# Patient Record
Sex: FEMALE | Race: BLACK OR AFRICAN AMERICAN | Employment: OTHER | URBAN - METROPOLITAN AREA
[De-identification: names, ages, dates, MRNs, and addresses within clinical notes are randomized per-mention and may not be internally consistent; named-entity substitution may affect disease eponyms.]

---

## 2017-01-17 LAB
CREATININE, EXTERNAL: 0.97
MICROALBUMIN UR TEST STR-MCNC: 971.2 MG/DL

## 2017-02-27 ENCOUNTER — OFFICE VISIT (OUTPATIENT)
Dept: ENDOCRINOLOGY | Age: 72
End: 2017-02-27

## 2017-02-27 VITALS
BODY MASS INDEX: 36.18 KG/M2 | HEIGHT: 62 IN | WEIGHT: 196.6 LBS | DIASTOLIC BLOOD PRESSURE: 71 MMHG | SYSTOLIC BLOOD PRESSURE: 157 MMHG | HEART RATE: 71 BPM

## 2017-02-27 DIAGNOSIS — E11.42 TYPE 2 DIABETES MELLITUS WITH DIABETIC POLYNEUROPATHY, WITH LONG-TERM CURRENT USE OF INSULIN (HCC): Primary | ICD-10-CM

## 2017-02-27 DIAGNOSIS — I10 ESSENTIAL HYPERTENSION: ICD-10-CM

## 2017-02-27 DIAGNOSIS — E78.2 MIXED HYPERLIPIDEMIA: ICD-10-CM

## 2017-02-27 DIAGNOSIS — Z79.4 TYPE 2 DIABETES MELLITUS WITH DIABETIC POLYNEUROPATHY, WITH LONG-TERM CURRENT USE OF INSULIN (HCC): Primary | ICD-10-CM

## 2017-02-27 LAB — HBA1C MFR BLD HPLC: 12.5 %

## 2017-02-27 RX ORDER — VALSARTAN AND HYDROCHLOROTHIAZIDE 320; 25 MG/1; MG/1
TABLET, FILM COATED ORAL DAILY
COMMUNITY
Start: 2016-12-09 | End: 2017-05-30 | Stop reason: SDUPTHER

## 2017-02-27 RX ORDER — INSULIN LISPRO 100 [IU]/ML
INJECTION, SUSPENSION SUBCUTANEOUS
Qty: 45 ML | Refills: 3 | Status: SHIPPED | OUTPATIENT
Start: 2017-02-27 | End: 2017-05-30 | Stop reason: SDUPTHER

## 2017-02-27 RX ORDER — BETAMETHASONE DIPROPIONATE 0.5 MG/G
LOTION TOPICAL 2 TIMES DAILY
Qty: 60 ML | Refills: 0 | Status: SHIPPED | OUTPATIENT
Start: 2017-02-27 | End: 2017-05-30 | Stop reason: SDUPTHER

## 2017-02-27 RX ORDER — METOPROLOL TARTRATE 25 MG/1
25 TABLET, FILM COATED ORAL 2 TIMES DAILY
COMMUNITY
Start: 2016-12-09 | End: 2017-05-30

## 2017-02-27 RX ORDER — INSULIN LISPRO 100 [IU]/ML
INJECTION, SUSPENSION SUBCUTANEOUS
Qty: 6 ML | Refills: 0 | Status: SHIPPED | COMMUNITY
Start: 2017-02-27 | End: 2017-05-30 | Stop reason: SDUPTHER

## 2017-02-27 RX ORDER — FUROSEMIDE 40 MG/1
40 TABLET ORAL DAILY
COMMUNITY
Start: 2017-01-17 | End: 2017-05-30 | Stop reason: SDUPTHER

## 2017-02-27 RX ORDER — INSULIN LISPRO 100 [IU]/ML
INJECTION, SUSPENSION SUBCUTANEOUS
Qty: 45 ML | Refills: 3 | Status: SHIPPED | OUTPATIENT
Start: 2017-02-27 | End: 2017-02-27

## 2017-02-27 NOTE — PROGRESS NOTES
Chief Complaint   Patient presents with    Diabetes     pcp and pharmacy verified. No eye exam in over a year     History of Present Illness: Aura Hernandez is a 81 y.o. female here for follow up of diabetes. She was diagnosed with diabetes about around 2010. At her last visit in July 2016 her A1C was 9.6%. Since that time she has had multiple cancellations and I have not see her since July 2016. Her A1C today was 12.1%. Pt is taking Glimeperide 4mg  BID and Lantus 35 units BID. She does not check her BGs. She has seen Dr. Chuck Claros two times since our last visit. She notes that \"everything is stable\" from the CKD and Nephropathy stand point. Pt notes her feet and legs \"are turning colors\". She notes dark spots on her ankles that do itch. No erythema or swelling. She notes her  had CABG in October 2016 and she is caring for him. Her son is also living with her, who is on HD. She is followed by Dr. Tamela Overton of cardiology for HTN and CAD. She has an appointment for a podiatrist in the near future. Her last eye appointment was 2015, no hx of retinopathy. Pt is eating 2-3 meals daily  Pt has breakfast everyday, around 930AM, yesterday she had a rasin bagel with cream cheese and water. She has lunch 3 days per week, yesterday she had a tuna sandwich. She typically has an apple or a pear in the afternoon. She has dinner before 6PM, last night she had baked chicken, mixed greens and apple sauce and water. She will occasionally have a protein bar in the evening. Last night she had an apple. Pt has hx of CAD, neuropathy, and neuropathy. She was previously on Metformin but was not able to tolerate the GI side effects. She denies any episodes of hypoglycemia. Pt also has hx of HLD on Atorvastatin 40mg daily and CT/US evidence of hepatic steatosis. She states she takes her atorvastatin daily. Pt reports adherance to her BP regimen. She is on BB, ARB, CCB, Clonidine and Lasix.    She notes she has checked her BP at home and her SBP is 140+. I have previously screened her for endocrine causes of HTN and they were negative. Current Outpatient Prescriptions   Medication Sig    furosemide (LASIX) 40 mg tablet     metoprolol tartrate (LOPRESSOR) 25 mg tablet Take 25 mg by mouth two (2) times a day.  valsartan-hydroCHLOROthiazide (DIOVAN-HCT) 320-25 mg per tablet daily.  Insulin Lisp & Lisp Prot, Hum, (HUMALOG MIX 75-25 KWIKPEN) 100 unit/mL (75-25) inpn ad    betamethasone dipropionate (DIPROLENE) 0.05 % topical lotion Apply  to affected area two (2) times a day.  Insulin Lisp & Lisp Prot, Hum, (HUMALOG MIX 75-25 KWIKPEN) 100 unit/mL (75-25) inpn 40 units with breakfast and 30 units with dinner    gabapentin (NEURONTIN) 100 mg capsule Take 3 Caps by mouth three (3) times daily.  cloNIDine HCl (CATAPRES) 0.1 mg tablet Take 0.2 mg by mouth nightly.  carvedilol (COREG) 6.25 mg tablet Take  by mouth two (2) times daily (with meals).  amLODIPine (NORVASC) 5 mg tablet Take 1 Tab by mouth daily.  aspirin 81 mg chewable tablet Take 1 Tab by mouth daily.  atorvastatin (LIPITOR) 40 mg tablet Take 1 Tab by mouth nightly.  clopidogrel (PLAVIX) 75 mg tablet Take 1 Tab by mouth daily.  nitroglycerin (NITROSTAT) 0.4 mg SL tablet 1 Tab by SubLINGual route every five (5) minutes as needed for Chest Pain.  glimepiride (AMARYL) 4 mg tablet Take 1 Tab by mouth every morning. Indications: TYPE 2 DIABETES MELLITUS (Patient taking differently: Take 4 mg by mouth two (2) times a day. Indications: TYPE 2 DIABETES MELLITUS)    pantoprazole (PROTONIX) 40 mg tablet Take 1 Tab by mouth every morning. No current facility-administered medications for this visit.       No Known Allergies  Review of Systems:  - Eyes: no blurry vision or double vision  - Cardiovascular: no chest pain  - Respiratory: no shortness of breath  - Musculoskeletal: no myalgias  - Neurological: no numbness/tingling in extremities    Physical Examination:  Blood pressure 157/71, pulse 71, height 5' 2\" (1.575 m), weight 196 lb 9.6 oz (89.2 kg). - General: pleasant, no distress, good eye contact   - Neck: no carotid bruits  - Cardiovascular: regular, normal rate, nl s1 and s2, no m/r/g, 2+ DP pulses   - Respiratory: clear bilaterally  - Integumentary: no edema, no foot ulcers, sensation to monofilament and vibration intact bilaterally  - Psychiatric: normal mood and affect    Data Reviewed:   - none new for review    Assessment/Plan:   1) DM > Pt is not checking her BGs, and her A1C has gone up considerably. I explained that I felt we needed to add a short acting insulin to her basal. She wanted to minimize the number of injections so will start Humalog 75:25 insulin 40 units with breakfast and 30 units with dinner. Pt to stop the Lantus and Glimepride. Pt to check her BGs twice daily and mail her BG logs to me in 3 weeks. 2) HTN > Pts BP is elevated again today and she is on 5 drugs for HTN with poor control. She insistes she has been adherent to her regimen. I have tested her for endocrine causes for her HTN, which were negative. 3) HLD > Pt is on Atrovastain 40mg daily, she is followed by Dr. Pereyra , will defer. 4) Rash > Will give pt prescription for betamethasone cream to apply to her ankles BID. If the rash does not improve, she will need a referral to dermatology. Pt voices understanding and agreement with the plan. Pain noted and pt was recommended to call her PCP for further evaluation and treatment, as needed    We spent 40 minutes of face to face time together and > 50% of the time was spent in counseling on diabetes management and determining what changes to make to her medical regimen. RTC 3 months    Patient Instructions   1) Stop the Lantus  2) Stop the Glimepride  3) Start the Humalog 75:25 insulin. Take 40 units with breakfast and 30 units with dinner.   4) check your blood sugars twice a day for the next 3 weeks. Follow-up Disposition:  Return in about 3 months (around 5/27/2017).     Copy sent to:  Dr. Ellen Mario

## 2017-02-27 NOTE — PATIENT INSTRUCTIONS
1) Stop the Lantus  2) Stop the Glimepride  3) Start the Humalog 75:25 insulin. Take 40 units with breakfast and 30 units with dinner. 4) check your blood sugars twice a day for the next 3 weeks.

## 2017-02-27 NOTE — MR AVS SNAPSHOT
Visit Information Date & Time Provider Department Dept. Phone Encounter #  
 2/27/2017 12:10 PM José Manuel Mora, [address] [facility] [phone] [id number] Follow-up Instructions Return in about 3 months (around 5/27/2017). Upcoming Health Maintenance Date Due  
 EYE EXAM RETINAL OR DILATED Q1 1/9/1957 DTaP/Tdap/Td series (1 - Tdap) 1/9/1968 FOBT Q 1 YEAR AGE 50-75 1/9/1997 ZOSTER VACCINE AGE 60> 1/9/2007 GLAUCOMA SCREENING Q2Y 1/9/2012 OSTEOPOROSIS SCREENING (DEXA) 1/9/2012 Pneumococcal 65+ Low/Medium Risk (1 of 2 - PCV13) 1/9/2012 MEDICARE YEARLY EXAM 1/9/2012 LIPID PANEL Q1 2/16/2016 INFLUENZA AGE 9 TO ADULT 8/1/2016 HEMOGLOBIN A1C Q6M 1/29/2017 FOOT EXAM Q1 7/29/2017 MICROALBUMIN Q1 1/18/2018 BREAST CANCER SCRN MAMMOGRAM 4/25/2018 Allergies as of 2/27/2017  Review Complete On: 2/27/2017 By: José Manuel Mora MD  
 No Known Allergies Current Immunizations  Never Reviewed No immunizations on file. Not reviewed this visit You Were Diagnosed With   
  
 Codes Comments Type 2 diabetes mellitus with diabetic polyneuropathy, with long-term current use of insulin (HCC)    -  Primary ICD-10-CM: E11.42, Z79.4 ICD-9-CM: 250.60, 357.2, V58.67 Essential hypertension     ICD-10-CM: I10 
ICD-9-CM: 401.9 Mixed hyperlipidemia     ICD-10-CM: E78.2 ICD-9-CM: 272.2 Vitals BP  
  
  
  
  
  
 157/71 (BP 1 Location: Left arm, BP Patient Position: Sitting) Vitals History BMI and BSA Data Body Mass Index Body Surface Area 35.96 kg/m 2 1.98 m 2 Preferred Pharmacy Pharmacy Name Phone [facility] [address] [phone] Your Updated Medication List  
  
   
This list is accurate as of: 2/27/17 12:58 PM.  Always use your most recent med list. amLODIPine 5 mg tablet Commonly known as:  Dez Carranza Take 1 Tab by mouth daily. aspirin 81 mg chewable tablet Take 1 Tab by mouth daily. atorvastatin 40 mg tablet Commonly known as:  LIPITOR Take 1 Tab by mouth nightly. betamethasone dipropionate 0.05 % topical lotion Commonly known as:  Lucendia Pontiff Apply  to affected area two (2) times a day. carvedilol 6.25 mg tablet Commonly known as:  Cori Mynor Take  by mouth two (2) times daily (with meals). cloNIDine HCl 0.1 mg tablet Commonly known as:  CATAPRES Take 0.2 mg by mouth nightly. clopidogrel 75 mg Tab Commonly known as:  PLAVIX Take 1 Tab by mouth daily. furosemide 40 mg tablet Commonly known as:  LASIX  
  
 gabapentin 100 mg capsule Commonly known as:  NEURONTIN Take 3 Caps by mouth three (3) times daily. glimepiride 4 mg tablet Commonly known as:  AMARYL Take 1 Tab by mouth every morning. Indications: TYPE 2 DIABETES MELLITUS * Insulin Lisp & Lisp Prot (Hum) 100 unit/mL (75-25) Inpn Commonly known as:  HumaLOG Mix 75-25 KwikPen  
ad * Insulin Lisp & Lisp Prot (Hum) 100 unit/mL (75-25) Inpn Commonly known as:  HumaLOG Mix 75-25 KwikPen 40 units with breakfast and 30 units with dinner  
  
 metoprolol tartrate 25 mg tablet Commonly known as:  LOPRESSOR Take 25 mg by mouth two (2) times a day. nitroglycerin 0.4 mg SL tablet Commonly known as:  NITROSTAT  
1 Tab by SubLINGual route every five (5) minutes as needed for Chest Pain.  
  
 pantoprazole 40 mg tablet Commonly known as:  PROTONIX Take 1 Tab by mouth every morning. valsartan-hydroCHLOROthiazide 320-25 mg per tablet Commonly known as:  DIOVAN-HCT  
daily. * Notice: This list has 2 medication(s) that are the same as other medications prescribed for you. Read the directions carefully, and ask your doctor or other care provider to review them with you. Prescriptions Sent to Pharmacy Refills betamethasone dipropionate (DIPROLENE) 0.05 % topical lotion 0 Sig: Apply  to affected area two (2) times a day. Class: Normal  
 Pharmacy: [address] Ph #: [phone] Route: Topical  
 Insulin Lisp & Lisp Prot, Hum, (HUMALOG MIX 75-25 KWIKPEN) 100 unit/mL (75-25) inpn 3 Si units with breakfast and 30 units with dinner Class: Normal  
 Pharmacy: [id number] [place] [address] Ph #: [phone] Follow-up Instructions Return in about 3 months (around 2017). Patient Instructions 1) Stop the Lantus 2) Stop the Glimepride 3) Start the Humalog 75:25 insulin. Take 40 units with breakfast and 30 units with dinner. 4) check your blood sugars twice a day for the next 3 weeks. Introducing \[facility]\"" & HEALTH SERVICES! Luanne Peterson introduces [employer] patient portal. Now you can access parts of your medical record, email your doctor's office, and request medication refills online. 1. In your internet browser, go to [email or web address] 2. Click on the First Time User? Click Here link in the Sign In box. You will see the New Member Sign Up page. 3. Enter your [employer] Access Code exactly as it appears below. You will not need to use this code after youve completed the sign-up process. If you do not sign up before the expiration date, you must request a new code. · [employer] Access Code: [email or web address] Expires: 2017 10:54 AM 
 
4. Enter the last four digits of your Social Security Number (xxxx) and Date of Birth (mm/dd/yyyy) as indicated and click Submit. You will be taken to the next sign-up page. 5. Create a [employer] ID. This will be your [employer] login ID and cannot be changed, so think of one that is secure and easy to remember. 6. Create a [employer] password. You can change your password at any time. 7. Enter your Password Reset Question and Answer. This can be used at a later time if you forget your password. 8. Enter your e-mail address. You will receive e-mail notification when new information is available in [address] 9. Click Sign Up. You can now view and download portions of your medical record. 10. Click the Download Summary menu link to download a portable copy of your medical information. If you have questions, please visit the Frequently Asked Questions section of the [employer] website. Remember, [employer] is NOT to be used for urgent needs. For medical emergencies, dial 911. Now available from your iPhone and Android! Please provide this summary of care documentation to your next provider. Your primary care clinician is listed as Becky Mendoza. If you have any questions after today's visit, please call [phone].

## 2017-05-30 ENCOUNTER — TELEPHONE (OUTPATIENT)
Dept: ENDOCRINOLOGY | Age: 72
End: 2017-05-30

## 2017-05-30 ENCOUNTER — OFFICE VISIT (OUTPATIENT)
Dept: ENDOCRINOLOGY | Age: 72
End: 2017-05-30

## 2017-05-30 VITALS
HEIGHT: 62 IN | DIASTOLIC BLOOD PRESSURE: 74 MMHG | WEIGHT: 195.4 LBS | HEART RATE: 63 BPM | SYSTOLIC BLOOD PRESSURE: 173 MMHG | BODY MASS INDEX: 35.96 KG/M2

## 2017-05-30 DIAGNOSIS — Z79.4 TYPE 2 DIABETES MELLITUS WITH DIABETIC POLYNEUROPATHY, WITH LONG-TERM CURRENT USE OF INSULIN (HCC): Primary | ICD-10-CM

## 2017-05-30 DIAGNOSIS — E78.2 MIXED HYPERLIPIDEMIA: ICD-10-CM

## 2017-05-30 DIAGNOSIS — I10 ESSENTIAL HYPERTENSION: ICD-10-CM

## 2017-05-30 DIAGNOSIS — E11.42 TYPE 2 DIABETES MELLITUS WITH DIABETIC POLYNEUROPATHY, WITH LONG-TERM CURRENT USE OF INSULIN (HCC): Primary | ICD-10-CM

## 2017-05-30 LAB — HBA1C MFR BLD HPLC: 13 %

## 2017-05-30 RX ORDER — AMLODIPINE BESYLATE 10 MG/1
10 TABLET ORAL DAILY
Qty: 90 TAB | Refills: 3 | Status: SHIPPED | OUTPATIENT
Start: 2017-05-30 | End: 2017-10-26 | Stop reason: SDUPTHER

## 2017-05-30 RX ORDER — BETAMETHASONE DIPROPIONATE 0.5 MG/G
LOTION TOPICAL 2 TIMES DAILY
Qty: 60 ML | Refills: 0 | Status: SHIPPED | OUTPATIENT
Start: 2017-05-30 | End: 2018-05-22

## 2017-05-30 RX ORDER — PANTOPRAZOLE SODIUM 40 MG/1
40 TABLET, DELAYED RELEASE ORAL
Qty: 90 TAB | Refills: 3 | Status: SHIPPED | OUTPATIENT
Start: 2017-05-30 | End: 2017-10-26 | Stop reason: SDUPTHER

## 2017-05-30 RX ORDER — VALSARTAN AND HYDROCHLOROTHIAZIDE 320; 25 MG/1; MG/1
1 TABLET, FILM COATED ORAL DAILY
Qty: 90 TAB | Refills: 3 | Status: SHIPPED | OUTPATIENT
Start: 2017-05-30 | End: 2017-10-26 | Stop reason: SDUPTHER

## 2017-05-30 RX ORDER — NITROGLYCERIN 0.4 MG/1
0.4 TABLET SUBLINGUAL
Qty: 50 TAB | Refills: 12 | Status: SHIPPED | OUTPATIENT
Start: 2017-05-30 | End: 2020-01-27

## 2017-05-30 RX ORDER — FUROSEMIDE 40 MG/1
40 TABLET ORAL DAILY
Qty: 90 TAB | Refills: 3 | Status: SHIPPED | OUTPATIENT
Start: 2017-05-30 | End: 2017-10-26 | Stop reason: SDUPTHER

## 2017-05-30 RX ORDER — CLOPIDOGREL BISULFATE 75 MG/1
75 TABLET ORAL DAILY
Qty: 90 TAB | Refills: 3 | Status: SHIPPED | OUTPATIENT
Start: 2017-05-30 | End: 2017-10-26 | Stop reason: SDUPTHER

## 2017-05-30 RX ORDER — INSULIN LISPRO 100 [IU]/ML
INJECTION, SUSPENSION SUBCUTANEOUS
Qty: 45 ML | Refills: 3
Start: 2017-05-30 | End: 2017-05-30 | Stop reason: SDUPTHER

## 2017-05-30 RX ORDER — GABAPENTIN 300 MG/1
300 CAPSULE ORAL 3 TIMES DAILY
Qty: 270 CAP | Refills: 3 | Status: SHIPPED | OUTPATIENT
Start: 2017-05-30 | End: 2017-10-26 | Stop reason: SDUPTHER

## 2017-05-30 RX ORDER — ATORVASTATIN CALCIUM 40 MG/1
40 TABLET, FILM COATED ORAL
Qty: 90 TAB | Refills: 3 | Status: SHIPPED | OUTPATIENT
Start: 2017-05-30 | End: 2017-10-26 | Stop reason: SDUPTHER

## 2017-05-30 RX ORDER — CARVEDILOL 6.25 MG/1
6.25 TABLET ORAL 2 TIMES DAILY WITH MEALS
Qty: 180 TAB | Refills: 3 | Status: SHIPPED | OUTPATIENT
Start: 2017-05-30 | End: 2017-10-26 | Stop reason: SDUPTHER

## 2017-05-30 RX ORDER — INSULIN LISPRO 100 [IU]/ML
INJECTION, SUSPENSION SUBCUTANEOUS
Qty: 45 ML | Refills: 3 | Status: SHIPPED | OUTPATIENT
Start: 2017-05-30 | End: 2017-10-26 | Stop reason: SDUPTHER

## 2017-05-30 NOTE — PATIENT INSTRUCTIONS
1) Humalog 75:25. Take 50 units with breakfast and 40 units with dinner. Keep a log of your blood sugars and bring that back with you in 6 weeks.

## 2017-05-30 NOTE — PROGRESS NOTES
Chief Complaint   Patient presents with    Diabetes     pcp and pharmacy verified. Eye exam over a year     History of Present Illness: Jose Alejandro Dean is a 81 y.o. female here for follow up of diabetes. She was diagnosed with diabetes about around 2010. At her last visit in July 2016 her A1C was 12.1% on Glimepride 4mg BID and Lantus 35 units BID. I explained that I felt we needed to add a short acting insulin to her basal. She wanted to minimize the number of injections so will start Humalog 75:25 insulin 40 units with breakfast and 30 units with dinner. Pt was instructed to stop the Lantus and Glimepride. Her A1C today was 13.0%. Pt notes she is taking care of her  and son so \"I have let myself go\". Her  had CABG October 2016 and Shay Trores has been going to the doctors a lot ever since\". Her son is DM and ESRD and is on HD on T/T/Sa and between caring for them she has not had time to care for herself. She notes that her feet hurt \"like I am walking on rubber balls\" and she notes that she gets a little dizzy at time. She notes the pain started after she ran out of her Gabapentin. She will miss about one dose of her insulin weekly. She reports that she is taking the Humalog 75:25 40 units with breakfast and 30 units in the evenings (but not with dinner). She does not check her BGs regularly. She notes she started to feel light headed today. She notes that she does drink plenty of water and she notes she has issues of polyuria. She denies issues of hypoglycemia. She follows up with Dr. Ivonne Thorne on June 5th. She notes that \"everything is stable\" from the CKD and Nephropathy stand point. Pt notes her feet and legs \"are turning colors\". She notes dark spots on her ankles that do itch. No erythema or swelling. She is followed by Dr. Terrance Cohen of cardiology for HTN and CAD. She has an appointment for a podiatrist in the near future.   Her last eye appointment was 2015, no hx of retinopathy. Pt has breakfast everyday, around 930AM, yesterday she had eggs, cheese, turkey sausage and apple sauce, sweet tea and water. She notes she will snack in the mid-morning on PB crackers  She has lunch around 2PM, she will have and open face sandwich with cheese and water. She will occasionally have a tangerine. She will snack in the mid-afternoon on fruit (grapes and strawberries) or a waffle (plain)  She has dinner around 630PM, last night she had baked chicken, string beans and potatoes and ginger ale. She will have an evening snack of chicken wings, or fruit. Pt has hx of CAD, neuropathy, and neuropathy. She was previously on Metformin but was not able to tolerate the GI side effects. She denies any episodes of hypoglycemia. Pt also has hx of HLD on Atorvastatin 40mg daily and CT/US evidence of hepatic steatosis. She states she takes her atorvastatin daily. Pt reports adherance to her BP regimen. She is on BB, ARB, CCB, Clonidine and Lasix. She notes she has checked her BP at home and her SBP is 140+. I have previously screened her for endocrine causes of HTN and they were negative. Current Outpatient Prescriptions   Medication Sig    gabapentin (NEURONTIN) 300 mg capsule Take 1 Cap by mouth three (3) times daily.  amLODIPine (NORVASC) 10 mg tablet Take 1 Tab by mouth daily.  Insulin Lisp & Lisp Prot, Hum, (HUMALOG MIX 75-25 KWIKPEN) 100 unit/mL (75-25) inpn 50 units with breakfast and 40 units with dinner    furosemide (LASIX) 40 mg tablet Take 1 Tab by mouth daily.  valsartan-hydroCHLOROthiazide (DIOVAN-HCT) 320-25 mg per tablet Take 1 Tab by mouth daily.  betamethasone dipropionate (DIPROLENE) 0.05 % topical lotion Apply  to affected area two (2) times a day.  atorvastatin (LIPITOR) 40 mg tablet Take 1 Tab by mouth nightly.  clopidogrel (PLAVIX) 75 mg tab Take 1 Tab by mouth daily.     nitroglycerin (NITROSTAT) 0.4 mg SL tablet 1 Tab by SubLINGual route every five (5) minutes as needed for Chest Pain.  pantoprazole (PROTONIX) 40 mg tablet Take 1 Tab by mouth every morning.  carvedilol (COREG) 6.25 mg tablet Take 1 Tab by mouth two (2) times daily (with meals).  aspirin 81 mg chewable tablet Take 1 Tab by mouth daily. No current facility-administered medications for this visit. No Known Allergies  Review of Systems:  - Eyes: no blurry vision or double vision  - Cardiovascular: no chest pain  - Respiratory: no shortness of breath  - Musculoskeletal: no myalgias  - Neurological: + numbness/tingling in extremities    Physical Examination:  Blood pressure 173/74, pulse 63, height 5' 2\" (1.575 m), weight 195 lb 6.4 oz (88.6 kg). - General: pleasant, no distress, good eye contact   - Neck: no carotid bruits  - Cardiovascular: regular, normal rate, nl s1 and s2, no m/r/g, 2+ DP pulses   - Respiratory: clear bilaterally  - Integumentary: no edema, no foot ulcers, sensation to monofilament and vibration intact bilaterally  - Psychiatric: normal mood and affect    Data Reviewed:   Her A1C today was 13.0%. Assessment/Plan:   1) DM > Her A1C today was 13.0%. She insists she is adherent with her insulin. Will increase the Humalog 75:25 to 50 units with breakfast and 40 units with dinner. Pt to check her BGs twice daily and keep a log and bring that back with her in 6 weeks to see me. 2) HTN > Pts BP was 150/70 manual and she is on 5 drugs for HTN with poor control. I increased her Amilodipine from 5mg daily to 10mg daily. She insistes she has been adherent to her regimen. I have tested her for endocrine causes for her HTN, which were negative. 3) HLD > Pt is on Atrovastain 40mg daily, she is followed by Dr. Napoleon Almaraz, will refill her Atorvastatin. Pt voices understanding and agreement with the plan.   Pain noted and pt was recommended to call her PCP for further evaluation and treatment, as needed    We spent 40 minutes of face to face time together and > 50% of the time was spent in counseling on diabetes management and determining what changes to make to her medical regimen. RTC 6 weeks    Patient Instructions   1) Humalog 75:25. Take 50 units with breakfast and 40 units with dinner. Keep a log of your blood sugars and bring that back with you in 6 weeks. Follow-up Disposition:  Return in about 6 weeks (around 7/11/2017).     Copy sent to:  Dr. Tigist Zepeda

## 2017-05-30 NOTE — MR AVS SNAPSHOT
Visit Information Date & Time Provider Department Dept. Phone Encounter #  
 5/30/2017 11:50 AM Leslie Santoro MD [facility] [phone] [id number] Follow-up Instructions Return in about 3 months (around 8/30/2017). Upcoming Health Maintenance Date Due  
 EYE EXAM RETINAL OR DILATED Q1 1/9/1957 DTaP/Tdap/Td series (1 - Tdap) 1/9/1968 FOBT Q 1 YEAR AGE 50-75 1/9/1997 ZOSTER VACCINE AGE 60> 1/9/2007 GLAUCOMA SCREENING Q2Y 1/9/2012 OSTEOPOROSIS SCREENING (DEXA) 1/9/2012 Pneumococcal 65+ Low/Medium Risk (1 of 2 - PCV13) 1/9/2012 MEDICARE YEARLY EXAM 1/9/2012 LIPID PANEL Q1 2/16/2016 FOOT EXAM Q1 7/29/2017 INFLUENZA AGE 9 TO ADULT 8/1/2017 HEMOGLOBIN A1C Q6M 8/27/2017 MICROALBUMIN Q1 1/18/2018 BREAST CANCER SCRN MAMMOGRAM 4/25/2018 Allergies as of 5/30/2017  Review Complete On: 5/30/2017 By: Leslie Santoro MD  
 No Known Allergies Current Immunizations  Never Reviewed No immunizations on file. Not reviewed this visit You Were Diagnosed With   
  
 Codes Comments Type 2 diabetes mellitus with diabetic polyneuropathy, with long-term current use of insulin (HCC)    -  Primary ICD-10-CM: E11.42, Z79.4 ICD-9-CM: 250.60, 357.2, V58.67 Essential hypertension     ICD-10-CM: I10 
ICD-9-CM: 401.9 Mixed hyperlipidemia     ICD-10-CM: E78.2 ICD-9-CM: 272.2 Vitals BP Pulse Height(growth percentile) Weight(growth percentile) BMI OB Status 173/74 (BP 1 Location: Right arm, BP Patient Position: Sitting) 63 5' 2\" (1.575 m) 195 lb 6.4 oz (88.6 kg) 35.74 kg/m2 Postmenopausal  
 Smoking Status Never Smoker Vitals History BMI and BSA Data Body Mass Index Body Surface Area 35.74 kg/m 2 1.97 m 2 Preferred Pharmacy Pharmacy Name [address] [facility] [phone] Your Updated Medication List  
  
   
This list is accurate as of: 5/30/17 11:57 AM.  Always use your most recent med list. amLODIPine 5 mg tablet Commonly known as:  Nay Tamez Take 1 Tab by mouth daily. aspirin 81 mg chewable tablet Take 1 Tab by mouth daily. atorvastatin 40 mg tablet Commonly known as:  LIPITOR Take 1 Tab by mouth nightly. betamethasone dipropionate 0.05 % topical lotion Commonly known as:  Lulu Parkinson Apply  to affected area two (2) times a day. carvedilol 6.25 mg tablet Commonly known as:  Llana Milo Take  by mouth two (2) times daily (with meals). clopidogrel 75 mg Tab Commonly known as:  PLAVIX Take 1 Tab by mouth daily. furosemide 40 mg tablet Commonly known as:  LASIX 40 mg daily. gabapentin 100 mg capsule Commonly known as:  NEURONTIN Take 3 Caps by mouth three (3) times daily. Insulin Lisp & Lisp Prot (Hum) 100 unit/mL (75-25) Inpn Commonly known as:  HumaLOG Mix 75-25 KwikPen 40 units with breakfast and 30 units with dinner  
  
 metoprolol tartrate 25 mg tablet Commonly known as:  LOPRESSOR Take 25 mg by mouth two (2) times a day. nitroglycerin 0.4 mg SL tablet Commonly known as:  NITROSTAT  
1 Tab by SubLINGual route every five (5) minutes as needed for Chest Pain.  
  
 pantoprazole 40 mg tablet Commonly known as:  PROTONIX Take 1 Tab by mouth every morning. valsartan-hydroCHLOROthiazide 320-25 mg per tablet Commonly known as:  DIOVAN-HCT  
daily. We Performed the Following AMB POC HEMOGLOBIN A1C [75609 CPT(R)]  DIABETES FOOT EXAM [7 Custom] NC COLLECTION VENOUS BLOOD,VENIPUNCTURE H950021 CPT(R)] Follow-up Instructions Return in about 3 months (around 8/30/2017). Patient Instructions 1) Humalog 75:25. Take 50 units with breakfast and 40 units with dinner. Keep a log of your blood sugars and bring that back with you in 6 weeks. Introducing [facility]! Lili Sandeeprolly introduces [employer] patient portal. Now you can access parts of your medical record, email your doctor's office, and request medication refills online. 1. In your internet browser, go to [email or web address] 2. Click on the First Time User? Click Here link in the Sign In box. You will see the New Member Sign Up page. 3. Enter your [employer] Access Code exactly as it appears below. You will not need to use this code after youve completed the sign-up process. If you do not sign up before the expiration date, you must request a new code. · [employer] Access Code: [facility] Expires: 8/28/2017 11:57 AM 
 
4. Enter the last four digits of your Social Security Number (xxxx) and Date of Birth (mm/dd/yyyy) as indicated and click Submit. You will be taken to the next sign-up page. 5. Create a [employer] ID. This will be your [employer] login ID and cannot be changed, so think of one that is secure and easy to remember. 6. Create a [employer] password. You can change your password at any time. 7. Enter your Password Reset Question and Answer. This can be used at a later time if you forget your password. 8. Enter your e-mail address. You will receive e-mail notification when new information is available in [address] 9. Click Sign Up. You can now view and download portions of your medical record. 10. Click the Download Summary menu link to download a portable copy of your medical information. If you have questions, please visit the Frequently Asked Questions section of the [employer] website. Remember, [employer] is NOT to be used for urgent needs. For medical emergencies, dial 911. Now available from your iPhone and Android! Please provide this summary of care documentation to your next provider. Your primary care clinician is listed as Bevely Crooked. If you have any questions after today's visit, please call [phone].

## 2017-07-12 ENCOUNTER — OFFICE VISIT (OUTPATIENT)
Dept: ENDOCRINOLOGY | Age: 72
End: 2017-07-12

## 2017-07-12 VITALS
HEIGHT: 62 IN | BODY MASS INDEX: 37.39 KG/M2 | WEIGHT: 203.2 LBS | DIASTOLIC BLOOD PRESSURE: 55 MMHG | SYSTOLIC BLOOD PRESSURE: 135 MMHG | HEART RATE: 67 BPM

## 2017-07-12 DIAGNOSIS — I10 ESSENTIAL HYPERTENSION: ICD-10-CM

## 2017-07-12 DIAGNOSIS — E11.42 TYPE 2 DIABETES MELLITUS WITH DIABETIC POLYNEUROPATHY, WITH LONG-TERM CURRENT USE OF INSULIN (HCC): Primary | ICD-10-CM

## 2017-07-12 DIAGNOSIS — Z79.4 TYPE 2 DIABETES MELLITUS WITH DIABETIC POLYNEUROPATHY, WITH LONG-TERM CURRENT USE OF INSULIN (HCC): Primary | ICD-10-CM

## 2017-07-12 DIAGNOSIS — E78.2 MIXED HYPERLIPIDEMIA: ICD-10-CM

## 2017-07-12 DIAGNOSIS — R60.0 LOCALIZED EDEMA: ICD-10-CM

## 2017-07-12 NOTE — PROGRESS NOTES
Chief Complaint   Patient presents with    Diabetes     pcp and pharmacy verified. Eye exam over a year     History of Present Illness: Sindy Monterroso is a 81 y.o. female here for follow up of diabetes. She was diagnosed with diabetes about around 2010. At her last visit in May 2017 her A1C was 13% on Humalog 75:25 40 units with breakfast and 30 units in the evenings (but not with dinner). She insisted she was adherent with her insulin regimen. I instructed her to increase her AM dose of 75:25 to 50 units and her evening dose to 40 units and to take the evening dose WITH DINNER. Pt notes her legs have been swelling and \"I seem to have a fungus around my ankles\". She notes that the betamethasone has not helped with the rash on her legs. Pt notes her  is doing much better, he had a defib placed and is doing better. Her  had CABG October 2016 and Elner  has been going to the doctors a lot ever since\". Her son is DM and ESRD and is on HD on T/T/Sa and between caring for them she has not had time to care for herself. She notes that her feet hurt \"like I am walking on rubber balls\" and she notes that she gets a little dizzy at time. She notes the pain started after she ran out of her Gabapentin. She will miss about one dose of her insulin weekly. She reports that she is taking the Humalog 75:25 50 units with breakfast and 40 units with dinner. She does not check her BGs regularly and she \"is not eating right\"    Pt has breakfast everyday, around 930AM, this AM she had a bowl of oatmeal and water. She has lunch around 1PM, yesterday she had a baked chicken sandwich, no side items and sweet tea. She will snack in the mid-afternoon on \"breakfast biscuits\"  She has dinner around 6PM, last night she had baked chicken, broccoli and red potatoes and sweet tea. She does most of her snacking during the day. She will snack on potato chips. She follows up with Dr. Cee Jesus of Nephrology.  She notes that \"everything is stable\" from the CKD and Nephropathy stand point. Pt notes her feet and legs \"are turning colors\". She notes dark spots on her ankles that do itch. No erythema or swelling. She is followed by Dr. Nathalia Myles of cardiology for HTN and CAD. She has an appointment for a podiatrist in the near future. Her last eye appointment was 2015, no hx of retinopathy. Pt has hx of CAD, neuropathy, and neuropathy (pt is on gabapentin 300mg TID). Pt also has hx of HLD on Atorvastatin 40mg daily and CT/US evidence of hepatic steatosis. She states she takes her atorvastatin daily. Pt reports adherance to her BP regimen. She is on BB, ARB, CCB, Clonidine and Lasix. She notes she has checked her BP at home and her SBP is 140+. I have previously screened her for endocrine causes of HTN and they were negative. Current Outpatient Prescriptions   Medication Sig    gabapentin (NEURONTIN) 300 mg capsule Take 1 Cap by mouth three (3) times daily.  amLODIPine (NORVASC) 10 mg tablet Take 1 Tab by mouth daily.  Insulin Lisp & Lisp Prot, Hum, (HUMALOG MIX 75-25 KWIKPEN) 100 unit/mL (75-25) inpn 50 units with breakfast and 40 units with dinner    furosemide (LASIX) 40 mg tablet Take 1 Tab by mouth daily.  valsartan-hydroCHLOROthiazide (DIOVAN-HCT) 320-25 mg per tablet Take 1 Tab by mouth daily.  betamethasone dipropionate (DIPROLENE) 0.05 % topical lotion Apply  to affected area two (2) times a day.  atorvastatin (LIPITOR) 40 mg tablet Take 1 Tab by mouth nightly.  clopidogrel (PLAVIX) 75 mg tab Take 1 Tab by mouth daily.  nitroglycerin (NITROSTAT) 0.4 mg SL tablet 1 Tab by SubLINGual route every five (5) minutes as needed for Chest Pain.  pantoprazole (PROTONIX) 40 mg tablet Take 1 Tab by mouth every morning.  carvedilol (COREG) 6.25 mg tablet Take 1 Tab by mouth two (2) times daily (with meals).  aspirin 81 mg chewable tablet Take 1 Tab by mouth daily.      No current facility-administered medications for this visit. Allergies   Allergen Reactions    Metformin Other (comments)     GI side effects     Review of Systems:  - Eyes: no blurry vision or double vision  - Cardiovascular: no chest pain  - Respiratory: no shortness of breath  - Musculoskeletal: no myalgias  - Neurological: + numbness/tingling in extremities    Physical Examination:  Blood pressure 135/55, pulse 67, height 5' 2\" (1.575 m), weight 203 lb 3.2 oz (92.2 kg). - General: pleasant, no distress, good eye contact   - Neck: no carotid bruits  - Cardiovascular: regular, normal rate, nl s1 and s2, no m/r/g, 2+ DP pulses   - Respiratory: clear bilaterally  - Integumentary: no edema, no foot ulcers, sensation to monofilament and vibration intact bilaterally  - Psychiatric: normal mood and affect    Data Reviewed:   -None     Assessment/Plan:   1) DM >  She insists she is adherent with her insulin, but is still drinking sugar sweetened tea and snacking on chips and high carb snacks. Will check a fructosamine today and pt instructed to stop the sugar sweetened beverages and cut out the snacking. Will check a fructosamine level today. 2) HTN > Manual BP today was 135/55, will continue her current regimen. I would be concerned about increasing further as it could cause diastolic hypotension. 3) HLD > Pt is on Atrovastain 40mg daily, she is followed by Dr. Merlinda Ferrara. 4) LE Edema > Will have pt increase her Lasix to 60mg daily for the next week and follow up with Dr. Latosha Chun. Will check a CMP today. Pt voices understanding and agreement with the plan. Pain noted and pt was recommended to call her PCP for further evaluation and treatment, as needed    We spent 40 minutes of face to face time together and > 50% of the time was spent in counseling on diabetes management and determining what changes to make to her medical regimen.      RTC 3 months    Patient Instructions   1) Increase your Lasix (Water pill) to one and a half pills daily and follow up with Maria Teresa Melvin in a couple of weeks. Let him know that I told you to increase the lasix for the swelling in your legs for a week or two. Follow-up Disposition:  Return in about 3 months (around 10/12/2017).     Copy sent to:  Dr. Dee Cyr

## 2017-07-12 NOTE — PATIENT INSTRUCTIONS
1) Increase your Lasix (Water pill) to one and a half pills daily and follow up with Sara Harvey in a couple of weeks. Let him know that I told you to increase the lasix for the swelling in your legs for a week or two.

## 2017-07-12 NOTE — PROGRESS NOTES
Blood pressure has been repeated in opposite arm. No symptoms, per patient. Took some of her BP med today.

## 2017-07-12 NOTE — MR AVS SNAPSHOT
Visit Information Date & Time Provider Department Dept. Phone Encounter #  
 7/12/2017  9:30 AM Cinthia Acosta, [address] [facility] [phone] [id number] Follow-up Instructions Return in about 3 months (around 10/12/2017). Upcoming Health Maintenance Date Due  
 EYE EXAM RETINAL OR DILATED Q1 1/9/1957 DTaP/Tdap/Td series (1 - Tdap) 1/9/1968 FOBT Q 1 YEAR AGE 50-75 1/9/1997 ZOSTER VACCINE AGE 60> 1/9/2007 GLAUCOMA SCREENING Q2Y 1/9/2012 OSTEOPOROSIS SCREENING (DEXA) 1/9/2012 Pneumococcal 65+ Low/Medium Risk (1 of 2 - PCV13) 1/9/2012 MEDICARE YEARLY EXAM 1/9/2012 INFLUENZA AGE 9 TO ADULT 8/1/2017 HEMOGLOBIN A1C Q6M 11/30/2017 BREAST CANCER SCRN MAMMOGRAM 4/25/2018 FOOT EXAM Q1 5/30/2018 MICROALBUMIN Q1 6/9/2018 LIPID PANEL Q1 7/12/2018 Allergies as of 7/12/2017  Review Complete On: 7/12/2017 By: Cinthia Acosta MD  
  
 Severity Noted Reaction Type Reactions Metformin  07/12/2017    Other (comments) GI side effects Current Immunizations  Never Reviewed No immunizations on file. Not reviewed this visit Vitals BP Pulse Height(growth percentile) Weight(growth percentile) BMI OB Status 146/60 (BP 1 Location: Right arm, BP Patient Position: Sitting) 67 5' 2\" (1.575 m) 203 lb 3.2 oz (92.2 kg) 37.17 kg/m2 Postmenopausal  
 Smoking Status Never Smoker Vitals History BMI and BSA Data Body Mass Index Body Surface Area  
 37.17 kg/m 2 2.01 m 2 Preferred Pharmacy Pharmacy Name Phone [address] [facility] [phone] Your Updated Medication List  
  
   
This list is accurate as of: 7/12/17 10:00 AM.  Always use your most recent med list. amLODIPine 10 mg tablet Commonly known as:  Manrique Inks Take 1 Tab by mouth daily. aspirin 81 mg chewable tablet Take 1 Tab by mouth daily. atorvastatin 40 mg tablet Commonly known as:  LIPITOR Take 1 Tab by mouth nightly. betamethasone dipropionate 0.05 % topical lotion Commonly known as:  Lendell Stall Apply  to affected area two (2) times a day. carvedilol 6.25 mg tablet Commonly known as:  Gilberto Hirschfeld Take 1 Tab by mouth two (2) times daily (with meals). clopidogrel 75 mg Tab Commonly known as:  PLAVIX Take 1 Tab by mouth daily. furosemide 40 mg tablet Commonly known as:  LASIX Take 1 Tab by mouth daily. gabapentin 300 mg capsule Commonly known as:  NEURONTIN Take 1 Cap by mouth three (3) times daily. Insulin Lisp & Lisp Prot (Hum) 100 unit/mL (75-25) Inpn Commonly known as:  HumaLOG Mix 75-25 KwikPen 50 units with breakfast and 40 units with dinner  
  
 nitroglycerin 0.4 mg SL tablet Commonly known as:  NITROSTAT  
1 Tab by SubLINGual route every five (5) minutes as needed for Chest Pain.  
  
 pantoprazole 40 mg tablet Commonly known as:  PROTONIX Take 1 Tab by mouth every morning. valsartan-hydroCHLOROthiazide 320-25 mg per tablet Commonly known as:  DIOVAN-HCT Take 1 Tab by mouth daily. Follow-up Instructions Return in about 3 months (around 10/12/2017). Patient Instructions 1) Increase your Lasix (Water pill) to one and a half pills daily and follow up with [facility] in a couple of weeks. Let him know that I told you to increase the lasix for the swelling in your legs for a week or two. Introducing [facility]! Tanya Estrada introduces [employer] patient portal. Now you can access parts of your medical record, email your doctor's office, and request medication refills online. 1. In your internet browser, go to [email or web address] 2. Click on the First Time User? Click Here link in the Sign In box. You will see the New Member Sign Up page. 3. Enter your [email or web address] Access Code exactly as it appears below. You will not need to use this code after youve completed the sign-up process. If you do not sign up before the expiration date, you must request a new code. · [employer] Access Code: [facility] Expires: 8/28/2017 11:57 AM 
 
4. Enter the last four digits of your Social Security Number (xxxx) and Date of Birth (mm/dd/yyyy) as indicated and click Submit. You will be taken to the next sign-up page. 5. Create a [email or web address] ID. This will be your [employer] login ID and cannot be changed, so think of one that is secure and easy to remember. 6. Create a [employer] password. You can change your password at any time. 7. Enter your Password Reset Question and Answer. This can be used at a later time if you forget your password. 8. Enter your e-mail address. You will receive e-mail notification when new information is available in [address] 9. Click Sign Up. You can now view and download portions of your medical record. 10. Click the Download Summary menu link to download a portable copy of your medical information. If you have questions, please visit the Frequently Asked Questions section of the [employer] website. Remember, [employer] is NOT to be used for urgent needs. For medical emergencies, dial 911. Now available from your iPhone and Android! Please provide this summary of care documentation to your next provider. Your primary care clinician is listed as Ron Mix. If you have any questions after today's visit, please call [phone].

## 2017-07-13 LAB
ALBUMIN SERPL-MCNC: 3.7 G/DL (ref 3.5–4.8)
ALBUMIN/GLOB SERPL: 1.2 {RATIO} (ref 1.2–2.2)
ALP SERPL-CCNC: 58 IU/L (ref 39–117)
ALT SERPL-CCNC: 26 IU/L (ref 0–32)
AST SERPL-CCNC: 20 IU/L (ref 0–40)
BILIRUB SERPL-MCNC: 0.3 MG/DL (ref 0–1.2)
BUN SERPL-MCNC: 20 MG/DL (ref 8–27)
BUN/CREAT SERPL: 18 (ref 12–28)
CALCIUM SERPL-MCNC: 9.1 MG/DL (ref 8.7–10.3)
CHLORIDE SERPL-SCNC: 95 MMOL/L (ref 96–106)
CO2 SERPL-SCNC: 26 MMOL/L (ref 18–29)
CREAT SERPL-MCNC: 1.12 MG/DL (ref 0.57–1)
FRUCTOSAMINE SERPL-SCNC: 397 UMOL/L (ref 0–285)
GLOBULIN SER CALC-MCNC: 3.2 G/DL (ref 1.5–4.5)
GLUCOSE SERPL-MCNC: 187 MG/DL (ref 65–99)
INTERPRETATION: NORMAL
Lab: NORMAL
POTASSIUM SERPL-SCNC: 4 MMOL/L (ref 3.5–5.2)
PROT SERPL-MCNC: 6.9 G/DL (ref 6–8.5)
SODIUM SERPL-SCNC: 138 MMOL/L (ref 134–144)

## 2017-07-13 NOTE — PROGRESS NOTES
Her lab results tell me that for the last 2 weeks her average blood sugar has been in the mid 200s range. Stop drinking the sugar sweetened tea and other sugar sweetened drinks and watch the snacking as those will drive up your blood sugars. Your kidney function tests are looking ok and your liver tests look good.

## 2017-10-26 ENCOUNTER — OFFICE VISIT (OUTPATIENT)
Dept: ENDOCRINOLOGY | Age: 72
End: 2017-10-26

## 2017-10-26 VITALS
SYSTOLIC BLOOD PRESSURE: 160 MMHG | BODY MASS INDEX: 37.04 KG/M2 | HEART RATE: 80 BPM | WEIGHT: 201.3 LBS | DIASTOLIC BLOOD PRESSURE: 65 MMHG | HEIGHT: 62 IN

## 2017-10-26 DIAGNOSIS — Z79.4 TYPE 2 DIABETES MELLITUS WITH DIABETIC POLYNEUROPATHY, WITH LONG-TERM CURRENT USE OF INSULIN (HCC): Primary | ICD-10-CM

## 2017-10-26 DIAGNOSIS — E11.42 TYPE 2 DIABETES MELLITUS WITH DIABETIC POLYNEUROPATHY, WITH LONG-TERM CURRENT USE OF INSULIN (HCC): Primary | ICD-10-CM

## 2017-10-26 DIAGNOSIS — E78.2 MIXED HYPERLIPIDEMIA: ICD-10-CM

## 2017-10-26 DIAGNOSIS — I10 ESSENTIAL HYPERTENSION: ICD-10-CM

## 2017-10-26 LAB — HBA1C MFR BLD HPLC: 12.4 %

## 2017-10-26 RX ORDER — INSULIN LISPRO 100 [IU]/ML
INJECTION, SUSPENSION SUBCUTANEOUS
Qty: 45 ML | Refills: 3 | Status: SHIPPED | OUTPATIENT
Start: 2017-10-26 | End: 2018-02-07 | Stop reason: SDUPTHER

## 2017-10-26 RX ORDER — INSULIN LISPRO 100 [IU]/ML
INJECTION, SUSPENSION SUBCUTANEOUS
Qty: 45 ML | Refills: 3 | Status: SHIPPED | OUTPATIENT
Start: 2017-10-26 | End: 2017-10-26 | Stop reason: SDUPTHER

## 2017-10-26 RX ORDER — CLOPIDOGREL BISULFATE 75 MG/1
75 TABLET ORAL DAILY
Qty: 90 TAB | Refills: 3 | Status: SHIPPED | OUTPATIENT
Start: 2017-10-26 | End: 2018-02-07 | Stop reason: SDUPTHER

## 2017-10-26 RX ORDER — FUROSEMIDE 40 MG/1
40 TABLET ORAL DAILY
Qty: 90 TAB | Refills: 3 | Status: SHIPPED | OUTPATIENT
Start: 2017-10-26 | End: 2018-02-07 | Stop reason: SDUPTHER

## 2017-10-26 RX ORDER — GUAIFENESIN 100 MG/5ML
81 LIQUID (ML) ORAL DAILY
Qty: 100 TAB | Refills: 12 | Status: SHIPPED | OUTPATIENT
Start: 2017-10-26 | End: 2020-11-20

## 2017-10-26 RX ORDER — CARVEDILOL 6.25 MG/1
6.25 TABLET ORAL 2 TIMES DAILY WITH MEALS
Qty: 180 TAB | Refills: 3 | Status: SHIPPED | OUTPATIENT
Start: 2017-10-26 | End: 2018-02-07 | Stop reason: SDUPTHER

## 2017-10-26 RX ORDER — VALSARTAN AND HYDROCHLOROTHIAZIDE 320; 25 MG/1; MG/1
1 TABLET, FILM COATED ORAL DAILY
Qty: 90 TAB | Refills: 3 | Status: SHIPPED | OUTPATIENT
Start: 2017-10-26 | End: 2018-02-07 | Stop reason: SDUPTHER

## 2017-10-26 RX ORDER — PANTOPRAZOLE SODIUM 40 MG/1
40 TABLET, DELAYED RELEASE ORAL
Qty: 90 TAB | Refills: 3 | Status: SHIPPED | OUTPATIENT
Start: 2017-10-26 | End: 2018-02-07 | Stop reason: SDUPTHER

## 2017-10-26 RX ORDER — AMLODIPINE BESYLATE 10 MG/1
10 TABLET ORAL DAILY
Qty: 90 TAB | Refills: 3 | Status: SHIPPED | OUTPATIENT
Start: 2017-10-26 | End: 2018-02-07 | Stop reason: SDUPTHER

## 2017-10-26 RX ORDER — GABAPENTIN 300 MG/1
300 CAPSULE ORAL 3 TIMES DAILY
Qty: 270 CAP | Refills: 3 | Status: SHIPPED | OUTPATIENT
Start: 2017-10-26 | End: 2018-02-07 | Stop reason: SDUPTHER

## 2017-10-26 RX ORDER — ATORVASTATIN CALCIUM 40 MG/1
40 TABLET, FILM COATED ORAL
Qty: 90 TAB | Refills: 3 | Status: SHIPPED | OUTPATIENT
Start: 2017-10-26 | End: 2018-02-07 | Stop reason: SDUPTHER

## 2017-10-26 NOTE — PROGRESS NOTES
Chief Complaint   Patient presents with    Diabetes     pcp and pharmacy verified. Last eye exam 2 years ago     History of Present Illness: Nicolás Chaney is a 81 y.o. female here for follow up of diabetes. She was diagnosed with diabetes about around 2010. At her last visit in June 2017 her A1C was 13% on Humalog 75:25 40 units with breakfast and 30 units with dinner. She was not checking her BGs and not following a low carb diet and was drinking a lot of sugar sweetened beverages. Her A1C today was 12.4%. Pt reports she is taking the Humalog 75:25 insulin 50 units in the morning and 40 units HS. She notes that she has been having issues of swelling in her legs during the day and \"it feels like I am walking on rubber balls\". She is taking the Gabapentin 300mg TID. It has not been helping very much. She insists she has been adherent with her insulin regimen. Pt notes that she has cut out the sweet tea, but is drinking regular ginger ale. Pt notes her  is doing much better, he had a defib placed and is doing better. Her  had CABG October 2016 and Isabel Branham has been going to the doctors a lot ever since\". Her son is DM and ESRD and is on HD on T/T/Sa and between caring for them she has not had time to care for herself. Pt eats two meals per day. Pt has breakfast everyday, around 9AM, this AM she had a bowl of oatmeal with raisins and walnuts and water. She has lunch 3 days per week, around 1PM, yesterday she did not have lunch. She will snack in the mid-afternoon on a protein bar. She has dinner around 6PM, last night she had salmon cakes and potatoes and eunice greens and regular soda. She does most of her snacking during the day. She will snack on potato chips. She follows up with Dr. Rene Devine of Nephrology. She notes that \"everything is stable\" from the CKD and Nephropathy stand point. She sees her again in December 2017.     She is followed by Dr. Kathy Coon of cardiology Musculoskeletal: no myalgias  - Neurological: + numbness/tingling in extremities    Physical Examination:  Blood pressure 160/65, pulse 80, height 5' 2\" (1.575 m), weight 201 lb 4.8 oz (91.3 kg). - General: pleasant, no distress, good eye contact   - Neck: no carotid bruits  - Cardiovascular: regular, normal rate, nl s1 and s2, no m/r/g, 2+ DP pulses   - Respiratory: clear bilaterally  - Integumentary: 1+ edema, no foot ulcers, sensation to monofilament and vibration intact bilaterally  - Psychiatric: normal mood and affect    Data Reviewed:    Her A1C today was 12.4%    Assessment/Plan:   1) DM >  She insists she is adherent with her insulin, but is still drinking sugar sweetened soda and snacking on chips and high carb snacks. Pt again instructed to stop the sugar sweetened beverages and cut out the snacking. Will increase her Humalog 75/25 to 60 units in the AM and 60 units with dinner. Will continue the Gabapentin at 600mg TID. 2) HTN > Manual BP today was 160/65. She notes she does check her BGs at home. She notes her BP at home tends to be in the 160/60 range. Pt given information about low salt diet, she notes she is not following a low salt diet at this time. 3) HLD > Pt is on Atrovastain 40mg daily, she is followed by Dr. Jeanne Valdes. 4) LE Edema > Pt to cut back on her sodium intake and get compression stockings. Pt voices understanding and agreement with the plan. Pain noted and pt was recommended to call her PCP for further evaluation and treatment, as needed    We spent 40 minutes of face to face time together and > 50% of the time was spent in counseling on diabetes management and determining what changes to make to her medical regimen. RTC 3 months    Patient Instructions   1) Humalog 75:25. Take 60 units with breakfast and 60 units with dinner. DASH Diet: Care Instructions  Your Care Instructions    The DASH diet is an eating plan that can help lower your blood pressure. DASH stands for Dietary Approaches to Stop Hypertension. Hypertension is high blood pressure. The DASH diet focuses on eating foods that are high in calcium, potassium, and magnesium. These nutrients can lower blood pressure. The foods that are highest in these nutrients are fruits, vegetables, low-fat dairy products, nuts, seeds, and legumes. But taking calcium, potassium, and magnesium supplements instead of eating foods that are high in those nutrients does not have the same effect. The DASH diet also includes whole grains, fish, and poultry. The DASH diet is one of several lifestyle changes your doctor may recommend to lower your high blood pressure. Your doctor may also want you to decrease the amount of sodium in your diet. Lowering sodium while following the DASH diet can lower blood pressure even further than just the DASH diet alone. Follow-up care is a key part of your treatment and safety. Be sure to make and go to all appointments, and call your doctor if you are having problems. It's also a good idea to know your test results and keep a list of the medicines you take. How can you care for yourself at home? Following the DASH diet  · Eat 4 to 5 servings of fruit each day. A serving is 1 medium-sized piece of fruit, ½ cup chopped or canned fruit, 1/4 cup dried fruit, or 4 ounces (½ cup) of fruit juice. Choose fruit more often than fruit juice. · Eat 4 to 5 servings of vegetables each day. A serving is 1 cup of lettuce or raw leafy vegetables, ½ cup of chopped or cooked vegetables, or 4 ounces (½ cup) of vegetable juice. Choose vegetables more often than vegetable juice. · Get 2 to 3 servings of low-fat and fat-free dairy each day. A serving is 8 ounces of milk, 1 cup of yogurt, or 1 ½ ounces of cheese. · Eat 6 to 8 servings of grains each day.  A serving is 1 slice of bread, 1 ounce of dry cereal, or ½ cup of cooked rice, pasta, or cooked cereal. Try to choose whole-grain products as much as possible. · Limit lean meat, poultry, and fish to 2 servings each day. A serving is 3 ounces, about the size of a deck of cards. · Eat 4 to 5 servings of nuts, seeds, and legumes (cooked dried beans, lentils, and split peas) each week. A serving is 1/3 cup of nuts, 2 tablespoons of seeds, or ½ cup of cooked beans or peas. · Limit fats and oils to 2 to 3 servings each day. A serving is 1 teaspoon of vegetable oil or 2 tablespoons of salad dressing. · Limit sweets and added sugars to 5 servings or less a week. A serving is 1 tablespoon jelly or jam, ½ cup sorbet, or 1 cup of lemonade. · Eat less than 2,300 milligrams (mg) of sodium a day. If you limit your sodium to 1,500 mg a day, you can lower your blood pressure even more. Tips for success  · Start small. Do not try to make dramatic changes to your diet all at once. You might feel that you are missing out on your favorite foods and then be more likely to not follow the plan. Make small changes, and stick with them. Once those changes become habit, add a few more changes. · Try some of the following:  ¨ Make it a goal to eat a fruit or vegetable at every meal and at snacks. This will make it easy to get the recommended amount of fruits and vegetables each day. ¨ Try yogurt topped with fruit and nuts for a snack or healthy dessert. ¨ Add lettuce, tomato, cucumber, and onion to sandwiches. ¨ Combine a ready-made pizza crust with low-fat mozzarella cheese and lots of vegetable toppings. Try using tomatoes, squash, spinach, broccoli, carrots, cauliflower, and onions. ¨ Have a variety of cut-up vegetables with a low-fat dip as an appetizer instead of chips and dip. ¨ Sprinkle sunflower seeds or chopped almonds over salads. Or try adding chopped walnuts or almonds to cooked vegetables. ¨ Try some vegetarian meals using beans and peas. Add garbanzo or kidney beans to salads. Make burritos and tacos with mashed rae beans or black beans.   Where can you learn more?  Go to http://adriano-nevaeh.info/. Enter A403 in the search box to learn more about \"DASH Diet: Care Instructions. \"  Current as of: September 21, 2016  Content Version: 11.4  © 3810-6450 [employer]. Care instructions adapted under license by [employer] (which disclaims liability or warranty for this information). If you have questions about a medical condition or this instruction, always ask your healthcare professional. [place] [address] any warranty or liability for your use of this information. Follow-up Disposition:  Return in about 3 months (around 1/26/2018).     Copy sent to:  Dr. Chelo Chery

## 2017-10-26 NOTE — MR AVS SNAPSHOT
Visit Information Date & Time Provider Department Dept. Phone Encounter #  
 10/26/2017 11:30 AM Jeremiah Ramirez, [facility] [phone] Follow-up Instructions Return in about 3 months (around 1/26/2018). Upcoming Health Maintenance Date Due  
 EYE EXAM RETINAL OR DILATED Q1 1/9/1955 DTaP/Tdap/Td series (1 - Tdap) 1/9/1968 FOBT Q 1 YEAR AGE 50-75 1/9/1997 ZOSTER VACCINE AGE 60> 11/9/2006 GLAUCOMA SCREENING Q2Y 1/9/2012 OSTEOPOROSIS SCREENING (DEXA) 1/9/2012 Pneumococcal 65+ Low/Medium Risk (1 of 2 - PCV13) 1/9/2012 MEDICARE YEARLY EXAM 1/9/2012 INFLUENZA AGE 9 TO ADULT 8/1/2017 HEMOGLOBIN A1C Q6M 11/30/2017 BREAST CANCER SCRN MAMMOGRAM 4/25/2018 FOOT EXAM Q1 5/30/2018 MICROALBUMIN Q1 6/9/2018 LIPID PANEL Q1 7/12/2018 Allergies as of 10/26/2017  Review Complete On: 10/26/2017 By: Jeremiah Ramirez MD  
  
 Severity Noted Reaction Type Reactions Metformin  07/12/2017    Other (comments) GI side effects Current Immunizations  Never Reviewed No immunizations on file. Not reviewed this visit You Were Diagnosed With   
  
 Codes Comments Type 2 diabetes mellitus with diabetic polyneuropathy, with long-term current use of insulin (HCC)    -  Primary ICD-10-CM: E11.42, Z79.4 ICD-9-CM: 250.60, 357.2, V58.67 Essential hypertension     ICD-10-CM: I10 
ICD-9-CM: 401.9 Mixed hyperlipidemia     ICD-10-CM: E78.2 ICD-9-CM: 272.2 Vitals BP Pulse Height(growth percentile) Weight(growth percentile) BMI OB Status 160/65 80 5' 2\" (1.575 m) 201 lb 4.8 oz (91.3 kg) 36.82 kg/m2 Postmenopausal  
 Smoking Status Never Smoker Vitals History BMI and BSA Data Body Mass Index Body Surface Area  
 36.82 kg/m 2 2 m 2 Preferred Pharmacy Pharmacy Name Phone  [address] [facility] [place] [phone] Your Updated Medication List  
  
   
This list is accurate as of: 10/26/17 12:13 PM.  Always use your most recent med list. amLODIPine 10 mg tablet Commonly known as:  Nory Darting Take 1 Tab by mouth daily. aspirin 81 mg chewable tablet Take 1 Tab by mouth daily. atorvastatin 40 mg tablet Commonly known as:  LIPITOR Take 1 Tab by mouth nightly. betamethasone dipropionate 0.05 % topical lotion Commonly known as:  Humaira Angeles Apply  to affected area two (2) times a day. carvedilol 6.25 mg tablet Commonly known as:  Ozie Bitter Take 1 Tab by mouth two (2) times daily (with meals). clopidogrel 75 mg Tab Commonly known as:  PLAVIX Take 1 Tab by mouth daily. furosemide 40 mg tablet Commonly known as:  LASIX Take 1 Tab by mouth daily. gabapentin 300 mg capsule Commonly known as:  NEURONTIN Take 1 Cap by mouth three (3) times daily. Insulin Lisp & Lisp Prot (Hum) 100 unit/mL (75-25) Inpn Commonly known as:  HumaLOG Mix 75-25 KwikPen 60 units with breakfast and 60 units with dinner  
  
 nitroglycerin 0.4 mg SL tablet Commonly known as:  NITROSTAT  
1 Tab by SubLINGual route every five (5) minutes as needed for Chest Pain.  
  
 pantoprazole 40 mg tablet Commonly known as:  PROTONIX Take 1 Tab by mouth every morning. valsartan-hydroCHLOROthiazide 320-25 mg per tablet Commonly known as:  DIOVAN-HCT Take 1 Tab by mouth daily. Prescriptions Sent to Pharmacy Refills Insulin Lisp & Lisp Prot, Hum, (HUMALOG MIX 75-25 KWIKPEN) 100 unit/mL (75-25) inpn 3 Si units with breakfast and 60 units with dinner Class: Normal  
 Pharmacy: [address], [address] [place] [address] #: [phone]  
 gabapentin (NEURONTIN) 300 mg capsule 3 Sig: Take 1 Cap by mouth three (3) times daily.   
 Class: Normal  
 Pharmacy: [address], [address] Ph #: [phone] Route: Oral  
 amLODIPine (NORVASC) 10 mg tablet 3 Sig: Take 1 Tab by mouth daily. Class: Normal  
 Pharmacy: [address], [address] Ph #: [phone] Route: Oral  
 furosemide (LASIX) 40 mg tablet 3 Sig: Take 1 Tab by mouth daily. Class: Normal  
 Pharmacy: [address], [address] Ph #: [phone] Route: Oral  
 valsartan-hydroCHLOROthiazide (DIOVAN-HCT) 320-25 mg per tablet 3 Sig: Take 1 Tab by mouth daily. Class: Normal  
 Pharmacy: [address], [address] Ph #: [phone] Route: Oral  
 atorvastatin (LIPITOR) 40 mg tablet 3 Sig: Take 1 Tab by mouth nightly. Class: Normal  
 Pharmacy: [address], [address] Ph #: [phone] Route: Oral  
 clopidogrel (PLAVIX) 75 mg tab 3 Sig: Take 1 Tab by mouth daily. Class: Normal  
 Pharmacy: [address], [address] Ph #: [phone] Route: Oral  
 pantoprazole (PROTONIX) 40 mg tablet 3 Sig: Take 1 Tab by mouth every morning. Class: Normal  
 Pharmacy: [address], [address] Ph #: [phone] Route: Oral  
 carvedilol (COREG) 6.25 mg tablet 3 Sig: Take 1 Tab by mouth two (2) times daily (with meals). Class: Normal  
 Pharmacy: [address], [address] Ph #: [phone] Route: Oral  
 aspirin 81 mg chewable tablet 12 Sig: Take 1 Tab by mouth daily. Class: Normal  
 Pharmacy: [address], [address] Ph #: [phone] Route: Oral  
  
We Performed the Following AMB POC HEMOGLOBIN A1C [82684 CPT(R)]  DIABETES FOOT EXAM [[facility]] Follow-up Instructions Return in about 3 months (around 1/26/2018). Patient Instructions 1) Humalog 75:25. Take 60 units with breakfast and 60 units with dinner. DASH Diet: Care Instructions Your Care Instructions The DASH diet is an eating plan that can help lower your blood pressure. DASH stands for Dietary Approaches to Stop Hypertension. Hypertension is high blood pressure. The DASH diet focuses on eating foods that are high in calcium, potassium, and magnesium. These nutrients can lower blood pressure. The foods that are highest in these nutrients are fruits, vegetables, low-fat dairy products, nuts, seeds, and legumes. But taking calcium, potassium, and magnesium supplements instead of eating foods that are high in those nutrients does not have the same effect. The DASH diet also includes whole grains, fish, and poultry. The DASH diet is one of several lifestyle changes your doctor may recommend to lower your high blood pressure. Your doctor may also want you to decrease the amount of sodium in your diet. Lowering sodium while following the DASH diet can lower blood pressure even further than just the DASH diet alone. Follow-up care is a key part of your treatment and safety. Be sure to make and go to all appointments, and call your doctor if you are having problems. It's also a good idea to know your test results and keep a list of the medicines you take. How can you care for yourself at home? Following the DASH diet · Eat 4 to 5 servings of fruit each day. A serving is 1 medium-sized piece of fruit, ½ cup chopped or canned fruit, 1/4 cup dried fruit, or 4 ounces (½ cup) of fruit juice. Choose fruit more often than fruit juice. · Eat 4 to 5 servings of vegetables each day.  A serving is 1 cup of lettuce or raw leafy vegetables, ½ cup of chopped or cooked vegetables, or 4 ounces (½ cup) of vegetable juice. Choose vegetables more often than vegetable juice. · Get 2 to 3 servings of low-fat and fat-free dairy each day. A serving is 8 ounces of milk, 1 cup of yogurt, or 1 ½ ounces of cheese. · Eat 6 to 8 servings of grains each day. A serving is 1 slice of bread, 1 ounce of dry cereal, or ½ cup of cooked rice, pasta, or cooked cereal. Try to choose whole-grain products as much as possible. · Limit lean meat, poultry, and fish to 2 servings each day. A serving is 3 ounces, about the size of a deck of cards. · Eat 4 to 5 servings of nuts, seeds, and legumes (cooked dried beans, lentils, and split peas) each week. A serving is 1/3 cup of nuts, 2 tablespoons of seeds, or ½ cup of cooked beans or peas. · Limit fats and oils to 2 to 3 servings each day. A serving is 1 teaspoon of vegetable oil or 2 tablespoons of salad dressing. · Limit sweets and added sugars to 5 servings or less a week. A serving is 1 tablespoon jelly or jam, ½ cup sorbet, or 1 cup of lemonade. · Eat less than 2,300 milligrams (mg) of sodium a day. If you limit your sodium to 1,500 mg a day, you can lower your blood pressure even more. Tips for success · Start small. Do not try to make dramatic changes to your diet all at once. You might feel that you are missing out on your favorite foods and then be more likely to not follow the plan. Make small changes, and stick with them. Once those changes become habit, add a few more changes. · Try some of the following: ¨ Make it a goal to eat a fruit or vegetable at every meal and at snacks. This will make it easy to get the recommended amount of fruits and vegetables each day. ¨ Try yogurt topped with fruit and nuts for a snack or healthy dessert. ¨ Add lettuce, tomato, cucumber, and onion to sandwiches. ¨ Combine a ready-made pizza crust with low-fat mozzarella cheese and lots of vegetable toppings.  Try using tomatoes, squash, spinach, broccoli, carrots, cauliflower, and onions. ¨ Have a variety of cut-up vegetables with a low-fat dip as an appetizer instead of chips and dip. ¨ Sprinkle sunflower seeds or chopped almonds over salads. Or try adding chopped walnuts or almonds to cooked vegetables. ¨ Try some vegetarian meals using beans and peas. Add garbanzo or kidney beans to salads. Make burritos and tacos with mashed rae beans or black beans. Where can you learn more? Go to http://adrianoAvePointnevaeh.info/. Enter P571 in the search box to learn more about \"DASH Diet: Care Instructions. \" Current as of: September 21, 2016 Content Version: 11.4 © 9662-6779 GeekStatus. Care instructions adapted under license by [employer] (which disclaims liability or warranty for this information). If you have questions about a medical condition or this instruction, always ask your healthcare professional. [place] [address] any warranty or liability for your use of this information. Introducing [facility]! Bret Lockhart introduces [employer]isteo patient portal. Now you can access parts of your medical record, email your doctor's office, and request medication refills online. 1. In your internet browser, go to [email or web address] 2. Click on the First Time User? Click Here link in the Sign In box. You will see the New Member Sign Up page. 3. Enter your Qualisteo Access Code exactly as it appears below. You will not need to use this code after youve completed the sign-up process. If you do not sign up before the expiration date, you must request a new code. · [employer]o Access Code: [email or web address] Expires: 1/24/2018 12:09 PM 
 
4. Enter the last four digits of your Social Security Number (xxxx) and Date of Birth (mm/dd/yyyy) as indicated and click Submit. You will be taken to the next sign-up page. 5. Create a [employer]isteo ID.  This will be your [employer]isteo login ID and cannot be changed, so think of one that is secure and easy to remember. 6. Create a BlueVox password. You can change your password at any time. 7. Enter your Password Reset Question and Answer. This can be used at a later time if you forget your password. 8. Enter your e-mail address. You will receive e-mail notification when new information is available in [address] 9. Click Sign Up. You can now view and download portions of your medical record. 10. Click the Download Summary menu link to download a portable copy of your medical information. If you have questions, please visit the Frequently Asked Questions section of the BlueVox website. Remember, BlueVox is NOT to be used for urgent needs. For medical emergencies, dial 911. Now available from your iPhone and Android! Please provide this summary of care documentation to your next provider. Your primary care clinician is listed as Lesly Rivera. If you have any questions after today's visit, please call [phone].

## 2017-10-26 NOTE — PATIENT INSTRUCTIONS
1) Humalog 75:25. Take 60 units with breakfast and 60 units with dinner. DASH Diet: Care Instructions  Your Care Instructions    The DASH diet is an eating plan that can help lower your blood pressure. DASH stands for Dietary Approaches to Stop Hypertension. Hypertension is high blood pressure. The DASH diet focuses on eating foods that are high in calcium, potassium, and magnesium. These nutrients can lower blood pressure. The foods that are highest in these nutrients are fruits, vegetables, low-fat dairy products, nuts, seeds, and legumes. But taking calcium, potassium, and magnesium supplements instead of eating foods that are high in those nutrients does not have the same effect. The DASH diet also includes whole grains, fish, and poultry. The DASH diet is one of several lifestyle changes your doctor may recommend to lower your high blood pressure. Your doctor may also want you to decrease the amount of sodium in your diet. Lowering sodium while following the DASH diet can lower blood pressure even further than just the DASH diet alone. Follow-up care is a key part of your treatment and safety. Be sure to make and go to all appointments, and call your doctor if you are having problems. It's also a good idea to know your test results and keep a list of the medicines you take. How can you care for yourself at home? Following the DASH diet  · Eat 4 to 5 servings of fruit each day. A serving is 1 medium-sized piece of fruit, ½ cup chopped or canned fruit, 1/4 cup dried fruit, or 4 ounces (½ cup) of fruit juice. Choose fruit more often than fruit juice. · Eat 4 to 5 servings of vegetables each day. A serving is 1 cup of lettuce or raw leafy vegetables, ½ cup of chopped or cooked vegetables, or 4 ounces (½ cup) of vegetable juice. Choose vegetables more often than vegetable juice. · Get 2 to 3 servings of low-fat and fat-free dairy each day.  A serving is 8 ounces of milk, 1 cup of yogurt, or 1 ½ ounces of cheese. · Eat 6 to 8 servings of grains each day. A serving is 1 slice of bread, 1 ounce of dry cereal, or ½ cup of cooked rice, pasta, or cooked cereal. Try to choose whole-grain products as much as possible. · Limit lean meat, poultry, and fish to 2 servings each day. A serving is 3 ounces, about the size of a deck of cards. · Eat 4 to 5 servings of nuts, seeds, and legumes (cooked dried beans, lentils, and split peas) each week. A serving is 1/3 cup of nuts, 2 tablespoons of seeds, or ½ cup of cooked beans or peas. · Limit fats and oils to 2 to 3 servings each day. A serving is 1 teaspoon of vegetable oil or 2 tablespoons of salad dressing. · Limit sweets and added sugars to 5 servings or less a week. A serving is 1 tablespoon jelly or jam, ½ cup sorbet, or 1 cup of lemonade. · Eat less than 2,300 milligrams (mg) of sodium a day. If you limit your sodium to 1,500 mg a day, you can lower your blood pressure even more. Tips for success  · Start small. Do not try to make dramatic changes to your diet all at once. You might feel that you are missing out on your favorite foods and then be more likely to not follow the plan. Make small changes, and stick with them. Once those changes become habit, add a few more changes. · Try some of the following:  ¨ Make it a goal to eat a fruit or vegetable at every meal and at snacks. This will make it easy to get the recommended amount of fruits and vegetables each day. ¨ Try yogurt topped with fruit and nuts for a snack or healthy dessert. ¨ Add lettuce, tomato, cucumber, and onion to sandwiches. ¨ Combine a ready-made pizza crust with low-fat mozzarella cheese and lots of vegetable toppings. Try using tomatoes, squash, spinach, broccoli, carrots, cauliflower, and onions. ¨ Have a variety of cut-up vegetables with a low-fat dip as an appetizer instead of chips and dip. ¨ Sprinkle sunflower seeds or chopped almonds over salads.  Or try adding chopped walnuts or almonds to cooked vegetables. ¨ Try some vegetarian meals using beans and peas. Add garbanzo or kidney beans to salads. Make burritos and tacos with mashed rae beans or black beans. Where can you learn more? Go to http://adriano-nevaeh.info/. Enter Z705 in the search box to learn more about \"DASH Diet: Care Instructions. \"  Current as of: September 21, 2016  Content Version: 11.4  © 2126-9281 [employer]. Care instructions adapted under license by [employer] (which disclaims liability or warranty for this information). If you have questions about a medical condition or this instruction, always ask your healthcare professional. [address] any warranty or liability for your use of this information.

## 2018-01-31 ENCOUNTER — HOSPITAL ENCOUNTER (EMERGENCY)
Age: 73
Discharge: HOME | End: 2018-01-31
Attending: EMERGENCY MEDICINE
Payer: MEDICARE

## 2018-01-31 VITALS
OXYGEN SATURATION: 100 % | SYSTOLIC BLOOD PRESSURE: 182 MMHG | DIASTOLIC BLOOD PRESSURE: 71 MMHG | TEMPERATURE: 98 F | RESPIRATION RATE: 18 BRPM | BODY MASS INDEX: 36.47 KG/M2 | HEIGHT: 62 IN | WEIGHT: 198.19 LBS | HEART RATE: 86 BPM

## 2018-01-31 DIAGNOSIS — R10.13 ABDOMINAL PAIN, EPIGASTRIC: ICD-10-CM

## 2018-01-31 DIAGNOSIS — H60.502 ACUTE OTITIS EXTERNA OF LEFT EAR, UNSPECIFIED TYPE: Primary | ICD-10-CM

## 2018-01-31 PROCEDURE — 77030012890

## 2018-01-31 PROCEDURE — 99282 EMERGENCY DEPT VISIT SF MDM: CPT

## 2018-01-31 PROCEDURE — 77030020782 HC GWN BAIR PAWS FLX 3M -B

## 2018-01-31 RX ORDER — CIPROFLOXACIN AND DEXAMETHASONE 3; 1 MG/ML; MG/ML
4 SUSPENSION/ DROPS AURICULAR (OTIC) 2 TIMES DAILY
Qty: 7.5 ML | Refills: 0 | Status: SHIPPED | OUTPATIENT
Start: 2018-01-31 | End: 2018-05-22

## 2018-01-31 NOTE — DISCHARGE INSTRUCTIONS
Swimmer's Ear: Care Instructions  Your Care Instructions    Swimmer's ear (otitis externa) is inflammation or infection of the ear canal. This is the passage that leads from the outer ear to the eardrum. Any water, sand, or other debris that gets into the ear canal and stays there can cause swimmer's ear. Putting cotton swabs or other items in the ear to clean it can also cause this problem. Swimmer's ear can be very painful. But you can treat the pain and infection with medicines. You should feel better in a few days. Follow-up care is a key part of your treatment and safety. Be sure to make and go to all appointments, and call your doctor if you are having problems. It's also a good idea to know your test results and keep a list of the medicines you take. How can you care for yourself at home? Cleaning and care  · Use antibiotic drops as your doctor directs. · Do not insert ear drops (other than the antibiotic ear drops) or anything else into the ear unless your doctor has told you to. · Avoid getting water in the ear until the problem clears up. Use cotton lightly coated with petroleum jelly as an earplug. Do not use plastic earplugs. · Use a hair dryer set on low to carefully dry the ear after you shower. · To ease ear pain, hold a warm washcloth against your ear. · Take pain medicines exactly as directed. ¨ If the doctor gave you a prescription medicine for pain, take it as prescribed. ¨ If you are not taking a prescription pain medicine, ask your doctor if you can take an over-the-counter medicine. Inserting ear drops  · Warm the drops to body temperature by rolling the container in your hands. Or you can place it in a cup of warm water for a few minutes. · Lie down, with your ear facing up. · Place drops inside the ear. Follow your doctor's instructions (or the directions on the label) for how many drops to use.  Gently wiggle the outer ear or pull the ear up and back to help the drops get into the ear. · It's important to keep the liquid in the ear canal for 3 to 5 minutes. When should you call for help? Call your doctor now or seek immediate medical care if:  ? · You have a new or higher fever. ? · You have new or worse pain, swelling, warmth, or redness around or behind your ear. ? · You have new or increasing pus or blood draining from your ear. ? Watch closely for changes in your health, and be sure to contact your doctor if:  ? · You are not getting better after 2 days (48 hours). Where can you learn more? Go to http://adriano-nevaeh.info/. Enter C706 in the search box to learn more about \"Swimmer's Ear: Care Instructions. \"  Current as of: May 12, 2017  Content Version: 11.4  © 1812-4855 [employer]. Care instructions adapted under license by [employer] (which disclaims liability or warranty for this information). If you have questions about a medical condition or this instruction, always ask your healthcare professional. [place] [address] any warranty or liability for your use of this information. Abdominal Pain: Care Instructions  Your Care Instructions    Abdominal pain has many possible causes. Some aren't serious and get better on their own in a few days. Others need more testing and treatment. If your pain continues or gets worse, you need to be rechecked and may need more tests to find out what is wrong. You may need surgery to correct the problem. Don't ignore new symptoms, such as fever, nausea and vomiting, urination problems, pain that gets worse, and dizziness. These may be signs of a more serious problem. Your doctor may have recommended a follow-up visit in the next 8 to 12 hours. If you are not getting better, you may need more tests or treatment. The doctor has checked you carefully, but problems can develop later.  If you notice any problems or new symptoms, get medical treatment right away.  Follow-up care is a key part of your treatment and safety. Be sure to make and go to all appointments, and call your doctor if you are having problems. It's also a good idea to know your test results and keep a list of the medicines you take. How can you care for yourself at home? · Rest until you feel better. · To prevent dehydration, drink plenty of fluids, enough so that your urine is light yellow or clear like water. Choose water and other caffeine-free clear liquids until you feel better. If you have kidney, heart, or liver disease and have to limit fluids, talk with your doctor before you increase the amount of fluids you drink. · If your stomach is upset, eat mild foods, such as rice, dry toast or crackers, bananas, and applesauce. Try eating several small meals instead of two or three large ones. · Wait until 48 hours after all symptoms have gone away before you have spicy foods, alcohol, and drinks that contain caffeine. · Do not eat foods that are high in fat. · Avoid anti-inflammatory medicines such as aspirin, ibuprofen (Advil, Motrin), and naproxen (Aleve). These can cause stomach upset. Talk to your doctor if you take daily aspirin for another health problem. When should you call for help? Call 911 anytime you think you may need emergency care. For example, call if:  ? · You passed out (lost consciousness). ? · You pass maroon or very bloody stools. ? · You vomit blood or what looks like coffee grounds. ? · You have new, severe belly pain. ?Call your doctor now or seek immediate medical care if:  ? · Your pain gets worse, especially if it becomes focused in one area of your belly. ? · You have a new or higher fever. ? · Your stools are black and look like tar, or they have streaks of blood. ? · You have unexpected vaginal bleeding. ? · You have symptoms of a urinary tract infection. These may include:  ¨ Pain when you urinate.   ¨ Urinating more often than usual.  ¨ Blood in your urine. ? · You are dizzy or lightheaded, or you feel like you may faint. ? Watch closely for changes in your health, and be sure to contact your doctor if:  ? · You are not getting better after 1 day (24 hours). Where can you learn more? Go to http://adriano-nevaeh.info/. Enter U729 in the search box to learn more about \"Abdominal Pain: Care Instructions. \"  Current as of: March 20, 2017  Content Version: 11.4  © 2538-2252 [employer]. Care instructions adapted under license by [employer] (which disclaims liability or warranty for this information). If you have questions about a medical condition or this instruction, always ask your healthcare professional. [address] any warranty or liability for your use of this information.

## 2018-01-31 NOTE — ED PROVIDER NOTES
EMERGENCY DEPARTMENT HISTORY AND PHYSICAL EXAM      Date: 1/31/2018  Patient Name: Shweta Rod    History of Presenting Illness     Chief Complaint   Patient presents with    Ear Pain     pt c/o left ear ache with cough and body aches x5 days.  Cough     History Provided By: Patient    HPI: Shweta Rod, 72 y.o. female with PMHx significant for HTN, DM, and GERD, presents ambulatory to the ED with cc of gradually worsening ear pain alongside a cough and generalized aches for the past five days. Per pt, she has noted to be presenting with worsening pain to the left lateral ear with a moderate intensity and an associated dull, aching sensation to the region. Pt additionally informs of an intermittent dry cough for the past several days. Pt lastly expresses concern of generalized body aches with prominence to the abdomen with a mild intensity and an associated aching sensation. Pt reports no OTC medications for her discomfort. Pt states she uses Q-tips often to clean her ears. Pt informs of no modifying factors. Pt expresses concern for symptomatic alleviation. Pt specifically denies any fevers, chills, congestion, sore throat, nausea, vomiting, diarrhea, urinary complications, chest pain, or SOB. PMHx: arthritis   PSHx: cholecystectomy, D&C  Social Hx: - EtOH; - Smoker; - Illicit Drugs    PCP: Malia Euceda MD    There are no other complaints, changes, or physical findings at this time. Current Outpatient Prescriptions   Medication Sig Dispense Refill    ciprofloxacin-dexamethasone (CIPRODEX) 0.3-0.1 % otic suspension Administer 4 Drops in left ear two (2) times a day. 7.5 mL 0    Insulin Lisp & Lisp Prot, Hum, (HUMALOG MIX 75-25 KWIKPEN) 100 unit/mL (75-25) inpn 60 units with breakfast and 60 units with dinner 45 mL 3    gabapentin (NEURONTIN) 300 mg capsule Take 1 Cap by mouth three (3) times daily. 270 Cap 3    amLODIPine (NORVASC) 10 mg tablet Take 1 Tab by mouth daily.  90 Tab 3    furosemide (LASIX) 40 mg tablet Take 1 Tab by mouth daily. 90 Tab 3    valsartan-hydroCHLOROthiazide (DIOVAN-HCT) 320-25 mg per tablet Take 1 Tab by mouth daily. 90 Tab 3    atorvastatin (LIPITOR) 40 mg tablet Take 1 Tab by mouth nightly. 90 Tab 3    clopidogrel (PLAVIX) 75 mg tab Take 1 Tab by mouth daily. 90 Tab 3    pantoprazole (PROTONIX) 40 mg tablet Take 1 Tab by mouth every morning. 90 Tab 3    carvedilol (COREG) 6.25 mg tablet Take 1 Tab by mouth two (2) times daily (with meals). 180 Tab 3    aspirin 81 mg chewable tablet Take 1 Tab by mouth daily. 100 Tab 12    betamethasone dipropionate (DIPROLENE) 0.05 % topical lotion Apply  to affected area two (2) times a day. 60 mL 0    nitroglycerin (NITROSTAT) 0.4 mg SL tablet 1 Tab by SubLINGual route every five (5) minutes as needed for Chest Pain. 48 Tab 12     Past History     Past Medical History:  Past Medical History:   Diagnosis Date    Arthritis     Chronic bronchitis ([address]     per pt:  as of 1/9/15 pt denies any ARENAS or SOB    Diabetes ([address] Dx approx 2009    Dr Nia Wiggins (in [facility])    GERD (gastroesophageal reflux disease)     Hypercholesteremia     Hypertension      Past Surgical History:  Past Surgical History:   Procedure Laterality Date    HX CHOLECYSTECTOMY  6/12    HX COLONOSCOPY      HX CORONARY STENT PLACEMENT  8/25/2015    HX DILATION AND CURETTAGE       Family History:  Family History   Problem Relation Age of Onset    Diabetes Mother     Heart Disease Mother     Hypertension Mother     Stroke Father     Heart Disease Brother     Heart Disease Brother     Stroke Brother     HIV/AIDS Brother      Social History:  Social History   Substance Use Topics    Smoking status: Never Smoker    Smokeless tobacco: Never Used    Alcohol use No     Allergies:   Allergies   Allergen Reactions    Metformin Other (comments)     GI side effects     Review of Systems   Review of Systems   Constitutional: Negative for chills and fever.   HENT: Positive for ear pain (L). Negative for congestion, rhinorrhea and sore throat. Eyes: Negative for visual disturbance. Respiratory: Positive for cough. Negative for shortness of breath. Cardiovascular: Negative for chest pain. Gastrointestinal: Positive for abdominal pain. Negative for constipation, diarrhea, nausea and vomiting. Genitourinary: Negative for dysuria, frequency, hematuria and urgency. Musculoskeletal: Positive for arthralgias (generalized) and myalgias (generalized). Negative for back pain. Skin: Negative for wound. Neurological: Negative for headaches. All other systems reviewed and are negative. Physical Exam   Physical Exam   Constitutional: She is oriented to person, place, and time. She appears well-developed and well-nourished. No distress. Overweight 71 yo -American female   HENT:   Head: Normocephalic and atraumatic. Right Ear: Tympanic membrane, external ear and ear canal normal. No tenderness. Tympanic membrane is not erythematous. No middle ear effusion. Left Ear: Tympanic membrane and ear canal normal. There is tenderness. Tympanic membrane is not erythematous. No middle ear effusion. Nose: Rhinorrhea present. Right sinus exhibits no maxillary sinus tenderness and no frontal sinus tenderness. Left sinus exhibits no maxillary sinus tenderness and no frontal sinus tenderness. Mouth/Throat: Uvula is midline, oropharynx is clear and moist and mucous membranes are normal. No oropharyngeal exudate, posterior oropharyngeal edema or posterior oropharyngeal erythema. Erythema to the L EAC with + tragus tug. Eyes: Conjunctivae are normal. Right eye exhibits no discharge. Left eye exhibits no discharge. Neck: Normal range of motion. Neck supple. Cardiovascular: Normal rate, regular rhythm and normal heart sounds. Pulmonary/Chest: Effort normal and breath sounds normal. No respiratory distress. She has no wheezes. Abdominal: Soft. Bowel sounds are normal. She exhibits no distension. There is no tenderness. There is no rebound and no guarding. Lymphadenopathy:     She has no cervical adenopathy. Neurological: She is alert and oriented to person, place, and time. Skin: Skin is warm and dry. She is not diaphoretic. Psychiatric: She has a normal mood and affect. Her behavior is normal.   Nursing note and vitals reviewed. Medical Decision Making   I am the first provider for this patient. I reviewed the vital signs, available nursing notes, past medical history, past surgical history, family history and social history. Vital Signs-Reviewed the patient's vital signs. Patient Vitals for the past 12 hrs:   Temp Pulse Resp BP SpO2   01/31/18 0752 98 °F (36.7 °C) 86 18 182/71 100 %     Pulse Oximetry Analysis - 100% on RA    Cardiac Monitor:   Rate: 86 bpm  Rhythm: Normal Sinus Rhythm      Records Reviewed: Nursing Notes and Old Medical Records    Provider Notes (Medical Decision Making):   DDx: otitis media, otitis externa, URI, bronchitis, PNA, GERD    ED Course:   Initial assessment performed. The patients presenting problems have been discussed, and they are in agreement with the care plan formulated and outlined with them. I have encouraged them to ask questions as they arise throughout their visit. Disposition:  DISCHARGE NOTE:    8:04 AM  The patient is ready for discharge. The patient signs, symptoms, diagnosis, and discharge instructions have been discussed and the patient has conveyed their understanding. The patient is to follow-up as reccommended or returned to the ER should their symptoms worsen. Plan has been discussed and patient is in agreement. PLAN:  1. Current Discharge Medication List      START taking these medications    Details   ciprofloxacin-dexamethasone (CIPRODEX) 0.3-0.1 % otic suspension Administer 4 Drops in left ear two (2) times a day. Qty: 7.5 mL, Refills: 0           2.    Follow-up Information     Follow up With Details Comments Contact Info    Robert Negron MD In 2 days  [address] [phone]        3. Take over the counter cough/cold medications and Tylenol/Motrin PRN  4. Stop using cotton tipped applicators to ear canals  Return to ED if worse     Diagnosis     Clinical Impression:   1. Acute otitis externa of left ear, unspecified type    2. Abdominal pain, epigastric      Attestations: This note is prepared by Ana Alfonso, acting as Scribe for JESSIE Rand: The scribe's documentation has been prepared under my direction and personally reviewed by me in its entirety. I confirm that the note above accurately reflects all work, treatment, procedures, and medical decision making performed by me.

## 2018-02-07 ENCOUNTER — OFFICE VISIT (OUTPATIENT)
Dept: ENDOCRINOLOGY | Age: 73
End: 2018-02-07

## 2018-02-07 VITALS
SYSTOLIC BLOOD PRESSURE: 138 MMHG | BODY MASS INDEX: 36.1 KG/M2 | DIASTOLIC BLOOD PRESSURE: 60 MMHG | HEART RATE: 70 BPM | HEIGHT: 62 IN | WEIGHT: 196.2 LBS

## 2018-02-07 DIAGNOSIS — Z79.4 TYPE 2 DIABETES MELLITUS WITH DIABETIC POLYNEUROPATHY, WITH LONG-TERM CURRENT USE OF INSULIN (HCC): Primary | ICD-10-CM

## 2018-02-07 DIAGNOSIS — E78.2 MIXED HYPERLIPIDEMIA: ICD-10-CM

## 2018-02-07 DIAGNOSIS — E11.42 TYPE 2 DIABETES MELLITUS WITH DIABETIC POLYNEUROPATHY, WITH LONG-TERM CURRENT USE OF INSULIN (HCC): Primary | ICD-10-CM

## 2018-02-07 DIAGNOSIS — I10 ESSENTIAL HYPERTENSION: ICD-10-CM

## 2018-02-07 LAB
CREATININE, EXTERNAL: 1.23
HBA1C MFR BLD HPLC: 14.5 %
HBA1C MFR BLD HPLC: 6.1 %

## 2018-02-07 RX ORDER — PANTOPRAZOLE SODIUM 40 MG/1
40 TABLET, DELAYED RELEASE ORAL
Qty: 90 TAB | Refills: 3 | Status: SHIPPED | OUTPATIENT
Start: 2018-02-07 | End: 2019-04-17 | Stop reason: SDUPTHER

## 2018-02-07 RX ORDER — FUROSEMIDE 40 MG/1
40 TABLET ORAL DAILY
Qty: 90 TAB | Refills: 3 | Status: SHIPPED | OUTPATIENT
Start: 2018-02-07 | End: 2020-01-27

## 2018-02-07 RX ORDER — GABAPENTIN 300 MG/1
600 CAPSULE ORAL 3 TIMES DAILY
Qty: 540 CAP | Refills: 3 | Status: SHIPPED | OUTPATIENT
Start: 2018-02-07 | End: 2018-09-24

## 2018-02-07 RX ORDER — CARVEDILOL 6.25 MG/1
6.25 TABLET ORAL 2 TIMES DAILY WITH MEALS
Qty: 180 TAB | Refills: 3 | Status: SHIPPED | OUTPATIENT
Start: 2018-02-07 | End: 2019-04-17 | Stop reason: SDUPTHER

## 2018-02-07 RX ORDER — INSULIN LISPRO 100 [IU]/ML
INJECTION, SUSPENSION SUBCUTANEOUS
Qty: 60 ML | Refills: 3 | Status: SHIPPED | OUTPATIENT
Start: 2018-02-07 | End: 2018-02-07 | Stop reason: SDUPTHER

## 2018-02-07 RX ORDER — ATORVASTATIN CALCIUM 40 MG/1
40 TABLET, FILM COATED ORAL
Qty: 90 TAB | Refills: 3 | Status: SHIPPED | OUTPATIENT
Start: 2018-02-07 | End: 2019-01-08 | Stop reason: SDUPTHER

## 2018-02-07 RX ORDER — CLOPIDOGREL BISULFATE 75 MG/1
75 TABLET ORAL DAILY
Qty: 90 TAB | Refills: 3 | Status: SHIPPED | OUTPATIENT
Start: 2018-02-07 | End: 2019-04-17 | Stop reason: SDUPTHER

## 2018-02-07 RX ORDER — INSULIN LISPRO 100 [IU]/ML
INJECTION, SUSPENSION SUBCUTANEOUS
Qty: 60 ML | Refills: 3 | Status: SHIPPED | OUTPATIENT
Start: 2018-02-07 | End: 2018-05-22

## 2018-02-07 RX ORDER — AMLODIPINE BESYLATE 10 MG/1
10 TABLET ORAL DAILY
Qty: 90 TAB | Refills: 3 | Status: SHIPPED | OUTPATIENT
Start: 2018-02-07 | End: 2019-04-17 | Stop reason: SDUPTHER

## 2018-02-07 RX ORDER — VALSARTAN AND HYDROCHLOROTHIAZIDE 320; 25 MG/1; MG/1
1 TABLET, FILM COATED ORAL DAILY
Qty: 90 TAB | Refills: 3 | Status: SHIPPED | OUTPATIENT
Start: 2018-02-07 | End: 2018-09-24

## 2018-02-07 NOTE — MR AVS SNAPSHOT
[address] [facility] [address] [address] [phone] Patient: Shweta Branhamo MRN: [id number] TAN:2/7/2070 Visit Information Date & Time Provider Department Dept. Phone Encounter #  
 2/7/2018 11:30 AM Lorena Valle, [address] [place] [address] Diabetes and Endocrinology [phone] [id number] Follow-up Instructions Return in about 3 months (around 5/7/2018). Upcoming Health Maintenance Date Due  
 EYE EXAM RETINAL OR DILATED Q1 1/9/1957 DTaP/Tdap/Td series (1 - Tdap) 1/9/1968 FOBT Q 1 YEAR AGE 50-75 1/9/1997 ZOSTER VACCINE AGE 60> 11/9/2006 GLAUCOMA SCREENING Q2Y 1/9/2012 OSTEOPOROSIS SCREENING (DEXA) 1/9/2012 Pneumococcal 65+ Low/Medium Risk (1 of 2 - PCV13) 1/9/2012 MEDICARE YEARLY EXAM 1/9/2012 Influenza Age 5 to Adult 8/1/2017 BREAST CANCER SCRN MAMMOGRAM 4/25/2018 HEMOGLOBIN A1C Q6M 4/26/2018 LIPID PANEL Q1 7/12/2018 FOOT EXAM Q1 10/26/2018 MICROALBUMIN Q1 2/6/2019 Allergies as of 2/7/2018  Review Complete On: 2/7/2018 By: Lorena Valle MD  
  
 Severity Noted Reaction Type Reactions Metformin  07/12/2017    Other (comments) GI side effects Current Immunizations  Never Reviewed No immunizations on file. Not reviewed this visit You Were Diagnosed With   
  
 Codes Comments Type 2 diabetes mellitus with diabetic polyneuropathy, with long-term current use of insulin (HCC)    -  Primary ICD-10-CM: E11.42, Z79.4 ICD-9-CM: 250.60, 357.2, V58.67 Essential hypertension     ICD-10-CM: I10 
ICD-9-CM: 401.9 Mixed hyperlipidemia     ICD-10-CM: E78.2 ICD-9-CM: 272.2 Vitals BP Pulse Height(growth percentile) Weight(growth percentile) BMI OB Status 138/60 70 5' 2\" (1.575 m) 196 lb 3.2 oz (89 kg) 35.89 kg/m2 Postmenopausal  
 Smoking Status Never Smoker Vitals History BMI and BSA Data Body Mass Index Body Surface Area  
 35.89 kg/m 2 1.97 m 2 Preferred Pharmacy Pharmacy Name Phone [address] [facility] [phone] Your Updated Medication List  
  
   
This list is accurate as of: 2/7/18 11:59 AM.  Always use your most recent med list. amLODIPine 10 mg tablet Commonly known as:  Leisa Drewd Take 1 Tab by mouth daily. aspirin 81 mg chewable tablet Take 1 Tab by mouth daily. atorvastatin 40 mg tablet Commonly known as:  LIPITOR Take 1 Tab by mouth nightly. betamethasone dipropionate 0.05 % topical lotion Commonly known as:  Asim Defiance Apply  to affected area two (2) times a day. carvedilol 6.25 mg tablet Commonly known as:  Drena Gavel Take 1 Tab by mouth two (2) times daily (with meals). ciprofloxacin-dexamethasone 0.3-0.1 % otic suspension Commonly known as:  Remona Ropes Administer 4 Drops in left ear two (2) times a day. clopidogrel 75 mg Tab Commonly known as:  PLAVIX Take 1 Tab by mouth daily. furosemide 40 mg tablet Commonly known as:  LASIX Take 1 Tab by mouth daily. gabapentin 300 mg capsule Commonly known as:  NEURONTIN Take 2 Caps by mouth three (3) times daily. Insulin Lisp & Lisp Prot (Hum) 100 unit/mL (75-25) Inpn Commonly known as:  HumaLOG Mix 75-25 KwikPen 75 units with breakfast and 75 units with dinner  
  
 nitroglycerin 0.4 mg SL tablet Commonly known as:  NITROSTAT  
1 Tab by SubLINGual route every five (5) minutes as needed for Chest Pain.  
  
 pantoprazole 40 mg tablet Commonly known as:  PROTONIX Take 1 Tab by mouth every morning. valsartan-hydroCHLOROthiazide 320-25 mg per tablet Commonly known as:  DIOVAN-HCT Take 1 Tab by mouth daily. Prescriptions Sent to Pharmacy Refills  Insulin Lisp & Lisp Prot, Hum, (HUMALOG MIX 75-25 KWIKPEN) 100 unit/mL (75-25) inpn 3  
 Si units with breakfast and 75 units with dinner Class: Normal  
 Pharmacy: [address], [address] Ph #: [phone]  
 gabapentin (NEURONTIN) 300 mg capsule 3 Sig: Take 2 Caps by mouth three (3) times daily. Class: Normal  
 Pharmacy: [address], [address] Ph #: [phone] Route: Oral  
 amLODIPine (NORVASC) 10 mg tablet 3 Sig: Take 1 Tab by mouth daily. Class: Normal  
 Pharmacy: [address], [address] Ph #: [phone] Route: Oral  
 furosemide (LASIX) 40 mg tablet 3 Sig: Take 1 Tab by mouth daily. Class: Normal  
 Pharmacy: [address], [address] Ph #: [phone] Route: Oral  
 valsartan-hydroCHLOROthiazide (DIOVAN-HCT) 320-25 mg per tablet 3 Sig: Take 1 Tab by mouth daily. Class: Normal  
 Pharmacy: [address], [address] Ph #: [phone] Route: Oral  
 atorvastatin (LIPITOR) 40 mg tablet 3 Sig: Take 1 Tab by mouth nightly. Class: Normal  
 Pharmacy: [address], [address] Ph #: [phone] Route: Oral  
 clopidogrel (PLAVIX) 75 mg tab 3 Sig: Take 1 Tab by mouth daily. Class: Normal  
 Pharmacy: [address], [address] Ph #: [phone] Route: Oral  
 pantoprazole (PROTONIX) 40 mg tablet 3 Sig: Take 1 Tab by mouth every morning. Class: Normal  
 Pharmacy: [address], [address] Ph #: [phone] Route: Oral  
 carvedilol (COREG) 6.25 mg tablet 3 Sig: Take 1 Tab by mouth two (2) times daily (with meals). Class: Normal  
 Pharmacy: [address], [address] Ph #: [phone]  Route: Oral  
  
 We Performed the Following AMB POC HEMOGLOBIN A1C [21530 CPT(R)]  DIABETES FOOT EXAM [HM7 Custom] Follow-up Instructions Return in about 3 months (around 5/7/2018). Patient Instructions 1) Increase your Humalog 75:25 to 75 units with breakfast and 75 units with dinner. 2) If you start to have low blood sugars (less than 70) call me. Introducing [facility]! Ju Sanches introduces [employer] patient portal. Now you can access parts of your medical record, email your doctor's office, and request medication refills online. 1. In your internet browser, go to [email or web address] 2. Click on the First Time User? Click Here link in the Sign In box. You will see the New Member Sign Up page. 3. Enter your [employer] Access Code exactly as it appears below. You will not need to use this code after youve completed the sign-up process. If you do not sign up before the expiration date, you must request a new code. · [employer] Access Code: [facility] Expires: 5/1/2018  8:14 AM 
 
4. Enter the last four digits of your Social Security Number (xxxx) and Date of Birth (mm/dd/yyyy) as indicated and click Submit. You will be taken to the next sign-up page. 5. Create a [employer] ID. This will be your [employer] login ID and cannot be changed, so think of one that is secure and easy to remember. 6. Create a [employer] password. You can change your password at any time. 7. Enter your Password Reset Question and Answer. This can be used at a later time if you forget your password. 8. Enter your e-mail address. You will receive e-mail notification when new information is available in [address] 9. Click Sign Up. You can now view and download portions of your medical record. 10. Click the Download Summary menu link to download a portable copy of your medical information.  
 
If you have questions, please visit the Frequently Asked Questions section of the [employer]. Remember, [employer] is NOT to be used for urgent needs. For medical emergencies, dial 911. Now available from your iPhone and Android! Please provide this summary of care documentation to your next provider. Your primary care clinician is listed as Lexi Beltre. If you have any questions after today's visit, please call [phone].

## 2018-02-07 NOTE — PROGRESS NOTES
Chief Complaint   Patient presents with    Diabetes     pcp and pharmacy verified. Eye exam over a year   Records since last visit reviewed. History of Present Illness: Veronica Levi is a 72 y.o. female here for follow up of diabetes. She was diagnosed with diabetes about around 2010. At her last visit in October 2017 her A1C was 12.4% on Humalog 75:25 50 units with breakfast and 40 units with dinner. She was not checking her BGs and not following a low carb diet and was drinking a lot of sugar sweetened beverages. I again stressed the importance of cutting out the sugar sweetened beverages and I increased her Humalog 75:25 to 60 units in the morning and 60 units with dinner. Her A1C today was 14.5%. She notes her son is in the hospital. His HD graft started bleeding and he was rushed to the ED. Pt reports she is taking the Humalog 75:25 insulin 60 units in the morning and 60 units with dinner. She notes there are days (two days a week) that she will not take her insulin at all, or she will fall asleep and forget her night dose. Pt eats two meals per day. Pt has breakfast everyday, around 10AM, this AM she had an open face tuna sandwich and water. She reports that she is note eating lunch, but will have an orange or an apple. She has dinner around 6PM, last night she has an apple and an orange. She will snack occasionally during the day, but she has stopped chips and will snack on fruit. She is taking the Gabapentin 300mg TID. It has not been helping very much. Pt notes her  is doing much better, he had a defib placed and is doing better. Her  had CABG October 2016 and Shawn Mary has been going to the doctors a lot ever since\". Her son is DM and ESRD and is on HD on T/T/Sa and between caring for them she has not had time to care for herself. She follows up with Dr. Charu Villa of Nephrology. She notes that \"I am spilling protein\". She saw her on 2/6/18.    She is followed by Dr. German Navarro of cardiology for HTN and CAD. Her last eye appointment was 2015. Pt has hx of CAD, neuropathy, and neuropathy (pt is on gabapentin 300mg TID). Pt also has hx of HLD on Atorvastatin 40mg daily and CT/US evidence of hepatic steatosis. She states she takes her atorvastatin daily. Pt reports adherance to her BP regimen. She is on BB, ARB, CCB, Clonidine and Lasix. She notes she has checked her BP at home and her SBP is 140+. I have previously screened her for endocrine causes of HTN and they were negative. Current Outpatient Prescriptions   Medication Sig    Insulin Lisp & Lisp Prot, Hum, (HUMALOG MIX 75-25 KWIKPEN) 100 unit/mL (75-25) inpn 75 units with breakfast and 75 units with dinner    gabapentin (NEURONTIN) 300 mg capsule Take 2 Caps by mouth three (3) times daily.  amLODIPine (NORVASC) 10 mg tablet Take 1 Tab by mouth daily.  furosemide (LASIX) 40 mg tablet Take 1 Tab by mouth daily.  valsartan-hydroCHLOROthiazide (DIOVAN-HCT) 320-25 mg per tablet Take 1 Tab by mouth daily.  atorvastatin (LIPITOR) 40 mg tablet Take 1 Tab by mouth nightly.  clopidogrel (PLAVIX) 75 mg tab Take 1 Tab by mouth daily.  pantoprazole (PROTONIX) 40 mg tablet Take 1 Tab by mouth every morning.  carvedilol (COREG) 6.25 mg tablet Take 1 Tab by mouth two (2) times daily (with meals).  ciprofloxacin-dexamethasone (CIPRODEX) 0.3-0.1 % otic suspension Administer 4 Drops in left ear two (2) times a day.  aspirin 81 mg chewable tablet Take 1 Tab by mouth daily.  betamethasone dipropionate (DIPROLENE) 0.05 % topical lotion Apply  to affected area two (2) times a day.  nitroglycerin (NITROSTAT) 0.4 mg SL tablet 1 Tab by SubLINGual route every five (5) minutes as needed for Chest Pain. No current facility-administered medications for this visit.       Allergies   Allergen Reactions    Metformin Other (comments)     GI side effects     Review of Systems:  - Eyes: no blurry vision or double vision  - Cardiovascular: no chest pain  - Respiratory: no shortness of breath  - Musculoskeletal: no myalgias  - Neurological: + numbness/tingling in extremities    Physical Examination:  Blood pressure 138/60, pulse 70, height 5' 2\" (1.575 m), weight 196 lb 3.2 oz (89 kg). - General: pleasant, no distress, good eye contact   - Neck: no carotid bruits  - Cardiovascular: regular, normal rate, nl s1 and s2, no m/r/g, 2+ DP pulses   - Respiratory: clear bilaterally  - Integumentary: 1+ edema, no foot ulcers, sensation to monofilament and vibration intact bilaterally  - Psychiatric: normal mood and affect    Data Reviewed:    Her A1C today was 14.5%    Assessment/Plan:   1) DM >  She is not taking her insulin regularly and we discussed at length the importance of taking her insulin twice per day, every day. She does not want to change her regimen to a basal /bolus, but wants to stick with the Humalog 75/25. Will increase her Humalog 75/25 to 75 units in the AM and 75 units with dinner. Will continue the Gabapentin at 600mg TID. 2) HTN > BP at goal today on her current regimen. 3) HLD > Pt is on Atrovastain 40mg daily, she is followed by Dr. Leatha Briones. Pt voices understanding and agreement with the plan. Pain noted and pt was recommended to call her PCP for further evaluation and treatment, as needed    We spent 40 minutes of face to face time together and > 50% of the time was spent in counseling on diabetes management and determining what changes to make to her medical regimen. RTC 3 months    Patient Instructions   1) Increase your Humalog 75:25 to 75 units with breakfast and 75 units with dinner. 2) If you start to have low blood sugars (less than 70) call me. Follow-up Disposition:  Return in about 3 months (around 5/7/2018).     Copy sent to:  Dr. [place] Rod

## 2018-02-07 NOTE — PATIENT INSTRUCTIONS
1) Increase your Humalog 75:25 to 75 units with breakfast and 75 units with dinner. 2) If you start to have low blood sugars (less than 70) call me.

## 2018-05-22 ENCOUNTER — OFFICE VISIT (OUTPATIENT)
Dept: ENDOCRINOLOGY | Age: 73
End: 2018-05-22

## 2018-05-22 VITALS
HEART RATE: 64 BPM | BODY MASS INDEX: 34.63 KG/M2 | SYSTOLIC BLOOD PRESSURE: 145 MMHG | HEIGHT: 62 IN | WEIGHT: 188.2 LBS | DIASTOLIC BLOOD PRESSURE: 61 MMHG

## 2018-05-22 DIAGNOSIS — E11.42 TYPE 2 DIABETES MELLITUS WITH DIABETIC POLYNEUROPATHY, WITH LONG-TERM CURRENT USE OF INSULIN (HCC): Primary | ICD-10-CM

## 2018-05-22 DIAGNOSIS — E78.2 MIXED HYPERLIPIDEMIA: ICD-10-CM

## 2018-05-22 DIAGNOSIS — Z79.4 TYPE 2 DIABETES MELLITUS WITH DIABETIC POLYNEUROPATHY, WITH LONG-TERM CURRENT USE OF INSULIN (HCC): Primary | ICD-10-CM

## 2018-05-22 DIAGNOSIS — I10 ESSENTIAL HYPERTENSION: ICD-10-CM

## 2018-05-22 PROBLEM — E11.21 TYPE 2 DIABETES WITH NEPHROPATHY (HCC): Status: ACTIVE | Noted: 2018-05-22

## 2018-05-22 LAB — HBA1C MFR BLD HPLC: 14 %

## 2018-05-22 RX ORDER — INSULIN DEGLUDEC 200 U/ML
INJECTION, SOLUTION SUBCUTANEOUS
Qty: 6 ML | Refills: 0 | Status: SHIPPED | COMMUNITY
Start: 2018-05-22 | End: 2018-09-24 | Stop reason: SDUPTHER

## 2018-05-22 RX ORDER — INSULIN DEGLUDEC 200 U/ML
INJECTION, SOLUTION SUBCUTANEOUS
Qty: 60 ML | Refills: 1 | Status: SHIPPED | OUTPATIENT
Start: 2018-05-22 | End: 2018-06-19 | Stop reason: SDUPTHER

## 2018-05-22 NOTE — MR AVS SNAPSHOT
[address] [facility] [address] [address] [phone] Patient: Gabriel Pfeiffer MRN: [id number] BSD:[birth year] Visit Information Date & Time Provider Department Dept. Phone Encounter #  
 5/22/2018 11:30 AM Myra Rayo, [address] [place] [address] Diabetes and Endocrinology [id number] Follow-up Instructions Return in about 3 months (around 8/22/2018). Upcoming Health Maintenance Date Due  
 EYE EXAM RETINAL OR DILATED Q1 1/9/1957 DTaP/Tdap/Td series (1 - Tdap) 1/9/1968 FOBT Q 1 YEAR AGE 50-75 1/9/1997 ZOSTER VACCINE AGE 60> 11/9/2006 GLAUCOMA SCREENING Q2Y 1/9/2012 Bone Densitometry (Dexa) Screening 1/9/2012 Pneumococcal 65+ Low/Medium Risk (1 of 2 - PCV13) 1/9/2012 MEDICARE YEARLY EXAM 3/14/2018 BREAST CANCER SCRN MAMMOGRAM 4/25/2018 LIPID PANEL Q1 7/12/2018 Influenza Age 5 to Adult 8/1/2018 HEMOGLOBIN A1C Q6M 8/7/2018 MICROALBUMIN Q1 2/6/2019 FOOT EXAM Q1 2/7/2019 Allergies as of 5/22/2018  Review Complete On: 5/22/2018 By: Myra Rayo MD  
  
 Severity Noted Reaction Type Reactions Metformin  07/12/2017    Other (comments) GI side effects Current Immunizations  Never Reviewed No immunizations on file. Not reviewed this visit You Were Diagnosed With   
  
 Codes Comments Type 2 diabetes mellitus with diabetic polyneuropathy, with long-term current use of insulin (HCC)    -  Primary ICD-10-CM: E11.42, Z79.4 ICD-9-CM: 250.60, 357.2, V58.67 Essential hypertension     ICD-10-CM: I10 
ICD-9-CM: 401.9 Mixed hyperlipidemia     ICD-10-CM: E78.2 ICD-9-CM: 272.2 Vitals BP Pulse Height(growth percentile) Weight(growth percentile) BMI OB Status 145/61 (BP 1 Location: Right arm, BP Patient Position: Sitting) 64 5' 2\" (1.575 m) 188 lb 3.2 oz (85.4 kg) 34.42 kg/m2 Postmenopausal  
 Smoking Status Never Smoker Vitals History BMI and BSA Data Body Mass Index Body Surface Area 34.42 kg/m 2 1.93 m 2 Preferred Pharmacy Pharmacy Name Phone [address], [facility] [phone] Your Updated Medication List  
  
   
This list is accurate as of 5/22/18 11:41 AM.  Always use your most recent med list. amLODIPine 10 mg tablet Commonly known as:  Robin Mateus Take 1 Tab by mouth daily. aspirin 81 mg chewable tablet Take 1 Tab by mouth daily. atorvastatin 40 mg tablet Commonly known as:  LIPITOR Take 1 Tab by mouth nightly. carvedilol 6.25 mg tablet Commonly known as:  Deedee Menghini Take 1 Tab by mouth two (2) times daily (with meals). clopidogrel 75 mg Tab Commonly known as:  PLAVIX Take 1 Tab by mouth daily. furosemide 40 mg tablet Commonly known as:  LASIX Take 1 Tab by mouth daily. gabapentin 300 mg capsule Commonly known as:  NEURONTIN Take 2 Caps by mouth three (3) times daily. insulin lispro  & lisp protamine (human) 100 unit/mL (75-25) Inpn Commonly known as:  HumaLOG Mix 75-25 KwikPen 75 units with breakfast and 75 units with dinner  
  
 nitroglycerin 0.4 mg SL tablet Commonly known as:  NITROSTAT  
1 Tab by SubLINGual route every five (5) minutes as needed for Chest Pain.  
  
 pantoprazole 40 mg tablet Commonly known as:  PROTONIX Take 1 Tab by mouth every morning. valsartan-hydroCHLOROthiazide 320-25 mg per tablet Commonly known as:  DIOVAN-HCT Take 1 Tab by mouth daily. We Performed the Following AMB POC HEMOGLOBIN A1C [78185 CPT(R)]  DIABETES FOOT EXAM [7 Custom] Follow-up Instructions Return in about 3 months (around 8/22/2018). Patient Instructions 1) Stop the Humalog 75:25 insulin 2) Start Tresiba 120 units every morning.  
3) Check your blood sugars twice per day and keep a written record of your sugars. 4) Come back to see me in 4 weeks, bring your blood sugar records and we will discuss them. Introducing [facility]! Sheila Hopkins introduces [email or web address] patient portal. Now you can access parts of your medical record, email your doctor's office, and request medication refills online. 1. In your internet browser, go to [email or web address] 2. Click on the First Time User? Click Here link in the Sign In box. You will see the New Member Sign Up page. 3. Enter your [email or web address] Access Code exactly as it appears below. You will not need to use this code after youve completed the sign-up process. If you do not sign up before the expiration date, you must request a new code. · [email or web address] Access Code: [email or web address] Expires: 8/20/2018 11:41 AM 
 
4. Enter the last four digits of your Social Security Number (xxxx) and Date of Birth (mm/dd/yyyy) as indicated and click Submit. You will be taken to the next sign-up page. 5. Create a [employer] ID. This will be your [email or web address] login ID and cannot be changed, so think of one that is secure and easy to remember. 6. Create a [email or web address] password. You can change your password at any time. 7. Enter your Password Reset Question and Answer. This can be used at a later time if you forget your password. 8. Enter your e-mail address. You will receive e-mail notification when new information is available in [address] 9. Click Sign Up. You can now view and download portions of your medical record. 10. Click the Download Summary menu link to download a portable copy of your medical information. If you have questions, please visit the Frequently Asked Questions section of the [email or web address] website. Remember, [email or web address] is NOT to be used for urgent needs. For medical emergencies, dial 911. Now available from your iPhone and Android! Please provide this summary of care documentation to your next provider. Your primary care clinician is listed as Toni Gutierres. If you have any questions after today's visit, please call [phone].

## 2018-05-22 NOTE — PATIENT INSTRUCTIONS
1) Stop the Humalog 75:25 insulin  2) Start Tresiba 120 units every morning. 3) Check your blood sugars twice per day and keep a written record of your sugars. 4) Come back to see me in 4 weeks, bring your blood sugar records and we will discuss them.

## 2018-05-22 NOTE — PROGRESS NOTES
Chief Complaint   Patient presents with    Diabetes     pcp and pharmacy verified. Eye exam over a year   Records since last visit reviewed. History of Present Illness: Frutoso Homans is a 72 y.o. female here for follow up of diabetes. She was diagnosed with diabetes about around 2010. At her last visit in February 2018 her A1C was 14.5%. She was taking Humalog 75:25 60 units BID, but she reported that there were at least two days per week she would not take her insulin at all or would fall asleep and not take her PM dose. We discussed at length the importance of taking her insulin twice per day, every day. She did not want to change her regimen to a basal /bolus, but wants to stick with the Humalog 75/25, so we increased her Humalog 75/25 to 75 units in the AM and 75 units with dinner. Pt notes that her was in rehab, but was taken to [facility] for very high BP. She notes her  has been in the hospital and she notes she it the only care-taker for her son,  and brother. Her A1C today was 14%. She notes her legs and feet are hurting her and it is preventing her from sleeping at night. She reports she is taking 75 units in the morning and with dinner. She notes that she is still forgetting to take take her insulin at least twice per week. She is not checking her BGs regularly. When she does check, it tends to be in the 300's range. Pt eats two meals per day. Pt has breakfast everyday, around 10AM, this AM she had an apple and water. She reports that she is note eating lunch, but will have an orange or an apple. She has dinner around 6PM, last night she had baked chicken and broccoli and water. She denies snacking between meals. She follows up with Dr. Sheng Madrid of Nephrology for CKD III and Nephropathy. She is followed by Dr. Hector Reyna of cardiology for HTN and CAD. Her last eye appointment was 2015.     Pt has hx of CAD, neuropathy, and neuropathy (pt is on gabapentin 600mg TID). She does not feel the Gabapentin is helping. Pt also has hx of HLD on Atorvastatin 40mg daily and CT/US evidence of hepatic steatosis. She states she takes her atorvastatin daily. Pt reports adherance to her BP regimen. She is on BB, ARB, CCB, Clonidine and Lasix. She notes she has checked her BP at home and her SBP is 140+. I have previously screened her for endocrine causes of HTN and they were negative. Current Outpatient Prescriptions   Medication Sig    insulin degludec (TRESIBA FLEXTOUCH U-200) 200 unit/mL (3 mL) inpn 120 units daily    insulin degludec (TRESIBA FLEXTOUCH U-200) 200 unit/mL (3 mL) inpn 120 units daily    gabapentin (NEURONTIN) 300 mg capsule Take 2 Caps by mouth three (3) times daily.  amLODIPine (NORVASC) 10 mg tablet Take 1 Tab by mouth daily.  furosemide (LASIX) 40 mg tablet Take 1 Tab by mouth daily.  valsartan-hydroCHLOROthiazide (DIOVAN-HCT) 320-25 mg per tablet Take 1 Tab by mouth daily.  atorvastatin (LIPITOR) 40 mg tablet Take 1 Tab by mouth nightly.  clopidogrel (PLAVIX) 75 mg tab Take 1 Tab by mouth daily.  pantoprazole (PROTONIX) 40 mg tablet Take 1 Tab by mouth every morning.  carvedilol (COREG) 6.25 mg tablet Take 1 Tab by mouth two (2) times daily (with meals).  aspirin 81 mg chewable tablet Take 1 Tab by mouth daily.  nitroglycerin (NITROSTAT) 0.4 mg SL tablet 1 Tab by SubLINGual route every five (5) minutes as needed for Chest Pain. No current facility-administered medications for this visit. Allergies   Allergen Reactions    Metformin Other (comments)     GI side effects     Review of Systems:  - Eyes: no blurry vision or double vision  - Cardiovascular: no chest pain  - Respiratory: no shortness of breath  - Musculoskeletal: no myalgias  - Neurological: + numbness/tingling in extremities    Physical Examination:  Blood pressure 145/61, pulse 64, height 5' 2\" (1.575 m), weight 188 lb 3.2 oz (85.4 kg).   - General: pleasant, no distress, good eye contact   - Neck: no carotid bruits  - Cardiovascular: regular, normal rate, nl s1 and s2, no m/r/g, 2+ DP pulses   - Respiratory: clear bilaterally  - Integumentary: 1+ edema, no foot ulcers, sensation to monofilament and vibration intact bilaterally  - Psychiatric: normal mood and affect    Data Reviewed:    Her A1C today was 14%    Assessment/Plan:   1) DM >  She is not taking her insulin regularly and pt states that she is willing to try a basal bolus regimen of insulin. Will start by having pt stop the 75:25 and start her on Tresiba 120 units daily. She reports that she is taking 150 units daily in her current regimen, most days and has not had any low blood sugar episodes. Will start (150-20%) 120 units daily and have patient check her BGs twice daily and then sit down with pt in 4 weeks to discuss starting meal time insulin, based on her BG readings. Will continue the Gabapentin at 600mg TID for now and work on improving her BGs to see if that will help with her neuropathy. 2) HTN > SBP above goal today, will continue to monitor. 3) HLD > Pt is on Atrovastain 40mg daily, she is followed by Dr. Ileana Moore. Pt voices understanding and agreement with the plan. Pain noted and pt was recommended to call her PCP for further evaluation and treatment, as needed    We spent 40 minutes of face to face time together and > 50% of the time was spent in counseling on diabetes management and determining what changes to make to her medical regimen. RTC 4 weeks    Patient Instructions   1) Stop the Humalog 75:25 insulin  2) Start Tresiba 120 units every morning. 3) Check your blood sugars twice per day and keep a written record of your sugars. 4) Come back to see me in 4 weeks, bring your blood sugar records and we will discuss them. Follow-up Disposition:  Return in 4 weeks (on 6/19/2018).     Copy sent to:  Dr. Loco Back

## 2018-06-04 ENCOUNTER — DOCUMENTATION ONLY (OUTPATIENT)
Dept: ENDOCRINOLOGY | Age: 73
End: 2018-06-04

## 2018-06-04 NOTE — PROGRESS NOTES
Per Pratik Anthony. From [employer] [facility] has been approved from 04/25/18-12/3/2099. Approval ID [id number].

## 2018-06-19 ENCOUNTER — OFFICE VISIT (OUTPATIENT)
Dept: ENDOCRINOLOGY | Age: 73
End: 2018-06-19

## 2018-06-19 VITALS
SYSTOLIC BLOOD PRESSURE: 160 MMHG | DIASTOLIC BLOOD PRESSURE: 72 MMHG | BODY MASS INDEX: 35.99 KG/M2 | WEIGHT: 195.6 LBS | HEIGHT: 62 IN | HEART RATE: 69 BPM

## 2018-06-19 DIAGNOSIS — Z79.4 TYPE 2 DIABETES MELLITUS WITH DIABETIC POLYNEUROPATHY, WITH LONG-TERM CURRENT USE OF INSULIN (HCC): Primary | ICD-10-CM

## 2018-06-19 DIAGNOSIS — I10 ESSENTIAL HYPERTENSION: ICD-10-CM

## 2018-06-19 DIAGNOSIS — E11.42 TYPE 2 DIABETES MELLITUS WITH DIABETIC POLYNEUROPATHY, WITH LONG-TERM CURRENT USE OF INSULIN (HCC): Primary | ICD-10-CM

## 2018-06-19 PROBLEM — E66.01 SEVERE OBESITY (BMI 35.0-39.9): Status: ACTIVE | Noted: 2018-06-19

## 2018-06-19 NOTE — PROGRESS NOTES
Chief Complaint   Patient presents with    Diabetes     pcp and pharmacy verified. Eye exam over a  year   Records since last visit reviewed. History of Present Illness: Connie Smith is a 72 y.o. female here for follow up of diabetes. She was diagnosed with diabetes about around 2010. At her last visit on 5/22/18 her A1C was 14%. Pt admitted to missing her insulin on occasions. Pt has been in rehab and was at [facility] for very high blood pressures. Since the Humalog 75:25 regimen was not controlling her BGs, pt agreed to transition to a basal:bolus regimen. We started by having pt stop the 75:25 and started her on Tresiba U-200, 120 units daily. Pt was to check her BGs twice daily and then we agreed to sit down in 4 weeks to discuss starting meal time inuslin based on her BG readings. Pt has been taking the Tresiba U-200, 120 units daily. She will take it in the morning. She has not been checking her BGs. She does report issues of hypoglycemia. Pt notes that during the day her legs swell. When she lays down they improve and are normal by the next day. She notes her legs and feet are hurting her and it is preventing her from sleeping at night. Pt eats 2-3 meals per day. Pt has breakfast everyday, around 10AM, yesterday he had waffles (SF syrup), eggs and turkey sausage and cranberry juice. She will have lunch 4 days per week, around 2PM, yesterday she had baked chicken, broccoli and baked potato and cranberry juice      She has dinner around 6PM, last night she had baked fish, broccoli and cranberry juice. She denies snacking between meals. She follows up with Dr. Lindsay Salmon of Nephrology for CKD III and Nephropathy. She is followed by Dr. Valentina Medrano of cardiology for HTN and CAD. Her last eye appointment was 2015. Pt has hx of CAD, neuropathy, and neuropathy (pt is on gabapentin 600mg TID). She does not feel the Gabapentin is helping.   Pt also has hx of HLD on Atorvastatin 40mg daily and CT/US evidence of hepatic steatosis. She states she takes her atorvastatin daily. Pt reports adherance to her BP regimen. She is on BB, ARB, CCB, Clonidine and Lasix. She notes she has checked her BP at home and her SBP is 140+. I have previously screened her for endocrine causes of HTN and they were negative. Current Outpatient Prescriptions   Medication Sig    insulin degludec (TRESIBA FLEXTOUCH U-200) 200 unit/mL (3 mL) inpn 120 units daily    gabapentin (NEURONTIN) 300 mg capsule Take 2 Caps by mouth three (3) times daily.  amLODIPine (NORVASC) 10 mg tablet Take 1 Tab by mouth daily.  furosemide (LASIX) 40 mg tablet Take 1 Tab by mouth daily.  valsartan-hydroCHLOROthiazide (DIOVAN-HCT) 320-25 mg per tablet Take 1 Tab by mouth daily.  atorvastatin (LIPITOR) 40 mg tablet Take 1 Tab by mouth nightly.  clopidogrel (PLAVIX) 75 mg tab Take 1 Tab by mouth daily.  pantoprazole (PROTONIX) 40 mg tablet Take 1 Tab by mouth every morning.  carvedilol (COREG) 6.25 mg tablet Take 1 Tab by mouth two (2) times daily (with meals).  aspirin 81 mg chewable tablet Take 1 Tab by mouth daily.  nitroglycerin (NITROSTAT) 0.4 mg SL tablet 1 Tab by SubLINGual route every five (5) minutes as needed for Chest Pain. No current facility-administered medications for this visit. Allergies   Allergen Reactions    Metformin Other (comments)     GI side effects     Review of Systems:  - Eyes: no blurry vision or double vision  - Cardiovascular: no chest pain  - Respiratory: no shortness of breath  - Musculoskeletal: no myalgias  - Neurological: + numbness/tingling in extremities    Physical Examination:  Blood pressure 160/72, pulse 69, height 5' 2\" (1.575 m), weight 195 lb 9.6 oz (88.7 kg).   - General: pleasant, no distress, good eye contact   - Neck: no carotid bruits  - Cardiovascular: regular, normal rate, nl s1 and s2, no m/r/g, 2+ DP pulses   - Respiratory: clear bilaterally  - Integumentary: 1+ edema, no foot ulcers, sensation to monofilament and vibration intact bilaterally  - Psychiatric: normal mood and affect    Diabetic foot exam:     Left Foot:   Visual Exam: callous - present   Pulse DP: 2+ (normal)   Filament test: normal sensation    Vibratory sensation: normal      Right Foot:   Visual Exam: callous - present   Pulse DP: 2+ (normal)   Filament test: normal sensation    Vibratory sensation: normal        Data Reviewed:   - none    Assessment/Plan:   1) DM > Pt insists she is taking her Tresiba U-200 120 units daily. Will check a fructosamine and we can discuss starting meal time Novolog. Pt instructed to check her BGs 4 times daily. For her LE edema I recommended she wear compression stockings during the day. Will continue the Gabapentin at 600mg TID for now and work on improving her BGs to see if that will help with her neuropathy. With her hx of neuropathy and calluses, she would benefit from DM shoes and inserts     2) HTN > SBP above goal today, will continue to monitor. Pt is on ARB, CCB, BB, HCTZ and Lasix, which pt insists she is taking faithfully. 3) HLD > Pt is on Atrovastain 40mg daily, she is followed by Dr. Yanelis Lewis. Pt voices understanding and agreement with the plan. Pain noted and pt was recommended to call her PCP for further evaluation and treatment, as needed      Follow-up Disposition:  Return in about 3 months (around 9/19/2018).     Copy sent to:  Dr. Ritu Apodaca

## 2018-06-20 LAB — FRUCTOSAMINE SERPL-SCNC: 379 UMOL/L (ref 0–285)

## 2018-06-21 NOTE — PROGRESS NOTES
Her blood sugars have improved down from the 350-360's range down to the 210-230's range. This is excellent improvement, but still too high. Continue the Tresiba 120 units every day. Check your blood sugars twice per day and send us those blood sugar readings in 2-3 weeks. If your sugars are not improving any more in the next 3 weeks, we will need to discuss adding another medication to help get your sugars down further.

## 2018-08-15 RX ORDER — LOSARTAN POTASSIUM AND HYDROCHLOROTHIAZIDE 25; 100 MG/1; MG/1
1 TABLET ORAL DAILY
COMMUNITY
End: 2019-07-18 | Stop reason: SDUPTHER

## 2018-09-24 ENCOUNTER — OFFICE VISIT (OUTPATIENT)
Dept: ENDOCRINOLOGY | Age: 73
End: 2018-09-24

## 2018-09-24 VITALS
BODY MASS INDEX: 37.32 KG/M2 | DIASTOLIC BLOOD PRESSURE: 70 MMHG | WEIGHT: 202.8 LBS | SYSTOLIC BLOOD PRESSURE: 150 MMHG | HEIGHT: 62 IN | HEART RATE: 71 BPM

## 2018-09-24 DIAGNOSIS — E78.2 MIXED HYPERLIPIDEMIA: ICD-10-CM

## 2018-09-24 DIAGNOSIS — I10 ESSENTIAL HYPERTENSION: ICD-10-CM

## 2018-09-24 DIAGNOSIS — Z79.4 TYPE 2 DIABETES MELLITUS WITH DIABETIC POLYNEUROPATHY, WITH LONG-TERM CURRENT USE OF INSULIN (HCC): Primary | ICD-10-CM

## 2018-09-24 DIAGNOSIS — E11.42 TYPE 2 DIABETES MELLITUS WITH DIABETIC POLYNEUROPATHY, WITH LONG-TERM CURRENT USE OF INSULIN (HCC): Primary | ICD-10-CM

## 2018-09-24 LAB — HBA1C MFR BLD HPLC: 10.4 %

## 2018-09-24 RX ORDER — GLIPIZIDE 10 MG/1
10 TABLET ORAL 2 TIMES DAILY
Qty: 60 TAB | Refills: 3 | Status: SHIPPED | OUTPATIENT
Start: 2018-09-24 | End: 2019-01-08 | Stop reason: SDUPTHER

## 2018-09-24 RX ORDER — PEN NEEDLE, DIABETIC 31 GX3/16"
NEEDLE, DISPOSABLE MISCELLANEOUS
Qty: 100 PEN NEEDLE | Refills: 3 | Status: SHIPPED | OUTPATIENT
Start: 2018-09-24 | End: 2019-01-08 | Stop reason: SDUPTHER

## 2018-09-24 RX ORDER — GLIPIZIDE 10 MG/1
10 TABLET ORAL 2 TIMES DAILY
Qty: 60 TAB | Refills: 3 | Status: SHIPPED | OUTPATIENT
Start: 2018-09-24 | End: 2018-09-24 | Stop reason: SDUPTHER

## 2018-09-24 RX ORDER — PREGABALIN 75 MG/1
75 CAPSULE ORAL 2 TIMES DAILY
Qty: 60 CAP | Refills: 3 | Status: SHIPPED | OUTPATIENT
Start: 2018-09-24 | End: 2019-07-18

## 2018-09-24 RX ORDER — INSULIN DEGLUDEC 200 U/ML
INJECTION, SOLUTION SUBCUTANEOUS
Qty: 54 ML | Refills: 5 | Status: SHIPPED | OUTPATIENT
Start: 2018-09-24 | End: 2019-01-08 | Stop reason: SDUPTHER

## 2018-09-24 NOTE — PROGRESS NOTES
Chief Complaint Patient presents with  Diabetes  
  pcp and pharmacy verified; no eye exam within one year Records since last visit reviewed. History of Present Illness: Sera Menendez is a 72 y.o. female here for follow up of diabetes. She was diagnosed with diabetes about around . At her last visit on 18 her A1C was 14%. Pt admitted to missing her insulin and she had been in rehab and was at [facility] for very high blood pressures. Since the Humalog 75:25 regimen was not controlling her BGs, pt agreed to transition to a basal:bolus regimen. We started by having pt stop the 75:25 and started her on Tresiba U-200, 120 units daily. Her BGs were starting to show evidence of improvement on the Tresiba U-200 120 units daily. She was to follow up with me in 6 weeks, but this did not happen. Her A1C today was 10.4%. Pt has been taking the Tresiba U-200, 120 units daily. She will take it in the morning. She has not been checking her BGs. She does report issues of hypoglycemia. Pt eats 3 meals per day. Pt has breakfast everyday, around 10AM, this AM she had eggs, ricks and toast.   
She will have lunch 4 days per week, around 2PM, this afternoon she had a PB sandwich on toast and water. She has dinner around 6PM, last night she had baked chicken, broccoli baked potato and water. Pt reports she has been eating a lot of ice cream and has gained 7 pounds since our last visit. She notes that she has \"had a lot on my mind and I have been eating a lot of junk\". Pt notes that she son's wife , her  will be having a pacemaker placed in the near future and her brother is in [place]. She follows up with Dr. Vincenzo Marshall of Nephrology for CKD III and Nephropathy. \"She changed my cozar to hyzaar and changed me to BID\". She is followed by Dr. Marlo Sorenson of cardiology for HTN and CAD. \"I have not seen him in a while, I have been so busy taking care of others I have not been taking care of myself\". Her last eye appointment was 2015. Pt has hx of CAD, neuropathy, and neuropathy (pt is on gabapentin 600mg TID). She does not feel the Gabapentin is helping. \"My legs, feet and thighs are killing me\". Pt also has hx of HLD on Atorvastatin 40mg daily and CT/US evidence of hepatic steatosis. She states she takes her atorvastatin daily. Pt reports adherance to her BP regimen. She is on BB, ARB, CCB, Clonidine and Lasix. She notes she has checked her BP at home and her SBP is 140+. I have previously screened her for endocrine causes of HTN and they were negative. Current Outpatient Prescriptions Medication Sig  
 insulin degludec (TRESIBA FLEXTOUCH U-200) 200 unit/mL (3 mL) inpn 120 units daily  pregabalin (LYRICA) 75 mg capsule Take 1 Cap by mouth two (2) times a day. Max Daily Amount: 150 mg.  
 Insulin Needles, Disposable, (JUAN LUIS PEN NEEDLE) 32 gauge x 5/32\" ndle One shot daily  glipiZIDE (GLUCOTROL) 10 mg tablet Take 1 Tab by mouth two (2) times a day.  losartan-hydroCHLOROthiazide (HYZAAR) 100-25 mg per tablet Take 1 Tab by mouth daily.  amLODIPine (NORVASC) 10 mg tablet Take 1 Tab by mouth daily.  furosemide (LASIX) 40 mg tablet Take 1 Tab by mouth daily.  atorvastatin (LIPITOR) 40 mg tablet Take 1 Tab by mouth nightly.  clopidogrel (PLAVIX) 75 mg tab Take 1 Tab by mouth daily.  pantoprazole (PROTONIX) 40 mg tablet Take 1 Tab by mouth every morning.  carvedilol (COREG) 6.25 mg tablet Take 1 Tab by mouth two (2) times daily (with meals).  aspirin 81 mg chewable tablet Take 1 Tab by mouth daily.  nitroglycerin (NITROSTAT) 0.4 mg SL tablet 1 Tab by SubLINGual route every five (5) minutes as needed for Chest Pain. No current facility-administered medications for this visit. Allergies Allergen Reactions  Metformin Other (comments) GI side effects Review of Systems: - Eyes: no blurry vision or double vision - Cardiovascular: no chest pain - Respiratory: no shortness of breath - Musculoskeletal: no myalgias - Neurological: + numbness/tingling in extremities Physical Examination: 
Blood pressure 150/70, pulse 71, height 5' 2\" (1.575 m), weight 202 lb 12.8 oz (92 kg). - General: pleasant, no distress, good eye contact  
- Neck: no carotid bruits - Cardiovascular: regular, normal rate, nl s1 and s2, no m/r/g, 2+ DP pulses - Respiratory: clear bilaterally - Integumentary: 1+ edema, no foot ulcers, sensation to monofilament and vibration intact bilaterally - Psychiatric: normal mood and affect Diabetic foot exam:  
 
Left Foot: 
 Visual Exam: callous - present Pulse DP: 2+ (normal) Filament test: normal sensation Vibratory sensation: normal 
   
Right Foot: 
 Visual Exam: callous - present Pulse DP: 2+ (normal) Filament test: normal sensation Vibratory sensation: normal 
 
 
 
Data Reviewed:  
Her A1C today was 10.4%. Assessment/Plan:  
1) DM > Her A1C today was 10.4%. She reports that she is taking her Tresiba 120 units daily, but is not checking her BGs. Will start pt on Glipizide 10mg BID. Pt instructed to check her BGs 4 times daily. For her LE edema I recommended she wear compression stockings during the day. Will stop the Gabapentin 600mg TID and start her on Lyrica 75mg BID. Pt to take it HS for a week to see how it effects and her if she will tolerate it well. With her hx of neuropathy and calluses, she would benefit from DM shoes and inserts 2) HTN > /70 today, will continue to monitor. Pt is on ARB, CCB, BB, HCTZ and Lasix, which pt insists she is taking faithfully. 3) HLD > Pt is on Atrovastain 40mg daily, she is followed by Dr. Angel Mistry. Pt voices understanding and agreement with the plan. Pain noted and pt was recommended to call her PCP for further evaluation and treatment, as needed Follow-up Disposition: 
Return in about 3 months (around 12/24/2018). Copy sent to: Wendy Reyes and Beth Harris

## 2018-09-24 NOTE — PATIENT INSTRUCTIONS
1) Continue the Tresiba 120 units daily 2) Start Glipizide. Take one 10mg tablet in the morning and one 10mg tablet with dinner. 3) Stop the Gabapentin, I am replacing it with Lyrica 75mg tablet. Start by taking one at bedtime for a week and let me know if you do ok with this pill. Pregabalin (By mouth) Pregabalin (pre-GA-ba-catherine) Treats nerve and muscle pain, including fibromyalgia. Also treats seizures. Brand Name(s): Lyrica There may be other brand names for this medicine. When This Medicine Should Not Be Used: This medicine is not right for everyone. Do not use it if you had an allergic reaction to pregabalin. How to Use This Medicine:  
Capsule, Liquid · Take your medicine as directed. Your dose may need to be changed several times to find what works best for you. · Measure the oral liquid medicine with a marked measuring spoon, oral syringe, or medicine cup. · This medicine should come with a Medication Guide. Ask your pharmacist for a copy if you do not have one. · Missed dose: Take a dose as soon as you remember. If it is almost time for your next dose, wait until then and take a regular dose. Do not take extra medicine to make up for a missed dose. · Store the medicine in a closed container at room temperature, away from heat, moisture, and direct light. Drugs and Foods to Avoid: Ask your doctor or pharmacist before using any other medicine, including over-the-counter medicines, vitamins, and herbal products. · Some medicines can affect how pregabalin works. Tell your doctor if you are using any of the following: ¨ Diabetes medicine that you take by mouth (pioglitazone, rosiglitazone) ¨ An ACE inhibitor (benazepril, enalapril, lisinopril, quinapril, ramipril) · Tell your doctor if you use anything else that makes you sleepy. Some examples are allergy medicine, narcotic pain medicine, and alcohol. Warnings While Using This Medicine: · Tell your doctor if you are pregnant or breastfeeding, or if you have kidney disease, heart failure, heart rhythm problems, angioedema, a bleeding disorder, or a low blood platelet count. Tell your doctor if you have a history of alcohol or drug abuse, depression, or other mood problems. · This medicine may cause the following problems:  
¨ Serious allergic reactions ¨ Suicidal thoughts · This medicine may make you dizzy or drowsy. It may also cause blurry or double vision. Do not drive or do anything that could be dangerous until you know how this medicine affects you. · Do not stop using this medicine suddenly. Your doctor will need to slowly decrease your dose before you stop it completely. · Your doctor will check your progress and the effects of this medicine at regular visits. Keep all appointments. · Keep all medicine out of the reach of children. Never share your medicine with anyone. Possible Side Effects While Using This Medicine:  
Call your doctor right away if you notice any of these side effects: · Allergic reaction: Itching or hives, swelling in your face or hands, swelling or tingling in your mouth or throat, chest tightness, trouble breathing · Blistering, peeling, red skin rash · Blurry or double vision · Fever, chills, cough, sore throat, and body aches · Muscle pain, tenderness, or weakness, fever, or general ill feeling · Rapid weight gain, swelling in your hands, ankles, or feet · Severe dizziness or drowsiness · Sudden mood changes, unusual thoughts or behavior such as extreme happiness or depression · Suicidal thoughts · Swelling in your throat, head, or neck · Uneven heartbeat · Unusual bleeding, bruising, or weakness If you notice these less serious side effects, talk with your doctor: · Confusion, trouble concentrating · Constipation · Dry mouth If you notice other side effects that you think are caused by this medicine, tell your doctor. Call your doctor for medical advice about side effects. You may report side effects to FDA at 5-107-BJS-2661 © 2017 [facility] Information is for End User's use only and may not be sold, redistributed or otherwise used for commercial purposes. The above information is an  only. It is not intended as medical advice for individual conditions or treatments. Talk to your doctor, nurse or pharmacist before following any medical regimen to see if it is safe and effective for you.

## 2018-09-24 NOTE — MR AVS SNAPSHOT
[address] [place] [address] [address] [id number] [phone] Patient: Carol Bermudez MRN: [id number]:[id number] Visit Information Date & Time Provider Department Dept. Phone Encounter #  
 9/24/2018  3:50 PM Kourtney Reynoso, [address] [place] [address] Diabetes and Endocrinology [phone] Follow-up Instructions Return in about 3 months (around 12/24/2018). Upcoming Health Maintenance Date Due  
 EYE EXAM RETINAL OR DILATED Q1 1/9/1957 DTaP/Tdap/Td series (1 - Tdap) 1/9/1968 Shingrix Vaccine Age 50> (1 of 2) 1/9/1997 FOBT Q 1 YEAR AGE 50-75 1/9/1997 GLAUCOMA SCREENING Q2Y 1/9/2012 Bone Densitometry (Dexa) Screening 1/9/2012 Pneumococcal 65+ Low/Medium Risk (1 of 2 - PCV13) 1/9/2012 MEDICARE YEARLY EXAM 3/14/2018 BREAST CANCER SCRN MAMMOGRAM 4/25/2018 LIPID PANEL Q1 7/12/2018 Influenza Age 5 to Adult 8/1/2018 HEMOGLOBIN A1C Q6M 11/22/2018 MICROALBUMIN Q1 2/6/2019 FOOT EXAM Q1 6/19/2019 Allergies as of 9/24/2018  Review Complete On: 9/24/2018 By: Kourtney Reynoso MD  
  
 Severity Noted Reaction Type Reactions Metformin  07/12/2017    Other (comments) GI side effects Current Immunizations  Never Reviewed No immunizations on file. Not reviewed this visit You Were Diagnosed With   
  
 Codes Comments Type 2 diabetes mellitus with diabetic polyneuropathy, with long-term current use of insulin (HCC)    -  Primary ICD-10-CM: E11.42, Z79.4 ICD-9-CM: 250.60, 357.2, V58.67 Essential hypertension     ICD-10-CM: I10 
ICD-9-CM: 401.9 Mixed hyperlipidemia     ICD-10-CM: E78.2 ICD-9-CM: 272.2 Vitals BP Pulse Height(growth percentile) Weight(growth percentile) BMI OB Status 150/70 (BP 1 Location: Left arm, BP Patient Position: Sitting) 71 5' 2\" (1.575 m) 202 lb 12.8 oz (92 kg) 37.09 kg/m2 Postmenopausal  
 Smoking Status Never Smoker Vitals History BMI and BSA Data Body Mass Index Body Surface Area 37.09 kg/m 2 2.01 m 2 Preferred Pharmacy Pharmacy Name Phone [facility] [address], [address] [place] [address] Avenue [phone] Your Updated Medication List  
  
   
This list is accurate as of 9/24/18  4:23 PM.  Always use your most recent med list. amLODIPine 10 mg tablet Commonly known as:  [place] Take 1 Tab by mouth daily. aspirin 81 mg chewable tablet Take 1 Tab by mouth daily. atorvastatin 40 mg tablet Commonly known as:  LIPITOR Take 1 Tab by mouth nightly. carvedilol 6.25 mg tablet Commonly known as:  Carlitos Peat Take 1 Tab by mouth two (2) times daily (with meals). clopidogrel 75 mg Tab Commonly known as:  PLAVIX Take 1 Tab by mouth daily. furosemide 40 mg tablet Commonly known as:  LASIX Take 1 Tab by mouth daily. glipiZIDE 10 mg tablet Commonly known as:  Rumalda Gabriela Take 1 Tab by mouth two (2) times a day. insulin degludec 200 unit/mL (3 mL) Inpn Commonly known as:  TRESIBA FLEXTOUCH U-200  
120 units daily Insulin Needles (Disposable) 32 gauge x 5/32\" Ndle Commonly known as:  Selena Pen Needle One shot daily  
  
 losartan-hydroCHLOROthiazide 100-25 mg per tablet Commonly known as:  HYZAAR Take 1 Tab by mouth daily. nitroglycerin 0.4 mg SL tablet Commonly known as:  NITROSTAT  
1 Tab by SubLINGual route every five (5) minutes as needed for Chest Pain.  
  
 pantoprazole 40 mg tablet Commonly known as:  PROTONIX Take 1 Tab by mouth every morning. pregabalin 75 mg capsule Commonly known as:  Seleta Baller Take 1 Cap by mouth two (2) times a day. Max Daily Amount: 150 mg.  
  
  
  
  
Prescriptions Printed Refills  
 pregabalin (LYRICA) 75 mg capsule 3 Sig: Take 1 Cap by mouth two (2) times a day. Max Daily Amount: 150 mg.  
 Class: Print  Route: Oral  
  
 Prescriptions Sent to Pharmacy Refills  
 insulin degludec (TRESIBA FLEXTOUCH U-200) 200 unit/mL (3 mL) inpn 5 Si units daily Class: Normal  
 Pharmacy: [address], [address] [facility] Ph #: [phone] Insulin Needles, Disposable, (JUAN LUIS PEN NEEDLE) 32 gauge x \" ndle 3 Sig: One shot daily Class: Normal  
 Pharmacy: [facility] [address] [place] [address], [facility] Ph #: [phone]  
 glipiZIDE (GLUCOTROL) 10 mg tablet 3 Sig: Take 1 Tab by mouth two (2) times a day. Class: Normal  
 Pharmacy: [address], [address] [place] [address] Ph #: [phone] Route: Oral  
  
We Performed the Following AMB POC HEMOGLOBIN A1C [27620 CPT(R)] HM DIABETES FOOT EXAM [[facility] Custom] MICROALBUMIN, UR, RAND W/ MICROALB/CREAT RATIO [id number] CPT(R)] Follow-up Instructions Return in about 3 months (around 2018). Patient Instructions 1) Continue the Tresiba 120 units daily 2) Start Glipizide. Take one 10mg tablet in the morning and one 10mg tablet with dinner. 3) Stop the Gabapentin, I am replacing it with Lyrica 75mg tablet. Start by taking one at bedtime for a week and let me know if you do ok with this pill. Pregabalin (By mouth) Pregabalin (pre-GA-ba-catherine) Treats nerve and muscle pain, including fibromyalgia. Also treats seizures. Brand Name(s): Lyrica There may be other brand names for this medicine. When This Medicine Should Not Be Used: This medicine is not right for everyone. Do not use it if you had an allergic reaction to pregabalin. How to Use This Medicine:  
Capsule, Liquid · Take your medicine as directed. Your dose may need to be changed several times to find what works best for you. · Measure the oral liquid medicine with a marked measuring spoon, oral syringe, or medicine cup. · This medicine should come with a Medication Guide. Ask your pharmacist for a copy if you do not have one. · Missed dose: Take a dose as soon as you remember. If it is almost time for your next dose, wait until then and take a regular dose. Do not take extra medicine to make up for a missed dose. · Store the medicine in a closed container at room temperature, away from heat, moisture, and direct light. Drugs and Foods to Avoid: Ask your doctor or pharmacist before using any other medicine, including over-the-counter medicines, vitamins, and herbal products. · Some medicines can affect how pregabalin works. Tell your doctor if you are using any of the following: ¨ Diabetes medicine that you take by mouth (pioglitazone, rosiglitazone) ¨ An ACE inhibitor (benazepril, enalapril, lisinopril, quinapril, ramipril) · Tell your doctor if you use anything else that makes you sleepy. Some examples are allergy medicine, narcotic pain medicine, and alcohol. Warnings While Using This Medicine: · Tell your doctor if you are pregnant or breastfeeding, or if you have kidney disease, heart failure, heart rhythm problems, angioedema, a bleeding disorder, or a low blood platelet count. Tell your doctor if you have a history of alcohol or drug abuse, depression, or other mood problems. · This medicine may cause the following problems:  
¨ Serious allergic reactions ¨ Suicidal thoughts · This medicine may make you dizzy or drowsy. It may also cause blurry or double vision. Do not drive or do anything that could be dangerous until you know how this medicine affects you. · Do not stop using this medicine suddenly. Your doctor will need to slowly decrease your dose before you stop it completely. · Your doctor will check your progress and the effects of this medicine at regular visits. Keep all appointments. · Keep all medicine out of the reach of children. Never share your medicine with anyone. Possible Side Effects While Using This Medicine:  
Call your doctor right away if you notice any of these side effects: · Allergic reaction: Itching or hives, swelling in your face or hands, swelling or tingling in your mouth or throat, chest tightness, trouble breathing · Blistering, peeling, red skin rash · Blurry or double vision · Fever, chills, cough, sore throat, and body aches · Muscle pain, tenderness, or weakness, fever, or general ill feeling · Rapid weight gain, swelling in your hands, ankles, or feet · Severe dizziness or drowsiness · Sudden mood changes, unusual thoughts or behavior such as extreme happiness or depression · Suicidal thoughts · Swelling in your throat, head, or neck · Uneven heartbeat · Unusual bleeding, bruising, or weakness If you notice these less serious side effects, talk with your doctor: · Confusion, trouble concentrating · Constipation · Dry mouth If you notice other side effects that you think are caused by this medicine, tell your doctor. Call your doctor for medical advice about side effects. You may report side effects to FDA at 3-486-FDA-3692 © 2017 [address] Information is for End User's use only and may not be sold, redistributed or otherwise used for commercial purposes. The above information is an  only. It is not intended as medical advice for individual conditions or treatments. Talk to your doctor, nurse or pharmacist before following any medical regimen to see if it is safe and effective for you. Introducing [facility]! [employer] introduces [employer] patient portal. Now you can access parts of your medical record, email your doctor's office, and request medication refills online. 1. In your internet browser, go to [email or web address] 2. Click on the First Time User? Click Here link in the Sign In box. You will see the New Member Sign Up page. 3. Enter your [employer] Access Code exactly as it appears below. You will not need to use this code after youve completed the sign-up process. If you do not sign up before the expiration date, you must request a new code. · [employer] Access Code: [email or web address] Expires: 12/23/2018  4:23 PM 
 
4. Enter the last four digits of your Social Security Number (xxxx) and Date of Birth (mm/dd/yyyy) as indicated and click Submit. You will be taken to the next sign-up page. 5. Create a Boedo ID. This will be your [employer] login ID and cannot be changed, so think of one that is secure and easy to remember. 6. Create a [employer] password. You can change your password at any time. 7. Enter your Password Reset Question and Answer. This can be used at a later time if you forget your password. 8. Enter your e-mail address. You will receive e-mail notification when new information is available in [address] 9. Click Sign Up. You can now view and download portions of your medical record. 10. Click the Download Summary menu link to download a portable copy of your medical information. If you have questions, please visit the Frequently Asked Questions section of the [employer] website. Remember, [employer] is NOT to be used for urgent needs. For medical emergencies, dial 911. Now available from your iPhone and Android! Please provide this summary of care documentation to your next provider. Your primary care clinician is listed as Mann Lewis. If you have any questions after today's visit, please call [phone].

## 2018-09-25 LAB
ALBUMIN/CREAT UR: 326.6 MG/G CREAT (ref 0–30)
CREAT UR-MCNC: 42.5 MG/DL
MICROALBUMIN UR-MCNC: 138.8 UG/ML

## 2018-09-25 NOTE — PROGRESS NOTES
Her urine test showed that the amount of protein in her urine has decreased, which is a good finding. It is important to keep your blood sugars in a good level to help prevent further damage to her kidneys from her DM and high blood sugars.

## 2018-09-27 ENCOUNTER — TELEPHONE (OUTPATIENT)
Dept: ENDOCRINOLOGY | Age: 73
End: 2018-09-27

## 2018-09-27 RX ORDER — DULOXETIN HYDROCHLORIDE 60 MG/1
60 CAPSULE, DELAYED RELEASE ORAL DAILY
Qty: 30 CAP | Refills: 3 | Status: SHIPPED | OUTPATIENT
Start: 2018-09-27 | End: 2019-07-18 | Stop reason: SDUPTHER

## 2018-09-27 NOTE — TELEPHONE ENCOUNTER
Insurance will not cover Lyrica. Per last OV, patient was changed from Gabapentin to Lyrica. Wants patient to try generic Cymbalta.

## 2018-09-27 NOTE — TELEPHONE ENCOUNTER
Patient has been advised. Advised to take for 30 days, if does not help the neuropathy, call back. Will then do PA for Lyrica. Patient expressed understanding.

## 2018-11-26 LAB — MICROALBUMIN UR TEST STR-MCNC: 4350 MG/DL

## 2019-01-08 ENCOUNTER — OFFICE VISIT (OUTPATIENT)
Dept: ENDOCRINOLOGY | Age: 74
End: 2019-01-08

## 2019-01-08 VITALS
DIASTOLIC BLOOD PRESSURE: 85 MMHG | WEIGHT: 210.6 LBS | BODY MASS INDEX: 38.76 KG/M2 | HEIGHT: 62 IN | HEART RATE: 82 BPM | SYSTOLIC BLOOD PRESSURE: 160 MMHG

## 2019-01-08 DIAGNOSIS — Z79.4 TYPE 2 DIABETES MELLITUS WITH DIABETIC POLYNEUROPATHY, WITH LONG-TERM CURRENT USE OF INSULIN (HCC): Primary | ICD-10-CM

## 2019-01-08 DIAGNOSIS — I10 ESSENTIAL HYPERTENSION: ICD-10-CM

## 2019-01-08 DIAGNOSIS — E11.42 TYPE 2 DIABETES MELLITUS WITH DIABETIC POLYNEUROPATHY, WITH LONG-TERM CURRENT USE OF INSULIN (HCC): Primary | ICD-10-CM

## 2019-01-08 DIAGNOSIS — E78.2 MIXED HYPERLIPIDEMIA: ICD-10-CM

## 2019-01-08 LAB — HBA1C MFR BLD HPLC: 6.8 %

## 2019-01-08 RX ORDER — GLIPIZIDE 10 MG/1
10 TABLET ORAL 2 TIMES DAILY
Qty: 60 TAB | Refills: 3 | Status: SHIPPED | OUTPATIENT
Start: 2019-01-08 | End: 2019-04-12 | Stop reason: SDUPTHER

## 2019-01-08 RX ORDER — ATORVASTATIN CALCIUM 40 MG/1
40 TABLET, FILM COATED ORAL
Qty: 90 TAB | Refills: 3 | Status: SHIPPED | OUTPATIENT
Start: 2019-01-08 | End: 2020-01-27 | Stop reason: SDUPTHER

## 2019-01-08 RX ORDER — INSULIN DEGLUDEC 200 U/ML
INJECTION, SOLUTION SUBCUTANEOUS
Qty: 54 ML | Refills: 5 | Status: SHIPPED | OUTPATIENT
Start: 2019-01-08 | End: 2019-04-12 | Stop reason: SDUPTHER

## 2019-01-08 RX ORDER — PEN NEEDLE, DIABETIC 31 GX3/16"
NEEDLE, DISPOSABLE MISCELLANEOUS
Qty: 100 PEN NEEDLE | Refills: 3 | Status: SHIPPED | OUTPATIENT
Start: 2019-01-08 | End: 2019-04-12 | Stop reason: SDUPTHER

## 2019-01-08 RX ORDER — SENNOSIDES 25 MG/1
TABLET, FILM COATED ORAL
Qty: 45 G | Refills: 11 | Status: SHIPPED | OUTPATIENT
Start: 2019-01-08 | End: 2019-04-12 | Stop reason: SDUPTHER

## 2019-01-08 NOTE — PROGRESS NOTES
Chief Complaint Patient presents with  Diabetes  
  pcp and pharmacy verified. Eye exam over a year. Records since last visit reviewed. History of Present Illness: García Nichols is a 72 y.o. female here for follow up of diabetes. She was diagnosed with diabetes about around 2010. At her last visit in September 2018 her A1C had declined from 14% down to 10.4% on Tresiba U-200 120 units daily. I started pt on Glipizide 10mg BID. Her A1C today was 6.8%. \"I have been eating stuff I am not supposed to. Pt has been taking the Tresiba U-200, 120 units daily and Glipizide 10mg BID. Pt has not had any recent blood transfusion. She has not been checking her BGs. She does report issues of hypoglycemia. Pt notes her son is still in the hospital (he has been in the hospital since February 2018). He is now in rehab. Pt eats 2 meals per day. Pt has breakfast everyday, around 10AM, this AM she had eggs, ricks, toast and water. She will have lunch 4 days per week, around 2PM, this afternoon she had a PB sandwich on toast and water. She has dinner around 6PM, last night she had baked chicken, string beans, yams and ginger ale. Pt reports she has still been eating a lot of ice cream. 
She notes that she has \"had a lot on my mind and I have been eating a lot of junk\". Her  had another stent placed in his right leg (which is his second), but is doing well after having the AICD placed. She follows up with Dr. Dyllan Wilson of Nephrology for CKD III and Nephropathy. She is followed by Dr. Thom Madrid of cardiology for HTN and CAD. \"I have not seen him in a while, I have been so busy taking care of others I have not been taking care of myself\". Her last eye appointment was 2015. Pt has hx of CAD, neuropathy, and neuropathy (In September I changed her from gabapentin 600mg TID to Lyrica 75mg BID).   
Pt also has hx of HLD on Atorvastatin 40mg daily and CT/US evidence of hepatic steatosis. She states she takes her atorvastatin daily. Pt reports adherance to her BP regimen. She is on BB, ARB, CCB, Clonidine and Lasix. She notes she has checked her BP at home and her SBP is 140+. I have previously screened her for endocrine causes of HTN and they were negative. Current Outpatient Medications Medication Sig  
 atorvastatin (LIPITOR) 40 mg tablet Take 1 Tab by mouth nightly.  glipiZIDE (GLUCOTROL) 10 mg tablet Take 1 Tab by mouth two (2) times a day.  Insulin Needles, Disposable, (JUAN LUIS PEN NEEDLE) 32 gauge x 5/32\" ndle One shot daily  insulin degludec (TRESIBA FLEXTOUCH U-200) 200 unit/mL (3 mL) inpn 120 units daily  lidocaine 5 % topical cream Apply every 8 hours as needed for pain  DULoxetine (CYMBALTA) 60 mg capsule Take 1 Cap by mouth daily.  pregabalin (LYRICA) 75 mg capsule Take 1 Cap by mouth two (2) times a day. Max Daily Amount: 150 mg.  
 losartan-hydroCHLOROthiazide (HYZAAR) 100-25 mg per tablet Take 1 Tab by mouth daily.  amLODIPine (NORVASC) 10 mg tablet Take 1 Tab by mouth daily.  furosemide (LASIX) 40 mg tablet Take 1 Tab by mouth daily.  clopidogrel (PLAVIX) 75 mg tab Take 1 Tab by mouth daily.  pantoprazole (PROTONIX) 40 mg tablet Take 1 Tab by mouth every morning.  carvedilol (COREG) 6.25 mg tablet Take 1 Tab by mouth two (2) times daily (with meals).  aspirin 81 mg chewable tablet Take 1 Tab by mouth daily.  nitroglycerin (NITROSTAT) 0.4 mg SL tablet 1 Tab by SubLINGual route every five (5) minutes as needed for Chest Pain. No current facility-administered medications for this visit. Allergies Allergen Reactions  Metformin Other (comments) GI side effects Review of Systems: - Eyes: no blurry vision or double vision - Cardiovascular: no chest pain - Respiratory: no shortness of breath - Musculoskeletal: no myalgias - Neurological: + numbness/tingling in extremities Physical Examination: Blood pressure 160/85, pulse 82, height 5' 2\" (1.575 m), weight 210 lb 9.6 oz (95.5 kg). - General: pleasant, no distress, good eye contact  
- Neck: no carotid bruits - Cardiovascular: regular, normal rate, nl s1 and s2, no m/r/g, 2+ DP pulses - Respiratory: clear bilaterally - Integumentary: 1+ edema, no foot ulcers,  
- Psychiatric: normal mood and affect Diabetic foot exam:  
 
Left Foot: 
 Visual Exam: callous - present Pulse DP: 2+ (normal) Filament test: normal sensation Vibratory sensation: normal 
   
Right Foot: 
 Visual Exam: callous - present Pulse DP: 2+ (normal) Filament test: normal sensation Vibratory sensation: normal 
 
 
 
Data Reviewed:  
Her A1C today was 6.8%. Assessment/Plan:  
1) DM > Her A1C today was 6.8%. Pt to continue the Tresiba 120 units daily and Glipizide 10mg BID. Pt instructed to check her BGs 4 times daily. For her LE edema I recommended she wear compression stockings during the day. Will continue the Lyrica 75mg BID and topical Lidocaine PRN. With her hx of neuropathy and calluses, she would benefit from DM shoes and inserts 2) HTN > BP elevated. Pt notes \"I have not taken all my BP meds today. She denies issues of CP, HA, SOB, AMS. She notes she has been eating a high salt diet. I gave her information about low salt dieting to help with BP and swelling. 3) HLD > Pt is on Atrovastain 40mg daily, she is followed by Dr. Mark Stewart. Pt voices understanding and agreement with the plan. Pain noted and pt was recommended to call her PCP for further evaluation and treatment, as needed Follow-up Disposition: 
Return in about 3 months (around 4/8/2019). Copy sent to: 
Wendy Patel and Anisa Otero

## 2019-01-08 NOTE — PROGRESS NOTES
Blood pressure has been repeated in opposite arm. No symptoms, per patient.  States has not taken all of her medication yet this am.

## 2019-01-08 NOTE — PATIENT INSTRUCTIONS
1) Your A1C was 6.8%, which is very good. Continue to take your Tresiba 120 units every morning and the Glipizide 10mg with breakfast and 10mg with dinner. DASH Diet: Care Instructions Your Care Instructions The DASH diet is an eating plan that can help lower your blood pressure. DASH stands for Dietary Approaches to Stop Hypertension. Hypertension is high blood pressure. The DASH diet focuses on eating foods that are high in calcium, potassium, and magnesium. These nutrients can lower blood pressure. The foods that are highest in these nutrients are fruits, vegetables, low-fat dairy products, nuts, seeds, and legumes. But taking calcium, potassium, and magnesium supplements instead of eating foods that are high in those nutrients does not have the same effect. The DASH diet also includes whole grains, fish, and poultry. The DASH diet is one of several lifestyle changes your doctor may recommend to lower your high blood pressure. Your doctor may also want you to decrease the amount of sodium in your diet. Lowering sodium while following the DASH diet can lower blood pressure even further than just the DASH diet alone. Follow-up care is a key part of your treatment and safety. Be sure to make and go to all appointments, and call your doctor if you are having problems. It's also a good idea to know your test results and keep a list of the medicines you take. How can you care for yourself at home? Following the DASH diet · Eat 4 to 5 servings of fruit each day. A serving is 1 medium-sized piece of fruit, ½ cup chopped or canned fruit, 1/4 cup dried fruit, or 4 ounces (½ cup) of fruit juice. Choose fruit more often than fruit juice. · Eat 4 to 5 servings of vegetables each day. A serving is 1 cup of lettuce or raw leafy vegetables, ½ cup of chopped or cooked vegetables, or 4 ounces (½ cup) of vegetable juice. Choose vegetables more often than vegetable juice. · Get 2 to 3 servings of low-fat and fat-free dairy each day. A serving is 8 ounces of milk, 1 cup of yogurt, or 1 ½ ounces of cheese. · Eat 6 to 8 servings of grains each day. A serving is 1 slice of bread, 1 ounce of dry cereal, or ½ cup of cooked rice, pasta, or cooked cereal. Try to choose whole-grain products as much as possible. · Limit lean meat, poultry, and fish to 2 servings each day. A serving is 3 ounces, about the size of a deck of cards. · Eat 4 to 5 servings of nuts, seeds, and legumes (cooked dried beans, lentils, and split peas) each week. A serving is 1/3 cup of nuts, 2 tablespoons of seeds, or ½ cup of cooked beans or peas. · Limit fats and oils to 2 to 3 servings each day. A serving is 1 teaspoon of vegetable oil or 2 tablespoons of salad dressing. · Limit sweets and added sugars to 5 servings or less a week. A serving is 1 tablespoon jelly or jam, ½ cup sorbet, or 1 cup of lemonade. · Eat less than 2,300 milligrams (mg) of sodium a day. If you limit your sodium to 1,500 mg a day, you can lower your blood pressure even more. Tips for success · Start small. Do not try to make dramatic changes to your diet all at once. You might feel that you are missing out on your favorite foods and then be more likely to not follow the plan. Make small changes, and stick with them. Once those changes become habit, add a few more changes. · Try some of the following: ? Make it a goal to eat a fruit or vegetable at every meal and at snacks. This will make it easy to get the recommended amount of fruits and vegetables each day. ? Try yogurt topped with fruit and nuts for a snack or healthy dessert. ? Add lettuce, tomato, cucumber, and onion to sandwiches. ? Combine a ready-made pizza crust with low-fat mozzarella cheese and lots of vegetable toppings. Try using tomatoes, squash, spinach, broccoli, carrots, cauliflower, and onions. ? Have a variety of cut-up vegetables with a low-fat dip as an appetizer instead of chips and dip. ? Sprinkle sunflower seeds or chopped almonds over salads. Or try adding chopped walnuts or almonds to cooked vegetables. ? Try some vegetarian meals using beans and peas. Add garbanzo or kidney beans to salads. Make burritos and tacos with mashed rae beans or black beans. Where can you learn more? Go to http://adriano-nevaeh.info/. Enter M861 in the search box to learn more about \"DASH Diet: Care Instructions. \" Current as of: December 6, 2017 Content Version: 11.8 © 9479-5475 [employer]. Care instructions adapted under license by [employer] (which disclaims liability or warranty for this information). If you have questions about a medical condition or this instruction, always ask your healthcare professional. [place] [address] any warranty or liability for your use of this information.

## 2019-04-12 ENCOUNTER — OFFICE VISIT (OUTPATIENT)
Dept: ENDOCRINOLOGY | Age: 74
End: 2019-04-12

## 2019-04-12 VITALS
DIASTOLIC BLOOD PRESSURE: 70 MMHG | BODY MASS INDEX: 38.02 KG/M2 | HEART RATE: 74 BPM | HEIGHT: 62 IN | WEIGHT: 206.6 LBS | SYSTOLIC BLOOD PRESSURE: 135 MMHG

## 2019-04-12 DIAGNOSIS — Z79.4 TYPE 2 DIABETES MELLITUS WITH DIABETIC POLYNEUROPATHY, WITH LONG-TERM CURRENT USE OF INSULIN (HCC): Primary | ICD-10-CM

## 2019-04-12 DIAGNOSIS — E78.2 MIXED HYPERLIPIDEMIA: ICD-10-CM

## 2019-04-12 DIAGNOSIS — I10 ESSENTIAL HYPERTENSION: ICD-10-CM

## 2019-04-12 DIAGNOSIS — E11.42 TYPE 2 DIABETES MELLITUS WITH DIABETIC POLYNEUROPATHY, WITH LONG-TERM CURRENT USE OF INSULIN (HCC): Primary | ICD-10-CM

## 2019-04-12 LAB — HBA1C MFR BLD HPLC: 7.8 %

## 2019-04-12 RX ORDER — PEN NEEDLE, DIABETIC 31 GX3/16"
NEEDLE, DISPOSABLE MISCELLANEOUS
Qty: 100 PEN NEEDLE | Refills: 3 | Status: SHIPPED | OUTPATIENT
Start: 2019-04-12 | End: 2019-07-18 | Stop reason: SDUPTHER

## 2019-04-12 RX ORDER — INSULIN DEGLUDEC 200 U/ML
INJECTION, SOLUTION SUBCUTANEOUS
Qty: 18 PEN | Refills: 3 | Status: SHIPPED | OUTPATIENT
Start: 2019-04-12 | End: 2019-07-18 | Stop reason: SDUPTHER

## 2019-04-12 RX ORDER — GLIPIZIDE 10 MG/1
10 TABLET ORAL 2 TIMES DAILY
Qty: 180 TAB | Refills: 3 | Status: SHIPPED | OUTPATIENT
Start: 2019-04-12 | End: 2019-07-18 | Stop reason: SDUPTHER

## 2019-04-12 RX ORDER — SENNOSIDES 25 MG/1
TABLET, FILM COATED ORAL
Qty: 45 G | Refills: 11 | Status: SHIPPED | OUTPATIENT
Start: 2019-04-12 | End: 2020-01-27

## 2019-04-12 NOTE — PROGRESS NOTES
Chief Complaint Patient presents with  Diabetes  
  pcp and pharmacy verified. Brought copy of eye exam  
Records since last visit reviewed. History of Present Illness: Enzo Manzanares is a 69 y.o. female here for follow up of diabetes. She was diagnosed with diabetes about around 2010. At her last visit in January 2019 her A1C wsa 6.8% on Tresiba U-200 120 units daily and Glipizide 10mg BID. Pt was encouraged to keep up the good work. Pt notes that she has a swelling in her neck/clavicular area \"which is going down my back\". She also notes pain/tenderness in her breast and in her axillary region. She called about getting a mammogram, but she was told to speak with her PCP. She denies any recent illnesses, injuries or hospitalizations. Her son is still in rehab. \"He is not taking baby steps, but he can not walk yet\". Her  was recently admitted for PNA \"and he is not getting any better, he is still coughing and he is losing weight\". Her A1C today was 7.8%. Pt has been taking the Tresiba U-200, 120 units daily and Glipizide 10mg BID. She has not been checking her BGs. She does report issues of hypoglycemia. Pt eats 2 meals per day. Pt has breakfast everyday, around 10AM, this AM she had sausage, egg and cheese breakfast sandwich and cranberry juice. She will have lunch 4 days per week, around 2PM, yesterday she had a bowl of oatmeal and water. She has dinner around 6PM, last night she had a pork chop, rice and broccoli and unsweetened iced tea. She has stopped eating her ice cream in the evening. \"Some times I get so stressed out I eat\". She follows up with Dr. Da Gerber of Nephrology for CKD III and Nephropathy. She is followed by Dr. Charlette Toure of cardiology for HTN and CAD. \"I have not seen him in a while, I have been so busy taking care of others I have not been taking care of myself\".  
 
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 (record reviewed) Pt has hx of CAD, neuropathy, and neuropathy (In September I changed her from gabapentin 600mg TID to Lyrica 75mg BID). Pt also has hx of HLD on Atorvastatin 40mg daily and CT/US evidence of hepatic steatosis. She states she takes her atorvastatin daily. Pt reports adherance to her BP regimen. She is on BB, ARB, CCB, Clonidine and Lasix. She notes she has checked her BP at home and her SBP is 140+. I have previously screened her for endocrine causes of HTN and they were negative. Current Outpatient Medications Medication Sig  
 atorvastatin (LIPITOR) 40 mg tablet Take 1 Tab by mouth nightly.  glipiZIDE (GLUCOTROL) 10 mg tablet Take 1 Tab by mouth two (2) times a day.  Insulin Needles, Disposable, (JUAN LUIS PEN NEEDLE) 32 gauge x 5/32\" ndle One shot daily  insulin degludec (TRESIBA FLEXTOUCH U-200) 200 unit/mL (3 mL) inpn 120 units daily  DULoxetine (CYMBALTA) 60 mg capsule Take 1 Cap by mouth daily.  pregabalin (LYRICA) 75 mg capsule Take 1 Cap by mouth two (2) times a day. Max Daily Amount: 150 mg.  
 losartan-hydroCHLOROthiazide (HYZAAR) 100-25 mg per tablet Take 1 Tab by mouth daily.  amLODIPine (NORVASC) 10 mg tablet Take 1 Tab by mouth daily.  furosemide (LASIX) 40 mg tablet Take 1 Tab by mouth daily.  clopidogrel (PLAVIX) 75 mg tab Take 1 Tab by mouth daily.  pantoprazole (PROTONIX) 40 mg tablet Take 1 Tab by mouth every morning.  carvedilol (COREG) 6.25 mg tablet Take 1 Tab by mouth two (2) times daily (with meals).  aspirin 81 mg chewable tablet Take 1 Tab by mouth daily.  nitroglycerin (NITROSTAT) 0.4 mg SL tablet 1 Tab by SubLINGual route every five (5) minutes as needed for Chest Pain.  lidocaine 5 % topical cream Apply every 8 hours as needed for pain No current facility-administered medications for this visit. Allergies Allergen Reactions  Metformin Other (comments) GI side effects Review of Systems: - Eyes: no blurry vision or double vision - Cardiovascular: no chest pain - Respiratory: no shortness of breath - Musculoskeletal: no myalgias - Neurological: + numbness/tingling in extremities Physical Examination: 
Blood pressure 135/70, pulse 74, height 5' 2\" (1.575 m), weight 206 lb 9.6 oz (93.7 kg). - General: pleasant, no distress, good eye contact  
- Neck: no carotid bruits - Cardiovascular: regular, normal rate, nl s1 and s2, no m/r/g, 2+ DP pulses - Respiratory: clear bilaterally - Integumentary: 1+ edema, no foot ulcers,  
- Psychiatric: normal mood and affect Diabetic foot exam:  
 
Left Foot: 
 Visual Exam: callous - present Pulse DP: 2+ (normal) Filament test: normal sensation Vibratory sensation: normal 
   
Right Foot: 
 Visual Exam: callous - present Pulse DP: 2+ (normal) Filament test: normal sensation Vibratory sensation: normal 
 
 
 
Data Reviewed:  
Her A1C today was 7.8%. Assessment/Plan:  
1) DM > Her A1C today was 7.8%. Pt to continue the Tresiba 120 units daily and Glipizide 10mg BID and cut out the snacking and \"stress eating\". Pt instructed to check her BGs 4 times daily. For her LE edema I recommended she wear compression stockings during the day. Will continue the Lyrica 75mg BID and topical Lidocaine PRN. With her hx of neuropathy and calluses, she would benefit from DM shoes and inserts 2) HTN > BP at goal today on her current regimen. 3) HLD > Pt is on Atrovastain 40mg daily, she is followed by Dr. Raquel Starr. Pt voices understanding and agreement with the plan. Pain noted and pt was recommended to call her PCP for further evaluation and treatment, as needed Follow-up and Dispositions · Return in about 3 months (around 7/12/2019). Copy sent to: 
Wendy Bone and Gino Bertrand

## 2019-04-18 RX ORDER — PANTOPRAZOLE SODIUM 40 MG/1
TABLET, DELAYED RELEASE ORAL
Qty: 90 TAB | Refills: 3 | Status: SHIPPED | OUTPATIENT
Start: 2019-04-18 | End: 2020-10-04

## 2019-04-18 RX ORDER — CLOPIDOGREL BISULFATE 75 MG/1
TABLET ORAL
Qty: 90 TAB | Refills: 3 | Status: SHIPPED | OUTPATIENT
Start: 2019-04-18 | End: 2020-10-13

## 2019-04-18 RX ORDER — CARVEDILOL 6.25 MG/1
TABLET ORAL
Qty: 180 TAB | Refills: 3 | Status: SHIPPED | OUTPATIENT
Start: 2019-04-18 | End: 2020-01-27

## 2019-04-18 RX ORDER — AMLODIPINE BESYLATE 10 MG/1
TABLET ORAL
Qty: 90 TAB | Refills: 3 | Status: SHIPPED | OUTPATIENT
Start: 2019-04-18 | End: 2020-01-27 | Stop reason: SDUPTHER

## 2019-07-09 ENCOUNTER — HOSPITAL ENCOUNTER (OUTPATIENT)
Dept: MAMMOGRAPHY | Age: 74
Discharge: HOME | End: 2019-07-09
Attending: FAMILY MEDICINE
Payer: MEDICARE

## 2019-07-09 ENCOUNTER — HOSPITAL ENCOUNTER (OUTPATIENT)
Dept: ULTRASOUND IMAGING | Age: 74
Discharge: HOME | End: 2019-07-09
Attending: INTERNAL MEDICINE
Payer: MEDICARE

## 2019-07-09 DIAGNOSIS — Z12.39 BREAST SCREENING: ICD-10-CM

## 2019-07-09 DIAGNOSIS — R07.81 PLEURITIC PAIN: ICD-10-CM

## 2019-07-09 DIAGNOSIS — R22.2 SUPRACLAVICULAR MASS: ICD-10-CM

## 2019-07-09 PROCEDURE — 76536 US EXAM OF HEAD AND NECK: CPT

## 2019-07-09 PROCEDURE — 77067 SCR MAMMO BI INCL CAD: CPT

## 2019-07-18 ENCOUNTER — OFFICE VISIT (OUTPATIENT)
Dept: ENDOCRINOLOGY | Age: 74
End: 2019-07-18

## 2019-07-18 VITALS
WEIGHT: 203.8 LBS | SYSTOLIC BLOOD PRESSURE: 138 MMHG | HEIGHT: 62 IN | BODY MASS INDEX: 37.5 KG/M2 | HEART RATE: 72 BPM | DIASTOLIC BLOOD PRESSURE: 58 MMHG

## 2019-07-18 DIAGNOSIS — E11.42 TYPE 2 DIABETES MELLITUS WITH DIABETIC POLYNEUROPATHY, WITH LONG-TERM CURRENT USE OF INSULIN (HCC): Primary | ICD-10-CM

## 2019-07-18 DIAGNOSIS — Z79.4 TYPE 2 DIABETES MELLITUS WITH DIABETIC POLYNEUROPATHY, WITH LONG-TERM CURRENT USE OF INSULIN (HCC): Primary | ICD-10-CM

## 2019-07-18 DIAGNOSIS — Z79.4 TYPE 2 DIABETES MELLITUS WITH DIABETIC POLYNEUROPATHY, WITH LONG-TERM CURRENT USE OF INSULIN (HCC): ICD-10-CM

## 2019-07-18 DIAGNOSIS — I10 ESSENTIAL HYPERTENSION: ICD-10-CM

## 2019-07-18 DIAGNOSIS — E11.42 TYPE 2 DIABETES MELLITUS WITH DIABETIC POLYNEUROPATHY, WITH LONG-TERM CURRENT USE OF INSULIN (HCC): ICD-10-CM

## 2019-07-18 DIAGNOSIS — E78.2 MIXED HYPERLIPIDEMIA: ICD-10-CM

## 2019-07-18 LAB — HBA1C MFR BLD HPLC: 6.1 %

## 2019-07-18 RX ORDER — INSULIN DEGLUDEC 200 U/ML
INJECTION, SOLUTION SUBCUTANEOUS
Qty: 18 PEN | Refills: 3 | Status: SHIPPED | OUTPATIENT
Start: 2019-07-18 | End: 2020-01-27 | Stop reason: SDUPTHER

## 2019-07-18 RX ORDER — DULOXETIN HYDROCHLORIDE 60 MG/1
60 CAPSULE, DELAYED RELEASE ORAL DAILY
Qty: 90 CAP | Refills: 3 | Status: SHIPPED | OUTPATIENT
Start: 2019-07-18 | End: 2020-10-04

## 2019-07-18 RX ORDER — PREGABALIN 75 MG/1
100 CAPSULE ORAL 2 TIMES DAILY
Qty: 60 CAP | Refills: 3 | Status: SHIPPED | OUTPATIENT
Start: 2019-07-18 | End: 2019-07-18 | Stop reason: SDUPTHER

## 2019-07-18 RX ORDER — PEN NEEDLE, DIABETIC 31 GX3/16"
NEEDLE, DISPOSABLE MISCELLANEOUS
Qty: 100 PEN NEEDLE | Refills: 3 | Status: SHIPPED | OUTPATIENT
Start: 2019-07-18 | End: 2020-01-27 | Stop reason: SDUPTHER

## 2019-07-18 RX ORDER — GLIPIZIDE 10 MG/1
10 TABLET ORAL 2 TIMES DAILY
Qty: 180 TAB | Refills: 3 | Status: SHIPPED | OUTPATIENT
Start: 2019-07-18 | End: 2020-01-27 | Stop reason: SDUPTHER

## 2019-07-18 RX ORDER — LOSARTAN POTASSIUM AND HYDROCHLOROTHIAZIDE 25; 100 MG/1; MG/1
TABLET ORAL
Qty: 90 TAB | Refills: 3 | Status: SHIPPED | OUTPATIENT
Start: 2019-07-18 | End: 2020-01-27

## 2019-07-18 RX ORDER — PREGABALIN 75 MG/1
100 CAPSULE ORAL 2 TIMES DAILY
Qty: 180 CAP | Refills: 1 | Status: SHIPPED | OUTPATIENT
Start: 2019-07-18 | End: 2020-10-04

## 2019-07-18 NOTE — PROGRESS NOTES
Chief Complaint   Patient presents with    Diabetes     pcp and pharmacy verified. Eye exam UTD   Records since last visit reviewed. History of Present Illness: Luz Marina Cooper is a 69 y.o. female here for follow up of diabetes. She was diagnosed with diabetes about around 2010. At her last visit in April 2019 her A1C was 7.8%. Pt was instructed to continue the Tresiba 120 units daily and Glipizide 10mg BID and cut out the snacking and \"stress eating\". \"I have been getting sweats and getting dizzy\". She started to note these about a month ago. \"If I drink OJ that helps and the sweating calms down. \"  Pt notes that she is not checking her BGs when these episodes occur. She notes they mostly occur at night, but she has had them during the day. She denies any recent illnesses or hospitalizations. Her A1C today was 6.1%. Her weight today was 203 pounds, which is down 3 pounds from our last visit. Pt is still Taking the U-200 120 units every morning and Glipizide 10mg BID. Her son is still in rehab. \"He is walking with a walker\". Her  \"is coming along\". She does not check her BGs. Pt eats 2 meals per day. Pt has breakfast everyday, around 10AM, this AM she had 1/2 a sausage and egg biscuit and water. She reports that she is not eating lunch in the afternoon, but she will snack on fruit. She has dinner around 6PM, last night she had baked chicken, string beans, rice and ginger ale. She has stopped eating her ice cream in the evening. She follows up with Dr. Madiha Clark of Nephrology for CKD III and Nephropathy. She is followed by Dr. Rosalia Parsons of cardiology for HTN and CAD. \"I have not seen him in a while, I have been so busy taking care of others I have not been taking care of myself\".     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 (record reviewed)    Pt has hx of CAD, neuropathy, and neuropathy (In September I changed her from gabapentin 600mg TID to Lyrica 75mg BID). Pt also has hx of HLD on Atorvastatin 40mg daily and CT/US evidence of hepatic steatosis. She states she takes her atorvastatin daily. Pt reports adherance to her BP regimen. She is on BB, ARB, CCB, Clonidine and Lasix. I have previously screened her for endocrine causes of HTN and they were negative. Current Outpatient Medications   Medication Sig    pantoprazole (PROTONIX) 40 mg tablet TAKE 1 TABLET EVERY MORNING    carvedilol (COREG) 6.25 mg tablet TAKE 1 TABLET TWICE A DAY WITH MEALS    amLODIPine (NORVASC) 10 mg tablet TAKE 1 TABLET DAILY    clopidogrel (PLAVIX) 75 mg tab TAKE 1 TABLET DAILY    lidocaine 5 % topical cream Apply every 8 hours as needed for pain    glipiZIDE (GLUCOTROL) 10 mg tablet Take 1 Tab by mouth two (2) times a day.  insulin degludec (TRESIBA FLEXTOUCH U-200) 200 unit/mL (3 mL) inpn 120 units daily    Insulin Needles, Disposable, (JUAN LUIS PEN NEEDLE) 32 gauge x 5/32\" ndle One shot daily    atorvastatin (LIPITOR) 40 mg tablet Take 1 Tab by mouth nightly.  DULoxetine (CYMBALTA) 60 mg capsule Take 1 Cap by mouth daily.  pregabalin (LYRICA) 75 mg capsule Take 1 Cap by mouth two (2) times a day. Max Daily Amount: 150 mg.    losartan-hydroCHLOROthiazide (HYZAAR) 100-25 mg per tablet Take 1 Tab by mouth daily.  furosemide (LASIX) 40 mg tablet Take 1 Tab by mouth daily.  aspirin 81 mg chewable tablet Take 1 Tab by mouth daily.  nitroglycerin (NITROSTAT) 0.4 mg SL tablet 1 Tab by SubLINGual route every five (5) minutes as needed for Chest Pain. No current facility-administered medications for this visit.       Allergies   Allergen Reactions    Metformin Other (comments)     GI side effects     Review of Systems:  - Eyes: no blurry vision or double vision  - Cardiovascular: no chest pain  - Respiratory: no shortness of breath  - Musculoskeletal: no myalgias  - Neurological: + numbness/tingling in extremities    Physical Examination:  Blood pressure 138/58, pulse 72, height 5' 2\" (1.575 m), weight 203 lb 12.8 oz (92.4 kg). - General: pleasant, no distress, good eye contact   - Neck: no carotid bruits  - Cardiovascular: regular, normal rate, nl s1 and s2, no m/r/g, 2+ DP pulses   - Respiratory: clear bilaterally  - Integumentary: 1+ edema, no foot ulcers,   - Psychiatric: normal mood and affect    Diabetic foot exam:     Left Foot:   Visual Exam: callous - present   Pulse DP: 2+ (normal)   Filament test: normal sensation    Vibratory sensation: normal      Right Foot:   Visual Exam: callous - present   Pulse DP: 2+ (normal)   Filament test: normal sensation    Vibratory sensation: normal        Data Reviewed:   Her A1C today was 6.1%. Assessment/Plan:   1) DM > Her A1C today was 6.1%, but she is having what sounds like hypoglycemia. Pt to decrease her Tresiba to 108 units daily and continue the Glipizide 10mg BID. With her weight loss and she is not snacking on ice cream and cookies, so her insulin requirement is lower. Pt instructed to check her BGs 4 times daily. For her LE edema I recommended she wear compression stockings during the day. Will increase her Lyrica to 100mg BID and topical Lidocaine PRN. With her hx of neuropathy and calluses, she would benefit from DM shoes and inserts     2) HTN > BP at goal today on her current regimen. 3) HLD > Pt is on Atrovastain 40mg daily, she is followed by Dr. Haylee Tejeda. Pt voices understanding and agreement with the plan. Pain noted and pt was recommended to call her PCP for further evaluation and treatment, as needed          Copy sent to:  Wendy John and Christy Greenwood

## 2019-07-18 NOTE — PATIENT INSTRUCTIONS
1) Decrease your Tresiba from 120 units to 108 units every morning (10% decrease). 2) I am going to increase your Lyrica to help with the pain in your feet from 75mg to 100mg.

## 2019-10-16 LAB — CREATININE, EXTERNAL: 1.08

## 2019-10-21 ENCOUNTER — OFFICE VISIT (OUTPATIENT)
Dept: ENDOCRINOLOGY | Age: 74
End: 2019-10-21

## 2019-10-21 VITALS
DIASTOLIC BLOOD PRESSURE: 61 MMHG | HEIGHT: 62 IN | BODY MASS INDEX: 39.97 KG/M2 | WEIGHT: 217.2 LBS | HEART RATE: 65 BPM | SYSTOLIC BLOOD PRESSURE: 134 MMHG

## 2019-10-21 DIAGNOSIS — E11.42 TYPE 2 DIABETES MELLITUS WITH DIABETIC POLYNEUROPATHY, WITH LONG-TERM CURRENT USE OF INSULIN (HCC): Primary | ICD-10-CM

## 2019-10-21 DIAGNOSIS — I10 ESSENTIAL HYPERTENSION: ICD-10-CM

## 2019-10-21 DIAGNOSIS — Z79.4 TYPE 2 DIABETES MELLITUS WITH DIABETIC POLYNEUROPATHY, WITH LONG-TERM CURRENT USE OF INSULIN (HCC): Primary | ICD-10-CM

## 2019-10-21 DIAGNOSIS — E78.2 MIXED HYPERLIPIDEMIA: ICD-10-CM

## 2019-10-21 LAB — HBA1C MFR BLD HPLC: 6.2 %

## 2019-10-21 RX ORDER — CARVEDILOL 25 MG/1
25 TABLET ORAL 2 TIMES DAILY
COMMUNITY
Start: 2019-10-15 | End: 2020-01-27 | Stop reason: SDUPTHER

## 2019-10-21 NOTE — PROGRESS NOTES
No chief complaint on file. Records since last visit reviewed. History of Present Illness: Sabino Kussmaul is a 69 y.o. female here for follow up of diabetes. She was diagnosed with diabetes about around 2010. At her last visit in July 2019 her A1C was 6.1%, but she had been experiencing some low BGs. She had some weight loss as well, I instructed her to decrease her Tresiba to 108 units daily and continue the Glipizide 10mg BID. She denies any recent illnesses or hospitalizations. Her A1C today was 6.2%. Her weight today was 217 pounds. She is still Taking the [place] U-200 108 units every morning and Glipizide 10mg BID. Her son is still in rehab. \"He is walking with a walker and he is still getting better\". Her  \"is doing ok, but he is still having trouble with his hip\". Pt notes that she has a brother that she cares for as well. Pt notes that she has been under a lot of stress and has been stress eating. She does not check her BGs. She denies issues of hypoglycemia. Pt eats 2 meals per day. Pt has breakfast everyday, around 9-10AM, this AM she had an english breakfast with egg white and turkey sausage and diet ginger ale. She reports that she is not eating lunch in the afternoon, but she will snack on fruit. She has dinner around 630PM, last night she had baked fish, broccoli, baked potato and diet ginger ale. She has stopped eating her ice cream in the evening. She follows up with Dr. Woody Schirmer of Nephrology for CKD III and Nephropathy. She is followed by Dr. Chance Wills of cardiology for HTN and CAD. 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 (record reviewed)    Pt has hx of CAD, neuropathy, and neuropathy (In September I changed her from gabapentin 600mg TID to Lyrica BID). Pt also has hx of HLD on Atorvastatin 40mg daily and CT/US evidence of hepatic steatosis.  She states she takes her atorvastatin daily. Pt reports adherance to her BP regimen. She is on BB, ARB, CCB, Clonidine and Lasix. I have previously screened her for endocrine causes of HTN and they were negative. Current Outpatient Medications   Medication Sig    glipiZIDE (GLUCOTROL) 10 mg tablet Take 1 Tab by mouth two (2) times a day.  insulin degludec (TRESIBA FLEXTOUCH U-200) 200 unit/mL (3 mL) inpn 108 units daily    DULoxetine (CYMBALTA) 60 mg capsule Take 1 Cap by mouth daily.  losartan-hydroCHLOROthiazide (HYZAAR) 100-25 mg per tablet Take once daily    Insulin Needles, Disposable, (JUAN LUIS PEN NEEDLE) 32 gauge x 5/32\" ndle One shot daily. DX E11.42    pregabalin (LYRICA) 75 mg capsule Take 1 Cap by mouth two (2) times a day. Max Daily Amount: 150 mg.  pantoprazole (PROTONIX) 40 mg tablet TAKE 1 TABLET EVERY MORNING    carvedilol (COREG) 6.25 mg tablet TAKE 1 TABLET TWICE A DAY WITH MEALS    amLODIPine (NORVASC) 10 mg tablet TAKE 1 TABLET DAILY    clopidogrel (PLAVIX) 75 mg tab TAKE 1 TABLET DAILY    lidocaine 5 % topical cream Apply every 8 hours as needed for pain    atorvastatin (LIPITOR) 40 mg tablet Take 1 Tab by mouth nightly.  furosemide (LASIX) 40 mg tablet Take 1 Tab by mouth daily.  aspirin 81 mg chewable tablet Take 1 Tab by mouth daily.  nitroglycerin (NITROSTAT) 0.4 mg SL tablet 1 Tab by SubLINGual route every five (5) minutes as needed for Chest Pain. No current facility-administered medications for this visit. Allergies   Allergen Reactions    Metformin Other (comments)     GI side effects     Review of Systems:  - Eyes: no blurry vision or double vision  - Cardiovascular: no chest pain  - Respiratory: no shortness of breath  - Musculoskeletal: no myalgias  - Neurological: + numbness/tingling in extremities    Physical Examination:  There were no vitals taken for this visit.   - General: pleasant, no distress, good eye contact   - Neck: no carotid bruits  - Cardiovascular: regular, normal rate, nl s1 and s2, no m/r/g, 2+ DP pulses   - Respiratory: clear bilaterally  - Integumentary: 1+ edema, no foot ulcers,   - Psychiatric: normal mood and affect    Diabetic foot exam:     Left Foot:   Visual Exam: callous - present   Pulse DP: 2+ (normal)   Filament test: normal sensation    Vibratory sensation: normal      Right Foot:   Visual Exam: callous - present   Pulse DP: 2+ (normal)   Filament test: normal sensation    Vibratory sensation: normal        Data Reviewed:   Her A1C today was 6.2%. Assessment/Plan:   1) DM > Her A1C today was 6.2%. She is no longer having the low BGs. Pt to continue the Tresiba 108 units daily and continue the Glipizide 10mg BID. Pt instructed to check her BGs 4 times daily. For her LE edema I recommended she wear compression stockings during the day. Will continue the Lyrica to 100mg BID and topical Lidocaine PRN. With her hx of neuropathy and calluses, she would benefit from DM shoes and inserts     2) HTN > BP at goal today on her current regimen. 3) HLD > Pt is on Atrovastain 40mg daily, she is followed by Dr. Jose Boss of cardiology, will defer. Pt voices understanding and agreement with the plan. Pain noted and pt was recommended to call her PCP for further evaluation and treatment, as needed      Follow-up and Dispositions    · Return in about 3 months (around 1/21/2020). Copy sent to:  Wendy Sosa and Bernice Sewell

## 2020-01-08 LAB — CREATININE, EXTERNAL: 1.21

## 2020-01-21 DIAGNOSIS — E78.2 MIXED HYPERLIPIDEMIA: ICD-10-CM

## 2020-01-21 DIAGNOSIS — E11.42 TYPE 2 DIABETES MELLITUS WITH DIABETIC POLYNEUROPATHY, WITH LONG-TERM CURRENT USE OF INSULIN (HCC): ICD-10-CM

## 2020-01-21 DIAGNOSIS — I10 ESSENTIAL HYPERTENSION: ICD-10-CM

## 2020-01-21 DIAGNOSIS — Z79.4 TYPE 2 DIABETES MELLITUS WITH DIABETIC POLYNEUROPATHY, WITH LONG-TERM CURRENT USE OF INSULIN (HCC): ICD-10-CM

## 2020-01-27 ENCOUNTER — OFFICE VISIT (OUTPATIENT)
Dept: ENDOCRINOLOGY | Age: 75
End: 2020-01-27

## 2020-01-27 VITALS
HEIGHT: 62 IN | RESPIRATION RATE: 16 BRPM | DIASTOLIC BLOOD PRESSURE: 79 MMHG | SYSTOLIC BLOOD PRESSURE: 181 MMHG | BODY MASS INDEX: 40.27 KG/M2 | OXYGEN SATURATION: 98 % | WEIGHT: 218.8 LBS | HEART RATE: 80 BPM

## 2020-01-27 DIAGNOSIS — E11.42 TYPE 2 DIABETES MELLITUS WITH DIABETIC POLYNEUROPATHY, WITH LONG-TERM CURRENT USE OF INSULIN (HCC): Primary | ICD-10-CM

## 2020-01-27 DIAGNOSIS — Z79.4 TYPE 2 DIABETES MELLITUS WITH DIABETIC POLYNEUROPATHY, WITH LONG-TERM CURRENT USE OF INSULIN (HCC): Primary | ICD-10-CM

## 2020-01-27 DIAGNOSIS — E78.2 MIXED HYPERLIPIDEMIA: ICD-10-CM

## 2020-01-27 DIAGNOSIS — I10 ESSENTIAL HYPERTENSION: ICD-10-CM

## 2020-01-27 LAB — HBA1C MFR BLD HPLC: 7.2 %

## 2020-01-27 RX ORDER — LOSARTAN POTASSIUM AND HYDROCHLOROTHIAZIDE 25; 100 MG/1; MG/1
TABLET ORAL
Qty: 90 TAB | Refills: 3 | Status: CANCELLED | OUTPATIENT
Start: 2020-01-27

## 2020-01-27 RX ORDER — AMLODIPINE BESYLATE 10 MG/1
TABLET ORAL
Qty: 90 TAB | Refills: 3 | Status: SHIPPED | OUTPATIENT
Start: 2020-01-27 | End: 2020-10-13

## 2020-01-27 RX ORDER — FUROSEMIDE 40 MG/1
40 TABLET ORAL DAILY
Qty: 90 TAB | Refills: 3 | Status: CANCELLED | OUTPATIENT
Start: 2020-01-27

## 2020-01-27 RX ORDER — GLIPIZIDE 10 MG/1
10 TABLET ORAL 2 TIMES DAILY
Qty: 180 TAB | Refills: 3 | Status: ON HOLD | OUTPATIENT
Start: 2020-01-27 | End: 2020-11-20 | Stop reason: SDUPTHER

## 2020-01-27 RX ORDER — PEN NEEDLE, DIABETIC 31 GX3/16"
NEEDLE, DISPOSABLE MISCELLANEOUS
Qty: 100 PEN NEEDLE | Refills: 3 | Status: SHIPPED | OUTPATIENT
Start: 2020-01-27 | End: 2020-10-04

## 2020-01-27 RX ORDER — INSULIN DEGLUDEC 200 U/ML
INJECTION, SOLUTION SUBCUTANEOUS
Qty: 18 PEN | Refills: 3 | Status: ON HOLD | OUTPATIENT
Start: 2020-01-27 | End: 2020-11-20 | Stop reason: SDUPTHER

## 2020-01-27 RX ORDER — ATORVASTATIN CALCIUM 40 MG/1
40 TABLET, FILM COATED ORAL
Qty: 90 TAB | Refills: 3 | Status: SHIPPED | OUTPATIENT
Start: 2020-01-27

## 2020-01-27 RX ORDER — CARVEDILOL 25 MG/1
25 TABLET ORAL 2 TIMES DAILY
Qty: 180 TAB | Refills: 3 | Status: SHIPPED | OUTPATIENT
Start: 2020-01-27 | End: 2020-10-13

## 2020-01-27 NOTE — PATIENT INSTRUCTIONS
1) Continue the [place] 108 every day. 2) Take the Glipizide, one pill in the morning and one pill with dinner. 3) Take the Atorvastatin (Lipitor) every evening. Your A1C today was 7.2%

## 2020-01-27 NOTE — PROGRESS NOTES
Lab Results   Component Value Date/Time    Hemoglobin A1c 9.6 (H) 07/29/2016 12:26 PM    Hemoglobin A1c (POC) 6.2 10/21/2019 12:00 PM    Hemoglobin A1c, External 6.1 02/07/2018

## 2020-01-27 NOTE — PROGRESS NOTES
Chief Complaint   Patient presents with    Diabetes     Pharmacy verified: [facility]    Medication Refill   Records since last visit reviewed. History of Present Illness: Nakita Hansen is a 70 y.o. female here for follow up of diabetes. She was diagnosed with diabetes about around 2010. At her last visit in October 2019 her A1C was 6.2% on Tresiba 108 units daily and continue the Glipizide 10mg BID. Pt encouraged to keep up the good work and continue the [place] to 108 units daily and continue the Glipizide 10mg BID. Pt notes she ran out of her medications a week ago. She notes her BP was high today, because she has been out of her medication. She notes that Dr. Stacie Willis started her on Weekly Vitamin D and started her on Losartan and Torosemide. Will discontinue the Hyzar and Lasix from her chart. She denies any recent illnesses or hospitalizations. Her A1C today was 7.2%. Her weight today was 218 pounds. She is still Taking the [place] U-200 108 units every morning and Glipizide 10mg in the morning only, she has not been taking the dinner dose. She has been of her insulin a week ago. Her son is still in rehab. \"He is walking with a walker and he is still getting better\". Her  \"is doing ok, but he is still having trouble with his hip\". Pt notes that she has a brother that she cares for as well. Pt notes that she has been under a lot of stress and has been stress eating. She does not check her BGs. She denies issues of hypoglycemia. Pt eats 2 meals per day. Pt has breakfast everyday, around 9-10AM, this AM she had a banana and water. She reports that she is not eating lunch in the afternoon, but she will snack on fruit. She has dinner around 630PM, last night she had roast beef, broccoli, potatoes and OJ. She has stopped eating her ice cream in the evening. She follows up with Dr. Stacie Willis of Nephrology for CKD III and Nephropathy.      She is followed by Dr. Fernandez Green of cardiology for HTN and CAD. 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 (record reviewed)    Pt has hx of CAD, neuropathy, and neuropathy for which she is taking Lyrica. Pt also has hx of HLD on Atorvastatin 40mg daily and CT/US evidence of hepatic steatosis. Pt notes she stopped taking her atorvastatin \"a while ago\". Pt reports adherance to her BP regimen. She is on BB, ARB, CCB, Clonidine and Lasix. I have previously screened her for endocrine causes of HTN and they were negative. Current Outpatient Medications   Medication Sig    carvedilol (COREG) 25 mg tablet 25 mg two (2) times a day.  glipiZIDE (GLUCOTROL) 10 mg tablet Take 1 Tab by mouth two (2) times a day.  insulin degludec (TRESIBA FLEXTOUCH U-200) 200 unit/mL (3 mL) inpn 108 units daily    DULoxetine (CYMBALTA) 60 mg capsule Take 1 Cap by mouth daily.  Insulin Needles, Disposable, (JUAN LUIS PEN NEEDLE) 32 gauge x 5/32\" ndle One shot daily. DX E11.42    pregabalin (LYRICA) 75 mg capsule Take 1 Cap by mouth two (2) times a day. Max Daily Amount: 150 mg.  pantoprazole (PROTONIX) 40 mg tablet TAKE 1 TABLET EVERY MORNING    amLODIPine (NORVASC) 10 mg tablet TAKE 1 TABLET DAILY    clopidogrel (PLAVIX) 75 mg tab TAKE 1 TABLET DAILY    atorvastatin (LIPITOR) 40 mg tablet Take 1 Tab by mouth nightly.  aspirin 81 mg chewable tablet Take 1 Tab by mouth daily. No current facility-administered medications for this visit. Allergies   Allergen Reactions    Metformin Other (comments)     GI side effects     Review of Systems:  - Eyes: no blurry vision or double vision  - Cardiovascular: no chest pain  - Respiratory: no shortness of breath  - Musculoskeletal: no myalgias  - Neurological: + numbness/tingling in extremities    Physical Examination:  Blood pressure 181/79, pulse 80, resp.  rate 16, height 5' 2\" (1.575 m), weight 218 lb 12.8 oz (99.2 kg), SpO2 98 %. - General: pleasant, no distress, good eye contact   - Neck: no carotid bruits  - Cardiovascular: regular, normal rate, nl s1 and s2, no m/r/g, 2+ DP pulses   - Respiratory: clear bilaterally  - Integumentary: 1+ edema, no foot ulcers,   - Psychiatric: normal mood and affect    Diabetic foot exam:     Left Foot:   Visual Exam: callous - present   Pulse DP: 2+ (normal)   Filament test: normal sensation    Vibratory sensation: normal      Right Foot:   Visual Exam: callous - present   Pulse DP: 2+ (normal)   Filament test: normal sensation    Vibratory sensation: normal        Data Reviewed:   Her A1C today was 7.2%. Assessment/Plan:   1) DM > Her A1C today was 7.2%. Will reorder the Tresiba 108 units daily and pt to take the Glipizide 10mg BID. Pt instructed to check her BGs 4 times daily. For her LE edema I recommended she wear compression stockings during the day. Will continue the Lyrica to 100mg BID and topical Lidocaine PRN. With her hx of neuropathy and calluses, she would benefit from DM shoes and inserts     2) HTN > BP at goal today on her current regimen. 3) HLD > Pt to restart the Atrovastain 40mg daily, she is followed by Dr. Corazon Gomez of cardiology. Pt voices understanding and agreement with the plan. Pain noted and pt was recommended to call her PCP for further evaluation and treatment, as needed      Follow-up and Dispositions    · Return in about 3 months (around 4/27/2020). Copy sent to:  Wendy Hoyt and Stacie Willis

## 2020-04-27 DIAGNOSIS — E11.42 TYPE 2 DIABETES MELLITUS WITH DIABETIC POLYNEUROPATHY, WITH LONG-TERM CURRENT USE OF INSULIN (HCC): ICD-10-CM

## 2020-04-27 DIAGNOSIS — E78.2 MIXED HYPERLIPIDEMIA: ICD-10-CM

## 2020-04-27 DIAGNOSIS — I10 ESSENTIAL HYPERTENSION: ICD-10-CM

## 2020-04-27 DIAGNOSIS — Z79.4 TYPE 2 DIABETES MELLITUS WITH DIABETIC POLYNEUROPATHY, WITH LONG-TERM CURRENT USE OF INSULIN (HCC): ICD-10-CM

## 2020-10-04 ENCOUNTER — HOSPITAL ENCOUNTER (INPATIENT)
Age: 75
LOS: 9 days | Discharge: HOME | DRG: 374 | End: 2020-10-13
Attending: STUDENT IN AN ORGANIZED HEALTH CARE EDUCATION/TRAINING PROGRAM | Admitting: GENERAL ACUTE CARE HOSPITAL
Payer: MEDICARE

## 2020-10-04 ENCOUNTER — APPOINTMENT (OUTPATIENT)
Dept: GENERAL RADIOLOGY | Age: 75
DRG: 374 | End: 2020-10-04
Attending: STUDENT IN AN ORGANIZED HEALTH CARE EDUCATION/TRAINING PROGRAM
Payer: MEDICARE

## 2020-10-04 ENCOUNTER — APPOINTMENT (OUTPATIENT)
Dept: CT IMAGING | Age: 75
DRG: 374 | End: 2020-10-04
Attending: INTERNAL MEDICINE
Payer: MEDICARE

## 2020-10-04 DIAGNOSIS — K31.89 MASS OF GASTROESOPHAGEAL JUNCTION: ICD-10-CM

## 2020-10-04 DIAGNOSIS — D64.9 ANEMIA, UNSPECIFIED TYPE: Primary | ICD-10-CM

## 2020-10-04 PROBLEM — K92.2 GI BLEED: Status: ACTIVE | Noted: 2020-10-04

## 2020-10-04 LAB
ALBUMIN SERPL-MCNC: 2.5 G/DL (ref 3.5–5)
ALBUMIN/GLOB SERPL: 0.7 {RATIO} (ref 1.1–2.2)
ALP SERPL-CCNC: 50 U/L (ref 45–117)
ALT SERPL-CCNC: 28 U/L (ref 12–78)
ANION GAP SERPL CALC-SCNC: 10 MMOL/L (ref 5–15)
AST SERPL-CCNC: 32 U/L (ref 15–37)
BASOPHILS # BLD: 0 K/UL (ref 0–0.1)
BASOPHILS NFR BLD: 0 % (ref 0–1)
BILIRUB SERPL-MCNC: 0.5 MG/DL (ref 0.2–1)
BNP SERPL-MCNC: ABNORMAL PG/ML
BUN SERPL-MCNC: 46 MG/DL (ref 6–20)
BUN/CREAT SERPL: 24 (ref 12–20)
CALCIUM SERPL-MCNC: 7.7 MG/DL (ref 8.5–10.1)
CHLORIDE SERPL-SCNC: 105 MMOL/L (ref 97–108)
CK MB CFR SERPL CALC: 0.6 % (ref 0–2.5)
CK MB SERPL-MCNC: 2.2 NG/ML (ref 5–25)
CK SERPL-CCNC: 359 U/L (ref 26–192)
CO2 SERPL-SCNC: 25 MMOL/L (ref 21–32)
CREAT SERPL-MCNC: 1.89 MG/DL (ref 0.55–1.02)
DIFFERENTIAL METHOD BLD: ABNORMAL
EOSINOPHIL # BLD: 0 K/UL (ref 0–0.4)
EOSINOPHIL NFR BLD: 0 % (ref 0–7)
ERYTHROCYTE [DISTWIDTH] IN BLOOD BY AUTOMATED COUNT: 17.2 % (ref 11.5–14.5)
GLOBULIN SER CALC-MCNC: 3.8 G/DL (ref 2–4)
GLUCOSE BLD STRIP.AUTO-MCNC: 105 MG/DL (ref 65–100)
GLUCOSE SERPL-MCNC: 140 MG/DL (ref 65–100)
HCT VFR BLD AUTO: 15.6 % (ref 35–47)
HCT VFR BLD AUTO: 15.7 % (ref 35–47)
HEMOCCULT STL QL: NEGATIVE
HGB BLD-MCNC: 4.5 G/DL (ref 11.5–16)
HGB BLD-MCNC: 4.5 G/DL (ref 11.5–16)
HISTORY CHECKED?,CKHIST: NORMAL
IMM GRANULOCYTES # BLD AUTO: 0 K/UL (ref 0–0.04)
IMM GRANULOCYTES NFR BLD AUTO: 0 % (ref 0–0.5)
INR PPP: 1.1 (ref 0.9–1.1)
LYMPHOCYTES # BLD: 1.6 K/UL (ref 0.8–3.5)
LYMPHOCYTES NFR BLD: 10 % (ref 12–49)
MAGNESIUM SERPL-MCNC: 2.6 MG/DL (ref 1.6–2.4)
MCH RBC QN AUTO: 24.6 PG (ref 26–34)
MCHC RBC AUTO-ENTMCNC: 28.8 G/DL (ref 30–36.5)
MCV RBC AUTO: 85.2 FL (ref 80–99)
MONOCYTES # BLD: 0.7 K/UL (ref 0–1)
MONOCYTES NFR BLD: 4 % (ref 5–13)
NEUTS SEG # BLD: 14 K/UL (ref 1.8–8)
NEUTS SEG NFR BLD: 86 % (ref 32–75)
NRBC # BLD: 0.23 K/UL (ref 0–0.01)
NRBC BLD-RTO: 1.4 PER 100 WBC
PLATELET # BLD AUTO: 343 K/UL (ref 150–400)
PMV BLD AUTO: 10 FL (ref 8.9–12.9)
POTASSIUM SERPL-SCNC: 3.5 MMOL/L (ref 3.5–5.1)
PROT SERPL-MCNC: 6.3 G/DL (ref 6.4–8.2)
PROTHROMBIN TIME: 11.6 SEC (ref 9–11.1)
RBC # BLD AUTO: 1.83 M/UL (ref 3.8–5.2)
RBC MORPH BLD: ABNORMAL
SERVICE CMNT-IMP: ABNORMAL
SODIUM SERPL-SCNC: 140 MMOL/L (ref 136–145)
TROPONIN I BLD-MCNC: 1.15 NG/ML (ref 0–0.08)
TROPONIN I SERPL-MCNC: 7.99 NG/ML
TROPONIN I SERPL-MCNC: 8.11 NG/ML
TROPONIN I SERPL-MCNC: 8.2 NG/ML
WBC # BLD AUTO: 16.3 K/UL (ref 3.6–11)

## 2020-10-04 PROCEDURE — 86900 BLOOD TYPING SEROLOGIC ABO: CPT

## 2020-10-04 PROCEDURE — 74011000250 HC RX REV CODE- 250: Performed by: INTERNAL MEDICINE

## 2020-10-04 PROCEDURE — 80053 COMPREHEN METABOLIC PANEL: CPT

## 2020-10-04 PROCEDURE — 82550 ASSAY OF CK (CPK): CPT

## 2020-10-04 PROCEDURE — 74176 CT ABD & PELVIS W/O CONTRAST: CPT

## 2020-10-04 PROCEDURE — 86921 COMPATIBILITY TEST INCUBATE: CPT

## 2020-10-04 PROCEDURE — 86920 COMPATIBILITY TEST SPIN: CPT

## 2020-10-04 PROCEDURE — 36430 TRANSFUSION BLD/BLD COMPNT: CPT

## 2020-10-04 PROCEDURE — 83880 ASSAY OF NATRIURETIC PEPTIDE: CPT

## 2020-10-04 PROCEDURE — 82272 OCCULT BLD FECES 1-3 TESTS: CPT

## 2020-10-04 PROCEDURE — 74011250636 HC RX REV CODE- 250/636: Performed by: INTERNAL MEDICINE

## 2020-10-04 PROCEDURE — 74011250636 HC RX REV CODE- 250/636: Performed by: GENERAL ACUTE CARE HOSPITAL

## 2020-10-04 PROCEDURE — 71045 X-RAY EXAM CHEST 1 VIEW: CPT

## 2020-10-04 PROCEDURE — 36415 COLL VENOUS BLD VENIPUNCTURE: CPT

## 2020-10-04 PROCEDURE — 85610 PROTHROMBIN TIME: CPT

## 2020-10-04 PROCEDURE — P9016 RBC LEUKOCYTES REDUCED: HCPCS

## 2020-10-04 PROCEDURE — 86870 RBC ANTIBODY IDENTIFICATION: CPT

## 2020-10-04 PROCEDURE — C9113 INJ PANTOPRAZOLE SODIUM, VIA: HCPCS | Performed by: INTERNAL MEDICINE

## 2020-10-04 PROCEDURE — 65660000001 HC RM ICU INTERMED STEPDOWN

## 2020-10-04 PROCEDURE — 85018 HEMOGLOBIN: CPT

## 2020-10-04 PROCEDURE — 82962 GLUCOSE BLOOD TEST: CPT

## 2020-10-04 PROCEDURE — 86922 COMPATIBILITY TEST ANTIGLOB: CPT

## 2020-10-04 PROCEDURE — 84484 ASSAY OF TROPONIN QUANT: CPT

## 2020-10-04 PROCEDURE — 99285 EMERGENCY DEPT VISIT HI MDM: CPT

## 2020-10-04 PROCEDURE — 93005 ELECTROCARDIOGRAM TRACING: CPT

## 2020-10-04 PROCEDURE — 30233N1 TRANSFUSION OF NONAUTOLOGOUS RED BLOOD CELLS INTO PERIPHERAL VEIN, PERCUTANEOUS APPROACH: ICD-10-PCS | Performed by: STUDENT IN AN ORGANIZED HEALTH CARE EDUCATION/TRAINING PROGRAM

## 2020-10-04 PROCEDURE — 85025 COMPLETE CBC W/AUTO DIFF WBC: CPT

## 2020-10-04 PROCEDURE — 82553 CREATINE MB FRACTION: CPT

## 2020-10-04 PROCEDURE — 83735 ASSAY OF MAGNESIUM: CPT

## 2020-10-04 RX ORDER — TORSEMIDE 20 MG/1
40 TABLET ORAL DAILY
COMMUNITY
End: 2022-02-16

## 2020-10-04 RX ORDER — PROMETHAZINE HYDROCHLORIDE 25 MG/1
12.5 TABLET ORAL
Status: DISCONTINUED | OUTPATIENT
Start: 2020-10-04 | End: 2020-10-13 | Stop reason: HOSPADM

## 2020-10-04 RX ORDER — SODIUM CHLORIDE 9 MG/ML
250 INJECTION, SOLUTION INTRAVENOUS AS NEEDED
Status: DISCONTINUED | OUTPATIENT
Start: 2020-10-04 | End: 2020-10-05 | Stop reason: SDUPTHER

## 2020-10-04 RX ORDER — ERGOCALCIFEROL 1.25 MG/1
50000 CAPSULE ORAL
COMMUNITY
End: 2021-01-07

## 2020-10-04 RX ORDER — MAGNESIUM SULFATE 100 %
4 CRYSTALS MISCELLANEOUS AS NEEDED
Status: DISCONTINUED | OUTPATIENT
Start: 2020-10-04 | End: 2020-10-13 | Stop reason: HOSPADM

## 2020-10-04 RX ORDER — SODIUM CHLORIDE 9 MG/ML
75 INJECTION, SOLUTION INTRAVENOUS CONTINUOUS
Status: DISCONTINUED | OUTPATIENT
Start: 2020-10-04 | End: 2020-10-07

## 2020-10-04 RX ORDER — SODIUM CHLORIDE 9 MG/ML
250 INJECTION, SOLUTION INTRAVENOUS AS NEEDED
Status: DISCONTINUED | OUTPATIENT
Start: 2020-10-04 | End: 2020-10-12

## 2020-10-04 RX ORDER — ACETAMINOPHEN 650 MG/1
650 SUPPOSITORY RECTAL
Status: DISCONTINUED | OUTPATIENT
Start: 2020-10-04 | End: 2020-10-13 | Stop reason: HOSPADM

## 2020-10-04 RX ORDER — INSULIN LISPRO 100 [IU]/ML
INJECTION, SOLUTION INTRAVENOUS; SUBCUTANEOUS
Status: DISCONTINUED | OUTPATIENT
Start: 2020-10-04 | End: 2020-10-13 | Stop reason: HOSPADM

## 2020-10-04 RX ORDER — ACETAMINOPHEN 325 MG/1
650 TABLET ORAL
Status: DISCONTINUED | OUTPATIENT
Start: 2020-10-04 | End: 2020-10-13 | Stop reason: HOSPADM

## 2020-10-04 RX ORDER — DULOXETIN HYDROCHLORIDE 30 MG/1
60 CAPSULE, DELAYED RELEASE ORAL DAILY
Status: DISCONTINUED | OUTPATIENT
Start: 2020-10-05 | End: 2020-10-13 | Stop reason: HOSPADM

## 2020-10-04 RX ORDER — POLYETHYLENE GLYCOL 3350 17 G/17G
17 POWDER, FOR SOLUTION ORAL DAILY PRN
Status: DISCONTINUED | OUTPATIENT
Start: 2020-10-04 | End: 2020-10-13 | Stop reason: HOSPADM

## 2020-10-04 RX ORDER — ONDANSETRON 2 MG/ML
4 INJECTION INTRAMUSCULAR; INTRAVENOUS
Status: DISCONTINUED | OUTPATIENT
Start: 2020-10-04 | End: 2020-10-13 | Stop reason: HOSPADM

## 2020-10-04 RX ORDER — DEXTROSE 50 % IN WATER (D50W) INTRAVENOUS SYRINGE
12.5-25 AS NEEDED
Status: DISCONTINUED | OUTPATIENT
Start: 2020-10-04 | End: 2020-10-13 | Stop reason: HOSPADM

## 2020-10-04 RX ORDER — SODIUM CHLORIDE 0.9 % (FLUSH) 0.9 %
5-40 SYRINGE (ML) INJECTION EVERY 8 HOURS
Status: DISCONTINUED | OUTPATIENT
Start: 2020-10-04 | End: 2020-10-07 | Stop reason: SDUPTHER

## 2020-10-04 RX ORDER — SODIUM CHLORIDE 0.9 % (FLUSH) 0.9 %
5-40 SYRINGE (ML) INJECTION AS NEEDED
Status: DISCONTINUED | OUTPATIENT
Start: 2020-10-04 | End: 2020-10-07 | Stop reason: SDUPTHER

## 2020-10-04 RX ADMIN — SODIUM CHLORIDE 75 ML/HR: 900 INJECTION, SOLUTION INTRAVENOUS at 16:36

## 2020-10-04 RX ADMIN — SODIUM CHLORIDE 40 MG: 9 INJECTION, SOLUTION INTRAMUSCULAR; INTRAVENOUS; SUBCUTANEOUS at 16:36

## 2020-10-04 RX ADMIN — SODIUM CHLORIDE 40 MG: 9 INJECTION, SOLUTION INTRAMUSCULAR; INTRAVENOUS; SUBCUTANEOUS at 21:50

## 2020-10-04 RX ADMIN — Medication 5 ML: at 15:56

## 2020-10-04 RX ADMIN — Medication 10 ML: at 21:51

## 2020-10-04 NOTE — ED NOTES
To CT via stretcher. Skin warm and dry. Respirations even and unlabored. In no apparent distress at this time.

## 2020-10-04 NOTE — CONSULTS
GI Consultation Note Lacey Garcia for Angelina)    NAME: Estevan Hernandez : 1945 MRN: [id number]   PRIMARY GI: Dang Mckeon MD PCP: Machelle Mccallum MD  Date/Time:  10/4/2020 2:44 PM  Subjective:   REASON FOR CONSULT:    Severe microcytic anemia    Helen Christine is a 70 y.o.  female who I was asked to see for above. Pt presented to ED with severe progressive weakness over the last couple of months. 1 episode of black stool a couple of months ago. No BRB. +Generalized AP as well as back pain over this time period. No weight loss. No NSAID use. H/o CAD s/p PCI in  no longer on Plavix. EGD in '15 by Angelina with mild gastritis and fundic gland polyps. Pt reports h/o colonoscopy in past but we don't have access to those recrods      Past Medical History:   Diagnosis Date    Arthritis     Chronic bronchitis ([facility] [address]     per pt:  as of 1/9/15 pt denies any ARENAS or SOB    Diabetes ([facility] [address] Dx approx     Dr Klaus Lucio (in [facility])    GERD (gastroesophageal reflux disease)     Hypercholesteremia     Hypertension       Past Surgical History:   Procedure Laterality Date    HX CHOLECYSTECTOMY      HX COLONOSCOPY      HX CORONARY STENT PLACEMENT  2015    HX DILATION AND CURETTAGE       Social History     Tobacco Use    Smoking status: Never Smoker    Smokeless tobacco: Never Used   Substance Use Topics    Alcohol use: No      Family History   Problem Relation Age of Onset    Diabetes Mother     Heart Disease Mother     Hypertension Mother     Stroke Father     Heart Disease Brother     Heart Disease Brother     Stroke Brother     HIV/AIDS Brother       Allergies   Allergen Reactions    Metformin Other (comments)     GI side effects      Home Medications:  Prior to Admission Medications   Prescriptions Last Dose Informant Patient Reported? Taking? DULoxetine (CYMBALTA) 60 mg capsule   No No   Sig: Take 1 Cap by mouth daily.    Insulin Needles, Disposable, (JUAN LUIS PEN NEEDLE) 32 gauge x \" ndle   No No   Sig: One shot daily. DX E11.42   amLODIPine (NORVASC) 10 mg tablet   No No   Sig: TAKE 1 TABLET DAILY   aspirin 81 mg chewable tablet   No No   Sig: Take 1 Tab by mouth daily. atorvastatin (LIPITOR) 40 mg tablet   No No   Sig: Take 1 Tab by mouth nightly. carvediloL (COREG) 25 mg tablet   No No   Sig: Take 1 Tab by mouth two (2) times a day. clopidogrel (PLAVIX) 75 mg tab   No No   Sig: TAKE 1 TABLET DAILY   glipiZIDE (GLUCOTROL) 10 mg tablet   No No   Sig: Take 1 Tab by mouth two (2) times a day. insulin degludec (TRESIBA FLEXTOUCH U-200) 200 unit/mL (3 mL) inpn   No No   Si units daily   pantoprazole (PROTONIX) 40 mg tablet   No No   Sig: TAKE 1 TABLET EVERY MORNING   pregabalin (LYRICA) 75 mg capsule   No No   Sig: Take 1 Cap by mouth two (2) times a day. Max Daily Amount: 150 mg. Facility-Administered Medications: None     Hospital medications:  Current Facility-Administered Medications   Medication Dose Route Frequency    0.9% sodium chloride infusion 250 mL  250 mL IntraVENous PRN     Current Outpatient Medications   Medication Sig    insulin degludec (TRESIBA FLEXTOUCH U-200) 200 unit/mL (3 mL) inpn 108 units daily    Insulin Needles, Disposable, (JUAN LUIS PEN NEEDLE) 32 gauge x 5/32\" ndle One shot daily. DX E11.42    carvediloL (COREG) 25 mg tablet Take 1 Tab by mouth two (2) times a day.  amLODIPine (NORVASC) 10 mg tablet TAKE 1 TABLET DAILY    glipiZIDE (GLUCOTROL) 10 mg tablet Take 1 Tab by mouth two (2) times a day.  atorvastatin (LIPITOR) 40 mg tablet Take 1 Tab by mouth nightly.  DULoxetine (CYMBALTA) 60 mg capsule Take 1 Cap by mouth daily.  pregabalin (LYRICA) 75 mg capsule Take 1 Cap by mouth two (2) times a day. Max Daily Amount: 150 mg.  pantoprazole (PROTONIX) 40 mg tablet TAKE 1 TABLET EVERY MORNING    clopidogrel (PLAVIX) 75 mg tab TAKE 1 TABLET DAILY    aspirin 81 mg chewable tablet Take 1 Tab by mouth daily.      REVIEW OF SYSTEMS:     []     Unable to obtain ROS due to  []    mental status change  []    sedated   []    intubated   []    Total of 11 systems reviewed as follows:  Const:  negative fever, negative chills, negative weight loss  Eyes:   negative diplopia or visual changes, negative eye pain  ENT:   negative coryza, negative sore throat  Resp:   negative cough, hemoptysis, dyspnea  Cards:  negative for chest pain, palpitations, lower extremity edema  :  negative for frequency, dysuria and hematuria  Skin:   negative for rash and pruritus  Heme:  negative for easy bruising and gum/nose bleeding  MS:  negative for myalgias, arthralgias, back pain and muscle weakness  Neurolo:  negative for headaches, dizziness, vertigo, memory problems   Psych:  negative for feelings of anxiety, depression     Pertinent Positives include :    Objective:   VITALS:    Visit Vitals  /72 (BP 1 Location: Right arm, BP Patient Position: At rest)   Pulse 89   Temp 99.2 °F (37.3 °C)   Resp 23   Ht 5' 2\" (1.575 m)   Wt 84.8 kg (187 lb)   SpO2 99%   BMI 34.20 kg/m²     Temp (24hrs), Av.2 °F (37.3 °C), Min:99.2 °F (37.3 °C), Max:99.2 °F (37.3 °C)    PHYSICAL EXAM:   General:    Alert, cooperative, no distress, appears stated age. Head:   Normocephalic, without obvious abnormality, atraumatic. Eyes:   Conjunctivae clear, anicteric sclerae. Pupils are equal  Nose:  Nares normal. No drainage or sinus tenderness. Throat:    Lips, mucosa, and tongue normal.  No Thrush  Neck:  Supple, symmetrical,  no adenopathy, thyroid: non tender  Back:    Symmetric,  No CVA tenderness. Lungs:   CTA bilaterally. No wheezing/rhonchi/rales. Chest wall:  No tenderness or deformity. No Accessory muscle use. Heart:   Regular rate and rhythm,  no murmur, rub or gallop. Abdomen:   Soft, +generalized AP, Not distended. Bowel sounds normal. No masses  Extremities: Atraumatic, No cyanosis. No edema.  No clubbing  Skin:     Texture, turgor normal. No rashes/lesions/jaundice  Lymph: Cervical, supraclavicular normal.  Psych:  Good insight. Not depressed. Not anxious or agitated. Neurologic: EOMs intact. No facial asymmetry. No aphasia or slurred speech. Normal  strength, A/O X 3. LAB DATA REVIEWED:    Recent Results (from the past 48 hour(s))   EKG, 12 LEAD, INITIAL    Collection Time: 10/04/20 12:51 PM   Result Value Ref Range    Ventricular Rate 89 BPM    Atrial Rate 89 BPM    P-R Interval 110 ms    QRS Duration 100 ms    Q-T Interval 402 ms    QTC Calculation (Bezet) 489 ms    Calculated P Axis 26 degrees    Calculated R Axis -11 degrees    Calculated T Axis -175 degrees    Diagnosis       Sinus rhythm with short MI  Left ventricular hypertrophy with repolarization abnormality  When compared with ECG of 28-AUG-2015 05:24,  Vent. rate has increased BY  33 BPM  ST now depressed in Inferior leads  ST now depressed in Lateral leads  Nonspecific T wave abnormality, improved in Inferior leads  T wave inversion no longer evident in Anterior leads  QT has lengthened     CBC WITH AUTOMATED DIFF    Collection Time: 10/04/20  1:08 PM   Result Value Ref Range    WBC 16.3 (H) 3.6 - 11.0 K/uL    RBC 1.83 (L) 3.80 - 5.20 M/uL    HGB 4.5 (LL) 11.5 - 16.0 g/dL    HCT 15.6 (LL) 35.0 - 47.0 %    MCV 85.2 80.0 - 99.0 FL    MCH 24.6 (L) 26.0 - 34.0 PG    MCHC 28.8 (L) 30.0 - 36.5 g/dL    RDW 17.2 (H) 11.5 - 14.5 %    PLATELET 419 108 - 475 K/uL    MPV 10.0 8.9 - 12.9 FL    NRBC 1.4 (H) 0  WBC    ABSOLUTE NRBC 0.23 (H) 0.00 - 0.01 K/uL    NEUTROPHILS 86 (H) 32 - 75 %    LYMPHOCYTES 10 (L) 12 - 49 %    MONOCYTES 4 (L) 5 - 13 %    EOSINOPHILS 0 0 - 7 %    BASOPHILS 0 0 - 1 %    IMMATURE GRANULOCYTES 0 0.0 - 0.5 %    ABS. NEUTROPHILS 14.0 (H) 1.8 - 8.0 K/UL    ABS. LYMPHOCYTES 1.6 0.8 - 3.5 K/UL    ABS. MONOCYTES 0.7 0.0 - 1.0 K/UL    ABS. EOSINOPHILS 0.0 0.0 - 0.4 K/UL    ABS. BASOPHILS 0.0 0.0 - 0.1 K/UL    ABS. IMM.  GRANS. 0.0 0.00 - 0.04 K/UL    DF MANUAL      RBC COMMENTS ANISOCYTOSIS  1+        RBC COMMENTS HYPOCHROMIA  1+        RBC COMMENTS POLYCHROMASIA  1+        RBC COMMENTS Pathology Review Requested     METABOLIC PANEL, COMPREHENSIVE    Collection Time: 10/04/20  1:08 PM   Result Value Ref Range    Sodium 140 136 - 145 mmol/L    Potassium 3.5 3.5 - 5.1 mmol/L    Chloride 105 97 - 108 mmol/L    CO2 25 21 - 32 mmol/L    Anion gap 10 5 - 15 mmol/L    Glucose 140 (H) 65 - 100 mg/dL    BUN 46 (H) 6 - 20 MG/DL    Creatinine 1.89 (H) 0.55 - 1.02 MG/DL    BUN/Creatinine ratio 24 (H) 12 - 20      GFR est AA 32 (L) >60 ml/min/1.73m2    GFR est non-AA 26 (L) >60 ml/min/1.73m2    Calcium 7.7 (L) 8.5 - 10.1 MG/DL    Bilirubin, total 0.5 0.2 - 1.0 MG/DL    ALT (SGPT) 28 12 - 78 U/L    AST (SGOT) 32 15 - 37 U/L    Alk. phosphatase 50 45 - 117 U/L    Protein, total 6.3 (L) 6.4 - 8.2 g/dL    Albumin 2.5 (L) 3.5 - 5.0 g/dL    Globulin 3.8 2.0 - 4.0 g/dL    A-G Ratio 0.7 (L) 1.1 - 2.2     CK W/ REFLX CKMB    Collection Time: 10/04/20  1:08 PM   Result Value Ref Range     (H) 26 - 192 U/L   TROPONIN I    Collection Time: 10/04/20  1:08 PM   Result Value Ref Range    Troponin-I, Qt. 8.20 (H) <0.05 ng/mL   PROTHROMBIN TIME + INR    Collection Time: 10/04/20  1:08 PM   Result Value Ref Range    INR 1.1 0.9 - 1.1      Prothrombin time 11.6 (H) 9.0 - 11.1 sec   MAGNESIUM    Collection Time: 10/04/20  1:08 PM   Result Value Ref Range    Magnesium 2.6 (H) 1.6 - 2.4 mg/dL   NT-PRO BNP    Collection Time: 10/04/20  1:08 PM   Result Value Ref Range    NT pro-BNP 14,613 (H) <125 PG/ML   TYPE & SCREEN    Collection Time: 10/04/20  1:08 PM   Result Value Ref Range    Crossmatch Expiration 10/07/2020     ABO/Rh(D) PENDING     Antibody screen PENDING    CK-MB,QUANT.     Collection Time: 10/04/20  1:08 PM   Result Value Ref Range    CK - MB 2.2 <3.6 NG/ML    CK-MB Index 0.6 0.0 - 2.5     POC TROPONIN-I    Collection Time: 10/04/20  1:10 PM   Result Value Ref Range    Troponin-I (POC) 1.15 (H) 0.00 - 0.08 ng/mL     IMAGING RESULTS:   []      I have personally reviewed the actual   []    CXR  []    CT  []     US    Assessment/Plan:      Active Problems:    * No active hospital problems. *    1. Severe microcytic anemia with Hgb of 4.5 gm/dL (last Hgb from 2015 was 12.7 gm/dL)  2. No overt GI bleeding  3. EGD in '15 with mild gastritis and fundic gland polyps  4.  Pt reports h/o colonoscopy but records not available for review  ___________________________________________________  RECOMMENDATIONS:    - CT Abd/Pelvis without IV contrast (given GFR) ordered for evaluation of AP  - BID PPI  - blood transfusion with goal Hgb at least >7, maybe higher given h/o CAD  - needs eventual EGD/Colon, NPO after midnight for possible EGD tomorrow with Dr. Albino Wells if Hgb at adequate level    Dr. Albino Wells to assume care of patient tomorrow    Discussed Code Status:    []    Full Code      []    DNR    ___________________________________________________  Care Plan discussed with:    [x]    Patient   [x]    Family   []    Nursing   []    Attending   ___________________________________________________  GI: Glen Og MD

## 2020-10-04 NOTE — ED NOTES
Pt is a difficult stick - IV catheters will not pull back, but flush without difficulty. Phlebotomy at bedside to draw second pink top.

## 2020-10-04 NOTE — H&P
Hospitalist Admission Note    NAME: Shamar Carbajal   :  1945   MRN:  [id number]     Date/Time:  10/4/2020 2:51 PM    Patient PCP: Elizabeth Johnson MD  ______________________________________________________________________  Given the patient's current clinical presentation, I have a high level of concern for decompensation if discharged from the emergency department. Complex decision making was performed, which includes reviewing the patient's available past medical records, laboratory results, and x-ray films. My assessment of this patient's clinical condition and my plan of care is as follows. Assessment / Plan:  Symptomatic GI bleed  Microcytic anemia  Admit to PCU  Hgb 4.6 - transfuse pRBC  Goal Hgb 8  Trend q6hrs  IV PPI BID  Keep NPO  IVF  Abdo CT without contrast pending  GI Consult    GUANACO  Secondary to volume depletion given GI bleed  Trend, transfuse pRBC    Elevated troponin  CAD  Trop 8.2 - ST depressions noted on EKG as well  Pt without any chest pain  ?secondary to significant anemia  Trend Trops q4hrs till downtrending  Cardiology Consult  Impression:  1. Enlarged cardiac silhouette, otherwise no acute disease    DMII  GERD  HLD  HTN  Hold BP meds  FS monitoring, SS    Code Status: Full  Surrogate Decision Maker:   DVT Prophylaxis: SCD  GI Prophylaxis: not indicated  Baseline: Independent      Subjective:   CHIEF COMPLAINT: weakness, unable to walk    HISTORY OF PRESENT ILLNESS:     Shamar Carbajal is a 70 y.o.  female who presents with CC listed above. Pt states that for the past few weeks she has been feeling progressively weak. It finally came to a head today because she was unable to ambulate safely and became worried. She denies any bleeding in her stool. She denies any history of NSAID abuse.  She endorses having a colonoscopy in the past 10 years and believes that it was \"normal.\"     We were asked to admit for work up and evaluation of the above problems. Past Medical History:   Diagnosis Date    Arthritis     Chronic bronchitis ([address]     per pt:  as of 1/9/15 pt denies any ARENAS or SOB    Diabetes ([address] Dx approx 2009    Dr Rony Ogden [facility]in [facility]    GERD (gastroesophageal reflux disease)     Hypercholesteremia     Hypertension         Past Surgical History:   Procedure Laterality Date    HX CHOLECYSTECTOMY  6/12    HX COLONOSCOPY      HX CORONARY STENT PLACEMENT  8/25/2015    HX DILATION AND CURETTAGE         Social History     Tobacco Use    Smoking status: Never Smoker    Smokeless tobacco: Never Used   Substance Use Topics    Alcohol use: No        Family History   Problem Relation Age of Onset    Diabetes Mother     Heart Disease Mother     Hypertension Mother     Stroke Father     Heart Disease Brother     Heart Disease Brother     Stroke Brother     HIV/AIDS Brother      Allergies   Allergen Reactions    Metformin Other (comments)     GI side effects        Prior to Admission medications    Medication Sig Start Date End Date Taking? Authorizing Provider   insulin degludec (TRESIBA FLEXTOUCH U-200) 200 unit/mL (3 mL) inpn 108 units daily 1/27/20   David George MD   Insulin Needles, Disposable, (JUAN LUIS PEN NEEDLE) 32 gauge x 5/32\" ndle One shot daily. DX E11.42 1/27/20   David George MD   carvediloL (COREG) 25 mg tablet Take 1 Tab by mouth two (2) times a day. 1/27/20   David George MD   amLODIPine (NORVASC) 10 mg tablet TAKE 1 TABLET DAILY 1/27/20   David George MD   glipiZIDE (GLUCOTROL) 10 mg tablet Take 1 Tab by mouth two (2) times a day. 1/27/20   David George MD   atorvastatin (LIPITOR) 40 mg tablet Take 1 Tab by mouth nightly. 1/27/20   David George MD   DULoxetine (CYMBALTA) 60 mg capsule Take 1 Cap by mouth daily. 7/18/19   David George MD   pregabalin (LYRICA) 75 mg capsule Take 1 Cap by mouth two (2) times a day.  Max Daily Amount: 150 mg. 7/18/19   David George MD pantoprazole (PROTONIX) 40 mg tablet TAKE 1 TABLET EVERY MORNING 4/18/19   Chaz Barraza MD   clopidogrel (PLAVIX) 75 mg tab TAKE 1 TABLET DAILY 4/18/19   Chaz Barraza MD   aspirin 81 mg chewable tablet Take 1 Tab by mouth daily. 10/26/17   Chaz Barraza MD       REVIEW OF SYSTEMS:     I am not able to complete the review of systems because:    The patient is intubated and sedated    The patient has altered mental status due to his acute medical problems    The patient has baseline aphasia from prior stroke(s)    The patient has baseline dementia and is not reliable historian    The patient is in acute medical distress and unable to provide information           Total of 12 systems reviewed as follows:       POSITIVE= underlined text  Negative = text not underlined  General:  fever, chills, sweats, generalized weakness, weight loss/gain,      loss of appetite   Eyes:    blurred vision, eye pain, loss of vision, double vision  ENT:    rhinorrhea, pharyngitis   Respiratory:   cough, sputum production, SOB, ARENAS, wheezing, pleuritic pain   Cardiology:   chest pain, palpitations, orthopnea, PND, edema, syncope   Gastrointestinal:  abdominal pain , N/V, diarrhea, dysphagia, constipation, bleeding   Genitourinary:  frequency, urgency, dysuria, hematuria, incontinence   Muskuloskeletal :  arthralgia, myalgia, back pain  Hematology:  easy bruising, nose or gum bleeding, lymphadenopathy   Dermatological: rash, ulceration, pruritis, color change / jaundice  Endocrine:   hot flashes or polydipsia   Neurological:  headache, dizziness, confusion, focal weakness, paresthesia,     Speech difficulties, memory loss, gait difficulty  Psychological: Feelings of anxiety, depression, agitation    Objective:   VITALS:    Visit Vitals  /72 (BP 1 Location: Right arm, BP Patient Position: At rest)   Pulse 89   Temp 99.2 °F (37.3 °C)   Resp 23   Ht 5' 2\" (1.575 m)   Wt 84.8 kg (187 lb)   SpO2 99%   BMI 34.20 kg/m² PHYSICAL EXAM:    General:    Alert, cooperative, no distress, appears stated age. HEENT: Atraumatic, anicteric sclerae, pale conjunctivae     No oral ulcers, mucosa moist, throat clear, dentition fair  Neck:  Supple, symmetrical,  thyroid: non tender  Lungs:   Clear to auscultation bilaterally. No Wheezing or Rhonchi. No rales. Chest wall:  No tenderness  No Accessory muscle use. Heart:   Regular  rhythm,  No  murmur   No edema  Abdomen:   Soft, non-tender. Not distended. Bowel sounds normal  Extremities: No cyanosis. No clubbing,      Skin turgor normal, Capillary refill normal, Radial dial pulse 2+  Skin:     Not pale. Not Jaundiced  No rashes   Psych:  Good insight. Not depressed. Not anxious or agitated. Neurologic: EOMs intact. No facial asymmetry. No aphasia or slurred speech. Symmetrical strength, Sensation grossly intact. Alert and oriented X 4.     _______________________________________________________________________  Care Plan discussed with:    Comments   Patient x    Family  x    RN x    Care Manager                    Consultant:      _______________________________________________________________________  Expected  Disposition:   Home with Family    HH/PT/OT/RN x   SNF/LTC    ROSE    ________________________________________________________________________  TOTAL TIME:  54 Minutes    Critical Care Provided     Minutes non procedure based      Comments     Reviewed previous records   >50% of visit spent in counseling and coordination of care  Discussion with patient and/or family and questions answered       ________________________________________________________________________  Signed: Roopa Ann MD    Procedures: see electronic medical records for all procedures/Xrays and details which were not copied into this note but were reviewed prior to creation of Plan.     LAB DATA REVIEWED:    Recent Results (from the past 24 hour(s))   EKG, 12 LEAD, INITIAL    Collection Time: 10/04/20 12:51 PM   Result Value Ref Range    Ventricular Rate 89 BPM    Atrial Rate 89 BPM    P-R Interval 110 ms    QRS Duration 100 ms    Q-T Interval 402 ms    QTC Calculation (Bezet) 489 ms    Calculated P Axis 26 degrees    Calculated R Axis -11 degrees    Calculated T Axis -175 degrees    Diagnosis       Sinus rhythm with short ME  Left ventricular hypertrophy with repolarization abnormality  When compared with ECG of 28-AUG-2015 05:24,  Vent. rate has increased BY  33 BPM  ST now depressed in Inferior leads  ST now depressed in Lateral leads  Nonspecific T wave abnormality, improved in Inferior leads  T wave inversion no longer evident in Anterior leads  QT has lengthened     CBC WITH AUTOMATED DIFF    Collection Time: 10/04/20  1:08 PM   Result Value Ref Range    WBC 16.3 (H) 3.6 - 11.0 K/uL    RBC 1.83 (L) 3.80 - 5.20 M/uL    HGB 4.5 (LL) 11.5 - 16.0 g/dL    HCT 15.6 (LL) 35.0 - 47.0 %    MCV 85.2 80.0 - 99.0 FL    MCH 24.6 (L) 26.0 - 34.0 PG    MCHC 28.8 (L) 30.0 - 36.5 g/dL    RDW 17.2 (H) 11.5 - 14.5 %    PLATELET 624 775 - 119 K/uL    MPV 10.0 8.9 - 12.9 FL    NRBC 1.4 (H) 0  WBC    ABSOLUTE NRBC 0.23 (H) 0.00 - 0.01 K/uL    NEUTROPHILS 86 (H) 32 - 75 %    LYMPHOCYTES 10 (L) 12 - 49 %    MONOCYTES 4 (L) 5 - 13 %    EOSINOPHILS 0 0 - 7 %    BASOPHILS 0 0 - 1 %    IMMATURE GRANULOCYTES 0 0.0 - 0.5 %    ABS. NEUTROPHILS 14.0 (H) 1.8 - 8.0 K/UL    ABS. LYMPHOCYTES 1.6 0.8 - 3.5 K/UL    ABS. MONOCYTES 0.7 0.0 - 1.0 K/UL    ABS. EOSINOPHILS 0.0 0.0 - 0.4 K/UL    ABS. BASOPHILS 0.0 0.0 - 0.1 K/UL    ABS. IMM.  GRANS. 0.0 0.00 - 0.04 K/UL    DF MANUAL      RBC COMMENTS ANISOCYTOSIS  1+        RBC COMMENTS HYPOCHROMIA  1+        RBC COMMENTS POLYCHROMASIA  1+        RBC COMMENTS Pathology Review Requested     METABOLIC PANEL, COMPREHENSIVE    Collection Time: 10/04/20  1:08 PM   Result Value Ref Range    Sodium 140 136 - 145 mmol/L    Potassium 3.5 3.5 - 5.1 mmol/L    Chloride 105 97 - 108 mmol/L    CO2 25 21 - 32 mmol/L    Anion gap 10 5 - 15 mmol/L    Glucose 140 (H) 65 - 100 mg/dL    BUN 46 (H) 6 - 20 MG/DL    Creatinine 1.89 (H) 0.55 - 1.02 MG/DL    BUN/Creatinine ratio 24 (H) 12 - 20      GFR est AA 32 (L) >60 ml/min/1.73m2    GFR est non-AA 26 (L) >60 ml/min/1.73m2    Calcium 7.7 (L) 8.5 - 10.1 MG/DL    Bilirubin, total 0.5 0.2 - 1.0 MG/DL    ALT (SGPT) 28 12 - 78 U/L    AST (SGOT) 32 15 - 37 U/L    Alk. phosphatase 50 45 - 117 U/L    Protein, total 6.3 (L) 6.4 - 8.2 g/dL    Albumin 2.5 (L) 3.5 - 5.0 g/dL    Globulin 3.8 2.0 - 4.0 g/dL    A-G Ratio 0.7 (L) 1.1 - 2.2     CK W/ REFLX CKMB    Collection Time: 10/04/20  1:08 PM   Result Value Ref Range     (H) 26 - 192 U/L   TROPONIN I    Collection Time: 10/04/20  1:08 PM   Result Value Ref Range    Troponin-I, Qt. 8.20 (H) <0.05 ng/mL   PROTHROMBIN TIME + INR    Collection Time: 10/04/20  1:08 PM   Result Value Ref Range    INR 1.1 0.9 - 1.1      Prothrombin time 11.6 (H) 9.0 - 11.1 sec   MAGNESIUM    Collection Time: 10/04/20  1:08 PM   Result Value Ref Range    Magnesium 2.6 (H) 1.6 - 2.4 mg/dL   NT-PRO BNP    Collection Time: 10/04/20  1:08 PM   Result Value Ref Range    NT pro-BNP 14,613 (H) <125 PG/ML   TYPE & SCREEN    Collection Time: 10/04/20  1:08 PM   Result Value Ref Range    Crossmatch Expiration 10/07/2020     ABO/Rh(D) PENDING     Antibody screen PENDING    CK-MB,QUANT.     Collection Time: 10/04/20  1:08 PM   Result Value Ref Range    CK - MB 2.2 <3.6 NG/ML    CK-MB Index 0.6 0.0 - 2.5     POC TROPONIN-I    Collection Time: 10/04/20  1:10 PM   Result Value Ref Range    Troponin-I (POC) 1.15 (H) 0.00 - 0.08 ng/mL

## 2020-10-04 NOTE — CONSULTS
Cardiology Consult Note    CC: AMS; profound anemia    PCP:Kenzie Morrell MD  Reason for consult:  CAD; NQWMI  Requesting MD:  Dr. Jailene Horton. Admit Date: 10/4/2020   Today's Date: 10/4/2020   Cardiologist:  Dr Juana Colmenares then Dr Gina Cullen (last OV 2017). Cardiac Assessment/Plan:   1) CAD: Distal RCA NEENA 8/2015 for NQWMI; Med Rx mid circ and diffusely dz diagonals. 2) HTN  3) DM  4) Dyslipidemia  5) CKD III: Cr 1.43/gfr 42 7/2020 (Dr Dottie Dennis). 6) Obesity: ?NANY. 7) Non-compliant with some meds per pt 10/2020. Poor compliance with f/u OV. Presenting with AMS, profound anemia; NQWMI. GUANACO on CKD. No CP/change in chronic ARENAS. Rec: Need to correct profound anemia; Agree with GI eval.   I suspect NQWMI is type II; no current role for invasive Rx. No role for anticoagulation; ASA when ok with GI. Cont bblocker and ARB as BP tolerates. The patient reports no chest pain/pressure; no change in chronic ARENAS. No PND, orthopnea, palpitations, pre-syncope, syncope, new peripheral edema, or decrease in exercise tolerance. No current complaints. Fell 3 days ago (gen weak; not lightheaded; no syncope); Decreased LOC last few days per : \"asleep\"; wakes when shaken. +Sx NANY. Dark stool several weeks ago, none since. ECG: Sinus; ; IVCD 110; LVH; Lat ST depression (more than 2015). Tele: sinus  CXR: \"Enlarged cardiac silhouette, otherwise no acute disease\"    Notable labs: WBC 16.3; Hg 4.5. Cr 1.89 (nl in 2017); BUN 46. ; neg MB; trop 8.2.  _____________________________________________________________________________  Notable prior cardiac history:  @ last OV 1/24/17:         Ms Letha Mercedes has a h/o CAD (NEENA in distal RCA 8/2015 for NSTEMI,  Normal LV EF, 75% mid-circ; Dr Juana Colmenares), HTN, DM, and dyslipidemia; She has chronic mild ARENAS.    7/2016: No change ARENAS; no CP. Worse LE edema: some salt; BP poorly controlled per pt (chronic);  Last week, PCP decreased norvasc to 5 qd and added diuretic. 1/2017: No angina; Minor ARENAS (doesnt need to stop activity); Using less salt; Wt down 10# with diet. IMPRESSION AND PLAN  01. Atherosclerotic heart disease of native coronary artery with other forms of angina pectoris:  No angina; Stable dyspnea; could consider PCI of mid circ if needed: MPI next year/sooner prn. We discussed the signs and symptoms of unstable angina, myocardial infarction and malignant arrhythmia. The patient knows to seek immediate medical attention should they occur. ECG done today  Exercise MPI to be done with next visit. 02. Old myocardial infarction: This condition is stable. 03. Hypertensive heart disease without heart failure:  Adequately controlled. 04. Mixed hyperlipidemia:  Lipid labs drawn by PCP. The patient is tolerating lipid lowering therapy well. 05. Localized edema:  Resolved. The patient was instructed on a low sodium diet. 06. Type 2 diabetes mellitus without complications: This condition is stable. 07. Long term (current) use of aspirin:  No bleeding. Continue Rx   08. Long term (current) use of antithrombotics/antiplatelets:  No bleeding. D/C plavix at next OV if unremarkable MPI.   09. Body mass index (BMI) 36.0-36.9, adult:  The patient was instructed on AHA diet and regular exercise. ORDERS:  1 ECG done today   2 Myocardial Perfusion Study / Sy protocol with next visit   3 Return office visit with Stacy Moody MD in 1 Year. 4 The patient was instructed on AHA diet and regular exercise. [facility]   6 Hypertension Educational Materials   7 Patient counseled on the following: Low Salt Diet.        1/2017 MEDICATION LIST  Medication Sig Description   Amaryl 4 mg tablet take 1 tablet by oral route 2 times every day   amlodipine 10 mg tablet take 1 tablet by oral route  every day   aspirin 81 mg tablet,delayed release take 1 tablet by oral route  every day   atorvastatin 40 mg tablet take 1 tablet by oral route  every bedtime   clonidine HCl 0.1 mg tablet take 1 tablet by oral route  every day   Coreg 25 mg tablet take 1 tablet by oral route 2 times every day with food   Diovan  mg-25 mg tablet take 1 tablet by oral route  every day   furosemide 20 mg tablet take 1 tablet by oral route  every day   gabapentin 100 mg capsule take 1 capsule by oral route 3 times every day   hydralazine 25 mg tablet take 1 tablet by oral route 2 times every day with food   Lantus 100 unit/mL subcutaneous solution inject by subcutaneous route as per insulin protocol   Nitrostat 0.4 mg sublingual tablet place 1 tablet by sublingual route at the 1st sign of attack; may repeat every 5 min until relief; if pain persists after 3 tablets in 15 min, prompt medical attention is recommended   pantoprazole 40 mg tablet,delayed release take 1 tablet by oral route  every day   Plavix 75 mg tablet take 1 tablet by oral route  every day         _____________________________________________________________________________________________  CARDIAC HISTORY  CAD:  1 NSTEMI (PCI (Successful PTCA and stenting of totally occluded distal RCA. Resolute Integrity used. ) - 8/26/2015) - 8/28/2015     RISK FACTORS:  1 Hypertension   2 Dyslipidemia   3 Type 2 Diabetes       CARDIOVASCULAR PROCEDURES  CATH LAB:  Cath (EF 0.65 (65%), 30% Proximal LAD, 30% Mid LAD, 75% Mid CX, 50% Proximal RCA, 50% Mid RCA, 100% Distal RCA, small diffuse diseased Diagonals: Resolute NEENA to distal RCA. ) performed by Mitzi Stallworth MD - 8/26/2015   ELECTROPHYSIOLOGY:  EKG (LVH  left axis  T inversion inf and lat precordial leads) - 9/11/2015   EKG (Sinus Rhythm, LVH) - 12/17/2015   EKG (Sinus Rhythm, borderline LVH, inf-lat TWI.) - 1/24/2017       ALLERGIES/INTOLERANCES:    Ingredient Reaction Medication Name Comment   NO KNOWN ALLERGIES        None    PROBLEM LIST:  Problem Description Chronic   Benign essential HTN Y   High cholesterol Y   Diabetes mellitus Y Post PTCA Y         PAST MEDICAL/SURGICAL HISTORY  (Detailed)    Disease/disorder Onset Date Management Date Comments   Hyperlipidemia       Hypertension       STEMI 2015 coronary artery stent         Family History  (Detailed)  Relationship Family Member Name  Age at Death Condition Onset Age Cause of Death   Mother    Congestive heart failure  N   Mother    Diabetes mellitus  N     SOCIAL HISTORY  (Detailed)  Tobacco use reviewed. Preferred language is [place]. EDUCATION/EMPLOYMENT/OCCUPATION  Employment History Status Retired Restrictions                                                      MARITAL STATUS/FAMILY/SOCIAL SUPPORT  Currently . Smoking status: Never smoker.           ______________________________________________________________________  Patient Active Problem List    Diagnosis Date Noted    Severe obesity (BMI 35.0-39.9) 2018    Essential hypertension 2018    Mixed hyperlipidemia 2018    Type 2 diabetes with nephropathy ([facility] 2018    Type 2 diabetes mellitus with diabetic polyneuropathy, with long-term current use of insulin ([address] 2017    UTI (lower urinary tract infection) 2015    Non-ST elevated myocardial infarction ([address] 2015    Noncompliance 2015    Fatty liver 2015        Past Medical History:   Diagnosis Date    Arthritis     Chronic bronchitis ([address]     per pt:  as of 1/9/15 pt denies any ARENAS or SOB    Diabetes ([address] Dx approx     Dr Elizabeth Espinal (in [facility])    GERD (gastroesophageal reflux disease)     Hypercholesteremia     Hypertension       Past Surgical History:   Procedure Laterality Date    HX CHOLECYSTECTOMY      HX COLONOSCOPY      HX CORONARY STENT PLACEMENT  2015    HX DILATION AND CURETTAGE       Allergies   Allergen Reactions    Metformin Other (comments)     GI side effects      Family History   Problem Relation Age of Onset    Diabetes Mother     Heart Disease Mother     Hypertension Mother     Stroke Father     Heart Disease Brother     Heart Disease Brother     Stroke Brother     HIV/AIDS Brother       Social History     Socioeconomic History    Marital status:      Spouse name: Not on file    Number of children: Not on file    Years of education: Not on file    Highest education level: Not on file   Occupational History    Not on file   Social Needs    Financial resource strain: Not on file    Food insecurity     Worry: Not on file     Inability: Not on file   [employer] needs     Medical: Not on file     Non-medical: Not on file   Tobacco Use    Smoking status: Never Smoker    Smokeless tobacco: Never Used   Substance and Sexual Activity    Alcohol use: No    Drug use: No    Sexual activity: Not on file   Lifestyle    Physical activity     Days per week: Not on file     Minutes per session: Not on file    Stress: Not on file   Relationships    Social connections     Talks on phone: Not on file     Gets together: Not on file     Attends Evangelical service: Not on file     Active member of club or organization: Not on file     Attends meetings of clubs or organizations: Not on file     Relationship status: Not on file    Intimate partner violence     Fear of current or ex partner: Not on file     Emotionally abused: Not on file     Physically abused: Not on file     Forced sexual activity: Not on file   Other Topics Concern    Not on file   Social History Narrative    Not on file     Current Facility-Administered Medications   Medication Dose Route Frequency    0.9% sodium chloride infusion 250 mL  250 mL IntraVENous PRN     Current Outpatient Medications   Medication Sig    insulin degludec (TRESIBA FLEXTOUCH U-200) 200 unit/mL (3 mL) inpn 108 units daily    Insulin Needles, Disposable, (JUAN LUIS PEN NEEDLE) 32 gauge x 5/32\" ndle One shot daily. DX E11.42    carvediloL (COREG) 25 mg tablet Take 1 Tab by mouth two (2) times a day.     amLODIPine (NORVASC) 10 mg tablet TAKE 1 TABLET DAILY    glipiZIDE (GLUCOTROL) 10 mg tablet Take 1 Tab by mouth two (2) times a day.  atorvastatin (LIPITOR) 40 mg tablet Take 1 Tab by mouth nightly.  DULoxetine (CYMBALTA) 60 mg capsule Take 1 Cap by mouth daily.  pregabalin (LYRICA) 75 mg capsule Take 1 Cap by mouth two (2) times a day. Max Daily Amount: 150 mg.  pantoprazole (PROTONIX) 40 mg tablet TAKE 1 TABLET EVERY MORNING    clopidogrel (PLAVIX) 75 mg tab TAKE 1 TABLET DAILY    aspirin 81 mg chewable tablet Take 1 Tab by mouth daily. Prior to Admission Medications:  Prior to Admission medications    Medication Sig Start Date End Date Taking? Authorizing Provider   insulin degludec (TRESIBA FLEXTOUCH U-200) 200 unit/mL (3 mL) inpn 108 units daily 1/27/20   Monica Dunbar MD   Insulin Needles, Disposable, (JUAN LUIS PEN NEEDLE) 32 gauge x 5/32\" ndle One shot daily. DX E11.42 1/27/20   Monica Dunbar MD   carvediloL (COREG) 25 mg tablet Take 1 Tab by mouth two (2) times a day. 1/27/20   Monica Dunbar MD   amLODIPine (NORVASC) 10 mg tablet TAKE 1 TABLET DAILY 1/27/20   Monica Dunbar MD   glipiZIDE (GLUCOTROL) 10 mg tablet Take 1 Tab by mouth two (2) times a day. 1/27/20   Monica Dunbar MD   atorvastatin (LIPITOR) 40 mg tablet Take 1 Tab by mouth nightly. 1/27/20   Monica Dunbar MD   DULoxetine (CYMBALTA) 60 mg capsule Take 1 Cap by mouth daily. 7/18/19   Monica Dunbar MD   pregabalin (LYRICA) 75 mg capsule Take 1 Cap by mouth two (2) times a day. Max Daily Amount: 150 mg. 7/18/19   Monica Dunbar MD   pantoprazole (PROTONIX) 40 mg tablet TAKE 1 TABLET EVERY MORNING 4/18/19   Monica Dunbar MD   clopidogrel (PLAVIX) 75 mg tab TAKE 1 TABLET DAILY 4/18/19   Monica Dunbar MD   aspirin 81 mg chewable tablet Take 1 Tab by mouth daily.  10/26/17   Monica Dunbar MD        Review of Symptoms: Except as noted in HPI, patient denies recent fever or chills, nausea, vomiting, diarrhea, hemoptysis, hematemesis, dysuria, myalgias, focal neurologic symptoms, ecchymosis, angioedema, odynophagia, dysphagia, sore throat, earache,rash, hematochezia, depression, GERD, cold intolerance, petechia, bleeding gums, or significant weight loss. 24 hr VS reviewed, overall VSSAF  Temp (24hrs), Av.2 °F (37.3 °C), Min:99.2 °F (37.3 °C), Max:99.2 °F (37.3 °C)    Patient Vitals for the past 8 hrs:   Pulse   10/04/20 1331 89   10/04/20 1300 90     Patient Vitals for the past 8 hrs:   Resp   10/04/20 1331 23   10/04/20 1300 25     Patient Vitals for the past 8 hrs:   BP   10/04/20 1331 128/72   10/04/20 1300 (!) 146/60     No intake or output data in the 24 hours ending 10/04/20 1426      Physical Exam (complete single organ system exam)    Cons: The patient is no distress. Appears stated age. HEENT: Normal conjunctivae and palate. No xanthelasma. Neck: Flat JVP without appreciable HJR. Resp: Normal respiratory effort with clear lungs bilaterally. CV: Regular rate and rhythm. PMI not palpated. Normal S1,S2  No gallop or rubs appreciated. Soft JOE murmur appreciated. Intact carotid upstroke bilaterally without appreciated bruits. Abdominal aorta not palpated; no abdominal bruit noted. Intact pedal pulses. No peripheral edema. GI: No abd mass noted, soft; no organomegaly noted. Bowel sounds present. Muscular:  No significant kyphosis. Strength WNL for age. Ext: No cyanosis, clubbing, or stigmata of peripheral embolization. Derm: No ulcers or stasis dermatitis of lower extremities. Neuro: Alert and oriented x 3;  Grossly non-focal. Normal mood and affect.      Labs:   Recent Results (from the past 24 hour(s))   EKG, 12 LEAD, INITIAL    Collection Time: 10/04/20 12:51 PM   Result Value Ref Range    Ventricular Rate 89 BPM    Atrial Rate 89 BPM    P-R Interval 110 ms    QRS Duration 100 ms    Q-T Interval 402 ms    QTC Calculation (Bezet) 489 ms    Calculated P Axis 26 degrees    Calculated R Axis -11 degrees    Calculated T Axis -175 degrees    Diagnosis       Sinus rhythm with short CA  Left ventricular hypertrophy with repolarization abnormality  When compared with ECG of 28-AUG-2015 05:24,  Vent. rate has increased BY  33 BPM  ST now depressed in Inferior leads  ST now depressed in Lateral leads  Nonspecific T wave abnormality, improved in Inferior leads  T wave inversion no longer evident in Anterior leads  QT has lengthened     CBC WITH AUTOMATED DIFF    Collection Time: 10/04/20  1:08 PM   Result Value Ref Range    WBC 16.3 (H) 3.6 - 11.0 K/uL    RBC 1.83 (L) 3.80 - 5.20 M/uL    HGB 4.5 (LL) 11.5 - 16.0 g/dL    HCT 15.6 (LL) 35.0 - 47.0 %    MCV 85.2 80.0 - 99.0 FL    MCH 24.6 (L) 26.0 - 34.0 PG    MCHC 28.8 (L) 30.0 - 36.5 g/dL    RDW 17.2 (H) 11.5 - 14.5 %    PLATELET 847 526 - 839 K/uL    MPV 10.0 8.9 - 12.9 FL    NRBC 1.4 (H) 0  WBC    ABSOLUTE NRBC 0.23 (H) 0.00 - 0.01 K/uL    NEUTROPHILS 86 (H) 32 - 75 %    LYMPHOCYTES 10 (L) 12 - 49 %    MONOCYTES 4 (L) 5 - 13 %    EOSINOPHILS 0 0 - 7 %    BASOPHILS 0 0 - 1 %    IMMATURE GRANULOCYTES 0 0.0 - 0.5 %    ABS. NEUTROPHILS 14.0 (H) 1.8 - 8.0 K/UL    ABS. LYMPHOCYTES 1.6 0.8 - 3.5 K/UL    ABS. MONOCYTES 0.7 0.0 - 1.0 K/UL    ABS. EOSINOPHILS 0.0 0.0 - 0.4 K/UL    ABS. BASOPHILS 0.0 0.0 - 0.1 K/UL    ABS. IMM.  GRANS. 0.0 0.00 - 0.04 K/UL    DF MANUAL      RBC COMMENTS ANISOCYTOSIS  1+        RBC COMMENTS HYPOCHROMIA  1+        RBC COMMENTS POLYCHROMASIA  1+        RBC COMMENTS Pathology Review Requested     METABOLIC PANEL, COMPREHENSIVE    Collection Time: 10/04/20  1:08 PM   Result Value Ref Range    Sodium 140 136 - 145 mmol/L    Potassium 3.5 3.5 - 5.1 mmol/L    Chloride 105 97 - 108 mmol/L    CO2 25 21 - 32 mmol/L    Anion gap 10 5 - 15 mmol/L    Glucose 140 (H) 65 - 100 mg/dL    BUN 46 (H) 6 - 20 MG/DL    Creatinine 1.89 (H) 0.55 - 1.02 MG/DL    BUN/Creatinine ratio 24 (H) 12 - 20      GFR est AA 32 (L) >60 ml/min/1.73m2    GFR est non-AA 26 (L) >60 ml/min/1.73m2    Calcium 7.7 (L) 8.5 - 10.1 MG/DL    Bilirubin, total 0.5 0.2 - 1.0 MG/DL    ALT (SGPT) 28 12 - 78 U/L    AST (SGOT) 32 15 - 37 U/L    Alk. phosphatase 50 45 - 117 U/L    Protein, total 6.3 (L) 6.4 - 8.2 g/dL    Albumin 2.5 (L) 3.5 - 5.0 g/dL    Globulin 3.8 2.0 - 4.0 g/dL    A-G Ratio 0.7 (L) 1.1 - 2.2     CK W/ REFLX CKMB    Collection Time: 10/04/20  1:08 PM   Result Value Ref Range     (H) 26 - 192 U/L   TROPONIN I    Collection Time: 10/04/20  1:08 PM   Result Value Ref Range    Troponin-I, Qt. 8.20 (H) <0.05 ng/mL   PROTHROMBIN TIME + INR    Collection Time: 10/04/20  1:08 PM   Result Value Ref Range    INR 1.1 0.9 - 1.1      Prothrombin time 11.6 (H) 9.0 - 11.1 sec   MAGNESIUM    Collection Time: 10/04/20  1:08 PM   Result Value Ref Range    Magnesium 2.6 (H) 1.6 - 2.4 mg/dL   NT-PRO BNP    Collection Time: 10/04/20  1:08 PM   Result Value Ref Range    NT pro-BNP 14,613 (H) <125 PG/ML   CK-MB,QUANT.     Collection Time: 10/04/20  1:08 PM   Result Value Ref Range    CK - MB 2.2 <3.6 NG/ML    CK-MB Index 0.6 0.0 - 2.5     POC TROPONIN-I    Collection Time: 10/04/20  1:10 PM   Result Value Ref Range    Troponin-I (POC) 1.15 (H) 0.00 - 0.08 ng/mL     Recent Labs     10/04/20  1308   CKMB 2.2   CKNDX 0.6   TROIQ 8.20*       Recent Labs     10/04/20  1308      K 3.5      CO2 25   BUN 46*   CREA 1.89*   GFRAA 32*   *   CA 7.7*   ALB 2.5*   WBC 16.3*   HGB 4.5*   HCT 15.6*          Trace Tubbs MD

## 2020-10-04 NOTE — ED PROVIDER NOTES
EMERGENCY DEPARTMENT HISTORY AND PHYSICAL EXAM      Date: 10/4/2020  Patient Name: Estevan Hernandez    History of Presenting Illness     Chief Complaint   Patient presents with    Altered mental status     Presents to the ED via EMS with c/o AMS and SOB, per her . EMS reports an episode where the pt became unresponsive, and came around after a sternal rub. Upon arrival to ED, she appears fatigued, but A&O x 4. Pt c/o diffuse abdominal tenderness and back pain for months, also reports dark stools several months ago but none recently. [facility] MI and cardiac stents in 2015. She has been non-compliant with all her medications, including Plavix. Mucous membranes are pale.  Abdominal Pain    Back Pain    Shortness of Breath         HPI: Estevan Hernandez, 70 y.o. female presents to the ED with cc of generalized weakness and syncope. Per her , she has been very weak for several months, however this became much worse over the past several days. She had an episode today where she became slightly less responsive. She reports some slight shortness of breath. Denies any chest pain. Denies any fevers. Denies any dysuria or hematuria. She reports an episode of black stool a month ago, however none currently. Denies any headache. Denies any vomiting or diarrhea. Reports chronic abdominal pain for several months, no change in this. She has a history of coronary artery disease, she is supposed to be taking Plavix however has not taken this in many months. She takes a daily aspirin, no other blood thinners. There are no other complaints, changes, or physical findings at this time. PCP: Machelle Mccallum MD    No current facility-administered medications on file prior to encounter. Current Outpatient Medications on File Prior to Encounter   Medication Sig Dispense Refill    ergocalciferol (ERGOCALCIFEROL) 1,250 mcg (50,000 unit) capsule Take 50,000 Units by mouth every seven (7) days.  On Friday      torsemide (DEMADEX) 20 mg tablet Take 40 mg by mouth daily.  insulin degludec (TRESIBA FLEXTOUCH U-200) 200 unit/mL (3 mL) inpn 108 units daily 18 Pen 3    carvediloL (COREG) 25 mg tablet Take 1 Tab by mouth two (2) times a day. 180 Tab 3    amLODIPine (NORVASC) 10 mg tablet TAKE 1 TABLET DAILY 90 Tab 3    glipiZIDE (GLUCOTROL) 10 mg tablet Take 1 Tab by mouth two (2) times a day. 180 Tab 3    atorvastatin (LIPITOR) 40 mg tablet Take 1 Tab by mouth nightly. 90 Tab 3    clopidogrel (PLAVIX) 75 mg tab TAKE 1 TABLET DAILY 90 Tab 3    aspirin 81 mg chewable tablet Take 1 Tab by mouth daily. 100 Tab 12    [DISCONTINUED] Insulin Needles, Disposable, (JUAN LUIS PEN NEEDLE) 32 gauge x 5/32\" ndle One shot daily. DX E11.42 100 Pen Needle 3    [DISCONTINUED] DULoxetine (CYMBALTA) 60 mg capsule Take 1 Cap by mouth daily. 90 Cap 3    [DISCONTINUED] pregabalin (LYRICA) 75 mg capsule Take 1 Cap by mouth two (2) times a day.  Max Daily Amount: 150 mg. 180 Cap 1    [DISCONTINUED] pantoprazole (PROTONIX) 40 mg tablet TAKE 1 TABLET EVERY MORNING 90 Tab 3       Past History     Past Medical History:  Past Medical History:   Diagnosis Date    Arthritis     Chronic bronchitis ([address]     per pt:  as of 1/9/15 pt denies any ARENAS or SOB    Diabetes ([address] Dx approx 2009    Dr Tashia Mendoza (in [facility])    GERD (gastroesophageal reflux disease)     Hypercholesteremia     Hypertension        Past Surgical History:  Past Surgical History:   Procedure Laterality Date    HX CHOLECYSTECTOMY  6/12    HX COLONOSCOPY      HX CORONARY STENT PLACEMENT  8/25/2015    HX DILATION AND CURETTAGE         Family History:  Family History   Problem Relation Age of Onset    Diabetes Mother     Heart Disease Mother     Hypertension Mother     Stroke Father     Heart Disease Brother     Heart Disease Brother     Stroke Brother     HIV/AIDS Brother        Social History:  Social History     Tobacco Use    Smoking status: Never Smoker    Smokeless tobacco: Never Used   Substance Use Topics    Alcohol use: No    Drug use: No       Allergies: Allergies   Allergen Reactions    Metformin Other (comments)     GI side effects         Review of Systems   no fever  no eye pain  no ear pain  Reports mild shortness of breath  no chest pain  Reports chronic abdominal pain  no dysuria  no leg pain  no rash  no lymphadenopathy  no weight loss    Physical Exam   Physical Exam  Constitutional:       Appearance: She is well-developed. HENT:      Head: Normocephalic and atraumatic. Mouth/Throat:      Mouth: Mucous membranes are moist.   Eyes:      Extraocular Movements: Extraocular movements intact. Comments: Conjunctival pallor   Neck:      Musculoskeletal: Neck supple. Cardiovascular:      Rate and Rhythm: Normal rate and regular rhythm. Pulmonary:      Effort: Pulmonary effort is normal.      Breath sounds: Normal breath sounds. Abdominal:      Palpations: Abdomen is soft. Tenderness: There is no abdominal tenderness. Musculoskeletal:         General: No swelling or tenderness. Skin:     General: Skin is warm and dry. Neurological:      Mental Status: She is alert and oriented to person, place, and time. Cranial Nerves: No cranial nerve deficit. Sensory: No sensory deficit. Motor: No weakness.    Psychiatric:         Mood and Affect: Mood normal.         Diagnostic Study Results     Labs -     Recent Results (from the past 24 hour(s))   EKG, 12 LEAD, INITIAL    Collection Time: 10/04/20 12:51 PM   Result Value Ref Range    Ventricular Rate 89 BPM    Atrial Rate 89 BPM    P-R Interval 110 ms    QRS Duration 100 ms    Q-T Interval 402 ms    QTC Calculation (Bezet) 489 ms    Calculated P Axis 26 degrees    Calculated R Axis -11 degrees    Calculated T Axis -175 degrees    Diagnosis       Sinus rhythm with short TN  Left ventricular hypertrophy with repolarization abnormality  When compared with ECG of 28-AUG-2015 05:24,  Vent. rate has increased BY  33 BPM  ST now depressed in Inferior leads  ST now depressed in Lateral leads  Nonspecific T wave abnormality, improved in Inferior leads  T wave inversion no longer evident in Anterior leads  QT has lengthened     CBC WITH AUTOMATED DIFF    Collection Time: 10/04/20  1:08 PM   Result Value Ref Range    WBC 16.3 (H) 3.6 - 11.0 K/uL    RBC 1.83 (L) 3.80 - 5.20 M/uL    HGB 4.5 (LL) 11.5 - 16.0 g/dL    HCT 15.6 (LL) 35.0 - 47.0 %    MCV 85.2 80.0 - 99.0 FL    MCH 24.6 (L) 26.0 - 34.0 PG    MCHC 28.8 (L) 30.0 - 36.5 g/dL    RDW 17.2 (H) 11.5 - 14.5 %    PLATELET 051 681 - 099 K/uL    MPV 10.0 8.9 - 12.9 FL    NRBC 1.4 (H) 0  WBC    ABSOLUTE NRBC 0.23 (H) 0.00 - 0.01 K/uL    NEUTROPHILS 86 (H) 32 - 75 %    LYMPHOCYTES 10 (L) 12 - 49 %    MONOCYTES 4 (L) 5 - 13 %    EOSINOPHILS 0 0 - 7 %    BASOPHILS 0 0 - 1 %    IMMATURE GRANULOCYTES 0 0.0 - 0.5 %    ABS. NEUTROPHILS 14.0 (H) 1.8 - 8.0 K/UL    ABS. LYMPHOCYTES 1.6 0.8 - 3.5 K/UL    ABS. MONOCYTES 0.7 0.0 - 1.0 K/UL    ABS. EOSINOPHILS 0.0 0.0 - 0.4 K/UL    ABS. BASOPHILS 0.0 0.0 - 0.1 K/UL    ABS. IMM. GRANS. 0.0 0.00 - 0.04 K/UL    DF MANUAL      RBC COMMENTS ANISOCYTOSIS  1+        RBC COMMENTS HYPOCHROMIA  1+        RBC COMMENTS POLYCHROMASIA  1+        RBC COMMENTS Pathology Review Requested     METABOLIC PANEL, COMPREHENSIVE    Collection Time: 10/04/20  1:08 PM   Result Value Ref Range    Sodium 140 136 - 145 mmol/L    Potassium 3.5 3.5 - 5.1 mmol/L    Chloride 105 97 - 108 mmol/L    CO2 25 21 - 32 mmol/L    Anion gap 10 5 - 15 mmol/L    Glucose 140 (H) 65 - 100 mg/dL    BUN 46 (H) 6 - 20 MG/DL    Creatinine 1.89 (H) 0.55 - 1.02 MG/DL    BUN/Creatinine ratio 24 (H) 12 - 20      GFR est AA 32 (L) >60 ml/min/1.73m2    GFR est non-AA 26 (L) >60 ml/min/1.73m2    Calcium 7.7 (L) 8.5 - 10.1 MG/DL    Bilirubin, total 0.5 0.2 - 1.0 MG/DL    ALT (SGPT) 28 12 - 78 U/L    AST (SGOT) 32 15 - 37 U/L    Alk.  phosphatase 50 45 - 117 U/L Protein, total 6.3 (L) 6.4 - 8.2 g/dL    Albumin 2.5 (L) 3.5 - 5.0 g/dL    Globulin 3.8 2.0 - 4.0 g/dL    A-G Ratio 0.7 (L) 1.1 - 2.2     CK W/ REFLX CKMB    Collection Time: 10/04/20  1:08 PM   Result Value Ref Range     (H) 26 - 192 U/L   TROPONIN I    Collection Time: 10/04/20  1:08 PM   Result Value Ref Range    Troponin-I, Qt. 8.20 (H) <0.05 ng/mL   PROTHROMBIN TIME + INR    Collection Time: 10/04/20  1:08 PM   Result Value Ref Range    INR 1.1 0.9 - 1.1      Prothrombin time 11.6 (H) 9.0 - 11.1 sec   MAGNESIUM    Collection Time: 10/04/20  1:08 PM   Result Value Ref Range    Magnesium 2.6 (H) 1.6 - 2.4 mg/dL   NT-PRO BNP    Collection Time: 10/04/20  1:08 PM   Result Value Ref Range    NT pro-BNP 14,613 (H) <125 PG/ML   TYPE & SCREEN    Collection Time: 10/04/20  1:08 PM   Result Value Ref Range    Crossmatch Expiration 10/07/2020     ABO/Rh(D) PENDING     Antibody screen PENDING    CK-MB,QUANT. Collection Time: 10/04/20  1:08 PM   Result Value Ref Range    CK - MB 2.2 <3.6 NG/ML    CK-MB Index 0.6 0.0 - 2.5     POC TROPONIN-I    Collection Time: 10/04/20  1:10 PM   Result Value Ref Range    Troponin-I (POC) 1.15 (H) 0.00 - 0.08 ng/mL   RBC, ALLOCATE    Collection Time: 10/04/20  2:00 PM   Result Value Ref Range    HISTORY CHECKED? Historical check performed    OCCULT BLOOD, STOOL    Collection Time: 10/04/20  3:11 PM   Result Value Ref Range    Occult blood, stool Negative NEG         Radiologic Studies -   CT ABD PELV WO CONT   Final Result   IMPRESSION:      1. Small right pleural effusion. 2. Status post cholecystectomy. 3. Nonobstructive stone in the left kidney. 4. Several small uterine fibroids. XR CHEST PORT   Final Result   Impression:   1. Enlarged cardiac silhouette, otherwise no acute disease           CT Results  (Last 48 hours)               10/04/20 1546  CT ABD PELV WO CONT Final result    Impression:  IMPRESSION:       1. Small right pleural effusion.    2. Status post cholecystectomy. 3. Nonobstructive stone in the left kidney. 4. Several small uterine fibroids. Narrative:  EXAM: CT ABD PELV WO CONT       INDICATION: Generalized AP and severe anemia. Ricka Distad Ricka Distad ? Malignancy       COMPARISON: 8/25/2015       CONTRAST:  None. TECHNIQUE:    Thin axial images were obtained through the abdomen and pelvis. Coronal and   sagittal reformats were generated. Oral contrast was not administered. CT dose   reduction was achieved through use of a standardized protocol tailored for this   examination and automatic exposure control for dose modulation. The absence of intravenous contrast material reduces the sensitivity for   evaluation of the vasculature and solid organs. FINDINGS:    LOWER THORAX: The heart is enlarged. Atherosclerotic disease is present. The   blood pool is hypodense related to the ventricular myocardium related to anemia. There is a small right pleural effusion. LIVER: No mass. Several calcifications are likely related to prior granulomatous   disease. BILIARY TREE: Status post cholecystectomy. CBD is not dilated. SPLEEN: within normal limits. PANCREAS: No focal abnormality. ADRENALS: Unremarkable. KIDNEYS/URETERS: There is a small nodular to stone in lower pole of left kidney. No hydronephrosis. STOMACH: Unremarkable. SMALL BOWEL: No dilatation or wall thickening. COLON: No dilatation or wall thickening. Several colonic diverticula are   present. APPENDIX: Unremarkable. PERITONEUM: No ascites or pneumoperitoneum. RETROPERITONEUM: No lymphadenopathy or aortic aneurysm. REPRODUCTIVE ORGANS: Calcified lesions in the uterus are likely related to   uterine fibroids. URINARY BLADDER: No mass or calculus. BONES: No destructive bone lesion. ABDOMINAL WALL: No mass or hernia.    ADDITIONAL COMMENTS: N/A               CXR Results  (Last 48 hours)               10/04/20 1334  XR CHEST PORT Final result    Impression: Impression:   1. Enlarged cardiac silhouette, otherwise no acute disease           Narrative:  INDICATION:  chest pain        EXAM: Single portable view of chest 1327 . Comparison:8/25/2015       Findings: Cardiac silhouette is enlarged. Pulmonary vasculature is not engorged. There are no focal parenchymal opacities, effusions, or pneumothorax. Medical Decision Making   I am the first provider for this patient. I reviewed the vital signs, available nursing notes, past medical history, past surgical history, family history and social history. Vital Signs-Reviewed the patient's vital signs. Patient Vitals for the past 24 hrs:   Temp Pulse Resp BP SpO2   10/04/20 1721 -- 82 24 (!) 138/57 100 %   10/04/20 1516 -- 88 24 -- 100 %   10/04/20 1515 -- 87 27 132/83 --   10/04/20 1430 -- 88 28 136/66 100 %   10/04/20 1331 -- 89 23 128/72 99 %   10/04/20 1300 99.2 °F (37.3 °C) 90 25 (!) 146/60 98 %         Provider Notes (Medical Decision Making):   68-year-old female presenting with generalized weakness. Differential is broad, concern for anemia, dehydration, UTI, cardiac dysrhythmia, atypical angina, electrolyte abnormalities. EKG is performed at 12: 51, shows sinus rhythm at a rate of 89. , , QTc of 489, upright axis. There are T wave inversions present in leads V5 and V6, these are seen on prior. There is ST segment depression as well in leads V4 through V6, seen on prior however worsening on this EKG here today. EKG is interpreted as sinus rhythm with T wave inversions and ST depressions. CBC shows leukocytosis of 16.3, she is anemic with a hemoglobin of 4.5. Hemoccult is performed, there is no melena or blood on rectal exam, brown stool. She does have a history of elevated creatinine on prior, however this is significant for GUANACO. Basic metabolic panel shows creatinine elevated at 1.89. ED Course:     Initial assessment performed.  The patients presenting problems have been discussed, and they are in agreement with the care plan formulated and outlined with them. I have encouraged them to ask questions as they arise throughout their visit. She is consented for blood transfusion. Given history of reported black stools Last month, and s significant microcytic anemia, GI is consulted. Her troponin is significantly elevated at 8.2, I have spoken with cardiology. This is likely in the setting of demand ischemia given her significant anemia. She continues to deny any chest pain. She will be admitted to medicine, her vital signs are stable. Critical Care Time:         Disposition:  Admit to medicine    PLAN:  1. Current Discharge Medication List        2.    Follow-up Information    None       Return to ED if worse     Diagnosis     Clinical Impression: Acute fatigue secondary to severe anemia, acute elevated troponin likely secondary to demand ischemia in the setting of above

## 2020-10-04 NOTE — ED NOTES
Spoke with Quintin Brennan in the blood bank - pt's specimen has to go to [facility].  will notify RN when blood is ready for transfusion.

## 2020-10-04 NOTE — ED NOTES
Spoke with Aditya Claros in the blood bank - the  just picked up the pt's blood specimen to take to [facility].  will notify RN when blood is ready for transfusion.

## 2020-10-04 NOTE — PROGRESS NOTES
Pharmacy Clarification of the Prior to Admission Medication Regimen Retrospective to the Admission Medication Reconciliation    The patient was interviewed regarding clarification of the prior to admission medication regimen.  present in room and obtained permission from patient to discuss drug regimen with visitor(s) present. Patient was questioned regarding use of any other inhalers, topical products, over the counter medications, herbal medications, vitamin products or ophthalmic/nasal/otic medication use. Information Obtained From: query, Patient, RX bottles    Recommendations/Findings: The following amendments were made to the patient's active medication list on file at [facility]:     1) Additions:   Vit D2  Torsemide      2) Removals:   Cymbalta  Protonix  Lyrica  Insulin needles      3) Changes:      4) Pertinent Pharmacy Findings:Patient has run out of some for her medications and not refilled them for over a month. 1) Norvasc                                                          2) Lipitor                                                          3) Plavix                                                          4) Tresiba insulin pen                                                        Updated patients preferred outpatient pharmacy to: [facility]  Identified High Alert Medication Information       PTA medication list was corrected to the following:     Prior to Admission Medications   Prescriptions Last Dose Informant Taking? amLODIPine (NORVASC) 10 mg tablet  Self Yes   Sig: TAKE 1 TABLET DAILY   aspirin 81 mg chewable tablet 10/3/2020 at Unknown time Self Yes   Sig: Take 1 Tab by mouth daily. atorvastatin (LIPITOR) 40 mg tablet  Self Yes   Sig: Take 1 Tab by mouth nightly. carvediloL (COREG) 25 mg tablet 10/4/2020 at Unknown time Self Yes   Sig: Take 1 Tab by mouth two (2) times a day.    clopidogrel (PLAVIX) 75 mg tab  Self Yes Sig: TAKE 1 TABLET DAILY   ergocalciferol (ERGOCALCIFEROL) 1,250 mcg (50,000 unit) capsule 2020 at Unknown time Self Yes   Sig: Take 50,000 Units by mouth every seven (7) days. On Friday   glipiZIDE (GLUCOTROL) 10 mg tablet 10/4/2020 at Unknown time Self Yes   Sig: Take 1 Tab by mouth two (2) times a day. insulin degludec (TRESIBA FLEXTOUCH U-200) 200 unit/mL (3 mL) inpn  Self Yes   Si units daily   torsemide (DEMADEX) 20 mg tablet 10/4/2020 at Unknown time Self Yes   Sig: Take 40 mg by mouth daily.       Facility-Administered Medications: None          Thank you,  Tierney Claudio CPHT  Medication History Pharmacy Technician

## 2020-10-05 LAB
ALBUMIN SERPL-MCNC: 2.4 G/DL (ref 3.5–5)
ALBUMIN/GLOB SERPL: 0.7 {RATIO} (ref 1.1–2.2)
ALP SERPL-CCNC: 46 U/L (ref 45–117)
ALT SERPL-CCNC: 32 U/L (ref 12–78)
ANION GAP SERPL CALC-SCNC: 6 MMOL/L (ref 5–15)
APPEARANCE UR: ABNORMAL
AST SERPL-CCNC: 47 U/L (ref 15–37)
ATRIAL RATE: 89 BPM
BACTERIA URNS QL MICRO: NEGATIVE /HPF
BASOPHILS # BLD: 0 K/UL (ref 0–0.1)
BASOPHILS NFR BLD: 0 % (ref 0–1)
BILIRUB SERPL-MCNC: 1.2 MG/DL (ref 0.2–1)
BILIRUB UR QL: NEGATIVE
BUN SERPL-MCNC: 46 MG/DL (ref 6–20)
BUN/CREAT SERPL: 24 (ref 12–20)
CALCIUM SERPL-MCNC: 7.3 MG/DL (ref 8.5–10.1)
CALCULATED P AXIS, ECG09: 26 DEGREES
CALCULATED R AXIS, ECG10: -11 DEGREES
CALCULATED T AXIS, ECG11: -175 DEGREES
CHLORIDE SERPL-SCNC: 110 MMOL/L (ref 97–108)
CO2 SERPL-SCNC: 27 MMOL/L (ref 21–32)
COLOR UR: ABNORMAL
CREAT SERPL-MCNC: 1.92 MG/DL (ref 0.55–1.02)
DIAGNOSIS, 93000: NORMAL
DIFFERENTIAL METHOD BLD: ABNORMAL
EOSINOPHIL # BLD: 0 K/UL (ref 0–0.4)
EOSINOPHIL NFR BLD: 0 % (ref 0–7)
EPITH CASTS URNS QL MICRO: ABNORMAL /LPF
ERYTHROCYTE [DISTWIDTH] IN BLOOD BY AUTOMATED COUNT: 16 % (ref 11.5–14.5)
GLOBULIN SER CALC-MCNC: 3.5 G/DL (ref 2–4)
GLUCOSE BLD STRIP.AUTO-MCNC: 170 MG/DL (ref 65–100)
GLUCOSE BLD STRIP.AUTO-MCNC: 190 MG/DL (ref 65–100)
GLUCOSE BLD STRIP.AUTO-MCNC: 191 MG/DL (ref 65–100)
GLUCOSE BLD STRIP.AUTO-MCNC: 82 MG/DL (ref 65–100)
GLUCOSE SERPL-MCNC: 71 MG/DL (ref 65–100)
GLUCOSE UR STRIP.AUTO-MCNC: NEGATIVE MG/DL
HAPTOGLOB SERPL-MCNC: 289 MG/DL (ref 30–200)
HCT VFR BLD AUTO: 19.1 % (ref 35–47)
HCT VFR BLD AUTO: 25.2 % (ref 35–47)
HCT VFR BLD AUTO: 25.9 % (ref 35–47)
HCT VFR BLD AUTO: 26.1 % (ref 35–47)
HGB BLD-MCNC: 5.8 G/DL (ref 11.5–16)
HGB BLD-MCNC: 8 G/DL (ref 11.5–16)
HGB BLD-MCNC: 8.1 G/DL (ref 11.5–16)
HGB BLD-MCNC: 8.3 G/DL (ref 11.5–16)
HGB UR QL STRIP: NEGATIVE
HISTORY CHECKED?,CKHIST: NORMAL
HYALINE CASTS URNS QL MICRO: ABNORMAL /LPF (ref 0–5)
IMM GRANULOCYTES # BLD AUTO: 0.2 K/UL (ref 0–0.04)
IMM GRANULOCYTES NFR BLD AUTO: 1 % (ref 0–0.5)
KETONES UR QL STRIP.AUTO: NEGATIVE MG/DL
LDH SERPL L TO P-CCNC: 365 U/L (ref 81–246)
LEUKOCYTE ESTERASE UR QL STRIP.AUTO: ABNORMAL
LYMPHOCYTES # BLD: 2.7 K/UL (ref 0.8–3.5)
LYMPHOCYTES NFR BLD: 18 % (ref 12–49)
MAGNESIUM SERPL-MCNC: 2.7 MG/DL (ref 1.6–2.4)
MCH RBC QN AUTO: 26 PG (ref 26–34)
MCHC RBC AUTO-ENTMCNC: 30.4 G/DL (ref 30–36.5)
MCV RBC AUTO: 85.7 FL (ref 80–99)
MONOCYTES # BLD: 1.2 K/UL (ref 0–1)
MONOCYTES NFR BLD: 8 % (ref 5–13)
NEUTS SEG # BLD: 11.1 K/UL (ref 1.8–8)
NEUTS SEG NFR BLD: 73 % (ref 32–75)
NITRITE UR QL STRIP.AUTO: NEGATIVE
NRBC # BLD: 0.21 K/UL (ref 0–0.01)
NRBC BLD-RTO: 1.4 PER 100 WBC
P-R INTERVAL, ECG05: 110 MS
PATH REV BLD -IMP: NORMAL
PH UR STRIP: 5 [PH] (ref 5–8)
PHOSPHATE SERPL-MCNC: 5 MG/DL (ref 2.6–4.7)
PLATELET # BLD AUTO: 301 K/UL (ref 150–400)
PMV BLD AUTO: 10 FL (ref 8.9–12.9)
POTASSIUM SERPL-SCNC: 3.7 MMOL/L (ref 3.5–5.1)
PROT SERPL-MCNC: 5.9 G/DL (ref 6.4–8.2)
PROT UR STRIP-MCNC: 300 MG/DL
Q-T INTERVAL, ECG07: 402 MS
QRS DURATION, ECG06: 100 MS
QTC CALCULATION (BEZET), ECG08: 489 MS
RBC # BLD AUTO: 2.23 M/UL (ref 3.8–5.2)
RBC #/AREA URNS HPF: ABNORMAL /HPF (ref 0–5)
RBC MORPH BLD: ABNORMAL
RETICS # AUTO: 0.1 M/UL (ref 0.02–0.08)
RETICS/RBC NFR AUTO: 3.4 % (ref 0.7–2.1)
SERVICE CMNT-IMP: ABNORMAL
SERVICE CMNT-IMP: NORMAL
SODIUM SERPL-SCNC: 143 MMOL/L (ref 136–145)
SP GR UR REFRACTOMETRY: 1.01 (ref 1–1.03)
TROPONIN I SERPL-MCNC: 9.2 NG/ML
UROBILINOGEN UR QL STRIP.AUTO: 1 EU/DL (ref 0.2–1)
VENTRICULAR RATE, ECG03: 89 BPM
WBC # BLD AUTO: 15.2 K/UL (ref 3.6–11)
WBC URNS QL MICRO: ABNORMAL /HPF (ref 0–4)

## 2020-10-05 PROCEDURE — 74011250637 HC RX REV CODE- 250/637: Performed by: GENERAL ACUTE CARE HOSPITAL

## 2020-10-05 PROCEDURE — 83010 ASSAY OF HAPTOGLOBIN QUANT: CPT

## 2020-10-05 PROCEDURE — 84100 ASSAY OF PHOSPHORUS: CPT

## 2020-10-05 PROCEDURE — 36430 TRANSFUSION BLD/BLD COMPNT: CPT

## 2020-10-05 PROCEDURE — 36415 COLL VENOUS BLD VENIPUNCTURE: CPT

## 2020-10-05 PROCEDURE — 74011250636 HC RX REV CODE- 250/636: Performed by: GENERAL ACUTE CARE HOSPITAL

## 2020-10-05 PROCEDURE — 74011250636 HC RX REV CODE- 250/636: Performed by: INTERNAL MEDICINE

## 2020-10-05 PROCEDURE — 83615 LACTATE (LD) (LDH) ENZYME: CPT

## 2020-10-05 PROCEDURE — 74011636637 HC RX REV CODE- 636/637: Performed by: GENERAL ACUTE CARE HOSPITAL

## 2020-10-05 PROCEDURE — C9113 INJ PANTOPRAZOLE SODIUM, VIA: HCPCS | Performed by: INTERNAL MEDICINE

## 2020-10-05 PROCEDURE — 85025 COMPLETE CBC W/AUTO DIFF WBC: CPT

## 2020-10-05 PROCEDURE — 81001 URINALYSIS AUTO W/SCOPE: CPT

## 2020-10-05 PROCEDURE — 80053 COMPREHEN METABOLIC PANEL: CPT

## 2020-10-05 PROCEDURE — 85018 HEMOGLOBIN: CPT

## 2020-10-05 PROCEDURE — 74011250637 HC RX REV CODE- 250/637: Performed by: INTERNAL MEDICINE

## 2020-10-05 PROCEDURE — 65660000001 HC RM ICU INTERMED STEPDOWN

## 2020-10-05 PROCEDURE — 84484 ASSAY OF TROPONIN QUANT: CPT

## 2020-10-05 PROCEDURE — 85045 AUTOMATED RETICULOCYTE COUNT: CPT

## 2020-10-05 PROCEDURE — 86922 COMPATIBILITY TEST ANTIGLOB: CPT

## 2020-10-05 PROCEDURE — 74011000250 HC RX REV CODE- 250: Performed by: INTERNAL MEDICINE

## 2020-10-05 PROCEDURE — 86921 COMPATIBILITY TEST INCUBATE: CPT

## 2020-10-05 PROCEDURE — 82962 GLUCOSE BLOOD TEST: CPT

## 2020-10-05 PROCEDURE — P9016 RBC LEUKOCYTES REDUCED: HCPCS

## 2020-10-05 PROCEDURE — 83735 ASSAY OF MAGNESIUM: CPT

## 2020-10-05 RX ORDER — CARVEDILOL 3.12 MG/1
3.12 TABLET ORAL 2 TIMES DAILY WITH MEALS
Status: DISCONTINUED | OUTPATIENT
Start: 2020-10-05 | End: 2020-10-08

## 2020-10-05 RX ADMIN — INSULIN LISPRO 2 UNITS: 100 INJECTION, SOLUTION INTRAVENOUS; SUBCUTANEOUS at 17:40

## 2020-10-05 RX ADMIN — SODIUM CHLORIDE 75 ML/HR: 900 INJECTION, SOLUTION INTRAVENOUS at 22:20

## 2020-10-05 RX ADMIN — SODIUM CHLORIDE 250 ML: 900 INJECTION, SOLUTION INTRAVENOUS at 05:17

## 2020-10-05 RX ADMIN — Medication 10 ML: at 14:28

## 2020-10-05 RX ADMIN — Medication 10 ML: at 22:11

## 2020-10-05 RX ADMIN — SODIUM CHLORIDE 75 ML/HR: 900 INJECTION, SOLUTION INTRAVENOUS at 08:53

## 2020-10-05 RX ADMIN — CARVEDILOL 3.12 MG: 3.12 TABLET, FILM COATED ORAL at 09:57

## 2020-10-05 RX ADMIN — SODIUM CHLORIDE 40 MG: 9 INJECTION, SOLUTION INTRAMUSCULAR; INTRAVENOUS; SUBCUTANEOUS at 08:44

## 2020-10-05 RX ADMIN — DULOXETINE HYDROCHLORIDE 60 MG: 30 CAPSULE, DELAYED RELEASE ORAL at 08:44

## 2020-10-05 RX ADMIN — CARVEDILOL 3.12 MG: 3.12 TABLET, FILM COATED ORAL at 17:40

## 2020-10-05 RX ADMIN — SODIUM CHLORIDE 40 MG: 9 INJECTION, SOLUTION INTRAMUSCULAR; INTRAVENOUS; SUBCUTANEOUS at 22:11

## 2020-10-05 RX ADMIN — INSULIN LISPRO 2 UNITS: 100 INJECTION, SOLUTION INTRAVENOUS; SUBCUTANEOUS at 12:30

## 2020-10-05 NOTE — PROGRESS NOTES
0700: Received bedside report from Naomi off going nurse. Assumed care of patient. 1900: Bedside shift change report given to Shu Basilio (oncoming nurse) by Cori Davison (offgoing nurse). Report included the following information SBAR, Kardex, Intake/Output, MAR, Recent Results and Cardiac Rhythm NSR. Problem: Anemia Care Plan (Adult and Pediatric)  Goal: *Labs within defined limits  Outcome: Progressing Towards Goal  Goal: *Tolerates increased activity  Outcome: Progressing Towards Goal     Problem: Falls - Risk of  Goal: *Absence of Falls  Description: Document Flor Fall Risk and appropriate interventions in the flowsheet. Outcome: Progressing Towards Goal  Note: Fall Risk Interventions:  Mobility Interventions: Bed/chair exit alarm, Communicate number of staff needed for ambulation/transfer, OT consult for ADLs, PT Consult for mobility concerns, PT Consult for assist device competence, Utilize walker, cane, or other assistive device       Medication Interventions: Bed/chair exit alarm, Patient to call before getting OOB, Teach patient to arise slowly    Elimination Interventions: Bed/chair exit alarm, Call light in reach, Patient to call for help with toileting needs, Toileting schedule/hourly rounds              Problem: Pressure Injury - Risk of  Goal: *Prevention of pressure injury  Description: Document Rufus Scale and appropriate interventions in the flowsheet.   Outcome: Progressing Towards Goal  Note: Pressure Injury Interventions:  Sensory Interventions: Float heels, Keep linens dry and wrinkle-free, Maintain/enhance activity level, Minimize linen layers    Moisture Interventions: Absorbent underpads, Apply protective barrier, creams and emollients, Limit adult briefs, Maintain skin hydration (lotion/cream), Minimize layers    Activity Interventions: Increase time out of bed, Pressure redistribution bed/mattress(bed type), PT/OT evaluation    Mobility Interventions: Float heels, Pressure redistribution bed/mattress (bed type), HOB 30 degrees or less, PT/OT evaluation, Assess need for specialty bed    Nutrition Interventions: Document food/fluid/supplement intake, Discuss nutritional consult with provider    Friction and Shear Interventions: Apply protective barrier, creams and emollients, Feet elevated on foot rest, HOB 30 degrees or less, Foam dressings/transparent film/skin sealants, Lift team/patient mobility team

## 2020-10-05 NOTE — PROGRESS NOTES
Cardiology Progress Note  10/5/2020     Admit Date: 10/4/2020  Admit Diagnosis: GI bleed [K92.2]  CC: none currently  Cardiologist:  Dr Macrina Colbert then Dr Milo Mendenhall (last OV 2017). Cardiac Assessment/Plan:    1) CAD: Distal RCA NEENA 8/2015 for NQWMI; Med Rx mid circ and diffusely dz diagonals. 2) HTN  3) DM  4) Dyslipidemia  5) CKD III: Cr 1.43/gfr 42 7/2020 (Dr Shun North). 6) Obesity: ?NANY. 7) Non-compliant with some meds per pt 10/2020. Poor compliance with f/u OV.     Presenting with AMS, profound anemia; NQWMI. GUANACO on CKD. No CP/change in chronic ARENAS.     Rec POA: Need to correct profound anemia; Agree with GI eval.   I suspect NQWMI is type II; no current role for invasive Rx. No role for anticoagulation; ASA when ok with GI. Cont bblocker and ARB as BP tolerates.         10/5: Afeb; BPs 110-140s; HR 70-80s; 98% RA. Hg 5.8; Cr 1.92 from 1.89; trop 8-9 range. Cardiac meds: none; NS @ 75. Echo pending; still needs anemia corrected. Add low dose coreg (3.125 bid) w/ hold parameters.   _________________________________________________________________    As noted POA: \"The patient reports no chest pain/pressure; no change in chronic RAENAS. No PND, orthopnea, palpitations, pre-syncope, syncope, new peripheral edema, or decrease in exercise tolerance. No current complaints.     Fell 3 days ago (gen weak; not lightheaded; no syncope); Decreased LOC last few days per : \"asleep\"; wakes when shaken. +Sx NANY.     Dark stool several weeks ago, none since.     ECG: Sinus; ; IVCD 110; LVH; Lat ST depression (more than 2015). Tele: sinus  CXR: \"Enlarged cardiac silhouette, otherwise no acute disease\"     Notable labs: WBC 16.3; Hg 4.5. Cr 1.89 (nl in 2017); BUN 46. ; neg MB; trop 8. 2. \"  _____________________________________________________________________________  Notable prior cardiac history:  @ last OV 1/24/17:          Ms Montell Bamberger has a h/o CAD (NEENA in distal RCA 8/2015 for NSTEMI,  Normal LV EF, 75% mid-circ; Dr Abby Lino), HTN, DM, and dyslipidemia; She has chronic mild ARENAS.     7/2016: No change ARENAS; no CP. Worse LE edema: some salt; BP poorly controlled per pt (chronic); Last week, PCP decreased norvasc to 5 qd and added diuretic.     1/2017: No angina; Minor ARENAS (doesnt need to stop activity); Using less salt; Wt down 10# with diet.        IMPRESSION AND PLAN  01. Atherosclerotic heart disease of native coronary artery with other forms of angina pectoris:  No angina; Stable dyspnea; could consider PCI of mid circ if needed: MPI next year/sooner prn.     We discussed the signs and symptoms of unstable angina, myocardial infarction and malignant arrhythmia. The patient knows to seek immediate medical attention should they occur. ECG done today  Exercise MPI to be done with next visit. 02. Old myocardial infarction: This condition is stable. 03. Hypertensive heart disease without heart failure:  Adequately controlled. 04. Mixed hyperlipidemia:  Lipid labs drawn by PCP. The patient is tolerating lipid lowering therapy well. 05. Localized edema:  Resolved. The patient was instructed on a low sodium diet. 06. Type 2 diabetes mellitus without complications: This condition is stable. 07. Long term (current) use of aspirin:  No bleeding. Continue Rx   08. Long term (current) use of antithrombotics/antiplatelets:  No bleeding. D/C plavix at next OV if unremarkable MPI.   09. Body mass index (BMI) 36.0-36.9, adult:  The patient was instructed on AHA diet and regular exercise.            ORDERS:  1 ECG done today   2 Myocardial Perfusion Study / Sy protocol with next visit   3 Return office visit with Alvin Ballard. Heidy WATERS in 1 Year. 4 The patient was instructed on AHA diet and regular exercise.    [address] [employer] [place]   6 Hypertension Educational Materials   7 Patient counseled on the following: Low Salt Diet.         1/2017 MEDICATION LIST  Medication Sig Description   Amaryl 4 mg tablet take 1 tablet by oral route 2 times every day   amlodipine 10 mg tablet take 1 tablet by oral route  every day   aspirin 81 mg tablet,delayed release take 1 tablet by oral route  every day   atorvastatin 40 mg tablet take 1 tablet by oral route  every bedtime   clonidine HCl 0.1 mg tablet take 1 tablet by oral route  every day   Coreg 25 mg tablet take 1 tablet by oral route 2 times every day with food   Diovan  mg-25 mg tablet take 1 tablet by oral route  every day   furosemide 20 mg tablet take 1 tablet by oral route  every day   gabapentin 100 mg capsule take 1 capsule by oral route 3 times every day   hydralazine 25 mg tablet take 1 tablet by oral route 2 times every day with food   Lantus 100 unit/mL subcutaneous solution inject by subcutaneous route as per insulin protocol   Nitrostat 0.4 mg sublingual tablet place 1 tablet by sublingual route at the 1st sign of attack; may repeat every 5 min until relief; if pain persists after 3 tablets in 15 min, prompt medical attention is recommended   pantoprazole 40 mg tablet,delayed release take 1 tablet by oral route  every day   Plavix 75 mg tablet take 1 tablet by oral route  every day            _____________________________________________________________________________________________  CARDIAC HISTORY  CAD:  1 NSTEMI (PCI (Successful PTCA and stenting of totally occluded distal RCA. Resolute Integrity used. ) - 8/26/2015) - 8/28/2015      RISK FACTORS:  1 Hypertension   2 Dyslipidemia   3 Type 2 Diabetes         CARDIOVASCULAR PROCEDURES  CATH LAB:  Cath (EF 0.65 (65%), 30% Proximal LAD, 30% Mid LAD, 75% Mid CX, 50% Proximal RCA, 50% Mid RCA, 100% Distal RCA, small diffuse diseased Diagonals: Resolute NEENA to distal RCA. ) performed by William Andrade MD - 8/26/2015   ELECTROPHYSIOLOGY:  EKG (LVH  left axis  T inversion inf and lat precordial leads) - 9/11/2015   EKG (Sinus Rhythm, LVH) - 12/17/2015   EKG (Sinus Rhythm, borderline LVH, inf-lat TWI.) - 2017       For other plans, see orders.   Hospital problem list     Active Hospital Problems    Diagnosis Date Noted    GI bleed 10/04/2020        Subjective: Lauri Gerardo reports       Chest pain X none  consistent with:  Non-cardiac CP         Atypical CP     None now  On going  Anginal CP     Dyspnea X none  at rest  with exertion         improved  unchanged  worse              PND X none  overnight       Orthopnea X none  improved  unchanged  worse   Presyncope X none  improved  unchanged  worse     Ambulated in hallway without symptoms   Yes   Ambulated in room without symptoms  Yes   Objective:     Physical Exam:  Overall VSSAF;    Visit Vitals  /64   Pulse 77   Temp 98.2 °F (36.8 °C)   Resp 18   Ht 5' 2\" (1.575 m)   Wt 84.8 kg (187 lb)   SpO2 98%   BMI 34.20 kg/m²     Temp (24hrs), Av.5 °F (36.9 °C), Min:98.1 °F (36.7 °C), Max:99.2 °F (37.3 °C)    Patient Vitals for the past 8 hrs:   Pulse   10/05/20 0815 77   10/05/20 0800 79   10/05/20 0745 79   10/05/20 0730 80   10/05/20 0715 80   10/05/20 0700 79   10/05/20 0630 78   10/05/20 0600 79   10/05/20 0553 75   10/05/20 0538 76   10/05/20 0452 79   10/05/20 0400 77   10/05/20 0300 77   10/05/20 0230 76   10/05/20 0215 78   10/05/20 0157 76   10/05/20 0117 77     Patient Vitals for the past 8 hrs:   Resp   10/05/20 0800 18   10/05/20 0700 18   10/05/20 0630 18   10/05/20 0553 18   10/05/20 0538 18   10/05/20 0452 18   10/05/20 0400 18   10/05/20 0300 18   10/05/20 0230 18   10/05/20 0215 18   10/05/20 0157 18   10/05/20 0117 18     Patient Vitals for the past 8 hrs:   BP   10/05/20 0800 139/64   10/05/20 0700 (!) 141/65   10/05/20 0630 130/61   10/05/20 0600 (!) 114/46   10/05/20 0553 (!) 130/58   10/05/20 0538 (!) 137/57   10/05/20 0452 (!) 130/57   10/05/20 0400 137/76   10/05/20 0300 (!) 146/72   10/05/20 0230 (!) 141/71   10/05/20 0215 135/65   10/05/20 0157 131/63   10/05/20 0117 (!) 141/65       Intake/Output Summary (Last 24 hours) at 10/5/2020 0830  Last data filed at 10/5/2020 0000  Gross per 24 hour   Intake --   Output 600 ml   Net -600 ml     General Appearance: Well developed, well nourished, no acute distress. Ears/Nose/Mouth/Throat:   Normal MM; anicteric. JVP: WNL   Resp:   Lungs clear to auscultation bilaterally. Nl resp effort. Cardiovascular:  RRR, S1, S2 normal, no new murmur. No gallop or rub. Abdomen:   Soft, non-tender, bowel sounds are present. Extremities: No edema bilaterally. Skin:  Neuro: Warm and dry. A/O x3, grossly nonfocal       cath site intact w/o hematoma or new bruit; distal pulse unchanged  Yes   Data Review:     Telemetry independently reviewed x sinus  chronic afib  parox afib  NSVT     ECG independently reviewed  NSR  afib  no significant changes  NSST-Tw chgs     x no new ECG provided for review   Lab results reviewed as noted below. Current medications reviewed as noted below. No results for input(s): PH, PCO2, PO2 in the last 72 hours.   Recent Labs     10/05/20  0207 10/04/20  2113 10/04/20  1745 10/04/20  1308   CKMB  --   --   --  2.2   CKNDX  --   --   --  0.6   TROIQ 9.20* 8.11* 7.99* 8.20*     Recent Labs     10/05/20  0207 10/04/20  2113 10/04/20  1308     --  140   K 3.7  --  3.5   *  --  105   CO2 27  --  25   BUN 46*  --  46*   CREA 1.92*  --  1.89*   GFRAA 31*  --  32*   GLU 71  --  140*   PHOS 5.0*  --   --    CA 7.3*  --  7.7*   ALB 2.4*  --  2.5*   WBC 15.2*  --  16.3*   HGB 5.8* 4.5* 4.5*   HCT 19.1* 15.7* 15.6*     --  343     Lab Results   Component Value Date/Time    Cholesterol, total 179 02/16/2015 09:56 AM    HDL Cholesterol 29 (L) 02/16/2015 09:56 AM    LDL, calculated 74 02/16/2015 09:56 AM    Triglyceride 378 (H) 02/16/2015 09:56 AM     Recent Labs     10/05/20  0207 10/04/20  1308   AP 46 50   TP 5.9* 6.3*   ALB 2.4* 2.5*   GLOB 3.5 3.8     Recent Labs     10/04/20  1308   INR 1.1   PTP 11.6*      No components found for: [place]    Current Facility-Administered Medications   Medication Dose Route Frequency    0.9% sodium chloride infusion 250 mL  250 mL IntraVENous PRN    DULoxetine (CYMBALTA) capsule 60 mg  60 mg Oral DAILY    insulin lispro (HUMALOG) injection   SubCUTAneous AC&HS    glucose chewable tablet 16 g  4 Tab Oral PRN    dextrose (D50W) injection syrg 12.5-25 g  12.5-25 g IntraVENous PRN    glucagon (GLUCAGEN) injection 1 mg  1 mg IntraMUSCular PRN    sodium chloride (NS) flush 5-40 mL  5-40 mL IntraVENous Q8H    sodium chloride (NS) flush 5-40 mL  5-40 mL IntraVENous PRN    acetaminophen (TYLENOL) tablet 650 mg  650 mg Oral Q6H PRN    Or    acetaminophen (TYLENOL) suppository 650 mg  650 mg Rectal Q6H PRN    polyethylene glycol (MIRALAX) packet 17 g  17 g Oral DAILY PRN    promethazine (PHENERGAN) tablet 12.5 mg  12.5 mg Oral Q6H PRN    Or    ondansetron (ZOFRAN) injection 4 mg  4 mg IntraVENous Q6H PRN    0.9% sodium chloride infusion  75 mL/hr IntraVENous CONTINUOUS    pantoprazole (PROTONIX) 40 mg in 0.9% sodium chloride 10 mL injection  40 mg IntraVENous Q12H    0.9% sodium chloride infusion 250 mL  250 mL IntraVENous PRN        Mariah Capps MD

## 2020-10-05 NOTE — PROGRESS NOTES
[address] with family assistance    Transportation will be provided by spouse or family    Preferred Rx is [facility] on [facility]    2nd IM letter will be needed prior to discharge    Follow Up appointments will be needed prior to discharge. Reason for Admission:   GI Bleed                   RUR Score:    17  %  Low                 Plan for utilizing home health:    No qualifying needs for home health identified at this time however patient has been non compliant with medications per H&P    PCP: First and Last name:  Anita Soto MD   Name of Practice:    Are you a current patient: Yes/No: yes   Approximate date of last visit:   2/2020  Prior to [place]   Can you participate in a virtual visit with your PCP:                     Current Advanced Directive/Advance Care Plan:   No ACP forms on chart;  Patient stated that some time ago she thought that she had named her brother to make medical decisions for her however CM advised her that there are no forms on her chart pertaining to her brother making medical decisions. ACP form provided to patient so that she can document her wishes regarding a MPOA. Transition of Care Plan:    Home with family assistance at this time. Care Management Interventions  PCP Verified by CM: Yes  Last Visit to PCP: 02/05/20  Mode of Transport at Discharge: Other (see comment)(spouse or family)  Transition of Care Consult (CM Consult): Other  Discharge Durable Medical Equipment: No  Physical Therapy Consult: No  Occupational Therapy Consult: No  Speech Therapy Consult: No  Current Support Network: Lives with Spouse(in one level ranch home)  Confirm Follow Up Transport: Family  Discharge Location  Discharge Placement: Home with family assistance    Care Management will continue to follow and assist with discharge needs.     Jani Sanchez RN  Care Manager  Ext [phone]

## 2020-10-05 NOTE — PROGRESS NOTES
Hospitalist Progress Note    NAME: Balta Waterman   :  1945   MRN:  [id number]       Assessment / Plan:  Symptomatic severe anemia likely due to slow GI bleeding   Hb on admission 4.5, baseline 12  S/p 3 PRBC with hb up to 8.1   Feeling better  Stool hemoccult negative in ED   Will add IV iron ( cannot check level since already was transfused)    GI help appreciated: cont PPI, EGD when cleared by cardiology     NSTEMI in settings of CAD   HTN   -BP stable   -cardiology help appreciated:   suspect NQWMI is type II; no current role for invasive Rx. No role for anticoagulation; ASA when ok with GI. Cont bblocker and ARB as BP tolerates. GUANACO vs CKD  -last cr in the system 2017 was normal, recent cr is not available  Admission 1.89, monitor closely  Avoid nephrotoxic drugs, adjust all meds to GFR. DM2   - NPO after MN, watch BS  C/w SS as needed     GERD  HLP         Code status: Full  Prophylaxis: SCD's     Baseline: independent   Recommended Disposition: Home w/Family     Subjective:     Chief Complaint / Reason for Physician Visit: FU anemia/ nstemi/ guanaco   Feeling better after PRBC   No CP/dyspnea    Discussed with RN events overnight. Review of Systems:  Symptom Y/N Comments  Symptom Y/N Comments   Fever/Chills n   Chest Pain n    Poor Appetite    Edema     Cough    Abdominal Pain     Sputum    Joint Pain     SOB/ARENAS n   Pruritis/Rash     Nausea/vomit    Tolerating PT/OT     Diarrhea    Tolerating Diet     Constipation    Other       Could NOT obtain due to:      Objective:     VITALS:   Last 24hrs VS reviewed since prior progress note.  Most recent are:  Patient Vitals for the past 24 hrs:   Temp Pulse Resp BP SpO2   10/05/20 1115 97.5 °F (36.4 °C) 90 18 (!) 151/82 97 %   10/05/20 1100 -- 85 -- -- --   10/05/20 1045 -- (!) 108 -- -- --   10/05/20 1030 -- 85 -- -- --   10/05/20 1015 -- 84 -- -- --   10/05/20 1000 -- 81 -- (!) 155/76 --   10/05/20 0957 97.7 °F (36.5 °C) 81 18 (!) 155/76 97 % 10/05/20 0945 -- 80 -- -- --   10/05/20 0815 -- 77 -- -- --   10/05/20 0800 98.2 °F (36.8 °C) 79 18 139/64 98 %   10/05/20 0745 -- 79 -- -- --   10/05/20 0730 -- 80 -- -- --   10/05/20 0715 -- 80 -- -- --   10/05/20 0700 98.6 °F (37 °C) 79 18 (!) 141/65 98 %   10/05/20 0630 98.4 °F (36.9 °C) 78 18 130/61 97 %   10/05/20 0600 -- 79 -- (!) 114/46 --   10/05/20 0553 98.3 °F (36.8 °C) 75 18 (!) 130/58 98 %   10/05/20 0538 98.5 °F (36.9 °C) 76 18 (!) 137/57 98 %   10/05/20 0452 98.4 °F (36.9 °C) 79 18 (!) 130/57 100 %   10/05/20 0400 98.3 °F (36.8 °C) 77 18 137/76 97 %   10/05/20 0300 98.4 °F (36.9 °C) 77 18 (!) 146/72 99 %   10/05/20 0230 98.5 °F (36.9 °C) 76 18 (!) 141/71 97 %   10/05/20 0215 98.3 °F (36.8 °C) 78 18 135/65 98 %   10/05/20 0157 99.2 °F (37.3 °C) 76 18 131/63 98 %   10/05/20 0117 99.2 °F (37.3 °C) 77 18 (!) 141/65 99 %   10/05/20 0000 -- 79 -- 138/67 --   10/04/20 2300 98.3 °F (36.8 °C) 76 18 128/64 95 %   10/04/20 2230 98.4 °F (36.9 °C) 76 18 128/63 --   10/04/20 2204 98.4 °F (36.9 °C) 79 20 127/64 97 %   10/04/20 2130 98.6 °F (37 °C) 81 20 134/63 --   10/04/20 2116 98.4 °F (36.9 °C) 77 18 132/63 96 %   10/04/20 2045 98.1 °F (36.7 °C) 77 20 124/60 --   10/04/20 1854 99.2 °F (37.3 °C) 82 22 (!) 141/70 95 %   10/04/20 1812 98.2 °F (36.8 °C) 81 24 (!) 142/59 100 %   10/04/20 1721 -- 82 24 (!) 138/57 100 %       Intake/Output Summary (Last 24 hours) at 10/5/2020 1557  Last data filed at 10/5/2020 1427  Gross per 24 hour   Intake 860 ml   Output 1000 ml   Net -140 ml        PHYSICAL EXAM:  General: WD, WN. Alert, cooperative, no acute distress    EENT:  EOMI. Anicteric sclerae. MMM  Resp:  CTA bilaterally, no wheezing or rales. No accessory muscle use  CV:  Regular  rhythm,  No edema  GI:  Soft, Non distended, Non tender.  +Bowel sounds  Neurologic:  Alert and oriented X 3, normal speech,   Psych:   Good insight. Not anxious nor agitated  Skin:  No rashes.   No jaundice    Reviewed most current lab test results and cultures  YES  Reviewed most current radiology test results   YES  Review and summation of old records today    NO  Reviewed patient's current orders and MAR    YES  PMH/SH reviewed - no change compared to H&P  ________________________________________________________________________  Care Plan discussed with:    Comments   Patient y    Family      RN y    Care Manager     Consultant                        Multidiciplinary team rounds were held today with , nursing, pharmacist and clinical coordinator. Patient's plan of care was discussed; medications were reviewed and discharge planning was addressed. ________________________________________________________________________  Total NON critical care TIME: 35   Minutes    Total CRITICAL CARE TIME Spent:   Minutes non procedure based      Comments   >50% of visit spent in counseling and coordination of care     ________________________________________________________________________  Rufina Covarrubias MD     Procedures: see electronic medical records for all procedures/Xrays and details which were not copied into this note but were reviewed prior to creation of Plan. LABS:  I reviewed today's most current labs and imaging studies. Pertinent labs include:  Recent Labs     10/05/20  1509 10/05/20  0800 10/05/20  0207  10/04/20  1308   WBC  --   --  15.2*  --  16.3*   HGB 8.1* 8.0* 5.8*   < > 4.5*   HCT 25.9* 25.2* 19.1*   < > 15.6*   PLT  --   --  301  --  343    < > = values in this interval not displayed.      Recent Labs     10/05/20  0207 10/04/20  1308    140   K 3.7 3.5   * 105   CO2 27 25   GLU 71 140*   BUN 46* 46*   CREA 1.92* 1.89*   CA 7.3* 7.7*   MG 2.7* 2.6*   PHOS 5.0*  --    ALB 2.4* 2.5*   TBILI 1.2* 0.5   ALT 32 28   INR  --  1.1       Signed: Rufina Covarrubias MD

## 2020-10-05 NOTE — PROGRESS NOTES
2200  Critical Hgb 4.5. Patient is currently receiving 1 Unit PRBC. MD notified. Orders received for 2 units PRBC. Bedside shift change report given to KATHERINE Nicole (oncoming nurse) by Og Mckenzie (offgoing nurse). Report included the following information SBAR and Kardex.

## 2020-10-05 NOTE — PROGRESS NOTES
Gastroenterology Progress Note    10/5/2020    Admit Date: 10/4/2020    Subjective: Follow up for:  1)  Severe anemia      Patient has not had a dark stool in a couple of months. No hematochezia or hematemesis. Stool heme negative. Presented with hgb 4.5 and generalized weakness. Transfused 2 units of PRBC's and hgb improved to only 5.8. Another unit of blood was ordered by the hospitalist.  H/H being checked Q6hrs. Is on BID PPI. Not on any blood thinners. Was taking 81mg ASA PTA but no plavix \"in a while. \"  Troponin elevated to 9.20 and cardiology following for NQWMI. Has mild generalized abd discomfort that improves when she sleeps. No CP/SOB. Current Facility-Administered Medications   Medication Dose Route Frequency    0.9% sodium chloride infusion 250 mL  250 mL IntraVENous PRN    DULoxetine (CYMBALTA) capsule 60 mg  60 mg Oral DAILY    insulin lispro (HUMALOG) injection   SubCUTAneous AC&HS    glucose chewable tablet 16 g  4 Tab Oral PRN    dextrose (D50W) injection syrg 12.5-25 g  12.5-25 g IntraVENous PRN    glucagon (GLUCAGEN) injection 1 mg  1 mg IntraMUSCular PRN    sodium chloride (NS) flush 5-40 mL  5-40 mL IntraVENous Q8H    sodium chloride (NS) flush 5-40 mL  5-40 mL IntraVENous PRN    acetaminophen (TYLENOL) tablet 650 mg  650 mg Oral Q6H PRN    Or    acetaminophen (TYLENOL) suppository 650 mg  650 mg Rectal Q6H PRN    polyethylene glycol (MIRALAX) packet 17 g  17 g Oral DAILY PRN    promethazine (PHENERGAN) tablet 12.5 mg  12.5 mg Oral Q6H PRN    Or    ondansetron (ZOFRAN) injection 4 mg  4 mg IntraVENous Q6H PRN    0.9% sodium chloride infusion  75 mL/hr IntraVENous CONTINUOUS    pantoprazole (PROTONIX) 40 mg in 0.9% sodium chloride 10 mL injection  40 mg IntraVENous Q12H    0.9% sodium chloride infusion 250 mL  250 mL IntraVENous PRN        Objective:     Blood pressure 139/64, pulse 77, temperature 98.2 °F (36.8 °C), resp.  rate 18, height 5' 2\" (1.575 m), weight 84.8 kg (187 lb), SpO2 98 %. 10/05 0701 - 10/05 1900  In: 380   Out: -     10/03 1901 - 10/05 0700  In: -   Out: 600 [Urine:600]    EXAM:   Gen: obese BF in NAD  HEENT: sclera anicteric  Lungs: CTA B  Heart: RRR No M/R/G  Abd: obese, +BS, ND, soft, NT  Ext: no edema  Neuro: A&O x3    Data Review    Recent Results (from the past 24 hour(s))   EKG, 12 LEAD, INITIAL    Collection Time: 10/04/20 12:51 PM   Result Value Ref Range    Ventricular Rate 89 BPM    Atrial Rate 89 BPM    P-R Interval 110 ms    QRS Duration 100 ms    Q-T Interval 402 ms    QTC Calculation (Bezet) 489 ms    Calculated P Axis 26 degrees    Calculated R Axis -11 degrees    Calculated T Axis -175 degrees    Diagnosis       Sinus rhythm with short IA  Left ventricular hypertrophy with repolarization abnormality  When compared with ECG of 28-AUG-2015 05:24,  Vent. rate has increased BY  33 BPM  More lateral ST depression. Confirmed by Olive Barahona ([id number]) on 10/5/2020 8:51:03 AM     CBC WITH AUTOMATED DIFF    Collection Time: 10/04/20  1:08 PM   Result Value Ref Range    WBC 16.3 (H) 3.6 - 11.0 K/uL    RBC 1.83 (L) 3.80 - 5.20 M/uL    HGB 4.5 (LL) 11.5 - 16.0 g/dL    HCT 15.6 (LL) 35.0 - 47.0 %    MCV 85.2 80.0 - 99.0 FL    MCH 24.6 (L) 26.0 - 34.0 PG    MCHC 28.8 (L) 30.0 - 36.5 g/dL    RDW 17.2 (H) 11.5 - 14.5 %    PLATELET 507 589 - 101 K/uL    MPV 10.0 8.9 - 12.9 FL    NRBC 1.4 (H) 0  WBC    ABSOLUTE NRBC 0.23 (H) 0.00 - 0.01 K/uL    NEUTROPHILS 86 (H) 32 - 75 %    LYMPHOCYTES 10 (L) 12 - 49 %    MONOCYTES 4 (L) 5 - 13 %    EOSINOPHILS 0 0 - 7 %    BASOPHILS 0 0 - 1 %    IMMATURE GRANULOCYTES 0 0.0 - 0.5 %    ABS. NEUTROPHILS 14.0 (H) 1.8 - 8.0 K/UL    ABS. LYMPHOCYTES 1.6 0.8 - 3.5 K/UL    ABS. MONOCYTES 0.7 0.0 - 1.0 K/UL    ABS. EOSINOPHILS 0.0 0.0 - 0.4 K/UL    ABS. BASOPHILS 0.0 0.0 - 0.1 K/UL    ABS. IMM.  GRANS. 0.0 0.00 - 0.04 K/UL    DF MANUAL      RBC COMMENTS ANISOCYTOSIS  1+        RBC COMMENTS HYPOCHROMIA  1+ RBC COMMENTS POLYCHROMASIA  1+        RBC COMMENTS Pathology Review Requested     METABOLIC PANEL, COMPREHENSIVE    Collection Time: 10/04/20  1:08 PM   Result Value Ref Range    Sodium 140 136 - 145 mmol/L    Potassium 3.5 3.5 - 5.1 mmol/L    Chloride 105 97 - 108 mmol/L    CO2 25 21 - 32 mmol/L    Anion gap 10 5 - 15 mmol/L    Glucose 140 (H) 65 - 100 mg/dL    BUN 46 (H) 6 - 20 MG/DL    Creatinine 1.89 (H) 0.55 - 1.02 MG/DL    BUN/Creatinine ratio 24 (H) 12 - 20      GFR est AA 32 (L) >60 ml/min/1.73m2    GFR est non-AA 26 (L) >60 ml/min/1.73m2    Calcium 7.7 (L) 8.5 - 10.1 MG/DL    Bilirubin, total 0.5 0.2 - 1.0 MG/DL    ALT (SGPT) 28 12 - 78 U/L    AST (SGOT) 32 15 - 37 U/L    Alk.  phosphatase 50 45 - 117 U/L    Protein, total 6.3 (L) 6.4 - 8.2 g/dL    Albumin 2.5 (L) 3.5 - 5.0 g/dL    Globulin 3.8 2.0 - 4.0 g/dL    A-G Ratio 0.7 (L) 1.1 - 2.2     CK W/ REFLX CKMB    Collection Time: 10/04/20  1:08 PM   Result Value Ref Range     (H) 26 - 192 U/L   TROPONIN I    Collection Time: 10/04/20  1:08 PM   Result Value Ref Range    Troponin-I, Qt. 8.20 (H) <0.05 ng/mL   PROTHROMBIN TIME + INR    Collection Time: 10/04/20  1:08 PM   Result Value Ref Range    INR 1.1 0.9 - 1.1      Prothrombin time 11.6 (H) 9.0 - 11.1 sec   MAGNESIUM    Collection Time: 10/04/20  1:08 PM   Result Value Ref Range    Magnesium 2.6 (H) 1.6 - 2.4 mg/dL   NT-PRO BNP    Collection Time: 10/04/20  1:08 PM   Result Value Ref Range    NT pro-BNP 14,613 (H) <125 PG/ML   TYPE & SCREEN    Collection Time: 10/04/20  1:08 PM   Result Value Ref Range    Crossmatch Expiration 10/07/2020     ABO/Rh(D) A POSITIVE     Antibody screen NEG     Antibody ID [id number]     A1 LECTIN Invalid reactions,repeat testing     A2 CELLS NEG     Unit number [id number]     Blood component type [facility]     Unit division 00     Status of unit ISSUED     Crossmatch result Compatible     Unit number [id number]     Blood component type [facility]     Unit division 00 Status of unit ISSUED     Crossmatch result Compatible     Unit number [id number]     Blood component type RC LR     Unit division 00     Status of unit TRANSFUSED     Crossmatch result Compatible     Unit number [id number]     Blood component type RC LR     Unit division 00     Status of unit ALLOCATED     Crossmatch result Compatible     Unit number [id number]     Blood component type RC LR     Unit division 00     Status of unit ALLOCATED     Crossmatch result Compatible    CK-MB,QUANT. Collection Time: 10/04/20  1:08 PM   Result Value Ref Range    CK - MB 2.2 <3.6 NG/ML    CK-MB Index 0.6 0.0 - 2.5     POC TROPONIN-I    Collection Time: 10/04/20  1:10 PM   Result Value Ref Range    Troponin-I (POC) 1.15 (H) 0.00 - 0.08 ng/mL   RBC, ALLOCATE    Collection Time: 10/04/20  2:00 PM   Result Value Ref Range    HISTORY CHECKED? Historical check performed    OCCULT BLOOD, STOOL    Collection Time: 10/04/20  3:11 PM   Result Value Ref Range    Occult blood, stool Negative NEG     TROPONIN I    Collection Time: 10/04/20  5:45 PM   Result Value Ref Range    Troponin-I, Qt. 7.99 (H) <0.05 ng/mL   GLUCOSE, POC    Collection Time: 10/04/20  8:49 PM   Result Value Ref Range    Glucose (POC) 105 (H) 65 - 100 mg/dL    Performed by Devonte Rene    HGB & HCT    Collection Time: 10/04/20  9:13 PM   Result Value Ref Range    HGB 4.5 (LL) 11.5 - 16.0 g/dL    HCT 15.7 (LL) 35.0 - 47.0 %   TROPONIN I    Collection Time: 10/04/20  9:13 PM   Result Value Ref Range    Troponin-I, Qt. 8.11 (H) <0.05 ng/mL   RBC, ALLOCATE    Collection Time: 10/04/20 11:30 PM   Result Value Ref Range    HISTORY CHECKED?  Historical check performed    URINALYSIS W/ RFLX MICROSCOPIC    Collection Time: 10/05/20 12:29 AM   Result Value Ref Range    Color YELLOW/STRAW      Appearance CLOUDY (A) CLEAR      Specific gravity 1.013 1.003 - 1.030      pH (UA) 5.0 5.0 - 8.0      Protein 300 (A) NEG mg/dL    Glucose Negative NEG mg/dL    Ketone Negative NEG mg/dL    Bilirubin Negative NEG      Blood Negative NEG      Urobilinogen 1.0 0.2 - 1.0 EU/dL    Nitrites Negative NEG      Leukocyte Esterase TRACE (A) NEG      WBC 10-20 0 - 4 /hpf    RBC 0-5 0 - 5 /hpf    Epithelial cells FEW FEW /lpf    Bacteria Negative NEG /hpf    Hyaline cast 2-5 0 - 5 /lpf   METABOLIC PANEL, COMPREHENSIVE    Collection Time: 10/05/20  2:07 AM   Result Value Ref Range    Sodium 143 136 - 145 mmol/L    Potassium 3.7 3.5 - 5.1 mmol/L    Chloride 110 (H) 97 - 108 mmol/L    CO2 27 21 - 32 mmol/L    Anion gap 6 5 - 15 mmol/L    Glucose 71 65 - 100 mg/dL    BUN 46 (H) 6 - 20 MG/DL    Creatinine 1.92 (H) 0.55 - 1.02 MG/DL    BUN/Creatinine ratio 24 (H) 12 - 20      GFR est AA 31 (L) >60 ml/min/1.73m2    GFR est non-AA 26 (L) >60 ml/min/1.73m2    Calcium 7.3 (L) 8.5 - 10.1 MG/DL    Bilirubin, total 1.2 (H) 0.2 - 1.0 MG/DL    ALT (SGPT) 32 12 - 78 U/L    AST (SGOT) 47 (H) 15 - 37 U/L    Alk.  phosphatase 46 45 - 117 U/L    Protein, total 5.9 (L) 6.4 - 8.2 g/dL    Albumin 2.4 (L) 3.5 - 5.0 g/dL    Globulin 3.5 2.0 - 4.0 g/dL    A-G Ratio 0.7 (L) 1.1 - 2.2     MAGNESIUM    Collection Time: 10/05/20  2:07 AM   Result Value Ref Range    Magnesium 2.7 (H) 1.6 - 2.4 mg/dL   PHOSPHORUS    Collection Time: 10/05/20  2:07 AM   Result Value Ref Range    Phosphorus 5.0 (H) 2.6 - 4.7 MG/DL   CBC WITH AUTOMATED DIFF    Collection Time: 10/05/20  2:07 AM   Result Value Ref Range    WBC 15.2 (H) 3.6 - 11.0 K/uL    RBC 2.23 (L) 3.80 - 5.20 M/uL    HGB 5.8 (LL) 11.5 - 16.0 g/dL    HCT 19.1 (L) 35.0 - 47.0 %    MCV 85.7 80.0 - 99.0 FL    MCH 26.0 26.0 - 34.0 PG    MCHC 30.4 30.0 - 36.5 g/dL    RDW 16.0 (H) 11.5 - 14.5 %    PLATELET 740 671 - 595 K/uL    MPV 10.0 8.9 - 12.9 FL    NRBC 1.4 (H) 0  WBC    ABSOLUTE NRBC 0.21 (H) 0.00 - 0.01 K/uL    NEUTROPHILS 73 32 - 75 %    LYMPHOCYTES 18 12 - 49 %    MONOCYTES 8 5 - 13 %    EOSINOPHILS 0 0 - 7 %    BASOPHILS 0 0 - 1 %    IMMATURE GRANULOCYTES 1 (H) 0.0 - 0.5 %    ABS. NEUTROPHILS 11.1 (H) 1.8 - 8.0 K/UL    ABS. LYMPHOCYTES 2.7 0.8 - 3.5 K/UL    ABS. MONOCYTES 1.2 (H) 0.0 - 1.0 K/UL    ABS. EOSINOPHILS 0.0 0.0 - 0.4 K/UL    ABS. BASOPHILS 0.0 0.0 - 0.1 K/UL    ABS. IMM. GRANS. 0.2 (H) 0.00 - 0.04 K/UL    DF SMEAR SCANNED      RBC COMMENTS ANISOCYTOSIS  1+        RBC COMMENTS HYPOCHROMIA  1+        RBC COMMENTS POLYCHROMASIA  1+       TROPONIN I    Collection Time: 10/05/20  2:07 AM   Result Value Ref Range    Troponin-I, Qt. 9.20 (H) <0.05 ng/mL   GLUCOSE, POC    Collection Time: 10/05/20  7:13 AM   Result Value Ref Range    Glucose (POC) 82 65 - 100 mg/dL    Performed by Manny Byers PCT      Recent Labs     10/05/20  0207 10/04/20  2113 10/04/20  1308   WBC 15.2*  --  16.3*   HGB 5.8* 4.5* 4.5*   HCT 19.1* 15.7* 15.6*     --  343     Recent Labs     10/05/20  0207 10/04/20  1308    140   K 3.7 3.5   * 105   CO2 27 25   BUN 46* 46*   CREA 1.92* 1.89*   GLU 71 140*   CA 7.3* 7.7*   MG 2.7* 2.6*   PHOS 5.0*  --      Recent Labs     10/05/20  0207 10/04/20  1308   ALT 32 28   AP 46 50   TBILI 1.2* 0.5   TP 5.9* 6.3*   ALB 2.4* 2.5*   GLOB 3.5 3.8     Recent Labs     10/04/20  1308   INR 1.1   PTP 11.6*      No results for input(s): FE, TIBC, PSAT, FERR in the last 72 hours. No results found for: FOL, RBCF   No results for input(s): PH, PCO2, PO2 in the last 72 hours.   Recent Labs     10/05/20  0207 10/04/20  2113 10/04/20  1745 10/04/20  1308   CKNDX  --   --   --  0.6   TROIQ 9.20* 8.11* 7.99* 8.20*     Lab Results   Component Value Date/Time    Cholesterol, total 179 02/16/2015 09:56 AM    HDL Cholesterol 29 (L) 02/16/2015 09:56 AM    LDL, calculated 74 02/16/2015 09:56 AM    Triglyceride 378 (H) 02/16/2015 09:56 AM     Lab Results   Component Value Date/Time    Glucose (POC) 82 10/05/2020 07:13 AM    Glucose (POC) 105 (H) 10/04/2020 08:49 PM    Glucose (POC) 93 08/28/2015 08:15 AM    Glucose (POC) 76 08/28/2015 08:13 AM    Glucose (POC) 78 08/28/2015 08:01 AM     Lab Results   Component Value Date/Time    Color YELLOW/STRAW 10/05/2020 12:29 AM    Appearance CLOUDY (A) 10/05/2020 12:29 AM    Specific gravity 1.013 10/05/2020 12:29 AM    Specific gravity 1.025 08/25/2015 03:47 PM    pH (UA) 5.0 10/05/2020 12:29 AM    Protein 300 (A) 10/05/2020 12:29 AM    Glucose Negative 10/05/2020 12:29 AM    Ketone Negative 10/05/2020 12:29 AM    Bilirubin Negative 10/05/2020 12:29 AM    Urobilinogen 1.0 10/05/2020 12:29 AM    Nitrites Negative 10/05/2020 12:29 AM    Leukocyte Esterase TRACE (A) 10/05/2020 12:29 AM    Epithelial cells FEW 10/05/2020 12:29 AM    Bacteria Negative 10/05/2020 12:29 AM    WBC 10-20 10/05/2020 12:29 AM    RBC 0-5 10/05/2020 12:29 AM           Assessment:     Active Problems:    GI bleed (10/4/2020)        Plan:     Hold EGD today given hgb only 5.8 and patient presenting with NQWMI. Not having any overt GIB with heme negative stool. Transfuse as necessary. Continue BID PPI. Okay to eat today but will make NPO after MN for possible EGD tomorrow. Continue BID PPI. I noted elevated bili today. Check LDH, haptoglobin and reticulocyte count to rule out hemolytic anemia.     IRVING Segal  10/05/20  9:01 AM

## 2020-10-05 NOTE — PROGRESS NOTES
Problem: Falls - Risk of  Goal: *Absence of Falls  Description: Document Balta Saab Fall Risk and appropriate interventions in the flowsheet. Outcome: Progressing Towards Goal  Note: Fall Risk Interventions:  Mobility Interventions: Assess mobility with egress test, Bed/chair exit alarm, Communicate number of staff needed for ambulation/transfer, Patient to call before getting OOB         Medication Interventions: Assess postural VS orthostatic hypotension, Bed/chair exit alarm, Evaluate medications/consider consulting pharmacy, Patient to call before getting OOB    Elimination Interventions: Bed/chair exit alarm, Call light in reach, Patient to call for help with toileting needs              Problem: Pressure Injury - Risk of  Goal: *Prevention of pressure injury  Description: Document Rufus Scale and appropriate interventions in the flowsheet.   Outcome: Progressing Towards Goal  Note: Pressure Injury Interventions:  Sensory Interventions: Assess changes in LOC, Assess need for specialty bed, Keep linens dry and wrinkle-free    Moisture Interventions: Absorbent underpads, Internal/External urinary devices    Activity Interventions: Increase time out of bed, Pressure redistribution bed/mattress(bed type), PT/OT evaluation    Mobility Interventions: Chair cushion, Float heels, Pressure redistribution bed/mattress (bed type)    Nutrition Interventions: Document food/fluid/supplement intake    Friction and Shear Interventions: Apply protective barrier, creams and emollients, Foam dressings/transparent film/skin sealants, HOB 30 degrees or less

## 2020-10-06 ENCOUNTER — APPOINTMENT (OUTPATIENT)
Dept: NON INVASIVE DIAGNOSTICS | Age: 75
DRG: 374 | End: 2020-10-06
Attending: INTERNAL MEDICINE
Payer: MEDICARE

## 2020-10-06 ENCOUNTER — ANESTHESIA (OUTPATIENT)
Dept: ENDOSCOPY | Age: 75
DRG: 374 | End: 2020-10-06
Payer: MEDICARE

## 2020-10-06 ENCOUNTER — APPOINTMENT (OUTPATIENT)
Dept: CT IMAGING | Age: 75
DRG: 374 | End: 2020-10-06
Attending: SPECIALIST
Payer: MEDICARE

## 2020-10-06 ENCOUNTER — ANESTHESIA EVENT (OUTPATIENT)
Dept: ENDOSCOPY | Age: 75
DRG: 374 | End: 2020-10-06
Payer: MEDICARE

## 2020-10-06 PROBLEM — D64.9 ANEMIA, UNSPECIFIED: Status: ACTIVE | Noted: 2020-10-06

## 2020-10-06 LAB
ANION GAP SERPL CALC-SCNC: 5 MMOL/L (ref 5–15)
BUN SERPL-MCNC: 41 MG/DL (ref 6–20)
BUN/CREAT SERPL: 25 (ref 12–20)
CALCIUM SERPL-MCNC: 7.5 MG/DL (ref 8.5–10.1)
CHLORIDE SERPL-SCNC: 110 MMOL/L (ref 97–108)
CO2 SERPL-SCNC: 25 MMOL/L (ref 21–32)
CREAT SERPL-MCNC: 1.64 MG/DL (ref 0.55–1.02)
ECHO AO ROOT DIAM: 3.18 CM
ECHO AV AREA PEAK VELOCITY: 2.6 CM2
ECHO AV AREA PEAK VELOCITY: 2.6 CM2
ECHO AV AREA VTI: 2.74 CM2
ECHO AV AREA VTI: 2.74 CM2
ECHO AV MEAN GRADIENT: 1.8 MMHG
ECHO AV MEAN VELOCITY: 61.37 CM/S
ECHO AV PEAK GRADIENT: 4.04 MMHG
ECHO AV PEAK VELOCITY: 100.51 CM/S
ECHO AV VTI: 19.15 CM
ECHO EST RA PRESSURE: 10 MMHG
ECHO LA AREA 4C: 18.44 CM2
ECHO LA MAJOR AXIS: 3.44 CM
ECHO LA MINOR AXIS: 1.85 CM
ECHO LA VOL 4C: 43.75 ML (ref 22–52)
ECHO LA VOLUME INDEX A4C: 23.55 ML/M2 (ref 16–28)
ECHO LV E' LATERAL VELOCITY: 5.15 CM/S
ECHO LV E' SEPTAL VELOCITY: 3.27 CM/S
ECHO LV EDV A4C: 132.15 ML
ECHO LV EDV INDEX A4C: 71.1 ML/M2
ECHO LV EJECTION FRACTION A4C: 14 PERCENT
ECHO LV ESV A4C: 113.42 ML
ECHO LV ESV INDEX A4C: 61.1 ML/M2
ECHO LV INTERNAL DIMENSION DIASTOLIC: 5.25 CM (ref 3.9–5.3)
ECHO LV INTERNAL DIMENSION SYSTOLIC: 4.13 CM
ECHO LV IVSD: 1.35 CM (ref 0.6–0.9)
ECHO LV MASS 2D: 295.1 G (ref 67–162)
ECHO LV MASS INDEX 2D: 158.9 G/M2 (ref 43–95)
ECHO LV POSTERIOR WALL DIASTOLIC: 1.33 CM (ref 0.6–0.9)
ECHO LVOT CARDIAC OUTPUT: 3.49 LITER/MINUTE
ECHO LVOT DIAM: 2.13 CM
ECHO LVOT PEAK GRADIENT: 2.14 MMHG
ECHO LVOT PEAK VELOCITY: 73.13 CM/S
ECHO LVOT SV: 52.5 ML
ECHO LVOT VTI: 14.67 CM
ECHO MV A VELOCITY: 86.23 CM/S
ECHO MV AREA PHT: 3.25 CM2
ECHO MV AREA VTI: 2.05 CM2
ECHO MV E DECELERATION TIME (DT): 0.2 S
ECHO MV E VELOCITY: 82.79 CM/S
ECHO MV E/A RATIO: 0.96
ECHO MV E/E' LATERAL: 16.08
ECHO MV E/E' RATIO (AVERAGED): 20.7
ECHO MV E/E' SEPTAL: 25.32
ECHO MV MAX VELOCITY: 93.47 CM/S
ECHO MV MEAN GRADIENT: 1.65 MMHG
ECHO MV PEAK GRADIENT: 3.49 MMHG
ECHO MV PRESSURE HALF TIME (PHT): 0.07 S
ECHO MV VTI: 25.58 CM
ECHO PV MAX VELOCITY: 65.18 CM/S
ECHO PV MEAN GRADIENT: 0.9 MMHG
ECHO PV PEAK INSTANTANEOUS GRADIENT SYSTOLIC: 1.7 MMHG
ECHO PV PEAK INSTANTANEOUS GRADIENT SYSTOLIC: 4.32 MMHG
ECHO PV REGURGITANT MAX VELOCITY: 103.98 CM/S
ECHO PV REGURGITANT MAX VELOCITY: 390.8 CM/S
ECHO PV VTI: 16.08 CM
ECHO RIGHT VENTRICULAR SYSTOLIC PRESSURE (RVSP): 46.16 MMHG
ECHO RIGHT VENTRICULAR SYSTOLIC PRESSURE (RVSP): 53.59 MMHG
ECHO RIGHT VENTRICULAR SYSTOLIC PRESSURE (RVSP): 56.66 MMHG
ECHO RIGHT VENTRICULAR SYSTOLIC PRESSURE (RVSP): 57.94 MMHG
ECHO TV REGURGITANT MAX VELOCITY: 300.67 CM/S
ECHO TV REGURGITANT MAX VELOCITY: 329.47 CM/S
ECHO TV REGURGITANT MAX VELOCITY: 341.55 CM/S
ECHO TV REGURGITANT MAX VELOCITY: 346.19 CM/S
ECHO TV REGURGITANT PEAK GRADIENT: 36.16 MMHG
ECHO TV REGURGITANT PEAK GRADIENT: 43.59 MMHG
ECHO TV REGURGITANT PEAK GRADIENT: 46.66 MMHG
ECHO TV REGURGITANT PEAK GRADIENT: 47.94 MMHG
ERYTHROCYTE [DISTWIDTH] IN BLOOD BY AUTOMATED COUNT: 17.2 % (ref 11.5–14.5)
GLUCOSE BLD STRIP.AUTO-MCNC: 138 MG/DL (ref 65–100)
GLUCOSE BLD STRIP.AUTO-MCNC: 149 MG/DL (ref 65–100)
GLUCOSE BLD STRIP.AUTO-MCNC: 154 MG/DL (ref 65–100)
GLUCOSE BLD STRIP.AUTO-MCNC: 165 MG/DL (ref 65–100)
GLUCOSE BLD STRIP.AUTO-MCNC: 174 MG/DL (ref 65–100)
GLUCOSE SERPL-MCNC: 145 MG/DL (ref 65–100)
HCT VFR BLD AUTO: 26.3 % (ref 35–47)
HGB BLD-MCNC: 8.3 G/DL (ref 11.5–16)
LVOT MG: 0.99 MMHG
MCH RBC QN AUTO: 26.8 PG (ref 26–34)
MCHC RBC AUTO-ENTMCNC: 31.6 G/DL (ref 30–36.5)
MCV RBC AUTO: 84.8 FL (ref 80–99)
NRBC # BLD: 0.33 K/UL (ref 0–0.01)
NRBC BLD-RTO: 1.8 PER 100 WBC
PLATELET # BLD AUTO: 290 K/UL (ref 150–400)
PMV BLD AUTO: 10.9 FL (ref 8.9–12.9)
POTASSIUM SERPL-SCNC: 3.7 MMOL/L (ref 3.5–5.1)
RBC # BLD AUTO: 3.1 M/UL (ref 3.8–5.2)
SERVICE CMNT-IMP: ABNORMAL
SODIUM SERPL-SCNC: 140 MMOL/L (ref 136–145)
WBC # BLD AUTO: 18.4 K/UL (ref 3.6–11)

## 2020-10-06 PROCEDURE — 74011250636 HC RX REV CODE- 250/636: Performed by: GENERAL ACUTE CARE HOSPITAL

## 2020-10-06 PROCEDURE — 74011250636 HC RX REV CODE- 250/636: Performed by: INTERNAL MEDICINE

## 2020-10-06 PROCEDURE — 76040000019: Performed by: SPECIALIST

## 2020-10-06 PROCEDURE — 80048 BASIC METABOLIC PNL TOTAL CA: CPT

## 2020-10-06 PROCEDURE — 88342 IMHCHEM/IMCYTCHM 1ST ANTB: CPT

## 2020-10-06 PROCEDURE — 76060000031 HC ANESTHESIA FIRST 0.5 HR: Performed by: SPECIALIST

## 2020-10-06 PROCEDURE — 74011000250 HC RX REV CODE- 250: Performed by: NURSE ANESTHETIST, CERTIFIED REGISTERED

## 2020-10-06 PROCEDURE — C9113 INJ PANTOPRAZOLE SODIUM, VIA: HCPCS | Performed by: INTERNAL MEDICINE

## 2020-10-06 PROCEDURE — 74011250636 HC RX REV CODE- 250/636: Performed by: NURSE ANESTHETIST, CERTIFIED REGISTERED

## 2020-10-06 PROCEDURE — 0DB68ZX EXCISION OF STOMACH, VIA NATURAL OR ARTIFICIAL OPENING ENDOSCOPIC, DIAGNOSTIC: ICD-10-PCS | Performed by: SPECIALIST

## 2020-10-06 PROCEDURE — 77030019988 HC FCPS ENDOSC DISP BSC -B: Performed by: SPECIALIST

## 2020-10-06 PROCEDURE — 74011250636 HC RX REV CODE- 250/636: Performed by: SPECIALIST

## 2020-10-06 PROCEDURE — 74150 CT ABDOMEN W/O CONTRAST: CPT

## 2020-10-06 PROCEDURE — 36415 COLL VENOUS BLD VENIPUNCTURE: CPT

## 2020-10-06 PROCEDURE — 0DB98ZX EXCISION OF DUODENUM, VIA NATURAL OR ARTIFICIAL OPENING ENDOSCOPIC, DIAGNOSTIC: ICD-10-PCS | Performed by: SPECIALIST

## 2020-10-06 PROCEDURE — 2709999900 HC NON-CHARGEABLE SUPPLY: Performed by: SPECIALIST

## 2020-10-06 PROCEDURE — 74011000250 HC RX REV CODE- 250: Performed by: INTERNAL MEDICINE

## 2020-10-06 PROCEDURE — 74011636637 HC RX REV CODE- 636/637: Performed by: GENERAL ACUTE CARE HOSPITAL

## 2020-10-06 PROCEDURE — 74011250637 HC RX REV CODE- 250/637: Performed by: GENERAL ACUTE CARE HOSPITAL

## 2020-10-06 PROCEDURE — 82962 GLUCOSE BLOOD TEST: CPT

## 2020-10-06 PROCEDURE — 93306 TTE W/DOPPLER COMPLETE: CPT

## 2020-10-06 PROCEDURE — 74011250637 HC RX REV CODE- 250/637: Performed by: INTERNAL MEDICINE

## 2020-10-06 PROCEDURE — 85027 COMPLETE CBC AUTOMATED: CPT

## 2020-10-06 PROCEDURE — 74011000255 HC RX REV CODE- 255: Performed by: SPECIALIST

## 2020-10-06 PROCEDURE — 88341 IMHCHEM/IMCYTCHM EA ADD ANTB: CPT

## 2020-10-06 PROCEDURE — 88305 TISSUE EXAM BY PATHOLOGIST: CPT

## 2020-10-06 PROCEDURE — 65660000001 HC RM ICU INTERMED STEPDOWN

## 2020-10-06 RX ORDER — PHENYLEPHRINE HCL IN 0.9% NACL 0.4MG/10ML
SYRINGE (ML) INTRAVENOUS AS NEEDED
Status: DISCONTINUED | OUTPATIENT
Start: 2020-10-06 | End: 2020-10-06 | Stop reason: HOSPADM

## 2020-10-06 RX ORDER — FLUMAZENIL 0.1 MG/ML
0.2 INJECTION INTRAVENOUS
Status: DISCONTINUED | OUTPATIENT
Start: 2020-10-06 | End: 2020-10-06 | Stop reason: HOSPADM

## 2020-10-06 RX ORDER — NALOXONE HYDROCHLORIDE 0.4 MG/ML
0.4 INJECTION, SOLUTION INTRAMUSCULAR; INTRAVENOUS; SUBCUTANEOUS
Status: DISCONTINUED | OUTPATIENT
Start: 2020-10-06 | End: 2020-10-06 | Stop reason: HOSPADM

## 2020-10-06 RX ORDER — DEXTROMETHORPHAN/PSEUDOEPHED 2.5-7.5/.8
1.2 DROPS ORAL
Status: DISCONTINUED | OUTPATIENT
Start: 2020-10-06 | End: 2020-10-06 | Stop reason: HOSPADM

## 2020-10-06 RX ORDER — ATROPINE SULFATE 0.1 MG/ML
0.5 INJECTION INTRAVENOUS
Status: DISCONTINUED | OUTPATIENT
Start: 2020-10-06 | End: 2020-10-06 | Stop reason: HOSPADM

## 2020-10-06 RX ORDER — SODIUM CHLORIDE 9 MG/ML
50 INJECTION, SOLUTION INTRAVENOUS CONTINUOUS
Status: DISPENSED | OUTPATIENT
Start: 2020-10-06 | End: 2020-10-06

## 2020-10-06 RX ORDER — SODIUM CHLORIDE 0.9 % (FLUSH) 0.9 %
5-40 SYRINGE (ML) INJECTION EVERY 8 HOURS
Status: DISCONTINUED | OUTPATIENT
Start: 2020-10-06 | End: 2020-10-09 | Stop reason: SDUPTHER

## 2020-10-06 RX ORDER — BARIUM SULFATE 20 MG/ML
450 SUSPENSION ORAL ONCE
Status: COMPLETED | OUTPATIENT
Start: 2020-10-06 | End: 2020-10-06

## 2020-10-06 RX ORDER — PROPOFOL 10 MG/ML
INJECTION, EMULSION INTRAVENOUS AS NEEDED
Status: DISCONTINUED | OUTPATIENT
Start: 2020-10-06 | End: 2020-10-06 | Stop reason: HOSPADM

## 2020-10-06 RX ORDER — SODIUM CHLORIDE 0.9 % (FLUSH) 0.9 %
5-40 SYRINGE (ML) INJECTION AS NEEDED
Status: DISCONTINUED | OUTPATIENT
Start: 2020-10-06 | End: 2020-10-09 | Stop reason: SDUPTHER

## 2020-10-06 RX ORDER — MIDAZOLAM HYDROCHLORIDE 1 MG/ML
.25-5 INJECTION, SOLUTION INTRAMUSCULAR; INTRAVENOUS
Status: DISCONTINUED | OUTPATIENT
Start: 2020-10-06 | End: 2020-10-06 | Stop reason: HOSPADM

## 2020-10-06 RX ORDER — LIDOCAINE HYDROCHLORIDE 20 MG/ML
INJECTION, SOLUTION EPIDURAL; INFILTRATION; INTRACAUDAL; PERINEURAL AS NEEDED
Status: DISCONTINUED | OUTPATIENT
Start: 2020-10-06 | End: 2020-10-06 | Stop reason: HOSPADM

## 2020-10-06 RX ADMIN — Medication 10 ML: at 14:00

## 2020-10-06 RX ADMIN — CARVEDILOL 3.12 MG: 3.12 TABLET, FILM COATED ORAL at 08:55

## 2020-10-06 RX ADMIN — Medication 10 ML: at 06:47

## 2020-10-06 RX ADMIN — DULOXETINE HYDROCHLORIDE 60 MG: 30 CAPSULE, DELAYED RELEASE ORAL at 08:57

## 2020-10-06 RX ADMIN — PROPOFOL 20 MG: 10 INJECTION, EMULSION INTRAVENOUS at 13:04

## 2020-10-06 RX ADMIN — SODIUM CHLORIDE 50 ML/HR: 900 INJECTION, SOLUTION INTRAVENOUS at 12:26

## 2020-10-06 RX ADMIN — BARIUM SULFATE 450 ML: 20 SUSPENSION ORAL at 14:33

## 2020-10-06 RX ADMIN — CARVEDILOL 3.12 MG: 3.12 TABLET, FILM COATED ORAL at 17:18

## 2020-10-06 RX ADMIN — Medication 80 MCG: at 13:14

## 2020-10-06 RX ADMIN — SODIUM CHLORIDE 75 ML/HR: 900 INJECTION, SOLUTION INTRAVENOUS at 17:20

## 2020-10-06 RX ADMIN — INSULIN LISPRO 2 UNITS: 100 INJECTION, SOLUTION INTRAVENOUS; SUBCUTANEOUS at 08:54

## 2020-10-06 RX ADMIN — LIDOCAINE HYDROCHLORIDE 100 MG: 20 INJECTION, SOLUTION EPIDURAL; INFILTRATION; INTRACAUDAL; PERINEURAL at 13:00

## 2020-10-06 RX ADMIN — SODIUM CHLORIDE 40 MG: 9 INJECTION, SOLUTION INTRAMUSCULAR; INTRAVENOUS; SUBCUTANEOUS at 09:03

## 2020-10-06 RX ADMIN — PROPOFOL 70 MG: 10 INJECTION, EMULSION INTRAVENOUS at 13:01

## 2020-10-06 RX ADMIN — SODIUM CHLORIDE: 900 INJECTION, SOLUTION INTRAVENOUS at 12:27

## 2020-10-06 RX ADMIN — Medication 80 MCG: at 13:11

## 2020-10-06 RX ADMIN — INSULIN LISPRO 2 UNITS: 100 INJECTION, SOLUTION INTRAVENOUS; SUBCUTANEOUS at 17:17

## 2020-10-06 RX ADMIN — Medication 10 ML: at 21:42

## 2020-10-06 RX ADMIN — SODIUM CHLORIDE 40 MG: 9 INJECTION, SOLUTION INTRAMUSCULAR; INTRAVENOUS; SUBCUTANEOUS at 21:41

## 2020-10-06 NOTE — H&P
Pre-endoscopy H and P    The patient was seen and examined in the endoscopy suite. The airway was assessed and docuemented. The problem list, past medical history, and medications were reviewed. Patient Active Problem List   Diagnosis Code    Non-ST elevated myocardial infarction (HCC) I21.4    Noncompliance Z91.19    Fatty liver K76.0    UTI (lower urinary tract infection) N39.0    Type 2 diabetes mellitus with diabetic polyneuropathy, with long-term current use of insulin (HCC) E11.42, Z79.4    Essential hypertension I10    Mixed hyperlipidemia E78.2    Type 2 diabetes with nephropathy ([facility] [address] E11.21    Severe obesity (BMI 35.0-39. 9) E66.01    GI bleed K92.2    Anemia, unspecified D64.9     Social History     Socioeconomic History    Marital status:      Spouse name: Not on file    Number of children: Not on file    Years of education: Not on file    Highest education level: Not on file   Occupational History    Not on file   Social Needs    Financial resource strain: Not on file    Food insecurity     Worry: Not on file     Inability: Not on file    Transportation needs     Medical: Not on file     Non-medical: Not on file   Tobacco Use    Smoking status: Never Smoker    Smokeless tobacco: Never Used   Substance and Sexual Activity    Alcohol use: No    Drug use: No    Sexual activity: Not on file   Lifestyle    Physical activity     Days per week: Not on file     Minutes per session: Not on file    Stress: Not on file   Relationships    Social connections     Talks on phone: Not on file     Gets together: Not on file     Attends Mandaeism service: Not on file     Active member of club or organization: Not on file     Attends meetings of clubs or organizations: Not on file     Relationship status: Not on file    Intimate partner violence     Fear of current or ex partner: Not on file     Emotionally abused: Not on file     Physically abused: Not on file     Forced sexual activity: Not on file   Other Topics Concern    Not on file   Social History Narrative    Not on file     Past Medical History:   Diagnosis Date    Anemia, unspecified 10/6/2020    Arthritis     Chronic bronchitis ([facility] [address]     per pt:  as of 1/9/15 pt denies any ARENAS or SOB    Diabetes ([facility] [address] Dx approx     Dr Evy Carrillo (in connect care)    GERD (gastroesophageal reflux disease)     Hypercholesteremia     Hypertension      The patient has a family history of na    Prior to Admission Medications   Prescriptions Last Dose Informant Patient Reported? Taking? amLODIPine (NORVASC) 10 mg tablet  Self No Yes   Sig: TAKE 1 TABLET DAILY   aspirin 81 mg chewable tablet 10/3/2020 at Unknown time Self No Yes   Sig: Take 1 Tab by mouth daily. atorvastatin (LIPITOR) 40 mg tablet  Self No Yes   Sig: Take 1 Tab by mouth nightly. carvediloL (COREG) 25 mg tablet 10/4/2020 at Unknown time Self No Yes   Sig: Take 1 Tab by mouth two (2) times a day. clopidogrel (PLAVIX) 75 mg tab  Self No Yes   Sig: TAKE 1 TABLET DAILY   ergocalciferol (ERGOCALCIFEROL) 1,250 mcg (50,000 unit) capsule 2020 at Unknown time Self Yes Yes   Sig: Take 50,000 Units by mouth every seven (7) days. On Friday   glipiZIDE (GLUCOTROL) 10 mg tablet 10/4/2020 at Unknown time Self No Yes   Sig: Take 1 Tab by mouth two (2) times a day. insulin degludec (TRESIBA FLEXTOUCH U-200) 200 unit/mL (3 mL) inpn  Self No Yes   Si units daily   torsemide (DEMADEX) 20 mg tablet 10/4/2020 at Unknown time Self Yes Yes   Sig: Take 40 mg by mouth daily. Facility-Administered Medications: None           The review of systems is:  Positive for shortness of breath and negative for chest pain      The heart, lungs, and mental status were satisfactory for the administration of anesthesia sedation and for the procedure. I discussed with the patient the objectives, risks, consequences and alternatives to the procedure.   The patient was counseled at length about the risks of dylan Covid-19 during their perioperative period and any recovery window from their procedure. The patient was made aware that dylan Covid-19  may worsen their prognosis for recovering from their procedure and lend to a higher morbidity and/or mortality risk. All material risks, benefits, and reasonable alternatives including postponing the procedure were discussed. The patient does  wish to proceed with the procedure at this time.         Anna Lees MD  10/6/2020  12:59 PM

## 2020-10-06 NOTE — PROGRESS NOTES
Endoscope was pre-cleaned at the bedside immediately following procedure by Bon Zamora. For medications administered by anesthesia, see anesthesia chart.

## 2020-10-06 NOTE — PROGRESS NOTES
Physical Therapy Screening:    An [facility] screening referral was triggered for physical therapy based on results obtained during the nursing admission assessment. The patients chart was reviewed and the patient is appropriate for a skilled therapy evaluation if there is a decline in functional mobility from baseline. Please order a consult for physical therapy if you are in agreement and would like an evaluation to be completed. Thank you.     Sonja Nunez, PT, DPT

## 2020-10-06 NOTE — PROGRESS NOTES
KOFI    EGD today: Results pending. Home with family assistance     Transportation will be provided by spouse or family     Preferred Rx is [facility] on [facility]     2nd IM letter will be needed prior to discharge     Follow Up appointments will be needed prior to discharge. CM will continue to monitor discharge plan.      Marcello Bills  Ext [phone]

## 2020-10-06 NOTE — ANESTHESIA PREPROCEDURE EVALUATION
Anesthetic History   No history of anesthetic complications            Review of Systems / Medical History  Patient summary reviewed, nursing notes reviewed and pertinent labs reviewed    Pulmonary    COPD (no recent problems): mild            Comments: Hx of chronic bronchitis   Neuro/Psych   Within defined limits           Cardiovascular    Hypertension: well controlled          Past MI, CAD, cardiac stents and hyperlipidemia    Exercise tolerance: >4 METS  Comments: Active and asymptomatic since her cardiac stent    Had her beta blocker at 08:55 today   GI/Hepatic/Renal     GERD: well controlled    Renal disease: CRI  Liver disease    Comments: GI bleed Endo/Other    Diabetes (runs in the 200's): poorly controlled, type 2, using insulin    Obesity, arthritis and anemia  Pertinent negatives: No morbid obesity   Other Findings            Physical Exam    Airway  Mallampati: III  TM Distance: 4 - 6 cm  Neck ROM: normal range of motion   Mouth opening: Normal     Cardiovascular    Rhythm: regular  Rate: normal      Pertinent negatives: No murmur   Dental    Dentition: Caps/crowns and Poor dentition  Comments: Upper front & left (2)   Pulmonary  Breath sounds clear to auscultation               Abdominal  GI exam deferred       Other Findings            Anesthetic Plan    ASA: 3  Anesthesia type: total IV anesthesia and MAC          Induction: Intravenous  Anesthetic plan and risks discussed with: Patient

## 2020-10-06 NOTE — PROGRESS NOTES
Cardiology Progress Note  10/6/2020     Admit Date: 10/4/2020  Admit Diagnosis: GI bleed [K92.2]  CC: none currently  Cardiologist:  Dr Micheal Mcgregor then Dr Germain Perea (last OV 2017). Cardiac Assessment/Plan:    1) CAD: Distal RCA NEENA 8/2015 for NQWMI; Med Rx mid circ and diffusely dz diagonals. 2) HTN  3) DM  4) Dyslipidemia  5) CKD III: Cr 1.43/gfr 42 7/2020 (Dr Kim Eric). 6) Obesity: ?NANY. 7) Non-compliant with some meds per pt 10/2020. Poor compliance with f/u OV.     Presenting with AMS, profound anemia; NQWMI. GUANACO on CKD. No CP/change in chronic ARENAS.     Rec POA: Need to correct profound anemia; Agree with GI eval.   I suspect NQWMI is type II; no current role for invasive Rx. No role for anticoagulation; ASA when ok with GI. Cont bblocker and ARB as BP tolerates.         10/5: Afeb; BPs 110-140s; HR 70-80s; 98% RA. Hg 5.8; Cr 1.92 from 1.89; trop 8-9 range. Cardiac meds: none; NS @ 75. Echo pending; still needs anemia corrected. Add low dose coreg (3.125 bid) w/ hold parameters. 10/6: -150s; HR 70-90s; 95-97% RA. Net + 1.6L. Hg 8.3 (stable); Nl K; Cr 1.64. Cardiac meds: corege 3.125 bid; NS @ 75. Rec: echo pending; anemia stable currently. Add back ARB as able.   _________________________________________________________________    As noted POA: \"The patient reports no chest pain/pressure; no change in chronic ARENAS. No PND, orthopnea, palpitations, pre-syncope, syncope, new peripheral edema, or decrease in exercise tolerance. No current complaints.     Fell 3 days ago (gen weak; not lightheaded; no syncope); Decreased LOC last few days per : \"asleep\"; wakes when shaken. +Sx NANY.     Dark stool several weeks ago, none since.     ECG: Sinus; ; IVCD 110; LVH; Lat ST depression (more than 2015). Tele: sinus  CXR: \"Enlarged cardiac silhouette, otherwise no acute disease\"     Notable labs: WBC 16.3; Hg 4.5. Cr 1.89 (nl in 2017); BUN 46.  ; neg MB; trop 8.2.\"  _____________________________________________________________________________  Notable prior cardiac history:  @ last OV 1/24/17:          Ms Eusebio Mcnulty has a h/o CAD (NEENA in distal RCA 8/2015 for NSTEMI,  Normal LV EF, 75% mid-circ; Dr Christine Richter), HTN, DM, and dyslipidemia; She has chronic mild ARENAS.     7/2016: No change ARENAS; no CP. Worse LE edema: some salt; BP poorly controlled per pt (chronic); Last week, PCP decreased norvasc to 5 qd and added diuretic.     1/2017: No angina; Minor ARENAS (doesnt need to stop activity); Using less salt; Wt down 10# with diet.        IMPRESSION AND PLAN  01. Atherosclerotic heart disease of native coronary artery with other forms of angina pectoris:  No angina; Stable dyspnea; could consider PCI of mid circ if needed: MPI next year/sooner prn.     We discussed the signs and symptoms of unstable angina, myocardial infarction and malignant arrhythmia. The patient knows to seek immediate medical attention should they occur. ECG done today  Exercise MPI to be done with next visit. 02. Old myocardial infarction: This condition is stable. 03. Hypertensive heart disease without heart failure:  Adequately controlled. 04. Mixed hyperlipidemia:  Lipid labs drawn by PCP. The patient is tolerating lipid lowering therapy well. 05. Localized edema:  Resolved. The patient was instructed on a low sodium diet. 06. Type 2 diabetes mellitus without complications: This condition is stable. 07. Long term (current) use of aspirin:  No bleeding. Continue Rx   08. Long term (current) use of antithrombotics/antiplatelets:  No bleeding. D/C plavix at next OV if unremarkable MPI.   09. Body mass index (BMI) 36.0-36.9, adult:  The patient was instructed on AHA diet and regular exercise.            ORDERS:  1 ECG done today   2 Myocardial Perfusion Study / Sy protocol with next visit   3 Return office visit with Gina Moody MD in 1 Year.    4 The patient was instructed on AHA diet and regular exercise. [facility]   6 Hypertension Educational Materials   7 Patient counseled on the following: Low Salt Diet.         1/2017 MEDICATION LIST  Medication Sig Description   Amaryl 4 mg tablet take 1 tablet by oral route 2 times every day   amlodipine 10 mg tablet take 1 tablet by oral route  every day   aspirin 81 mg tablet,delayed release take 1 tablet by oral route  every day   atorvastatin 40 mg tablet take 1 tablet by oral route  every bedtime   clonidine HCl 0.1 mg tablet take 1 tablet by oral route  every day   Coreg 25 mg tablet take 1 tablet by oral route 2 times every day with food   Diovan  mg-25 mg tablet take 1 tablet by oral route  every day   furosemide 20 mg tablet take 1 tablet by oral route  every day   gabapentin 100 mg capsule take 1 capsule by oral route 3 times every day   hydralazine 25 mg tablet take 1 tablet by oral route 2 times every day with food   Lantus 100 unit/mL subcutaneous solution inject by subcutaneous route as per insulin protocol   Nitrostat 0.4 mg sublingual tablet place 1 tablet by sublingual route at the 1st sign of attack; may repeat every 5 min until relief; if pain persists after 3 tablets in 15 min, prompt medical attention is recommended   pantoprazole 40 mg tablet,delayed release take 1 tablet by oral route  every day   Plavix 75 mg tablet take 1 tablet by oral route  every day            _____________________________________________________________________________________________  CARDIAC HISTORY  CAD:  1 NSTEMI (PCI (Successful PTCA and stenting of totally occluded distal RCA. Resolute Integrity used. ) - 8/26/2015) - 8/28/2015      RISK FACTORS:  1 Hypertension   2 Dyslipidemia   3 Type 2 Diabetes         CARDIOVASCULAR PROCEDURES  CATH LAB:  Cath (EF 0.65 (65%), 30% Proximal LAD, 30% Mid LAD, 75% Mid CX, 50% Proximal RCA, 50% Mid RCA, 100% Distal RCA, small diffuse diseased Diagonals: Resolute NEENA to distal RCA.) performed by Juan Black MD - 2015   ELECTROPHYSIOLOGY:  EKG (LVH  left axis  T inversion inf and lat precordial leads) - 2015   EKG (Sinus Rhythm, LVH) - 2015   EKG (Sinus Rhythm, borderline LVH, inf-lat TWI.) - 2017       For other plans, see orders. Hospital problem list     Active Hospital Problems    Diagnosis Date Noted    GI bleed 10/04/2020        Subjective: Neris Linares reports       Chest pain X none  consistent with:  Non-cardiac CP         Atypical CP     None now  On going  Anginal CP     Dyspnea X none  at rest  with exertion         improved  unchanged  worse              PND X none  overnight       Orthopnea X none  improved  unchanged  worse   Presyncope X none  improved  unchanged  worse     Ambulated in hallway without symptoms   Yes   Ambulated in room without symptoms  Yes   Objective:     Physical Exam:  Overall VSSAF;    Visit Vitals  BP (!) 153/76 (BP 1 Location: Left arm, BP Patient Position: At rest)   Pulse 75   Temp 98.3 °F (36.8 °C)   Resp 16   Ht 5' 2\" (1.575 m)   Wt 84.8 kg (187 lb)   SpO2 95%   BMI 34.20 kg/m²     Temp (24hrs), Av °F (36.7 °C), Min:97.5 °F (36.4 °C), Max:98.3 °F (36.8 °C)    Patient Vitals for the past 8 hrs:   Pulse   10/06/20 0648 75   10/06/20 0242 73     Patient Vitals for the past 8 hrs:   Resp   10/06/20 0648 16   10/06/20 0242 16     Patient Vitals for the past 8 hrs:   BP   10/06/20 0648 (!) 153/76   10/06/20 0242 137/76       Intake/Output Summary (Last 24 hours) at 10/6/2020 0944  Last data filed at 10/5/2020 1808  Gross per 24 hour   Intake 2635 ml   Output 800 ml   Net 1835 ml     General Appearance: Well developed, obese, no acute distress. Ears/Nose/Mouth/Throat:   Normal MM; anicteric. JVP: WNL   Resp:   Lungs clear to auscultation bilaterally. Nl resp effort. Cardiovascular:  RRR, S1, S2 normal, no new murmur. No gallop or rub. Abdomen:   Soft, non-tender, bowel sounds are present.    Extremities: No edema bilaterally. Skin:  Neuro: Warm and dry. A/O x3, grossly nonfocal       cath site intact w/o hematoma or new bruit; distal pulse unchanged  Yes   Data Review:     Telemetry independently reviewed x sinus  chronic afib  parox afib  NSVT     ECG independently reviewed  NSR  afib  no significant changes  NSST-Tw chgs     x no new ECG provided for review   Lab results reviewed as noted below. Current medications reviewed as noted below. No results for input(s): PH, PCO2, PO2 in the last 72 hours. Recent Labs     10/05/20  0207 10/04/20  2113 10/04/20  1745 10/04/20  1308   CKMB  --   --   --  2.2   CKNDX  --   --   --  0.6   TROIQ 9.20* 8.11* 7.99* 8.20*     Recent Labs     10/06/20  0403 10/06/20  0244 10/05/20  2007 10/05/20  1509  10/05/20  0207  10/04/20  1308     --   --   --   --  143  --  140   K 3.7  --   --   --   --  3.7  --  3.5   *  --   --   --   --  110*  --  105   CO2 25  --   --   --   --  27  --  25   BUN 41*  --   --   --   --  46*  --  46*   CREA 1.64*  --   --   --   --  1.92*  --  1.89*   GFRAA 37*  --   --   --   --  31*  --  32*   *  --   --   --   --  71  --  140*   PHOS  --   --   --   --   --  5.0*  --   --    CA 7.5*  --   --   --   --  7.3*  --  7.7*   ALB  --   --   --   --   --  2.4*  --  2.5*   WBC  --  18.4*  --   --   --  15.2*  --  16.3*   HGB  --  8.3* 8.3* 8.1*   < > 5.8*   < > 4.5*   HCT  --  26.3* 26.1* 25.9*   < > 19.1*   < > 15.6*   PLT  --  290  --   --   --  301  --  343    < > = values in this interval not displayed.      Lab Results   Component Value Date/Time    Cholesterol, total 179 02/16/2015 09:56 AM    HDL Cholesterol 29 (L) 02/16/2015 09:56 AM    LDL, calculated 74 02/16/2015 09:56 AM    Triglyceride 378 (H) 02/16/2015 09:56 AM     Recent Labs     10/05/20  0207 10/04/20  1308   AP 46 50   TP 5.9* 6.3*   ALB 2.4* 2.5*   GLOB 3.5 3.8     Recent Labs     10/04/20  1308   INR 1.1   PTP 11.6*      No components found for: [place]    Current Facility-Administered Medications   Medication Dose Route Frequency    carvediloL (COREG) tablet 3.125 mg  3.125 mg Oral BID WITH MEALS    DULoxetine (CYMBALTA) capsule 60 mg  60 mg Oral DAILY    insulin lispro (HUMALOG) injection   SubCUTAneous AC&HS    glucose chewable tablet 16 g  4 Tab Oral PRN    dextrose (D50W) injection syrg 12.5-25 g  12.5-25 g IntraVENous PRN    glucagon (GLUCAGEN) injection 1 mg  1 mg IntraMUSCular PRN    sodium chloride (NS) flush 5-40 mL  5-40 mL IntraVENous Q8H    sodium chloride (NS) flush 5-40 mL  5-40 mL IntraVENous PRN    acetaminophen (TYLENOL) tablet 650 mg  650 mg Oral Q6H PRN    Or    acetaminophen (TYLENOL) suppository 650 mg  650 mg Rectal Q6H PRN    polyethylene glycol (MIRALAX) packet 17 g  17 g Oral DAILY PRN    promethazine (PHENERGAN) tablet 12.5 mg  12.5 mg Oral Q6H PRN    Or    ondansetron (ZOFRAN) injection 4 mg  4 mg IntraVENous Q6H PRN    0.9% sodium chloride infusion  75 mL/hr IntraVENous CONTINUOUS    pantoprazole (PROTONIX) 40 mg in 0.9% sodium chloride 10 mL injection  40 mg IntraVENous Q12H    0.9% sodium chloride infusion 250 mL  250 mL IntraVENous PRN        Josette Calle MD

## 2020-10-06 NOTE — PROGRESS NOTES
See op note    Large gastric mass    Plan:    Clear liquids    I will let  and patient know  Vinayak [phone])    Ct oral and iv contrast  gen surgery consult    No plans for colonoscopy      Cyn Sanchez MD  1:11 PM  10/6/2020

## 2020-10-06 NOTE — PROGRESS NOTES
I spoke to  by phone (home number)      Related that this is likely a gastric cancer    Nohemy Gutierrez MD  3:08 PM  10/6/2020

## 2020-10-06 NOTE — PROGRESS NOTES
Problem: Anemia Care Plan (Adult and Pediatric)  Goal: *Labs within defined limits  Outcome: Progressing Towards Goal  Goal: *Tolerates increased activity  Outcome: Progressing Towards Goal     Problem: Patient Education: Go to Patient Education Activity  Goal: Patient/Family Education  Outcome: Progressing Towards Goal

## 2020-10-06 NOTE — PROGRESS NOTES
Gastroenterology Progress Note    10/6/2020    Admit Date: 10/4/2020    Subjective: Follow up for:  1)  Severe anemia      No overnight events. Hgb improved to 8.3 after blood transfusion. No overt GIB  Continues to have \"generalized\" abd discomfort (but on exam is epigastric/luq)  WBC 18.4. No fevers. No CP or SOB. Current Facility-Administered Medications   Medication Dose Route Frequency    carvediloL (COREG) tablet 3.125 mg  3.125 mg Oral BID WITH MEALS    DULoxetine (CYMBALTA) capsule 60 mg  60 mg Oral DAILY    insulin lispro (HUMALOG) injection   SubCUTAneous AC&HS    glucose chewable tablet 16 g  4 Tab Oral PRN    dextrose (D50W) injection syrg 12.5-25 g  12.5-25 g IntraVENous PRN    glucagon (GLUCAGEN) injection 1 mg  1 mg IntraMUSCular PRN    sodium chloride (NS) flush 5-40 mL  5-40 mL IntraVENous Q8H    sodium chloride (NS) flush 5-40 mL  5-40 mL IntraVENous PRN    acetaminophen (TYLENOL) tablet 650 mg  650 mg Oral Q6H PRN    Or    acetaminophen (TYLENOL) suppository 650 mg  650 mg Rectal Q6H PRN    polyethylene glycol (MIRALAX) packet 17 g  17 g Oral DAILY PRN    promethazine (PHENERGAN) tablet 12.5 mg  12.5 mg Oral Q6H PRN    Or    ondansetron (ZOFRAN) injection 4 mg  4 mg IntraVENous Q6H PRN    0.9% sodium chloride infusion  75 mL/hr IntraVENous CONTINUOUS    pantoprazole (PROTONIX) 40 mg in 0.9% sodium chloride 10 mL injection  40 mg IntraVENous Q12H    0.9% sodium chloride infusion 250 mL  250 mL IntraVENous PRN        Objective:     Blood pressure (!) 153/76, pulse 75, temperature 98.3 °F (36.8 °C), resp. rate 16, height 5' 2\" (1.575 m), weight 84.8 kg (187 lb), SpO2 95 %. No intake/output data recorded. 10/04 1901 - 10/06 0700  In: 3015 [P.O.:720;  I.V.:1915]  Out: 1400 [Urine:1400]    EXAM:   Gen: obese BF in NAD  HEENT: sclera anicteric  Lungs: CTA B  Heart: RRR No M/R/G  Abd: obese, +BS, ND, soft, tender in epigastrium and LUQ  Ext: no edema  Neuro: A&O x3    Data Review    Recent Results (from the past 24 hour(s))   HGB & HCT    Collection Time: 10/05/20  8:00 AM   Result Value Ref Range    HGB 8.0 (L) 11.5 - 16.0 g/dL    HCT 25.2 (L) 35.0 - 47.0 %   GLUCOSE, POC    Collection Time: 10/05/20 11:28 AM   Result Value Ref Range    Glucose (POC) 170 (H) 65 - 100 mg/dL    Performed by Clifford Mitchell    LD    Collection Time: 10/05/20 11:45 AM   Result Value Ref Range     (H) 81 - 246 U/L   HAPTOGLOBIN    Collection Time: 10/05/20 11:45 AM   Result Value Ref Range    Haptoglobin 289 (H) 30 - 200 mg/dL   RETICULOCYTE COUNT    Collection Time: 10/05/20 11:45 AM   Result Value Ref Range    Reticulocyte count 3.4 (H) 0.7 - 2.1 %    Absolute Retic Cnt. 0.1034 (H) 0.0164 - 0.0776 M/ul   HGB & HCT    Collection Time: 10/05/20  3:09 PM   Result Value Ref Range    HGB 8.1 (L) 11.5 - 16.0 g/dL    HCT 25.9 (L) 35.0 - 47.0 %   GLUCOSE, POC    Collection Time: 10/05/20  4:09 PM   Result Value Ref Range    Glucose (POC) 191 (H) 65 - 100 mg/dL    Performed by Renetta Raymond (PCT)    HGB & HCT    Collection Time: 10/05/20  8:07 PM   Result Value Ref Range    HGB 8.3 (L) 11.5 - 16.0 g/dL    HCT 26.1 (L) 35.0 - 47.0 %   GLUCOSE, POC    Collection Time: 10/05/20  8:42 PM   Result Value Ref Range    Glucose (POC) 190 (H) 65 - 100 mg/dL    Performed by Dereje TANG    CBC W/O DIFF    Collection Time: 10/06/20  2:44 AM   Result Value Ref Range    WBC 18.4 (H) 3.6 - 11.0 K/uL    RBC 3.10 (L) 3.80 - 5.20 M/uL    HGB 8.3 (L) 11.5 - 16.0 g/dL    HCT 26.3 (L) 35.0 - 47.0 %    MCV 84.8 80.0 - 99.0 FL    MCH 26.8 26.0 - 34.0 PG    MCHC 31.6 30.0 - 36.5 g/dL    RDW 17.2 (H) 11.5 - 14.5 %    PLATELET 217 768 - 342 K/uL    MPV 10.9 8.9 - 12.9 FL    NRBC 1.8 (H) 0  WBC    ABSOLUTE NRBC 0.33 (H) 0.00 - 2.18 K/uL   METABOLIC PANEL, BASIC    Collection Time: 10/06/20  4:03 AM   Result Value Ref Range    Sodium 140 136 - 145 mmol/L    Potassium 3.7 3.5 - 5.1 mmol/L    Chloride 110 (H) 97 - 108 mmol/L    CO2 25 21 - 32 mmol/L    Anion gap 5 5 - 15 mmol/L    Glucose 145 (H) 65 - 100 mg/dL    BUN 41 (H) 6 - 20 MG/DL    Creatinine 1.64 (H) 0.55 - 1.02 MG/DL    BUN/Creatinine ratio 25 (H) 12 - 20      GFR est AA 37 (L) >60 ml/min/1.73m2    GFR est non-AA 31 (L) >60 ml/min/1.73m2    Calcium 7.5 (L) 8.5 - 10.1 MG/DL   GLUCOSE, POC    Collection Time: 10/06/20  6:55 AM   Result Value Ref Range    Glucose (POC) 165 (H) 65 - 100 mg/dL    Performed by Danuta Ray [facility]      Recent Labs     10/06/20  0244 10/05/20  2007  10/05/20  0207   WBC 18.4*  --   --  15.2*   HGB 8.3* 8.3*   < > 5.8*   HCT 26.3* 26.1*   < > 19.1*     --   --  301    < > = values in this interval not displayed. Recent Labs     10/06/20  0403 10/05/20  0207 10/04/20  1308    143 140   K 3.7 3.7 3.5   * 110* 105   CO2 25 27 25   BUN 41* 46* 46*   CREA 1.64* 1.92* 1.89*   * 71 140*   CA 7.5* 7.3* 7.7*   MG  --  2.7* 2.6*   PHOS  --  5.0*  --      Recent Labs     10/05/20  0207 10/04/20  1308   ALT 32 28   AP 46 50   TBILI 1.2* 0.5   TP 5.9* 6.3*   ALB 2.4* 2.5*   GLOB 3.5 3.8     Recent Labs     10/04/20  1308   INR 1.1   PTP 11.6*      No results for input(s): FE, TIBC, PSAT, FERR in the last 72 hours. No results found for: FOL, RBCF   No results for input(s): PH, PCO2, PO2 in the last 72 hours.   Recent Labs     10/05/20  0207 10/04/20  2113 10/04/20  1745 10/04/20  1308   CKNDX  --   --   --  0.6   TROIQ 9.20* 8.11* 7.99* 8.20*     Lab Results   Component Value Date/Time    Cholesterol, total 179 02/16/2015 09:56 AM    HDL Cholesterol 29 (L) 02/16/2015 09:56 AM    LDL, calculated 74 02/16/2015 09:56 AM    Triglyceride 378 (H) 02/16/2015 09:56 AM     Lab Results   Component Value Date/Time    Glucose (POC) 165 (H) 10/06/2020 06:55 AM    Glucose (POC) 190 (H) 10/05/2020 08:42 PM    Glucose (POC) 191 (H) 10/05/2020 04:09 PM    Glucose (POC) 170 (H) 10/05/2020 11:28 AM    Glucose (POC) 82 10/05/2020 07:13 AM     Lab Results   Component Value Date/Time    Color YELLOW/STRAW 10/05/2020 12:29 AM    Appearance CLOUDY (A) 10/05/2020 12:29 AM    Specific gravity 1.013 10/05/2020 12:29 AM    Specific gravity 1.025 08/25/2015 03:47 PM    pH (UA) 5.0 10/05/2020 12:29 AM    Protein 300 (A) 10/05/2020 12:29 AM    Glucose Negative 10/05/2020 12:29 AM    Ketone Negative 10/05/2020 12:29 AM    Bilirubin Negative 10/05/2020 12:29 AM    Urobilinogen 1.0 10/05/2020 12:29 AM    Nitrites Negative 10/05/2020 12:29 AM    Leukocyte Esterase TRACE (A) 10/05/2020 12:29 AM    Epithelial cells FEW 10/05/2020 12:29 AM    Bacteria Negative 10/05/2020 12:29 AM    WBC 10-20 10/05/2020 12:29 AM    RBC 0-5 10/05/2020 12:29 AM           Assessment:     Active Problems:    GI bleed (10/4/2020)        Plan:     Proceed with EGD as planned today. I reviewed risks/benefits. Hgb up to 8.3 after transfusion and remains stable with no overt GIB. She has epigastric/LUQ pain and does not use NSAIDs. Will look for ulcers, H. Pylori, large hiatal hernia with ansia erosions, etc.    The patient was counseled at length about the risks of dylan Covid-19 during their perioperative period and any recovery window from their procedure. The patient was made aware that dylan Covid-19  may worsen their prognosis for recovering from their procedure and lend to a higher morbidity and/or mortality risk. All material risks, benefits, and reasonable alternatives including postponing the procedure were discussed. The patient does  wish to proceed with the procedure at this time.           IRVING Segal  10/06/20  7:53 AM

## 2020-10-06 NOTE — PROCEDURES
Esophagogastroduodenoscopy    Indications:  Anemia  History of melena    Medications:  See anesthesia form    Assistants:  Jackie Garcia RN      Post procedure diagnosis:  Gastric Ulcer, Gastric Polyps, Gastric Mass    Description of Procedure:    Prior to the procedure its objectives, risks, consequences and alternatives were discussed with the patient who then elected to proceed. The Olympus video endoscope was inserted under direct vision into the mouth and then into the esophagus. The esophagus looked normal.    The z-line was located at 40cm. Immediately distal to the z line there was a 4 x 6 cm mass. The mass was soft to biopsy and had 3 cm of ulcerated surface. It was not bleeding. It appears malignant. In the gastric antrum at 10:00 there was a 8mm superficial ulcer and some surrounding erythema  Gastric polyps (5-10mm) were seen in the body. There were no other diagnostic abnormalities of the body, fundus, antrum, cardia and incisura of the stomach. This included direct and retroflexion examination. The first and second portion of the duodenum appeared normal. I took duodenal biopsies for celiac disease. I took gastric ulcer and gastric biopsies. I took biopsies of the mass      Complications: There were no apparent complications and the patient tolerated the procedure well.         Implants:  none    Estimated Blood Loss:  About 10 ml  Specimens Removed:  Duodenum  Gastric ulcer (antrum) and stomach  Gastric mas  Impressions:  Large proximal gastric mass, likely malignant  Small pre pyloric ulcer  Gastric poliyps      Signed By: Windy Moreno MD                        October 6, 2020     1:15 PM

## 2020-10-06 NOTE — PROGRESS NOTES
TRANSFER - OUT REPORT:    Verbal report given to Ronald Stephens RN on Chino Martínez  being transferred to [facility] routine progression of care       Report consisted of patients Situation, Background, Assessment and   Recommendations(SBAR). Information from the following report(s) SBAR was reviewed with the receiving nurse. Lines:   Peripheral IV 10/06/20 Left Wrist (Active)   Site Assessment Clean, dry, & intact 10/06/20 1115   Phlebitis Assessment 0 10/06/20 1115   Infiltration Assessment 0 10/06/20 1115   Dressing Status Clean, dry, & intact 10/06/20 1115   Dressing Type Tape;Transparent 10/06/20 1115   Hub Color/Line Status Yellow;Capped;Flushed 10/06/20 1115   Alcohol Cap Used Yes 10/06/20 1115        Opportunity for questions and clarification was provided.       Patient transported with:  Transporter

## 2020-10-06 NOTE — PROGRESS NOTES
0700 Received bedside report from Alexandro Hernández, [address] [facility] assumed care of the pt      [id number] Spoke with endoscopy was informed that pt procedure will be at 1300. Transport will be to pick pt up at [phone] Pt off floor for EGD procedure     [phone] with Ronna Coyle in [address]. Informed me to order and administer barium sulfate to pt. Call Ronna Coyle once pt has been administered barium sulfate     1900 Bedside shift change report given to Patricia Sewell (oncoming nurse) by Dale Yost RN (offgoing nurse). Report included the following information SBAR, Kardex, Intake/Output and MAR.

## 2020-10-06 NOTE — ANESTHESIA POSTPROCEDURE EVALUATION
Procedure(s):  ESOPHAGOGASTRODUODENAL (EGD) BIOPSY  ESOPHAGOGASTRODUODENOSCOPY (EGD). total IV anesthesia, MAC    Anesthesia Post Evaluation        Patient location during evaluation: PACU  Note status: Adequate. Level of consciousness: responsive to verbal stimuli and sleepy but conscious  Pain management: satisfactory to patient  Airway patency: patent  Anesthetic complications: no  Cardiovascular status: acceptable  Respiratory status: acceptable  Hydration status: acceptable  Comments: +Post-Anesthesia Evaluation and Assessment    Patient: Dave Nath MRN: [id number]  SSN: [id number]   YOB: 1945  Age: 70 y.o. Sex: female      Cardiovascular Function/Vital Signs    BP (!) 141/54 (BP 1 Location: Right arm, BP Patient Position: At rest)   Pulse 75   Temp 36.6 °C (97.9 °F)   Resp 20   Ht 5' 2\" (1.575 m)   Wt 84.8 kg (187 lb)   SpO2 97%   BMI 34.20 kg/m²     Patient is status post Procedure(s):  ESOPHAGOGASTRODUODENAL (EGD) BIOPSY  ESOPHAGOGASTRODUODENOSCOPY (EGD). Nausea/Vomiting: Controlled. Postoperative hydration reviewed and adequate. Pain:  Pain Scale 1: Numeric (0 - 10) (10/06/20 1325)  Pain Intensity 1: 0 (10/06/20 1325)   Managed. Neurological Status: At baseline. Mental Status and Level of Consciousness: Arousable. Pulmonary Status:   O2 Device: Room air (10/06/20 1348)   Adequate oxygenation and airway patent. Complications related to anesthesia: None    Post-anesthesia assessment completed. No concerns. Signed By: Abdias Hadley MD    10/6/2020  Post anesthesia nausea and vomiting:  controlled      INITIAL Post-op Vital signs:   Vitals Value Taken Time   /54 10/6/2020  2:46 PM   Temp 36.6 °C (97.9 °F) 10/6/2020  2:45 PM   Pulse 71 10/6/2020  3:59 PM   Resp 20 10/6/2020  2:45 PM   SpO2 99 % 10/6/2020  3:03 PM   Vitals shown include unvalidated device data.

## 2020-10-06 NOTE — ROUTINE PROCESS
[address] END OF SHIFT REPORT      Bedside shift change report GIVEN TO Mesfin Zazueta RN. Report included the following information SBAR, Kardex, MAR and Recent Results. SIGNIFICANT CHANGES DURING SHIFT:  Patient has been NPO since midnight. She is schedule for an EGD this morning. Consent is on the chart. This morning's Hgb was 8.3 up from 5.8 on admission. CONCERNS TO ADDRESS WITH MD:  None        [facility] NURSING NOTE   Admission Date 10/4/2020   Admission Diagnosis GI bleed [K92.2]   Consults IP CONSULT TO CARDIOLOGY  IP CONSULT TO GASTROENTEROLOGY  IP CONSULT TO HOSPITALIST      Cardiac Monitoring [x] Yes [] No      Purposeful Hourly Rounding [x] Yes    Flor Score Total Score: 3   Flor score 3 or > [x] Bed Alarm [] Avasys [] 1:1 sitter [] Patient refused (Signed refusal form in chart)   Rufus Score Rufus Score: 16   Rufus score 14 or < [] PMT consult [] Wound Care consult    []  Specialty bed  [] Nutrition consult      Influenza Vaccine Received Flu Vaccine for Current Season (usually Sept-March): No    Patient/Guardian Refused (Notify MD): Yes      Oxygen needs? [x] Room air Oxygen @  []1L    []2L    []3L   []4L    []5L   []6L via  NC   Chronic home O2 use? [] Yes [] No  Perform O2 challenge test and document in progress note using smartphrase (.Homeoxygen)      Last bowel movement Last Bowel Movement Date: 10/05/20      Urinary Catheter             LDAs               Peripheral IV 10/06/20 Left Wrist (Active)   Site Assessment Clean, dry, & intact 10/06/20 0330   Phlebitis Assessment 0 10/06/20 0330   Infiltration Assessment 0 10/06/20 0330   Dressing Status Clean, dry, & intact 10/06/20 0330   Dressing Type Tape;Transparent 10/06/20 0330   Hub Color/Line Status Yellow; Flushed 10/06/20 0330                         Readmission Risk Assessment Tool Score High Risk            40       Total Score        2 . Living with Significant Other. Assisted Living. LTAC. SNF. or   Rehab    5 Pt.  Coverage (Medicare=5 , Medicaid, or Self-Pay=4)    33 Charlson Comorbidity Score (Age + Comorbid Conditions)        Criteria that do not apply:    Has Seen PCP in Last 6 Months (Yes=3, No=0)    Patient Length of Stay (>5 days = 3)    IP Visits Last 12 Months (1-3=4, 4=9, >4=11)       Expected Length of Stay - - -   Actual Length of Stay 2

## 2020-10-07 ENCOUNTER — ANESTHESIA EVENT (OUTPATIENT)
Dept: ENDOSCOPY | Age: 75
DRG: 374 | End: 2020-10-07
Payer: MEDICARE

## 2020-10-07 LAB
A1 AB SERPL QL: NORMAL
A2 CELLS,A2C: NORMAL
ABO + RH BLD: NORMAL
ANION GAP SERPL CALC-SCNC: 6 MMOL/L (ref 5–15)
BASOPHILS # BLD: 0 K/UL (ref 0–0.1)
BASOPHILS NFR BLD: 0 % (ref 0–1)
BLD PROD TYP BPU: NORMAL
BLOOD GROUP ANTIBODIES SERPL: NORMAL
BLOOD GROUP ANTIBODIES SERPL: NORMAL
BPU ID: NORMAL
BUN SERPL-MCNC: 38 MG/DL (ref 6–20)
BUN/CREAT SERPL: 27 (ref 12–20)
CALCIUM SERPL-MCNC: 7.8 MG/DL (ref 8.5–10.1)
CHLORIDE SERPL-SCNC: 112 MMOL/L (ref 97–108)
CO2 SERPL-SCNC: 24 MMOL/L (ref 21–32)
CREAT SERPL-MCNC: 1.43 MG/DL (ref 0.55–1.02)
CROSSMATCH RESULT,%XM: NORMAL
DIFFERENTIAL METHOD BLD: ABNORMAL
EOSINOPHIL # BLD: 0.1 K/UL (ref 0–0.4)
EOSINOPHIL NFR BLD: 1 % (ref 0–7)
ERYTHROCYTE [DISTWIDTH] IN BLOOD BY AUTOMATED COUNT: 16.6 % (ref 11.5–14.5)
GLUCOSE BLD STRIP.AUTO-MCNC: 125 MG/DL (ref 65–100)
GLUCOSE BLD STRIP.AUTO-MCNC: 147 MG/DL (ref 65–100)
GLUCOSE BLD STRIP.AUTO-MCNC: 201 MG/DL (ref 65–100)
GLUCOSE BLD STRIP.AUTO-MCNC: 235 MG/DL (ref 65–100)
GLUCOSE SERPL-MCNC: 111 MG/DL (ref 65–100)
HCT VFR BLD AUTO: 27.4 % (ref 35–47)
HGB BLD-MCNC: 8.3 G/DL (ref 11.5–16)
IMM GRANULOCYTES # BLD AUTO: 0.1 K/UL (ref 0–0.04)
IMM GRANULOCYTES NFR BLD AUTO: 1 % (ref 0–0.5)
LYMPHOCYTES # BLD: 2.1 K/UL (ref 0.8–3.5)
LYMPHOCYTES NFR BLD: 15 % (ref 12–49)
MCH RBC QN AUTO: 26.3 PG (ref 26–34)
MCHC RBC AUTO-ENTMCNC: 30.3 G/DL (ref 30–36.5)
MCV RBC AUTO: 87 FL (ref 80–99)
MONOCYTES # BLD: 0.9 K/UL (ref 0–1)
MONOCYTES NFR BLD: 7 % (ref 5–13)
NEUTS SEG # BLD: 10.7 K/UL (ref 1.8–8)
NEUTS SEG NFR BLD: 76 % (ref 32–75)
NRBC # BLD: 0.17 K/UL (ref 0–0.01)
NRBC BLD-RTO: 1.2 PER 100 WBC
PLATELET # BLD AUTO: 241 K/UL (ref 150–400)
PMV BLD AUTO: 10 FL (ref 8.9–12.9)
POTASSIUM SERPL-SCNC: 3.8 MMOL/L (ref 3.5–5.1)
RBC # BLD AUTO: 3.15 M/UL (ref 3.8–5.2)
SERVICE CMNT-IMP: ABNORMAL
SODIUM SERPL-SCNC: 142 MMOL/L (ref 136–145)
SPECIMEN EXP DATE BLD: NORMAL
STATUS OF UNIT,%ST: NORMAL
UNIT DIVISION, %UDIV: 0
WBC # BLD AUTO: 13.9 K/UL (ref 3.6–11)

## 2020-10-07 PROCEDURE — 85025 COMPLETE CBC W/AUTO DIFF WBC: CPT

## 2020-10-07 PROCEDURE — 74011250637 HC RX REV CODE- 250/637: Performed by: GENERAL ACUTE CARE HOSPITAL

## 2020-10-07 PROCEDURE — 80048 BASIC METABOLIC PNL TOTAL CA: CPT

## 2020-10-07 PROCEDURE — 74011250636 HC RX REV CODE- 250/636: Performed by: INTERNAL MEDICINE

## 2020-10-07 PROCEDURE — 36415 COLL VENOUS BLD VENIPUNCTURE: CPT

## 2020-10-07 PROCEDURE — 51798 US URINE CAPACITY MEASURE: CPT

## 2020-10-07 PROCEDURE — 65660000001 HC RM ICU INTERMED STEPDOWN

## 2020-10-07 PROCEDURE — C9113 INJ PANTOPRAZOLE SODIUM, VIA: HCPCS | Performed by: INTERNAL MEDICINE

## 2020-10-07 PROCEDURE — 74011636637 HC RX REV CODE- 636/637: Performed by: GENERAL ACUTE CARE HOSPITAL

## 2020-10-07 PROCEDURE — 74011250636 HC RX REV CODE- 250/636: Performed by: GENERAL ACUTE CARE HOSPITAL

## 2020-10-07 PROCEDURE — 82962 GLUCOSE BLOOD TEST: CPT

## 2020-10-07 PROCEDURE — 74011000250 HC RX REV CODE- 250: Performed by: INTERNAL MEDICINE

## 2020-10-07 PROCEDURE — 99222 1ST HOSP IP/OBS MODERATE 55: CPT | Performed by: SURGERY

## 2020-10-07 PROCEDURE — 74011250637 HC RX REV CODE- 250/637: Performed by: INTERNAL MEDICINE

## 2020-10-07 RX ORDER — LOSARTAN POTASSIUM 25 MG/1
25 TABLET ORAL DAILY
Status: DISCONTINUED | OUTPATIENT
Start: 2020-10-07 | End: 2020-10-08

## 2020-10-07 RX ADMIN — SODIUM CHLORIDE 40 MG: 9 INJECTION, SOLUTION INTRAMUSCULAR; INTRAVENOUS; SUBCUTANEOUS at 08:29

## 2020-10-07 RX ADMIN — CARVEDILOL 3.12 MG: 3.12 TABLET, FILM COATED ORAL at 08:28

## 2020-10-07 RX ADMIN — SODIUM CHLORIDE 75 ML/HR: 900 INJECTION, SOLUTION INTRAVENOUS at 06:27

## 2020-10-07 RX ADMIN — INSULIN LISPRO 3 UNITS: 100 INJECTION, SOLUTION INTRAVENOUS; SUBCUTANEOUS at 17:29

## 2020-10-07 RX ADMIN — DULOXETINE HYDROCHLORIDE 60 MG: 30 CAPSULE, DELAYED RELEASE ORAL at 08:28

## 2020-10-07 RX ADMIN — Medication 10 ML: at 15:08

## 2020-10-07 RX ADMIN — CARVEDILOL 3.12 MG: 3.12 TABLET, FILM COATED ORAL at 17:32

## 2020-10-07 RX ADMIN — Medication 10 ML: at 06:28

## 2020-10-07 RX ADMIN — SODIUM CHLORIDE 40 MG: 9 INJECTION, SOLUTION INTRAMUSCULAR; INTRAVENOUS; SUBCUTANEOUS at 21:36

## 2020-10-07 RX ADMIN — Medication 10 ML: at 21:37

## 2020-10-07 RX ADMIN — LOSARTAN POTASSIUM 25 MG: 25 TABLET, FILM COATED ORAL at 09:24

## 2020-10-07 RX ADMIN — INSULIN LISPRO 3 UNITS: 100 INJECTION, SOLUTION INTRAVENOUS; SUBCUTANEOUS at 11:57

## 2020-10-07 NOTE — PROGRESS NOTES
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 No role for anticoagulation; ASA when ok with GI. Cont bblocker and ARB as BP tolerates  -elevated trops likely represents demand ischemic  -EKG NS, LVH, ?lateral ST depression     CKD 3  Admission 1.89, monitor closely  Avoid nephrotoxic drugs, adjust all meds to GFR.      DM2   - NPO after MN, watch BS  C/w SS as needed      GERD  HLP        Code status: Full  Prophylaxis: SCD's      Baseline: independent   Recommended Disposition: Home w/Family     Subjective:     Chief Complaint / Reason for Physician Visit  Patient seen post-procedure following EGD. Some persistent abd pain. Denies N/V or gross blood per rectum    Discussed with RN events overnight. Review of Systems:  Symptom Y/N Comments  Symptom Y/N Comments   Fever/Chills n   Chest Pain n    Poor Appetite    Edema     Cough    Abdominal Pain y mild   Sputum    Joint Pain     SOB/ARENAS    Pruritis/Rash     Nausea/vomit n   Tolerating PT/OT     Diarrhea    Tolerating Diet y clears   Constipation    Other       Could NOT obtain due to:      Objective:     VITALS:   Last 24hrs VS reviewed since prior progress note.  Most recent are:  Patient Vitals for the past 24 hrs:   Temp Pulse Resp BP SpO2   10/06/20 1835 97.6 °F (36.4 °C) 73 18 (!) 150/76 97 %   10/06/20 1445 97.9 °F (36.6 °C) 75 20 (!) 141/54 97 %   10/06/20 1409 98 °F (36.7 °C) 73 20 (!) 147/75 97 %   10/06/20 1348 -- 73 23 (!) 152/66 98 %   10/06/20 1345 -- 72 23 (!) 153/68 98 %   10/06/20 1342 -- 72 22 (!) 154/70 97 %   10/06/20 1339 -- 71 22 (!) 146/67 96 %   10/06/20 1336 -- 74 27 (!) 147/86 98 %   10/06/20 1333 -- 70 23 (!) 144/66 98 %   10/06/20 1330 -- 71 17 (!) 148/88 94 %   10/06/20 1329 -- 71 28 124/71 99 %   10/06/20 1325 98 °F (36.7 °C) 71 25 127/81 --   10/06/20 1314 -- 66 22 (!) 98/42 97 %   10/06/20 1230 -- 70 23 (!) 151/71 97 %   10/06/20 1133 97.9 °F (36.6 °C) 70 24 (!) 152/80 98 %   10/06/20 1108 -- -- -- (!) 153/76 --   10/06/20 0648 98.3 °F (36.8 °C) 75 16 (!) 153/76 --   10/06/20 0242 98 °F (36.7 °C) 73 16 137/76 --   10/05/20 2235 97.9 °F (36.6 °C) 72 18 124/68 --       Intake/Output Summary (Last 24 hours) at 10/6/2020 2233  Last data filed at 10/6/2020 1313  Gross per 24 hour   Intake 250 ml   Output --   Net 250 ml        PHYSICAL EXAM:  General: WD, WN. Alert, cooperative, no acute distress    EENT:  EOMI. Anicteric sclerae. MMM  Resp:  CTA bilaterally, no wheezing or rales. No accessory muscle use  CV:  Regular  rhythm,  No edema  GI:  Soft, mild distension, mild epigastric tenderness. Neurologic:  Alert and oriented X 3, normal speech,   Psych:   Good insight. Not anxious nor agitated  Skin:  No rashes. No jaundice    Reviewed most current lab test results and cultures  YES  Reviewed most current radiology test results   YES  Review and summation of old records today    NO  Reviewed patient's current orders and MAR    YES  PMH/SH reviewed - no change compared to H&P  ________________________________________________________________________  Care Plan discussed with:    Comments   Patient x    Family      RN x    Care Manager     Consultant                       x Multidiciplinary team rounds were held today with , nursing, pharmacist and clinical coordinator. Patient's plan of care was discussed; medications were reviewed and discharge planning was addressed.      ________________________________________________________________________  Total NON critical care TIME: 28   Minutes    Total CRITICAL CARE TIME Spent:   Minutes non procedure based      Comments   >50% of visit spent in counseling and coordination of care x    ________________________________________________________________________  Rinku Gardner DO     Procedures: see electronic medical records for all procedures/Xrays and details which were not copied into this note but were reviewed prior to creation of Plan. LABS:  I reviewed today's most current labs and imaging studies. Pertinent labs include:  Recent Labs     10/06/20  0244 10/05/20  2007 10/05/20  1509  10/05/20  0207  10/04/20  1308   WBC 18.4*  --   --   --  15.2*  --  16.3*   HGB 8.3* 8.3* 8.1*   < > 5.8*   < > 4.5*   HCT 26.3* 26.1* 25.9*   < > 19.1*   < > 15.6*     --   --   --  301  --  343    < > = values in this interval not displayed.      Recent Labs     10/06/20  0403 10/05/20  0207 10/04/20  1308    143 140   K 3.7 3.7 3.5   * 110* 105   CO2 25 27 25   * 71 140*   BUN 41* 46* 46*   CREA 1.64* 1.92* 1.89*   CA 7.5* 7.3* 7.7*   MG  --  2.7* 2.6*   PHOS  --  5.0*  --    ALB  --  2.4* 2.5*   TBILI  --  1.2* 0.5   ALT  --  32 28   INR  --   --  1.1       Signed: Lion Torres DO

## 2020-10-07 NOTE — PROGRESS NOTES
Problem: Anemia Care Plan (Adult and Pediatric)  Goal: *Labs within defined limits  Outcome: Progressing Towards Goal  Goal: *Tolerates increased activity  Outcome: Progressing Towards Goal     Problem: Patient Education: Go to Patient Education Activity  Goal: Patient/Family Education  Outcome: Progressing Towards Goal     Problem: Falls - Risk of  Goal: *Absence of Falls  Description: Document Devon Moreland Fall Risk and appropriate interventions in the flowsheet. Outcome: Progressing Towards Goal  Note: Fall Risk Interventions:  Mobility Interventions: Bed/chair exit alarm, Communicate number of staff needed for ambulation/transfer, OT consult for ADLs, Patient to call before getting OOB, PT Consult for mobility concerns, PT Consult for assist device competence, Strengthening exercises (ROM-active/passive), Utilize walker, cane, or other assistive device         Medication Interventions: Bed/chair exit alarm, Patient to call before getting OOB, Teach patient to arise slowly, Utilize gait belt for transfers/ambulation    Elimination Interventions: Bed/chair exit alarm, Call light in reach, Patient to call for help with toileting needs, Stay With Me (per policy), Toilet paper/wipes in reach, Toileting schedule/hourly rounds    History of Falls Interventions: Bed/chair exit alarm, Door open when patient unattended, Room close to nurse's station, Investigate reason for fall         Problem: Patient Education: Go to Patient Education Activity  Goal: Patient/Family Education  Outcome: Progressing Towards Goal     Problem: Pressure Injury - Risk of  Goal: *Prevention of pressure injury  Description: Document Rufus Scale and appropriate interventions in the flowsheet.   Outcome: Progressing Towards Goal  Note: Pressure Injury Interventions:  Sensory Interventions: Assess changes in LOC, Assess need for specialty bed, Avoid rigorous massage over bony prominences, Chair cushion, Check visual cues for pain, Discuss PT/OT consult with provider, Keep linens dry and wrinkle-free, Maintain/enhance activity level, Minimize linen layers, Pressure redistribution bed/mattress (bed type), Turn and reposition approx.  every two hours (pillows and wedges if needed)    Moisture Interventions: Absorbent underpads, Limit adult briefs, Minimize layers    Activity Interventions: Assess need for specialty bed, Chair cushion, Increase time out of bed, Pressure redistribution bed/mattress(bed type), PT/OT evaluation    Mobility Interventions: Assess need for specialty bed, Chair cushion, Pressure redistribution bed/mattress (bed type), PT/OT evaluation, HOB 30 degrees or less    Nutrition Interventions: Document food/fluid/supplement intake, Discuss nutritional consult with provider, Offer support with meals,snacks and hydration    Friction and Shear Interventions: HOB 30 degrees or less, Lift sheet, Minimize layers                Problem: Patient Education: Go to Patient Education Activity  Goal: Patient/Family Education  Outcome: Progressing Towards Goal

## 2020-10-07 NOTE — PROGRESS NOTES
Problem: Falls - Risk of  Goal: *Absence of Falls  Description: Document Giancarlo Emerson Fall Risk and appropriate interventions in the flowsheet.   Outcome: Progressing Towards Goal  Note: Fall Risk Interventions:  Mobility Interventions: Bed/chair exit alarm, Communicate number of staff needed for ambulation/transfer, OT consult for ADLs, Patient to call before getting OOB, PT Consult for mobility concerns, PT Consult for assist device competence, Strengthening exercises (ROM-active/passive), Utilize walker, cane, or other assistive device         Medication Interventions: Bed/chair exit alarm, Evaluate medications/consider consulting pharmacy, Patient to call before getting OOB, Teach patient to arise slowly    Elimination Interventions: Bed/chair exit alarm, Call light in reach, Patient to call for help with toileting needs, Stay With Me (per policy), Toileting schedule/hourly rounds    History of Falls Interventions: Bed/chair exit alarm, Door open when patient unattended, Investigate reason for fall, Room close to nurse's station

## 2020-10-07 NOTE — PROGRESS NOTES
Cardiology Progress Note  10/7/2020     Admit Date: 10/4/2020  Admit Diagnosis: GI bleed [K92.2]  CC: none currently  Cardiologist:  Dr Abby Lino then Dr Sammi Roberts (last OV 2017). Cardiac Assessment/Plan:    1) CAD: Distal RCA NEENA 8/2015 for NQWMI; Med Rx mid circ and diffusely dz diagonals. *NQWMI 10/2020 (trop 9), c/w type II from profound anemia. * EF 30-35% 10/6/20. 2) HTN  3) DM  4) Dyslipidemia  5) CKD III: Cr 1.43/gfr 42 7/2020 (Dr Talisha Kaba). 6) Obesity: ?NANY. 7) Non-compliant with some meds per pt 10/2020. Poor compliance with f/u OV. 8) Anemia POA (Hg 4.5): gastric mass (?CA); path pending.     Presenting with AMS, profound anemia; NQWMI. GUANACO on CKD. No CP/change in chronic ARENAS.     Rec POA: Need to correct profound anemia; Agree with GI eval.   I suspect NQWMI is type II; no current role for invasive Rx. No role for anticoagulation; ASA when ok with GI. Cont bblocker and ARB as BP tolerates.         10/5: Afeb; BPs 110-140s; HR 70-80s; 98% RA. Hg 5.8; Cr 1.92 from 1.89; trop 8-9 range. Cardiac meds: none; NS @ 75. Echo pending; still needs anemia corrected. Add low dose coreg (3.125 bid) w/ hold parameters. 10/6: -150s; HR 70-90s; 95-97% RA. Net + 1.6L. Hg 8.3 (stable); Nl K; Cr 1.64. Cardiac meds: corege 3.125 bid; NS @ 75. Rec: echo pending; anemia stable currently. Add back ARB as able. Echo 10/6/20:   · LV: Estimated LVEF is 30 - 35%. Normal cavity size. Mild concentric hypertrophy. Moderate-to-severely reduced systolic function. · TV: Mild to moderate tricuspid valve regurgitation is present. 10/7: BPs 140-160; HR 70s; NSR. 95% RA. No UOP recorded yesterday. Hg stable @ 8.3; Cr 1.43/gfr 44. Cardiac meds: coreg 3.125 bid; NS @ 75. Rec: add losartan 25 qday; uptitrate meds as able/needed. D/C IVF. Med Rx for now; cant have cath/PCI until able to have full antiplt agents (asa/plavix for at least 1 month IF BMS used).   Awaiting on stomach mass pathology. I will not be here tomorrow nor the rest if this week. Dr Carlitos Bernal will see the patient in my absence.    _________________________________________________________________    As noted POA: \"The patient reports no chest pain/pressure; no change in chronic ARENAS. No PND, orthopnea, palpitations, pre-syncope, syncope, new peripheral edema, or decrease in exercise tolerance. No current complaints.     Fell 3 days ago (gen weak; not lightheaded; no syncope); Decreased LOC last few days per : \"asleep\"; wakes when shaken. +Sx NANY.     Dark stool several weeks ago, none since.     ECG: Sinus; ; IVCD 110; LVH; Lat ST depression (more than 2015). Tele: sinus  CXR: \"Enlarged cardiac silhouette, otherwise no acute disease\"     Notable labs: WBC 16.3; Hg 4.5. Cr 1.89 (nl in 2017); BUN 46. ; neg MB; trop 8. 2. \"  _____________________________________________________________________________  Notable prior cardiac history:  @ last OV 1/24/17:          Ms Yandy Bolton has a h/o CAD (NEENA in distal RCA 8/2015 for NSTEMI,  Normal LV EF, 75% mid-circ; Dr Erin Cisneros), HTN, DM, and dyslipidemia; She has chronic mild ARENAS.     7/2016: No change ARENAS; no CP. Worse LE edema: some salt; BP poorly controlled per pt (chronic); Last week, PCP decreased norvasc to 5 qd and added diuretic.     1/2017: No angina; Minor ARENAS (doesnt need to stop activity); Using less salt; Wt down 10# with diet.        IMPRESSION AND PLAN  01. Atherosclerotic heart disease of native coronary artery with other forms of angina pectoris:  No angina; Stable dyspnea; could consider PCI of mid circ if needed: MPI next year/sooner prn.     We discussed the signs and symptoms of unstable angina, myocardial infarction and malignant arrhythmia. The patient knows to seek immediate medical attention should they occur. ECG done today  Exercise MPI to be done with next visit. 02. Old myocardial infarction: This condition is stable. 03.  Hypertensive heart disease without heart failure:  Adequately controlled. 04. Mixed hyperlipidemia:  Lipid labs drawn by PCP. The patient is tolerating lipid lowering therapy well. 05. Localized edema:  Resolved. The patient was instructed on a low sodium diet. 06. Type 2 diabetes mellitus without complications: This condition is stable. 07. Long term (current) use of aspirin:  No bleeding. Continue Rx   08. Long term (current) use of antithrombotics/antiplatelets:  No bleeding. D/C plavix at next OV if unremarkable MPI.   09. Body mass index (BMI) 36.0-36.9, adult:  The patient was instructed on AHA diet and regular exercise.            ORDERS:  1 ECG done today   2 Myocardial Perfusion Study / Sy protocol with next visit   3 Return office visit with Lauren Moody MD in 1 Year. 4 The patient was instructed on AHA diet and regular exercise.    [facility]   6 Hypertension Educational Materials   7 Patient counseled on the following: Low Salt Diet.         1/2017 MEDICATION LIST  Medication Sig Description   Amaryl 4 mg tablet take 1 tablet by oral route 2 times every day   amlodipine 10 mg tablet take 1 tablet by oral route  every day   aspirin 81 mg tablet,delayed release take 1 tablet by oral route  every day   atorvastatin 40 mg tablet take 1 tablet by oral route  every bedtime   clonidine HCl 0.1 mg tablet take 1 tablet by oral route  every day   Coreg 25 mg tablet take 1 tablet by oral route 2 times every day with food   Diovan  mg-25 mg tablet take 1 tablet by oral route  every day   furosemide 20 mg tablet take 1 tablet by oral route  every day   gabapentin 100 mg capsule take 1 capsule by oral route 3 times every day   hydralazine 25 mg tablet take 1 tablet by oral route 2 times every day with food   Lantus 100 unit/mL subcutaneous solution inject by subcutaneous route as per insulin protocol   Nitrostat 0.4 mg sublingual tablet place 1 tablet by sublingual route at the 1st sign of attack; may repeat every 5 min until relief; if pain persists after 3 tablets in 15 min, prompt medical attention is recommended   pantoprazole 40 mg tablet,delayed release take 1 tablet by oral route  every day   Plavix 75 mg tablet take 1 tablet by oral route  every day            _____________________________________________________________________________________________  CARDIAC HISTORY  CAD:  1 NSTEMI (PCI (Successful PTCA and stenting of totally occluded distal RCA. Resolute Integrity used. ) - 8/26/2015) - 8/28/2015      RISK FACTORS:  1 Hypertension   2 Dyslipidemia   3 Type 2 Diabetes         CARDIOVASCULAR PROCEDURES  CATH LAB:  Cath (EF 0.65 (65%), 30% Proximal LAD, 30% Mid LAD, 75% Mid CX, 50% Proximal RCA, 50% Mid RCA, 100% Distal RCA, small diffuse diseased Diagonals: Resolute NEENA to distal RCA. ) performed by Joie Martines MD - 8/26/2015   ELECTROPHYSIOLOGY:  EKG (LVH  left axis  T inversion inf and lat precordial leads) - 9/11/2015   EKG (Sinus Rhythm, LVH) - 12/17/2015   EKG (Sinus Rhythm, borderline LVH, inf-lat TWI.) - 1/24/2017       For other plans, see orders.   Hospital problem list     Active Hospital Problems    Diagnosis Date Noted    Anemia, unspecified 10/06/2020    GI bleed 10/04/2020        Subjective: Bella Terrazas reports       Chest pain X none  consistent with:  Non-cardiac CP         Atypical CP     None now  On going  Anginal CP     Dyspnea X none  at rest  with exertion         improved  unchanged  worse              PND X none  overnight       Orthopnea X none  improved  unchanged  worse   Presyncope X none  improved  unchanged  worse     Ambulated in hallway without symptoms   Yes   Ambulated in room without symptoms  Yes   Objective:     Physical Exam:  Overall VSSAF;    Visit Vitals  BP (!) 169/88 (BP 1 Location: Right arm, BP Patient Position: At rest)   Pulse 77   Temp 98.3 °F (36.8 °C)   Resp 20   Ht 5' 2\" (1.575 m)   Wt 92.1 kg (203 lb 1.6 oz)   SpO2 95% BMI 37.15 kg/m²     Temp (24hrs), Av °F (36.7 °C), Min:97.6 °F (36.4 °C), Max:98.3 °F (36.8 °C)    Patient Vitals for the past 8 hrs:   Pulse   10/07/20 0630 77     Patient Vitals for the past 8 hrs:   Resp   10/07/20 0630 20   10/07/20 0311 20     Patient Vitals for the past 8 hrs:   BP   10/07/20 0630 (!) 169/88   10/07/20 0311 (!) 150/68       Intake/Output Summary (Last 24 hours) at 10/7/2020 0807  Last data filed at 10/6/2020 1313  Gross per 24 hour   Intake 250 ml   Output --   Net 250 ml     General Appearance: Well developed, obese, no acute distress. Ears/Nose/Mouth/Throat:   Normal MM; anicteric. JVP: WNL   Resp:   Lungs clear to auscultation bilaterally. Nl resp effort. Cardiovascular:  RRR, S1, S2 normal, no new murmur. No gallop or rub. Abdomen:   Soft, non-tender, bowel sounds are present. Extremities: No edema bilaterally. Skin:  Neuro: Warm and dry. A/O x3, grossly nonfocal       cath site intact w/o hematoma or new bruit; distal pulse unchanged  Yes   Data Review:     Telemetry independently reviewed x sinus  chronic afib  parox afib  NSVT     ECG independently reviewed  NSR  afib  no significant changes  NSST-Tw chgs     x no new ECG provided for review   Lab results reviewed as noted below. Current medications reviewed as noted below. No results for input(s): PH, PCO2, PO2 in the last 72 hours.   Recent Labs     10/05/20  0207 10/04/20  2113 10/04/20  1745 10/04/20  1308   CKMB  --   --   --  2.2   CKNDX  --   --   --  0.6   TROIQ 9.20* 8.11* 7.99* 8.20*     Recent Labs     10/07/20  0321 10/06/20  0403 10/06/20  0244 10/05/20  2007  10/05/20  0207  10/04/20  1308    140  --   --   --  143  --  140   K 3.8 3.7  --   --   --  3.7  --  3.5   * 110*  --   --   --  110*  --  105   CO2 24 25  --   --   --  27  --  25   BUN 38* 41*  --   --   --  46*  --  46*   CREA 1.43* 1.64*  --   --   --  1.92*  --  1.89*   GFRAA 44* 37*  --   --   --  31*  --  32*   * 145* --   --   --  71  --  140*   PHOS  --   --   --   --   --  5.0*  --   --    CA 7.8* 7.5*  --   --   --  7.3*  --  7.7*   ALB  --   --   --   --   --  2.4*  --  2.5*   WBC 13.9*  --  18.4*  --   --  15.2*  --  16.3*   HGB 8.3*  --  8.3* 8.3*   < > 5.8*   < > 4.5*   HCT 27.4*  --  26.3* 26.1*   < > 19.1*   < > 15.6*     --  290  --   --  301  --  343    < > = values in this interval not displayed.      Lab Results   Component Value Date/Time    Cholesterol, total 179 02/16/2015 09:56 AM    HDL Cholesterol 29 (L) 02/16/2015 09:56 AM    LDL, calculated 74 02/16/2015 09:56 AM    Triglyceride 378 (H) 02/16/2015 09:56 AM     Recent Labs     10/05/20  0207 10/04/20  1308   AP 46 50   TP 5.9* 6.3*   ALB 2.4* 2.5*   GLOB 3.5 3.8     Recent Labs     10/04/20  1308   INR 1.1   PTP 11.6*      No components found for: GLPOC    Current Facility-Administered Medications   Medication Dose Route Frequency    sodium chloride (NS) flush 5-40 mL  5-40 mL IntraVENous Q8H    sodium chloride (NS) flush 5-40 mL  5-40 mL IntraVENous PRN    carvediloL (COREG) tablet 3.125 mg  3.125 mg Oral BID WITH MEALS    DULoxetine (CYMBALTA) capsule 60 mg  60 mg Oral DAILY    insulin lispro (HUMALOG) injection   SubCUTAneous AC&HS    glucose chewable tablet 16 g  4 Tab Oral PRN    dextrose (D50W) injection syrg 12.5-25 g  12.5-25 g IntraVENous PRN    glucagon (GLUCAGEN) injection 1 mg  1 mg IntraMUSCular PRN    sodium chloride (NS) flush 5-40 mL  5-40 mL IntraVENous Q8H    sodium chloride (NS) flush 5-40 mL  5-40 mL IntraVENous PRN    acetaminophen (TYLENOL) tablet 650 mg  650 mg Oral Q6H PRN    Or    acetaminophen (TYLENOL) suppository 650 mg  650 mg Rectal Q6H PRN    polyethylene glycol (MIRALAX) packet 17 g  17 g Oral DAILY PRN    promethazine (PHENERGAN) tablet 12.5 mg  12.5 mg Oral Q6H PRN    Or    ondansetron (ZOFRAN) injection 4 mg  4 mg IntraVENous Q6H PRN    0.9% sodium chloride infusion  75 mL/hr IntraVENous CONTINUOUS    pantoprazole (PROTONIX) 40 mg in 0.9% sodium chloride 10 mL injection  40 mg IntraVENous Q12H    0.9% sodium chloride infusion 250 mL  250 mL IntraVENous PRN        Josette Calle MD

## 2020-10-07 NOTE — PROGRESS NOTES
[address] with family     Surgical consult pending following EGD      Transportation will be provided by spouse or family     Preferred Rx is [facility] on [address]     2nd  letter will be needed prior to discharge     Follow Up appointments will be needed prior to discharge.     CM will continue to follow to assist with discharge plans.     Yasmani Hardin RN   Care Manager  Ext [phone]

## 2020-10-07 NOTE — CONSULTS
Surgery Consult  Consulted by: Dr. Andrea Desai  PCP: Kerry Perez MD    Thank you for allowing me to participate in your patient's care. Please call me with any questions. 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 Path pending. Profound anemia, subacute. Body mass index is 37.15 kg/m². Plan:   UGI to see if we can categorize this tumor anatomically to determine multimodal treatment sequence. EUS will assist with this as well. I have asked Dr. Cruzito Sweeney to see her tomorrow in case we move toward neoadjuvant chemo. Discussed this with Dr. Andrea Desai. Subjective:      Lauri Gerardo is a 70 y.o. female who is seen in consultation at the request of Dr. Andrea Desai for newly diagnosed GEj tumor. EGD yesterday. Biopsies pending. She is admitted with a month of progressive fatigue and weakness. Some abd discomfort and back pain. No known blood in stool. Has been eating well with no dysphagia. No NSAID use. No steroids. No anticoagulation. Objective:   Blood pressure (!) 143/70, pulse 71, temperature 97.7 °F (36.5 °C), resp. rate 16, height 5' 2\" (1.575 m), weight 92.1 kg (203 lb 1.6 oz), SpO2 100 %.   Temp (24hrs), Av °F (36.7 °C), Min:97.6 °F (36.4 °C), Max:98.3 °F (36.8 °C)      Physical Exam:  PHYSICAL EXAM:  Gen:  [x]     A&O     [x]      No acute distress    [x]     non-toxic     []     ill apearing     []     Critical        HEENT:   [x]     anicteric    []      scleral icterus    [x]     moist mucosa     []     dry mucosa    RESP:   [x]     CTA bilaterally, no wheezing/rhonchi/rales/crackles    []     wheezing     []     rhonchi     []     crackles     []     use of accessory muscles    CARD:  [x]     regular rate and rhythm     [x]     No murmurs/rubs/gallops    []     irregular rhythm     []     Murmur     []     Rubs     []     Gallops    ABD:     [x]  soft  [x]     non distended  [x]     non tender  [x]      NABS    SKIN:   [x]     normal      []Rashes      []Ulcers EXT:  [x]      No CCE     []2+ pulses throughout    []      Clubbing     []Cyanosis     []Edema     []diminished pulses    NEUR:  [x]     Strength normal     []weakness   []LUE     []RUE     []LLE     []RLE    [x]     follows commands          PSYCH:   insight []Poor   [x]good                      []     depressed     []     anxious     []     agitated    Past Medical History:   Diagnosis Date    Anemia, unspecified 10/6/2020    Arthritis     Chronic bronchitis ([facility] [address]     per pt:  as of 1/9/15 pt denies any ARENAS or SOB    Diabetes ([facility] [address] Dx approx 2009    Dr Brandee Fernandez (in [facility])    GERD (gastroesophageal reflux disease)     Hypercholesteremia     Hypertension      Past Surgical History:   Procedure Laterality Date    HX CHOLECYSTECTOMY  6/12    HX COLONOSCOPY      HX CORONARY STENT PLACEMENT  8/25/2015    HX DILATION AND CURETTAGE      UPPER GI ENDOSCOPY,BIOPSY  10/6/2020           Family History   Problem Relation Age of Onset    Diabetes Mother     Heart Disease Mother     Hypertension Mother     Stroke Father     Heart Disease Brother     Heart Disease Brother     Stroke Brother     HIV/AIDS Brother      Social History     Socioeconomic History    Marital status:      Spouse name: Not on file    Number of children: Not on file    Years of education: Not on file    Highest education level: Not on file   Tobacco Use    Smoking status: Never Smoker    Smokeless tobacco: Never Used   Substance and Sexual Activity    Alcohol use: No    Drug use: No      Prior to Admission medications    Medication Sig Start Date End Date Taking? Authorizing Provider   ergocalciferol (ERGOCALCIFEROL) 1,250 mcg (50,000 unit) capsule Take 50,000 Units by mouth every seven (7) days. On Friday   Yes Provider, Historical   torsemide (DEMADEX) 20 mg tablet Take 40 mg by mouth daily.    Yes Provider, Historical   insulin degludec (TRESIBA FLEXTOUCH U-200) 200 unit/mL (3 mL) inpn 108 units daily 1/27/20 Yes Yasmeen Thomas MD   carvediloL (COREG) 25 mg tablet Take 1 Tab by mouth two (2) times a day. 1/27/20  Yes Yasmeen Thomas MD   amLODIPine (NORVASC) 10 mg tablet TAKE 1 TABLET DAILY 1/27/20  Yes Yasmeen Thomas MD   glipiZIDE (GLUCOTROL) 10 mg tablet Take 1 Tab by mouth two (2) times a day. 1/27/20  Yes Yasmeen Thomas MD   atorvastatin (LIPITOR) 40 mg tablet Take 1 Tab by mouth nightly. 1/27/20  Yes Yasmeen Thomas MD   clopidogrel (PLAVIX) 75 mg tab TAKE 1 TABLET DAILY 4/18/19  Yes Yasmeen Thomas MD   aspirin 81 mg chewable tablet Take 1 Tab by mouth daily. 10/26/17  Yes Yasmeen Thomas MD     ALLERGIES:    Allergies   Allergen Reactions    Metformin Other (comments)     GI side effects.  Not a true allergy       Review of Systems:  (unchecked were asked but negative)  Constitutional: [] Fever/chills   [] Sweats   [] Loss of appetite   [x] Fatigue/weakness   [] Weight loss  Head & Neck:   [] Headaches   [] Visual loss   [] Hearing loss   [] Thyroid problems  Cardiovascular:   [] Stroke   [] Calf pain when walking   [] Chest pain   [] Rapid heart beat       [] Heart attack   [x] Stents   [] Congestive heart failure   [] Leg swelling   [] Murmur  Respiratory:   [] Sleep apnea   [] Cough/congestion   [] Shortness of breath   [] Wheezing/asthma      [] COPD/emphysema  Gastrointestinal:   [] Frequent indigestion   [] Trouble swallowing   [] Nausea   [] Vomiting   [] Bloating   [x] Abd pain       [] Diarrhea   [] Constipation   [] Blood in stool   [] Ulcers   [] Intestinal disease   [] Hepatitis    Genitourinary:   [] Painful urination   [] Difficulty urinating   [] Frequent urination       [] Enlarged prostate   [] Vasectomy   [] Blood in urine   [] Dialysis  Musculoskeletal:  [] Muscle aches   [] Back pain   [] Joint pain  Neurologic:    [] Seizures   [] Dizziness   [] Numbness  Hematologic:   [] Nosebleeds   [] Easy bruising   [] Anemia   [] Easy bleeding  Psychiatric:    [] Depression   [] Anxiety   [] Bipolar disorder   [] Schizophrenia                                  []     Unable to obtain  ROS due to  []     mental status change  []     sedated   []     intubated    LABS:  Recent Labs     10/07/20  0321 10/06/20  0244   WBC 13.9* 18.4*   HGB 8.3* 8.3*   HCT 27.4* 26.3*    290     Recent Labs     10/07/20  0321 10/06/20  0403 10/05/20  0207    140 143   K 3.8 3.7 3.7   * 110* 110*   CO2 24 25 27   BUN 38* 41* 46*   CREA 1.43* 1.64* 1.92*   * 145* 71   CA 7.8* 7.5* 7.3*   MG  --   --  2.7*   PHOS  --   --  5.0*     Recent Labs     10/05/20  0207   AP 46   TP 5.9*   ALB 2.4*   GLOB 3.5     No results for input(s): INR, PTP, APTT, INREXT in the last 72 hours.       Signed By: Sri Saldivar MD     October 7, 2020

## 2020-10-07 NOTE — PROGRESS NOTES
Gastroenterology Progress Note    10/7/2020    Admit Date: 10/4/2020    Subjective: Follow up for:  1)  Severe anemia      EGD yesterday revealed 4x6cm ulcerated mass just distal to z-line concerning for malignancy s/p bx. There was also an 8mm superficial ulcer in the antrum and 5-10mm gastric polyps in the body. Path is still pending. CT abd without contrast shows: IMPRESSION:   1. Gastric mass. 2. Left nephrolithiasis. 3. Bilateral pleural effusions. 4. The lack of intravenous contrast limits this study. Hgb remains stable at 8.3 with no overt GIB. Cardiology is holding blood thinners until cleared by GI. General surgery has been consulted. Patient's only c/o is that she is tired as she didn't sleep well last night.      Current Facility-Administered Medications   Medication Dose Route Frequency    losartan (COZAAR) tablet 25 mg  25 mg Oral DAILY    sodium chloride (NS) flush 5-40 mL  5-40 mL IntraVENous Q8H    sodium chloride (NS) flush 5-40 mL  5-40 mL IntraVENous PRN    carvediloL (COREG) tablet 3.125 mg  3.125 mg Oral BID WITH MEALS    DULoxetine (CYMBALTA) capsule 60 mg  60 mg Oral DAILY    insulin lispro (HUMALOG) injection   SubCUTAneous AC&HS    glucose chewable tablet 16 g  4 Tab Oral PRN    dextrose (D50W) injection syrg 12.5-25 g  12.5-25 g IntraVENous PRN    glucagon (GLUCAGEN) injection 1 mg  1 mg IntraMUSCular PRN    acetaminophen (TYLENOL) tablet 650 mg  650 mg Oral Q6H PRN    Or    acetaminophen (TYLENOL) suppository 650 mg  650 mg Rectal Q6H PRN    polyethylene glycol (MIRALAX) packet 17 g  17 g Oral DAILY PRN    promethazine (PHENERGAN) tablet 12.5 mg  12.5 mg Oral Q6H PRN    Or    ondansetron (ZOFRAN) injection 4 mg  4 mg IntraVENous Q6H PRN    pantoprazole (PROTONIX) 40 mg in 0.9% sodium chloride 10 mL injection  40 mg IntraVENous Q12H    0.9% sodium chloride infusion 250 mL  250 mL IntraVENous PRN        Objective:     Blood pressure 137/84, pulse 70, temperature 98.3 °F (36.8 °C), resp. rate 18, height 5' 2\" (1.575 m), weight 92.1 kg (203 lb 1.6 oz), SpO2 96 %. 10/07 0701 - 10/07 1900  In: 26 [P.O.:460]  Out: 600 [Urine:600]    10/05 1901 - 10/07 0700  In: 250 [I.V.:250]  Out: -     EXAM:   Gen: obese BF in NAD  HEENT: sclera anicteric  Lungs: CTA B  Heart: RRR No M/R/G  Abd: obese, +BS, ND, soft, non tender  Ext: no edema  Neuro: A&O x3    Data Review    Recent Results (from the past 24 hour(s))   GLUCOSE, POC    Collection Time: 10/06/20  4:47 PM   Result Value Ref Range    Glucose (POC) 174 (H) 65 - 100 mg/dL    Performed by Vasyl Swift (PCT)    GLUCOSE, POC    Collection Time: 10/06/20  9:25 PM   Result Value Ref Range    Glucose (POC) 138 (H) 65 - 100 mg/dL    Performed by Shereen Otero RN    CBC WITH AUTOMATED DIFF    Collection Time: 10/07/20  3:21 AM   Result Value Ref Range    WBC 13.9 (H) 3.6 - 11.0 K/uL    RBC 3.15 (L) 3.80 - 5.20 M/uL    HGB 8.3 (L) 11.5 - 16.0 g/dL    HCT 27.4 (L) 35.0 - 47.0 %    MCV 87.0 80.0 - 99.0 FL    MCH 26.3 26.0 - 34.0 PG    MCHC 30.3 30.0 - 36.5 g/dL    RDW 16.6 (H) 11.5 - 14.5 %    PLATELET 561 054 - 349 K/uL    MPV 10.0 8.9 - 12.9 FL    NRBC 1.2 (H) 0  WBC    ABSOLUTE NRBC 0.17 (H) 0.00 - 0.01 K/uL    NEUTROPHILS 76 (H) 32 - 75 %    LYMPHOCYTES 15 12 - 49 %    MONOCYTES 7 5 - 13 %    EOSINOPHILS 1 0 - 7 %    BASOPHILS 0 0 - 1 %    IMMATURE GRANULOCYTES 1 (H) 0.0 - 0.5 %    ABS. NEUTROPHILS 10.7 (H) 1.8 - 8.0 K/UL    ABS. LYMPHOCYTES 2.1 0.8 - 3.5 K/UL    ABS. MONOCYTES 0.9 0.0 - 1.0 K/UL    ABS. EOSINOPHILS 0.1 0.0 - 0.4 K/UL    ABS. BASOPHILS 0.0 0.0 - 0.1 K/UL    ABS. IMM.  GRANS. 0.1 (H) 0.00 - 0.04 K/UL    DF AUTOMATED     METABOLIC PANEL, BASIC    Collection Time: 10/07/20  3:21 AM   Result Value Ref Range    Sodium 142 136 - 145 mmol/L    Potassium 3.8 3.5 - 5.1 mmol/L    Chloride 112 (H) 97 - 108 mmol/L    CO2 24 21 - 32 mmol/L    Anion gap 6 5 - 15 mmol/L    Glucose 111 (H) 65 - 100 mg/dL    BUN 38 (H) 6 - 20 MG/DL    Creatinine 1.43 (H) 0.55 - 1.02 MG/DL    BUN/Creatinine ratio 27 (H) 12 - 20      GFR est AA 44 (L) >60 ml/min/1.73m2    GFR est non-AA 36 (L) >60 ml/min/1.73m2    Calcium 7.8 (L) 8.5 - 10.1 MG/DL   GLUCOSE, POC    Collection Time: 10/07/20  7:26 AM   Result Value Ref Range    Glucose (POC) 125 (H) 65 - 100 mg/dL    Performed by Ashley Nap PCT    GLUCOSE, POC    Collection Time: 10/07/20 11:48 AM   Result Value Ref Range    Glucose (POC) 235 (H) 65 - 100 mg/dL    Performed by Ashley Nap PCT      Recent Labs     10/07/20  0321 10/06/20  0244   WBC 13.9* 18.4*   HGB 8.3* 8.3*   HCT 27.4* 26.3*    290     Recent Labs     10/07/20  0321 10/06/20  0403 10/05/20  0207 10/04/20  1308    140 143 140   K 3.8 3.7 3.7 3.5   * 110* 110* 105   CO2 24 25 27 25   BUN 38* 41* 46* 46*   CREA 1.43* 1.64* 1.92* 1.89*   * 145* 71 140*   CA 7.8* 7.5* 7.3* 7.7*   MG  --   --  2.7* 2.6*   PHOS  --   --  5.0*  --      Recent Labs     10/05/20  0207 10/04/20  1308   ALT 32 28   AP 46 50   TBILI 1.2* 0.5   TP 5.9* 6.3*   ALB 2.4* 2.5*   GLOB 3.5 3.8     Recent Labs     10/04/20  1308   INR 1.1   PTP 11.6*      No results for input(s): FE, TIBC, PSAT, FERR in the last 72 hours. No results found for: FOL, RBCF   No results for input(s): PH, PCO2, PO2 in the last 72 hours.   Recent Labs     10/05/20  0207 10/04/20  2113 10/04/20  1745 10/04/20  1308   CKNDX  --   --   --  0.6   TROIQ 9.20* 8.11* 7.99* 8.20*     Lab Results   Component Value Date/Time    Cholesterol, total 179 02/16/2015 09:56 AM    HDL Cholesterol 29 (L) 02/16/2015 09:56 AM    LDL, calculated 74 02/16/2015 09:56 AM    Triglyceride 378 (H) 02/16/2015 09:56 AM     Lab Results   Component Value Date/Time    Glucose (POC) 235 (H) 10/07/2020 11:48 AM    Glucose (POC) 125 (H) 10/07/2020 07:26 AM    Glucose (POC) 138 (H) 10/06/2020 09:25 PM    Glucose (POC) 174 (H) 10/06/2020 04:47 PM    Glucose (POC) 149 (H) 10/06/2020 11:48 AM     Lab Results   Component Value Date/Time    Color YELLOW/STRAW 10/05/2020 12:29 AM    Appearance CLOUDY (A) 10/05/2020 12:29 AM    Specific gravity 1.013 10/05/2020 12:29 AM    Specific gravity 1.025 08/25/2015 03:47 PM    pH (UA) 5.0 10/05/2020 12:29 AM    Protein 300 (A) 10/05/2020 12:29 AM    Glucose Negative 10/05/2020 12:29 AM    Ketone Negative 10/05/2020 12:29 AM    Bilirubin Negative 10/05/2020 12:29 AM    Urobilinogen 1.0 10/05/2020 12:29 AM    Nitrites Negative 10/05/2020 12:29 AM    Leukocyte Esterase TRACE (A) 10/05/2020 12:29 AM    Epithelial cells FEW 10/05/2020 12:29 AM    Bacteria Negative 10/05/2020 12:29 AM    WBC 10-20 10/05/2020 12:29 AM    RBC 0-5 10/05/2020 12:29 AM           Assessment:     Active Problems:    GI bleed (10/4/2020)      Anemia, unspecified (10/6/2020)        Plan:     Anemia is most likely explained by gastric mass which is worrisome for malignancy. Await path and surgical consult.           IRVING Madsen  10/07/20  12:28 PM

## 2020-10-07 NOTE — PROGRESS NOTES
[address] END OF SHIFT REPORT      Bedside shift change report GIVEN TO Olga Santiago RN. Report included the following information SBAR and Kardex. SIGNIFICANT CHANGES DURING SHIFT:  N/A      CONCERNS TO ADDRESS WITH MD:  N/A        Glo Wilian Alicia NURSING NOTE   Admission Date 10/4/2020   Admission Diagnosis GI bleed [K92.2]   Consults IP CONSULT TO CARDIOLOGY  IP CONSULT TO GASTROENTEROLOGY  IP CONSULT TO HOSPITALIST      Cardiac Monitoring [x] Yes [] No      Purposeful Hourly Rounding [x] Yes    Flor Score Total Score: 4   Flor score 3 or > [x] Bed Alarm [] Avasys [] 1:1 sitter [] Patient refused (Signed refusal form in chart)   Rufus Score Rufus Score: 18   Rufus score 14 or < [x] PMT consult [] Wound Care consult    []  Specialty bed  [] Nutrition consult      Influenza Vaccine Received Flu Vaccine for Current Season (usually Sept-March): No    Patient/Guardian Refused (Notify MD): Yes      Oxygen needs? [x] Room air Oxygen @  []1L    []2L    []3L   []4L    []5L   []6L via  NC   Chronic home O2 use? [] Yes [x] No  Perform O2 challenge test and document in progress note using smartphrase (.Homeoxygen)      Last bowel movement Last Bowel Movement Date: 10/05/20      Urinary Catheter             LDAs               Peripheral IV 10/06/20 Left Wrist (Active)   Site Assessment Clean, dry, & intact 10/07/20 0311   Phlebitis Assessment 0 10/07/20 0311   Infiltration Assessment 0 10/07/20 0311   Dressing Status Clean, dry, & intact 10/07/20 0311   Dressing Type Transparent;Tape 10/07/20 0311   Hub Color/Line Status Yellow; Infusing 10/07/20 0311   Alcohol Cap Used Yes 10/06/20 1115                         Readmission Risk Assessment Tool Score High Risk            40       Total Score        2 . Living with Significant Other. Assisted Living. LTAC. SNF. or   Rehab    5 Pt.  Coverage (Medicare=5 , Medicaid, or Self-Pay=4)    33 Charlson Comorbidity Score (Age + Comorbid Conditions)        Criteria that do not apply:    Has Seen PCP in Last 6 Months (Yes=3, No=0)    Patient Length of Stay (>5 days = 3)    IP Visits Last 12 Months (1-3=4, 4=9, >4=11)       Expected Length of Stay 3d 0h   Actual Length of Stay 3

## 2020-10-08 ENCOUNTER — ANESTHESIA (OUTPATIENT)
Dept: ENDOSCOPY | Age: 75
DRG: 374 | End: 2020-10-08
Payer: MEDICARE

## 2020-10-08 ENCOUNTER — HOSPITAL ENCOUNTER (OUTPATIENT)
Dept: GENERAL RADIOLOGY | Age: 75
Discharge: HOME | DRG: 374 | End: 2020-10-08
Attending: SURGERY
Payer: MEDICARE

## 2020-10-08 LAB
ANION GAP SERPL CALC-SCNC: 5 MMOL/L (ref 5–15)
BUN SERPL-MCNC: 33 MG/DL (ref 6–20)
BUN/CREAT SERPL: 23 (ref 12–20)
CALCIUM SERPL-MCNC: 7.8 MG/DL (ref 8.5–10.1)
CHLORIDE SERPL-SCNC: 113 MMOL/L (ref 97–108)
CO2 SERPL-SCNC: 24 MMOL/L (ref 21–32)
CREAT SERPL-MCNC: 1.44 MG/DL (ref 0.55–1.02)
ERYTHROCYTE [DISTWIDTH] IN BLOOD BY AUTOMATED COUNT: 17 % (ref 11.5–14.5)
GLUCOSE BLD STRIP.AUTO-MCNC: 121 MG/DL (ref 65–100)
GLUCOSE BLD STRIP.AUTO-MCNC: 142 MG/DL (ref 65–100)
GLUCOSE BLD STRIP.AUTO-MCNC: 155 MG/DL (ref 65–100)
GLUCOSE SERPL-MCNC: 101 MG/DL (ref 65–100)
HCT VFR BLD AUTO: 25.3 % (ref 35–47)
HGB BLD-MCNC: 7.6 G/DL (ref 11.5–16)
MCH RBC QN AUTO: 26.4 PG (ref 26–34)
MCHC RBC AUTO-ENTMCNC: 30 G/DL (ref 30–36.5)
MCV RBC AUTO: 87.8 FL (ref 80–99)
NRBC # BLD: 0.1 K/UL (ref 0–0.01)
NRBC BLD-RTO: 0.7 PER 100 WBC
PLATELET # BLD AUTO: 205 K/UL (ref 150–400)
PMV BLD AUTO: 9.6 FL (ref 8.9–12.9)
POTASSIUM SERPL-SCNC: 3.7 MMOL/L (ref 3.5–5.1)
RBC # BLD AUTO: 2.88 M/UL (ref 3.8–5.2)
SERVICE CMNT-IMP: ABNORMAL
SODIUM SERPL-SCNC: 142 MMOL/L (ref 136–145)
WBC # BLD AUTO: 14.3 K/UL (ref 3.6–11)

## 2020-10-08 PROCEDURE — C9113 INJ PANTOPRAZOLE SODIUM, VIA: HCPCS | Performed by: INTERNAL MEDICINE

## 2020-10-08 PROCEDURE — 2709999900 HC NON-CHARGEABLE SUPPLY: Performed by: INTERNAL MEDICINE

## 2020-10-08 PROCEDURE — 74011250637 HC RX REV CODE- 250/637: Performed by: INTERNAL MEDICINE

## 2020-10-08 PROCEDURE — 0DJ08ZZ INSPECTION OF UPPER INTESTINAL TRACT, VIA NATURAL OR ARTIFICIAL OPENING ENDOSCOPIC: ICD-10-PCS | Performed by: INTERNAL MEDICINE

## 2020-10-08 PROCEDURE — 74011000250 HC RX REV CODE- 250: Performed by: ANESTHESIOLOGY

## 2020-10-08 PROCEDURE — 76040000007: Performed by: INTERNAL MEDICINE

## 2020-10-08 PROCEDURE — 74240 X-RAY XM UPR GI TRC 1CNTRST: CPT

## 2020-10-08 PROCEDURE — 74011250636 HC RX REV CODE- 250/636

## 2020-10-08 PROCEDURE — 74011250636 HC RX REV CODE- 250/636: Performed by: ANESTHESIOLOGY

## 2020-10-08 PROCEDURE — 74011636637 HC RX REV CODE- 636/637: Performed by: GENERAL ACUTE CARE HOSPITAL

## 2020-10-08 PROCEDURE — 74011250636 HC RX REV CODE- 250/636: Performed by: INTERNAL MEDICINE

## 2020-10-08 PROCEDURE — 77030019957 HC CUF BLN GASTSCP OCOA -B: Performed by: INTERNAL MEDICINE

## 2020-10-08 PROCEDURE — 80048 BASIC METABOLIC PNL TOTAL CA: CPT

## 2020-10-08 PROCEDURE — 77030039825 HC MSK NSL PAP SUPERNO2VA VYRM -B

## 2020-10-08 PROCEDURE — 74011250637 HC RX REV CODE- 250/637: Performed by: GENERAL ACUTE CARE HOSPITAL

## 2020-10-08 PROCEDURE — 65660000001 HC RM ICU INTERMED STEPDOWN

## 2020-10-08 PROCEDURE — 82962 GLUCOSE BLOOD TEST: CPT

## 2020-10-08 PROCEDURE — 74011000250 HC RX REV CODE- 250: Performed by: INTERNAL MEDICINE

## 2020-10-08 PROCEDURE — 76060000032 HC ANESTHESIA 0.5 TO 1 HR: Performed by: INTERNAL MEDICINE

## 2020-10-08 PROCEDURE — 74011250636 HC RX REV CODE- 250/636: Performed by: SPECIALIST

## 2020-10-08 PROCEDURE — 77030003406 HC NDL ASPIR BIOP OCOA -C: Performed by: INTERNAL MEDICINE

## 2020-10-08 PROCEDURE — 85027 COMPLETE CBC AUTOMATED: CPT

## 2020-10-08 PROCEDURE — 36415 COLL VENOUS BLD VENIPUNCTURE: CPT

## 2020-10-08 RX ORDER — CARVEDILOL 6.25 MG/1
6.25 TABLET ORAL 2 TIMES DAILY WITH MEALS
Status: DISCONTINUED | OUTPATIENT
Start: 2020-10-08 | End: 2020-10-12

## 2020-10-08 RX ORDER — SODIUM CHLORIDE 9 MG/ML
75 INJECTION, SOLUTION INTRAVENOUS CONTINUOUS
Status: DISPENSED | OUTPATIENT
Start: 2020-10-08 | End: 2020-10-08

## 2020-10-08 RX ORDER — PROPOFOL 10 MG/ML
VIAL (ML) INTRAVENOUS AS NEEDED
Status: DISCONTINUED | OUTPATIENT
Start: 2020-10-08 | End: 2020-10-08 | Stop reason: HOSPADM

## 2020-10-08 RX ORDER — LIDOCAINE HYDROCHLORIDE 20 MG/ML
INJECTION, SOLUTION EPIDURAL; INFILTRATION; INTRACAUDAL; PERINEURAL AS NEEDED
Status: DISCONTINUED | OUTPATIENT
Start: 2020-10-08 | End: 2020-10-08 | Stop reason: HOSPADM

## 2020-10-08 RX ORDER — DEXTROMETHORPHAN/PSEUDOEPHED 2.5-7.5/.8
1.2 DROPS ORAL
Status: DISCONTINUED | OUTPATIENT
Start: 2020-10-08 | End: 2020-10-08 | Stop reason: HOSPADM

## 2020-10-08 RX ORDER — LOSARTAN POTASSIUM 50 MG/1
50 TABLET ORAL DAILY
Status: DISCONTINUED | OUTPATIENT
Start: 2020-10-09 | End: 2020-10-13

## 2020-10-08 RX ORDER — FLUMAZENIL 0.1 MG/ML
0.2 INJECTION INTRAVENOUS
Status: ACTIVE | OUTPATIENT
Start: 2020-10-08 | End: 2020-10-08

## 2020-10-08 RX ORDER — ATROPINE SULFATE 0.1 MG/ML
0.5 INJECTION INTRAVENOUS
Status: DISCONTINUED | OUTPATIENT
Start: 2020-10-08 | End: 2020-10-08 | Stop reason: HOSPADM

## 2020-10-08 RX ORDER — MIDAZOLAM HYDROCHLORIDE 1 MG/ML
.25-5 INJECTION, SOLUTION INTRAMUSCULAR; INTRAVENOUS
Status: ACTIVE | OUTPATIENT
Start: 2020-10-08 | End: 2020-10-08

## 2020-10-08 RX ORDER — SODIUM CHLORIDE 0.9 % (FLUSH) 0.9 %
5-40 SYRINGE (ML) INJECTION EVERY 8 HOURS
Status: DISCONTINUED | OUTPATIENT
Start: 2020-10-08 | End: 2020-10-13 | Stop reason: HOSPADM

## 2020-10-08 RX ORDER — PROPOFOL 10 MG/ML
INJECTION, EMULSION INTRAVENOUS AS NEEDED
Status: DISCONTINUED | OUTPATIENT
Start: 2020-10-08 | End: 2020-10-08 | Stop reason: HOSPADM

## 2020-10-08 RX ORDER — SODIUM CHLORIDE 0.9 % (FLUSH) 0.9 %
5-40 SYRINGE (ML) INJECTION AS NEEDED
Status: DISCONTINUED | OUTPATIENT
Start: 2020-10-08 | End: 2020-10-13 | Stop reason: HOSPADM

## 2020-10-08 RX ORDER — NALOXONE HYDROCHLORIDE 0.4 MG/ML
0.4 INJECTION, SOLUTION INTRAMUSCULAR; INTRAVENOUS; SUBCUTANEOUS
Status: ACTIVE | OUTPATIENT
Start: 2020-10-08 | End: 2020-10-08

## 2020-10-08 RX ADMIN — Medication 10 ML: at 14:00

## 2020-10-08 RX ADMIN — PROPOFOL 10 MG: 10 INJECTION, EMULSION INTRAVENOUS at 12:32

## 2020-10-08 RX ADMIN — Medication 10 ML: at 22:00

## 2020-10-08 RX ADMIN — CARVEDILOL 6.25 MG: 6.25 TABLET, FILM COATED ORAL at 19:52

## 2020-10-08 RX ADMIN — SODIUM CHLORIDE 40 MG: 9 INJECTION, SOLUTION INTRAMUSCULAR; INTRAVENOUS; SUBCUTANEOUS at 20:00

## 2020-10-08 RX ADMIN — SODIUM CHLORIDE 75 ML/HR: 900 INJECTION, SOLUTION INTRAVENOUS at 11:45

## 2020-10-08 RX ADMIN — LOSARTAN POTASSIUM 25 MG: 25 TABLET, FILM COATED ORAL at 09:17

## 2020-10-08 RX ADMIN — CARVEDILOL 3.12 MG: 3.12 TABLET, FILM COATED ORAL at 07:00

## 2020-10-08 RX ADMIN — PROPOFOL 115 MG: 10 INJECTION, EMULSION INTRAVENOUS at 12:56

## 2020-10-08 RX ADMIN — PROPOFOL 125 MG: 10 INJECTION, EMULSION INTRAVENOUS at 12:54

## 2020-10-08 RX ADMIN — Medication 10 ML: at 06:43

## 2020-10-08 RX ADMIN — LIDOCAINE HYDROCHLORIDE 20 MG: 20 INJECTION, SOLUTION EPIDURAL; INFILTRATION; INTRACAUDAL; PERINEURAL at 12:32

## 2020-10-08 RX ADMIN — SODIUM CHLORIDE 40 MG: 9 INJECTION, SOLUTION INTRAMUSCULAR; INTRAVENOUS; SUBCUTANEOUS at 09:17

## 2020-10-08 RX ADMIN — DULOXETINE HYDROCHLORIDE 60 MG: 30 CAPSULE, DELAYED RELEASE ORAL at 10:28

## 2020-10-08 RX ADMIN — INSULIN LISPRO 2 UNITS: 100 INJECTION, SOLUTION INTRAVENOUS; SUBCUTANEOUS at 17:18

## 2020-10-08 NOTE — H&P
Pre-endoscopy H and P    The patient was seen and examined in the room/pre-op holding area. The airway was assessed and documented. The problem list, past medical history, and medications were reviewed. Patient Active Problem List   Diagnosis Code    Non-ST elevated myocardial infarction (HCC) I21.4    Noncompliance Z91.19    Fatty liver K76.0    UTI (lower urinary tract infection) N39.0    Type 2 diabetes mellitus with diabetic polyneuropathy, with long-term current use of insulin (HCC) E11.42, Z79.4    Essential hypertension I10    Mixed hyperlipidemia E78.2    Type 2 diabetes with nephropathy ([facility] [address] E11.21    Severe obesity (BMI 35.0-39. 9) E66.01    GI bleed K92.2    Anemia, unspecified D64.9     Social History     Socioeconomic History    Marital status:      Spouse name: Not on file    Number of children: Not on file    Years of education: Not on file    Highest education level: Not on file   Occupational History    Not on file   Social Needs    Financial resource strain: Not on file    Food insecurity     Worry: Not on file     Inability: Not on file    Transportation needs     Medical: Not on file     Non-medical: Not on file   Tobacco Use    Smoking status: Never Smoker    Smokeless tobacco: Never Used   Substance and Sexual Activity    Alcohol use: No    Drug use: No    Sexual activity: Not on file   Lifestyle    Physical activity     Days per week: Not on file     Minutes per session: Not on file    Stress: Not on file   Relationships    Social connections     Talks on phone: Not on file     Gets together: Not on file     Attends [employer] service: Not on file     Active member of club or organization: Not on file     Attends meetings of clubs or organizations: Not on file     Relationship status: Not on file    Intimate partner violence     Fear of current or ex partner: Not on file     Emotionally abused: Not on file     Physically abused: Not on file     Forced sexual activity: Not on file   Other Topics Concern    Not on file   Social History Narrative    Not on file     Past Medical History:   Diagnosis Date    Anemia, unspecified 10/6/2020    Arthritis     Chronic bronchitis ([facility] [place] [address]     per pt:  as of 1/9/15 pt denies any ARENAS or SOB    Diabetes ([facility] [place] [address] Dx approx     Dr Karri Underwood (in connect care)    GERD (gastroesophageal reflux disease)     Hypercholesteremia     Hypertension          Prior to Admission Medications   Prescriptions Last Dose Informant Patient Reported? Taking? amLODIPine (NORVASC) 10 mg tablet  Self No Yes   Sig: TAKE 1 TABLET DAILY   aspirin 81 mg chewable tablet 10/3/2020 at Unknown time Self No Yes   Sig: Take 1 Tab by mouth daily. atorvastatin (LIPITOR) 40 mg tablet  Self No Yes   Sig: Take 1 Tab by mouth nightly. carvediloL (COREG) 25 mg tablet 10/4/2020 at Unknown time Self No Yes   Sig: Take 1 Tab by mouth two (2) times a day. clopidogrel (PLAVIX) 75 mg tab  at Unknown time Self No Yes   Sig: TAKE 1 TABLET DAILY   ergocalciferol (ERGOCALCIFEROL) 1,250 mcg (50,000 unit) capsule 2020 at Unknown time Self Yes Yes   Sig: Take 50,000 Units by mouth every seven (7) days. On Friday   glipiZIDE (GLUCOTROL) 10 mg tablet 10/4/2020 at Unknown time Self No Yes   Sig: Take 1 Tab by mouth two (2) times a day. insulin degludec (TRESIBA FLEXTOUCH U-200) 200 unit/mL (3 mL) inpn  Self No Yes   Si units daily   torsemide (DEMADEX) 20 mg tablet 10/4/2020 at Unknown time Self Yes Yes   Sig: Take 40 mg by mouth daily. Facility-Administered Medications: None           The review of systems is:  Negative  for shortness of breath or chest pain      The heart, lungs, and mental status were satisfactory for the administration of deep sedation and for the procedure. I discussed with the patient the objectives, risks, consequences and alternatives to the procedure.       Tracy Leroy MD  10/8/2020  12:22 PM

## 2020-10-08 NOTE — PROGRESS NOTES
Problem: Anemia Care Plan (Adult and Pediatric)  Goal: *Labs within defined limits  Outcome: Progressing Towards Goal  Goal: *Tolerates increased activity  Outcome: Progressing Towards Goal     Problem: Falls - Risk of  Goal: *Absence of Falls  Description: Document Flor Fall Risk and appropriate interventions in the flowsheet.   Outcome: Progressing Towards Goal  Note: Fall Risk Interventions:  Mobility Interventions: Assess mobility with egress test, Bed/chair exit alarm         Medication Interventions: Assess postural VS orthostatic hypotension, Bed/chair exit alarm    Elimination Interventions: Bed/chair exit alarm, Call light in reach    History of Falls Interventions: Bed/chair exit alarm, Door open when patient unattended

## 2020-10-08 NOTE — PERIOP NOTES
TRANSFER - OUT REPORT:    Verbal report given to June MURPHY(name) on Lauri Gerardo  being transferred to  2215(unit) for routine progression of care       Report consisted of patients Situation, Background, Assessment and   Recommendations(SBAR). Information from the following report(s) SBAR was reviewed with the receiving nurse. Lines:   Peripheral IV 10/07/20 Left Arm (Active)   Site Assessment Clean, dry, & intact 10/08/20 0818   Phlebitis Assessment 0 10/08/20 0818   Infiltration Assessment 0 10/08/20 0818   Dressing Status Clean, dry, & intact 10/08/20 0818   Dressing Type Tape;Transparent 10/08/20 0818   Hub Color/Line Status Blue;Capped;Flushed 10/08/20 0818   Alcohol Cap Used Yes 10/08/20 0818        Opportunity for questions and clarification was provided.

## 2020-10-08 NOTE — PROGRESS NOTES
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 No role for anticoagulation; ASA when ok with GI. Cont bblocker and ARB as BP tolerates  -elevated trops likely represents demand ischemic  -EKG NS, LVH, ?lateral ST depression     CKD 3  Admission 1.89, monitor closely  Avoid nephrotoxic drugs, adjust all meds to GFR.      DM2   - NPO after MN, watch BS  C/w SS as needed      GERD  HLP        Code status: Full  Prophylaxis: SCD's      Baseline: independent   Recommended Disposition: Home w/Family     Subjective:     Chief Complaint / Reason for Physician Visit  Off the floor, chart revived    Discussed with RN events overnight. Review of Systems:  Symptom Y/N Comments  Symptom Y/N Comments   Fever/Chills    Chest Pain     Poor Appetite    Edema     Cough    Abdominal Pain     Sputum    Joint Pain     SOB/ARENAS    Pruritis/Rash     Nausea/vomit    Tolerating PT/OT     Diarrhea    Tolerating Diet     Constipation    Other       Could NOT obtain due to:      Objective:     VITALS:   Last 24hrs VS reviewed since prior progress note.  Most recent are:  Patient Vitals for the past 24 hrs:   Temp Pulse Resp BP SpO2   10/08/20 1922 97.6 °F (36.4 °C) 75 18 (!) 169/79 100 %   10/08/20 1705 98 °F (36.7 °C) 73 20 (!) 175/94 100 %   10/08/20 1333 -- 69 20 (!) 160/70 97 %   10/08/20 1330 -- 69 20 (!) 156/68 98 %   10/08/20 1327 -- 69 18 138/78 95 %   10/08/20 1324 -- 69 19 (!) 162/62 93 %   10/08/20 1321 -- 68 20 (!) 151/53 95 %   10/08/20 1318 -- 67 20 136/62 92 %   10/08/20 1315 -- 68 22 138/60 100 %   10/08/20 1313 97.7 °F (36.5 °C) 70 22 (!) 150/60 93 %   10/08/20 1305 -- 67 16 (!) 133/46 97 %   10/08/20 1300 -- 60 18 (!) 125/46 99 %   10/08/20 1144 -- -- -- (!) 186/76 --   10/08/20 1116 -- 71 20 (!) 197/77 --   10/08/20 0917 -- 71 -- (!) 156/90 --   10/08/20 0654 97.8 °F (36.6 °C) 71 16 (!) 175/88 100 %   10/08/20 0400 97.6 °F (36.4 °C) 72 18 (!) 160/81 99 %   10/07/20 2346 97.8 °F (36.6 °C) 69 16 132/71 97 %       Intake/Output Summary (Last 24 hours) at 10/8/2020 1943  Last data filed at 10/8/2020 1322  Gross per 24 hour   Intake 370 ml   Output --   Net 370 ml        PHYSICAL EXAM:  Off the floor    Reviewed most current lab test results and cultures  YES  Reviewed most current radiology test results   YES  Review and summation of old records today    NO  Reviewed patient's current orders and MAR    YES  PMH/SH reviewed - no change compared to H&P  ________________________________________________________________________  Care Plan discussed with:    Comments   Patient     Family      RN x    Care Manager     Consultant                       x Multidiciplinary team rounds were held today with , nursing, pharmacist and clinical coordinator. Patient's plan of care was discussed; medications were reviewed and discharge planning was addressed.      ________________________________________________________________________  Total NON critical care TIME:  25   Minutes    Total CRITICAL CARE TIME Spent:   Minutes non procedure based      Comments   >50% of visit spent in counseling and coordination of care     ________________________________________________________________________  Casie Robles DO     Procedures: see electronic medical records for all procedures/Xrays and details which were not copied into this note but were reviewed prior to creation of Plan. LABS:  I reviewed today's most current labs and imaging studies.   Pertinent labs include:  Recent Labs     10/08/20  0407 10/07/20  0321 10/06/20  0244   WBC 14.3* 13.9* 18.4*   HGB 7.6* 8.3* 8.3*   HCT 25.3* 27.4* 26.3*    241 290     Recent Labs     10/08/20  0407 10/07/20  0321 10/06/20  0403    142 140   K 3.7 3.8 3.7   * 112* 110*   CO2 24 24 25   * 111* 145*   BUN 33* 38* 41*   CREA 1.44* 1.43* 1.64*   CA 7.8* 7.8* 7.5*       Signed: Casei Robles DO

## 2020-10-08 NOTE — PERIOP NOTES
TRANSFER - IN REPORT:    Verbal report received from PAlexOAlex Tate 135 RN(name) on Tay Bradford  being received from Rm 2215(unit) for ordered procedure      Report consisted of patients Situation, Background, Assessment and   Recommendations(SBAR). Information from the following report(s) SBAR was reviewed with the receiving nurse. Opportunity for questions and clarification was provided. Assessment completed upon patients arrival to unit and care assumed.

## 2020-10-08 NOTE — PROGRESS NOTES
Hospitalist Progress Note    NAME: James Alarcon   :  1945   MRN:  [id number]       Assessment / Plan:  Symptomatic severe anemia likely due to slow GI bleeding   Gastric junction tumor  -appreciate GI and surgical consults  -note plan for upper GI series and EUS for treatment planning  -path pending  -Heme/onc consult pending  Hb on admission 4.5, baseline 12  S/p 3 PRBC with hb up to 8.1   Feeling better  Stool hemoccult negative in ED   EGD today shows large gastric mass, likely gastric CA  -surgery consulted     Elevated troponin  CAD  HTN   -BP stable   -cardiology help appreciated  -medical management of CAD for now. 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 No role for anticoagulation; ASA when ok with GI. Cont bblocker and ARB as BP tolerates  -elevated trops likely represents demand ischemic  -EKG NS, LVH, ?lateral ST depression     CKD 3  Admission 1.89, monitor closely  Avoid nephrotoxic drugs, adjust all meds to GFR.      DM2   - NPO after MN, watch BS  C/w SS as needed      GERD  HLP        Code status: Full  Prophylaxis: SCD's      Baseline: independent   Recommended Disposition: Home w/Family     Subjective:     Chief Complaint / Reason for Physician Visit  Mild abdominal discomfort. Passing stool. No n/v    Discussed with RN events overnight. Review of Systems:  Symptom Y/N Comments  Symptom Y/N Comments   Fever/Chills n   Chest Pain n    Poor Appetite    Edema     Cough    Abdominal Pain y mild   Sputum    Joint Pain     SOB/ARENAS n   Pruritis/Rash     Nausea/vomit n   Tolerating PT/OT     Diarrhea n   Tolerating Diet y clears   Constipation n   Other       Could NOT obtain due to:      Objective:     VITALS:   Last 24hrs VS reviewed since prior progress note.  Most recent are:  Patient Vitals for the past 24 hrs:   Temp Pulse Resp BP SpO2   10/07/20 1853 98 °F (36.7 °C) 70 18 139/72 100 %   10/07/20 1732 -- 71 -- (!) 143/70 --   10/07/20 1441 97.7 °F (36.5 °C) 71 16 (!) 159/81 100 %   10/07/20 1131 98.3 °F (36.8 °C) 70 18 137/84 96 %   10/07/20 0924 -- 82 -- -- --   10/07/20 0828 -- 75 -- -- --   10/07/20 0630 98.3 °F (36.8 °C) 77 20 (!) 169/88 95 %   10/07/20 0311 98.2 °F (36.8 °C) -- 20 (!) 150/68 95 %       Intake/Output Summary (Last 24 hours) at 10/7/2020 2357  Last data filed at 10/7/2020 1800  Gross per 24 hour   Intake 1060 ml   Output 900 ml   Net 160 ml        PHYSICAL EXAM:  General: WD, WN. Alert, cooperative, no acute distress    EENT:  EOMI. Anicteric sclerae. MMM  Resp:  CTA bilaterally, no wheezing or rales. No accessory muscle use  CV:  Regular  rhythm,  No edema  GI:  Soft, mild distension, mild epigastric tenderness. Neurologic:  Alert and oriented X 3, normal speech,   Psych:   Good insight. Not anxious nor agitated  Skin:  No rashes. No jaundice    Reviewed most current lab test results and cultures  YES  Reviewed most current radiology test results   YES  Review and summation of old records today    NO  Reviewed patient's current orders and MAR    YES  PMH/ reviewed - no change compared to H&P  ________________________________________________________________________  Care Plan discussed with:    Comments   Patient x    Family      RN x    Care Manager     Consultant                       x Multidiciplinary team rounds were held today with , nursing, pharmacist and clinical coordinator. Patient's plan of care was discussed; medications were reviewed and discharge planning was addressed.      ________________________________________________________________________  Total NON critical care TIME:  30   Minutes    Total CRITICAL CARE TIME Spent:   Minutes non procedure based      Comments   >50% of visit spent in counseling and coordination of care x    ________________________________________________________________________  Strange Minor SEVERINO Torres DO     Procedures: see electronic medical records for all procedures/Xrays and details which were not copied into this note but were reviewed prior to creation of Plan. LABS:  I reviewed today's most current labs and imaging studies. Pertinent labs include:  Recent Labs     10/07/20  0321 10/06/20  0244 10/05/20  2007  10/05/20  0207   WBC 13.9* 18.4*  --   --  15.2*   HGB 8.3* 8.3* 8.3*   < > 5.8*   HCT 27.4* 26.3* 26.1*   < > 19.1*    290  --   --  301    < > = values in this interval not displayed.      Recent Labs     10/07/20  0321 10/06/20  0403 10/05/20  0207    140 143   K 3.8 3.7 3.7   * 110* 110*   CO2 24 25 27   * 145* 71   BUN 38* 41* 46*   CREA 1.43* 1.64* 1.92*   CA 7.8* 7.5* 7.3*   MG  --   --  2.7*   PHOS  --   --  5.0*   ALB  --   --  2.4*   TBILI  --   --  1.2*   ALT  --   --  32       Signed: Christa Torres DO

## 2020-10-08 NOTE — PROGRESS NOTES
Sanford Bautista  1945  [id number]    Situation:  Verbal report received from: [address], RN  Procedure: Procedure(s) with comments:  ENDOSCOPIC ULTRASOUND (EUS) - call Dr Caro Parker for best time. Background:    Preoperative diagnosis: Gastric mass [K31.89]  Postoperative diagnosis: Gastric mass    :  Dr. Caro Parker  Assistant(s): Endoscopy Technician-1: Vane Urbina  Endoscopy RN-1: Jillian Dunn RN  Endoscopy RN-2: Mona Martínez    Specimens: * No specimens in log *  H. Pylori  no    Assessment:  Intra-procedure medications   Anesthesia gave intra-procedure sedation and medications, see anesthesia flow sheet yes    Intravenous fluids: NS@ KVO     Vital signs stable yes    Abdominal assessment: round and soft yes    Recommendation:  Discharge patient per MD order no.   Return to floor yes  Family or Friend attila,   Permission to share finding with family or friend yes

## 2020-10-08 NOTE — PROGRESS NOTES
0700 Received bedside report from KATHERINE King assumed care of the patient. [facility] with Suresh Stone in xray informed that patient was ordered for upper GI series. Informed Deitra that patient would have to have endoscopic ultrasound before upper GI series. 86 Jackie Shah with Mal[facility] in ENDO gave pt report to Vera Becerra prior to pt procedure     [id number] with pts  Mr. Yadira Gill. Gave Mr. Marilia Caraballo update on pt.     1322 [facility] Pt received 125 of propofol, 10 of lidocaine and 125 of fluids. 1900 Bedside shift change report given to KATHERINE King (oncoming nurse) by Pretty Gallegos RN (offgoing nurse). Report included the following information SBAR, Kardex, Intake/Output and MAR.

## 2020-10-08 NOTE — PROGRESS NOTES
Cardiology Progress Note  10/8/2020     Admit Date: 10/4/2020  Admit Diagnosis: GI bleed [K92.2]  CC: none currently  Cardiologist:  Dr Jaime Rod then Dr Ruma Ng (last OV 2017). Cardiac Assessment/Plan:    1) CAD: Distal RCA NEENA 8/2015 for NQWMI; Med Rx mid circ and diffusely dz diagonals. *NQWMI 10/2020 (trop 9), c/w type II from profound anemia. * EF 30-35% 10/6/20. 2) HTN  3) DM  4) Dyslipidemia  5) CKD III: Cr 1.43/gfr 42 7/2020 (Dr Pearson Brunner). 6) Obesity: ?NANY. 7) Non-compliant with some meds per pt 10/2020. Poor compliance with f/u OV. 8) Anemia POA (Hg 4.5): gastric mass (?CA); path pending.     Presenting with AMS, profound anemia; NQWMI. GUANACO on CKD. No CP/change in chronic ARENAS.     Rec POA: Need to correct profound anemia; Agree with GI eval.   I suspect NQWMI is type II; no current role for invasive Rx. No role for anticoagulation; ASA when ok with GI. Cont bblocker and ARB as BP tolerates.         10/5: Afeb; BPs 110-140s; HR 70-80s; 98% RA. Hg 5.8; Cr 1.92 from 1.89; trop 8-9 range. Cardiac meds: none; NS @ 75. Echo pending; still needs anemia corrected. Add low dose coreg (3.125 bid) w/ hold parameters. 10/6: -150s; HR 70-90s; 95-97% RA. Net + 1.6L. Hg 8.3 (stable); Nl K; Cr 1.64. Cardiac meds: corege 3.125 bid; NS @ 75. Rec: echo pending; anemia stable currently. Add back ARB as able. Echo 10/6/20:   · LV: Estimated LVEF is 30 - 35%. Normal cavity size. Mild concentric hypertrophy. Moderate-to-severely reduced systolic function. · TV: Mild to moderate tricuspid valve regurgitation is present. 10/7: BPs 140-160; HR 70s; NSR. 95% RA. No UOP recorded yesterday. Hg stable @ 8.3; Cr 1.43/gfr 44. Cardiac meds: coreg 3.125 bid; NS @ 75. Rec: add losartan 25 qday; uptitrate meds as able/needed. D/C IVF. Med Rx for now; cant have cath/PCI until able to have full antiplt agents (asa/plavix for at least 1 month IF BMS used).   Awaiting on stomach mass pathology. I will not be here tomorrow nor the rest if this week. Dr Wan Baer will see the patient in my absence. 10/8  Events reviewed. Path report is pending . Cardiac status is stable. Continue medical management .    _________________________________________________________________    As noted POA: \"The patient reports no chest pain/pressure; no change in chronic ARENAS. No PND, orthopnea, palpitations, pre-syncope, syncope, new peripheral edema, or decrease in exercise tolerance. No current complaints.     Fell 3 days ago (gen weak; not lightheaded; no syncope); Decreased LOC last few days per : \"asleep\"; wakes when shaken. +Sx NANY.     Dark stool several weeks ago, none since.     ECG: Sinus; ; IVCD 110; LVH; Lat ST depression (more than 2015). Tele: sinus  CXR: \"Enlarged cardiac silhouette, otherwise no acute disease\"     Notable labs: WBC 16.3; Hg 4.5. Cr 1.89 (nl in 2017); BUN 46. ; neg MB; trop 8. 2. \"  _____________________________________________________________________________  Notable prior cardiac history:  @ last OV 1/24/17:          Ms Letha Mercedes has a h/o CAD (NEENA in distal RCA 8/2015 for NSTEMI,  Normal LV EF, 75% mid-circ; Dr Juana Colmenares), HTN, DM, and dyslipidemia; She has chronic mild ARENAS.     7/2016: No change ARENAS; no CP. Worse LE edema: some salt; BP poorly controlled per pt (chronic); Last week, PCP decreased norvasc to 5 qd and added diuretic.     1/2017: No angina; Minor ARENAS (doesnt need to stop activity); Using less salt; Wt down 10# with diet.        IMPRESSION AND PLAN  01. Atherosclerotic heart disease of native coronary artery with other forms of angina pectoris:  No angina; Stable dyspnea; could consider PCI of mid circ if needed: MPI next year/sooner prn.     We discussed the signs and symptoms of unstable angina, myocardial infarction and malignant arrhythmia. The patient knows to seek immediate medical attention should they occur.   ECG done today Exercise MPI to be done with next visit. 02. Old myocardial infarction: This condition is stable. 03. Hypertensive heart disease without heart failure:  Adequately controlled. 04. Mixed hyperlipidemia:  Lipid labs drawn by PCP. The patient is tolerating lipid lowering therapy well. 05. Localized edema:  Resolved. The patient was instructed on a low sodium diet. 06. Type 2 diabetes mellitus without complications: This condition is stable. 07. Long term (current) use of aspirin:  No bleeding. Continue Rx   08. Long term (current) use of antithrombotics/antiplatelets:  No bleeding. D/C plavix at next OV if unremarkable MPI.   09. Body mass index (BMI) 36.0-36.9, adult:  The patient was instructed on AHA diet and regular exercise.            ORDERS:  1 ECG done today   2 Myocardial Perfusion Study / Sy protocol with next visit   3 Return office visit with Henny Moody MD in 1 Year. 4 The patient was instructed on AHA diet and regular exercise.    [facility]   6 Hypertension Educational Materials   7 Patient counseled on the following: Low Salt Diet.         1/2017 MEDICATION LIST  Medication Sig Description   Amaryl 4 mg tablet take 1 tablet by oral route 2 times every day   amlodipine 10 mg tablet take 1 tablet by oral route  every day   aspirin 81 mg tablet,delayed release take 1 tablet by oral route  every day   atorvastatin 40 mg tablet take 1 tablet by oral route  every bedtime   clonidine HCl 0.1 mg tablet take 1 tablet by oral route  every day   Coreg 25 mg tablet take 1 tablet by oral route 2 times every day with food   Diovan  mg-25 mg tablet take 1 tablet by oral route  every day   furosemide 20 mg tablet take 1 tablet by oral route  every day   gabapentin 100 mg capsule take 1 capsule by oral route 3 times every day   hydralazine 25 mg tablet take 1 tablet by oral route 2 times every day with food   Lantus 100 unit/mL subcutaneous solution inject by subcutaneous route as per insulin protocol   Nitrostat 0.4 mg sublingual tablet place 1 tablet by sublingual route at the 1st sign of attack; may repeat every 5 min until relief; if pain persists after 3 tablets in 15 min, prompt medical attention is recommended   pantoprazole 40 mg tablet,delayed release take 1 tablet by oral route  every day   Plavix 75 mg tablet take 1 tablet by oral route  every day            _____________________________________________________________________________________________  CARDIAC HISTORY  CAD:  1 NSTEMI (PCI (Successful PTCA and stenting of totally occluded distal RCA. Resolute Integrity used. ) - 8/26/2015) - 8/28/2015      RISK FACTORS:  1 Hypertension   2 Dyslipidemia   3 Type 2 Diabetes         CARDIOVASCULAR PROCEDURES  CATH LAB:  Cath (EF 0.65 (65%), 30% Proximal LAD, 30% Mid LAD, 75% Mid CX, 50% Proximal RCA, 50% Mid RCA, 100% Distal RCA, small diffuse diseased Diagonals: Resolute NEENA to distal RCA. ) performed by William Andrade MD - 8/26/2015   ELECTROPHYSIOLOGY:  EKG (LVH  left axis  T inversion inf and lat precordial leads) - 9/11/2015   EKG (Sinus Rhythm, LVH) - 12/17/2015   EKG (Sinus Rhythm, borderline LVH, inf-lat TWI.) - 1/24/2017       For other plans, see orders.   Hospital problem list     Active Hospital Problems    Diagnosis Date Noted    Anemia, unspecified 10/06/2020    GI bleed 10/04/2020        Subjective: Ronaldo Woodall reports       Chest pain X none  consistent with:  Non-cardiac CP         Atypical CP     None now  On going  Anginal CP     Dyspnea X none  at rest  with exertion         improved  unchanged  worse              PND X none  overnight       Orthopnea X none  improved  unchanged  worse   Presyncope X none  improved  unchanged  worse     Ambulated in hallway without symptoms   Yes   Ambulated in room without symptoms  Yes   Objective:     Physical Exam:  Overall VSSAF;    Visit Vitals  BP (!) 186/76   Pulse 71   Temp 97.8 °F (36.6 °C)   Resp 20   Ht 5' 2\" (1.575 m)   Wt 92.1 kg (203 lb 1.6 oz)   SpO2 100%   BMI 37.15 kg/m²     Temp (24hrs), Av.8 °F (36.6 °C), Min:97.6 °F (36.4 °C), Max:98 °F (36.7 °C)    Patient Vitals for the past 8 hrs:   Pulse   10/08/20 1116 71   10/08/20 0917 71   10/08/20 0654 71   10/08/20 0400 72     Patient Vitals for the past 8 hrs:   Resp   10/08/20 1116 20   10/08/20 0654 16   10/08/20 0400 18     Patient Vitals for the past 8 hrs:   BP   10/08/20 1144 (!) 186/76   10/08/20 1116 (!) 197/77   10/08/20 0917 (!) 156/90   10/08/20 0654 (!) 175/88   10/08/20 0400 (!) 160/81       Intake/Output Summary (Last 24 hours) at 10/8/2020 1153  Last data filed at 10/7/2020 2346  Gross per 24 hour   Intake 720 ml   Output 300 ml   Net 420 ml     General Appearance: Well developed, obese, no acute distress. Ears/Nose/Mouth/Throat:   Normal MM; anicteric. JVP: WNL   Resp:   Lungs clear to auscultation bilaterally. Nl resp effort. Cardiovascular:  RRR, S1, S2 normal, no new murmur. No gallop or rub. Abdomen:   Soft, non-tender, bowel sounds are present. Extremities: No edema bilaterally. Skin:  Neuro: Warm and dry. A/O x3, grossly nonfocal       cath site intact w/o hematoma or new bruit; distal pulse unchanged  Yes   Data Review:     Telemetry independently reviewed x sinus  chronic afib  parox afib  NSVT     ECG independently reviewed  NSR  afib  no significant changes  NSST-Tw chgs     x no new ECG provided for review   Lab results reviewed as noted below. Current medications reviewed as noted below. No results for input(s): PH, PCO2, PO2 in the last 72 hours. No results for input(s): CPK, CKMB, CKNDX, TROIQ in the last 72 hours.   Recent Labs     10/08/20  0407 10/07/20  0321 10/06/20  0403 10/06/20  0244    142 140  --    K 3.7 3.8 3.7  --    * 112* 110*  --    CO2 24 24 25  --    BUN 33* 38* 41*  --    CREA 1.44* 1.43* 1.64*  --    GFRAA 43* 44* 37*  --    * 111* 145*  --    CA 7.8* 7.8* 7.5* --    WBC 14.3* 13.9*  --  18.4*   HGB 7.6* 8.3*  --  8.3*   HCT 25.3* 27.4*  --  26.3*    241  --  290     Lab Results   Component Value Date/Time    Cholesterol, total 179 02/16/2015 09:56 AM    HDL Cholesterol 29 (L) 02/16/2015 09:56 AM    LDL, calculated 74 02/16/2015 09:56 AM    Triglyceride 378 (H) 02/16/2015 09:56 AM     No results for input(s): AP, TBIL, TP, ALB, GLOB, GGT, AML, LPSE in the last 72 hours. No lab exists for component: SGOT, GPT, AMYP, HLPSE  No results for input(s): INR, PTP, APTT, INREXT, INREXT in the last 72 hours.    No components found for: [place]    Current Facility-Administered Medications   Medication Dose Route Frequency    0.9% sodium chloride infusion  75 mL/hr IntraVENous CONTINUOUS    sodium chloride (NS) flush 5-40 mL  5-40 mL IntraVENous Q8H    sodium chloride (NS) flush 5-40 mL  5-40 mL IntraVENous PRN    midazolam (VERSED) injection 0.25-5 mg  0.25-5 mg IntraVENous Multiple    naloxone (NARCAN) injection 0.4 mg  0.4 mg IntraVENous Multiple    flumazeniL (ROMAZICON) 0.1 mg/mL injection 0.2 mg  0.2 mg IntraVENous Multiple    simethicone (MYLICON) 56SY/3.4GZ oral drops 80 mg  1.2 mL Oral Multiple    atropine injection 0.5 mg  0.5 mg IntraVENous ONCE PRN    losartan (COZAAR) tablet 25 mg  25 mg Oral DAILY    sodium chloride (NS) flush 5-40 mL  5-40 mL IntraVENous Q8H    sodium chloride (NS) flush 5-40 mL  5-40 mL IntraVENous PRN    carvediloL (COREG) tablet 3.125 mg  3.125 mg Oral BID WITH MEALS    DULoxetine (CYMBALTA) capsule 60 mg  60 mg Oral DAILY    insulin lispro (HUMALOG) injection   SubCUTAneous AC&HS    glucose chewable tablet 16 g  4 Tab Oral PRN    dextrose (D50W) injection syrg 12.5-25 g  12.5-25 g IntraVENous PRN    glucagon (GLUCAGEN) injection 1 mg  1 mg IntraMUSCular PRN    acetaminophen (TYLENOL) tablet 650 mg  650 mg Oral Q6H PRN    Or    acetaminophen (TYLENOL) suppository 650 mg  650 mg Rectal Q6H PRN    polyethylene glycol (MIRALAX) packet 17 g  17 g Oral DAILY PRN    promethazine (PHENERGAN) tablet 12.5 mg  12.5 mg Oral Q6H PRN    Or    ondansetron (ZOFRAN) injection 4 mg  4 mg IntraVENous Q6H PRN    pantoprazole (PROTONIX) 40 mg in 0.9% sodium chloride 10 mL injection  40 mg IntraVENous Q12H    0.9% sodium chloride infusion 250 mL  250 mL IntraVENous PRN        Sindi Clemente MD

## 2020-10-08 NOTE — PROGRESS NOTES
[address], 97 [facility] Jhonny Bustillou, 200 [facility]  [phone]      We have been asked to see Ms. Montell Bamberger by Dr. Nelson Van, however the patient is in the endoscopy unit at this time. We will see her tomorrow.     Signed By: Ricardo Marie NP     October 8, 2020

## 2020-10-08 NOTE — ANESTHESIA PREPROCEDURE EVALUATION
Anesthetic History   No history of anesthetic complications            Review of Systems / Medical History  Patient summary reviewed and pertinent labs reviewed    Pulmonary    COPD (no recent problems): mild            Comments: Hx of chronic bronchitis   Neuro/Psych   Within defined limits           Cardiovascular    Hypertension: well controlled          Past MI, CAD, cardiac stents and hyperlipidemia    Exercise tolerance: >4 METS  Comments: EF 30-35%  Mild to moderate TR    Active and asymptomatic since her cardiac stent       GI/Hepatic/Renal     GERD: well controlled    Renal disease: CRI  Liver disease    Comments: GI bleed Endo/Other    Diabetes (runs in the 200's): poorly controlled, type 2, using insulin    Obesity, arthritis and anemia  Pertinent negatives: No morbid obesity   Other Findings   Comments: Gastric mass    Hgb 7.6  S/P 3 units transfused           Physical Exam    Airway  Mallampati: III  TM Distance: 4 - 6 cm  Neck ROM: normal range of motion   Mouth opening: Normal     Cardiovascular    Rhythm: regular  Rate: normal      Pertinent negatives: No murmur   Dental    Dentition: Caps/crowns and Poor dentition  Comments: Upper front & left (2)   Pulmonary  Breath sounds clear to auscultation               Abdominal  GI exam deferred       Other Findings            Anesthetic Plan    ASA: 3  Anesthesia type: total IV anesthesia and MAC          Induction: Intravenous  Anesthetic plan and risks discussed with: Patient

## 2020-10-08 NOTE — ANESTHESIA POSTPROCEDURE EVALUATION
Procedure(s):  ENDOSCOPIC ULTRASOUND (EUS). total IV anesthesia, MAC    Anesthesia Post Evaluation        Patient location during evaluation: PACU  Note status: Adequate. Level of consciousness: responsive to verbal stimuli and sleepy but conscious  Pain management: satisfactory to patient  Airway patency: patent  Anesthetic complications: no  Cardiovascular status: acceptable  Respiratory status: acceptable  Hydration status: acceptable  Comments: +Post-Anesthesia Evaluation and Assessment    Patient: Tay rBadford MRN: [id number]  SSN: [id number]   YOB: 1945  Age: 70 y.o. Sex: female      Cardiovascular Function/Vital Signs    BP (!) 160/70   Pulse 69   Temp 36.5 °C (97.7 °F)   Resp 20   Ht 5' 2\" (1.575 m)   Wt 92.1 kg (203 lb 1.6 oz)   SpO2 97%   BMI 37.15 kg/m²     Patient is status post Procedure(s) with comments:  ENDOSCOPIC ULTRASOUND (EUS) - call Dr Medhat Rubio for best time. .    Nausea/Vomiting: Controlled. Postoperative hydration reviewed and adequate. Pain:  Pain Scale 1: Numeric (0 - 10) (10/08/20 1333)  Pain Intensity 1: 0 (10/08/20 1333)   Managed. Neurological Status: At baseline. Mental Status and Level of Consciousness: Arousable. Pulmonary Status:   O2 Device: Room air (10/08/20 1333)   Adequate oxygenation and airway patent. Complications related to anesthesia: None    Post-anesthesia assessment completed. No concerns. Signed By: iMchele Juarez DO    10/8/2020  Post anesthesia nausea and vomiting:  controlled      INITIAL Post-op Vital signs:   Vitals Value Taken Time   /70 10/8/2020  1:33 PM   Temp 36.5 °C (97.7 °F) 10/8/2020  1:13 PM   Pulse 68 10/8/2020  1:34 PM   Resp 19 10/8/2020  1:34 PM   SpO2 95 % 10/8/2020  1:34 PM   Vitals shown include unvalidated device data.

## 2020-10-08 NOTE — PROGRESS NOTES
0700  Received bedside report from Sharyle Ka, [facility]. SBAR, Kardex, and MAR were discussed. [facility] END OF SHIFT REPORT      Bedside shift change report GIVEN TO KATHERINE King. Report included the following information SBAR, Kardex, ED Summary, Intake/Output, MAR, Recent Results and Cardiac Rhythm NSR.       SIGNIFICANT CHANGES DURING SHIFT:  None      CONCERNS TO ADDRESS WITH MD:  None        [facility] NURSING NOTE   Admission Date 10/4/2020   Admission Diagnosis GI bleed [K92.2]   Consults IP CONSULT TO CARDIOLOGY  IP CONSULT TO GASTROENTEROLOGY  IP CONSULT TO HOSPITALIST  IP CONSULT TO GENERAL SURGERY      Cardiac Monitoring [x] Yes [] No      Purposeful Hourly Rounding [x] Yes    Flor Score Total Score: 4   Flor score 3 or > [] Bed Alarm [] Avasys [] 1:1 sitter [] Patient refused (Signed refusal form in chart)   Rufus Score Rufus Score: 16   Rufus score 14 or < [] PMT consult [] Wound Care consult    []  Specialty bed  [] Nutrition consult      Influenza Vaccine Received Flu Vaccine for Current Season (usually Sept-March): No    Patient/Guardian Refused (Notify MD): Yes      Oxygen needs? [x] Room air Oxygen @  []1L    []2L    []3L   []4L    []5L   []6L via  NC   Chronic home O2 use? [] Yes [x] No  Perform O2 challenge test and document in progress note using smartphrase (.Homeoxygen)      Last bowel movement Last Bowel Movement Date: 10/05/20      Urinary Catheter             LDAs               Peripheral IV 10/07/20 Left Arm (Active)   Site Assessment Clean, dry, & intact 10/07/20 1650   Phlebitis Assessment 0 10/07/20 1650   Infiltration Assessment 0 10/07/20 1650   Dressing Status Clean, dry, & intact 10/07/20 1650   Dressing Type Transparent;Tape 10/07/20 1650   Hub Color/Line Status Blue;Flushed 10/07/20 1650   Alcohol Cap Used No 10/07/20 1139                         Readmission Risk Assessment Tool Score High Risk            40       Total Score        2 . Living with Significant Other. Assisted Living. LTAC. SNF. or   Rehab    5 Pt.  Coverage (Medicare=5 , Medicaid, or Self-Pay=4)    33 Charlson Comorbidity Score (Age + Comorbid Conditions)        Criteria that do not apply:    Has Seen PCP in Last 6 Months (Yes=3, No=0)    Patient Length of Stay (>5 days = 3)    IP Visits Last 12 Months (1-3=4, 4=9, >4=11)       Expected Length of Stay 5d 9h   Actual Length of Stay 3

## 2020-10-08 NOTE — PERIOP NOTES
Anesthesia reports 125mg Propofol, 10mg Lidocaine and 250mL NS given during procedure. Received report from anesthesia staff on vital signs and status of patient. Endoscope was pre-cleaned at the bedside immediately following procedure by Cooper FUENTES

## 2020-10-08 NOTE — PROGRESS NOTES
Off the floor. EUS report reviewed. Path shows spindle cell tumor. Supported by lack of lymphadenopathy. Will need to make plans for resection this admission due to bleeding. Need special EEA product due to proximity to GEJ. Will likely need to resect distal esophagus and cardia and create esophagogastrostomy. Please transfuse to Hgb>10. Would keep on full liquid diet with Ensure. Needs to ambulate in hallway TID to be in good shape for general anesthesia. I would anticipate operating Tuesday or Wednesday. Thanks.

## 2020-10-08 NOTE — PROGRESS NOTES
Bedside shift change report given to Daniel Oleary (oncoming nurse) by Juan Santoro (offgoing nurse).  Report included the following information SBAR, Kardex and MAR.     0700 scheduled 0800 coreg given for elevated BP

## 2020-10-08 NOTE — PROCEDURES
NAME:  Bella Terrazas   :   1945   MRN:   [id number]     [facility]    Date/Time:  10/8/2020   Procedure Type:  Radial EUS      Indications: Gastric mass    Pre-operative Diagnosis: see indication above    Post-operative Diagnosis:  See findings below    : J Luis Luong MD    Referring Provider: -Latrell Mancia MD    Procedure Details:    Exam:  Airway: clear, no airway problems anticipated  Heart: RRR, without gallops or rubs  Lungs: clear bilaterally without wheezes, crackles, or rhonchi  Abdomen: soft, nontender, nondistended, bowel sounds present  Mental Status: awake, alert and oriented to person, place and time     Anethesia/Sedation:  MAC anesthesia Propofol      Procedure Details   After a detailed informed consent was obtained for the procedure, with all risks and benefits of procedure explained the patient was taken to the endoscopy suite and placed in the left lateral decubitus position. Following sequential administration of sedation as per above, the radial echoendoscope was inserted into the mouth and advanced under direct vision to stomach. A careful inspection was made as the gastroscope was withdrawn, including a retroflexed view of the proximal stomach; findings and interventions are described below. Findings:     Endoscopic:E: normal, G: gastric mass(very difficult to see with the EUS scope). It bleeds on contact and appears ulcerated(please see EGD report )    Ultrasound:     Esophagus: See below     Stomach:   A large, hypoechoic mass-like lesion (There is more hypoechoic area NOS within the mass) is noted below the LES as noted on EGD. The wall is irregularly thickened at this site. Lesion is 41 x 30 mm in size. Borders are irregular. There is no obvious patti-tumoural lymphadenopathy noted on EUS. The mass invades the muscularis propria (T2). No FNA or FNB was performed as this has been biopsies already. ? minimal ascites noted.       Liver: Parenchyma: normal      Gallbladder: not examined      Bile Duct: not examined                 Lymph Node: no adenopathy noted        Specimen Removed:  None    Complications: None. EBL:  None. Interventions:NA    Recommendations:     -Await final pathology.  -Rest as per oncology and surgery.  -Follow bryson Oliver MD

## 2020-10-09 LAB
GLUCOSE BLD STRIP.AUTO-MCNC: 123 MG/DL (ref 65–100)
GLUCOSE BLD STRIP.AUTO-MCNC: 168 MG/DL (ref 65–100)
GLUCOSE BLD STRIP.AUTO-MCNC: 208 MG/DL (ref 65–100)
GLUCOSE BLD STRIP.AUTO-MCNC: 217 MG/DL (ref 65–100)
IRON SATN MFR SERPL: 3 % (ref 20–50)
IRON SERPL-MCNC: 9 UG/DL (ref 35–150)
SERVICE CMNT-IMP: ABNORMAL
TIBC SERPL-MCNC: 294 UG/DL (ref 250–450)

## 2020-10-09 PROCEDURE — 36415 COLL VENOUS BLD VENIPUNCTURE: CPT

## 2020-10-09 PROCEDURE — C9113 INJ PANTOPRAZOLE SODIUM, VIA: HCPCS | Performed by: INTERNAL MEDICINE

## 2020-10-09 PROCEDURE — 74011250637 HC RX REV CODE- 250/637: Performed by: GENERAL ACUTE CARE HOSPITAL

## 2020-10-09 PROCEDURE — 83540 ASSAY OF IRON: CPT

## 2020-10-09 PROCEDURE — 99222 1ST HOSP IP/OBS MODERATE 55: CPT | Performed by: INTERNAL MEDICINE

## 2020-10-09 PROCEDURE — 82962 GLUCOSE BLOOD TEST: CPT

## 2020-10-09 PROCEDURE — 74011000250 HC RX REV CODE- 250: Performed by: INTERNAL MEDICINE

## 2020-10-09 PROCEDURE — 99232 SBSQ HOSP IP/OBS MODERATE 35: CPT | Performed by: SURGERY

## 2020-10-09 PROCEDURE — 65270000029 HC RM PRIVATE

## 2020-10-09 PROCEDURE — 74011250637 HC RX REV CODE- 250/637: Performed by: INTERNAL MEDICINE

## 2020-10-09 PROCEDURE — 74011636637 HC RX REV CODE- 636/637: Performed by: GENERAL ACUTE CARE HOSPITAL

## 2020-10-09 PROCEDURE — 74011250636 HC RX REV CODE- 250/636: Performed by: INTERNAL MEDICINE

## 2020-10-09 RX ORDER — AMLODIPINE BESYLATE 5 MG/1
5 TABLET ORAL DAILY
Status: DISCONTINUED | OUTPATIENT
Start: 2020-10-09 | End: 2020-10-13 | Stop reason: HOSPADM

## 2020-10-09 RX ORDER — HYDRALAZINE HYDROCHLORIDE 20 MG/ML
10 INJECTION INTRAMUSCULAR; INTRAVENOUS
Status: DISCONTINUED | OUTPATIENT
Start: 2020-10-09 | End: 2020-10-13 | Stop reason: HOSPADM

## 2020-10-09 RX ADMIN — Medication 10 ML: at 06:00

## 2020-10-09 RX ADMIN — CARVEDILOL 6.25 MG: 6.25 TABLET, FILM COATED ORAL at 08:42

## 2020-10-09 RX ADMIN — SODIUM CHLORIDE 40 MG: 9 INJECTION, SOLUTION INTRAMUSCULAR; INTRAVENOUS; SUBCUTANEOUS at 20:55

## 2020-10-09 RX ADMIN — Medication 10 ML: at 22:09

## 2020-10-09 RX ADMIN — AMLODIPINE BESYLATE 5 MG: 5 TABLET ORAL at 09:44

## 2020-10-09 RX ADMIN — DULOXETINE HYDROCHLORIDE 60 MG: 30 CAPSULE, DELAYED RELEASE ORAL at 08:42

## 2020-10-09 RX ADMIN — LOSARTAN POTASSIUM 50 MG: 50 TABLET ORAL at 08:42

## 2020-10-09 RX ADMIN — INSULIN LISPRO 3 UNITS: 100 INJECTION, SOLUTION INTRAVENOUS; SUBCUTANEOUS at 12:31

## 2020-10-09 RX ADMIN — SODIUM CHLORIDE 40 MG: 9 INJECTION, SOLUTION INTRAMUSCULAR; INTRAVENOUS; SUBCUTANEOUS at 08:43

## 2020-10-09 RX ADMIN — INSULIN LISPRO 3 UNITS: 100 INJECTION, SOLUTION INTRAVENOUS; SUBCUTANEOUS at 18:20

## 2020-10-09 RX ADMIN — CARVEDILOL 6.25 MG: 6.25 TABLET, FILM COATED ORAL at 18:20

## 2020-10-09 RX ADMIN — Medication 10 ML: at 18:21

## 2020-10-09 NOTE — PROGRESS NOTES
Hospitalist Progress Note    NAME: Sanford Bautista   :  1945   MRN:  [id number]       Assessment / Plan:  Symptomatic severe anemia likely due to slow GI bleeding   Gastric junction tumor  -appreciate GI and surgical consults  -path suggestive of GI stromal tumor  -surgical plan for resection, probably middle of next week  -Heme/onc consulted   Hb on admission 4.5, baseline 12  S/p 3 PRBC with hb up to 8.1   -no signs of active bleeding, likely ongoing slow bleed  -EGD 10.6 shows large gastric mass    Elevated troponin  CAD  HTN   -BP stable   -cardiology help appreciated  -medical management of CAD for now. 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 No role for anticoagulation; ASA when ok with GI. Cont bblocker and ARB as BP tolerates  -elevated trops likely represents demand ischemic  -EKG NS, LVH, ? lateral ST depression     CKD 3  Admission 1.89, monitor closely  Avoid nephrotoxic drugs, adjust all meds to GFR.      DM2   - NPO after MN, watch BS  C/w SS as needed      GERD  HLP        Code status: Full  Prophylaxis: SCD's      Baseline: independent   Recommended Disposition: Home w/Family     Subjective:     Chief Complaint / Reason for Physician Visit  Mild abd discomfort    Discussed with RN events overnight. Review of Systems:  Symptom Y/N Comments  Symptom Y/N Comments   Fever/Chills n   Chest Pain n    Poor Appetite y   Edema n    Cough n   Abdominal Pain y Mild discomfort   Sputum n   Joint Pain     SOB/ARENAS n   Pruritis/Rash     Nausea/vomit n   Tolerating PT/OT     Diarrhea n   Tolerating Diet y ensure   Constipation n   Other       Could NOT obtain due to:      Objective:     VITALS:   Last 24hrs VS reviewed since prior progress note.  Most recent are:  Patient Vitals for the past 24 hrs:   Temp Pulse Resp BP SpO2   10/09/20 1110 98 °F (36.7 °C) 62 18 (!) 132/57 96 %   10/09/20 0842 -- 71 -- (!) 167/75 --   10/09/20 0640 98 °F (36.7 °C) 65 16 (!) 161/77 99 %   10/09/20 0315 97.8 °F (36.6 °C) 65 18 (!) 147/75 98 %   10/09/20 0047 97.7 °F (36.5 °C) 69 17 (!) 147/57 98 %   10/08/20 1922 97.6 °F (36.4 °C) 75 18 (!) 169/79 100 %   10/08/20 1705 98 °F (36.7 °C) 73 20 (!) 175/94 100 %       Intake/Output Summary (Last 24 hours) at 10/9/2020 1514  Last data filed at 10/9/2020 0317  Gross per 24 hour   Intake 120 ml   Output 400 ml   Net -280 ml        PHYSICAL EXAM:  General:          WD, WN. Alert, cooperative, no acute distress    EENT:             EOMI. Anicteric sclerae. MMM  Resp:              CTA bilaterally, no wheezing or rales. No accessory muscle use  CV:                  Regular  rhythm,  No edema  GI:                   Soft, mild distension, mild epigastric tenderness. Neurologic:      Alert and oriented X 3, normal speech,   Psych:             Good insight. Not anxious nor agitated  Skin:                No rashes. No jaundice    Reviewed most current lab test results and cultures  YES  Reviewed most current radiology test results   YES  Review and summation of old records today    NO  Reviewed patient's current orders and MAR    YES  PMH/SH reviewed - no change compared to H&P  ________________________________________________________________________  Care Plan discussed with:    Comments   Patient x    Family      RN x    Care Manager     Consultant                       x Multidiciplinary team rounds were held today with , nursing, pharmacist and clinical coordinator. Patient's plan of care was discussed; medications were reviewed and discharge planning was addressed.      ________________________________________________________________________  Total NON critical care TIME: 30   Minutes    Total CRITICAL CARE TIME Spent:   Minutes non procedure based      Comments   >50% of visit spent in counseling and coordination of care x ________________________________________________________________________  Tin Livingston DO     Procedures: see electronic medical records for all procedures/Xrays and details which were not copied into this note but were reviewed prior to creation of Plan. LABS:  I reviewed today's most current labs and imaging studies.   Pertinent labs include:  Recent Labs     10/08/20  0407 10/07/20  0321   WBC 14.3* 13.9*   HGB 7.6* 8.3*   HCT 25.3* 27.4*    241     Recent Labs     10/08/20  0407 10/07/20  0321    142   K 3.7 3.8   * 112*   CO2 24 24   * 111*   BUN 33* 38*   CREA 1.44* 1.43*   CA 7.8* 7.8*       Signed: Tin Livingston DO

## 2020-10-09 NOTE — PROGRESS NOTES
Gastroenterology Progress Note    10/9/2020    Admit Date: 10/4/2020    Subjective: Follow up for:  1)  Severe anemia      EGD 10/6/20 revealed 4x6cm ulcerated mass just distal to z-line concerning for malignancy s/p bx. There was also an 8mm superficial ulcer in the antrum and 5-10mm gastric polyps in the body. Path:     FINAL PATHOLOGIC DIAGNOSIS   1. Duodenum, biopsy:   No pathologic diagnosis; no blunting of villi or increased intraepithelial lymphocytes. 2. Stomach, biopsy: Active gastritis with erosions. Immunohistochemical stain for H. pylori is negative. 3. Stomach mass, biopsy:   Atypical spindle cell proliferation, most consistent with gastrointestinal stromal tumor, see comment   Foveolar hyperplasia. EUS 10/8/20:  Stomach:   A large, hypoechoic mass-like lesion (There is more hypoechoic area NOS within the mass) is noted below the LES as noted on EGD. The wall is irregularly thickened at this site. Lesion is 41 x 30 mm in size. Borders are irregular. There is no obvious patti-tumoural lymphadenopathy noted on EUS. The mass invades the muscularis propria (T2). No FNA or FNB was performed as this has been biopsies already. ? minimal ascites noted. CT abd without contrast 10/6/20:IMPRESSION:   1. Gastric mass. 2. Left nephrolithiasis. 3. Bilateral pleural effusions. 4. The lack of intravenous contrast limits this study. Hgb =7.6 with no overt GIB. Dr. Chivo Gibson recommends transfusion for hgb >10 in preparation for surgery possibly early next week. Cardiology is holding blood thinners until cleared by GI. Oncology has been consulted. Patient is very sleepy.       Current Facility-Administered Medications   Medication Dose Route Frequency    sodium chloride (NS) flush 5-40 mL  5-40 mL IntraVENous Q8H    sodium chloride (NS) flush 5-40 mL  5-40 mL IntraVENous PRN    carvediloL (COREG) tablet 6.25 mg  6.25 mg Oral BID WITH MEALS    losartan (COZAAR) tablet 50 mg  50 mg Oral DAILY    sodium chloride (NS) flush 5-40 mL  5-40 mL IntraVENous Q8H    sodium chloride (NS) flush 5-40 mL  5-40 mL IntraVENous PRN    DULoxetine (CYMBALTA) capsule 60 mg  60 mg Oral DAILY    insulin lispro (HUMALOG) injection   SubCUTAneous AC&HS    glucose chewable tablet 16 g  4 Tab Oral PRN    dextrose (D50W) injection syrg 12.5-25 g  12.5-25 g IntraVENous PRN    glucagon (GLUCAGEN) injection 1 mg  1 mg IntraMUSCular PRN    acetaminophen (TYLENOL) tablet 650 mg  650 mg Oral Q6H PRN    Or    acetaminophen (TYLENOL) suppository 650 mg  650 mg Rectal Q6H PRN    polyethylene glycol (MIRALAX) packet 17 g  17 g Oral DAILY PRN    promethazine (PHENERGAN) tablet 12.5 mg  12.5 mg Oral Q6H PRN    Or    ondansetron (ZOFRAN) injection 4 mg  4 mg IntraVENous Q6H PRN    pantoprazole (PROTONIX) 40 mg in 0.9% sodium chloride 10 mL injection  40 mg IntraVENous Q12H    0.9% sodium chloride infusion 250 mL  250 mL IntraVENous PRN        Objective:     Blood pressure (!) 161/77, pulse 65, temperature 98 °F (36.7 °C), resp. rate 16, height 5' 2\" (1.575 m), weight 92.1 kg (203 lb 1.6 oz), SpO2 99 %. No intake/output data recorded. 10/07 1901 - 10/09 0700  In: 56 [P.O.:240;  I.V.:250]  Out: 400 [Urine:400]    EXAM:   Gen: obese BF in NAD but lethargic  HEENT: sclera anicteric  Lungs: CTA B  Heart: RRR No M/R/G  Abd: obese, +BS, ND, soft, non tender  Ext: no edema  Neuro: A&O x3    Data Review    Recent Results (from the past 24 hour(s))   GLUCOSE, POC    Collection Time: 10/08/20  8:09 AM   Result Value Ref Range    Glucose (POC) 121 (H) 65 - 100 mg/dL    Performed by Amilcar Mokarlos PCT    GLUCOSE, POC    Collection Time: 10/08/20  5:04 PM   Result Value Ref Range    Glucose (POC) 142 (H) 65 - 100 mg/dL    Performed by Amilcar Mokarlos PCT    GLUCOSE, POC    Collection Time: 10/08/20  9:15 PM   Result Value Ref Range    Glucose (POC) 155 (H) 65 - 100 mg/dL    Performed by Alphonso Mcintosh RN GLUCOSE, POC    Collection Time: 10/09/20  7:25 AM   Result Value Ref Range    Glucose (POC) 123 (H) 65 - 100 mg/dL    Performed by Roland Sicard PCT      Recent Labs     10/08/20  0407 10/07/20  0321   WBC 14.3* 13.9*   HGB 7.6* 8.3*   HCT 25.3* 27.4*    241     Recent Labs     10/08/20  0407 10/07/20  0321    142   K 3.7 3.8   * 112*   CO2 24 24   BUN 33* 38*   CREA 1.44* 1.43*   * 111*   CA 7.8* 7.8*     No results for input(s): ALT, AP, TBIL, TBILI, TP, ALB, GLOB, GGT, AML, LPSE in the last 72 hours. No lab exists for component: SGOT, GPT, AMYP, HLPSE  No results for input(s): INR, PTP, APTT, INREXT, INREXT in the last 72 hours. No results for input(s): FE, TIBC, PSAT, FERR in the last 72 hours. No results found for: FOL, RBCF   No results for input(s): PH, PCO2, PO2 in the last 72 hours. No results for input(s): CPK, CKNDX, TROIQ in the last 72 hours.     No lab exists for component: CPKMB  Lab Results   Component Value Date/Time    Cholesterol, total 179 02/16/2015 09:56 AM    HDL Cholesterol 29 (L) 02/16/2015 09:56 AM    LDL, calculated 74 02/16/2015 09:56 AM    Triglyceride 378 (H) 02/16/2015 09:56 AM     Lab Results   Component Value Date/Time    Glucose (POC) 123 (H) 10/09/2020 07:25 AM    Glucose (POC) 155 (H) 10/08/2020 09:15 PM    Glucose (POC) 142 (H) 10/08/2020 05:04 PM    Glucose (POC) 121 (H) 10/08/2020 08:09 AM    Glucose (POC) 147 (H) 10/07/2020 08:59 PM     Lab Results   Component Value Date/Time    Color YELLOW/STRAW 10/05/2020 12:29 AM    Appearance CLOUDY (A) 10/05/2020 12:29 AM    Specific gravity 1.013 10/05/2020 12:29 AM    Specific gravity 1.025 08/25/2015 03:47 PM    pH (UA) 5.0 10/05/2020 12:29 AM    Protein 300 (A) 10/05/2020 12:29 AM    Glucose Negative 10/05/2020 12:29 AM    Ketone Negative 10/05/2020 12:29 AM    Bilirubin Negative 10/05/2020 12:29 AM    Urobilinogen 1.0 10/05/2020 12:29 AM    Nitrites Negative 10/05/2020 12:29 AM    Leukocyte Esterase TRACE (A) 10/05/2020 12:29 AM    Epithelial cells FEW 10/05/2020 12:29 AM    Bacteria Negative 10/05/2020 12:29 AM    WBC 10-20 10/05/2020 12:29 AM    RBC 0-5 10/05/2020 12:29 AM           Assessment:     Active Problems:    GI bleed (10/4/2020)      Anemia, unspecified (10/6/2020)        Plan:     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 Oncology to see Ms. Sahil Garcia today. Will sign off. Call with questions.       IRVING Jesus  10/09/20  8:04 AM

## 2020-10-09 NOTE — PROGRESS NOTES
[address] with family once stable.      Transportation will be provided by spouse or family     Preferred Rx is [facility] on [facility]     2nd IM letter will be needed prior to discharge     Follow Up appointments will be needed prior to discharge.     CM will continue to follow to assist with discharge plans.     Sanju   Care Manager  Ext [phone]

## 2020-10-09 NOTE — PROGRESS NOTES
Cardiology Progress Note  10/9/2020     Admit Date: 10/4/2020  Admit Diagnosis: GI bleed [K92.2]  CC: none currently  Cardiologist:  Dr Rossy Alexander then Dr Savannah Pennington (last OV 2017). Cardiac Assessment/Plan:    1) CAD: Distal RCA NEENA 8/2015 for NQWMI; Med Rx mid circ and diffusely dz diagonals. *NQWMI 10/2020 (trop 9), c/w type II from profound anemia. * EF 30-35% 10/6/20. 2) HTN  3) DM  4) Dyslipidemia  5) CKD III: Cr 1.43/gfr 42 7/2020 (Dr Rebel Ignacio). 6) Obesity: ?NANY. 7) Non-compliant with some meds per pt 10/2020. Poor compliance with f/u OV. 8) Anemia POA (Hg 4.5): gastric mass (?CA); path pending.     Presenting with AMS, profound anemia; NQWMI. GUANACO on CKD. No CP/change in chronic ARENAS.     Rec POA: Need to correct profound anemia; Agree with GI eval.   I suspect NQWMI is type II; no current role for invasive Rx. No role for anticoagulation; ASA when ok with GI. Cont bblocker and ARB as BP tolerates.         10/5: Afeb; BPs 110-140s; HR 70-80s; 98% RA. Hg 5.8; Cr 1.92 from 1.89; trop 8-9 range. Cardiac meds: none; NS @ 75. Echo pending; still needs anemia corrected. Add low dose coreg (3.125 bid) w/ hold parameters. 10/6: -150s; HR 70-90s; 95-97% RA. Net + 1.6L. Hg 8.3 (stable); Nl K; Cr 1.64. Cardiac meds: corege 3.125 bid; NS @ 75. Rec: echo pending; anemia stable currently. Add back ARB as able. Echo 10/6/20:   · LV: Estimated LVEF is 30 - 35%. Normal cavity size. Mild concentric hypertrophy. Moderate-to-severely reduced systolic function. · TV: Mild to moderate tricuspid valve regurgitation is present. 10/7: BPs 140-160; HR 70s; NSR. 95% RA. No UOP recorded yesterday. Hg stable @ 8.3; Cr 1.43/gfr 44. Cardiac meds: coreg 3.125 bid; NS @ 75. Rec: add losartan 25 qday; uptitrate meds as able/needed. D/C IVF. Med Rx for now; cant have cath/PCI until able to have full antiplt agents (asa/plavix for at least 1 month IF BMS used).   Awaiting on stomach mass pathology. I will not be here tomorrow nor the rest if this week. Dr Car Lopes will see the patient in my absence. 10/8  Events reviewed. Path report is pending . Cardiac status is stable. Continue medical management . 10/9  Findings and plans for surgery next week noted. She feels well . No cardiac complaints. Yesterday's Hgb still 7. No change in cardiac situation .    _________________________________________________________________    As noted POA: \"The patient reports no chest pain/pressure; no change in chronic ARENAS. No PND, orthopnea, palpitations, pre-syncope, syncope, new peripheral edema, or decrease in exercise tolerance. No current complaints.     Fell 3 days ago (gen weak; not lightheaded; no syncope); Decreased LOC last few days per : \"asleep\"; wakes when shaken. +Sx NANY.     Dark stool several weeks ago, none since.     ECG: Sinus; ; IVCD 110; LVH; Lat ST depression (more than 2015). Tele: sinus  CXR: \"Enlarged cardiac silhouette, otherwise no acute disease\"     Notable labs: WBC 16.3; Hg 4.5. Cr 1.89 (nl in 2017); BUN 46. ; neg MB; trop 8. 2. \"  _____________________________________________________________________________  Notable prior cardiac history:  @ last OV 1/24/17:          Ms Isaac Cole has a h/o CAD (NEENA in distal RCA 8/2015 for NSTEMI,  Normal LV EF, 75% mid-circ; Dr Marisol Almaraz), HTN, DM, and dyslipidemia; She has chronic mild ARENAS.     7/2016: No change ARENAS; no CP. Worse LE edema: some salt; BP poorly controlled per pt (chronic); Last week, PCP decreased norvasc to 5 qd and added diuretic.     1/2017: No angina; Minor ARENAS (doesnt need to stop activity); Using less salt; Wt down 10# with diet.        IMPRESSION AND PLAN  01. Atherosclerotic heart disease of native coronary artery with other forms of angina pectoris:  No angina;  Stable dyspnea; could consider PCI of mid circ if needed: MPI next year/sooner prn.     We discussed the signs and symptoms of unstable angina, myocardial infarction and malignant arrhythmia. The patient knows to seek immediate medical attention should they occur. ECG done today  Exercise MPI to be done with next visit. 02. Old myocardial infarction: This condition is stable. 03. Hypertensive heart disease without heart failure:  Adequately controlled. 04. Mixed hyperlipidemia:  Lipid labs drawn by PCP. The patient is tolerating lipid lowering therapy well. 05. Localized edema:  Resolved. The patient was instructed on a low sodium diet. 06. Type 2 diabetes mellitus without complications: This condition is stable. 07. Long term (current) use of aspirin:  No bleeding. Continue Rx   08. Long term (current) use of antithrombotics/antiplatelets:  No bleeding. D/C plavix at next OV if unremarkable MPI.   09. Body mass index (BMI) 36.0-36.9, adult:  The patient was instructed on AHA diet and regular exercise.            ORDERS:  1 ECG done today   2 Myocardial Perfusion Study / Sy protocol with next visit   3 Return office visit with Shelly Moody MD in 1 Year. 4 The patient was instructed on AHA diet and regular exercise.    [facility]   6 Hypertension Educational Materials   7 Patient counseled on the following: Low Salt Diet.         1/2017 MEDICATION LIST  Medication Sig Description   Amaryl 4 mg tablet take 1 tablet by oral route 2 times every day   amlodipine 10 mg tablet take 1 tablet by oral route  every day   aspirin 81 mg tablet,delayed release take 1 tablet by oral route  every day   atorvastatin 40 mg tablet take 1 tablet by oral route  every bedtime   clonidine HCl 0.1 mg tablet take 1 tablet by oral route  every day   Coreg 25 mg tablet take 1 tablet by oral route 2 times every day with food   Diovan  mg-25 mg tablet take 1 tablet by oral route  every day   furosemide 20 mg tablet take 1 tablet by oral route  every day   gabapentin 100 mg capsule take 1 capsule by oral route 3 times every day   hydralazine 25 mg tablet take 1 tablet by oral route 2 times every day with food   Lantus 100 unit/mL subcutaneous solution inject by subcutaneous route as per insulin protocol   Nitrostat 0.4 mg sublingual tablet place 1 tablet by sublingual route at the 1st sign of attack; may repeat every 5 min until relief; if pain persists after 3 tablets in 15 min, prompt medical attention is recommended   pantoprazole 40 mg tablet,delayed release take 1 tablet by oral route  every day   Plavix 75 mg tablet take 1 tablet by oral route  every day            _____________________________________________________________________________________________  CARDIAC HISTORY  CAD:  1 NSTEMI (PCI (Successful PTCA and stenting of totally occluded distal RCA. Resolute Integrity used. ) - 8/26/2015) - 8/28/2015      RISK FACTORS:  1 Hypertension   2 Dyslipidemia   3 Type 2 Diabetes         CARDIOVASCULAR PROCEDURES  CATH LAB:  Cath (EF 0.65 (65%), 30% Proximal LAD, 30% Mid LAD, 75% Mid CX, 50% Proximal RCA, 50% Mid RCA, 100% Distal RCA, small diffuse diseased Diagonals: Resolute NEENA to distal RCA. ) performed by Jonn Tee MD - 8/26/2015   ELECTROPHYSIOLOGY:  EKG (LVH  left axis  T inversion inf and lat precordial leads) - 9/11/2015   EKG (Sinus Rhythm, LVH) - 12/17/2015   EKG (Sinus Rhythm, borderline LVH, inf-lat TWI.) - 1/24/2017       For other plans, see orders.   Hospital problem list     Active Hospital Problems    Diagnosis Date Noted    Anemia, unspecified 10/06/2020    GI bleed 10/04/2020        Subjective: [place] Parks reports       Chest pain X none  consistent with:  Non-cardiac CP         Atypical CP     None now  On going  Anginal CP     Dyspnea X none  at rest  with exertion         improved  unchanged  worse              PND X none  overnight       Orthopnea X none  improved  unchanged  worse   Presyncope X none  improved  unchanged  worse     Ambulated in hallway without symptoms   Yes Ambulated in room without symptoms  Yes   Objective:     Physical Exam:  Overall VSSAF;    Visit Vitals  BP (!) 132/57 (BP 1 Location: Right arm, BP Patient Position: At rest)   Pulse 62   Temp 98 °F (36.7 °C)   Resp 18   Ht 5' 2\" (1.575 m)   Wt 92.1 kg (203 lb 1.6 oz)   SpO2 96%   BMI 37.15 kg/m²     Temp (24hrs), Av.9 °F (36.6 °C), Min:97.6 °F (36.4 °C), Max:98 °F (36.7 °C)    Patient Vitals for the past 8 hrs:   Pulse   10/09/20 1110 62   10/09/20 0842 71   10/09/20 0640 65     Patient Vitals for the past 8 hrs:   Resp   10/09/20 1110 18   10/09/20 0640 16     Patient Vitals for the past 8 hrs:   BP   10/09/20 1110 (!) 132/57   10/09/20 0842 (!) 167/75   10/09/20 0640 (!) 161/77       Intake/Output Summary (Last 24 hours) at 10/9/2020 1423  Last data filed at 10/9/2020 0317  Gross per 24 hour   Intake 120 ml   Output 400 ml   Net -280 ml     General Appearance: Well developed, obese, no acute distress. Ears/Nose/Mouth/Throat:   Normal MM; anicteric. JVP: WNL   Resp:   Lungs clear to auscultation bilaterally. Nl resp effort. Cardiovascular:  RRR, S1, S2 normal, no new murmur. No gallop or rub. Abdomen:   Soft, non-tender, bowel sounds are present. Extremities: No edema bilaterally. Skin:  Neuro: Warm and dry. A/O x3, grossly nonfocal       cath site intact w/o hematoma or new bruit; distal pulse unchanged  Yes   Data Review:     Telemetry independently reviewed x sinus  chronic afib  parox afib  NSVT     ECG independently reviewed  NSR  afib  no significant changes  NSST-Tw chgs     x no new ECG provided for review   Lab results reviewed as noted below. Current medications reviewed as noted below. No results for input(s): PH, PCO2, PO2 in the last 72 hours. No results for input(s): CPK, CKMB, CKNDX, TROIQ in the last 72 hours.   Recent Labs     10/08/20  0407 10/07/20  0321    142   K 3.7 3.8   * 112*   CO2 24 24   BUN 33* 38*   CREA 1.44* 1.43*   GFRAA 43* 44*   * 111* CA 7.8* 7.8*   WBC 14.3* 13.9*   HGB 7.6* 8.3*   HCT 25.3* 27.4*    241     Lab Results   Component Value Date/Time    Cholesterol, total 179 02/16/2015 09:56 AM    HDL Cholesterol 29 (L) 02/16/2015 09:56 AM    LDL, calculated 74 02/16/2015 09:56 AM    Triglyceride 378 (H) 02/16/2015 09:56 AM     No results for input(s): AP, TBIL, TP, ALB, GLOB, GGT, AML, LPSE in the last 72 hours. No lab exists for component: SGOT, GPT, AMYP, HLPSE  No results for input(s): INR, PTP, APTT, INREXT, INREXT in the last 72 hours.    No components found for: [place]    Current Facility-Administered Medications   Medication Dose Route Frequency    amLODIPine (NORVASC) tablet 5 mg  5 mg Oral DAILY    hydrALAZINE (APRESOLINE) 20 mg/mL injection 10 mg  10 mg IntraVENous Q2H PRN    sodium chloride (NS) flush 5-40 mL  5-40 mL IntraVENous Q8H    sodium chloride (NS) flush 5-40 mL  5-40 mL IntraVENous PRN    carvediloL (COREG) tablet 6.25 mg  6.25 mg Oral BID WITH MEALS    [Held by provider] losartan (COZAAR) tablet 50 mg  50 mg Oral DAILY    DULoxetine (CYMBALTA) capsule 60 mg  60 mg Oral DAILY    insulin lispro (HUMALOG) injection   SubCUTAneous AC&HS    glucose chewable tablet 16 g  4 Tab Oral PRN    dextrose (D50W) injection syrg 12.5-25 g  12.5-25 g IntraVENous PRN    glucagon (GLUCAGEN) injection 1 mg  1 mg IntraMUSCular PRN    acetaminophen (TYLENOL) tablet 650 mg  650 mg Oral Q6H PRN    Or    acetaminophen (TYLENOL) suppository 650 mg  650 mg Rectal Q6H PRN    polyethylene glycol (MIRALAX) packet 17 g  17 g Oral DAILY PRN    promethazine (PHENERGAN) tablet 12.5 mg  12.5 mg Oral Q6H PRN    Or    ondansetron (ZOFRAN) injection 4 mg  4 mg IntraVENous Q6H PRN    pantoprazole (PROTONIX) 40 mg in 0.9% sodium chloride 10 mL injection  40 mg IntraVENous Q12H    0.9% sodium chloride infusion 250 mL  250 mL IntraVENous PRN        Ortega Mayes MD

## 2020-10-09 NOTE — PROGRESS NOTES
Bedside shift change report given to Layton Mclean (oncoming nurse) by Neo Sánchez (offgoing nurse). Report included the following information SBAR, Kardex and MAR.

## 2020-10-09 NOTE — PROGRESS NOTES
Surgical Progress Note  10/09/20 (-)      Patient seen and examined by me today. Attending provider:  Adolfo East,*   PCP:  Megan Delaney MD         Assessment     Plan  [facility] at [facility]. Ulcerated with recent bleeding. Needs resection. This will likely involve esophagogastrostomy rather than a wedge resection due to its location. Given the recent anemia, and the ulcerated appearance on endoscopy there is probably no role for neoadjuvant Gleevec, however I will discuss this with Dr. Robles Garrido. We will ask cardiology to comment on operative risk stratification. We will not be able to have the necessary equipment for surgery until probably Wednesday or Thursday of next week. If she is stable from a medical perspective, she could certainly go home on a full liquid diet with 5 Ensure products per day and come in same day admission for surgery. If she does get discharged, I would like to see her in the office Monday or Tuesday. Otherwise I will check on her on Monday. Thanks. Subjective:     Chief Complaint: Did not like the Ensure clear but no nausea or vomiting. No blood noted in her stool. Review of Systems:   yes/no  yes/no   Fever n Abdominal pain  no   Chills n Flatus  yes   Chest pain n Nausea  no   Cough n Vomiting  no   Sputum n Constipation n   SOB n Diarrhea n   Dysuria n Tolerating diet   yes      []       Unable to obtain  ROS due to:_____________________    Objective:     VITALS:   Last 24hrs VS reviewed since prior hospitalist progress note.  Most recent are:  Visit Vitals  BP (!) 132/57 (BP 1 Location: Right arm, BP Patient Position: At rest)   Pulse 62   Temp 98 °F (36.7 °C)   Resp 18   Ht 5' 2\" (1.575 m)   Wt 92.1 kg (203 lb 1.6 oz)   SpO2 96%   BMI 37.15 kg/m²     Temp (24hrs), Av.9 °F (36.6 °C), Min:97.6 °F (36.4 °C), Max:98 °F (36.7 °C)      Intake/Output Summary (Last 24 hours) at 10/9/2020 3263  Last data filed at 10/9/2020 0314  Gross per 24 hour Intake 120 ml   Output 400 ml   Net -280 ml        PHYSICAL EXAM:  General appearance  Alert, cooperative, no distress, appears stated age. Eyes  Anicteric. Neck Supple. Lungs   Clear to auscultation bilaterally. Heart  Regular rate and rhythm. No murmur, rub or gallop. Abdomen   Soft. Bowel sounds normal.  Nontender. Neurologic Without overt sensory or motor deficit        Lab Data Reviewed: (see below)    Medications Reviewed: (see below)    PMH/SH reviewed - no change compared to H&P  ________________________________________________________________________  LABS:  Recent Labs     10/08/20  0407 10/07/20  0321   WBC 14.3* 13.9*   HGB 7.6* 8.3*   HCT 25.3* 27.4*    241     Recent Labs     10/08/20  0407 10/07/20  0321    142   K 3.7 3.8   * 112*   CO2 24 24   BUN 33* 38*   CREA 1.44* 1.43*   * 111*   CA 7.8* 7.8*     No results for input(s): ALT, AP, TBIL, TBILI, TP, ALB, GLOB, GGT, AML, LPSE in the last 72 hours. No lab exists for component: SGOT, GPT, AMYP, HLPSE  No results for input(s): INR, PTP, APTT, INREXT in the last 72 hours. No results for input(s): FE, TIBC, PSAT, FERR in the last 72 hours. No results for input(s): PH, PCO2, PO2 in the last 72 hours. No results for input(s): CPK, CKMB in the last 72 hours.     No lab exists for component: TROPONINI  Lab Results   Component Value Date/Time    Glucose (POC) 217 (H) 10/09/2020 11:06 AM    Glucose (POC) 123 (H) 10/09/2020 07:25 AM    Glucose (POC) 155 (H) 10/08/2020 09:15 PM    Glucose (POC) 142 (H) 10/08/2020 05:04 PM    Glucose (POC) 121 (H) 10/08/2020 08:09 AM       MEDICATIONS:  Current Facility-Administered Medications   Medication Dose Route Frequency    amLODIPine (NORVASC) tablet 5 mg  5 mg Oral DAILY    hydrALAZINE (APRESOLINE) 20 mg/mL injection 10 mg  10 mg IntraVENous Q2H PRN    sodium chloride (NS) flush 5-40 mL  5-40 mL IntraVENous Q8H    sodium chloride (NS) flush 5-40 mL  5-40 mL IntraVENous PRN    carvediloL (COREG) tablet 6.25 mg  6.25 mg Oral BID WITH MEALS    [Held by provider] losartan (COZAAR) tablet 50 mg  50 mg Oral DAILY    DULoxetine (CYMBALTA) capsule 60 mg  60 mg Oral DAILY    insulin lispro (HUMALOG) injection   SubCUTAneous AC&HS    glucose chewable tablet 16 g  4 Tab Oral PRN    dextrose (D50W) injection syrg 12.5-25 g  12.5-25 g IntraVENous PRN    glucagon (GLUCAGEN) injection 1 mg  1 mg IntraMUSCular PRN    acetaminophen (TYLENOL) tablet 650 mg  650 mg Oral Q6H PRN    Or    acetaminophen (TYLENOL) suppository 650 mg  650 mg Rectal Q6H PRN    polyethylene glycol (MIRALAX) packet 17 g  17 g Oral DAILY PRN    promethazine (PHENERGAN) tablet 12.5 mg  12.5 mg Oral Q6H PRN    Or    ondansetron (ZOFRAN) injection 4 mg  4 mg IntraVENous Q6H PRN    pantoprazole (PROTONIX) 40 mg in 0.9% sodium chloride 10 mL injection  40 mg IntraVENous Q12H    0.9% sodium chloride infusion 250 mL  250 mL IntraVENous PRN

## 2020-10-09 NOTE — PROGRESS NOTES
[address] END OF SHIFT REPORT      Bedside shift change report GIVEN TO Juwan Mosquera RN. Report included the following information SBAR, Kardex and MAR. SIGNIFICANT CHANGES DURING SHIFT:        CONCERNS TO ADDRESS WITH MD:  BP still elevated , PRN?        [address] [facility] NURSING NOTE   Admission Date 10/4/2020   Admission Diagnosis GI bleed [K92.2]   Consults IP CONSULT TO CARDIOLOGY  IP CONSULT TO GASTROENTEROLOGY  IP CONSULT TO HOSPITALIST  IP CONSULT TO GENERAL SURGERY      Cardiac Monitoring [x] Yes [] No      Purposeful Hourly Rounding [x] Yes    Flor Score Total Score: 4   Flor score 3 or > [x] Bed Alarm [] Avasys [] 1:1 sitter [] Patient refused (Signed refusal form in chart)   Rufus Score Rufus Score: 16   Rufus score 14 or < [] PMT consult [] Wound Care consult    []  Specialty bed  [] Nutrition consult      Influenza Vaccine Received Flu Vaccine for Current Season (usually Sept-March): No    Patient/Guardian Refused (Notify MD): Yes      Oxygen needs? [x] Room air Oxygen @  []1L    []2L    []3L   []4L    []5L   []6L via  NC   Chronic home O2 use? [] Yes [x] No  Perform O2 challenge test and document in progress note using smartphrase (.Homeoxygen)      Last bowel movement Last Bowel Movement Date: 10/05/20      Urinary Catheter             LDAs               Peripheral IV 10/07/20 Left Arm (Active)   Site Assessment Clean, dry, & intact 10/09/20 0316   Phlebitis Assessment 0 10/09/20 0316   Infiltration Assessment 0 10/09/20 0316   Dressing Status Clean, dry, & intact 10/09/20 0316   Dressing Type Transparent 10/09/20 0316   Hub Color/Line Status Pink 10/09/20 0316   Alcohol Cap Used Yes 10/08/20 0818                         Readmission Risk Assessment Tool Score High Risk            40       Total Score        2 . Living with Significant Other. Assisted Living. LTAC. SNF. or   Rehab    5 Pt.  Coverage (Medicare=5 , Medicaid, or Self-Pay=4)    33 Charlson Comorbidity Score (Age + Comorbid Conditions) Criteria that do not apply:    Has Seen PCP in Last 6 Months (Yes=3, No=0)    Patient Length of Stay (>5 days = 3)    IP Visits Last 12 Months (1-3=4, 4=9, >4=11)       Expected Length of Stay 5d 9h   Actual Length of Stay 5

## 2020-10-09 NOTE — PROGRESS NOTES
Comprehensive Nutrition Assessment    Type and Reason for Visit: Initial, RD nutrition re-screen/LOS    Nutrition Recommendations/Plan:  · Advance diet as tolerated  · Discontinue ONS  · Continue to monitor phos and mg  · Please document % meals and supplements consumed in flowsheet I/O's under intake     Nutrition Assessment:     Reviewed pt chart for LOS; adm for weakness and inability to walk; pmh DM, GERD, HTN. Met with pt at bedside, who reports having an excellent appetite \"even though I'm on this liquid diet;\" does not like the Ensure Clear. Pt denies any n/v, no swallowing/chewing difficulties, no wt loss, no vitamins or supplements at home. Pt had a normal BM yesterday. Phos and Mg levels are slightly elevated, will continue to monitor. RD intern has no nutritional dx at this time. Estimated Daily Nutrient Needs:  Energy (kcal):  1793 kcal (BMR 1379 x 1.3 AF)  Protein (g):  50-60 g (1.0-1.2 g/kg bw)       Fluid (ml/day):  1800 mL    Nutrition Related Findings:       Labs: -[phone], Ca 7.8, Phos 5.0, Mg 2.7  Meds: amlodipine, D50, coreg, humalog, cozaar, protonix,   Edema: RLE/LLE 2+  BM: 10/8; soft per pt    Wounds:    None       Current Nutrition Therapies:  DIET NUTRITIONAL SUPPLEMENTS Breakfast, AM Snack, Lunch, PM Snack, Dinner, Almanzar's; Ensure Clear  DIET CLEAR LIQUID    Anthropometric Measures:  · Height:  5' 2\" (157.5 cm)  · Current Body Wt:  92.1 kg (203 lb 0.7 oz)   · Ideal Body Wt:  110 lbs:  184.6 %   · BMI Category:  Obese class 2 (BMI 35.0-39. 9)       Nutrition Diagnosis:   No nutrition diagnosis at this time     Nutrition Interventions:   Food and/or Nutrient Delivery: Continue current diet, Modify oral nutrition supplement  Nutrition Education and Counseling: No recommendations at this time  Coordination of Nutrition Care: Continued inpatient monitoring    Goals:  PO intake >75% of meals next 5-7 days       Nutrition Monitoring and Evaluation:   Food/Nutrient Intake Outcomes: Diet advancement/tolerance, Food and nutrient intake  Physical Signs/Symptoms Outcomes: Biochemical data, Weight    Discharge Planning:    Continue current diet     Electronically signed by Jazmin Cristina on 10/9/2020 at 10:19 AM

## 2020-10-09 NOTE — CONSULTS
[address] [facility]  [address], [address] [facility] Jhonny Correa, [address] [facility]  [phone]      Hematology/ Oncology Consult Note      Reason for consult:     Estevan Hernandez is a 70 y.o. female who we have been asked to see by Dr. Tara Lopez for GIST. Subjective:     Estevan Hernandez is a 70year old female who presented to the ED on 10/4 after an episode where she became unresponsive. At admission, she was complaining of generalized weakness, shortness of breath and dark stools. She has a history of CAD, typel 2 DM, HTN and obesity. Admission hgb was 4.5. she received 3 units of PRBCs. She underwent an EGD by Dr. Ayo Campos, which showed a large gastric mass. Pathology confirms GIST. Patient is aware of diagnosis and Dr. Tara Lopez is planning to take her to the OR on Tuesday or Wednesday for resection. Review of Systems:  A comprehensive review of systems was negative except for that written in the History of Present Illness. Past Medical History:   Diagnosis Date    Anemia, unspecified 10/6/2020    Arthritis     Chronic bronchitis ([address]     per pt:  as of 1/9/15 pt denies any ARENAS or SOB    Diabetes ([address] Dx approx 2009    Dr Klaus Lucio (in [facility])    GERD (gastroesophageal reflux disease)     Hypercholesteremia     Hypertension      Past Surgical History:   Procedure Laterality Date    HX CHOLECYSTECTOMY  6/12    HX COLONOSCOPY      HX CORONARY STENT PLACEMENT  8/25/2015    HX DILATION AND CURETTAGE      UPPER GI ENDOSCOPY,BIOPSY  10/6/2020           Family History   Problem Relation Age of Onset    Diabetes Mother     Heart Disease Mother     Hypertension Mother     Stroke Father     Heart Disease Brother     Heart Disease Brother     Stroke Brother     HIV/AIDS Brother      Social History     Tobacco Use    Smoking status: Never Smoker    Smokeless tobacco: Never Used   Substance Use Topics    Alcohol use:  No Current Facility-Administered Medications   Medication Dose Route Frequency Provider Last Rate Last Dose    amLODIPine (NORVASC) tablet 5 mg  5 mg Oral DAILY Nir Torres DO   5 mg at 10/09/20 0944    hydrALAZINE (APRESOLINE) 20 mg/mL injection 10 mg  10 mg IntraVENous Q2H PRN Nir Torres DO        sodium chloride (NS) flush 5-40 mL  5-40 mL IntraVENous Q8H Luis Armando Araiza MD   10 mL at 10/09/20 0600    sodium chloride (NS) flush 5-40 mL  5-40 mL IntraVENous PRN Jessy Evans MD        carvediloL (COREG) tablet 6.25 mg  6.25 mg Oral BID WITH MEALS Maranda Tran MD   6.25 mg at 10/09/20 0842    [Held by provider] losartan (COZAAR) tablet 50 mg  50 mg Oral DAILY Maranda Tran MD   50 mg at 10/09/20 0842    DULoxetine (CYMBALTA) capsule 60 mg  60 mg Oral DAILY Robin Nayak MD   60 mg at 10/09/20 3284    insulin lispro (HUMALOG) injection   SubCUTAneous AC&HS Robin Nayak MD   Stopped at 10/08/20 2200    glucose chewable tablet 16 g  4 Tab Oral PRN Robin Nayak MD        dextrose (D50W) injection syrg 12.5-25 g  12.5-25 g IntraVENous PRN Robin Nayak MD        glucagon (GLUCAGEN) injection 1 mg  1 mg IntraMUSCular PRN Robin Nayak MD        acetaminophen (TYLENOL) tablet 650 mg  650 mg Oral Q6H PRN Robin Nayak MD        Or   [facility] acetaminophen (TYLENOL) suppository 650 mg  650 mg Rectal Q6H PRN Robin Nayak MD        polyethylene glycol (MIRALAX) packet 17 g  17 g Oral DAILY PRN Robin Nayak MD        promethazine (PHENERGAN) tablet 12.5 mg  12.5 mg Oral Q6H PRN Robin Nayak MD        Or    ondansetron [facility]) injection 4 mg  4 mg IntraVENous Q6H PRN Robin Nayak MD        pantoprazole (PROTONIX) 40 mg in 0.9% sodium chloride 10 mL injection  40 mg IntraVENous Q12H Naty Blakely MD   40 mg at 10/09/20 0843    0.9% sodium chloride infusion 250 mL  250 mL IntraVENous PRN Kali Romero MD 15 mL/hr at 10/05/20 0517 250 mL at 10/05/20 0517 Allergies   Allergen Reactions    Metformin Other (comments)     GI side effects. Not a true allergy          Objective:     Patient Vitals for the past 8 hrs:   BP Temp Pulse Resp SpO2 Height   10/09/20 1110 (!) 132/57 98 °F (36.7 °C) 62 18 96 % --   10/09/20 1014 -- -- -- -- -- 5' 2\" (1.575 m)   10/09/20 0842 (!) 167/75 -- 71 -- -- --   10/09/20 0640 (!) 161/77 98 °F (36.7 °C) 65 16 99 % --       Lab Results   Component Value Date/Time    WBC 14.3 (H) 10/08/2020 04:07 AM    HGB 7.6 (L) 10/08/2020 04:07 AM    HCT 25.3 (L) 10/08/2020 04:07 AM    PLATELET 034 94/61/6102 04:07 AM    MCV 87.8 10/08/2020 04:07 AM       Physical Exam:   General appearance: alert, cooperative, no distress, appears stated age  Head: Normocephalic, without obvious abnormality, atraumatic  Lungs: clear to auscultation bilaterally  Heart: regular rate and rhythm  Abdomen: soft, non-tender. Bowel sounds normal. No masses,  no organomegaly  Skin: Skin color, texture, turgor normal. No rashes or lesions  Lymph nodes: Cervical, supraclavicular, and axillary nodes normal.  Neurologic: Grossly normal    CT Results (most recent):  Results from Hospital Encounter encounter on 10/04/20   CT ABD WO CONT    Narrative EXAM: CT ABD WO CONT    INDICATION: gastric mass    COMPARISON: October 4. CONTRAST:  None. TECHNIQUE:   Multislice helical CT was performed from the diaphragm to the iliac crest.  Oral  contrast was administered. Contiguous 5 mm axial images were reconstructed and  lung and soft tissue windows were generated. Coronal and sagittal reformations  were generated. CT dose reduction was achieved through use of a standardized  protocol tailored for this examination and automatic exposure control for dose  modulation. FINDINGS:  Small bilateral pleural effusions. There are no focal abnormalities within the liver, spleen, pancreas, adrenal  glands or kidneys. The patient is status post cholecystectomy.  There is left  nephrolithiasis. There is a mass in the proximal stomach, maximal dimension 4.8  x 4.8 cm. The aorta tapers without aneurysm. There is no retroperitoneal adenopathy or  mass. The visualized bowel is normal. There is no ascites or free intraperitoneal air. Levoscoliosis. Impression IMPRESSION:   1. Gastric mass. 2. Left nephrolithiasis. 3. Bilateral pleural effusions. 4. The lack of intravenous contrast limits this study. Assessment:     1. Gastrointestinal stromal tumor     Gastric mass seen on CT. EGD - Dr. Ciro Gabriel    Pathology -   Atypical spindle cell proliferation, most consistent with gastrointestinal stromal tumor. Strong staining for C KIT    Plan for resection by Dr. Joe Medina next week    Will follow up regarding any need for adjuvant treatment after final surgical pathology has resulted     2. Anemia - iron deficiency    From GIST   Iron profile today  Plan to give IV venofer if needed    Plan:     1. Iron profile   2. Plan to see her after surgery to discuss final pathology and if any role for adjuvant treatment  3.  Dr. Joe Medina plans to take to the OR on Tuesday or Wednesday      Malick Morejon NP and Fady Kelley MD

## 2020-10-09 NOTE — PROGRESS NOTES
Problem: Anemia Care Plan (Adult and Pediatric)  Goal: *Labs within defined limits  Outcome: Progressing Towards Goal  Goal: *Tolerates increased activity  Outcome: Progressing Towards Goal     Problem: Patient Education: Go to Patient Education Activity  Goal: Patient/Family Education  Outcome: Progressing Towards Goal     Problem: Falls - Risk of  Goal: *Absence of Falls  Description: Document Edmonia Morning Fall Risk and appropriate interventions in the flowsheet. Outcome: Progressing Towards Goal  Note: Fall Risk Interventions:  Mobility Interventions: Bed/chair exit alarm         Medication Interventions: Patient to call before getting OOB    Elimination Interventions: Bed/chair exit alarm    History of Falls Interventions: Bed/chair exit alarm         Problem: Patient Education: Go to Patient Education Activity  Goal: Patient/Family Education  Outcome: Progressing Towards Goal     Problem: Pressure Injury - Risk of  Goal: *Prevention of pressure injury  Description: Document Rufus Scale and appropriate interventions in the flowsheet.   Outcome: Progressing Towards Goal  Note: Pressure Injury Interventions:  Sensory Interventions: Assess changes in LOC    Moisture Interventions: Absorbent underpads    Activity Interventions: Increase time out of bed    Mobility Interventions: HOB 30 degrees or less    Nutrition Interventions: Document food/fluid/supplement intake    Friction and Shear Interventions: Apply protective barrier, creams and emollients                Problem: Patient Education: Go to Patient Education Activity  Goal: Patient/Family Education  Outcome: Progressing Towards Goal

## 2020-10-09 NOTE — ROUTINE PROCESS
Report called to RN. All vitals stable patient to be transfered by wheelchair with all personal belongings.

## 2020-10-10 LAB
ANION GAP SERPL CALC-SCNC: 5 MMOL/L (ref 5–15)
BUN SERPL-MCNC: 27 MG/DL (ref 6–20)
BUN/CREAT SERPL: 19 (ref 12–20)
CALCIUM SERPL-MCNC: 7.7 MG/DL (ref 8.5–10.1)
CHLORIDE SERPL-SCNC: 110 MMOL/L (ref 97–108)
CO2 SERPL-SCNC: 25 MMOL/L (ref 21–32)
CREAT SERPL-MCNC: 1.42 MG/DL (ref 0.55–1.02)
ERYTHROCYTE [DISTWIDTH] IN BLOOD BY AUTOMATED COUNT: 16.3 % (ref 11.5–14.5)
GLUCOSE BLD STRIP.AUTO-MCNC: 136 MG/DL (ref 65–100)
GLUCOSE BLD STRIP.AUTO-MCNC: 235 MG/DL (ref 65–100)
GLUCOSE BLD STRIP.AUTO-MCNC: 239 MG/DL (ref 65–100)
GLUCOSE BLD STRIP.AUTO-MCNC: 282 MG/DL (ref 65–100)
GLUCOSE SERPL-MCNC: 119 MG/DL (ref 65–100)
HCT VFR BLD AUTO: 27.4 % (ref 35–47)
HGB BLD-MCNC: 8.2 G/DL (ref 11.5–16)
MCH RBC QN AUTO: 25.9 PG (ref 26–34)
MCHC RBC AUTO-ENTMCNC: 29.9 G/DL (ref 30–36.5)
MCV RBC AUTO: 86.4 FL (ref 80–99)
NRBC # BLD: 0.03 K/UL (ref 0–0.01)
NRBC BLD-RTO: 0.2 PER 100 WBC
PLATELET # BLD AUTO: 224 K/UL (ref 150–400)
PMV BLD AUTO: 9.9 FL (ref 8.9–12.9)
POTASSIUM SERPL-SCNC: 3.7 MMOL/L (ref 3.5–5.1)
RBC # BLD AUTO: 3.17 M/UL (ref 3.8–5.2)
SERVICE CMNT-IMP: ABNORMAL
SODIUM SERPL-SCNC: 140 MMOL/L (ref 136–145)
WBC # BLD AUTO: 13.4 K/UL (ref 3.6–11)

## 2020-10-10 PROCEDURE — 85027 COMPLETE CBC AUTOMATED: CPT

## 2020-10-10 PROCEDURE — 74011250637 HC RX REV CODE- 250/637: Performed by: GENERAL ACUTE CARE HOSPITAL

## 2020-10-10 PROCEDURE — C9113 INJ PANTOPRAZOLE SODIUM, VIA: HCPCS | Performed by: INTERNAL MEDICINE

## 2020-10-10 PROCEDURE — 94760 N-INVAS EAR/PLS OXIMETRY 1: CPT

## 2020-10-10 PROCEDURE — 65270000029 HC RM PRIVATE

## 2020-10-10 PROCEDURE — 74011250637 HC RX REV CODE- 250/637: Performed by: INTERNAL MEDICINE

## 2020-10-10 PROCEDURE — 80048 BASIC METABOLIC PNL TOTAL CA: CPT

## 2020-10-10 PROCEDURE — 74011000250 HC RX REV CODE- 250: Performed by: INTERNAL MEDICINE

## 2020-10-10 PROCEDURE — 36415 COLL VENOUS BLD VENIPUNCTURE: CPT

## 2020-10-10 PROCEDURE — 82962 GLUCOSE BLOOD TEST: CPT

## 2020-10-10 PROCEDURE — 74011636637 HC RX REV CODE- 636/637: Performed by: GENERAL ACUTE CARE HOSPITAL

## 2020-10-10 PROCEDURE — 74011250636 HC RX REV CODE- 250/636: Performed by: INTERNAL MEDICINE

## 2020-10-10 RX ADMIN — SODIUM CHLORIDE 40 MG: 9 INJECTION, SOLUTION INTRAMUSCULAR; INTRAVENOUS; SUBCUTANEOUS at 08:08

## 2020-10-10 RX ADMIN — INSULIN LISPRO 2 UNITS: 100 INJECTION, SOLUTION INTRAVENOUS; SUBCUTANEOUS at 21:14

## 2020-10-10 RX ADMIN — Medication 10 ML: at 17:02

## 2020-10-10 RX ADMIN — CARVEDILOL 6.25 MG: 6.25 TABLET, FILM COATED ORAL at 17:01

## 2020-10-10 RX ADMIN — SODIUM CHLORIDE 40 MG: 9 INJECTION, SOLUTION INTRAMUSCULAR; INTRAVENOUS; SUBCUTANEOUS at 21:15

## 2020-10-10 RX ADMIN — INSULIN LISPRO 5 UNITS: 100 INJECTION, SOLUTION INTRAVENOUS; SUBCUTANEOUS at 17:01

## 2020-10-10 RX ADMIN — CARVEDILOL 6.25 MG: 6.25 TABLET, FILM COATED ORAL at 08:08

## 2020-10-10 RX ADMIN — INSULIN LISPRO 3 UNITS: 100 INJECTION, SOLUTION INTRAVENOUS; SUBCUTANEOUS at 12:08

## 2020-10-10 RX ADMIN — LOSARTAN POTASSIUM 50 MG: 50 TABLET ORAL at 10:52

## 2020-10-10 RX ADMIN — Medication 10 ML: at 05:33

## 2020-10-10 RX ADMIN — Medication 10 ML: at 21:16

## 2020-10-10 RX ADMIN — DULOXETINE HYDROCHLORIDE 60 MG: 30 CAPSULE, DELAYED RELEASE ORAL at 08:08

## 2020-10-10 RX ADMIN — AMLODIPINE BESYLATE 5 MG: 5 TABLET ORAL at 08:08

## 2020-10-10 NOTE — PROGRESS NOTES
Problem: Anemia Care Plan (Adult and Pediatric)  Goal: *Labs within defined limits  Outcome: Progressing Towards Goal  Goal: *Tolerates increased activity  Outcome: Progressing Towards Goal     Problem: Patient Education: Go to Patient Education Activity  Goal: Patient/Family Education  Outcome: Progressing Towards Goal     Problem: Falls - Risk of  Goal: *Absence of Falls  Description: Document Solomon Going Fall Risk and appropriate interventions in the flowsheet. Outcome: Progressing Towards Goal  Note: Fall Risk Interventions:  Mobility Interventions: Patient to call before getting OOB         Medication Interventions: Patient to call before getting OOB    Elimination Interventions: Call light in reach    History of Falls Interventions: Door open when patient unattended         Problem: Patient Education: Go to Patient Education Activity  Goal: Patient/Family Education  Outcome: Progressing Towards Goal     Problem: Pressure Injury - Risk of  Goal: *Prevention of pressure injury  Description: Document Rufus Scale and appropriate interventions in the flowsheet. Outcome: Progressing Towards Goal  Note: Pressure Injury Interventions:  Sensory Interventions: Assess changes in LOC    Moisture Interventions: Absorbent underpads    Activity Interventions: Increase time out of bed    Mobility Interventions: Float heels    Nutrition Interventions: Document food/fluid/supplement intake    Friction and Shear Interventions: HOB 30 degrees or less                Problem: Patient Education: Go to Patient Education Activity  Goal: Patient/Family Education  Outcome: Progressing Towards Goal     Problem: Diabetes Self-Management  Goal: *Disease process and treatment process  Description: Define diabetes and identify own type of diabetes; list 3 options for treating diabetes.   Outcome: Progressing Towards Goal  Goal: *Incorporating nutritional management into lifestyle  Description: Describe effect of type, amount and timing of food on blood glucose; list 3 methods for planning meals. Outcome: Progressing Towards Goal  Goal: *Incorporating physical activity into lifestyle  Description: State effect of exercise on blood glucose levels. Outcome: Progressing Towards Goal  Goal: *Developing strategies to promote health/change behavior  Description: Define the ABC's of diabetes; identify appropriate screenings, schedule and personal plan for screenings. Outcome: Progressing Towards Goal  Goal: *Using medications safely  Description: State effect of diabetes medications on diabetes; name diabetes medication taking, action and side effects. Outcome: Progressing Towards Goal  Goal: *Monitoring blood glucose, interpreting and using results  Description: Identify recommended blood glucose targets  and personal targets. Outcome: Progressing Towards Goal  Goal: *Prevention, detection, treatment of acute complications  Description: List symptoms of hyper- and hypoglycemia; describe how to treat low blood sugar and actions for lowering  high blood glucose level. Outcome: Progressing Towards Goal  Goal: *Prevention, detection and treatment of chronic complications  Description: Define the natural course of diabetes and describe the relationship of blood glucose levels to long term complications of diabetes.   Outcome: Progressing Towards Goal  Goal: *Developing strategies to address psychosocial issues  Description: Describe feelings about living with diabetes; identify support needed and support network  Outcome: Progressing Towards Goal  Goal: *Insulin pump training  Outcome: Progressing Towards Goal  Goal: *Sick day guidelines  Outcome: Progressing Towards Goal  Goal: *Patient Specific Goal (EDIT GOAL, INSERT TEXT)  Outcome: Progressing Towards Goal     Problem: Patient Education: Go to Patient Education Activity  Goal: Patient/Family Education  Outcome: Progressing Towards Goal     Problem: Hypertension  Goal: *Blood pressure within specified parameters  Outcome: Progressing Towards Goal  Goal: *Fluid volume balance  Outcome: Progressing Towards Goal  Goal: *Labs within defined limits  Outcome: Progressing Towards Goal     Problem: Patient Education: Go to Patient Education Activity  Goal: Patient/Family Education  Outcome: Progressing Towards Goal

## 2020-10-10 NOTE — PROGRESS NOTES
Problem: Anemia Care Plan (Adult and Pediatric)  Goal: *Labs within defined limits  Outcome: Progressing Towards Goal  Goal: *Tolerates increased activity  Outcome: Progressing Towards Goal     Problem: Patient Education: Go to Patient Education Activity  Goal: Patient/Family Education  Outcome: Progressing Towards Goal     Problem: Falls - Risk of  Goal: *Absence of Falls  Description: Document Devon Moreland Fall Risk and appropriate interventions in the flowsheet. Outcome: Progressing Towards Goal  Note: Fall Risk Interventions:  Mobility Interventions: Bed/chair exit alarm         Medication Interventions: Patient to call before getting OOB    Elimination Interventions: Bed/chair exit alarm    History of Falls Interventions: Bed/chair exit alarm         Problem: Patient Education: Go to Patient Education Activity  Goal: Patient/Family Education  Outcome: Progressing Towards Goal     Problem: Pressure Injury - Risk of  Goal: *Prevention of pressure injury  Description: Document Rufus Scale and appropriate interventions in the flowsheet. Outcome: Progressing Towards Goal  Note: Pressure Injury Interventions:  Sensory Interventions: Assess changes in LOC    Moisture Interventions: Absorbent underpads    Activity Interventions: Increase time out of bed    Mobility Interventions: HOB 30 degrees or less    Nutrition Interventions: Document food/fluid/supplement intake    Friction and Shear Interventions: Apply protective barrier, creams and emollients                Problem: Patient Education: Go to Patient Education Activity  Goal: Patient/Family Education  Outcome: Progressing Towards Goal     Problem: Diabetes Self-Management  Goal: *Disease process and treatment process  Description: Define diabetes and identify own type of diabetes; list 3 options for treating diabetes.   Outcome: Progressing Towards Goal  Goal: *Incorporating nutritional management into lifestyle  Description: Describe effect of type, amount and timing of food on blood glucose; list 3 methods for planning meals. Outcome: Progressing Towards Goal  Goal: *Incorporating physical activity into lifestyle  Description: State effect of exercise on blood glucose levels. Outcome: Progressing Towards Goal  Goal: *Developing strategies to promote health/change behavior  Description: Define the ABC's of diabetes; identify appropriate screenings, schedule and personal plan for screenings. Outcome: Progressing Towards Goal  Goal: *Using medications safely  Description: State effect of diabetes medications on diabetes; name diabetes medication taking, action and side effects. Outcome: Progressing Towards Goal  Goal: *Monitoring blood glucose, interpreting and using results  Description: Identify recommended blood glucose targets  and personal targets. Outcome: Progressing Towards Goal  Goal: *Prevention, detection, treatment of acute complications  Description: List symptoms of hyper- and hypoglycemia; describe how to treat low blood sugar and actions for lowering  high blood glucose level. Outcome: Progressing Towards Goal  Goal: *Prevention, detection and treatment of chronic complications  Description: Define the natural course of diabetes and describe the relationship of blood glucose levels to long term complications of diabetes.   Outcome: Progressing Towards Goal  Goal: *Developing strategies to address psychosocial issues  Description: Describe feelings about living with diabetes; identify support needed and support network  Outcome: Progressing Towards Goal  Goal: *Insulin pump training  Outcome: Progressing Towards Goal  Goal: *Sick day guidelines  Outcome: Progressing Towards Goal  Goal: *Patient Specific Goal (EDIT GOAL, INSERT TEXT)  Outcome: Progressing Towards Goal     Problem: Patient Education: Go to Patient Education Activity  Goal: Patient/Family Education  Outcome: Progressing Towards Goal     Problem: Hypertension  Goal: *Blood pressure within specified parameters  Outcome: Progressing Towards Goal  Goal: *Fluid volume balance  Outcome: Progressing Towards Goal  Goal: *Labs within defined limits  Outcome: Progressing Towards Goal     Problem: Patient Education: Go to Patient Education Activity  Goal: Patient/Family Education  Outcome: Progressing Towards Goal

## 2020-10-10 NOTE — PROGRESS NOTES
1915 Bedside report, Pt Ax4 no s/s of acute distress noted. 0048 Pt resting in bed, no acute changes noted. 0715 Bedside and Verbal shift change report given to [address] [facility] [address] (oncoming nurse) by Linda Sánchez (offgoing nurse). Report included the following information SBAR, Kardex, Procedure Summary, Intake/Output, MAR, Accordion, Recent Results and Quality Measures.

## 2020-10-10 NOTE — PROGRESS NOTES
General Surgery End of Shift Nursing Note    75yr old  female admitted on 10/04/20. GI Bleed/ severe microcytic anemia. With generalized weakness and syncope also periods of non responsiveness and elevated troponin. Gastroenterology and Cardiology consulted and currently following progress of patient    Shift worked:   1900 - 0700   Summary of shift:    A&O x4, verbal, no c/o pain, tolerated all medications without issue. Continent of B/B, OOB to bathroom x2, currently resting quietly in bed with call bell in reach   Issues for physician to address:   No new issues to report     Number times ambulated in hallway past shift: 0    Number of times OOB to chair past shift: 2 to bathroom and back    Pain Management:  Current medication: see MAR  Patient states pain is manageable on current pain medication: YES    GI:    Current diet:  DIET NUTRITIONAL SUPPLEMENTS Breakfast, AM Snack, Lunch, PM Snack, Dinner, Almanzar's; Ensure Enlive  DIET FULL LIQUID    Tolerating current diet: YES  Passing flatus: YES  Last Bowel Movement: 10/05/20   Appearance: black, brown, soft    Respiratory:    Incentive Spirometer at bedside: YES  Patient instructed on use: YES    Patient Safety:    Falls Score: 4  Bed Alarm On? No  Sitter?  No    Abdias Johnson LPN

## 2020-10-10 NOTE — PROGRESS NOTES
General Surgery End of Shift Nursing Note    Bedside shift change report given to Marge(oncoming nurse) by Ghassan Foley (offgoing nurse). Report included the following information SBAR, Kardex and Intake/Output. Shift worked:   7a-7p   Summary of shift:    Pt had uneventful shift. Pt had a new IV placed today because her original one infiltrated. Dr. Sophy Capellan stated she will be staying until she has her surgery on Wednesday or Thursday. She has had an Ensure with every meal. Pt has no complaints of pain. She has had no bowel movements today. Issues for physician to address:   none     Number times ambulated in hallway past shift: 0    Number of times OOB to chair past shift: 0    Pain Management:  Current medication: see mar  Patient states pain is manageable on current pain medication: YES    GI:    Current diet:  DIET NUTRITIONAL SUPPLEMENTS Breakfast, AM Snack, Lunch, PM Snack, Dinner, Almanzar's; Ensure Enlive  DIET FULL LIQUID    Tolerating current diet: YES  Passing flatus: YES  Last Bowel Movement: several days ago   Appearance: unknown    Respiratory:    Incentive Spirometer at bedside: NO  Patient instructed on use: NO    Patient Safety:    Falls Score: 4  Bed Alarm On? No  Sitter?  Ellie Huddleston

## 2020-10-10 NOTE — PROGRESS NOTES
Hospitalist Progress Note    NAME: Ronaldo Woodall   :  1945   MRN:  [id number]       Assessment / Plan:  GE jnxn GIST  Symptomatic severe anemia POA 2/2 above  -appreciate GI and surgical consults  -surgery in the coming week  -Heme/onc consult   -s/p transfusion 3 units RBCs  -Hb has been stable, cont to monitor labs  -EGD 10.6 large gastric mass  -cont liquid diet w ensure    Elevated troponin  CAD  HTN   -BP stable   -cardiology help appreciated  -medical management of CAD for now. 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 No role for anticoagulation; ASA when ok with GI. Cont bblocker and ARB as BP tolerates  -elevated trops likely represents demand ischemic  -EKG NS, LVH, ? lateral ST depression    HTN  -not optimally controlled  -cont coreg, start amlodipine     CKD 3  Admission 1.89, monitor closely  Avoid nephrotoxic drugs, adjust all meds to GFR.      DM2   - NPO after MN, watch BS  C/w SS as needed      GERD  HLP      Code status: Full  Prophylaxis: SCD's      Baseline: independent   Recommended Disposition: Home w/Family     Subjective:     Chief Complaint / Reason for Physician Visit  Tolerating ensure. Some abd discomfort but no acute distress. Encouraged ambulation and OOB    Discussed with RN events overnight. Review of Systems:  Symptom Y/N Comments  Symptom Y/N Comments   Fever/Chills n   Chest Pain n    Poor Appetite y   Edema n    Cough n   Abdominal Pain y Mild discomfort   Sputum n   Joint Pain     SOB/ARENAS n   Pruritis/Rash     Nausea/vomit n   Tolerating PT/OT     Diarrhea n   Tolerating Diet y ensure   Constipation n   Other       Could NOT obtain due to:      Objective:     VITALS:   Last 24hrs VS reviewed since prior progress note.  Most recent are:  Patient Vitals for the past 24 hrs:   Temp Pulse Resp BP SpO2   10/10/20 1550 99.3 °F (37.4 °C) 76 18 (!) 144/65 99 %   10/10/20 1200 -- 73 -- -- --   10/10/20 1119 98.4 °F (36.9 °C) 67 18 (!) 133/54 100 %   10/10/20 0800 -- 76 -- -- --   10/10/20 0714 98.7 °F (37.1 °C) 68 17 (!) 157/73 98 %   10/10/20 0436 98.3 °F (36.8 °C) 67 16 (!) 155/69 97 %   10/10/20 0010 97.9 °F (36.6 °C) 62 16 (!) 120/53 98 %   10/10/20 0000 -- 77 -- -- --   10/09/20 2013 98.2 °F (36.8 °C) 62 18 129/60 95 %   10/09/20 2000 -- 66 -- -- --       Intake/Output Summary (Last 24 hours) at 10/10/2020 1649  Last data filed at 10/10/2020 0807  Gross per 24 hour   Intake 300 ml   Output --   Net 300 ml        PHYSICAL EXAM:  General:          WD, WN. Alert, cooperative, no acute distress    EENT:             EOMI. Anicteric sclerae. MMM  Resp:              CTA bilaterally, no wheezing or rales. No accessory muscle use  CV:                  Regular  rhythm,  No edema  GI:                   Soft, mild distension, mild epigastric tenderness. Neurologic:      Alert and oriented X 3, normal speech,   Psych:             Good insight. Not anxious nor agitated  Skin:                No rashes. No jaundice    Reviewed most current lab test results and cultures  YES  Reviewed most current radiology test results   YES  Review and summation of old records today    NO  Reviewed patient's current orders and MAR    YES  PMH/SH reviewed - no change compared to H&P  ________________________________________________________________________  Care Plan discussed with:    Comments   Patient x    Family      RN x    Care Manager     Consultant                       x Multidiciplinary team rounds were held today with , nursing, pharmacist and clinical coordinator. Patient's plan of care was discussed; medications were reviewed and discharge planning was addressed.      ________________________________________________________________________  Total NON critical care TIME: 30   Minutes    Total CRITICAL CARE TIME Spent:   Minutes non procedure based Comments   >50% of visit spent in counseling and coordination of care x    ________________________________________________________________________  Caity Prior, DO     Procedures: see electronic medical records for all procedures/Xrays and details which were not copied into this note but were reviewed prior to creation of Plan. LABS:  I reviewed today's most current labs and imaging studies.   Pertinent labs include:  Recent Labs     10/10/20  0053 10/08/20  0407   WBC 13.4* 14.3*   HGB 8.2* 7.6*   HCT 27.4* 25.3*    205     Recent Labs     10/10/20  0053 10/08/20  0407    142   K 3.7 3.7   * 113*   CO2 25 24   * 101*   BUN 27* 33*   CREA 1.42* 1.44*   CA 7.7* 7.8*       Signed: Caity Prior, DO

## 2020-10-11 LAB
GLUCOSE BLD STRIP.AUTO-MCNC: 192 MG/DL (ref 65–100)
GLUCOSE BLD STRIP.AUTO-MCNC: 236 MG/DL (ref 65–100)
GLUCOSE BLD STRIP.AUTO-MCNC: 297 MG/DL (ref 65–100)
GLUCOSE BLD STRIP.AUTO-MCNC: 313 MG/DL (ref 65–100)
SERVICE CMNT-IMP: ABNORMAL

## 2020-10-11 PROCEDURE — 74011000250 HC RX REV CODE- 250: Performed by: INTERNAL MEDICINE

## 2020-10-11 PROCEDURE — 74011250637 HC RX REV CODE- 250/637: Performed by: INTERNAL MEDICINE

## 2020-10-11 PROCEDURE — 74011250636 HC RX REV CODE- 250/636: Performed by: INTERNAL MEDICINE

## 2020-10-11 PROCEDURE — 65270000029 HC RM PRIVATE

## 2020-10-11 PROCEDURE — 82962 GLUCOSE BLOOD TEST: CPT

## 2020-10-11 PROCEDURE — 74011250637 HC RX REV CODE- 250/637: Performed by: GENERAL ACUTE CARE HOSPITAL

## 2020-10-11 PROCEDURE — C9113 INJ PANTOPRAZOLE SODIUM, VIA: HCPCS | Performed by: INTERNAL MEDICINE

## 2020-10-11 PROCEDURE — 74011636637 HC RX REV CODE- 636/637: Performed by: GENERAL ACUTE CARE HOSPITAL

## 2020-10-11 RX ADMIN — CARVEDILOL 6.25 MG: 6.25 TABLET, FILM COATED ORAL at 16:28

## 2020-10-11 RX ADMIN — Medication 10 ML: at 16:29

## 2020-10-11 RX ADMIN — INSULIN LISPRO 5 UNITS: 100 INJECTION, SOLUTION INTRAVENOUS; SUBCUTANEOUS at 16:27

## 2020-10-11 RX ADMIN — INSULIN LISPRO 4 UNITS: 100 INJECTION, SOLUTION INTRAVENOUS; SUBCUTANEOUS at 21:35

## 2020-10-11 RX ADMIN — SODIUM CHLORIDE 40 MG: 9 INJECTION, SOLUTION INTRAMUSCULAR; INTRAVENOUS; SUBCUTANEOUS at 08:56

## 2020-10-11 RX ADMIN — Medication 10 ML: at 21:36

## 2020-10-11 RX ADMIN — ACETAMINOPHEN 650 MG: 325 TABLET ORAL at 22:48

## 2020-10-11 RX ADMIN — DULOXETINE HYDROCHLORIDE 60 MG: 30 CAPSULE, DELAYED RELEASE ORAL at 08:57

## 2020-10-11 RX ADMIN — INSULIN LISPRO 3 UNITS: 100 INJECTION, SOLUTION INTRAVENOUS; SUBCUTANEOUS at 11:23

## 2020-10-11 RX ADMIN — SODIUM CHLORIDE 40 MG: 9 INJECTION, SOLUTION INTRAMUSCULAR; INTRAVENOUS; SUBCUTANEOUS at 21:35

## 2020-10-11 RX ADMIN — CARVEDILOL 6.25 MG: 6.25 TABLET, FILM COATED ORAL at 08:57

## 2020-10-11 RX ADMIN — LOSARTAN POTASSIUM 50 MG: 50 TABLET ORAL at 08:57

## 2020-10-11 RX ADMIN — AMLODIPINE BESYLATE 5 MG: 5 TABLET ORAL at 08:57

## 2020-10-11 RX ADMIN — INSULIN LISPRO 2 UNITS: 100 INJECTION, SOLUTION INTRAVENOUS; SUBCUTANEOUS at 08:56

## 2020-10-11 RX ADMIN — Medication 10 ML: at 06:39

## 2020-10-11 NOTE — PROGRESS NOTES
General Surgery End of Shift Nursing Note    75yr old  [place] female, admitted 10/04/20. GI Bleed/ severe microcytic anemia. With generalized weakness and syncope also periods of non responsiveness and elevated troponin. Gastroenterology and Cardiology consulted and currently following progress of patient     Shift worked:   1900 - 0700   Summary of shift:    A&O x4, verbal, no c/o pain or SOB, independent for all ADL's with x1 assist in sit to stand. And supervision for walking, patient does have a walker. Currently resting quietly in bed with call bell in reach   Issues for physician to address:  no new issues to report     Number times ambulated in hallway past shift: 0    Number of times OOB to chair past shift: 2 OOB to the bathroom and back to bed    Pain Management:  Current medication: see MAR  Patient states pain is manageable on current pain medication: YES    GI:    Current diet:  DIET NUTRITIONAL SUPPLEMENTS Breakfast, AM Snack, Lunch, PM Snack, Dinner, Almanzar[employer]; Ensure Enlive  DIET FULL LIQUID    Tolerating current diet: YES  Passing flatus: YES  Last Bowel Movement: 10/04/20       Respiratory:    Incentive Spirometer at bedside: YES  Patient instructed on use: YES    Patient Safety:    Falls Score: 4  Bed Alarm On? No  Sitter?  No    Mal Florence LPN

## 2020-10-11 NOTE — PROGRESS NOTES
Cardiology Progress Note  10/10/2020     Admit Date: 10/4/2020  Admit Diagnosis: GI bleed [K92.2]  CC: none currently  Cardiologist:  Dr Jaime Rod then Dr Ruam Ng (last OV 2017). Cardiac Assessment/Plan:    1) CAD: Distal RCA NEENA 8/2015 for NQWMI; Med Rx mid circ and diffusely dz diagonals. *NQWMI 10/2020 (trop 9), c/w type II from profound anemia. * EF 30-35% 10/6/20. 2) HTN  3) DM  4) Dyslipidemia  5) CKD III: Cr 1.43/gfr 42 7/2020 (Dr Pearson Brunner). 6) Obesity: ?NANY. 7) Non-compliant with some meds per pt 10/2020. Poor compliance with f/u OV. 8) Anemia POA (Hg 4.5): gastric mass (?CA); path pending.     Presenting with AMS, profound anemia; NQWMI. GUANACO on CKD. No CP/change in chronic ARENAS.     Rec POA: Need to correct profound anemia; Agree with GI eval.   I suspect NQWMI is type II; no current role for invasive Rx. No role for anticoagulation; ASA when ok with GI. Cont bblocker and ARB as BP tolerates.         10/5: Afeb; BPs 110-140s; HR 70-80s; 98% RA. Hg 5.8; Cr 1.92 from 1.89; trop 8-9 range. Cardiac meds: none; NS @ 75. Echo pending; still needs anemia corrected. Add low dose coreg (3.125 bid) w/ hold parameters. 10/6: -150s; HR 70-90s; 95-97% RA. Net + 1.6L. Hg 8.3 (stable); Nl K; Cr 1.64. Cardiac meds: corege 3.125 bid; NS @ 75. Rec: echo pending; anemia stable currently. Add back ARB as able. Echo 10/6/20:   · LV: Estimated LVEF is 30 - 35%. Normal cavity size. Mild concentric hypertrophy. Moderate-to-severely reduced systolic function. · TV: Mild to moderate tricuspid valve regurgitation is present. 10/7: BPs 140-160; HR 70s; NSR. 95% RA. No UOP recorded yesterday. Hg stable @ 8.3; Cr 1.43/gfr 44. Cardiac meds: coreg 3.125 bid; NS @ 75. Rec: add losartan 25 qday; uptitrate meds as able/needed. D/C IVF. Med Rx for now; cant have cath/PCI until able to have full antiplt agents (asa/plavix for at least 1 month IF BMS used).   Awaiting on stomach mass pathology. I will not be here tomorrow nor the rest if this week. Dr Andrea Hernández will see the patient in my absence. 10/8  Events reviewed. Path report is pending . Cardiac status is stable. Continue medical management . 10/9  Findings and plans for surgery next week noted. She feels well . No cardiac complaints. Yesterday's Hgb still 7. No change in cardiac situation . 10/10 Cardiac status unchanged.    _________________________________________________________________    As noted POA: \"The patient reports no chest pain/pressure; no change in chronic ARENAS. No PND, orthopnea, palpitations, pre-syncope, syncope, new peripheral edema, or decrease in exercise tolerance. No current complaints.     Fell 3 days ago (gen weak; not lightheaded; no syncope); Decreased LOC last few days per : \"asleep\"; wakes when shaken. +Sx NANY.     Dark stool several weeks ago, none since.     ECG: Sinus; ; IVCD 110; LVH; Lat ST depression (more than 2015). Tele: sinus  CXR: \"Enlarged cardiac silhouette, otherwise no acute disease\"     Notable labs: WBC 16.3; Hg 4.5. Cr 1.89 (nl in 2017); BUN 46. ; neg MB; trop 8. 2. \"  _____________________________________________________________________________  Notable prior cardiac history:  @ last OV 1/24/17:          Ms Melissa Merlin has a h/o CAD (NEENA in distal RCA 8/2015 for NSTEMI,  Normal LV EF, 75% mid-circ; Dr Marissa Lam), HTN, DM, and dyslipidemia; She has chronic mild ARENAS.     7/2016: No change ARENAS; no CP. Worse LE edema: some salt; BP poorly controlled per pt (chronic); Last week, PCP decreased norvasc to 5 qd and added diuretic.     1/2017: No angina; Minor ARENAS (doesnt need to stop activity); Using less salt; Wt down 10# with diet.        IMPRESSION AND PLAN  01. Atherosclerotic heart disease of native coronary artery with other forms of angina pectoris:  No angina;  Stable dyspnea; could consider PCI of mid circ if needed: MPI next year/sooner prn.     We discussed the signs and symptoms of unstable angina, myocardial infarction and malignant arrhythmia. The patient knows to seek immediate medical attention should they occur. ECG done today  Exercise MPI to be done with next visit. 02. Old myocardial infarction: This condition is stable. 03. Hypertensive heart disease without heart failure:  Adequately controlled. 04. Mixed hyperlipidemia:  Lipid labs drawn by PCP. The patient is tolerating lipid lowering therapy well. 05. Localized edema:  Resolved. The patient was instructed on a low sodium diet. 06. Type 2 diabetes mellitus without complications: This condition is stable. 07. Long term (current) use of aspirin:  No bleeding. Continue Rx   08. Long term (current) use of antithrombotics/antiplatelets:  No bleeding. D/C plavix at next OV if unremarkable MPI.   09. Body mass index (BMI) 36.0-36.9, adult:  The patient was instructed on AHA diet and regular exercise.            ORDERS:  1 ECG done today   2 Myocardial Perfusion Study / Sy protocol with next visit   3 Return office visit with Agnes Peter. [facility] WATERS in 1 Year. 4 The patient was instructed on AHA diet and regular exercise.    [facility]   6 Hypertension Educational Materials   7 Patient counseled on the following: Low Salt Diet.         1/2017 MEDICATION LIST  Medication Sig Description   Amaryl 4 mg tablet take 1 tablet by oral route 2 times every day   amlodipine 10 mg tablet take 1 tablet by oral route  every day   aspirin 81 mg tablet,delayed release take 1 tablet by oral route  every day   atorvastatin 40 mg tablet take 1 tablet by oral route  every bedtime   clonidine HCl 0.1 mg tablet take 1 tablet by oral route  every day   Coreg 25 mg tablet take 1 tablet by oral route 2 times every day with food   Diovan  mg-25 mg tablet take 1 tablet by oral route  every day   furosemide 20 mg tablet take 1 tablet by oral route  every day gabapentin 100 mg capsule take 1 capsule by oral route 3 times every day   hydralazine 25 mg tablet take 1 tablet by oral route 2 times every day with food   Lantus 100 unit/mL subcutaneous solution inject by subcutaneous route as per insulin protocol   Nitrostat 0.4 mg sublingual tablet place 1 tablet by sublingual route at the 1st sign of attack; may repeat every 5 min until relief; if pain persists after 3 tablets in 15 min, prompt medical attention is recommended   pantoprazole 40 mg tablet,delayed release take 1 tablet by oral route  every day   Plavix 75 mg tablet take 1 tablet by oral route  every day            _____________________________________________________________________________________________  CARDIAC HISTORY  CAD:  1 NSTEMI (PCI (Successful PTCA and stenting of totally occluded distal RCA. Resolute Integrity used. ) - 8/26/2015) - 8/28/2015      RISK FACTORS:  1 Hypertension   2 Dyslipidemia   3 Type 2 Diabetes         CARDIOVASCULAR PROCEDURES  CATH LAB:  Cath (EF 0.65 (65%), 30% Proximal LAD, 30% Mid LAD, 75% Mid CX, 50% Proximal RCA, 50% Mid RCA, 100% Distal RCA, small diffuse diseased Diagonals: Resolute NEENA to distal RCA. ) performed by Beth Boland MD - 8/26/2015   ELECTROPHYSIOLOGY:  EKG (LVH  left axis  T inversion inf and lat precordial leads) - 9/11/2015   EKG (Sinus Rhythm, LVH) - 12/17/2015   EKG (Sinus Rhythm, borderline LVH, inf-lat TWI.) - 1/24/2017       For other plans, see orders.   Hospital problem list     Active Hospital Problems    Diagnosis Date Noted    Anemia, unspecified 10/06/2020    GI bleed 10/04/2020        Subjective: Tay Bradford reports       Chest pain X none  consistent with:  Non-cardiac CP         Atypical CP     None now  On going  Anginal CP     Dyspnea X none  at rest  with exertion         improved  unchanged  worse              PND X none  overnight       Orthopnea X none  improved  unchanged  worse   Presyncope X none  improved  unchanged  worse Ambulated in hallway without symptoms   Yes   Ambulated in room without symptoms  Yes   Objective:     Physical Exam:  Overall VSSAF;    Visit Vitals  BP (!) 129/59   Pulse 72   Temp 99.4 °F (37.4 °C)   Resp 18   Ht 5' 2\" (1.575 m)   Wt 92.1 kg (203 lb 1.6 oz)   SpO2 98%   BMI 37.15 kg/m²     Temp (24hrs), Av.7 °F (37.1 °C), Min:97.9 °F (36.6 °C), Max:99.4 °F (37.4 °C)    Patient Vitals for the past 8 hrs:   Pulse   10/10/20 2048 72   10/10/20 1550 76     Patient Vitals for the past 8 hrs:   Resp   10/10/20 2048 18   10/10/20 1550 18     Patient Vitals for the past 8 hrs:   BP   10/10/20 2048 (!) 129/59   10/10/20 1550 (!) 144/65       Intake/Output Summary (Last 24 hours) at 10/10/2020 2210  Last data filed at 10/10/2020 1705  Gross per 24 hour   Intake 840 ml   Output --   Net 840 ml     General Appearance: Well developed, obese, no acute distress. Ears/Nose/Mouth/Throat:   Normal MM; anicteric. JVP: WNL   Resp:   Lungs clear to auscultation bilaterally. Nl resp effort. Cardiovascular:  RRR, S1, S2 normal, no new murmur. No gallop or rub. Abdomen:   Soft, non-tender, bowel sounds are present. Extremities: No edema bilaterally. Skin:  Neuro: Warm and dry. A/O x3, grossly nonfocal       cath site intact w/o hematoma or new bruit; distal pulse unchanged  Yes   Data Review:     Telemetry independently reviewed x sinus  chronic afib  parox afib  NSVT     ECG independently reviewed  NSR  afib  no significant changes  NSST-Tw chgs     x no new ECG provided for review   Lab results reviewed as noted below. Current medications reviewed as noted below. No results for input(s): PH, PCO2, PO2 in the last 72 hours. No results for input(s): CPK, CKMB, CKNDX, TROIQ in the last 72 hours.   Recent Labs     10/10/20  0053 10/08/20  0407    142   K 3.7 3.7   * 113*   CO2 25 24   BUN 27* 33*   CREA 1.42* 1.44*   GFRAA 44* 43*   * 101*   CA 7.7* 7.8*   WBC 13.4* 14.3*   HGB 8.2* 7.6*   HCT 27.4* 25.3*    205     Lab Results   Component Value Date/Time    Cholesterol, total 179 02/16/2015 09:56 AM    HDL Cholesterol 29 (L) 02/16/2015 09:56 AM    LDL, calculated 74 02/16/2015 09:56 AM    Triglyceride 378 (H) 02/16/2015 09:56 AM     No results for input(s): AP, TBIL, TP, ALB, GLOB, GGT, AML, LPSE in the last 72 hours. No lab exists for component: SGOT, GPT, AMYP, HLPSE  No results for input(s): INR, PTP, APTT, INREXT, INREXT in the last 72 hours.    No components found for: [place]    Current Facility-Administered Medications   Medication Dose Route Frequency    amLODIPine (NORVASC) tablet 5 mg  5 mg Oral DAILY    hydrALAZINE (APRESOLINE) 20 mg/mL injection 10 mg  10 mg IntraVENous Q2H PRN    sodium chloride (NS) flush 5-40 mL  5-40 mL IntraVENous Q8H    sodium chloride (NS) flush 5-40 mL  5-40 mL IntraVENous PRN    carvediloL (COREG) tablet 6.25 mg  6.25 mg Oral BID WITH MEALS    losartan (COZAAR) tablet 50 mg  50 mg Oral DAILY    DULoxetine (CYMBALTA) capsule 60 mg  60 mg Oral DAILY    insulin lispro (HUMALOG) injection   SubCUTAneous AC&HS    glucose chewable tablet 16 g  4 Tab Oral PRN    dextrose (D50W) injection syrg 12.5-25 g  12.5-25 g IntraVENous PRN    glucagon (GLUCAGEN) injection 1 mg  1 mg IntraMUSCular PRN    acetaminophen (TYLENOL) tablet 650 mg  650 mg Oral Q6H PRN    Or    acetaminophen (TYLENOL) suppository 650 mg  650 mg Rectal Q6H PRN    polyethylene glycol (MIRALAX) packet 17 g  17 g Oral DAILY PRN    promethazine (PHENERGAN) tablet 12.5 mg  12.5 mg Oral Q6H PRN    Or    ondansetron (ZOFRAN) injection 4 mg  4 mg IntraVENous Q6H PRN    pantoprazole (PROTONIX) 40 mg in 0.9% sodium chloride 10 mL injection  40 mg IntraVENous Q12H    0.9% sodium chloride infusion 250 mL  250 mL IntraVENous PRN        Evangelina Garcia MD

## 2020-10-11 NOTE — PROGRESS NOTES
Problem: Anemia Care Plan (Adult and Pediatric)  Goal: *Labs within defined limits  Outcome: Progressing Towards Goal  Goal: *Tolerates increased activity  Outcome: Progressing Towards Goal     Problem: Patient Education: Go to Patient Education Activity  Goal: Patient/Family Education  Outcome: Progressing Towards Goal     Problem: Falls - Risk of  Goal: *Absence of Falls  Description: Document Carlton Cano Fall Risk and appropriate interventions in the flowsheet. Outcome: Progressing Towards Goal  Note: Fall Risk Interventions:  Mobility Interventions: Patient to call before getting OOB         Medication Interventions: Patient to call before getting OOB    Elimination Interventions: Call light in reach, Patient to call for help with toileting needs    History of Falls Interventions: Door open when patient unattended         Problem: Patient Education: Go to Patient Education Activity  Goal: Patient/Family Education  Outcome: Progressing Towards Goal     Problem: Pressure Injury - Risk of  Goal: *Prevention of pressure injury  Description: Document Rufus Scale and appropriate interventions in the flowsheet. Outcome: Progressing Towards Goal  Note: Pressure Injury Interventions:  Sensory Interventions: Assess changes in LOC    Moisture Interventions: Absorbent underpads    Activity Interventions: Increase time out of bed    Mobility Interventions: Float heels    Nutrition Interventions: Document food/fluid/supplement intake    Friction and Shear Interventions: Minimize layers                Problem: Patient Education: Go to Patient Education Activity  Goal: Patient/Family Education  Outcome: Progressing Towards Goal     Problem: Diabetes Self-Management  Goal: *Disease process and treatment process  Description: Define diabetes and identify own type of diabetes; list 3 options for treating diabetes.   Outcome: Progressing Towards Goal  Goal: *Incorporating nutritional management into lifestyle  Description: Describe effect of type, amount and timing of food on blood glucose; list 3 methods for planning meals. Outcome: Progressing Towards Goal  Goal: *Incorporating physical activity into lifestyle  Description: State effect of exercise on blood glucose levels. Outcome: Progressing Towards Goal  Goal: *Developing strategies to promote health/change behavior  Description: Define the ABC's of diabetes; identify appropriate screenings, schedule and personal plan for screenings. Outcome: Progressing Towards Goal  Goal: *Using medications safely  Description: State effect of diabetes medications on diabetes; name diabetes medication taking, action and side effects. Outcome: Progressing Towards Goal  Goal: *Monitoring blood glucose, interpreting and using results  Description: Identify recommended blood glucose targets  and personal targets. Outcome: Progressing Towards Goal  Goal: *Prevention, detection, treatment of acute complications  Description: List symptoms of hyper- and hypoglycemia; describe how to treat low blood sugar and actions for lowering  high blood glucose level. Outcome: Progressing Towards Goal  Goal: *Prevention, detection and treatment of chronic complications  Description: Define the natural course of diabetes and describe the relationship of blood glucose levels to long term complications of diabetes.   Outcome: Progressing Towards Goal  Goal: *Developing strategies to address psychosocial issues  Description: Describe feelings about living with diabetes; identify support needed and support network  Outcome: Progressing Towards Goal  Goal: *Insulin pump training  Outcome: Progressing Towards Goal  Goal: *Sick day guidelines  Outcome: Progressing Towards Goal  Goal: *Patient Specific Goal (EDIT GOAL, INSERT TEXT)  Outcome: Progressing Towards Goal     Problem: Patient Education: Go to Patient Education Activity  Goal: Patient/Family Education  Outcome: Progressing Towards Goal     Problem: Hypertension  Goal: *Blood pressure within specified parameters  Outcome: Progressing Towards Goal  Goal: *Fluid volume balance  Outcome: Progressing Towards Goal  Goal: *Labs within defined limits  Outcome: Progressing Towards Goal     Problem: Patient Education: Go to Patient Education Activity  Goal: Patient/Family Education  Outcome: Progressing Towards Goal

## 2020-10-11 NOTE — PROGRESS NOTES
Problem: Anemia Care Plan (Adult and Pediatric)  Goal: *Labs within defined limits  Outcome: Progressing Towards Goal  Goal: *Tolerates increased activity  Outcome: Progressing Towards Goal     Problem: Patient Education: Go to Patient Education Activity  Goal: Patient/Family Education  Outcome: Progressing Towards Goal     Problem: Falls - Risk of  Goal: *Absence of Falls  Description: Document Devon Moreland Fall Risk and appropriate interventions in the flowsheet. Outcome: Progressing Towards Goal  Note: Fall Risk Interventions:  Mobility Interventions: Patient to call before getting OOB         Medication Interventions: Patient to call before getting OOB    Elimination Interventions: Call light in reach    History of Falls Interventions: Door open when patient unattended         Problem: Patient Education: Go to Patient Education Activity  Goal: Patient/Family Education  Outcome: Progressing Towards Goal     Problem: Pressure Injury - Risk of  Goal: *Prevention of pressure injury  Description: Document Rufus Scale and appropriate interventions in the flowsheet. Outcome: Progressing Towards Goal  Note: Pressure Injury Interventions:  Sensory Interventions: Assess changes in LOC    Moisture Interventions: Absorbent underpads    Activity Interventions: Increase time out of bed    Mobility Interventions: Float heels    Nutrition Interventions: Document food/fluid/supplement intake    Friction and Shear Interventions: Minimize layers                Problem: Patient Education: Go to Patient Education Activity  Goal: Patient/Family Education  Outcome: Progressing Towards Goal     Problem: Diabetes Self-Management  Goal: *Disease process and treatment process  Description: Define diabetes and identify own type of diabetes; list 3 options for treating diabetes.   Outcome: Progressing Towards Goal  Goal: *Incorporating nutritional management into lifestyle  Description: Describe effect of type, amount and timing of food on blood glucose; list 3 methods for planning meals. Outcome: Progressing Towards Goal  Goal: *Incorporating physical activity into lifestyle  Description: State effect of exercise on blood glucose levels. Outcome: Progressing Towards Goal  Goal: *Developing strategies to promote health/change behavior  Description: Define the ABC's of diabetes; identify appropriate screenings, schedule and personal plan for screenings. Outcome: Progressing Towards Goal  Goal: *Using medications safely  Description: State effect of diabetes medications on diabetes; name diabetes medication taking, action and side effects. Outcome: Progressing Towards Goal  Goal: *Monitoring blood glucose, interpreting and using results  Description: Identify recommended blood glucose targets  and personal targets. Outcome: Progressing Towards Goal  Goal: *Prevention, detection, treatment of acute complications  Description: List symptoms of hyper- and hypoglycemia; describe how to treat low blood sugar and actions for lowering  high blood glucose level. Outcome: Progressing Towards Goal  Goal: *Prevention, detection and treatment of chronic complications  Description: Define the natural course of diabetes and describe the relationship of blood glucose levels to long term complications of diabetes.   Outcome: Progressing Towards Goal  Goal: *Developing strategies to address psychosocial issues  Description: Describe feelings about living with diabetes; identify support needed and support network  Outcome: Progressing Towards Goal  Goal: *Insulin pump training  Outcome: Progressing Towards Goal  Goal: *Sick day guidelines  Outcome: Progressing Towards Goal  Goal: *Patient Specific Goal (EDIT GOAL, INSERT TEXT)  Outcome: Progressing Towards Goal     Problem: Patient Education: Go to Patient Education Activity  Goal: Patient/Family Education  Outcome: Progressing Towards Goal     Problem: Hypertension  Goal: *Blood pressure within specified parameters  Outcome: Progressing Towards Goal  Goal: *Fluid volume balance  Outcome: Progressing Towards Goal  Goal: *Labs within defined limits  Outcome: Progressing Towards Goal     Problem: Patient Education: Go to Patient Education Activity  Goal: Patient/Family Education  Outcome: Progressing Towards Goal

## 2020-10-11 NOTE — PROGRESS NOTES
Hospitalist Progress Note    NAME: Estevan Hernandez   :  1945   MRN:  [id number]       Assessment / Plan:  GE jnxn GIST  Symptomatic severe anemia POA 2/2 above  -appreciate GI and surgical consults  -surgery in the coming week  -Heme/onc consult   -s/p transfusion 3 units RBCs  -Hb has been stable, cont to monitor labs  -EGD 10.6 large gastric mass  -cont liquid diet w ensure    Elevated troponin  CAD  HTN   -BP stable   -cardiology help appreciated  -medical management of CAD for now. 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 No role for anticoagulation; ASA when ok with GI. Cont bblocker and ARB as BP tolerates  -elevated trops likely represents demand ischemic  -EKG NS, LVH, ? lateral ST depression    HTN  -not optimally controlled  -cont coreg, start amlodipine     CKD 3  Admission 1.89, monitor closely  Avoid nephrotoxic drugs, adjust all meds to GFR.      DM2   - NPO after MN, watch BS  C/w SS as needed      GERD  HLP      Code status: Full  Prophylaxis: SCD's      Baseline: independent   Recommended Disposition: Home w/Family     Subjective:     Chief Complaint / Reason for Physician Visit  No distress, legs cramping a little, encouraged ambulation. Doesn't like the ensure but is drinking it    Discussed with RN events overnight. Review of Systems:  Symptom Y/N Comments  Symptom Y/N Comments   Fever/Chills n   Chest Pain n    Poor Appetite y   Edema n    Cough n   Abdominal Pain y Mild discomfort   Sputum n   Joint Pain     SOB/ARENAS n   Pruritis/Rash     Nausea/vomit n   Tolerating PT/OT     Diarrhea n   Tolerating Diet y ensure   Constipation n   Other       Could NOT obtain due to:      Objective:     VITALS:   Last 24hrs VS reviewed since prior progress note.  Most recent are:  Patient Vitals for the past 24 hrs:   Temp Pulse Resp BP SpO2   10/11/20 0818 98.7 °F (37.1 °C) 63 16 (!) 145/58 100 %   10/11/20 0345 98.5 °F (36.9 °C) 68 17 127/62 98 %   10/10/20 2347 98.7 °F (37.1 °C) 69 17 121/63 95 %   10/10/20 2048 99.4 °F (37.4 °C) 72 18 (!) 129/59 98 %   10/10/20 1550 99.3 °F (37.4 °C) 76 18 (!) 144/65 99 %   10/10/20 1200 -- 73 -- -- --       Intake/Output Summary (Last 24 hours) at 10/11/2020 1152  Last data filed at 10/10/2020 1705  Gross per 24 hour   Intake 540 ml   Output --   Net 540 ml        PHYSICAL EXAM:  General:          WD, WN. Alert, cooperative, no acute distress    EENT:             EOMI. Anicteric sclerae. MMM  Resp:              CTA bilaterally, no wheezing or rales. No accessory muscle use  CV:                  Regular  rhythm,  No edema  GI:                   Soft, mild distension, mild epigastric tenderness. Neurologic:      Alert and oriented X 3, normal speech,   Psych:             Good insight. Not anxious nor agitated  Skin:                No rashes. No jaundice    Reviewed most current lab test results and cultures  YES  Reviewed most current radiology test results   YES  Review and summation of old records today    NO  Reviewed patient's current orders and MAR    YES  PMH/ reviewed - no change compared to H&P  ________________________________________________________________________  Care Plan discussed with:    Comments   Patient x    Family      RN x    Care Manager     Consultant                       x Multidiciplinary team rounds were held today with , nursing, pharmacist and clinical coordinator. Patient's plan of care was discussed; medications were reviewed and discharge planning was addressed.      ________________________________________________________________________  Total NON critical care TIME: 30   Minutes    Total CRITICAL CARE TIME Spent:   Minutes non procedure based      Comments   >50% of visit spent in counseling and coordination of care x ________________________________________________________________________  Jefferson Oquendo DO     Procedures: see electronic medical records for all procedures/Xrays and details which were not copied into this note but were reviewed prior to creation of Plan. LABS:  I reviewed today's most current labs and imaging studies.   Pertinent labs include:  Recent Labs     10/10/20  0053   WBC 13.4*   HGB 8.2*   HCT 27.4*        Recent Labs     10/10/20  0053      K 3.7   *   CO2 25   *   BUN 27*   CREA 1.42*   CA 7.7*       Signed: Jefferson Oquendo DO

## 2020-10-11 NOTE — PROGRESS NOTES
General Surgery End of Shift Nursing Note    Bedside shift change report given to Jamia Leonardo (oncoming nurse) by Gracie Mena (offgoing nurse). Report included the following information SBAR, Kardex, ED Summary, Intake/Output, MAR and Recent Results. Shift worked:   [id number]   Summary of shift:    Pt had minimal complaints throughout shift. Pt was able to ambulate in room and to the bathroom with no difficulty. Pt has not needed any medication for pain or nausea during this shift. No significant events to report. Issues for physician to address:   none     Number times ambulated in hallway past shift: 0    Number of times OOB to chair past shift: 3        GI:    Current diet:  DIET NUTRITIONAL SUPPLEMENTS Breakfast, AM Snack, Lunch, PM Snack, Dinner, HS Snack; Ensure Enlive  DIET FULL LIQUID    Tolerating current diet: YES  Passing flatus: YES  Last Bowel Movement: yesterday    Respiratory:    Incentive Spirometer at bedside: YES  Patient instructed on use: YES    Patient Safety:    Falls Score: 4  Bed Alarm On? Not applicable  Sitter?  Not applicable    Peri Narvaez

## 2020-10-11 NOTE — PROGRESS NOTES
Cardiology Progress Note  10/11/2020     Admit Date: 10/4/2020  Admit Diagnosis: GI bleed [K92.2]  CC: none currently  Cardiologist:  Dr Jaime Rod then Dr Ruma Ng (last OV 2017). Cardiac Assessment/Plan:    1) CAD: Distal RCA NEENA 8/2015 for NQWMI; Med Rx mid circ and diffusely dz diagonals. *NQWMI 10/2020 (trop 9), c/w type II from profound anemia. * EF 30-35% 10/6/20. 2) HTN  3) DM  4) Dyslipidemia  5) CKD III: Cr 1.43/gfr 42 7/2020 (Dr Pearson Brunner). 6) Obesity: ?NANY. 7) Non-compliant with some meds per pt 10/2020. Poor compliance with f/u OV. 8) Anemia POA (Hg 4.5): gastric mass (?CA); path pending.     Presenting with AMS, profound anemia; NQWMI. GUANACO on CKD. No CP/change in chronic ARENAS.     Rec POA: Need to correct profound anemia; Agree with GI eval.   I suspect NQWMI is type II; no current role for invasive Rx. No role for anticoagulation; ASA when ok with GI. Cont bblocker and ARB as BP tolerates.         10/5: Afeb; BPs 110-140s; HR 70-80s; 98% RA. Hg 5.8; Cr 1.92 from 1.89; trop 8-9 range. Cardiac meds: none; NS @ 75. Echo pending; still needs anemia corrected. Add low dose coreg (3.125 bid) w/ hold parameters. 10/6: -150s; HR 70-90s; 95-97% RA. Net + 1.6L. Hg 8.3 (stable); Nl K; Cr 1.64. Cardiac meds: corege 3.125 bid; NS @ 75. Rec: echo pending; anemia stable currently. Add back ARB as able. Echo 10/6/20:   · LV: Estimated LVEF is 30 - 35%. Normal cavity size. Mild concentric hypertrophy. Moderate-to-severely reduced systolic function. · TV: Mild to moderate tricuspid valve regurgitation is present. 10/7: BPs 140-160; HR 70s; NSR. 95% RA. No UOP recorded yesterday. Hg stable @ 8.3; Cr 1.43/gfr 44. Cardiac meds: coreg 3.125 bid; NS @ 75. Rec: add losartan 25 qday; uptitrate meds as able/needed. D/C IVF. Med Rx for now; cant have cath/PCI until able to have full antiplt agents (asa/plavix for at least 1 month IF BMS used).   Awaiting on stomach mass pathology. I will not be here tomorrow nor the rest if this week. Dr Good Agosto will see the patient in my absence. 10/8  Events reviewed. Path report is pending . Cardiac status is stable. Continue medical management . 10/9  Findings and plans for surgery next week noted. She feels well . No cardiac complaints. Yesterday's Hgb still 7. No change in cardiac situation . 10/10 Cardiac status unchanged. 10/11  No change in cardiac status. Hgb 8      _________________________________________________________________    As noted POA: \"The patient reports no chest pain/pressure; no change in chronic ARENAS. No PND, orthopnea, palpitations, pre-syncope, syncope, new peripheral edema, or decrease in exercise tolerance. No current complaints.     Fell 3 days ago (gen weak; not lightheaded; no syncope); Decreased LOC last few days per : \"asleep\"; wakes when shaken. +Sx NANY.     Dark stool several weeks ago, none since.     ECG: Sinus; ; IVCD 110; LVH; Lat ST depression (more than 2015). Tele: sinus  CXR: \"Enlarged cardiac silhouette, otherwise no acute disease\"     Notable labs: WBC 16.3; Hg 4.5. Cr 1.89 (nl in 2017); BUN 46. ; neg MB; trop 8. 2. \"  _____________________________________________________________________________  Notable prior cardiac history:  @ last OV 1/24/17:          Ms Eusebio Mcnulty has a h/o CAD (NEENA in distal RCA 8/2015 for NSTEMI,  Normal LV EF, 75% mid-circ; Dr Christine Richter), HTN, DM, and dyslipidemia; She has chronic mild ARENAS.     7/2016: No change ARENAS; no CP. Worse LE edema: some salt; BP poorly controlled per pt (chronic); Last week, PCP decreased norvasc to 5 qd and added diuretic.     1/2017: No angina; Minor ARENAS (doesnt need to stop activity); Using less salt; Wt down 10# with diet.        IMPRESSION AND PLAN  01. Atherosclerotic heart disease of native coronary artery with other forms of angina pectoris:  No angina;  Stable dyspnea; could consider PCI of mid circ if needed: MPI next year/sooner prn.     We discussed the signs and symptoms of unstable angina, myocardial infarction and malignant arrhythmia. The patient knows to seek immediate medical attention should they occur. ECG done today  Exercise MPI to be done with next visit. 02. Old myocardial infarction: This condition is stable. 03. Hypertensive heart disease without heart failure:  Adequately controlled. 04. Mixed hyperlipidemia:  Lipid labs drawn by PCP. The patient is tolerating lipid lowering therapy well. 05. Localized edema:  Resolved. The patient was instructed on a low sodium diet. 06. Type 2 diabetes mellitus without complications: This condition is stable. 07. Long term (current) use of aspirin:  No bleeding. Continue Rx   08. Long term (current) use of antithrombotics/antiplatelets:  No bleeding. D/C plavix at next OV if unremarkable MPI.   09. Body mass index (BMI) 36.0-36.9, adult:  The patient was instructed on AHA diet and regular exercise.            ORDERS:  1 ECG done today   2 Myocardial Perfusion Study / Sy protocol with next visit   3 Return office visit with Francoise Osler. Heidy WATERS in 1 Year. 4 The patient was instructed on AHA diet and regular exercise.    [address] [facility]   6 Hypertension Educational Materials   7 Patient counseled on the following: Low Salt Diet.         1/2017 MEDICATION LIST  Medication Sig Description   Amaryl 4 mg tablet take 1 tablet by oral route 2 times every day   amlodipine 10 mg tablet take 1 tablet by oral route  every day   aspirin 81 mg tablet,delayed release take 1 tablet by oral route  every day   atorvastatin 40 mg tablet take 1 tablet by oral route  every bedtime   clonidine HCl 0.1 mg tablet take 1 tablet by oral route  every day   Coreg 25 mg tablet take 1 tablet by oral route 2 times every day with food   Diovan  mg-25 mg tablet take 1 tablet by oral route  every day   furosemide 20 mg tablet take 1 tablet by oral route  every day   gabapentin 100 mg capsule take 1 capsule by oral route 3 times every day   hydralazine 25 mg tablet take 1 tablet by oral route 2 times every day with food   Lantus 100 unit/mL subcutaneous solution inject by subcutaneous route as per insulin protocol   Nitrostat 0.4 mg sublingual tablet place 1 tablet by sublingual route at the 1st sign of attack; may repeat every 5 min until relief; if pain persists after 3 tablets in 15 min, prompt medical attention is recommended   pantoprazole 40 mg tablet,delayed release take 1 tablet by oral route  every day   Plavix 75 mg tablet take 1 tablet by oral route  every day            _____________________________________________________________________________________________  CARDIAC HISTORY  CAD:  1 NSTEMI (PCI (Successful PTCA and stenting of totally occluded distal RCA. Resolute Integrity used. ) - 8/26/2015) - 8/28/2015      RISK FACTORS:  1 Hypertension   2 Dyslipidemia   3 Type 2 Diabetes         CARDIOVASCULAR PROCEDURES  CATH LAB:  Cath (EF 0.65 (65%), 30% Proximal LAD, 30% Mid LAD, 75% Mid CX, 50% Proximal RCA, 50% Mid RCA, 100% Distal RCA, small diffuse diseased Diagonals: Resolute NEENA to distal RCA. ) performed by Philip Álvarez MD - 8/26/2015   ELECTROPHYSIOLOGY:  EKG (LVH  left axis  T inversion inf and lat precordial leads) - 9/11/2015   EKG (Sinus Rhythm, LVH) - 12/17/2015   EKG (Sinus Rhythm, borderline LVH, inf-lat TWI.) - 1/24/2017       For other plans, see orders.   Hospital problem list     Active Hospital Problems    Diagnosis Date Noted    Anemia, unspecified 10/06/2020    GI bleed 10/04/2020        Subjective: Mikel Bauman reports       Chest pain X none  consistent with:  Non-cardiac CP         Atypical CP     None now  On going  Anginal CP     Dyspnea X none  at rest  with exertion         improved  unchanged  worse              PND X none  overnight       Orthopnea X none  improved  unchanged  worse   Presyncope X none  improved  unchanged  worse     Ambulated in hallway without symptoms   Yes   Ambulated in room without symptoms  Yes   Objective:     Physical Exam:  Overall VSSAF;    Visit Vitals  BP (!) 154/77   Pulse 74   Temp 98.5 °F (36.9 °C)   Resp 17   Ht 5' 2\" (1.575 m)   Wt 92.1 kg (203 lb 1.6 oz)   SpO2 100%   BMI 37.15 kg/m²     Temp (24hrs), Av.8 °F (37.1 °C), Min:98.5 °F (36.9 °C), Max:99.4 °F (37.4 °C)    Patient Vitals for the past 8 hrs:   Pulse   10/11/20 1625 74   10/11/20 1600 74   10/11/20 1206 66   10/11/20 1200 66     Patient Vitals for the past 8 hrs:   Resp   10/11/20 1625 17   10/11/20 1206 18     Patient Vitals for the past 8 hrs:   BP   10/11/20 1625 (!) 154/77   10/11/20 1206 130/78       Intake/Output Summary (Last 24 hours) at 10/11/2020 1637  Last data filed at 10/10/2020 1705  Gross per 24 hour   Intake 240 ml   Output --   Net 240 ml     General Appearance: Well developed, obese, no acute distress. Ears/Nose/Mouth/Throat:   Normal MM; anicteric. JVP: WNL   Resp:   Lungs clear to auscultation bilaterally. Nl resp effort. Cardiovascular:  RRR, S1, S2 normal, no new murmur. No gallop or rub. Abdomen:   Soft, non-tender, bowel sounds are present. Extremities: No edema bilaterally. Skin:  Neuro: Warm and dry. A/O x3, grossly nonfocal       cath site intact w/o hematoma or new bruit; distal pulse unchanged  Yes   Data Review:     Telemetry independently reviewed x sinus  chronic afib  parox afib  NSVT     ECG independently reviewed  NSR  afib  no significant changes  NSST-Tw chgs     x no new ECG provided for review   Lab results reviewed as noted below. Current medications reviewed as noted below. No results for input(s): PH, PCO2, PO2 in the last 72 hours. No results for input(s): CPK, CKMB, CKNDX, TROIQ in the last 72 hours.   Recent Labs     10/10/20  0053      K 3.7   *   CO2 25   BUN 27*   CREA 1.42*   GFRAA 44*   *   CA 7.7*   WBC 13.4*   HGB 8.2* HCT 27.4*        Lab Results   Component Value Date/Time    Cholesterol, total 179 02/16/2015 09:56 AM    HDL Cholesterol 29 (L) 02/16/2015 09:56 AM    LDL, calculated 74 02/16/2015 09:56 AM    Triglyceride 378 (H) 02/16/2015 09:56 AM     No results for input(s): AP, TBIL, TP, ALB, GLOB, GGT, AML, LPSE in the last 72 hours. No lab exists for component: SGOT, GPT, AMYP, HLPSE  No results for input(s): INR, PTP, APTT, INREXT, INREXT in the last 72 hours.    No components found for: [place]    Current Facility-Administered Medications   Medication Dose Route Frequency    amLODIPine (NORVASC) tablet 5 mg  5 mg Oral DAILY    hydrALAZINE (APRESOLINE) 20 mg/mL injection 10 mg  10 mg IntraVENous Q2H PRN    sodium chloride (NS) flush 5-40 mL  5-40 mL IntraVENous Q8H    sodium chloride (NS) flush 5-40 mL  5-40 mL IntraVENous PRN    carvediloL (COREG) tablet 6.25 mg  6.25 mg Oral BID WITH MEALS    losartan (COZAAR) tablet 50 mg  50 mg Oral DAILY    DULoxetine (CYMBALTA) capsule 60 mg  60 mg Oral DAILY    insulin lispro (HUMALOG) injection   SubCUTAneous AC&HS    glucose chewable tablet 16 g  4 Tab Oral PRN    dextrose (D50W) injection syrg 12.5-25 g  12.5-25 g IntraVENous PRN    glucagon (GLUCAGEN) injection 1 mg  1 mg IntraMUSCular PRN    acetaminophen (TYLENOL) tablet 650 mg  650 mg Oral Q6H PRN    Or    acetaminophen (TYLENOL) suppository 650 mg  650 mg Rectal Q6H PRN    polyethylene glycol (MIRALAX) packet 17 g  17 g Oral DAILY PRN    promethazine (PHENERGAN) tablet 12.5 mg  12.5 mg Oral Q6H PRN    Or    ondansetron (ZOFRAN) injection 4 mg  4 mg IntraVENous Q6H PRN    pantoprazole (PROTONIX) 40 mg in 0.9% sodium chloride 10 mL injection  40 mg IntraVENous Q12H    0.9% sodium chloride infusion 250 mL  250 mL IntraVENous PRN        Usha Coffey MD

## 2020-10-11 NOTE — PROGRESS NOTES
General Surgery End of Shift Nursing Note    Bedside shift change report given to [id number] [facility] nurse) by Sharad (offgoing nurse). Report included the following information SBAR, Kardex, Intake/Output, MAR and Recent Results. Shift worked:   [id number]   Summary of shift:    No changes this shift. Issues for physician to address:   none     Number times ambulated in hallway past shift: 0    Number of times OOB to chair past shift: 0    Pain Management:  Current medication: see MAR  Patient states pain is manageable on current pain medication: YES    GI:    Current diet:  DIET NUTRITIONAL SUPPLEMENTS Breakfast, AM Snack, Lunch, PM Snack, Dinner, Almanzar's; Ensure Enlive  DIET FULL LIQUID    Tolerating current diet: YES  Passing flatus: YES  Last Bowel Movement: several days ago     Respiratory:    Incentive Spirometer at bedside: YES  Patient instructed on use: YES    Patient Safety:    Falls Score: 4  Bed Alarm On? No  Sitter?  No    Brandee Guallpa RN

## 2020-10-12 LAB
ANION GAP SERPL CALC-SCNC: 4 MMOL/L (ref 5–15)
BUN SERPL-MCNC: 26 MG/DL (ref 6–20)
BUN/CREAT SERPL: 21 (ref 12–20)
CALCIUM SERPL-MCNC: 7.9 MG/DL (ref 8.5–10.1)
CHLORIDE SERPL-SCNC: 109 MMOL/L (ref 97–108)
CO2 SERPL-SCNC: 25 MMOL/L (ref 21–32)
CREAT SERPL-MCNC: 1.22 MG/DL (ref 0.55–1.02)
ERYTHROCYTE [DISTWIDTH] IN BLOOD BY AUTOMATED COUNT: 16 % (ref 11.5–14.5)
GLUCOSE BLD STRIP.AUTO-MCNC: 167 MG/DL (ref 65–100)
GLUCOSE BLD STRIP.AUTO-MCNC: 190 MG/DL (ref 65–100)
GLUCOSE BLD STRIP.AUTO-MCNC: 219 MG/DL (ref 65–100)
GLUCOSE BLD STRIP.AUTO-MCNC: 234 MG/DL (ref 65–100)
GLUCOSE SERPL-MCNC: 176 MG/DL (ref 65–100)
HCT VFR BLD AUTO: 27.6 % (ref 35–47)
HGB BLD-MCNC: 8.2 G/DL (ref 11.5–16)
MCH RBC QN AUTO: 25.7 PG (ref 26–34)
MCHC RBC AUTO-ENTMCNC: 29.7 G/DL (ref 30–36.5)
MCV RBC AUTO: 86.5 FL (ref 80–99)
NRBC # BLD: 0 K/UL (ref 0–0.01)
NRBC BLD-RTO: 0 PER 100 WBC
PLATELET # BLD AUTO: 261 K/UL (ref 150–400)
PMV BLD AUTO: 9.8 FL (ref 8.9–12.9)
POTASSIUM SERPL-SCNC: 4 MMOL/L (ref 3.5–5.1)
RBC # BLD AUTO: 3.19 M/UL (ref 3.8–5.2)
SERVICE CMNT-IMP: ABNORMAL
SODIUM SERPL-SCNC: 138 MMOL/L (ref 136–145)
WBC # BLD AUTO: 11.9 K/UL (ref 3.6–11)

## 2020-10-12 PROCEDURE — 74011250636 HC RX REV CODE- 250/636: Performed by: INTERNAL MEDICINE

## 2020-10-12 PROCEDURE — 94760 N-INVAS EAR/PLS OXIMETRY 1: CPT

## 2020-10-12 PROCEDURE — 36415 COLL VENOUS BLD VENIPUNCTURE: CPT

## 2020-10-12 PROCEDURE — 74011250637 HC RX REV CODE- 250/637: Performed by: GENERAL ACUTE CARE HOSPITAL

## 2020-10-12 PROCEDURE — 65270000029 HC RM PRIVATE

## 2020-10-12 PROCEDURE — 74011000250 HC RX REV CODE- 250: Performed by: INTERNAL MEDICINE

## 2020-10-12 PROCEDURE — 74011250637 HC RX REV CODE- 250/637: Performed by: INTERNAL MEDICINE

## 2020-10-12 PROCEDURE — 85027 COMPLETE CBC AUTOMATED: CPT

## 2020-10-12 PROCEDURE — 82962 GLUCOSE BLOOD TEST: CPT

## 2020-10-12 PROCEDURE — 74011636637 HC RX REV CODE- 636/637: Performed by: GENERAL ACUTE CARE HOSPITAL

## 2020-10-12 PROCEDURE — 99232 SBSQ HOSP IP/OBS MODERATE 35: CPT | Performed by: SURGERY

## 2020-10-12 PROCEDURE — 80048 BASIC METABOLIC PNL TOTAL CA: CPT

## 2020-10-12 PROCEDURE — C9113 INJ PANTOPRAZOLE SODIUM, VIA: HCPCS | Performed by: INTERNAL MEDICINE

## 2020-10-12 RX ORDER — CARVEDILOL 12.5 MG/1
12.5 TABLET ORAL 2 TIMES DAILY WITH MEALS
Status: DISCONTINUED | OUTPATIENT
Start: 2020-10-12 | End: 2020-10-13 | Stop reason: HOSPADM

## 2020-10-12 RX ADMIN — Medication 10 ML: at 06:19

## 2020-10-12 RX ADMIN — INSULIN LISPRO 3 UNITS: 100 INJECTION, SOLUTION INTRAVENOUS; SUBCUTANEOUS at 11:57

## 2020-10-12 RX ADMIN — LOSARTAN POTASSIUM 50 MG: 50 TABLET ORAL at 08:11

## 2020-10-12 RX ADMIN — CARVEDILOL 6.25 MG: 6.25 TABLET, FILM COATED ORAL at 08:10

## 2020-10-12 RX ADMIN — INSULIN LISPRO 2 UNITS: 100 INJECTION, SOLUTION INTRAVENOUS; SUBCUTANEOUS at 16:20

## 2020-10-12 RX ADMIN — AMLODIPINE BESYLATE 5 MG: 5 TABLET ORAL at 08:10

## 2020-10-12 RX ADMIN — INSULIN LISPRO 2 UNITS: 100 INJECTION, SOLUTION INTRAVENOUS; SUBCUTANEOUS at 08:12

## 2020-10-12 RX ADMIN — Medication 10 ML: at 22:15

## 2020-10-12 RX ADMIN — SODIUM CHLORIDE 40 MG: 9 INJECTION, SOLUTION INTRAMUSCULAR; INTRAVENOUS; SUBCUTANEOUS at 20:26

## 2020-10-12 RX ADMIN — DULOXETINE HYDROCHLORIDE 60 MG: 30 CAPSULE, DELAYED RELEASE ORAL at 08:09

## 2020-10-12 RX ADMIN — SODIUM CHLORIDE 40 MG: 9 INJECTION, SOLUTION INTRAMUSCULAR; INTRAVENOUS; SUBCUTANEOUS at 08:12

## 2020-10-12 RX ADMIN — INSULIN LISPRO 2 UNITS: 100 INJECTION, SOLUTION INTRAVENOUS; SUBCUTANEOUS at 22:00

## 2020-10-12 RX ADMIN — Medication 10 ML: at 16:20

## 2020-10-12 RX ADMIN — CARVEDILOL 12.5 MG: 12.5 TABLET, FILM COATED ORAL at 16:20

## 2020-10-12 RX ADMIN — ACETAMINOPHEN 650 MG: 325 TABLET ORAL at 08:11

## 2020-10-12 NOTE — PROGRESS NOTES
General Surgery End of Shift Nursing Note     Bedside shift change report given to Walker Dash (oncoming nurse) by Conrado Salmeron (offgoing nurse). Report included the following information SBAR, Kardex, Intake/Output, MAR and Recent Results.     Shift worked:   [id number]       Summary of shift:  Labs sent this morning, no changes overnight. Medicated once with tylenol for leg cramps. Issues for physician to address:     None        Number times ambulated in hallway past shift: 0    Number of times OOB to chair past shift: 0     Pain Management:  Current medication: see MAR  Patient states pain is manageable on current pain medication: YES     GI:     Current diet:  DIET NUTRITIONAL SUPPLEMENTS Breakfast, AM Snack, Lunch, PM Snack, Dinner, Almanzar's; Ensure Enlive  DIET FULL LIQUID    Tolerating current diet: YES  Passing flatus: YES  Last Bowel Movement: several days ago                Respiratory:     Incentive Spirometer at bedside: YES  Patient instructed on use: YES     Patient Safety:     Falls Score: 4  Bed Alarm On? No  Sitter?  No     Johana Newby RN

## 2020-10-12 NOTE — PROGRESS NOTES
Cardiology Progress Note  10/12/2020     Admit Date: 10/4/2020  Admit Diagnosis: GI bleed [K92.2]  CC: none currently  Cardiologist:  Dr Abby Lino then Dr Sammi Roberts (last OV 2017). Cardiac Assessment/Plan:    1) CAD: Distal RCA NEENA 8/2015 for NQWMI; Med Rx mid circ and diffusely dz diagonals. *NQWMI 10/2020 (trop 9), c/w type II from profound anemia. * EF 30-35% 10/6/20. 2) HTN  3) DM  4) Dyslipidemia  5) CKD III: Cr 1.43/gfr 42 7/2020 (Dr Talisha Kaba). 6) Obesity: ?NANY. 7) Non-compliant with some meds per pt 10/2020. Poor compliance with f/u OV. 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     Presenting with AMS, profound anemia; NQWMI. GUANACO on CKD. No CP/change in chronic ARENAS.     Rec POA: Need to correct profound anemia; Agree with GI eval.   I suspect NQWMI is type II; no current role for invasive Rx. No role for anticoagulation; ASA when ok with GI. Cont bblocker and ARB as BP tolerates.           Echo 10/6/20:   · LV: Estimated LVEF is 30 - 35%. Normal cavity size. Mild concentric hypertrophy. Moderate-to-severely reduced systolic function. · TV: Mild to moderate tricuspid valve regurgitation is present. 10/7: BPs 140-160; HR 70s; NSR. 95% RA. No UOP recorded yesterday. Hg stable @ 8.3; Cr 1.43/gfr 44. Cardiac meds: coreg 3.125 bid; NS @ 75. Rec: add losartan 25 qday; uptitrate meds as able/needed. D/C IVF. Med Rx for now; cant have cath/PCI until able to have full antiplt agents (asa/plavix for at least 1 month IF BMS used). Awaiting on stomach mass pathology. 10/12: -150; HR 60-70s; 99% RA. Inaccurate I/Os. Hg 8.2; Cr 1.22 from 1.4;   Cardiac meds: norvasc 5; coreg 6.25 bid; cozaar 50. No new cardiac recs.       _________________________________________________________________    As noted POA: \"The patient reports no chest pain/pressure; no change in chronic ARENAS.   No PND, orthopnea, palpitations, pre-syncope, syncope, new peripheral edema, or decrease in exercise tolerance. No current complaints.     Fell 3 days ago (gen weak; not lightheaded; no syncope); Decreased LOC last few days per : \"asleep\"; wakes when shaken. +Sx NANY.     Dark stool several weeks ago, none since.     ECG: Sinus; ; IVCD 110; LVH; Lat ST depression (more than 2015). Tele: sinus  CXR: \"Enlarged cardiac silhouette, otherwise no acute disease\"     Notable labs: WBC 16.3; Hg 4.5. Cr 1.89 (nl in 2017); BUN 46. ; neg MB; trop 8. 2. \"  _____________________________________________________________________________  Notable prior cardiac history:  @ last OV 1/24/17:          Ms Melissa Merlin has a h/o CAD (NEENA in distal RCA 8/2015 for NSTEMI,  Normal LV EF, 75% mid-circ; Dr Marissa Lam), HTN, DM, and dyslipidemia; She has chronic mild ARENAS.     7/2016: No change ARENAS; no CP. Worse LE edema: some salt; BP poorly controlled per pt (chronic); Last week, PCP decreased norvasc to 5 qd and added diuretic.     1/2017: No angina; Minor ARENAS (doesnt need to stop activity); Using less salt; Wt down 10# with diet.        IMPRESSION AND PLAN  01. Atherosclerotic heart disease of native coronary artery with other forms of angina pectoris:  No angina; Stable dyspnea; could consider PCI of mid circ if needed: MPI next year/sooner prn.     We discussed the signs and symptoms of unstable angina, myocardial infarction and malignant arrhythmia. The patient knows to seek immediate medical attention should they occur. ECG done today  Exercise MPI to be done with next visit. 02. Old myocardial infarction: This condition is stable. 03. Hypertensive heart disease without heart failure:  Adequately controlled. 04. Mixed hyperlipidemia:  Lipid labs drawn by PCP. The patient is tolerating lipid lowering therapy well. 05. Localized edema:  Resolved. The patient was instructed on a low sodium diet. 06. Type 2 diabetes mellitus without complications: This condition is stable. 07. Long term (current) use of aspirin:  No bleeding. Continue Rx   08. Long term (current) use of antithrombotics/antiplatelets:  No bleeding. D/C plavix at next OV if unremarkable MPI.   09. Body mass index (BMI) 36.0-36.9, adult:  The patient was instructed on AHA diet and regular exercise.            ORDERS:  1 ECG done today   2 Myocardial Perfusion Study / Sy protocol with next visit   3 Return office visit with Torrie Moody MD in 1 Year. 4 The patient was instructed on AHA diet and regular exercise.    [facility]   6 Hypertension Educational Materials   7 Patient counseled on the following: Low Salt Diet.         1/2017 MEDICATION LIST  Medication Sig Description   Amaryl 4 mg tablet take 1 tablet by oral route 2 times every day   amlodipine 10 mg tablet take 1 tablet by oral route  every day   aspirin 81 mg tablet,delayed release take 1 tablet by oral route  every day   atorvastatin 40 mg tablet take 1 tablet by oral route  every bedtime   clonidine HCl 0.1 mg tablet take 1 tablet by oral route  every day   Coreg 25 mg tablet take 1 tablet by oral route 2 times every day with food   Diovan  mg-25 mg tablet take 1 tablet by oral route  every day   furosemide 20 mg tablet take 1 tablet by oral route  every day   gabapentin 100 mg capsule take 1 capsule by oral route 3 times every day   hydralazine 25 mg tablet take 1 tablet by oral route 2 times every day with food   Lantus 100 unit/mL subcutaneous solution inject by subcutaneous route as per insulin protocol   Nitrostat 0.4 mg sublingual tablet place 1 tablet by sublingual route at the 1st sign of attack; may repeat every 5 min until relief; if pain persists after 3 tablets in 15 min, prompt medical attention is recommended   pantoprazole 40 mg tablet,delayed release take 1 tablet by oral route  every day   Plavix 75 mg tablet take 1 tablet by oral route  every day          _____________________________________________________________________________________________  CARDIAC HISTORY  CAD:  1 NSTEMI (PCI (Successful PTCA and stenting of totally occluded distal RCA. Resolute Integrity used. ) - 2015) - 2015      RISK FACTORS:  1 Hypertension   2 Dyslipidemia   3 Type 2 Diabetes         CARDIOVASCULAR PROCEDURES  CATH LAB:  Cath (EF 0.65 (65%), 30% Proximal LAD, 30% Mid LAD, 75% Mid CX, 50% Proximal RCA, 50% Mid RCA, 100% Distal RCA, small diffuse diseased Diagonals: Resolute NEENA to distal RCA. ) performed by Aury Holliday MD - 2015   ELECTROPHYSIOLOGY:  EKG (LVH  left axis  T inversion inf and lat precordial leads) - 2015   EKG (Sinus Rhythm, LVH) - 2015   EKG (Sinus Rhythm, borderline LVH, inf-lat TWI.) - 2017       For other plans, see orders.   Hospital problem list     Active Hospital Problems    Diagnosis Date Noted    Anemia, unspecified 10/06/2020    GI bleed 10/04/2020        Subjective: Yohan Ndiaye reports       Chest pain X none  consistent with:  Non-cardiac CP         Atypical CP     None now  On going  Anginal CP     Dyspnea X none  at rest  with exertion         improved  unchanged  worse              PND X none  overnight       Orthopnea X none  improved  unchanged  worse   Presyncope X none  improved  unchanged  worse     Ambulated in hallway without symptoms   Yes   Ambulated in room without symptoms  Yes   Objective:     Physical Exam:  Overall VSSAF;    Visit Vitals  BP (!) 146/59 (BP 1 Location: Left arm, BP Patient Position: At rest)   Pulse 64   Temp 97.6 °F (36.4 °C)   Resp 16   Ht 5' 2\" (1.575 m)   Wt 92.1 kg (203 lb 1.6 oz)   SpO2 99%   BMI 37.15 kg/m²     Temp (24hrs), Av.4 °F (36.9 °C), Min:97.6 °F (36.4 °C), Max:99 °F (37.2 °C)    Patient Vitals for the past 8 hrs:   Pulse   10/12/20 0716 64   10/12/20 0333 62     Patient Vitals for the past 8 hrs:   Resp   10/12/20 0716 16   10/12/20 0333 16     Patient Vitals for the past 8 hrs:   BP   10/12/20 0716 (!) 146/59   10/12/20 0333 (!) 152/63       Intake/Output Summary (Last 24 hours) at 10/12/2020 0814  Last data filed at 10/11/2020 2028  Gross per 24 hour   Intake 250 ml   Output --   Net 250 ml     General Appearance: Well developed, obese, no acute distress. Ears/Nose/Mouth/Throat:   Normal MM; anicteric. JVP: WNL   Resp:   Lungs clear to auscultation bilaterally. Nl resp effort. Cardiovascular:  RRR, S1, S2 normal, no new murmur. No gallop or rub. Abdomen:   Soft, non-tender, bowel sounds are present. Extremities: No edema bilaterally. Skin:  Neuro: Warm and dry. A/O x3, grossly nonfocal       cath site intact w/o hematoma or new bruit; distal pulse unchanged  Yes   Data Review:     Telemetry independently reviewed x sinus  chronic afib  parox afib  NSVT     ECG independently reviewed  NSR  afib  no significant changes  NSST-Tw chgs     x no new ECG provided for review   Lab results reviewed as noted below. Current medications reviewed as noted below. No results for input(s): PH, PCO2, PO2 in the last 72 hours. No results for input(s): CPK, CKMB, CKNDX, TROIQ in the last 72 hours. Recent Labs     10/12/20  0336 10/10/20  0053    140   K 4.0 3.7   * 110*   CO2 25 25   BUN 26* 27*   CREA 1.22* 1.42*   GFRAA 52* 44*   * 119*   CA 7.9* 7.7*   WBC 11.9* 13.4*   HGB 8.2* 8.2*   HCT 27.6* 27.4*    224     Lab Results   Component Value Date/Time    Cholesterol, total 179 02/16/2015 09:56 AM    HDL Cholesterol 29 (L) 02/16/2015 09:56 AM    LDL, calculated 74 02/16/2015 09:56 AM    Triglyceride 378 (H) 02/16/2015 09:56 AM     No results for input(s): AP, TBIL, TP, ALB, GLOB, GGT, AML, LPSE in the last 72 hours. No lab exists for component: SGOT, GPT, AMYP, HLPSE  No results for input(s): INR, PTP, APTT, INREXT, INREXT in the last 72 hours.    No components found for: [place]    Current Facility-Administered Medications   Medication Dose Route Frequency    amLODIPine (NORVASC) tablet 5 mg  5 mg Oral DAILY    hydrALAZINE (APRESOLINE) 20 mg/mL injection 10 mg  10 mg IntraVENous Q2H PRN    sodium chloride (NS) flush 5-40 mL  5-40 mL IntraVENous Q8H    sodium chloride (NS) flush 5-40 mL  5-40 mL IntraVENous PRN    carvediloL (COREG) tablet 6.25 mg  6.25 mg Oral BID WITH MEALS    losartan (COZAAR) tablet 50 mg  50 mg Oral DAILY    DULoxetine (CYMBALTA) capsule 60 mg  60 mg Oral DAILY    insulin lispro (HUMALOG) injection   SubCUTAneous AC&HS    glucose chewable tablet 16 g  4 Tab Oral PRN    dextrose (D50W) injection syrg 12.5-25 g  12.5-25 g IntraVENous PRN    glucagon (GLUCAGEN) injection 1 mg  1 mg IntraMUSCular PRN    acetaminophen (TYLENOL) tablet 650 mg  650 mg Oral Q6H PRN    Or    acetaminophen (TYLENOL) suppository 650 mg  650 mg Rectal Q6H PRN    polyethylene glycol (MIRALAX) packet 17 g  17 g Oral DAILY PRN    promethazine (PHENERGAN) tablet 12.5 mg  12.5 mg Oral Q6H PRN    Or    ondansetron (ZOFRAN) injection 4 mg  4 mg IntraVENous Q6H PRN    pantoprazole (PROTONIX) 40 mg in 0.9% sodium chloride 10 mL injection  40 mg IntraVENous Q12H    0.9% sodium chloride infusion 250 mL  250 mL IntraVENous PRN        Nelson Romero MD

## 2020-10-12 NOTE — PROGRESS NOTES
General Surgery End of Shift Nursing Note    Bedside shift change report given to Lizz Rene RN (oncoming nurse) by Oswaldo Lemos RN (offgoing nurse). Report included the following information SBAR, Kardex and MAR. Shift worked:   [id number]   Summary of shift:   Patient was cared for by SHANNON Neville for shift. Patient rested throughout the shift. Her pain was managed with PRN medication. She tolerated her diet. She did not ambulate due to her knee pain. She had a BM and was flatulent. Issues for physician to address:   None      Number times ambulated in hallway past shift: 0    Number of times OOB to chair past shift: 0    Pain Management:  Current medication: See MAR   Patient states pain is manageable on current pain medication: YES    GI:    Current diet:  DIET NUTRITIONAL SUPPLEMENTS Breakfast, AM Snack, Lunch, PM Snack, Dinner, Almanzar[employer];  Ensure Enlive  DIET FULL LIQUID    Tolerating current diet: YES  Passing flatus: YES  Last Bowel Movement: today   Appearance: Soft, brown     Patient Safety:    Falls Score: 4    Delmi Lentz, KATHERINE

## 2020-10-12 NOTE — PROGRESS NOTES
Problem: Anemia Care Plan (Adult and Pediatric)  Goal: *Labs within defined limits  Outcome: Progressing Towards Goal  Goal: *Tolerates increased activity  Outcome: Progressing Towards Goal     Problem: Patient Education: Go to Patient Education Activity  Goal: Patient/Family Education  Outcome: Progressing Towards Goal     Problem: Falls - Risk of  Goal: *Absence of Falls  Description: Document Mahsa Edmondnidhi Fall Risk and appropriate interventions in the flowsheet. Outcome: Progressing Towards Goal  Note: Fall Risk Interventions:  Mobility Interventions: Patient to call before getting OOB         Medication Interventions: Teach patient to arise slowly, Patient to call before getting OOB    Elimination Interventions: Call light in reach, Patient to call for help with toileting needs    History of Falls Interventions: Door open when patient unattended         Problem: Patient Education: Go to Patient Education Activity  Goal: Patient/Family Education  Outcome: Progressing Towards Goal     Problem: Pressure Injury - Risk of  Goal: *Prevention of pressure injury  Description: Document Rufus Scale and appropriate interventions in the flowsheet. Outcome: Progressing Towards Goal  Note: Pressure Injury Interventions:  Sensory Interventions: Assess changes in LOC    Moisture Interventions: Absorbent underpads    Activity Interventions: Increase time out of bed    Mobility Interventions: Pressure redistribution bed/mattress (bed type), Float heels    Nutrition Interventions: Document food/fluid/supplement intake    Friction and Shear Interventions: Minimize layers                Problem: Patient Education: Go to Patient Education Activity  Goal: Patient/Family Education  Outcome: Progressing Towards Goal     Problem: Diabetes Self-Management  Goal: *Disease process and treatment process  Description: Define diabetes and identify own type of diabetes; list 3 options for treating diabetes.   Outcome: Progressing Towards Goal  Goal: *Incorporating nutritional management into lifestyle  Description: Describe effect of type, amount and timing of food on blood glucose; list 3 methods for planning meals. Outcome: Progressing Towards Goal  Goal: *Incorporating physical activity into lifestyle  Description: State effect of exercise on blood glucose levels. Outcome: Progressing Towards Goal  Goal: *Developing strategies to promote health/change behavior  Description: Define the ABC's of diabetes; identify appropriate screenings, schedule and personal plan for screenings. Outcome: Progressing Towards Goal  Goal: *Using medications safely  Description: State effect of diabetes medications on diabetes; name diabetes medication taking, action and side effects. Outcome: Progressing Towards Goal  Goal: *Monitoring blood glucose, interpreting and using results  Description: Identify recommended blood glucose targets  and personal targets. Outcome: Progressing Towards Goal  Goal: *Prevention, detection, treatment of acute complications  Description: List symptoms of hyper- and hypoglycemia; describe how to treat low blood sugar and actions for lowering  high blood glucose level. Outcome: Progressing Towards Goal  Goal: *Prevention, detection and treatment of chronic complications  Description: Define the natural course of diabetes and describe the relationship of blood glucose levels to long term complications of diabetes.   Outcome: Progressing Towards Goal  Goal: *Developing strategies to address psychosocial issues  Description: Describe feelings about living with diabetes; identify support needed and support network  Outcome: Progressing Towards Goal  Goal: *Insulin pump training  Outcome: Progressing Towards Goal  Goal: *Sick day guidelines  Outcome: Progressing Towards Goal  Goal: *Patient Specific Goal (EDIT GOAL, INSERT TEXT)  Outcome: Progressing Towards Goal     Problem: Patient Education: Go to Patient Education Activity  Goal: Patient/Family Education  Outcome: Progressing Towards Goal     Problem: Hypertension  Goal: *Blood pressure within specified parameters  Outcome: Progressing Towards Goal  Goal: *Fluid volume balance  Outcome: Progressing Towards Goal  Goal: *Labs within defined limits  Outcome: Progressing Towards Goal     Problem: Patient Education: Go to Patient Education Activity  Goal: Patient/Family Education  Outcome: Progressing Towards Goal

## 2020-10-12 NOTE — PROGRESS NOTES
Spiritual Care Assessment/Progress Note  [facility]      NAME: Harvie Schirmer      MRN: [id number]  AGE: 70 y.o. SEX: female  Scientology Affiliation: Gnosticism   Language: English     10/12/2020     Total Time (in minutes): 8     Spiritual Assessment begun in [facility] through conversation with:         [x]Patient        [] Family    [] Friend(s)        Reason for Consult: Initial/Spiritual assessment, patient floor     Spiritual beliefs: (Please include comment if needed)     [] Identifies with a paulette tradition:         [] Supported by a paulette community:            [] Claims no spiritual orientation:           [] Seeking spiritual identity:                [] Adheres to an individual form of spirituality:           [x] Not able to assess:                           Identified resources for coping:      [] Prayer                               [] Music                  [] Guided Imagery     [] Family/friends                 [] Pet visits     [] Devotional reading                         [x] Unknown     [] Other:                                               Interventions offered during this visit: (See comments for more details)    Patient Interventions: Initial visit         Plan of Care:     [] Support spiritual and/or cultural needs    [] Support AMD and/or advance care planning process      [] Support grieving process   [] Coordinate Rites and/or Rituals    [] Coordination with community clergy   [] No spiritual needs identified at this time   [] Detailed Plan of Care below (See Comments)  [] Make referral to Music Therapy  [] Make referral to Pet Therapy     [] Make referral to Addiction services  [] Make referral to [facility]  [] Make referral to Spiritual Care Partner  [] No future visits requested        [x] Follow up visits as needed     Comments: Visited with patient Duy Silver on GEN SURG to provide an initial spiritual assessment. Patient was on the phone during attempted visit. I was unable to assess spiritual needs/supports at the time of visit. Introduced  services to patient. Affirmed her desire for follow-up visit, but at another time. Chaplains will follow as able and/or needed.     ANDERSON Connors 1 Provider   Paging Service [phone])

## 2020-10-12 NOTE — PROGRESS NOTES
Surgical Progress Note  10/12/20 (-)      Patient seen and examined by me today. Attending provider:  Tiara Ruiz,*   PCP:  Isaac Santoyo MD         Assessment     Plan  [facility]. Ulcerated with recent bleeding. Noncompliant with preop instructions -- has not had a single Ensure today and has not walked in the hallway at all. Needs resection. This will likely involve esophagogastrostomy rather than a wedge resection due to its location. This is a high risk anastomosis and if there is leak mediastinitis occurs and this is frequently life-threatening. I explained this to her last week and again today. Given her noncompliance with nutritional and activity recommendations I would favor sending her home and letting her get her nutritional and exercise status improved prior to operating. She can get all of her caloric needs through a full liquid diet that will be unlikely to cause recurrent bleeding from the tumor. Does the right knee pain need to be investigated? Subjective:     Chief Complaint: Right knee pain. Review of Systems:   yes/no  yes/no   Fever n Abdominal pain  no   Chills n Flatus  yes   Chest pain n Nausea  no   Cough n Vomiting  no   Sputum n Constipation n   SOB n Diarrhea n   Dysuria n Tolerating diet   yes      []       Unable to obtain  ROS due to:_____________________    Objective:     VITALS:   Last 24hrs VS reviewed since prior hospitalist progress note.  Most recent are:  Visit Vitals  /61   Pulse 62   Temp 98 °F (36.7 °C)   Resp 16   Ht 5' 2\" (1.575 m)   Wt 92.1 kg (203 lb 1.6 oz)   SpO2 100%   BMI 37.15 kg/m²     Temp (24hrs), Av.2 °F (36.8 °C), Min:97.6 °F (36.4 °C), Max:99 °F (37.2 °C)      Intake/Output Summary (Last 24 hours) at 10/12/2020 1717  Last data filed at 10/12/2020 1653  Gross per 24 hour   Intake 800 ml   Output --   Net 800 ml        PHYSICAL EXAM:  General appearance  Alert, cooperative, no distress, appears stated age. Eyes  Anicteric. Neck Supple. Lungs   Clear to auscultation bilaterally. Heart  Regular rate and rhythm. No murmur, rub or gallop. Abdomen   Soft. Bowel sounds normal.  Nontender. Neurologic Without overt sensory or motor deficit        Lab Data Reviewed: (see below)    Medications Reviewed: (see below)    PMH/SH reviewed - no change compared to H&P  ________________________________________________________________________  LABS:  Recent Labs     10/12/20  0336 10/10/20  0053   WBC 11.9* 13.4*   HGB 8.2* 8.2*   HCT 27.6* 27.4*    224     Recent Labs     10/12/20  0336 10/10/20  0053    140   K 4.0 3.7   * 110*   CO2 25 25   BUN 26* 27*   CREA 1.22* 1.42*   * 119*   CA 7.9* 7.7*     No results for input(s): ALT, AP, TBIL, TBILI, TP, ALB, GLOB, GGT, AML, LPSE in the last 72 hours. No lab exists for component: SGOT, GPT, AMYP, HLPSE  No results for input(s): INR, PTP, APTT, INREXT, INREXT in the last 72 hours. No results for input(s): FE, TIBC, PSAT, FERR in the last 72 hours. No results for input(s): PH, PCO2, PO2 in the last 72 hours. No results for input(s): CPK, CKMB in the last 72 hours.     No lab exists for component: TROPONINI  Lab Results   Component Value Date/Time    Glucose (POC) 190 (H) 10/12/2020 03:42 PM    Glucose (POC) 219 (H) 10/12/2020 10:41 AM    Glucose (POC) 167 (H) 10/12/2020 07:29 AM    Glucose (POC) 313 (H) 10/11/2020 08:56 PM    Glucose (POC) 297 (H) 10/11/2020 04:13 PM       MEDICATIONS:  Current Facility-Administered Medications   Medication Dose Route Frequency    carvediloL (COREG) tablet 12.5 mg  12.5 mg Oral BID WITH MEALS    amLODIPine (NORVASC) tablet 5 mg  5 mg Oral DAILY    hydrALAZINE (APRESOLINE) 20 mg/mL injection 10 mg  10 mg IntraVENous Q2H PRN    sodium chloride (NS) flush 5-40 mL  5-40 mL IntraVENous Q8H    sodium chloride (NS) flush 5-40 mL  5-40 mL IntraVENous PRN    losartan (COZAAR) tablet 50 mg  50 mg Oral DAILY    DULoxetine (CYMBALTA) capsule 60 mg  60 mg Oral DAILY    insulin lispro (HUMALOG) injection   SubCUTAneous AC&HS    glucose chewable tablet 16 g  4 Tab Oral PRN    dextrose (D50W) injection syrg 12.5-25 g  12.5-25 g IntraVENous PRN    glucagon (GLUCAGEN) injection 1 mg  1 mg IntraMUSCular PRN    acetaminophen (TYLENOL) tablet 650 mg  650 mg Oral Q6H PRN    Or    acetaminophen (TYLENOL) suppository 650 mg  650 mg Rectal Q6H PRN    polyethylene glycol (MIRALAX) packet 17 g  17 g Oral DAILY PRN    promethazine (PHENERGAN) tablet 12.5 mg  12.5 mg Oral Q6H PRN    Or    ondansetron (ZOFRAN) injection 4 mg  4 mg IntraVENous Q6H PRN    pantoprazole (PROTONIX) 40 mg in 0.9% sodium chloride 10 mL injection  40 mg IntraVENous Q12H

## 2020-10-13 VITALS
HEIGHT: 62 IN | TEMPERATURE: 98.6 F | WEIGHT: 203.1 LBS | SYSTOLIC BLOOD PRESSURE: 167 MMHG | HEART RATE: 76 BPM | OXYGEN SATURATION: 99 % | DIASTOLIC BLOOD PRESSURE: 68 MMHG | RESPIRATION RATE: 18 BRPM | BODY MASS INDEX: 37.37 KG/M2

## 2020-10-13 LAB
GLUCOSE BLD STRIP.AUTO-MCNC: 229 MG/DL (ref 65–100)
GLUCOSE BLD STRIP.AUTO-MCNC: 242 MG/DL (ref 65–100)
SERVICE CMNT-IMP: ABNORMAL
SERVICE CMNT-IMP: ABNORMAL

## 2020-10-13 PROCEDURE — 94760 N-INVAS EAR/PLS OXIMETRY 1: CPT

## 2020-10-13 PROCEDURE — 74011250636 HC RX REV CODE- 250/636: Performed by: INTERNAL MEDICINE

## 2020-10-13 PROCEDURE — 99232 SBSQ HOSP IP/OBS MODERATE 35: CPT | Performed by: SURGERY

## 2020-10-13 PROCEDURE — 74011250637 HC RX REV CODE- 250/637: Performed by: GENERAL ACUTE CARE HOSPITAL

## 2020-10-13 PROCEDURE — 74011000250 HC RX REV CODE- 250: Performed by: INTERNAL MEDICINE

## 2020-10-13 PROCEDURE — 74011636637 HC RX REV CODE- 636/637: Performed by: GENERAL ACUTE CARE HOSPITAL

## 2020-10-13 PROCEDURE — 74011250637 HC RX REV CODE- 250/637: Performed by: INTERNAL MEDICINE

## 2020-10-13 PROCEDURE — C9113 INJ PANTOPRAZOLE SODIUM, VIA: HCPCS | Performed by: INTERNAL MEDICINE

## 2020-10-13 PROCEDURE — 82962 GLUCOSE BLOOD TEST: CPT

## 2020-10-13 RX ORDER — LOSARTAN POTASSIUM 50 MG/1
50 TABLET ORAL
Status: COMPLETED | OUTPATIENT
Start: 2020-10-13 | End: 2020-10-13

## 2020-10-13 RX ORDER — CARVEDILOL 12.5 MG/1
12.5 TABLET ORAL 2 TIMES DAILY WITH MEALS
Qty: 60 TAB | Refills: 0 | Status: SHIPPED | OUTPATIENT
Start: 2020-10-13 | End: 2022-06-27 | Stop reason: SDUPTHER

## 2020-10-13 RX ORDER — LOSARTAN POTASSIUM 100 MG/1
100 TABLET ORAL DAILY
Qty: 60 TAB | Refills: 0 | Status: SHIPPED | OUTPATIENT
Start: 2020-10-14 | End: 2021-11-15 | Stop reason: SDUPTHER

## 2020-10-13 RX ORDER — AMLODIPINE BESYLATE 5 MG/1
5 TABLET ORAL DAILY
Qty: 60 TAB | Refills: 0 | Status: ON HOLD | OUTPATIENT
Start: 2020-10-14 | End: 2020-11-20 | Stop reason: SDUPTHER

## 2020-10-13 RX ORDER — LOSARTAN POTASSIUM 100 MG/1
100 TABLET ORAL DAILY
Status: DISCONTINUED | OUTPATIENT
Start: 2020-10-14 | End: 2020-10-13 | Stop reason: HOSPADM

## 2020-10-13 RX ORDER — DULOXETIN HYDROCHLORIDE 60 MG/1
60 CAPSULE, DELAYED RELEASE ORAL DAILY
Qty: 60 CAP | Refills: 0 | Status: SHIPPED | OUTPATIENT
Start: 2020-10-14 | End: 2022-07-13

## 2020-10-13 RX ADMIN — Medication 5 ML: at 09:18

## 2020-10-13 RX ADMIN — INSULIN LISPRO 3 UNITS: 100 INJECTION, SOLUTION INTRAVENOUS; SUBCUTANEOUS at 11:12

## 2020-10-13 RX ADMIN — LOSARTAN POTASSIUM 50 MG: 50 TABLET ORAL at 11:13

## 2020-10-13 RX ADMIN — SODIUM CHLORIDE 40 MG: 9 INJECTION, SOLUTION INTRAMUSCULAR; INTRAVENOUS; SUBCUTANEOUS at 09:16

## 2020-10-13 RX ADMIN — AMLODIPINE BESYLATE 5 MG: 5 TABLET ORAL at 09:20

## 2020-10-13 RX ADMIN — Medication 10 ML: at 05:05

## 2020-10-13 RX ADMIN — CARVEDILOL 12.5 MG: 12.5 TABLET, FILM COATED ORAL at 09:19

## 2020-10-13 RX ADMIN — Medication 5 ML: at 09:17

## 2020-10-13 RX ADMIN — INSULIN LISPRO 3 UNITS: 100 INJECTION, SOLUTION INTRAVENOUS; SUBCUTANEOUS at 08:04

## 2020-10-13 RX ADMIN — DULOXETINE HYDROCHLORIDE 60 MG: 30 CAPSULE, DELAYED RELEASE ORAL at 09:18

## 2020-10-13 NOTE — PROGRESS NOTES
Nutrition Note    Received consult for nutrition education for a high protein, mechanical soft diet at home. RD intern met with pt at beside and went over a few resources discussing food recommendations and preparations at home. Pt reluctantly accepted. Encouraged pt to drink some form of protein shake at home, she stated she does have some, but she doesn't like them very much. Provided pt with dietitian phone number, and informed her to call if she had any questions once she got home. Thank you.       Electronically signed by Josh Lorenzana on 10/13/2020 at 12:14 PM

## 2020-10-13 NOTE — PROGRESS NOTES
58 : Notified by nursing that patient's  is upset that she is coming home and does not want her to be discharged. NP called  who is worried, would like to her to have surgery this admission. Discussed patient's need to maintain high calorie diet prior to surgery as well as walk halls, both of which she has been unwilling to complete at this time. She will f/u OP on Friday to discuss with general surgery.

## 2020-10-13 NOTE — PROGRESS NOTES
Hospitalist Progress Note    NAME: Trace Banks   :  1945   MRN:  [id number]       Assessment / Plan:  GE jnxn GIST  Symptomatic severe anemia POA 2/2 above  -appreciate GI and surgical consults  -surgery once nutritional status optimized, on full liquids but non-compliant w/ preop instructions of 5 ensures/day. So likely to DC home and return for surgery  -s/p transfusion 3 units RBCs  -surgery to discuss case with Dr Nicole Rosario though need for neoadjuvant therapy felt less likely  -Hb has been stable, cont to monitor labs  -EGD 10.6 large gastric mass  -cont liquid diet w ensure    Elevated troponin  CAD  HTN   -BP stable   -cardiology help appreciated  -medical management of CAD for now. 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 No role for anticoagulation; ASA when ok with GI. Cont bblocker and ARB as BP tolerates  -elevated trops likely represents demand ischemic  -EKG NS, LVH, ? lateral ST depression    HTN  -not optimally controlled  -cont coreg, start amlodipine     CKD 3  Admission 89, monitor closely  Avoid nephrotoxic drugs, adjust all meds to GFR.      DM2   - NPO after MN, watch BS  C/w SS as needed      GERD  HLP      Code status: Full  Prophylaxis: SCD's      Baseline: independent   Recommended Disposition: Home w/Family     Subjective:     Chief Complaint / Reason for Physician Visit  Patient still in bed. Encouraged ambulation yesterday but seems activity is limited, mostly just OOB to bathroom    Discussed with RN events overnight.      Review of Systems:  Symptom Y/N Comments  Symptom Y/N Comments   Fever/Chills n   Chest Pain n    Poor Appetite y   Edema n    Cough n   Abdominal Pain n    Sputum n   Joint Pain     SOB/ARENAS n   Pruritis/Rash     Nausea/vomit n   Tolerating PT/OT     Diarrhea n   Tolerating Diet y    Constipation n   Other       Could NOT obtain due to:      Objective:     VITALS:   Last 24hrs VS reviewed since prior progress note. Most recent are:  Patient Vitals for the past 24 hrs:   Temp Pulse Resp BP SpO2   10/12/20 2023 98.6 °F (37 °C) 62 16 (!) 153/73 95 %   10/12/20 1444 98 °F (36.7 °C) 62 16 132/61 100 %   10/12/20 1038 98.5 °F (36.9 °C) 64 16 (!) 132/59 100 %   10/12/20 0800 -- 63 -- -- --   10/12/20 0716 97.6 °F (36.4 °C) 64 16 (!) 146/59 99 %   10/12/20 0333 97.8 °F (36.6 °C) 62 16 (!) 152/63 100 %       Intake/Output Summary (Last 24 hours) at 10/12/2020 2300  Last data filed at 10/12/2020 1848  Gross per 24 hour   Intake 3272.75 ml   Output --   Net 3272.75 ml        PHYSICAL EXAM:  General:          WD, WN. Alert, cooperative, no acute distress    EENT:             EOMI. Anicteric sclerae. MMM  Resp:              CTA bilaterally, no wheezing or rales. No accessory muscle use  CV:                  Regular  rhythm,  No edema  GI:                   Soft, mild distension, mild epigastric tenderness. Neurologic:      Alert and oriented X 3, normal speech,   Psych:             Good insight. Not anxious nor agitated  Skin:                No rashes. No jaundice    Reviewed most current lab test results and cultures  YES  Reviewed most current radiology test results   YES  Review and summation of old records today    NO  Reviewed patient's current orders and MAR    YES  PMH/ reviewed - no change compared to H&P  ________________________________________________________________________  Care Plan discussed with:    Comments   Patient x    Family      RN x    Care Manager     Consultant                       x Multidiciplinary team rounds were held today with , nursing, pharmacist and clinical coordinator. Patient's plan of care was discussed; medications were reviewed and discharge planning was addressed.      ________________________________________________________________________  Total NON critical care TIME: 30   Minutes    Total CRITICAL CARE TIME Spent:   Minutes non procedure based      Comments   >50% of visit spent in counseling and coordination of care x    ________________________________________________________________________  Rebel Tobin DO     Procedures: see electronic medical records for all procedures/Xrays and details which were not copied into this note but were reviewed prior to creation of Plan. LABS:  I reviewed today's most current labs and imaging studies.   Pertinent labs include:  Recent Labs     10/12/20  0336 10/10/20  0053   WBC 11.9* 13.4*   HGB 8.2* 8.2*   HCT 27.6* 27.4*    224     Recent Labs     10/12/20  0336 10/10/20  0053    140   K 4.0 3.7   * 110*   CO2 25 25   * 119*   BUN 26* 27*   CREA 1.22* 1.42*   CA 7.9* 7.7*       Signed: Rebel Tobin DO

## 2020-10-13 NOTE — DISCHARGE SUMMARY
Hospitalist Discharge Summary     Patient ID:  Yohan Ndiaye  [id number]  70 y.o.  1945  10/4/2020    PCP on record: Get Aguilar MD    Admit date: 10/4/2020  Discharge date and time: 10/13/2020    DISCHARGE DIAGNOSIS:    Patient Active Problem List   Diagnosis Code    Non-ST elevated myocardial infarction ([facility] I21.4    Noncompliance Z91.19    Fatty liver K76.0    UTI (lower urinary tract infection) N39.0    Type 2 diabetes mellitus with diabetic polyneuropathy, with long-term current use of insulin (HCC) E11.42, Z79.4    Essential hypertension I10    Mixed hyperlipidemia E78.2    Type 2 diabetes with nephropathy ([facility] E11.21    Severe obesity (BMI 35.0-39. 9) E66.01    GI bleed K92.2    Anemia, unspecified D64.9         CONSULTATIONS:  IP CONSULT TO CARDIOLOGY  IP CONSULT TO GASTROENTEROLOGY  IP CONSULT TO HOSPITALIST  IP CONSULT TO GENERAL SURGERY    Excerpted HPI from H&P of Scarlett Taylor MD:  Yohan Ndiaye is a 70 y.o.  female who presents with CC listed above. Pt states that for the past few weeks she has been feeling progressively weak. It finally came to a head today because she was unable to ambulate safely and became worried. She denies any bleeding in her stool. She denies any history of NSAID abuse. She endorses having a colonoscopy in the past 10 years and believes that it was \"normal.\"        ______________________________________________________________________  DISCHARGE SUMMARY/HOSPITAL COURSE:  for full details see H&P, daily progress notes, labs, consult notes. GE jnxn GIST  Symptomatic severe anemia POA 2/2 above  -appreciate GI and surgical consults - patient has refused to participate with preop for resection. Will not drink ensure or walk in hallways as recommended by General Surgery. She is a high risk for anastomosis post op.  Plan to d/c to f/u with surgery OP Friday to discuss moving forward with surgery.   -surgery once nutritional status optimized, on full liquids but non-compliant w/ preop instructions of 5 ensures/day.  -s/p transfusion 3 units RBCs  -Hgb has been stable at 8.2  -EGD 10/6 with \"large proximal gastric mass, likely malignant. Small pre-pyloric ulcer. Gastric polyps\" per GI note  -Long conversation encouraging compliance with preop instructions. Patient did roll her eyes when asked if she felt like she could drink 5 ensure a day, patient laughed when asked if she could walk several times in the hallway today when speaking with this NP. Elevated troponin  CAD  HTN   -BP stable   -cardiology help appreciated  -medical management of CAD for now. Per cardiology, in order to have cath/PCI would need asa/plavix at least one month in event of bare metal stent. suspect NQWMI is type II; no current role for invasive Rx. No role for anticoagulation; ASA when ok with GI. Cont bblocker and ARB   -elevated trops likely represents demand ischemic  HTN  -cont coreg, start amlodipine, continue ARB    CKD 3  On admission 1.89, now Cr 1.22  Avoid nephrotoxic drugs, adjust all meds to GFR.   DM2   - resume PTA Tresiba and glipizide   GERD  HLP     Discussion with patient at bedside, she was not interested in participating with pre-op for resection. Discussed hospitalization course and discharge plan. She is to follow up with General Surgery on Friday. She voiced understanding and denied questions at this time. Patient is a planned readmission for surgery once she completes her pre-op.       _______________________________________________________________________  Patient seen and examined by me on discharge day. Pertinent Findings:  Gen:    Not in distress  Chest: Clear lungs  CVS:   Regular rhythm.   No edema  Abd:  Soft, obese, not tender  Neuro:  Alert and oriented x4  _______________________________________________________________________  DISCHARGE MEDICATIONS:   Current Discharge Medication List      START taking these medications Details   losartan (COZAAR) 100 mg tablet Take 1 Tab by mouth daily. Qty: 60 Tab, Refills: 0         CONTINUE these medications which have CHANGED    Details   DULoxetine (CYMBALTA) 60 mg capsule Take 1 Cap by mouth daily. Qty: 60 Cap, Refills: 0      carvediloL (COREG) 12.5 mg tablet Take 1 Tab by mouth two (2) times daily (with meals). Qty: 60 Tab, Refills: 0      amLODIPine (NORVASC) 5 mg tablet Take 1 Tab by mouth daily. Qty: 60 Tab, Refills: 0         CONTINUE these medications which have NOT CHANGED    Details   ergocalciferol (ERGOCALCIFEROL) 1,250 mcg (50,000 unit) capsule Take 50,000 Units by mouth every seven (7) days. On Friday      torsemide (DEMADEX) 20 mg tablet Take 40 mg by mouth daily. insulin degludec (TRESIBA FLEXTOUCH U-200) 200 unit/mL (3 mL) inpn 108 units daily  Qty: 18 Pen, Refills: 3      glipiZIDE (GLUCOTROL) 10 mg tablet Take 1 Tab by mouth two (2) times a day. Qty: 180 Tab, Refills: 3      atorvastatin (LIPITOR) 40 mg tablet Take 1 Tab by mouth nightly. Qty: 90 Tab, Refills: 3      aspirin 81 mg chewable tablet Take 1 Tab by mouth daily. Qty: 100 Tab, Refills: 12         STOP taking these medications       clopidogrel (PLAVIX) 75 mg tab Comments:   Reason for Stopping:                 Patient Follow Up Instructions:    Activity: Activity as tolerated  Diet: Diabetic Diet  Wound Care: None needed    Follow-up Information     Follow up With Specialties Details Why Contact Info    Ivan Alicia MD Internal Medicine In 1 week  [id number]  [address] [address] [phone]      Pablo Aleman MD General Surgery, Breast Surgery, Colon and Rectal Surgery, Endocrinology On 10/23/2020 Please call for appt, Dr. Anand Pretty would like to see you on Friday [address]  [facility] [address]  [address] [facility]      Shelby Yee MD Cardiology In 2 weeks  [address]  [id number]  [phone] ________________________________________________________________    Risk of deterioration: High    Condition at Discharge:  Stable  __________________________________________________________________    Disposition  Home with family, no needs    ____________________________________________________________________    Code Status: Full Code  ___________________________________________________________________      Total time in minutes spent coordinating this discharge (includes going over instructions, follow-up, prescriptions, and preparing report for sign off to her PCP) :  >30 minutes    Signed:  Mia Mckeon NP

## 2020-10-13 NOTE — PROGRESS NOTES
KO[facility] Plan:    * Home with f/u apts    > CM confirmed pt has PCP f/u apt secured for d/c; details reflected in AVS  > Pt's sister-in-law to provide transportation at d/c    2:26 PM:  CM met with pt at bedside to check in, introduce role, and review [facility] plan for d/c re: home with f/u apts. Upon conducting room visit, pt presented as A&O x4. CM confirmed pt is agreeable to d/c plan. CM discussed pt's [facility] & DME needs for d/c; pt declined both reporting she \"has what she needs. \" CM inquired if pt had any additional questions, concerns, or specific needs for d/c; pt declined. Pt reported her sister-in-law will provide transportation at d/c. Medicare pt has received, reviewed, and provided  2nd IM letter informing them of their right to appeal the discharge. Copy has been placed on pt bedside chart. No further CM needs identified. CM notified pt's nurse of d/c. Initial note: CM acknowledged d/c. CM reviewed pt's chart prior to moving forward with d/c planning. CM is actively working to solidify [address] for d/c & will report updates as they become available. Care Management Interventions  PCP Verified by CM: Yes  Last Visit to PCP: 02/05/20  Mode of Transport at Discharge:  Other (see comment)(Pt's sister-in-law will provide transportation at d/c)  Transition of Care Consult (CM Consult): Discharge Planning  Discharge Durable Medical Equipment: No  Physical Therapy Consult: No  Occupational Therapy Consult: No  Speech Therapy Consult: No  Current Support Network: Lives with Spouse, Own Home(Pt lives with her  in single story home)  Confirm Follow Up Transport: [address] Provided?: No  Discharge Location  Discharge Placement: Home(f/u apts)    Theresa Jason, [address] [facility], [address] [facility]

## 2020-10-13 NOTE — PROGRESS NOTES
Patient refused to ambulate with nursing students and drink ensure supplement. Patients D/C orders placed, tried to contact  Gary Aguero to arrange . Line rang twice and went to voicemail. Will attempt to contact him again in a little.      Xu Washburn RN

## 2020-10-13 NOTE — PROGRESS NOTES
.General Surgery End of Shift Nursing Note    Bedside shift change report given to Michael Barbour RN(oncoming nurse) by Abilio Hicks RN (offgoing nurse). Report included the following information SBAR, Kardex, MAR and Recent Results. Shift worked:   7p-7a   Summary of shift:    Vitals signs stable throughout shift. Pt c/o pain in left knee, but denied intervention. Pt used distraction (talking to son) for pain relief, was effective. Patient assisted to bedside commode x1 during shift without issue. Issues for physician to address:   None      Number times ambulated in hallway past shift: 0  Number of times OOB to chair past shift: 0    Pain Management:  Current medication: none  Patient states pain is manageable on current pain medication: n/a    GI:    Current diet:  DIET NUTRITIONAL SUPPLEMENTS Breakfast, AM Snack, Lunch, PM Snack, Dinner, [employer];  Ensure Enlive  DIET FULL LIQUID    Tolerating current diet: yes  Passing flatus: yes        Patient Safety:    Falls Score:4    [employer]

## 2020-10-13 NOTE — PROGRESS NOTES
Cardiology Progress Note  10/13/2020     Admit Date: 10/4/2020  Admit Diagnosis: GI bleed [K92.2]  CC: none currently  Cardiologist:  Dr Erin Cisneros then Dr Rain Dean (last OV 2017). Cardiac Assessment/Plan:    1) CAD: Distal RCA NEENA 8/2015 for NQWMI; Med Rx mid circ and diffusely dz diagonals. *NQWMI 10/2020 (trop 9), c/w type II from profound anemia. * EF 30-35% 10/6/20. 2) HTN  3) DM  4) Dyslipidemia  5) CKD III: Cr 1.43/gfr 42 7/2020 (Dr Matt Pizano). 6) Obesity: ?NANY. 7) Non-compliant with some meds per pt 10/2020. Poor compliance with f/u OV. 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     Presenting with AMS, profound anemia; NQWMI. GUANACO on CKD. No CP/change in chronic ARENAS.     Rec POA: Need to correct profound anemia; Agree with GI eval.   I suspect NQWMI is type II; no current role for invasive Rx. No role for anticoagulation; ASA when ok with GI. Cont bblocker and ARB as BP tolerates.           Echo 10/6/20:   · LV: Estimated LVEF is 30 - 35%. Normal cavity size. Mild concentric hypertrophy. Moderate-to-severely reduced systolic function. · TV: Mild to moderate tricuspid valve regurgitation is present. 10/7: BPs 140-160; HR 70s; NSR. 95% RA. No UOP recorded yesterday. Hg stable @ 8.3; Cr 1.43/gfr 44. Cardiac meds: coreg 3.125 bid; NS @ 75. Rec: add losartan 25 qday; uptitrate meds as able/needed. D/C IVF. Med Rx for now; cant have cath/PCI until able to have full antiplt agents (asa/plavix for at least 1 month IF BMS used). Awaiting on stomach mass pathology. 10/12: -150; HR 60-70s; 99% RA. Inaccurate I/Os. Hg 8.2; Cr 1.22 from 1.4;   Cardiac meds: norvasc 5; coreg 6.25 bid; cozaar 50. No new cardiac recs. 10/13: BPs 130-160; HR 60-70; 100% RA. No labs today. Cardiac meds: norvasc 5; coreg 12.5 bid; cozaar 50. Losartan to 100 qday (coreg increased yesterday).   Dispo per primary team.  _________________________________________________________________    As noted POA: \"The patient reports no chest pain/pressure; no change in chronic ARENAS. No PND, orthopnea, palpitations, pre-syncope, syncope, new peripheral edema, or decrease in exercise tolerance. No current complaints.     Fell 3 days ago (gen weak; not lightheaded; no syncope); Decreased LOC last few days per : \"asleep\"; wakes when shaken. +Sx NANY.     Dark stool several weeks ago, none since.     ECG: Sinus; ; IVCD 110; LVH; Lat ST depression (more than 2015). Tele: sinus  CXR: \"Enlarged cardiac silhouette, otherwise no acute disease\"     Notable labs: WBC 16.3; Hg 4.5. Cr 1.89 (nl in 2017); BUN 46. ; neg MB; trop 8. 2. \"  _____________________________________________________________________________  Notable prior cardiac history:  @ last OV 1/24/17:          Ms Marcela Black has a h/o CAD (NEENA in distal RCA 8/2015 for NSTEMI,  Normal LV EF, 75% mid-circ; Dr Angeli Wade), HTN, DM, and dyslipidemia; She has chronic mild ARENAS.     7/2016: No change ARENAS; no CP. Worse LE edema: some salt; BP poorly controlled per pt (chronic); Last week, PCP decreased norvasc to 5 qd and added diuretic.     1/2017: No angina; Minor ARENAS (doesnt need to stop activity); Using less salt; Wt down 10# with diet.        IMPRESSION AND PLAN  01. Atherosclerotic heart disease of native coronary artery with other forms of angina pectoris:  No angina; Stable dyspnea; could consider PCI of mid circ if needed: MPI next year/sooner prn.     We discussed the signs and symptoms of unstable angina, myocardial infarction and malignant arrhythmia. The patient knows to seek immediate medical attention should they occur. ECG done today  Exercise MPI to be done with next visit. 02. Old myocardial infarction: This condition is stable. 03. Hypertensive heart disease without heart failure:  Adequately controlled. 04. Mixed hyperlipidemia:  Lipid labs drawn by PCP.  The patient is tolerating lipid lowering therapy well. 05. Localized edema:  Resolved. The patient was instructed on a low sodium diet. 06. Type 2 diabetes mellitus without complications: This condition is stable. 07. Long term (current) use of aspirin:  No bleeding. Continue Rx   08. Long term (current) use of antithrombotics/antiplatelets:  No bleeding. D/C plavix at next OV if unremarkable MPI.   09. Body mass index (BMI) 36.0-36.9, adult:  The patient was instructed on AHA diet and regular exercise.            ORDERS:  1 ECG done today   2 Myocardial Perfusion Study / Sy protocol with next visit   3 Return office visit with Stacy Moody MD in 1 Year. 4 The patient was instructed on AHA diet and regular exercise.    [facility]   6 Hypertension Educational Materials   7 Patient counseled on the following: Low Salt Diet.         1/2017 MEDICATION LIST  Medication Sig Description   Amaryl 4 mg tablet take 1 tablet by oral route 2 times every day   amlodipine 10 mg tablet take 1 tablet by oral route  every day   aspirin 81 mg tablet,delayed release take 1 tablet by oral route  every day   atorvastatin 40 mg tablet take 1 tablet by oral route  every bedtime   clonidine HCl 0.1 mg tablet take 1 tablet by oral route  every day   Coreg 25 mg tablet take 1 tablet by oral route 2 times every day with food   Diovan  mg-25 mg tablet take 1 tablet by oral route  every day   furosemide 20 mg tablet take 1 tablet by oral route  every day   gabapentin 100 mg capsule take 1 capsule by oral route 3 times every day   hydralazine 25 mg tablet take 1 tablet by oral route 2 times every day with food   Lantus 100 unit/mL subcutaneous solution inject by subcutaneous route as per insulin protocol   Nitrostat 0.4 mg sublingual tablet place 1 tablet by sublingual route at the 1st sign of attack; may repeat every 5 min until relief; if pain persists after 3 tablets in 15 min, prompt medical attention is recommended   pantoprazole 40 mg tablet,delayed release take 1 tablet by oral route  every day   Plavix 75 mg tablet take 1 tablet by oral route  every day            _____________________________________________________________________________________________  CARDIAC HISTORY  CAD:  1 NSTEMI (PCI (Successful PTCA and stenting of totally occluded distal RCA. Resolute Integrity used. ) - 2015) - 2015      RISK FACTORS:  1 Hypertension   2 Dyslipidemia   3 Type 2 Diabetes         CARDIOVASCULAR PROCEDURES  CATH LAB:  Cath (EF 0.65 (65%), 30% Proximal LAD, 30% Mid LAD, 75% Mid CX, 50% Proximal RCA, 50% Mid RCA, 100% Distal RCA, small diffuse diseased Diagonals: Resolute NEENA to distal RCA. ) performed by Kylie Mckeon MD - 2015   ELECTROPHYSIOLOGY:  EKG (LVH  left axis  T inversion inf and lat precordial leads) - 2015   EKG (Sinus Rhythm, LVH) - 2015   EKG (Sinus Rhythm, borderline LVH, inf-lat TWI.) - 2017       For other plans, see orders.   Hospital problem list     Active Hospital Problems    Diagnosis Date Noted    Anemia, unspecified 10/06/2020    GI bleed 10/04/2020        Subjective: Misa Iraheta reports       Chest pain X none  consistent with:  Non-cardiac CP         Atypical CP     None now  On going  Anginal CP     Dyspnea X none  at rest  with exertion         improved  unchanged  worse              PND X none  overnight       Orthopnea X none  improved  unchanged  worse   Presyncope X none  improved  unchanged  worse     Ambulated in hallway without symptoms   Yes   Ambulated in room without symptoms  Yes   Objective:     Physical Exam:  Overall VSSAF;    Visit Vitals  BP (!) 162/61   Pulse 74   Temp 98.9 °F (37.2 °C)   Resp 16   Ht 5' 2\" (1.575 m)   Wt 92.1 kg (203 lb 1.6 oz)   SpO2 100%   BMI 37.15 kg/m²     Temp (24hrs), Av.4 °F (36.9 °C), Min:98 °F (36.7 °C), Max:98.9 °F (37.2 °C)    Patient Vitals for the past 8 hrs:   Pulse   10/13/20 0752 74 10/13/20 0345 63     Patient Vitals for the past 8 hrs:   Resp   10/13/20 0752 16   10/13/20 0345 16     Patient Vitals for the past 8 hrs:   BP   10/13/20 0752 (!) 162/61   10/13/20 0345 (!) 140/69       Intake/Output Summary (Last 24 hours) at 10/13/2020 0938  Last data filed at 10/13/2020 0809  Gross per 24 hour   Intake 3492.75 ml   Output --   Net 3492.75 ml     General Appearance: Well developed, obese, no acute distress. Ears/Nose/Mouth/Throat:   Normal MM; anicteric. JVP: WNL   Resp:   Lungs clear to auscultation bilaterally. Nl resp effort. Cardiovascular:  RRR, S1, S2 normal, no new murmur. No gallop or rub. Abdomen:   Soft, non-tender, bowel sounds are present. Extremities: No edema bilaterally. Skin:  Neuro: Warm and dry. A/O x3, grossly nonfocal       cath site intact w/o hematoma or new bruit; distal pulse unchanged  Yes   Data Review:     Telemetry independently reviewed x sinus  chronic afib  parox afib  NSVT     ECG independently reviewed  NSR  afib  no significant changes  NSST-Tw chgs     x no new ECG provided for review   Lab results reviewed as noted below. Current medications reviewed as noted below. No results for input(s): PH, PCO2, PO2 in the last 72 hours. No results for input(s): CPK, CKMB, CKNDX, TROIQ in the last 72 hours. Recent Labs     10/12/20  0336      K 4.0   *   CO2 25   BUN 26*   CREA 1.22*   GFRAA 52*   *   CA 7.9*   WBC 11.9*   HGB 8.2*   HCT 27.6*        Lab Results   Component Value Date/Time    Cholesterol, total 179 02/16/2015 09:56 AM    HDL Cholesterol 29 (L) 02/16/2015 09:56 AM    LDL, calculated 74 02/16/2015 09:56 AM    Triglyceride 378 (H) 02/16/2015 09:56 AM     No results for input(s): AP, TBIL, TP, ALB, GLOB, GGT, AML, LPSE in the last 72 hours. No lab exists for component: SGOT, GPT, AMYP, HLPSE  No results for input(s): INR, PTP, APTT, INREXT, INREXT in the last 72 hours.    No components found for: [facility]    Current Facility-Administered Medications   Medication Dose Route Frequency    carvediloL (COREG) tablet 12.5 mg  12.5 mg Oral BID WITH MEALS    amLODIPine (NORVASC) tablet 5 mg  5 mg Oral DAILY    hydrALAZINE (APRESOLINE) 20 mg/mL injection 10 mg  10 mg IntraVENous Q2H PRN    sodium chloride (NS) flush 5-40 mL  5-40 mL IntraVENous Q8H    sodium chloride (NS) flush 5-40 mL  5-40 mL IntraVENous PRN    losartan (COZAAR) tablet 50 mg  50 mg Oral DAILY    DULoxetine (CYMBALTA) capsule 60 mg  60 mg Oral DAILY    insulin lispro (HUMALOG) injection   SubCUTAneous AC&HS    glucose chewable tablet 16 g  4 Tab Oral PRN    dextrose (D50W) injection syrg 12.5-25 g  12.5-25 g IntraVENous PRN    glucagon (GLUCAGEN) injection 1 mg  1 mg IntraMUSCular PRN    acetaminophen (TYLENOL) tablet 650 mg  650 mg Oral Q6H PRN    Or    acetaminophen (TYLENOL) suppository 650 mg  650 mg Rectal Q6H PRN    polyethylene glycol (MIRALAX) packet 17 g  17 g Oral DAILY PRN    promethazine (PHENERGAN) tablet 12.5 mg  12.5 mg Oral Q6H PRN    Or    ondansetron (ZOFRAN) injection 4 mg  4 mg IntraVENous Q6H PRN    pantoprazole (PROTONIX) 40 mg in 0.9% sodium chloride 10 mL injection  40 mg IntraVENous Q12H        Verónica Green MD

## 2020-10-13 NOTE — PROGRESS NOTES
Problem: Anemia Care Plan (Adult and Pediatric)  Goal: *Labs within defined limits  Outcome: Progressing Towards Goal  Goal: *Tolerates increased activity  Outcome: Progressing Towards Goal     Problem: Falls - Risk of  Goal: *Absence of Falls  Description: Document Flor Fall Risk and appropriate interventions in the flowsheet. Outcome: Progressing Towards Goal  Note: Fall Risk Interventions:  Mobility Interventions: Utilize walker, cane, or other assistive device         Medication Interventions: Patient to call before getting OOB, Teach patient to arise slowly    Elimination Interventions: Call light in reach    History of Falls Interventions: Door open when patient unattended         Problem: Pressure Injury - Risk of  Goal: *Prevention of pressure injury  Description: Document Rufus Scale and appropriate interventions in the flowsheet. Outcome: Progressing Towards Goal  Note: Pressure Injury Interventions:  Sensory Interventions: Assess changes in LOC    Moisture Interventions: Absorbent underpads    Activity Interventions: Increase time out of bed    Mobility Interventions: HOB 30 degrees or less    Nutrition Interventions: Document food/fluid/supplement intake    Friction and Shear Interventions: Minimize layers                Problem: Diabetes Self-Management  Goal: *Disease process and treatment process  Description: Define diabetes and identify own type of diabetes; list 3 options for treating diabetes. Outcome: Progressing Towards Goal  Goal: *Incorporating nutritional management into lifestyle  Description: Describe effect of type, amount and timing of food on blood glucose; list 3 methods for planning meals. Outcome: Progressing Towards Goal  Goal: *Incorporating physical activity into lifestyle  Description: State effect of exercise on blood glucose levels.   Outcome: Progressing Towards Goal  Goal: *Developing strategies to promote health/change behavior  Description: Define the ABC's of diabetes; identify appropriate screenings, schedule and personal plan for screenings. Outcome: Progressing Towards Goal  Goal: *Using medications safely  Description: State effect of diabetes medications on diabetes; name diabetes medication taking, action and side effects. Outcome: Progressing Towards Goal  Goal: *Monitoring blood glucose, interpreting and using results  Description: Identify recommended blood glucose targets  and personal targets. Outcome: Progressing Towards Goal  Goal: *Prevention, detection, treatment of acute complications  Description: List symptoms of hyper- and hypoglycemia; describe how to treat low blood sugar and actions for lowering  high blood glucose level. Outcome: Progressing Towards Goal  Goal: *Prevention, detection and treatment of chronic complications  Description: Define the natural course of diabetes and describe the relationship of blood glucose levels to long term complications of diabetes.   Outcome: Progressing Towards Goal  Goal: *Developing strategies to address psychosocial issues  Description: Describe feelings about living with diabetes; identify support needed and support network  Outcome: Progressing Towards Goal  Goal: *Insulin pump training  Outcome: Progressing Towards Goal  Goal: *Sick day guidelines  Outcome: Progressing Towards Goal  Goal: *Patient Specific Goal (EDIT GOAL, INSERT TEXT)  Outcome: Progressing Towards Goal     Problem: Hypertension  Goal: *Blood pressure within specified parameters  Outcome: Progressing Towards Goal  Goal: *Fluid volume balance  Outcome: Progressing Towards Goal  Goal: *Labs within defined limits  Outcome: Progressing Towards Goal

## 2020-10-13 NOTE — PROGRESS NOTES
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 Ulcerated with chronic anemia. No evidence of bleeding since admission. Noncompliant with preop instructions  -- has not been drinking Ensure. -- has not walked in the hallway at all. Needs resection. This will likely involve esophagogastrostomy rather than a wedge resection due to its location.       This is a high risk anastomosis and if there is leak mediastinitis occurs and this is frequently life-threatening. I explained this to her last week and again yesterday.       Given her noncompliance with nutritional and activity recommendations I would favor sending her home and letting her get her nutritional and exercise status improved prior to operating.     She can get all of her caloric needs through a mechanical soft, high protein diet that will be unlikely to cause recurrent bleeding from the tumor. Texted Dr. Casandra Iqbal via [employer] to discuss. If she is discharged today, she should see me in the office this Friday -- [phone]. Thanks.

## 2020-10-13 NOTE — PROGRESS NOTES
I have reviewed discharge instructions with the patient. The patient verbalized understanding. Patient left via wheelchair. Sister in law is providing transportation home.      North Chan RN

## 2020-10-13 NOTE — DISCHARGE INSTRUCTIONS
Patient Education        Anemia: Care Instructions  Your Care Instructions     Anemia is a low level of red blood cells, which carry oxygen throughout your body. Many things can cause anemia. Lack of iron is one of the most common causes. Your body needs iron to make hemoglobin, a substance in red blood cells that carries oxygen from the lungs to your body's cells. Without enough iron, the body produces fewer and smaller red blood cells. As a result, your body's cells do not get enough oxygen, and you feel tired and weak. And you may have trouble concentrating. Bleeding is the most common cause of a lack of iron. You may have heavy menstrual bleeding or bleeding caused by conditions such as ulcers, hemorrhoids, or cancer. Regular use of aspirin or other anti-inflammatory medicines (such as ibuprofen) also can cause bleeding in some people. A lack of iron in your diet also can cause anemia, especially at times when the body needs more iron, such as during pregnancy, infancy, and the teen years. Your doctor may have prescribed iron pills. It may take several months of treatment for your iron levels to return to normal. Your doctor also may suggest that you eat foods that are rich in iron, such as meat and beans. There are many other causes of anemia. It is not always due to a lack of iron. Finding the specific cause of your anemia will help your doctor find the right treatment for you. Follow-up care is a key part of your treatment and safety. Be sure to make and go to all appointments, and call your doctor if you are having problems. It's also a good idea to know your test results and keep a list of the medicines you take. How can you care for yourself at home? · Take your medicines exactly as prescribed. Call your doctor if you think you are having a problem with your medicine.   · If your doctor recommends iron pills, take them as directed:  ? Try to take the pills on an empty stomach about 1 hour before or 2 hours after meals. But you may need to take iron with food to avoid an upset stomach. ? Do not take antacids or drink milk or caffeine drinks (such as coffee, tea, or cola) at the same time or within 2 hours of the time that you take your iron. They can make it hard for your body to absorb the iron. ? Vitamin C (from food or supplements) helps your body absorb iron. Try taking iron pills with a glass of orange juice or some other food that is high in vitamin C, such as citrus fruits. ? Iron pills may cause stomach problems, such as heartburn, nausea, diarrhea, constipation, and cramps. Be sure to drink plenty of fluids, and include fruits, vegetables, and fiber in your diet each day. Iron pills often make your bowel movements dark or green. ? If you forget to take an iron pill, do not take a double dose of iron the next time you take a pill. ? Keep iron pills out of the reach of small children. An overdose of iron can be very dangerous. · Follow your doctor's advice about eating iron-rich foods. These include red meat, shellfish, poultry, eggs, beans, raisins, whole-grain bread, and leafy green vegetables. · Steam vegetables to help them keep their iron content. When should you call for help? Call 911 anytime you think you may need emergency care. For example, call if:    · You have symptoms of a heart attack. These may include:  ? Chest pain or pressure, or a strange feeling in the chest.  ? Sweating. ? Shortness of breath. ? Nausea or vomiting. ? Pain, pressure, or a strange feeling in the back, neck, jaw, or upper belly or in one or both shoulders or arms. ? Lightheadedness or sudden weakness. ? A fast or irregular heartbeat. After you call 911, the  may tell you to chew 1 adult-strength or 2 to 4 low-dose aspirin. Wait for an ambulance. Do not try to drive yourself.     · You passed out (lost consciousness).    Call your doctor now or seek immediate medical care if:    · You have new or increased shortness of breath.     · You are dizzy or lightheaded, or you feel like you may faint.     · Your fatigue and weakness continue or get worse.     · You have any abnormal bleeding, such as:  ? Nosebleeds. ? Vaginal bleeding that is different (heavier, more frequent, at a different time of the month) than what you are used to.  ? Bloody or black stools, or rectal bleeding. ? Bloody or pink urine. Watch closely for changes in your health, and be sure to contact your doctor if:    · You do not get better as expected. Where can you learn more? Go to http://www.chou.com/  Enter R301 in the search box to learn more about \"Anemia: Care Instructions. \"  Current as of: November 8, 2019               Content Version: 12.6  © 9934-3475 [employer]. Care instructions adapted under license by [employer] (which disclaims liability or warranty for this information). If you have questions about a medical condition or this instruction, always ask your healthcare professional. [place] [address] any warranty or liability for your use of this information. Patient Education        Gastrointestinal Bleeding: Care Instructions  Your Care Instructions     The digestive or gastrointestinal tract goes from the mouth to the anus. It is often called the GI tract. Bleeding can happen anywhere in the GI tract. It may be caused by an ulcer, an infection, or cancer. It may also be caused by medicines such as aspirin or ibuprofen. Light bleeding may not cause any symptoms at first. But if you continue to bleed for a while, you may feel very weak or tired. Sudden, heavy bleeding means you need to see a doctor right away. This kind of bleeding can be very dangerous. But it can usually be cured or controlled. The doctor may do some tests to find the cause of your bleeding. Follow-up care is a key part of your treatment and safety.  Be sure to make and go to all appointments, and call your doctor if you are having problems. It's also a good idea to know your test results and keep a list of the medicines you take. How can you care for yourself at home? · Be safe with medicines. Take your medicines exactly as prescribed. Call your doctor if you think you are having a problem with your medicine. You will get more details on the specific medicines your doctor prescribes. · Do not take aspirin or other anti-inflammatory medicines, such as naproxen (Aleve) or ibuprofen (Advil, Motrin), without talking to your doctor first. Ask your doctor if it is okay to use acetaminophen (Tylenol). · Do not drink alcohol. · The bleeding may make you lose iron. So it's important to eat foods that have a lot of iron. These include red meat, shellfish, poultry, and eggs. They also include beans, raisins, whole-grain breads, and leafy green vegetables. If you want help planning meals, you can make an appointment with a dietitian. When should you call for help? Call 911 anytime you think you may need emergency care. For example, call if:    · You have sudden, severe belly pain.     · You vomit blood or what looks like coffee grounds.     · You passed out (lost consciousness).     · Your stools are maroon or very bloody. Call your doctor now or seek immediate medical care if:    · You are dizzy or lightheaded, or you feel like you may faint.     · Your stools are black and look like tar, or they have streaks of blood.     · You have belly pain.     · You vomit or have nausea.     · You have trouble swallowing, or it hurts when you swallow. Watch closely for changes in your health, and be sure to contact your doctor if:    · You do not get better as expected. Where can you learn more? Go to http://www.gray.com/  Enter F981 in the search box to learn more about \"Gastrointestinal Bleeding: Care Instructions. \"  Current as of: June 26, 3369               FZJYOFN Version: 12.6  © 4577-6220 [employer]. Care instructions adapted under license by [employer] (which disclaims liability or warranty for this information). If you have questions about a medical condition or this instruction, always ask your healthcare professional. [address] any warranty or liability for your use of this information. HOSPITALIST DISCHARGE INSTRUCTIONS    NAME: Chiara Chiu   :  1945   MRN:  [id number]     Date/Time:  10/13/2020 10:56 AM    ADMIT DATE: 10/4/2020   DISCHARGE DATE: 10/13/2020     Attending Physician: Reynaldo Gay NP    DISCHARGE DIAGNOSIS:  Patient Active Problem List   Diagnosis Code    Non-ST elevated myocardial infarction ([facility] I21.4    Noncompliance Z91.19    Fatty liver K76.0    UTI (lower urinary tract infection) N39.0    Type 2 diabetes mellitus with diabetic polyneuropathy, with long-term current use of insulin (HCC) E11.42, Z79.4    Essential hypertension I10    Mixed hyperlipidemia E78.2    Type 2 diabetes with nephropathy ([facility] E11.21    Severe obesity (BMI 35.0-39. 9) E66.01    GI bleed K92.2    Anemia, unspecified D64.9       Medications: Per above medication reconciliation. Pain Management: per above medications    Recommended diet: Diabetic Diet    Recommended activity: Activity as tolerated    Wound care: None    Indwelling devices:  None    Supplemental Oxygen: None    Required Lab work: per PCP    Glucose management:  per routine    Code status: Full        Outside physician follow up:     Follow-up Information     Follow up With Specialties Details Why Contact Info    Scar Gould MD Internal Medicine In 1 week  [facility] [address]  [address] [address] [phone]      Tahmina Henning MD General Surgery, Breast Surgery, Colon and Rectal Surgery, Endocrinology On 10/23/2020 Please call for appt, Dr. Luis Carbajal would like to see you on Friday [address]  [address]  [facility]  [phone]      Leidy Vera MD Cardiology In 2 weeks  [address]  [id number]  [facility]  [phone]          [facility] to avoid frequent ED visits. [facility] can treat; pains, sprains, cuts, wounds, high fevers, upper respiratory infections and much more. There medical team is equipped with all the tools necessary to provide advanced medical care in the comfort of you home, workplace, or location of need. The medical team consists of doctors, nurse practitioners, and EMTs. [facility] is available 7 days per week 9 am to 9 pm.   Request care by calling [phone] or by going online at [address] [address]    Information obtained by :  I understand that if any problems occur once I am at home I am to contact my physician. I understand and acknowledge receipt of the instructions indicated above.                                                                                                                                            Physician's or R.N.'s Signature                                                                  Date/Time                                                                                                                                              Patient or Repres

## 2020-10-14 ENCOUNTER — PATIENT OUTREACH (OUTPATIENT)
Dept: CASE MANAGEMENT | Age: 75
End: 2020-10-14

## 2020-10-14 NOTE — PROGRESS NOTES
Patient was admitted to [facility] on 10-4-20 and discharged on 10-13-20 for:    DISCHARGE DIAGNOSIS:          Patient Active Problem List   Diagnosis Code    Non-ST elevated myocardial infarction ([facility] I21.4    Noncompliance Z91.19    Fatty liver K76.0    UTI (lower urinary tract infection) N39.0    Type 2 diabetes mellitus with diabetic polyneuropathy, with long-term current use of insulin ([facility] E11.42, Z79.4    Essential hypertension I10    Mixed hyperlipidemia E78.2    Type 2 diabetes with nephropathy ([facility] E11.21    Severe obesity (BMI 35.0-39. 9) E66.01    GI bleed K92.2    Anemia, unspecified D64.9     Patient and , Ronnie Barroso (on PHI), were contacted within one business day of discharge. Discharge Challenges to be reviewed by the provider:   Additional needs identified to be addressed with provider:  yes  -  Patient complains of shortness of breath upon exertion, ongoing fatigue, and polyuria. Patient states she does not check her home glucose levels and states, \"I probably need a new glucometer. \" Patient states she has not seen any blood in her stools and has not vomited any blood since discharge on 10-13-20.  - Patient states she is going to attempt to utilize a diabetic and regular diets at home. Reports her appetite is good. Patient states she has not avoided sweets in the past but she plans to do so in the future. - Patient states she does take her medications on time. - Patient reports one fall in the last six months and states she did not sustain any injury during this fall.   - Patient presented as not engaged in today's transitions of care phone call, declined medication reconciliation, and states her home glucometer is in working condition but old. When asked is she had glucometer test strips, lancets, alcohol wipes and supplies, patient did not provide an answer. COVID-19 related testing was not completed during this admission.    Method of communication with provider:  Phone conversation with Antoni Currie., at the office of Dr. Tone Watts [facility] and informed Amor Morelos all of the 'Discharge Challenges' above. Levia Jethro states she will send a message to Dr. Morrell/PCP and she will discuss with Dr. Sandra Cerda (patient used to see Dr. Sandra Cerda at [place] [address] for many years per Amor Morelos).         Component      Latest Ref Rng & Units 10/13/2020 10/13/2020 10/12/2020 10/12/2020          11:03 AM  7:57 AM  9:39 PM  3:42 PM   GLUCOSE,FAST - POC      65 - 100 mg/dL 242 (H) 229 (H) 234 (H) 190 (H)     Component      Latest Ref Rng & Units 10/12/2020 10/12/2020          10:41 AM  7:29 AM   GLUCOSE,FAST - POC      65 - 100 mg/dL 219 (H) 167 (H)     Component      Latest Ref Rng & Units 10/12/2020 10/10/2020 10/9/2020 10/8/2020           3:36 AM 12:53 AM  1:34 PM  4:07 AM   WBC      3.6 - 11.0 K/uL 11.9 (H) 13.4 (H)  14.3 (H)   RBC      3.80 - 5.20 M/uL 3.19 (L) 3.17 (L)  2.88 (L)   HGB      11.5 - 16.0 g/dL 8.2 (L) 8.2 (L)  7.6 (L)   HCT      35.0 - 47.0 % 27.6 (L) 27.4 (L)  25.3 (L)     Component      Latest Ref Rng & Units 10/12/2020 10/10/2020 10/9/2020 10/8/2020           3:36 AM 12:53 AM  1:34 PM  4:07 AM   MCH      26.0 - 34.0 PG 25.7 (L) 25.9 (L)  26.4   MCHC      30.0 - 36.5 g/dL 29.7 (L) 29.9 (L)  30.0   RDW      11.5 - 14.5 % 16.0 (H) 16.3 (H)  17.0 (H)     Component      Latest Ref Rng & Units 10/12/2020 10/10/2020 10/9/2020 10/8/2020           3:36 AM 12:53 AM  1:34 PM  4:07 AM   NRBC      0  WBC 0.0 0.2 (H)  0.7 (H)   ABSOLUTE NRBC      0.00 - 0.01 K/uL 0.00 0.03 (H)  0.10 (H)     Component      Latest Ref Rng & Units 10/9/2020           1:34 PM   Iron      35 - 150 ug/dL 9 (L)   TIBC      250 - 450 ug/dL 294   Iron % saturation      20 - 50 % 3 (L)     Component      Latest Ref Rng & Units 10/12/2020 10/10/2020 10/8/2020           3:36 AM 12:53 AM  4:07 AM   Chloride      97 - 108 mmol/L 109 (H) 110 (H) 113 (H) Component      Latest Ref Rng & Units 10/12/2020 10/10/2020 10/8/2020           3:36 AM 12:53 AM  4:07 AM   Anion gap      5 - 15 mmol/L 4 (L) 5 5     Component      Latest Ref Rng & Units 10/12/2020 10/10/2020 10/8/2020           3:36 AM 12:53 AM  4:07 AM   BUN      6 - 20 MG/DL 26 (H) 27 (H) 33 (H)   Creatinine      0.55 - 1.02 MG/DL 1.22 (H) 1.42 (H) 1.44 (H)   BUN/Creatinine ratio      12 - 20   21 (H) 19 23 (H)   GFR est AA      >60 ml/min/1.73m2 52 (L) 44 (L) 43 (L)   GFR est non-AA      >60 ml/min/1.73m2 43 (L) 36 (L) 36 (L)   Calcium      8.5 - 10.1 MG/DL 7.9 (L) 7.7 (L) 7.8 (L)     Advance Care Planning:   Does patient have an Advance Directive:  currently not on file; education provided     Was this a readmission? no   Patient stated reason for the admission: \"My  realized there was something wrong with me, I was short of breath when walking from the living room to the bathroom. \"   Patients top risk factors for readmission: medical condition  Interventions to address risk factors: Education provided to patient and  regarding signs/symptoms of GI bleed and anemia, both verbalized an understanding. Care Transition Nurse (CTN) contacted the patient and , Emily Diaz, by telephone to perform post hospital discharge assessment. Verified name and  with patient as identifiers. Provided introduction to self, and explanation of the CTN role. CTN reviewed discharge instructions, medical action plan and red flags with patient and  who verbalized understanding. Patient and  given an opportunity to ask questions and does not have any further questions or concerns at this time. The patient agrees to contact the PCP office for questions related to their healthcare. Medication reconciliation was not performed with patient during this phone conversation as patient declined stating, \"I don't want to do it. \"  [facility] obtaining a 90-day supply of all daily and as-needed medications. Referral to Pharm D needed: no     Home Health/Outpatient orders at discharge: [address]: n/a  Date of initial visit: [address] ordered at discharge: none  [address] received: n/a    Covid Risk Education    Patient has following risk factors of: diabetes and recent GI bleed and anemia. Education provided regarding infection prevention, and signs and symptoms of COVID-19 and when to seek medical attention with patient who verbalized understanding. Discussed exposure protocols and quarantine From CDC: Are you at higher risk for severe illness?  and given an opportunity for questions and concerns. The patient agrees to contact the COVID-19 hotline [phone] or PCP office for questions related to COVID-19. For more information on steps you can take to protect yourself, see CDC's How to Protect Yourself     Patient/family/caregiver given information for [employer] and agrees to enroll no  Patient's preferred e-mail: declines  Patient's preferred phone number: declines    Discussed follow-up appointments. If no appointment was previously scheduled, appointment scheduling offered: yes  [facility] follow up appointment(s):   Future Appointments   Date Time Provider Clifford Taylor   10/16/2020 10:00 AM Janeth Luna MD [facility]     Non[facility] follow up appointment(s): Please see below for appointments. Plan for follow-up call in 10-14 days based on severity of symptoms and risk factors. CTN provided contact information for future needs. Goals      Attends follow-up appointments as directed. 10-14-20: Patient has KOFI appointment scheduled with Dr. Claudia Undrewood on 10-20-20 and general surgery follow-up appointment scheduled with Dr. South Lam on 10-16-20 (per patient) and on 10-23-20 (per discharge summary).   states he will schedule two-week cardio follow-up appointment for patient today, with Dr. Dianna Fletcher. Phone number for the office of Dr. Leyla Alcantar was given to . Patient states her sister-in-law is currently providing transportation. Reji Ray Understands red flags post discharge. 10-14-20: Red flags of GI bleed and anemia reviewed with patient and  and both verbalized an understanding. Patient complains of shortness of breath upon exertion, ongoing fatigue, and polyuria. Patient states she does not check her home glucose levels and states, \"I probably need a new glucometer. \" Patient states she has not seen any blood in her stools and has not vomited any blood. Care Transitions Nurse will review red flags again with patient/ on next phone conversation.  Bret Black

## 2020-10-16 ENCOUNTER — OFFICE VISIT (OUTPATIENT)
Dept: SURGERY | Age: 75
End: 2020-10-16
Payer: MEDICARE

## 2020-10-16 VITALS
WEIGHT: 193.4 LBS | SYSTOLIC BLOOD PRESSURE: 163 MMHG | HEART RATE: 82 BPM | RESPIRATION RATE: 18 BRPM | DIASTOLIC BLOOD PRESSURE: 74 MMHG | BODY MASS INDEX: 35.59 KG/M2 | TEMPERATURE: 96.5 F | OXYGEN SATURATION: 97 % | HEIGHT: 62 IN

## 2020-10-16 DIAGNOSIS — R60.0 BILATERAL LOWER EXTREMITY EDEMA: Primary | ICD-10-CM

## 2020-10-16 DIAGNOSIS — R60.0 BILATERAL LOWER EXTREMITY EDEMA: ICD-10-CM

## 2020-10-16 DIAGNOSIS — D21.4 GIST, NON-MALIGNANT: ICD-10-CM

## 2020-10-16 PROCEDURE — G8536 NO DOC ELDER MAL SCRN: HCPCS | Performed by: SURGERY

## 2020-10-16 PROCEDURE — G8753 SYS BP > OR = 140: HCPCS | Performed by: SURGERY

## 2020-10-16 PROCEDURE — 3288F FALL RISK ASSESSMENT DOCD: CPT | Performed by: SURGERY

## 2020-10-16 PROCEDURE — G8510 SCR DEP NEG, NO PLAN REQD: HCPCS | Performed by: SURGERY

## 2020-10-16 PROCEDURE — G8417 CALC BMI ABV UP PARAM F/U: HCPCS | Performed by: SURGERY

## 2020-10-16 PROCEDURE — 1100F PTFALLS ASSESS-DOCD GE2>/YR: CPT | Performed by: SURGERY

## 2020-10-16 PROCEDURE — G8400 PT W/DXA NO RESULTS DOC: HCPCS | Performed by: SURGERY

## 2020-10-16 PROCEDURE — G9899 SCRN MAM PERF RSLTS DOC: HCPCS | Performed by: SURGERY

## 2020-10-16 PROCEDURE — 99213 OFFICE O/P EST LOW 20 MIN: CPT | Performed by: SURGERY

## 2020-10-16 PROCEDURE — 1111F DSCHRG MED/CURRENT MED MERGE: CPT | Performed by: SURGERY

## 2020-10-16 PROCEDURE — G8754 DIAS BP LESS 90: HCPCS | Performed by: SURGERY

## 2020-10-16 PROCEDURE — 3017F COLORECTAL CA SCREEN DOC REV: CPT | Performed by: SURGERY

## 2020-10-16 PROCEDURE — G8427 DOCREV CUR MEDS BY ELIG CLIN: HCPCS | Performed by: SURGERY

## 2020-10-16 PROCEDURE — 1090F PRES/ABSN URINE INCON ASSESS: CPT | Performed by: SURGERY

## 2020-10-16 NOTE — Clinical Note
10/22/20 Patient: Pao Arias YOB: 1945 Date of Visit: 10/16/2020 Marquita Goode MD 
[address] [address] VIA Facsimile: [phone] Dear Marquita Goode MD, Thank you for referring Ms. Pao Arias to  [facility] for evaluation. My notes for this consultation are attached. If you have questions, please do not hesitate to call me. I look forward to following your patient along with you.  
 
 
Sincerely, 
 
Radha Higgins MD

## 2020-10-22 RX ORDER — PANTOPRAZOLE SODIUM 40 MG/1
40 TABLET, DELAYED RELEASE ORAL DAILY
Qty: 30 TAB | Refills: 0 | Status: ON HOLD | OUTPATIENT
Start: 2020-10-22 | End: 2020-11-20 | Stop reason: SDUPTHER

## 2020-10-22 NOTE — PROGRESS NOTES
To: Cruz Geller MD, Ann Gonsales MD, Fernando Foley MD    From: Cindy King MD    Thank you. Encounter Date: 10/16/2020    Subjective:      Mirta Valle is a 70 y.o. 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 She received several transfusions and has done quite well without evidence of rebleeding. She has had significant issues with fluid overload and peripheral edema during this acute episode. She was n.p.o. for some time resulting in hypoalbuminemia and acute malnutrition. She continues to feel weak but is doing a bit better with time. She has not seen any black stools. Objective:     Visit Vitals  BP (!) 163/74 (BP 1 Location: Right arm, BP Patient Position: Sitting)   Pulse 82   Temp (!) 96.5 °F (35.8 °C) (Temporal)   Resp 18   Ht 5' 2\" (1.575 m)   Wt 87.7 kg (193 lb 6.4 oz)   SpO2 97%   BMI 35.37 kg/m²       General:  alert, cooperative, no distress, appears stated age   Abdomen: soft, bowel sounds active, non-tender   Extremities:  Her hands are edematous. Her legs from the knees down are edematous. Assessment:     Gastric cardia GIST. Chronic deconditioning. Acute malnutrition. Relative anemia. Plan:     She is scheduled to see Dr. Jordan Moore on October 30. If she continues to be edematous, she may require work-up prior to surgery for the GIST. Surgery will likely involve gastric resection with esophagogastrostomy rather than a wedge resection given its location. This is a high risk anastomosis and if it leaks mediastinitis will occur and this can be life-threatening. We need her to be well-nourished and in the best possible shape from a cardiopulmonary standpoint. In the meantime we will keep her on a mechanical soft diet and keep her on PPIs to reduce the risk of rebleeding.     Cindy King MD

## 2020-11-12 ENCOUNTER — HOSPITAL ENCOUNTER (INPATIENT)
Age: 75
LOS: 8 days | Discharge: HOME HEALTH | DRG: 374 | End: 2020-11-20
Attending: EMERGENCY MEDICINE | Admitting: INTERNAL MEDICINE
Payer: MEDICARE

## 2020-11-12 ENCOUNTER — APPOINTMENT (OUTPATIENT)
Dept: GENERAL RADIOLOGY | Age: 75
DRG: 374 | End: 2020-11-12
Attending: EMERGENCY MEDICINE
Payer: MEDICARE

## 2020-11-12 ENCOUNTER — APPOINTMENT (OUTPATIENT)
Dept: CT IMAGING | Age: 75
DRG: 374 | End: 2020-11-12
Attending: EMERGENCY MEDICINE
Payer: MEDICARE

## 2020-11-12 DIAGNOSIS — R06.02 SOB (SHORTNESS OF BREATH): Primary | ICD-10-CM

## 2020-11-12 DIAGNOSIS — K92.2 UPPER GI BLEED: ICD-10-CM

## 2020-11-12 DIAGNOSIS — K31.89 GASTRIC MASS: ICD-10-CM

## 2020-11-12 DIAGNOSIS — D64.9 SYMPTOMATIC ANEMIA: ICD-10-CM

## 2020-11-12 PROBLEM — C49.A0 GIST, MALIGNANT (HCC): Status: ACTIVE | Noted: 2020-11-12

## 2020-11-12 PROBLEM — D50.0 ANEMIA, BLOOD LOSS: Status: ACTIVE | Noted: 2020-11-12

## 2020-11-12 LAB
ALBUMIN SERPL-MCNC: 2.5 G/DL (ref 3.5–5)
ALBUMIN/GLOB SERPL: 0.6 {RATIO} (ref 1.1–2.2)
ALP SERPL-CCNC: 55 U/L (ref 45–117)
ALT SERPL-CCNC: 16 U/L (ref 12–78)
ANION GAP SERPL CALC-SCNC: 6 MMOL/L (ref 5–15)
AST SERPL-CCNC: 17 U/L (ref 15–37)
BASOPHILS # BLD: 0 K/UL (ref 0–0.1)
BASOPHILS NFR BLD: 0 % (ref 0–1)
BILIRUB SERPL-MCNC: 0.2 MG/DL (ref 0.2–1)
BUN SERPL-MCNC: 16 MG/DL (ref 6–20)
BUN/CREAT SERPL: 17 (ref 12–20)
CALCIUM SERPL-MCNC: 8.3 MG/DL (ref 8.5–10.1)
CHLORIDE SERPL-SCNC: 108 MMOL/L (ref 97–108)
CO2 SERPL-SCNC: 28 MMOL/L (ref 21–32)
CREAT SERPL-MCNC: 0.95 MG/DL (ref 0.55–1.02)
DIFFERENTIAL METHOD BLD: ABNORMAL
EOSINOPHIL # BLD: 0 K/UL (ref 0–0.4)
EOSINOPHIL NFR BLD: 0 % (ref 0–7)
ERYTHROCYTE [DISTWIDTH] IN BLOOD BY AUTOMATED COUNT: 17 % (ref 11.5–14.5)
GLOBULIN SER CALC-MCNC: 3.9 G/DL (ref 2–4)
GLUCOSE SERPL-MCNC: 114 MG/DL (ref 65–100)
HCT VFR BLD AUTO: 21.1 % (ref 35–47)
HGB BLD-MCNC: 5.7 G/DL (ref 11.5–16)
HISTORY CHECKED?,CKHIST: NORMAL
IMM GRANULOCYTES # BLD AUTO: 0.1 K/UL (ref 0–0.04)
IMM GRANULOCYTES NFR BLD AUTO: 1 % (ref 0–0.5)
LYMPHOCYTES # BLD: 1.3 K/UL (ref 0.8–3.5)
LYMPHOCYTES NFR BLD: 11 % (ref 12–49)
MCH RBC QN AUTO: 21.8 PG (ref 26–34)
MCHC RBC AUTO-ENTMCNC: 27 G/DL (ref 30–36.5)
MCV RBC AUTO: 80.8 FL (ref 80–99)
MONOCYTES # BLD: 0.6 K/UL (ref 0–1)
MONOCYTES NFR BLD: 5 % (ref 5–13)
NEUTS SEG # BLD: 9.8 K/UL (ref 1.8–8)
NEUTS SEG NFR BLD: 83 % (ref 32–75)
NRBC # BLD: 0.07 K/UL (ref 0–0.01)
NRBC BLD-RTO: 0.6 PER 100 WBC
PLATELET # BLD AUTO: 408 K/UL (ref 150–400)
PMV BLD AUTO: 9.8 FL (ref 8.9–12.9)
POTASSIUM SERPL-SCNC: 3.9 MMOL/L (ref 3.5–5.1)
PROT SERPL-MCNC: 6.4 G/DL (ref 6.4–8.2)
RBC # BLD AUTO: 2.61 M/UL (ref 3.8–5.2)
RBC MORPH BLD: ABNORMAL
SODIUM SERPL-SCNC: 142 MMOL/L (ref 136–145)
WBC # BLD AUTO: 11.8 K/UL (ref 3.6–11)

## 2020-11-12 PROCEDURE — 80053 COMPREHEN METABOLIC PANEL: CPT

## 2020-11-12 PROCEDURE — 36415 COLL VENOUS BLD VENIPUNCTURE: CPT

## 2020-11-12 PROCEDURE — 77030029684 HC NEB SM VOL KT MONA -A

## 2020-11-12 PROCEDURE — 74011000250 HC RX REV CODE- 250: Performed by: PHYSICIAN ASSISTANT

## 2020-11-12 PROCEDURE — 94640 AIRWAY INHALATION TREATMENT: CPT

## 2020-11-12 PROCEDURE — 86900 BLOOD TYPING SEROLOGIC ABO: CPT

## 2020-11-12 PROCEDURE — 86920 COMPATIBILITY TEST SPIN: CPT

## 2020-11-12 PROCEDURE — 99285 EMERGENCY DEPT VISIT HI MDM: CPT

## 2020-11-12 PROCEDURE — 85025 COMPLETE CBC W/AUTO DIFF WBC: CPT

## 2020-11-12 PROCEDURE — 65270000029 HC RM PRIVATE

## 2020-11-12 PROCEDURE — 93005 ELECTROCARDIOGRAM TRACING: CPT

## 2020-11-12 PROCEDURE — 74178 CT ABD&PLV WO CNTR FLWD CNTR: CPT

## 2020-11-12 PROCEDURE — 86922 COMPATIBILITY TEST ANTIGLOB: CPT

## 2020-11-12 PROCEDURE — 86921 COMPATIBILITY TEST INCUBATE: CPT

## 2020-11-12 PROCEDURE — 71045 X-RAY EXAM CHEST 1 VIEW: CPT

## 2020-11-12 PROCEDURE — 74011000636 HC RX REV CODE- 636: Performed by: EMERGENCY MEDICINE

## 2020-11-12 PROCEDURE — 2709999900 HC NON-CHARGEABLE SUPPLY

## 2020-11-12 PROCEDURE — 94761 N-INVAS EAR/PLS OXIMETRY MLT: CPT

## 2020-11-12 RX ORDER — IPRATROPIUM BROMIDE AND ALBUTEROL SULFATE 2.5; .5 MG/3ML; MG/3ML
3 SOLUTION RESPIRATORY (INHALATION)
Status: COMPLETED | OUTPATIENT
Start: 2020-11-12 | End: 2020-11-12

## 2020-11-12 RX ORDER — SODIUM CHLORIDE 9 MG/ML
250 INJECTION, SOLUTION INTRAVENOUS AS NEEDED
Status: DISCONTINUED | OUTPATIENT
Start: 2020-11-12 | End: 2020-11-17 | Stop reason: SDUPTHER

## 2020-11-12 RX ORDER — SODIUM CHLORIDE 0.9 % (FLUSH) 0.9 %
10 SYRINGE (ML) INJECTION
Status: COMPLETED | OUTPATIENT
Start: 2020-11-12 | End: 2020-11-12

## 2020-11-12 RX ADMIN — IOPAMIDOL 100 ML: 755 INJECTION, SOLUTION INTRAVENOUS at 23:03

## 2020-11-12 RX ADMIN — Medication 10 ML: at 23:03

## 2020-11-12 RX ADMIN — IPRATROPIUM BROMIDE AND ALBUTEROL SULFATE 3 ML: .5; 3 SOLUTION RESPIRATORY (INHALATION) at 20:09

## 2020-11-13 ENCOUNTER — APPOINTMENT (OUTPATIENT)
Dept: GENERAL RADIOLOGY | Age: 75
DRG: 374 | End: 2020-11-13
Attending: RADIOLOGY
Payer: MEDICARE

## 2020-11-13 ENCOUNTER — TELEPHONE (OUTPATIENT)
Dept: ONCOLOGY | Age: 75
End: 2020-11-13

## 2020-11-13 ENCOUNTER — APPOINTMENT (OUTPATIENT)
Dept: ULTRASOUND IMAGING | Age: 75
DRG: 374 | End: 2020-11-13
Attending: INTERNAL MEDICINE
Payer: MEDICARE

## 2020-11-13 LAB
ANION GAP SERPL CALC-SCNC: 4 MMOL/L (ref 5–15)
APPEARANCE FLD: ABNORMAL
ATRIAL RATE: 77 BPM
BUN SERPL-MCNC: 14 MG/DL (ref 6–20)
BUN/CREAT SERPL: 16 (ref 12–20)
CALCIUM SERPL-MCNC: 8.3 MG/DL (ref 8.5–10.1)
CALCULATED P AXIS, ECG09: 47 DEGREES
CALCULATED R AXIS, ECG10: 16 DEGREES
CALCULATED T AXIS, ECG11: -127 DEGREES
CHLORIDE SERPL-SCNC: 111 MMOL/L (ref 97–108)
CO2 SERPL-SCNC: 29 MMOL/L (ref 21–32)
COLOR FLD: YELLOW
COMMENT, HOLDF: NORMAL
CREAT SERPL-MCNC: 0.87 MG/DL (ref 0.55–1.02)
DIAGNOSIS, 93000: NORMAL
ERYTHROCYTE [DISTWIDTH] IN BLOOD BY AUTOMATED COUNT: 16.9 % (ref 11.5–14.5)
GLUCOSE BLD STRIP.AUTO-MCNC: 119 MG/DL (ref 65–100)
GLUCOSE BLD STRIP.AUTO-MCNC: 131 MG/DL (ref 65–100)
GLUCOSE BLD STRIP.AUTO-MCNC: 155 MG/DL (ref 65–100)
GLUCOSE BLD STRIP.AUTO-MCNC: 42 MG/DL (ref 65–100)
GLUCOSE BLD STRIP.AUTO-MCNC: 51 MG/DL (ref 65–100)
GLUCOSE BLD STRIP.AUTO-MCNC: 54 MG/DL (ref 65–100)
GLUCOSE BLD STRIP.AUTO-MCNC: 60 MG/DL (ref 65–100)
GLUCOSE BLD STRIP.AUTO-MCNC: 61 MG/DL (ref 65–100)
GLUCOSE BLD STRIP.AUTO-MCNC: 69 MG/DL (ref 65–100)
GLUCOSE BLD STRIP.AUTO-MCNC: 75 MG/DL (ref 65–100)
GLUCOSE SERPL-MCNC: 37 MG/DL (ref 65–100)
HCT VFR BLD AUTO: 21.8 % (ref 35–47)
HCT VFR BLD AUTO: 23.7 % (ref 35–47)
HGB BLD-MCNC: 6.3 G/DL (ref 11.5–16)
HGB BLD-MCNC: 6.9 G/DL (ref 11.5–16)
HISTORY CHECKED?,CKHIST: NORMAL
LDH FLD L TO P-CCNC: 51 U/L
LYMPHOCYTES NFR FLD: 73 %
MAGNESIUM SERPL-MCNC: 2.4 MG/DL (ref 1.6–2.4)
MCH RBC QN AUTO: 23.7 PG (ref 26–34)
MCHC RBC AUTO-ENTMCNC: 29.1 G/DL (ref 30–36.5)
MCV RBC AUTO: 81.4 FL (ref 80–99)
MESOTHL CELL NFR FLD: 2 %
MONOS+MACROS NFR FLD: 24 %
NEUTROPHILS NFR FLD: 1 %
NRBC # BLD: 0.05 K/UL (ref 0–0.01)
NRBC BLD-RTO: 0.4 PER 100 WBC
NUC CELL # FLD: 458 /CU MM
P-R INTERVAL, ECG05: 100 MS
PLATELET # BLD AUTO: 348 K/UL (ref 150–400)
PMV BLD AUTO: 9.8 FL (ref 8.9–12.9)
POTASSIUM SERPL-SCNC: 3.8 MMOL/L (ref 3.5–5.1)
PROT FLD-MCNC: 0.8 G/DL
Q-T INTERVAL, ECG07: 424 MS
QRS DURATION, ECG06: 92 MS
QTC CALCULATION (BEZET), ECG08: 479 MS
RBC # BLD AUTO: 2.91 M/UL (ref 3.8–5.2)
RBC # FLD: >100 /CU MM
SAMPLES BEING HELD,HOLD: NORMAL
SERVICE CMNT-IMP: ABNORMAL
SERVICE CMNT-IMP: NORMAL
SERVICE CMNT-IMP: NORMAL
SODIUM SERPL-SCNC: 144 MMOL/L (ref 136–145)
SPECIMEN SOURCE FLD: ABNORMAL
SPECIMEN SOURCE FLD: NORMAL
SPECIMEN SOURCE FLD: NORMAL
VENTRICULAR RATE, ECG03: 77 BPM
WBC # BLD AUTO: 14 K/UL (ref 3.6–11)

## 2020-11-13 PROCEDURE — 99221 1ST HOSP IP/OBS SF/LOW 40: CPT | Performed by: INTERNAL MEDICINE

## 2020-11-13 PROCEDURE — 85018 HEMOGLOBIN: CPT

## 2020-11-13 PROCEDURE — 74011636637 HC RX REV CODE- 636/637: Performed by: NURSE PRACTITIONER

## 2020-11-13 PROCEDURE — 83735 ASSAY OF MAGNESIUM: CPT

## 2020-11-13 PROCEDURE — 65270000015 HC RM PRIVATE ONCOLOGY

## 2020-11-13 PROCEDURE — 82962 GLUCOSE BLOOD TEST: CPT

## 2020-11-13 PROCEDURE — 80048 BASIC METABOLIC PNL TOTAL CA: CPT

## 2020-11-13 PROCEDURE — 36430 TRANSFUSION BLD/BLD COMPNT: CPT

## 2020-11-13 PROCEDURE — 71045 X-RAY EXAM CHEST 1 VIEW: CPT

## 2020-11-13 PROCEDURE — 0W993ZZ DRAINAGE OF RIGHT PLEURAL CAVITY, PERCUTANEOUS APPROACH: ICD-10-PCS | Performed by: RADIOLOGY

## 2020-11-13 PROCEDURE — 99223 1ST HOSP IP/OBS HIGH 75: CPT | Performed by: SURGERY

## 2020-11-13 PROCEDURE — 74011000250 HC RX REV CODE- 250: Performed by: RADIOLOGY

## 2020-11-13 PROCEDURE — 30233N1 TRANSFUSION OF NONAUTOLOGOUS RED BLOOD CELLS INTO PERIPHERAL VEIN, PERCUTANEOUS APPROACH: ICD-10-PCS | Performed by: EMERGENCY MEDICINE

## 2020-11-13 PROCEDURE — 88112 CYTOPATH CELL ENHANCE TECH: CPT

## 2020-11-13 PROCEDURE — 74011250637 HC RX REV CODE- 250/637: Performed by: INTERNAL MEDICINE

## 2020-11-13 PROCEDURE — 83615 LACTATE (LD) (LDH) ENZYME: CPT

## 2020-11-13 PROCEDURE — 88305 TISSUE EXAM BY PATHOLOGIST: CPT

## 2020-11-13 PROCEDURE — 74011250636 HC RX REV CODE- 250/636: Performed by: INTERNAL MEDICINE

## 2020-11-13 PROCEDURE — 84157 ASSAY OF PROTEIN OTHER: CPT

## 2020-11-13 PROCEDURE — 77030028236 US THORACENTESIS NDL PUNC ASP W IMAGE

## 2020-11-13 PROCEDURE — 85027 COMPLETE CBC AUTOMATED: CPT

## 2020-11-13 PROCEDURE — P9016 RBC LEUKOCYTES REDUCED: HCPCS

## 2020-11-13 PROCEDURE — 87205 SMEAR GRAM STAIN: CPT

## 2020-11-13 PROCEDURE — 74011000250 HC RX REV CODE- 250: Performed by: EMERGENCY MEDICINE

## 2020-11-13 PROCEDURE — 77010033678 HC OXYGEN DAILY

## 2020-11-13 PROCEDURE — C9113 INJ PANTOPRAZOLE SODIUM, VIA: HCPCS | Performed by: INTERNAL MEDICINE

## 2020-11-13 PROCEDURE — 89050 BODY FLUID CELL COUNT: CPT

## 2020-11-13 PROCEDURE — 74011000250 HC RX REV CODE- 250: Performed by: INTERNAL MEDICINE

## 2020-11-13 PROCEDURE — 36415 COLL VENOUS BLD VENIPUNCTURE: CPT

## 2020-11-13 RX ORDER — SODIUM CHLORIDE 0.9 % (FLUSH) 0.9 %
5-40 SYRINGE (ML) INJECTION EVERY 8 HOURS
Status: DISCONTINUED | OUTPATIENT
Start: 2020-11-13 | End: 2020-11-20 | Stop reason: HOSPADM

## 2020-11-13 RX ORDER — ACETAMINOPHEN 650 MG/1
650 SUPPOSITORY RECTAL
Status: DISCONTINUED | OUTPATIENT
Start: 2020-11-13 | End: 2020-11-20 | Stop reason: HOSPADM

## 2020-11-13 RX ORDER — ALBUTEROL SULFATE 2.5 MG/.5ML
5 SOLUTION RESPIRATORY (INHALATION)
Status: COMPLETED | OUTPATIENT
Start: 2020-11-13 | End: 2020-11-13

## 2020-11-13 RX ORDER — SODIUM CHLORIDE 0.9 % (FLUSH) 0.9 %
5-40 SYRINGE (ML) INJECTION AS NEEDED
Status: DISCONTINUED | OUTPATIENT
Start: 2020-11-13 | End: 2020-11-20 | Stop reason: HOSPADM

## 2020-11-13 RX ORDER — ONDANSETRON 2 MG/ML
4 INJECTION INTRAMUSCULAR; INTRAVENOUS
Status: DISCONTINUED | OUTPATIENT
Start: 2020-11-13 | End: 2020-11-20 | Stop reason: HOSPADM

## 2020-11-13 RX ORDER — CARVEDILOL 12.5 MG/1
12.5 TABLET ORAL 2 TIMES DAILY WITH MEALS
Status: DISCONTINUED | OUTPATIENT
Start: 2020-11-13 | End: 2020-11-20 | Stop reason: HOSPADM

## 2020-11-13 RX ORDER — IPRATROPIUM BROMIDE AND ALBUTEROL SULFATE 2.5; .5 MG/3ML; MG/3ML
3 SOLUTION RESPIRATORY (INHALATION)
Status: DISCONTINUED | OUTPATIENT
Start: 2020-11-13 | End: 2020-11-13

## 2020-11-13 RX ORDER — INSULIN GLARGINE 100 [IU]/ML
10 INJECTION, SOLUTION SUBCUTANEOUS DAILY
Status: DISCONTINUED | OUTPATIENT
Start: 2020-11-14 | End: 2020-11-18

## 2020-11-13 RX ORDER — DULOXETIN HYDROCHLORIDE 30 MG/1
60 CAPSULE, DELAYED RELEASE ORAL DAILY
Status: DISCONTINUED | OUTPATIENT
Start: 2020-11-13 | End: 2020-11-20 | Stop reason: HOSPADM

## 2020-11-13 RX ORDER — POLYETHYLENE GLYCOL 3350 17 G/17G
17 POWDER, FOR SOLUTION ORAL DAILY PRN
Status: DISCONTINUED | OUTPATIENT
Start: 2020-11-13 | End: 2020-11-20 | Stop reason: HOSPADM

## 2020-11-13 RX ORDER — ATORVASTATIN CALCIUM 40 MG/1
40 TABLET, FILM COATED ORAL
Status: DISCONTINUED | OUTPATIENT
Start: 2020-11-13 | End: 2020-11-20 | Stop reason: HOSPADM

## 2020-11-13 RX ORDER — INSULIN GLARGINE 100 [IU]/ML
100 INJECTION, SOLUTION SUBCUTANEOUS DAILY
Status: DISCONTINUED | OUTPATIENT
Start: 2020-11-13 | End: 2020-11-13

## 2020-11-13 RX ORDER — DEXTROSE 50 % IN WATER (D50W) INTRAVENOUS SYRINGE
12.5-25 AS NEEDED
Status: DISCONTINUED | OUTPATIENT
Start: 2020-11-13 | End: 2020-11-17 | Stop reason: SDUPTHER

## 2020-11-13 RX ORDER — INSULIN LISPRO 100 [IU]/ML
INJECTION, SOLUTION INTRAVENOUS; SUBCUTANEOUS
Status: DISCONTINUED | OUTPATIENT
Start: 2020-11-13 | End: 2020-11-14

## 2020-11-13 RX ORDER — LIDOCAINE HYDROCHLORIDE 10 MG/ML
10 INJECTION, SOLUTION EPIDURAL; INFILTRATION; INTRACAUDAL; PERINEURAL
Status: COMPLETED | OUTPATIENT
Start: 2020-11-13 | End: 2020-11-13

## 2020-11-13 RX ORDER — MAGNESIUM SULFATE 100 %
4 CRYSTALS MISCELLANEOUS AS NEEDED
Status: DISCONTINUED | OUTPATIENT
Start: 2020-11-13 | End: 2020-11-17 | Stop reason: SDUPTHER

## 2020-11-13 RX ORDER — AMLODIPINE BESYLATE 5 MG/1
5 TABLET ORAL DAILY
Status: DISCONTINUED | OUTPATIENT
Start: 2020-11-13 | End: 2020-11-20 | Stop reason: HOSPADM

## 2020-11-13 RX ORDER — ALBUTEROL SULFATE 2.5 MG/.5ML
SOLUTION RESPIRATORY (INHALATION)
Status: DISPENSED
Start: 2020-11-13 | End: 2020-11-13

## 2020-11-13 RX ORDER — FUROSEMIDE 10 MG/ML
20 INJECTION INTRAMUSCULAR; INTRAVENOUS ONCE
Status: DISCONTINUED | OUTPATIENT
Start: 2020-11-13 | End: 2020-11-13

## 2020-11-13 RX ORDER — FUROSEMIDE 10 MG/ML
60 INJECTION INTRAMUSCULAR; INTRAVENOUS 2 TIMES DAILY
Status: DISCONTINUED | OUTPATIENT
Start: 2020-11-13 | End: 2020-11-17

## 2020-11-13 RX ORDER — LOSARTAN POTASSIUM 25 MG/1
100 TABLET ORAL DAILY
Status: DISCONTINUED | OUTPATIENT
Start: 2020-11-13 | End: 2020-11-17

## 2020-11-13 RX ORDER — SODIUM CHLORIDE 9 MG/ML
250 INJECTION, SOLUTION INTRAVENOUS AS NEEDED
Status: DISCONTINUED | OUTPATIENT
Start: 2020-11-13 | End: 2020-11-17 | Stop reason: SDUPTHER

## 2020-11-13 RX ORDER — ACETAMINOPHEN 325 MG/1
650 TABLET ORAL
Status: DISCONTINUED | OUTPATIENT
Start: 2020-11-13 | End: 2020-11-20 | Stop reason: HOSPADM

## 2020-11-13 RX ADMIN — FUROSEMIDE 60 MG: 10 INJECTION, SOLUTION INTRAMUSCULAR; INTRAVENOUS at 17:36

## 2020-11-13 RX ADMIN — DULOXETINE HYDROCHLORIDE 60 MG: 30 CAPSULE, DELAYED RELEASE ORAL at 10:49

## 2020-11-13 RX ADMIN — IRON SUCROSE 200 MG: 20 INJECTION, SOLUTION INTRAVENOUS at 17:36

## 2020-11-13 RX ADMIN — LOSARTAN POTASSIUM 100 MG: 100 TABLET, FILM COATED ORAL at 10:48

## 2020-11-13 RX ADMIN — FUROSEMIDE 60 MG: 10 INJECTION, SOLUTION INTRAMUSCULAR; INTRAVENOUS at 01:07

## 2020-11-13 RX ADMIN — Medication 16 G: at 16:52

## 2020-11-13 RX ADMIN — FUROSEMIDE 60 MG: 10 INJECTION, SOLUTION INTRAMUSCULAR; INTRAVENOUS at 10:48

## 2020-11-13 RX ADMIN — Medication 10 ML: at 22:37

## 2020-11-13 RX ADMIN — Medication 10 ML: at 17:38

## 2020-11-13 RX ADMIN — Medication 16 G: at 22:37

## 2020-11-13 RX ADMIN — SODIUM CHLORIDE 40 MG: 9 INJECTION, SOLUTION INTRAMUSCULAR; INTRAVENOUS; SUBCUTANEOUS at 10:48

## 2020-11-13 RX ADMIN — DEXTROSE MONOHYDRATE 25 G: 25 INJECTION, SOLUTION INTRAVENOUS at 17:03

## 2020-11-13 RX ADMIN — Medication 10 ML: at 16:53

## 2020-11-13 RX ADMIN — CARVEDILOL 12.5 MG: 12.5 TABLET, FILM COATED ORAL at 10:48

## 2020-11-13 RX ADMIN — INSULIN GLARGINE 100 UNITS: 100 INJECTION, SOLUTION SUBCUTANEOUS at 12:03

## 2020-11-13 RX ADMIN — DEXTROSE MONOHYDRATE 25 G: 25 INJECTION, SOLUTION INTRAVENOUS at 05:46

## 2020-11-13 RX ADMIN — ALBUTEROL SULFATE 5 MG: 2.5 SOLUTION RESPIRATORY (INHALATION) at 00:59

## 2020-11-13 RX ADMIN — LIDOCAINE HYDROCHLORIDE 10 ML: 10 INJECTION, SOLUTION EPIDURAL; INFILTRATION; INTRACAUDAL; PERINEURAL at 09:32

## 2020-11-13 NOTE — TELEPHONE ENCOUNTER
CONSULT    Patient: Shawna Garcia      : 49     Referring Physician: Dr Bethany Marie    Reason:  GE junction stomach mass    Room #:  3056    Nurse: Angel Luis Castorena    Phone: [phone]

## 2020-11-13 NOTE — H&P
Hospitalist Admission Note    NAME: Marge Lea   :  1945   MRN:  [id number]     Date/Time:  2020 11:58 PM    Patient PCP: Rober Otero MD  ______________________________________________________________________  Given the patient's current clinical presentation, I have a high level of concern for decompensation if discharged from the emergency department. Complex decision making was performed, which includes reviewing the patient's available past medical records, laboratory results, and x-ray films. My assessment of this patient's clinical condition and my plan of care is as follows. 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  Malignant versus due to CHF  Acute on chronic systolic CHF POA -EF on last echo was 30 to 35% with mild LVH  Hypertension  Hypercholesterolemia  CAD  Diabetes type 2 insulin-dependent  Hemoglobin 5.7 (down from 8.2 last month)  CT abdomen and pelvis with contrast= negative active bleed, gastric mass lesion unchanged, increased bilateral pleural effusion moderate now    Admit to oncology bed on remote telemetry  Transfuse 1 unit PRBC as ordered in the ED, follow H&H after that unit transfuse further as needed  IV Protonix  Diabetic full liquid diet for now  Fingersticks before every meal and at bedtime, continue Lantus (takes [place] at home) here, SSI lispro as needed  Continue home blood pressure meds-Coreg, losartan, amlodipine  Patient unable to take any antiplatelet agents due to gastric mass which is bleeding  Continue Lipitor  Inpatient oncology consult- Dr Sandra Lucas general surgery consult= Dr Argenis Perez was lost to follow-up after last discharge, never followed up as outpatient as recommended to consider surgical resection and further neoadjuvant chemo plans with Dr. Alfonso Hwang  Patient was seen by cardiology last admission-was not considered for cardiac cath as patient would need to be on dual antiplatelet agent which she cannot take due to actively bleeding gastric mass  Consider palliative care consult if patient refuses recommendations this admission  Taper oxygen as able  Will order Thoracentesis in AM by IR- check fluid for cytology  Consider IP PT/OT eval before DC this admission      Code Status: Full code as per patient's wishes in the ED  Surrogate Decision Maker:     DVT Prophylaxis: SCDs  GI Prophylaxis: On PPI as above    Baseline: Patient is relatively independent with ADLs at home but has recently been more weak due to shortness of breath/ARENAS      Subjective:   CHIEF COMPLAINT: Worsening shortness of breath/ Dyspnea on exertion for the past few days    HISTORY OF PRESENT ILLNESS:     Lauri Mendoza is a 70 y.o.  female who presents with above complaints from home. 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 Patient has since stopped taking her aspirin and Plavix, but was lost to follow-up and has not followed up with general surgeon Dr. Steven Cohen or the oncology for any treatment for her cancer. Patient has seen her stools to be dark for the past few days but no obvious blood. Patient was found with hemoglobin of 5.7 on the blood work in the ED with CT abdomen and pelvis negative for any active bleeding but evident of her gastric mass which is unchanged but increased pleural effusion bilaterally was noted. We were asked to admit for work up and evaluation of the above problems.      Past Medical History:   Diagnosis Date    Anemia, unspecified 10/6/2020    Arrhythmia     Arthritis     CAD (coronary artery disease)  [facility] [address]     Chronic bronchitis ([facility] [address]     per pt:  as of 1/9/15 pt denies any ARENAS or SOB    Diabetes ([facility] [address] Dx approx 2009    Dr Blayne Juarez (in [facility])    GERD (gastroesophageal reflux disease)     Hypercholesteremia     Hypertension         Past Surgical History:   Procedure Laterality Date    HX CHOLECYSTECTOMY  6/12    HX COLONOSCOPY      HX CORONARY STENT PLACEMENT  8/25/2015    HX DILATION AND CURETTAGE      UPPER GI ENDOSCOPY,BIOPSY  10/6/2020            Social History     Tobacco Use    Smoking status: Former Smoker    Smokeless tobacco: Never Used   Substance Use Topics    Alcohol use: No        Family History   Problem Relation Age of Onset    Diabetes Mother     Heart Disease Mother     Hypertension Mother     Stroke Father     Heart Disease Brother     Heart Disease Brother     Stroke Brother     HIV/AIDS Brother      Allergies   Allergen Reactions    Metformin Other (comments)     GI side effects. Not a true allergy        Prior to Admission medications    Medication Sig Start Date End Date Taking? Authorizing Provider   pantoprazole (PROTONIX) 40 mg tablet Take 1 Tab by mouth daily for 30 days. 10/22/20 11/21/20  Beck Conte MD   DULoxetine (CYMBALTA) 60 mg capsule Take 1 Cap by mouth daily. 10/14/20   Delano Rodriguez NP   losartan (COZAAR) 100 mg tablet Take 1 Tab by mouth daily. 10/14/20   Delano Rodriguez NP   carvediloL (COREG) 12.5 mg tablet Take 1 Tab by mouth two (2) times daily (with meals). 10/13/20   Delano Rodriguez NP   amLODIPine (NORVASC) 5 mg tablet Take 1 Tab by mouth daily. 10/14/20   Delano Rodriguez NP   ergocalciferol (ERGOCALCIFEROL) 1,250 mcg (50,000 unit) capsule Take 50,000 Units by mouth every seven (7) days. On Friday    Provider, Historical   torsemide (DEMADEX) 20 mg tablet Take 40 mg by mouth daily.     Provider, Historical   insulin degludec (TRESIBA FLEXTOUCH U-200) 200 unit/mL (3 mL) inpn 108 units daily 1/27/20   Jolanta Workman MD   glipiZIDE (GLUCOTROL) 10 mg tablet Take 1 Tab by mouth two (2) times a day. 1/27/20   Carlos Alberto Ellison MD   atorvastatin (LIPITOR) 40 mg tablet Take 1 Tab by mouth nightly. 1/27/20   Carlos Alberto Ellison MD   aspirin 81 mg chewable tablet Take 1 Tab by mouth daily. 10/26/17   Carlos Alberto Ellison MD       REVIEW OF SYSTEMS:         Total of 12 systems reviewed as follows:       POSITIVE= underlined text  Negative = text not underlined  General:  fever, chills, sweats, generalized weakness, weight loss/gain,      loss of appetite   Eyes:    blurred vision, eye pain, loss of vision, double vision  ENT:    rhinorrhea, pharyngitis   Respiratory:   cough, sputum production, SOB, ARENAS, wheezing, pleuritic pain   Cardiology:   chest pain, palpitations, orthopnea, PND, edema, syncope   Gastrointestinal:  abdominal pain , N/V, diarrhea, dysphagia, constipation, bleeding   Genitourinary:  frequency, urgency, dysuria, hematuria, incontinence   Muskuloskeletal :  arthralgia, myalgia, back pain  Hematology:  easy bruising, nose or gum bleeding, lymphadenopathy   Dermatological: rash, ulceration, pruritis, color change / jaundice  Endocrine:   hot flashes or polydipsia   Neurological:  headache, dizziness, confusion, focal weakness, paresthesia,     Speech difficulties, memory loss, gait difficulty  Psychological: Feelings of anxiety, depression, agitation    Objective:   VITALS:    Visit Vitals  BP (!) 149/61   Pulse 79   Temp 97.3 °F (36.3 °C)   Resp 23   Ht 5' 2\" (1.575 m)   Wt 87.1 kg (192 lb)   SpO2 100%   BMI 35.12 kg/m²       PHYSICAL EXAM:    General:    Alert, cooperative, no distress, appears stated age. HEENT: Atraumatic, anicteric sclerae, pale conjunctivae +     No oral ulcers, mucosa moist, throat clear, dentition fair  Neck:  Supple, symmetrical,  thyroid: non tender  Lungs:   Clear to auscultation bilaterally. No Wheezing or Rhonchi. No rales. Chest wall:  No tenderness  No Accessory muscle use.   Heart: Regular  rhythm,  No  murmur   No edema  Abdomen:   Soft, non-tender. Not distended. Bowel sounds normal  Extremities: No cyanosis. No clubbing,      Skin turgor normal, Capillary refill normal, Radial dial pulse 2+  Skin:     Not pale. Not Jaundiced  No rashes   Psych:  Good insight. Not depressed. Not anxious or agitated. Neurologic: EOMs intact. No facial asymmetry. No aphasia or slurred speech. Symmetrical strength, Sensation grossly intact. Alert and oriented X 4.     _______________________________________________________________________  Care Plan discussed with:    Comments   Patient x    Family      RN x    Care Manager                    Consultant:  shay Maxwell   _______________________________________________________________________  Expected  Disposition:   Home with Family x   HH/PT/OT/RN ?   SNF/LTC    ROSE    ________________________________________________________________________  TOTAL TIME:  64 Minutes    Critical Care Provided     Minutes non procedure based      Comments     Reviewed previous records   >50% of visit spent in counseling and coordination of care  Discussion with patient and/or family and questions answered       ________________________________________________________________________  Signed: Nessa Garcia MD    Procedures: see electronic medical records for all procedures/Xrays and details which were not copied into this note but were reviewed prior to creation of Plan.     LAB DATA REVIEWED:    Recent Results (from the past 24 hour(s))   EKG, 12 LEAD, INITIAL    Collection Time: 11/12/20  4:57 PM   Result Value Ref Range    Ventricular Rate 77 BPM    Atrial Rate 77 BPM    P-R Interval 100 ms    QRS Duration 92 ms    Q-T Interval 424 ms    QTC Calculation (Bezet) 479 ms    Calculated P Axis 47 degrees    Calculated R Axis 16 degrees    Calculated T Axis -127 degrees    Diagnosis       Sinus rhythm with short AK  Left ventricular hypertrophy with repolarization abnormality  When compared with ECG of 04-OCT-2020 12:51,  ST no longer depressed in Anterior leads  T wave inversion now evident in Inferior leads     CBC WITH AUTOMATED DIFF    Collection Time: 11/12/20  8:07 PM   Result Value Ref Range    WBC 11.8 (H) 3.6 - 11.0 K/uL    RBC 2.61 (L) 3.80 - 5.20 M/uL    HGB 5.7 (LL) 11.5 - 16.0 g/dL    HCT 21.1 (L) 35.0 - 47.0 %    MCV 80.8 80.0 - 99.0 FL    MCH 21.8 (L) 26.0 - 34.0 PG    MCHC 27.0 (L) 30.0 - 36.5 g/dL    RDW 17.0 (H) 11.5 - 14.5 %    PLATELET 688 (H) 639 - 400 K/uL    MPV 9.8 8.9 - 12.9 FL    NRBC 0.6 (H) 0  WBC    ABSOLUTE NRBC 0.07 (H) 0.00 - 0.01 K/uL    NEUTROPHILS 83 (H) 32 - 75 %    LYMPHOCYTES 11 (L) 12 - 49 %    MONOCYTES 5 5 - 13 %    EOSINOPHILS 0 0 - 7 %    BASOPHILS 0 0 - 1 %    IMMATURE GRANULOCYTES 1 (H) 0.0 - 0.5 %    ABS. NEUTROPHILS 9.8 (H) 1.8 - 8.0 K/UL    ABS. LYMPHOCYTES 1.3 0.8 - 3.5 K/UL    ABS. MONOCYTES 0.6 0.0 - 1.0 K/UL    ABS. EOSINOPHILS 0.0 0.0 - 0.4 K/UL    ABS. BASOPHILS 0.0 0.0 - 0.1 K/UL    ABS. IMM. GRANS. 0.1 (H) 0.00 - 0.04 K/UL    DF SMEAR SCANNED      RBC COMMENTS MICROCYTOSIS  1+        RBC COMMENTS HYPOCHROMIA  1+        RBC COMMENTS POLYCHROMASIA  PRESENT       METABOLIC PANEL, COMPREHENSIVE    Collection Time: 11/12/20  8:07 PM   Result Value Ref Range    Sodium 142 136 - 145 mmol/L    Potassium 3.9 3.5 - 5.1 mmol/L    Chloride 108 97 - 108 mmol/L    CO2 28 21 - 32 mmol/L    Anion gap 6 5 - 15 mmol/L    Glucose 114 (H) 65 - 100 mg/dL    BUN 16 6 - 20 MG/DL    Creatinine 0.95 0.55 - 1.02 MG/DL    BUN/Creatinine ratio 17 12 - 20      GFR est AA >60 >60 ml/min/1.73m2    GFR est non-AA 58 (L) >60 ml/min/1.73m2    Calcium 8.3 (L) 8.5 - 10.1 MG/DL    Bilirubin, total 0.2 0.2 - 1.0 MG/DL    ALT (SGPT) 16 12 - 78 U/L    AST (SGOT) 17 15 - 37 U/L    Alk.  phosphatase 55 45 - 117 U/L    Protein, total 6.4 6.4 - 8.2 g/dL    Albumin 2.5 (L) 3.5 - 5.0 g/dL    Globulin 3.9 2.0 - 4.0 g/dL    A-G Ratio 0.6 (L) 1.1 - 2.2     TYPE & SCREEN    Collection Time: 11/12/20  8:47 PM   Result Value Ref Range    Crossmatch Expiration 11/15/2020,3756     ABO/Rh(D) A POSITIVE     Antibody screen PENDING     Comment Previously identified anti A1    RBC, ALLOCATE    Collection Time: 11/12/20  9:30 PM   Result Value Ref Range    HISTORY CHECKED?  Historical check performed

## 2020-11-13 NOTE — ED PROVIDER NOTES
EMERGENCY DEPARTMENT HISTORY AND PHYSICAL EXAM      Date: 11/12/2020  Patient Name: Etelvina Wu    History of Presenting Illness     Chief Complaint   Patient presents with    Shortness of Breath     c/o SOB for two months       History Provided By: Patient    HPI: Etelvina Wu, 70 y.o. female  presents to the ED with cc of shortness of breath. Patient states she has had shortness of breath from his 2 months but progressively worse tonight. Especially short of breath with exertion. She was admitted to the hospital 1 month ago and received multiple blood transfusions for a gastric mass that was bleeding. Patient states her stool has been dark but she has not seen any blood. No hematemesis. No abdominal pain. No chest pain. No fevers or chills reported. She is currently not on any anticoagulants or blood thinners. Past History     Past Medical History:  Past Medical History:   Diagnosis Date    Anemia, unspecified 10/6/2020    Arrhythmia     Arthritis     CAD (coronary artery disease)     [facility] ([facility]     Chronic bronchitis ([facility]     per pt:  as of 1/9/15 pt denies any ARENAS or SOB    Diabetes ([facility] Dx approx 2009    Dr Rony Dailey (in [facility])    GERD (gastroesophageal reflux disease)     Hypercholesteremia     Hypertension        Past Surgical History:  Past Surgical History:   Procedure Laterality Date    HX CHOLECYSTECTOMY  6/12    HX COLONOSCOPY      HX CORONARY STENT PLACEMENT  8/25/2015    HX DILATION AND CURETTAGE      UPPER GI ENDOSCOPY,BIOPSY  10/6/2020            Medications:  No current facility-administered medications on file prior to encounter. Current Outpatient Medications on File Prior to Encounter   Medication Sig Dispense Refill    pantoprazole (PROTONIX) 40 mg tablet Take 1 Tab by mouth daily for 30 days. 30 Tab 0    DULoxetine (CYMBALTA) 60 mg capsule Take 1 Cap by mouth daily. 60 Cap 0    losartan (COZAAR) 100 mg tablet Take 1 Tab by mouth daily.  60 Tab 0    carvediloL (COREG) 12.5 mg tablet Take 1 Tab by mouth two (2) times daily (with meals). 60 Tab 0    amLODIPine (NORVASC) 5 mg tablet Take 1 Tab by mouth daily. 60 Tab 0    ergocalciferol (ERGOCALCIFEROL) 1,250 mcg (50,000 unit) capsule Take 50,000 Units by mouth every seven (7) days. On Friday      torsemide (DEMADEX) 20 mg tablet Take 40 mg by mouth daily.  insulin degludec (TRESIBA FLEXTOUCH U-200) 200 unit/mL (3 mL) inpn 108 units daily 18 Pen 3    glipiZIDE (GLUCOTROL) 10 mg tablet Take 1 Tab by mouth two (2) times a day. 180 Tab 3    atorvastatin (LIPITOR) 40 mg tablet Take 1 Tab by mouth nightly. 90 Tab 3    aspirin 81 mg chewable tablet Take 1 Tab by mouth daily. 100 Tab 12       Family History:  Family History   Problem Relation Age of Onset    Diabetes Mother     Heart Disease Mother     Hypertension Mother     Stroke Father     Heart Disease Brother     Heart Disease Brother     Stroke Brother     HIV/AIDS Brother        Social History:  Social History     Tobacco Use    Smoking status: Former Smoker    Smokeless tobacco: Never Used   Substance Use Topics    Alcohol use: No    Drug use: No       Allergies: Allergies   Allergen Reactions    Metformin Other (comments)     GI side effects. Not a true allergy       All the above components of the past  history are auto-populated from the electronic record. They have been reviewed and the patient has been interviewed for any pertinent past history that pertains to the patient's chief complaint and reason for visit. Not all pre-populated components may be accurate at the time this note was generated. Review of Systems   Review of Systems   Constitutional: Negative for chills and fever. HENT: Negative for congestion, ear pain, rhinorrhea, sore throat and trouble swallowing. Eyes: Negative for visual disturbance. Respiratory: Positive for shortness of breath. Negative for cough and chest tightness.     Cardiovascular: Negative for chest pain and palpitations. Gastrointestinal: Negative for abdominal pain, blood in stool, constipation, diarrhea, nausea and vomiting. Genitourinary: Negative for decreased urine volume, difficulty urinating, dysuria and frequency. Musculoskeletal: Negative for back pain and neck pain. Skin: Negative for color change and rash. Neurological: Negative for dizziness, weakness, light-headedness and headaches. Physical Exam   Physical Exam  Vitals signs and nursing note reviewed. Constitutional:       General: She is not in acute distress. Appearance: She is well-developed. She is not ill-appearing. HENT:      Head: Normocephalic. Eyes:      Conjunctiva/sclera: Conjunctivae normal.   Neck:      Musculoskeletal: Normal range of motion. Cardiovascular:      Rate and Rhythm: Normal rate and regular rhythm. Pulmonary:      Effort: Pulmonary effort is normal. No accessory muscle usage or respiratory distress. Abdominal:      General: There is no distension. Skin:     General: Skin is warm and dry. Neurological:      Mental Status: She is alert and oriented to person, place, and time. Due to the COVID-19 pandemic, in order to reduce the spread and transmission of the virus, some basic elements of the physical exam have been deferred to reduce direct or close contact with the patient unless they are deemed to be absolutely necessary, regardless of whether the virus is highly suspected or not.       Diagnostic Study Results     Labs -     Recent Results (from the past 24 hour(s))   EKG, 12 LEAD, INITIAL    Collection Time: 11/12/20  4:57 PM   Result Value Ref Range    Ventricular Rate 77 BPM    Atrial Rate 77 BPM    P-R Interval 100 ms    QRS Duration 92 ms    Q-T Interval 424 ms    QTC Calculation (Bezet) 479 ms    Calculated P Axis 47 degrees    Calculated R Axis 16 degrees    Calculated T Axis -127 degrees    Diagnosis       Sinus rhythm with short GA  Left ventricular hypertrophy with repolarization abnormality  When compared with ECG of 04-OCT-2020 12:51,  ST no longer depressed in Anterior leads  T wave inversion now evident in Inferior leads     CBC WITH AUTOMATED DIFF    Collection Time: 11/12/20  8:07 PM   Result Value Ref Range    WBC 11.8 (H) 3.6 - 11.0 K/uL    RBC 2.61 (L) 3.80 - 5.20 M/uL    HGB 5.7 (LL) 11.5 - 16.0 g/dL    HCT 21.1 (L) 35.0 - 47.0 %    MCV 80.8 80.0 - 99.0 FL    MCH 21.8 (L) 26.0 - 34.0 PG    MCHC 27.0 (L) 30.0 - 36.5 g/dL    RDW 17.0 (H) 11.5 - 14.5 %    PLATELET 216 (H) 655 - 400 K/uL    MPV 9.8 8.9 - 12.9 FL    NRBC 0.6 (H) 0  WBC    ABSOLUTE NRBC 0.07 (H) 0.00 - 0.01 K/uL    NEUTROPHILS 83 (H) 32 - 75 %    LYMPHOCYTES 11 (L) 12 - 49 %    MONOCYTES 5 5 - 13 %    EOSINOPHILS 0 0 - 7 %    BASOPHILS 0 0 - 1 %    IMMATURE GRANULOCYTES 1 (H) 0.0 - 0.5 %    ABS. NEUTROPHILS 9.8 (H) 1.8 - 8.0 K/UL    ABS. LYMPHOCYTES 1.3 0.8 - 3.5 K/UL    ABS. MONOCYTES 0.6 0.0 - 1.0 K/UL    ABS. EOSINOPHILS 0.0 0.0 - 0.4 K/UL    ABS. BASOPHILS 0.0 0.0 - 0.1 K/UL    ABS. IMM. GRANS. 0.1 (H) 0.00 - 0.04 K/UL    DF SMEAR SCANNED      RBC COMMENTS MICROCYTOSIS  1+        RBC COMMENTS HYPOCHROMIA  1+        RBC COMMENTS POLYCHROMASIA  PRESENT       METABOLIC PANEL, COMPREHENSIVE    Collection Time: 11/12/20  8:07 PM   Result Value Ref Range    Sodium 142 136 - 145 mmol/L    Potassium 3.9 3.5 - 5.1 mmol/L    Chloride 108 97 - 108 mmol/L    CO2 28 21 - 32 mmol/L    Anion gap 6 5 - 15 mmol/L    Glucose 114 (H) 65 - 100 mg/dL    BUN 16 6 - 20 MG/DL    Creatinine 0.95 0.55 - 1.02 MG/DL    BUN/Creatinine ratio 17 12 - 20      GFR est AA >60 >60 ml/min/1.73m2    GFR est non-AA 58 (L) >60 ml/min/1.73m2    Calcium 8.3 (L) 8.5 - 10.1 MG/DL    Bilirubin, total 0.2 0.2 - 1.0 MG/DL    ALT (SGPT) 16 12 - 78 U/L    AST (SGOT) 17 15 - 37 U/L    Alk.  phosphatase 55 45 - 117 U/L    Protein, total 6.4 6.4 - 8.2 g/dL    Albumin 2.5 (L) 3.5 - 5.0 g/dL    Globulin 3.9 2.0 - 4.0 g/dL    A-G Ratio 0.6 (L) 1.1 - 2.2         Radiologic Studies -   XR CHEST SNGL V   Final Result   IMPRESSION: Probable mild left basilar atelectasis. CT ABD PELV W WO CONT    (Results Pending)     CT Results  (Last 48 hours)    None        CXR Results  (Last 48 hours)               11/12/20 2040  XR CHEST SNGL V Final result    Impression:  IMPRESSION: Probable mild left basilar atelectasis. Narrative:  INDICATION:   dyspnea        Portable AP semiupright view of the chest.       Direct comparison made to prior chest x-ray dated October 4, 2020. Cardiomediastinal silhouette is stable. The lungs are hypoinflated bilaterally. There is probable mild left basilar atelectasis. No pleural fluid is visualized. There is no pneumothorax. Medical Decision Making     I reviewed the vital signs, available nursing notes, past medical history, past surgical history, family history and social history. Vital Signs-I have reviewed the vital signs that have been made available during the patient's emergency department visit. The vital signs auto-populated below are obtained mostly by electronic means through monitoring devices that have been downloaded into the patient's chart by the nursing staff. Some vital signs are not downloaded into the chart until after the patient has been discharged and this note has been completed, therefore some vital signs may not be available to the physician for review prior to patient's discharge or admission. The physician has reviewed the patient's triage vital signs, monitored the electronic monitoring devices remotely for any significant vital sign abnormalities, and have reviewed vital signs prior to discharge. Some vital signs reviewed at bedside or remotely utilizing electronic monitoring devices may be different than the vital signs downloaded into the electronic medical record.   Some vital signs may be erroneous and inaccurate since they are obtained by electronic monitoring devices, and not all vital signs are verified for accuracy by nursing staff prior to downloading into the patient's chart. Patient Vitals for the past 24 hrs:   Temp Pulse Resp SpO2   11/12/20 1712 97.3 °F (36.3 °C) (!) 103 20 100 %         Records Reviewed: Nursing notes for today's visit have been reviewed. I have also reviewed most recent medical records pertinent to today's complaints, if available in our medical record system. I have also reviewed all labs and imaging results from previous results in comparison to results obtained today. If an EKG was obtained today, it has been compared to previous EKGs, if available. If arriving via EMS, the EMS report has been reviewed if made available to us within the patient's time in the emergency department. Provider Notes (Medical Decision Making):   Patient presents with shortness of breath and has a hemoglobin in the fives. She is symptomatic but hemodynamically stable otherwise. Patient was found to have a gastric mass that was bleeding on her last admission about 1 month ago. I would suspect that this mass may be continuing to bleed but she has not noticed any gross blood in her stool or had any hematemesis. We will transfuse blood and admit to hospitalist.  The hospitalist has recommended a CT of the abdomen to look for any active bleeding that interventional radiology may be able to intervene on. She will need to see gastroenterology tomorrow. ED Course:   Initial assessment performed. The patients presenting problems have been discussed, and they are in agreement with the care plan formulated and outlined with them. I have encouraged them to ask questions as they arise throughout their visit.          Orders Placed This Encounter    XR CHEST SNGL V    CT ABD PELV W WO CONT    CBC WITH AUTOMATED DIFF    METABOLIC PANEL, COMPREHENSIVE    TRANSFUSE PACKED RBC'S, 1 Units    EKG 12 LEAD INITIAL    TYPE & SCREEN    SAMPLE TO BLOOD BANK    RBC, ALLOCATE, 1 Units Date needed for transfusion: 11/12/2020    albuterol-ipratropium (DUO-NEB) 2.5 MG-0.5 MG/3 ML    0.9% sodium chloride infusion 250 mL    sodium chloride (NS) flush 10 mL    iopamidoL (ISOVUE-370) 76 % injection 100 mL       EKG    Date/Time: 11/12/2020 4:57 PM  Performed by: Jordon Kirby MD  Authorized by: Jordon Kirby MD     ECG reviewed by ED Physician in the absence of a cardiologist: yes    Rate:     ECG rate:  77  Rhythm:     Rhythm: sinus rhythm    Ectopy:     Ectopy: none    QRS:     QRS axis:  Normal  Conduction:     Conduction: normal    ST segments:     ST segments:  Normal  T waves:     T waves: normal    Other findings:     Other findings: LVH            Critical Care Time:   I have spent 30 minutes of critical care time in evaluating and treating this patient. This includes time spent at bedside, time with family and decision makers, documentation, review of labs and imaging, and/or consultation with specialists. It does not include time spent on separately billed procedures. This patient presents with a critical illness or injury that acutely impairs one or more vital organ systems such that there is a high probability of imminent or life threatening deterioration in the patient's condition. This case involved decision making of high complexity to assess, manipulate, and support vital organ system failure and/or to prevent further life threatening deterioration of the patient's condition. Failure to initiate these interventions on an urgent basis would likely result in sudden, clinically significant or life threatening deterioration in the patient's condition. Abnormal findings supporting critical care: Symptomatic anemia  Interventions to support critical care: Blood transfusion  Failure to intervene may result in: Respiratory failure and cardiac failure      Disposition:  Admit        Diagnosis     Clinical Impression:   1.  SOB (shortness of breath)    2. Symptomatic anemia    3. Upper GI bleed    4.  Gastric mass

## 2020-11-13 NOTE — ROUTINE PROCESS
4330  Patient transported via stretcher to Radiology Recovery, alert and oriented x 4.    1005 Thoracentesis complete. Patient tolerated without difficulty. Drained 700 cc of clear yellow fluid. 1006  Portable CXR complete. 1010  Patient transported to ED room 36 by Daria Moss.

## 2020-11-13 NOTE — CONSULTS
Consult Date: 11/13/2020    Consults    Subjective        NOTE:  Consult only called at [phone] today but written at 0051      I am asked to consult on this 70 y.o. female for abdominal pain     HPI    She was noted to have a mass in the cardia of the stomach near the GE junction 10/6/2020 after presenting with an UGIB. 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 She has extensive comorbidities and was being tuned up for resection by Dr Dayan Henderson. She now comes in with fatigue and anemia. She was also found to have pleural effusions and had 700 ml of serous fluid removed today from the right pleura.       Past Medical History:   Diagnosis Date    Anemia, unspecified 10/6/2020    Arrhythmia     Arthritis     CAD (coronary artery disease)     [facility] [address]     Chronic bronchitis ([address]     per pt:  as of 1/9/15 pt denies any ARENAS or SOB    Diabetes ([facility] [address] Dx approx 2009    Dr Ariane Contreras (in [facility])    GERD (gastroesophageal reflux disease)     Hypercholesteremia     Hypertension       Past Surgical History:   Procedure Laterality Date    HX CHOLECYSTECTOMY  6/12    HX COLONOSCOPY      HX CORONARY STENT PLACEMENT  8/25/2015    HX DILATION AND CURETTAGE      UPPER GI ENDOSCOPY,BIOPSY  10/6/2020          Family History   Problem Relation Age of Onset    Diabetes Mother     Heart Disease Mother     Hypertension Mother     Stroke Father     Heart Disease Brother     Heart Disease Brother     Stroke Brother     HIV/AIDS Brother       Social History     Tobacco Use    Smoking status: Former Smoker    Smokeless tobacco: Never Used   Substance Use Topics    Alcohol use: No       Current Facility-Administered Medications   Medication Dose Route Frequency Provider Last Rate Last Dose    amLODIPine (NORVASC) tablet 5 mg  5 mg Oral DAILY Jed WALKER MD        atorvastatin (LIPITOR) tablet 40 mg  40 mg Oral QHS Nadege Simons MD        carvediloL (COREG) tablet 12.5 mg  12.5 mg Oral BID WITH MEALS Bryanna Bruce MD   12.5 mg at 11/13/20 1048    DULoxetine (CYMBALTA) capsule 60 mg  60 mg Oral DAILY Delia WALKER MD   60 mg at 11/13/20 1049    insulin glargine (LANTUS) injection 100 Units  100 Units SubCUTAneous DAILY Paul Lees NP   100 Units at 11/13/20 1203    insulin lispro (HUMALOG) injection   SubCUTAneous AC&HS Bryanna Bruce MD   Stopped at 11/13/20 0730    glucose chewable tablet 16 g  4 Tab Oral PRN Bryanna Bruce MD        dextrose (D50W) injection syrg 12.5-25 g  12.5-25 g IntraVENous PRN Delia WALKER MD   25 g at 11/13/20 0546    glucagon (GLUCAGEN) injection 1 mg  1 mg IntraMUSCular PRN Bryanna Bruce MD        losartan (COZAAR) tablet 100 mg  100 mg Oral DAILY Delia WALKER MD   100 mg at 11/13/20 1048    pantoprazole (PROTONIX) 40 mg in 0.9% sodium chloride 10 mL injection  40 mg IntraVENous DAILY Delia WALKER MD   40 mg at 11/13/20 1048    furosemide (LASIX) injection 60 mg  60 mg IntraVENous BID Bryanna Bruce MD   60 mg at 11/13/20 1048    sodium chloride (NS) flush 5-40 mL  5-40 mL IntraVENous Q8H Delia WALKER MD        sodium chloride (NS) flush 5-40 mL  5-40 mL IntraVENous PRN Bryanna Bruce MD        acetaminophen (TYLENOL) tablet 650 mg  650 mg Oral Q6H PRN Bryanna Bruce MD        Or    acetaminophen (TYLENOL) suppository 650 mg  650 mg Rectal Q6H PRN Bryanna Bruce MD        polyethylene glycol (MIRALAX) packet 17 g  17 g Oral DAILY PRN Bryanna Bruce MD        ondansetron (ZOFRAN) injection 4 mg  4 mg IntraVENous Q6H PRN Delia WALKER MD        0.9% sodium chloride infusion 250 mL  250 mL IntraVENous PRN Roderick Delcid MD            Review of Systems   Constitutional: Negative for activity change, appetite change, chills, fatigue and fever. HENT: Negative for congestion, sinus pressure, sore throat, trouble swallowing and voice change.     Eyes: Negative for discharge, itching and visual disturbance. Respiratory: Positive for shortness of breath. Negative for apnea, chest tightness, wheezing and stridor. Cardiovascular: Positive for leg swelling. Negative for chest pain and palpitations. Gastrointestinal: Positive for blood in stool. Negative for abdominal distention, abdominal pain, anal bleeding, constipation, diarrhea, nausea and vomiting. Genitourinary: Negative for difficulty urinating, dysuria, flank pain, frequency and hematuria. Musculoskeletal: Negative for arthralgias, back pain, joint swelling, myalgias, neck pain and neck stiffness. Skin: Negative for color change, pallor and rash. Neurological: Negative for dizziness, seizures, facial asymmetry, speech difficulty, light-headedness, numbness and headaches. Hematological: Negative for adenopathy. Does not bruise/bleed easily. Psychiatric/Behavioral: Negative for agitation, behavioral problems and dysphoric mood. The patient is not nervous/anxious and is not hyperactive. Objective     Vital signs for last 24 hours:  Visit Vitals  BP (!) 146/61 (BP 1 Location: Left arm, BP Patient Position: At rest)   Pulse 72   Temp 97.4 °F (36.3 °C)   Resp 20   Ht 5' 2\" (1.575 m)   Wt 87.1 kg (192 lb)   SpO2 100%   BMI 35.12 kg/m²       Intake/Output this shift:  Current Shift: 11/13 0701 - 11/13 1900  In: -   Out: 1700 [Urine:1000]  Last 3 Shifts: No intake/output data recorded.     Data Review:   Recent Results (from the past 24 hour(s))   EKG, 12 LEAD, INITIAL    Collection Time: 11/12/20  4:57 PM   Result Value Ref Range    Ventricular Rate 77 BPM    Atrial Rate 77 BPM    P-R Interval 100 ms    QRS Duration 92 ms    Q-T Interval 424 ms    QTC Calculation (Bezet) 479 ms    Calculated P Axis 47 degrees    Calculated R Axis 16 degrees    Calculated T Axis -127 degrees    Diagnosis       Sinus rhythm with short [place]  Left ventricular hypertrophy with repolarization abnormality  When compared with ECG of 04-OCT-2020 12:51,  ST no longer depressed in Anterior leads  T wave inversion now evident in Inferior leads  Confirmed by Jaspreet Every, P.V. ([id number]) on 11/13/2020 11:43:03 AM     CBC WITH AUTOMATED DIFF    Collection Time: 11/12/20  8:07 PM   Result Value Ref Range    WBC 11.8 (H) 3.6 - 11.0 K/uL    RBC 2.61 (L) 3.80 - 5.20 M/uL    HGB 5.7 (LL) 11.5 - 16.0 g/dL    HCT 21.1 (L) 35.0 - 47.0 %    MCV 80.8 80.0 - 99.0 FL    MCH 21.8 (L) 26.0 - 34.0 PG    MCHC 27.0 (L) 30.0 - 36.5 g/dL    RDW 17.0 (H) 11.5 - 14.5 %    PLATELET 328 (H) 189 - 400 K/uL    MPV 9.8 8.9 - 12.9 FL    NRBC 0.6 (H) 0  WBC    ABSOLUTE NRBC 0.07 (H) 0.00 - 0.01 K/uL    NEUTROPHILS 83 (H) 32 - 75 %    LYMPHOCYTES 11 (L) 12 - 49 %    MONOCYTES 5 5 - 13 %    EOSINOPHILS 0 0 - 7 %    BASOPHILS 0 0 - 1 %    IMMATURE GRANULOCYTES 1 (H) 0.0 - 0.5 %    ABS. NEUTROPHILS 9.8 (H) 1.8 - 8.0 K/UL    ABS. LYMPHOCYTES 1.3 0.8 - 3.5 K/UL    ABS. MONOCYTES 0.6 0.0 - 1.0 K/UL    ABS. EOSINOPHILS 0.0 0.0 - 0.4 K/UL    ABS. BASOPHILS 0.0 0.0 - 0.1 K/UL    ABS. IMM. GRANS. 0.1 (H) 0.00 - 0.04 K/UL    DF SMEAR SCANNED      RBC COMMENTS MICROCYTOSIS  1+        RBC COMMENTS HYPOCHROMIA  1+        RBC COMMENTS POLYCHROMASIA  PRESENT       METABOLIC PANEL, COMPREHENSIVE    Collection Time: 11/12/20  8:07 PM   Result Value Ref Range    Sodium 142 136 - 145 mmol/L    Potassium 3.9 3.5 - 5.1 mmol/L    Chloride 108 97 - 108 mmol/L    CO2 28 21 - 32 mmol/L    Anion gap 6 5 - 15 mmol/L    Glucose 114 (H) 65 - 100 mg/dL    BUN 16 6 - 20 MG/DL    Creatinine 0.95 0.55 - 1.02 MG/DL    BUN/Creatinine ratio 17 12 - 20      GFR est AA >60 >60 ml/min/1.73m2    GFR est non-AA 58 (L) >60 ml/min/1.73m2    Calcium 8.3 (L) 8.5 - 10.1 MG/DL    Bilirubin, total 0.2 0.2 - 1.0 MG/DL    ALT (SGPT) 16 12 - 78 U/L    AST (SGOT) 17 15 - 37 U/L    Alk.  phosphatase 55 45 - 117 U/L    Protein, total 6.4 6.4 - 8.2 g/dL    Albumin 2.5 (L) 3.5 - 5.0 g/dL    Globulin 3.9 2.0 - 4.0 g/dL    A-G Ratio 0.6 (L) 1.1 - 2.2     TYPE & SCREEN    Collection Time: 11/12/20  8:47 PM   Result Value Ref Range    Crossmatch Expiration 11/15/2020,2359     ABO/Rh(D) A POSITIVE     Antibody screen NEG     Comment Previously identified anti A1     Unit number [id number]     Blood component type RC LR,2     Unit division 00     Status of unit ALLOCATED     Crossmatch result Compatible     Unit number [id number]     Blood component type RC LR     Unit division 00     Status of unit ALLOCATED     Crossmatch result Compatible     Unit number [id number]     Blood component type RC LR     Unit division 00     Status of unit ISSUED     Crossmatch result Compatible    RBC, ALLOCATE    Collection Time: 11/12/20  9:30 PM   Result Value Ref Range    HISTORY CHECKED?  Historical check performed    MAGNESIUM    Collection Time: 11/13/20  4:34 AM   Result Value Ref Range    Magnesium 2.4 1.6 - 2.4 mg/dL   METABOLIC PANEL, BASIC    Collection Time: 11/13/20  4:34 AM   Result Value Ref Range    Sodium 144 136 - 145 mmol/L    Potassium 3.8 3.5 - 5.1 mmol/L    Chloride 111 (H) 97 - 108 mmol/L    CO2 29 21 - 32 mmol/L    Anion gap 4 (L) 5 - 15 mmol/L    Glucose 37 (LL) 65 - 100 mg/dL    BUN 14 6 - 20 MG/DL    Creatinine 0.87 0.55 - 1.02 MG/DL    BUN/Creatinine ratio 16 12 - 20      GFR est AA >60 >60 ml/min/1.73m2    GFR est non-AA >60 >60 ml/min/1.73m2    Calcium 8.3 (L) 8.5 - 10.1 MG/DL   GLUCOSE, POC    Collection Time: 11/13/20  5:41 AM   Result Value Ref Range    Glucose (POC) 42 (LL) 65 - 100 mg/dL    Performed by Samara LARSON    GLUCOSE, POC    Collection Time: 11/13/20  6:05 AM   Result Value Ref Range    Glucose (POC) 131 (H) 65 - 100 mg/dL    Performed by Lindsey Marinelli (PCT)    GLUCOSE, POC    Collection Time: 11/13/20  7:33 AM   Result Value Ref Range    Glucose (POC) 119 (H) 65 - 100 mg/dL    Performed by Chata LARSON    CBC W/O DIFF    Collection Time: 11/13/20  8:27 AM   Result Value Ref Range    WBC 14.0 (H) 3.6 - 11.0 K/uL    RBC 2.91 (L) 3.80 - 5.20 M/uL    HGB 6.9 (L) 11.5 - 16.0 g/dL    HCT 23.7 (L) 35.0 - 47.0 %    MCV 81.4 80.0 - 99.0 FL    MCH 23.7 (L) 26.0 - 34.0 PG    MCHC 29.1 (L) 30.0 - 36.5 g/dL    RDW 16.9 (H) 11.5 - 14.5 %    PLATELET 421 735 - 924 K/uL    MPV 9.8 8.9 - 12.9 FL    NRBC 0.4 (H) 0  WBC    ABSOLUTE NRBC 0.05 (H) 0.00 - 0.01 K/uL   CELL COUNT, BODY FLUID    Collection Time: 11/13/20  9:38 AM   Result Value Ref Range    BODY FLUID TYPE PLEURAL FLUID      FLUID COLOR YELLOW      FLUID APPEARANCE HAZY      FLUID RBC CT. >100 (H) 0 /cu mm    FLUID NUCLEATED CELLS 458 /cu mm    FLD NEUTROPHILS 1 (A) NRRE %    FLD LYMPHS 73 (A) NRRE %    FLD MONO/MACROPHAGES 24 (A) NRRE %    FLUID MESOTHELIAL 2 (A) NRRE %   SAMPLES BEING HELD    Collection Time: 11/13/20  9:40 AM   Result Value Ref Range    SAMPLES BEING HELD  1 AMAC     COMMENT        Add-on orders for these samples will be processed based on acceptable specimen integrity and analyte stability, which may vary by analyte. Physical Exam  Constitutional:       General: She is not in acute distress. Appearance: She is well-developed. She is not diaphoretic. HENT:      Head: Normocephalic and atraumatic. Mouth/Throat:      Pharynx: No oropharyngeal exudate. Eyes:      General: No scleral icterus. Conjunctiva/sclera: Conjunctivae normal.      Pupils: Pupils are equal, round, and reactive to light. Neck:      Musculoskeletal: Normal range of motion and neck supple. Thyroid: No thyromegaly. Vascular: No JVD. Trachea: No tracheal deviation. Cardiovascular:      Rate and Rhythm: Normal rate and regular rhythm. Heart sounds: No murmur. No friction rub. No gallop. Pulmonary:      Effort: Pulmonary effort is normal. No respiratory distress. Breath sounds: Normal breath sounds. No wheezing or rales. Abdominal:      General: Bowel sounds are normal. There is no distension. Palpations: Abdomen is soft. There is no mass. Tenderness: There is no abdominal tenderness. There is no guarding or rebound. Musculoskeletal: Normal range of motion. Right lower leg: Edema present. Left lower leg: Edema present. Lymphadenopathy:      Cervical: No cervical adenopathy. Skin:     General: Skin is warm and dry. Coloration: Skin is not pale. Findings: No erythema or rash. Neurological:      Mental Status: She is alert and oriented to person, place, and time. Cranial Nerves: No cranial nerve deficit. Psychiatric:         Behavior: Behavior normal.         Thought Content: Thought content normal.         Judgment: Judgment normal.         IMPRESSION/PLAN:    1. Gastric mass suggestive of GIST. Ultimately needs resection but is at high risk and needs optimization prior to surgery  2. CHF/CAD.    POA. Needs diuresis  3. Chronic blood loss anemia-does not appear to be actively bleeding  Transfuse for Hgb < 7. Received 1 unit PRBC  4. Agree with full liquids with nutrition supplements  5. Severe malnutrition albumin 2.5  6. Chronic deconditioning  7. IDDM type 2. SSI  8.   Dr Omar Durbin will see Monday and our associate will see over the weekend     Carlton Carolina MD FACS

## 2020-11-13 NOTE — PROGRESS NOTES
1636- Pt's blood glucose was 69.    1638- Gave 2 cups of apple juice with sugar added.      1647- Blood glucose 60    1650- Gave 4 glucose tablets    1701- Blood glucose 61     1703-  25g d50w given    1706- Blood glucose- 155

## 2020-11-13 NOTE — ED NOTES
0730 Bedside shift change report given to Johnathon  (oncoming nurse) by Quin Echeverria  (offgoing nurse). Report included the following information SBAR, Kardex and ED Summary     1130 pt returned from IR.     1319 TRANSFER - OUT REPORT:    Verbal report given to Laura (name) on Eleno Macdonald  being transferred to Oncology (unit) for routine progression of care       Report consisted of patients Situation, Background, Assessment and   Recommendations(SBAR). Information from the following report(s) SBAR, Kardex and ED Summary was reviewed with the receiving nurse. Lines:   Peripheral IV 11/12/20 Right Antecubital (Active)   Site Assessment Clean, dry, & intact 11/12/20 2011   Phlebitis Assessment 0 11/12/20 2011   Infiltration Assessment 0 11/12/20 2011   Dressing Status Clean, dry, & intact 11/12/20 2011   Dressing Type Transparent 11/12/20 2011   Hub Color/Line Status Capped;Flushed;Patent 11/12/20 2011   Action Taken Blood drawn 11/12/20 2011   Alcohol Cap Used Yes 11/12/20 2011        Opportunity for questions and clarification was provided. Patient transported with:   O2 @ 2 liters  Registered Nurse          .

## 2020-11-13 NOTE — ROUTINE PROCESS
TRANSFER - OUT REPORT:    Verbal report given to Johnathon RN(name) on Balta Waterman  being transferred to ED 36(unit) for routine post - op       Report consisted of patients Situation, Background, Assessment and   Recommendations(SBAR). Information from the following report(s) SBAR was reviewed with the receiving nurse. Lines:   Peripheral IV 11/12/20 Right Antecubital (Active)   Site Assessment Clean, dry, & intact 11/12/20 2011   Phlebitis Assessment 0 11/12/20 2011   Infiltration Assessment 0 11/12/20 2011   Dressing Status Clean, dry, & intact 11/12/20 2011   Dressing Type Transparent 11/12/20 2011   Hub Color/Line Status Capped;Flushed;Patent 11/12/20 2011   Action Taken Blood drawn 11/12/20 2011   Alcohol Cap Used Yes 11/12/20 2011        Opportunity for questions and clarification was provided.       Patient transported with:   O2 @ 2 liters

## 2020-11-13 NOTE — PROGRESS NOTES
Bedside shift change report given to Kimmy (oncoming nurse) by Mino Lama (offgoing nurse). Report included the following information SBAR, Kardex, Intake/Output and MAR. Pt admitted to the unit from ED. Pt's blood sugars have been very low: in 30's in ED, and 69 on the unit. See previous note for actions taken. Jaswinder Mendez was notified. All scheduled meds given per [facility]. Pt education and hourly rounding completed.

## 2020-11-13 NOTE — PROGRESS NOTES
Hospitalist Progress Note    NAME: Misa Iraheta   :  1945   MRN:  [id number]     I reviewed pertinent labs and imaging, and discussed /agreed on the plan of care with Dr. Rodrick Leonardo. Assessment / Plan:  Symptomatic anemia POA   anemia of chronic blood loss POA   upper GI bleed from GIST tumor at GE junction POA  CT abdomen and pelvis with contrast= negative active bleed, gastric mass lesion unchanged, increased bilateral pleural effusion moderate now  Hemoglobin 5.7 (down from 8.2 last month)   Received one unit of PRBC's in the ED  Repeat hgB 6.9  2nd unit ordered  Repeat H&H after transfusion  Bilateral pleural effusions (moderate) POA-? 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 Code status: Full  Prophylaxis: SCD's  Recommended Disposition: Home w/Family     Subjective:     Chief Complaint / Reason for Physician Visit  Patient in good spirits. Voices no complaints at this time. Discussed with RN events overnight. Review of Systems:  Symptom Y/N Comments  Symptom Y/N Comments   Fever/Chills n   Chest Pain n    Poor Appetite y   Edema n    Cough n   Abdominal Pain     Sputum n   Joint Pain     SOB/ARENAS y   Pruritis/Rash n    Nausea/vomit n   Tolerating PT/OT     Diarrhea    Tolerating Diet y    Constipation    Other       Could NOT obtain due to:      Objective:     VITALS:   Last 24hrs VS reviewed since prior progress note.  Most recent are:  Patient Vitals for the past 24 hrs:   Temp Pulse Resp BP SpO2   20 1506 97.9 °F (36.6 °C) 89 18 100/85 100 %   11/13/20 1300 97.4 °F (36.3 °C) 72 20 (!) 146/61 100 %   11/13/20 1115 -- 76 26 (!) 146/56 100 %   11/13/20 1100 -- 77 22 (!) 142/61 100 %   11/13/20 1048 -- 81 -- 134/66 --   11/13/20 1045 -- 80 21 134/66 100 %   11/13/20 1000 -- 78 20 (!) 145/71 100 %   11/13/20 0950 -- 88 20 (!) 154/53 100 %   11/13/20 0945 -- 87 22 (!) 170/72 100 %   11/13/20 0932 -- 89 22 (!) 175/78 100 %   11/13/20 0845 -- 78 22 (!) 144/67 100 %   11/13/20 0815 -- 80 23 (!) 152/64 100 %   11/13/20 0800 -- 78 20 (!) 150/63 100 %   11/13/20 0745 -- 82 23 (!) 148/63 100 %   11/13/20 0730 -- 80 22 (!) 149/60 --   11/13/20 0715 -- 79 24 (!) 157/65 100 %   11/13/20 0700 -- 79 24 (!) 158/64 100 %   11/13/20 0600 -- 84 21 (!) 144/60 100 %   11/13/20 0545 -- 90 26 (!) 172/73 100 %   11/13/20 0530 -- 85 28 (!) 154/60 100 %   11/13/20 0515 -- 84 25 (!) 149/71 100 %   11/13/20 0500 -- 83 22 (!) 153/92 100 %   11/13/20 0445 -- 87 24 (!) 155/64 95 %   11/13/20 0430 99.2 °F (37.3 °C) 88 27 (!) 156/68 94 %   11/13/20 0415 -- 83 23 (!) 147/63 97 %   11/13/20 0400 -- 82 20 (!) 151/66 97 %   11/13/20 0345 -- 81 19 (!) 160/65 95 %   11/13/20 0330 -- 82 21 (!) 148/67 95 %   11/13/20 0315 -- 83 21 (!) 147/63 96 %   11/13/20 0300 -- 81 21 (!) 152/64 95 %   11/13/20 0245 -- 80 20 (!) 147/62 97 %   11/13/20 0230 -- 79 18 (!) 150/71 100 %   11/13/20 0215 -- 77 21 (!) 155/60 100 %   11/13/20 0200 98.1 °F (36.7 °C) 79 21 (!) 148/67 100 %   11/13/20 0145 -- 79 23 (!) 145/100 --   11/13/20 0140 98.2 °F (36.8 °C) 87 -- (!) 145/80 100 %   11/13/20 0130 -- 85 26 (!) 140/61 99 %   11/13/20 0115 98.1 °F (36.7 °C) 91 29 (!) 164/60 100 %   11/12/20 2230 -- 79 23 (!) 149/61 100 %   11/12/20 2215 -- 81 23 (!) 162/59 100 %   11/12/20 2200 -- 83 22 (!) 155/68 100 %   11/12/20 2145 -- 86 26 (!) 161/60 --   11/12/20 2130 -- 81 25 (!) 156/55 100 %       Intake/Output Summary (Last 24 hours) at 11/13/2020 1850  Last data filed at 11/13/2020 7176  Gross per 24 hour   Intake --   Output 1700 ml   Net -1700 ml        PHYSICAL EXAM:  General: Alert, cooperative, no acute distress    EENT:  Anicteric sclerae. normocephalic  Resp:  CTA bilaterally, no wheezing or rales. No accessory muscle use  CV:  Regular  rhythm,  No edema  GI:  Soft, Non distended, Non tender.  hypoactive Bowel sounds  Neurologic:  Alert and oriented X 3, normal speech,   Psych:   Good insight. Not anxious nor agitated  Skin:  No rashes. No jaundice    Reviewed most current lab test results and cultures  YES  Reviewed most current radiology test results   YES  Review and summation of old records today    NO  Reviewed patient's current orders and MAR    YES  PMH/SH reviewed - no change compared to H&P  ________________________________________________________________________  Care Plan discussed with:    Comments   Patient y    Family      RN y    Care Manager     Consultant                        Multidiciplinary team rounds were held today with , nursing, pharmacist and clinical coordinator. Patient's plan of care was discussed; medications were reviewed and discharge planning was addressed. ________________________________________________________________________    ________________________________________________________________________  Treva Morales NP     Procedures: see electronic medical records for all procedures/Xrays and details which were not copied into this note but were reviewed prior to creation of Plan. LABS:  I reviewed today's most current labs and imaging studies.   Pertinent labs include:  Recent Labs     11/13/20  0827 11/12/20 2007   WBC 14.0* 11.8*   HGB 6.9* 5.7*   HCT 23.7* 21.1*    408*     Recent Labs     11/13/20  0434 11/12/20 2007    142   K 3.8 3.9   * 108   CO2 29 28   GLU 37* 114*   BUN 14 16   CREA 0.87 0.95   CA 8.3* 8.3*   MG 2.4  --    ALB  --  2.5*   TBILI  --  0.2   ALT  --  16       Signed: Treva Morales, NP

## 2020-11-13 NOTE — CONSULTS
2001 [facility]  at [address] [place] [address], [address] [facility]  [phone]       Oncology Consultation Note        Patient: Mikel Bauman MRN: [id number]  SSN: [id number]    YOB: 1945  Age: 70 y.o. Sex: female        Reason for Consultation:      1. GIST  2. Iron deficiency anemia    Subjective:      Mikel Bauman is a 70 y.o. female who I am seeing for a new diagnosis of GIST and iron deficiency anemia. 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 She has seen by Gen Surgery and will likely undergo a partial gastrectomy. Chronic bleeding from the mass has led to iron deficiency anemia. She feels weak and tired. She also has multiple other medical problems including pleural effusion.        Review of Systems:    Constitutional: positive for fatigue  Eyes: negative  Ears, Nose, Mouth, Throat, and Face: negative  Respiratory: negative  Cardiovascular: negative  Gastrointestinal: positive for melena  Genitourinary:negative  Integument/Breast: negative  Hematologic/Lymphatic: negative  Musculoskeletal:negative  Neurological: negative    Past Medical History:   Diagnosis Date    Anemia, unspecified 10/6/2020    Arrhythmia     Arthritis     CAD (coronary artery disease)     Cancer ([address]     Chronic bronchitis ([address]     per pt:  as of 1/9/15 pt denies any ARENAS or SOB    Diabetes ([address] Dx approx 2009    Dr Blayne Juarez (in connect care)    GERD (gastroesophageal reflux disease)     Hypercholesteremia     Hypertension      Past Surgical History:   Procedure Laterality Date    HX CHOLECYSTECTOMY  6/12    HX COLONOSCOPY      HX CORONARY STENT PLACEMENT  8/25/2015    HX DILATION AND CURETTAGE      UPPER GI ENDOSCOPY,BIOPSY  10/6/2020           Family History   Problem Relation Age of Onset    Diabetes Mother     Heart Disease Mother     Hypertension Mother     Stroke Father     Heart Disease Brother     Heart Disease Brother     Stroke Brother     HIV/AIDS Brother      Social History     Tobacco Use    Smoking status: Former Smoker    Smokeless tobacco: Never Used   Substance Use Topics    Alcohol use: No      Prior to Admission medications    Medication Sig Start Date End Date Taking? Authorizing Provider   pantoprazole (PROTONIX) 40 mg tablet Take 1 Tab by mouth daily for 30 days. 10/22/20 11/21/20  Pablo Aleman MD   DULoxetine (CYMBALTA) 60 mg capsule Take 1 Cap by mouth daily. 10/14/20   Damien Hamm NP   losartan (COZAAR) 100 mg tablet Take 1 Tab by mouth daily. 10/14/20   Damien Hamm NP   carvediloL (COREG) 12.5 mg tablet Take 1 Tab by mouth two (2) times daily (with meals). 10/13/20   Damien Hamm NP   amLODIPine (NORVASC) 5 mg tablet Take 1 Tab by mouth daily. 10/14/20   Damien Hamm NP   ergocalciferol (ERGOCALCIFEROL) 1,250 mcg (50,000 unit) capsule Take 50,000 Units by mouth every seven (7) days. On Friday    Provider, Historical   torsemide (DEMADEX) 20 mg tablet Take 40 mg by mouth daily. Provider, Historical   insulin degludec (TRESIBA FLEXTOUCH U-200) 200 unit/mL (3 mL) inpn 108 units daily 1/27/20   Edmond Szymanski MD   glipiZIDE (GLUCOTROL) 10 mg tablet Take 1 Tab by mouth two (2) times a day. 1/27/20   Edmond Szymanski MD   atorvastatin (LIPITOR) 40 mg tablet Take 1 Tab by mouth nightly. 1/27/20   Edmond Szymanski MD   aspirin 81 mg chewable tablet Take 1 Tab by mouth daily. 10/26/17   Edmond Szymanski MD              Allergies   Allergen Reactions    Metformin Other (comments)     GI side effects.  Not a true allergy           Objective:     Vitals:    11/13/20 1100 11/13/20 1115 11/13/20 1300 11/13/20 1506   BP: (!) 142/61 (!) 146/56 (!) 146/61 100/85   Pulse: 77 76 72 89   Resp: 22 26 20 18   Temp:   97.4 °F (36.3 °C) 97.9 °F (36.6 °C)   SpO2: 100% 100% 100% 100%   Weight:       Height:                Physical Exam:  GENERAL: alert, cooperative, no distress, appears stated age  EYE: conjunctivae/corneas clear. PERRL, EOM's intact  LYMPHATIC: Cervical, supraclavicular, and axillary nodes normal.   THROAT & NECK: normal and no erythema or exudates noted. LUNG: clear to auscultation bilaterally  HEART: regular rate and rhythm, no murmur  ABDOMEN: soft, non-tender palpation  EXTREMITIES: no cyanosis or edema  SKIN: Normal, no rash or ecchymosis  NEUROLOGIC: AOx3. Gait normal. Reflexes and motor strength normal and symmetric. Cranial nerves 2-12 and sensation grossly intact. Lab Results   Component Value Date/Time    WBC 14.0 (H) 11/13/2020 08:27 AM    HGB 6.9 (L) 11/13/2020 08:27 AM    HCT 23.7 (L) 11/13/2020 08:27 AM    PLATELET 458 59/76/9090 08:27 AM    MCV 81.4 11/13/2020 08:27 AM       Lab Results   Component Value Date/Time    Sodium 144 11/13/2020 04:34 AM    Potassium 3.8 11/13/2020 04:34 AM    Chloride 111 (H) 11/13/2020 04:34 AM    CO2 29 11/13/2020 04:34 AM    Anion gap 4 (L) 11/13/2020 04:34 AM    Glucose 37 (LL) 11/13/2020 04:34 AM    BUN 14 11/13/2020 04:34 AM    Creatinine 0.87 11/13/2020 04:34 AM    BUN/Creatinine ratio 16 11/13/2020 04:34 AM    GFR est AA >60 11/13/2020 04:34 AM    GFR est non-AA >60 11/13/2020 04:34 AM    Calcium 8.3 (L) 11/13/2020 04:34 AM    Bilirubin, total 0.2 11/12/2020 08:07 PM    Alk. phosphatase 55 11/12/2020 08:07 PM    Protein, total 6.4 11/12/2020 08:07 PM    Albumin 2.5 (L) 11/12/2020 08:07 PM    Globulin 3.9 11/12/2020 08:07 PM    A-G Ratio 0.6 (L) 11/12/2020 08:07 PM    ALT (SGPT) 16 11/12/2020 08:07 PM    AST (SGOT) 17 11/12/2020 08:07 PM         Assessment:     1. Gastrointestinal tumor of the stomach    I spent 65 minute with the patient in a face-to-face encounter. I explained her the stage of the disease, pathophysiology of the disease and the treatment approaches. I answered all her questions. More than 50% of the time was utilized in education, counseling and co-ordination of care.      Small   Resectable   Low risk of recurrence  Likely no role of adjuvant therapy post surgery      2. Iron deficiency anemia    IV iron      Plan:       1. IV iron  2.  Follow up out patient after surgery      Signed By: Cony Reeves MD     November 13, 2020

## 2020-11-14 LAB
ERYTHROCYTE [DISTWIDTH] IN BLOOD BY AUTOMATED COUNT: 16.8 % (ref 11.5–14.5)
GLUCOSE BLD STRIP.AUTO-MCNC: 115 MG/DL (ref 65–100)
GLUCOSE BLD STRIP.AUTO-MCNC: 122 MG/DL (ref 65–100)
GLUCOSE BLD STRIP.AUTO-MCNC: 26 MG/DL (ref 65–100)
GLUCOSE BLD STRIP.AUTO-MCNC: 26 MG/DL (ref 65–100)
GLUCOSE BLD STRIP.AUTO-MCNC: 51 MG/DL (ref 65–100)
GLUCOSE BLD STRIP.AUTO-MCNC: 65 MG/DL (ref 65–100)
GLUCOSE BLD STRIP.AUTO-MCNC: 77 MG/DL (ref 65–100)
GLUCOSE BLD STRIP.AUTO-MCNC: 81 MG/DL (ref 65–100)
GLUCOSE BLD STRIP.AUTO-MCNC: 88 MG/DL (ref 65–100)
GLUCOSE BLD STRIP.AUTO-MCNC: 90 MG/DL (ref 65–100)
GLUCOSE BLD STRIP.AUTO-MCNC: 96 MG/DL (ref 65–100)
HCT VFR BLD AUTO: 28.3 % (ref 35–47)
HGB BLD-MCNC: 8.4 G/DL (ref 11.5–16)
MCH RBC QN AUTO: 24.4 PG (ref 26–34)
MCHC RBC AUTO-ENTMCNC: 29.7 G/DL (ref 30–36.5)
MCV RBC AUTO: 82.3 FL (ref 80–99)
NRBC # BLD: 0.19 K/UL (ref 0–0.01)
NRBC BLD-RTO: 1.4 PER 100 WBC
PLATELET # BLD AUTO: 295 K/UL (ref 150–400)
PMV BLD AUTO: 10.4 FL (ref 8.9–12.9)
RBC # BLD AUTO: 3.44 M/UL (ref 3.8–5.2)
SERVICE CMNT-IMP: ABNORMAL
SERVICE CMNT-IMP: NORMAL
WBC # BLD AUTO: 13.7 K/UL (ref 3.6–11)

## 2020-11-14 PROCEDURE — 74011000250 HC RX REV CODE- 250: Performed by: NURSE PRACTITIONER

## 2020-11-14 PROCEDURE — 74011000258 HC RX REV CODE- 258: Performed by: NURSE PRACTITIONER

## 2020-11-14 PROCEDURE — 82962 GLUCOSE BLOOD TEST: CPT

## 2020-11-14 PROCEDURE — 99231 SBSQ HOSP IP/OBS SF/LOW 25: CPT | Performed by: SURGERY

## 2020-11-14 PROCEDURE — 65270000015 HC RM PRIVATE ONCOLOGY

## 2020-11-14 PROCEDURE — 94760 N-INVAS EAR/PLS OXIMETRY 1: CPT

## 2020-11-14 PROCEDURE — 74011250637 HC RX REV CODE- 250/637: Performed by: INTERNAL MEDICINE

## 2020-11-14 PROCEDURE — 77010033678 HC OXYGEN DAILY

## 2020-11-14 PROCEDURE — 74011250636 HC RX REV CODE- 250/636: Performed by: INTERNAL MEDICINE

## 2020-11-14 PROCEDURE — 74011250636 HC RX REV CODE- 250/636: Performed by: NURSE PRACTITIONER

## 2020-11-14 PROCEDURE — 36415 COLL VENOUS BLD VENIPUNCTURE: CPT

## 2020-11-14 PROCEDURE — P9016 RBC LEUKOCYTES REDUCED: HCPCS

## 2020-11-14 PROCEDURE — 77030038269 HC DRN EXT URIN PURWCK BARD -A

## 2020-11-14 PROCEDURE — 74011000250 HC RX REV CODE- 250: Performed by: INTERNAL MEDICINE

## 2020-11-14 PROCEDURE — 36430 TRANSFUSION BLD/BLD COMPNT: CPT

## 2020-11-14 PROCEDURE — 85027 COMPLETE CBC AUTOMATED: CPT

## 2020-11-14 PROCEDURE — C9113 INJ PANTOPRAZOLE SODIUM, VIA: HCPCS | Performed by: INTERNAL MEDICINE

## 2020-11-14 RX ORDER — DEXTROSE 50 % IN WATER (D50W) INTRAVENOUS SYRINGE
12.5-25 AS NEEDED
Status: DISCONTINUED | OUTPATIENT
Start: 2020-11-14 | End: 2020-11-20 | Stop reason: HOSPADM

## 2020-11-14 RX ORDER — DEXTROSE MONOHYDRATE AND SODIUM CHLORIDE 5; .45 G/100ML; G/100ML
50 INJECTION, SOLUTION INTRAVENOUS CONTINUOUS
Status: DISCONTINUED | OUTPATIENT
Start: 2020-11-14 | End: 2020-11-14

## 2020-11-14 RX ORDER — MAGNESIUM SULFATE 100 %
4 CRYSTALS MISCELLANEOUS AS NEEDED
Status: DISCONTINUED | OUTPATIENT
Start: 2020-11-14 | End: 2020-11-20 | Stop reason: HOSPADM

## 2020-11-14 RX ORDER — INSULIN LISPRO 100 [IU]/ML
INJECTION, SOLUTION INTRAVENOUS; SUBCUTANEOUS
Status: DISCONTINUED | OUTPATIENT
Start: 2020-11-14 | End: 2020-11-20 | Stop reason: HOSPADM

## 2020-11-14 RX ADMIN — FUROSEMIDE 60 MG: 10 INJECTION, SOLUTION INTRAMUSCULAR; INTRAVENOUS at 11:11

## 2020-11-14 RX ADMIN — DEXTROSE MONOHYDRATE 25 G: 25 INJECTION, SOLUTION INTRAVENOUS at 04:30

## 2020-11-14 RX ADMIN — DEXTROSE MONOHYDRATE 12.5 G: 25 INJECTION, SOLUTION INTRAVENOUS at 03:06

## 2020-11-14 RX ADMIN — IRON SUCROSE 200 MG: 20 INJECTION, SOLUTION INTRAVENOUS at 18:39

## 2020-11-14 RX ADMIN — SODIUM CHLORIDE: 234 INJECTION, SOLUTION INTRAVENOUS at 15:14

## 2020-11-14 RX ADMIN — Medication 10 ML: at 21:56

## 2020-11-14 RX ADMIN — AMLODIPINE BESYLATE 5 MG: 5 TABLET ORAL at 11:10

## 2020-11-14 RX ADMIN — CARVEDILOL 12.5 MG: 12.5 TABLET, FILM COATED ORAL at 18:40

## 2020-11-14 RX ADMIN — DULOXETINE HYDROCHLORIDE 60 MG: 30 CAPSULE, DELAYED RELEASE ORAL at 11:11

## 2020-11-14 RX ADMIN — FUROSEMIDE 60 MG: 10 INJECTION, SOLUTION INTRAMUSCULAR; INTRAVENOUS at 18:39

## 2020-11-14 RX ADMIN — Medication 10 ML: at 15:18

## 2020-11-14 RX ADMIN — ATORVASTATIN CALCIUM 40 MG: 40 TABLET, FILM COATED ORAL at 21:56

## 2020-11-14 RX ADMIN — LOSARTAN POTASSIUM 100 MG: 100 TABLET, FILM COATED ORAL at 11:11

## 2020-11-14 RX ADMIN — CARVEDILOL 12.5 MG: 12.5 TABLET, FILM COATED ORAL at 11:11

## 2020-11-14 RX ADMIN — SODIUM CHLORIDE 40 MG: 9 INJECTION, SOLUTION INTRAMUSCULAR; INTRAVENOUS; SUBCUTANEOUS at 11:11

## 2020-11-14 NOTE — PROGRESS NOTES
Admit Date: 2020    POD * No surgery found *    Procedure:  * No surgery found *    Subjective:     Patient with recurring bleeding from proximal gastric cardia neoplastic GiST suspected, patient denies any hematemesis or hemoptysis. Last hemoglobin 6.3 g, pending today follow-up. Surgery is being planned by Dr. Antony Alves in the near future for resection          Review of Systems   Constitutional: Positive for fatigue. Respiratory: Negative. Cardiovascular: Negative. Gastrointestinal: Negative. Neurological: Negative. Psychiatric/Behavioral: Negative. Objective:     Blood pressure 135/74, pulse 63, temperature 97.3 °F (36.3 °C), resp. rate 18, height 5' 2\" (1.575 m), weight 192 lb (87.1 kg), SpO2 100 %. Temp (24hrs), Av.2 °F (36.8 °C), Min:97.3 °F (36.3 °C), Max:99.1 °F (37.3 °C)          Physical Exam  Vitals signs and nursing note reviewed. Constitutional:       Appearance: Normal appearance. HENT:      Head: Normocephalic. Mouth/Throat:      Pharynx: Oropharynx is clear. Cardiovascular:      Rate and Rhythm: Normal rate. Pulmonary:      Effort: Pulmonary effort is normal.   Abdominal:      General: Abdomen is flat. Palpations: Abdomen is soft. Tenderness: There is no abdominal tenderness. There is no guarding or rebound. Skin:     General: Skin is warm and dry. Neurological:      General: No focal deficit present. Mental Status: She is alert and oriented to person, place, and time. Psychiatric:         Mood and Affect: Mood normal.         Behavior: Behavior normal.         Thought Content:  Thought content normal.         Judgment: Judgment normal.            Labs:   Recent Results (from the past 24 hour(s))   GLUCOSE, POC    Collection Time: 20  4:36 PM   Result Value Ref Range    Glucose (POC) 69 65 - 100 mg/dL    Performed by Jerri Salcedo RN    GLUCOSE, POC    Collection Time: 20  4:47 PM   Result Value Ref Range    Glucose (POC) 60 (L) 65 - 100 mg/dL    Performed by Eric RICO)    GLUCOSE, POC    Collection Time: 11/13/20  5:01 PM   Result Value Ref Range    Glucose (POC) 61 (L) 65 - 100 mg/dL    Performed by Eric RICO)    GLUCOSE, POC    Collection Time: 11/13/20  5:06 PM   Result Value Ref Range    Glucose (POC) 155 (H) 65 - 100 mg/dL    Performed by Eric RICO)    RBC, ALLOCATE    Collection Time: 11/13/20  7:00 PM   Result Value Ref Range    HISTORY CHECKED?  Historical check performed    HGB & HCT    Collection Time: 11/13/20  7:40 PM   Result Value Ref Range    HGB 6.3 (L) 11.5 - 16.0 g/dL    HCT 21.8 (L) 35.0 - 47.0 %   GLUCOSE, POC    Collection Time: 11/13/20 10:00 PM   Result Value Ref Range    Glucose (POC) 54 (L) 65 - 100 mg/dL    Performed by Keisha [facility] PCT    GLUCOSE, POC    Collection Time: 11/13/20 10:33 PM   Result Value Ref Range    Glucose (POC) 51 (L) 65 - 100 mg/dL    Performed by Andria Castorena PCT    GLUCOSE, POC    Collection Time: 11/13/20 11:30 PM   Result Value Ref Range    Glucose (POC) 75 65 - 100 mg/dL    [facility]formed by [facility]    GLUCOSE, POC    Collection Time: 11/14/20  3:05 AM   Result Value Ref Range    Glucose (POC) 26 (LL) 65 - 100 mg/dL    Performed by Keisha [facility] PCT    GLUCOSE, POC    Collection Time: 11/14/20  3:18 AM   Result Value Ref Range    Glucose (POC) 90 65 - 100 mg/dL    Per[facility]    GLUCOSE, POC    Collection Time: 11/14/20  4:29 AM   Result Value Ref Range    Glucose (POC) 26 (LL) 65 - 100 mg/dL    Performed by Bran Aj (KATHERINE)    GLUCOSE, POC    Collection Time: 11/14/20  4:32 AM   Result Value Ref Range    Glucose (POC) 51 (L) 65 - 100 mg/dL    Performed by Bran Aj (KATHERINE)    GLUCOSE, POC    Collection Time: 11/14/20  4:46 AM   Result Value Ref Range    Glucose (POC) 122 (H) 65 - 100 mg/dL    Performed by Bran RICO)    GLUCOSE, POC    Collection Time: 11/14/20  6:18 AM   Result Value Ref Range    Glucose (POC) 65 65 - 100 mg/dL Performed by Stefanie Montejo PCT    GLUCOSE, POC    Collection Time: 11/14/20  7:50 AM   Result Value Ref Range    Glucose (POC) 77 65 - 100 mg/dL    Performed by Emily Barker (PCT)    GLUCOSE, POC    Collection Time: 11/14/20  9:44 AM   Result Value Ref Range    Glucose (POC) 88 65 - 100 mg/dL    Performed by Preet Salvador RN        Data Review images and reports reviewed    Assessment:     Active Problems:    Upper GI bleed (11/12/2020)      Anemia, blood loss (11/12/2020)      GIST, malignant ([address] (11/12/2020)        Plan/Recommendations/Medical Decision Making:     Continue present treatment   Patient with recurring bleeding from proximal gastric cardia neoplastic GiST suspected, patient denies any hematemesis or hemoptysis. Last hemoglobin 6.3 g, pending today follow-up. Surgery is being planned by Dr. Joe Medina in the near future for resection  Patient certainly will be at increased risk for surgery given the complexity of the surgery and her cardiopulmonary issues.   Nothing surgical acutely at this point, transfuse blood as needed based on hospitalist decision    Etelvina Gabriel MD , [facility]

## 2020-11-14 NOTE — PROGRESS NOTES
Reason for Admission:   Upper GI Bleed, anemia, blood loss, GIST, malignant                  RUR Score:     20%             PCP: First and Last name:  Dr. Stephenie Barone   Name of Practice:    Are you a current patient: Yes/No: Yes   Approximate date of last visit: 1 year ago   Can you participate in a virtual visit if needed:     Do you (patient/family) have any concerns for transition/discharge? None reported              Plan for utilizing home health:   None at this time    Current Advanced Directive/Advance Care Plan:  Pt reports she has an advance directive completed. CM encouraged pt to bring in advance directive to be scanned into chart. Pt verbalized understanding and agreement. Transition of Care Plan:          Pt is a 69 y/o [facility] female who was admitted with a diagnosis of Upper GI Bleed, anemia, blood loss, GIST, malignant. CM met with pt at bedside re: assessment, discharge planning. CM introduced self, explained role. Pt verbalized understanding. Pt verified demographic information on chart and reports no changes need to be made at this time. Pt lives in a 1 story home with 1 step to enter with her . Pt is independent in ADL/IADL needs at baseline. Pt has access to DME in the home to include a cane and walker. Pt has not utilized SNF or home health prior to admission. Pt does not drive at baseline. Pts family to transport at discharge. Pt plans on returning home at discharge. Pt reports no further questions or needs at this time. CM will continue to remain available for support, discharge planning as needed. KOFI:  Home with family  Pt will need 2nd IM letter  Pt will need follow up appointments     Care Management Interventions  PCP Verified by CM: Yes  Last Visit to PCP: 11/14/19  Mode of Transport at Discharge:  Other (see comment)(Pt's family to transport)  Transition of Care Consult (CM Consult): Discharge Planning  MyChart Signup: No  Discharge Durable Medical Equipment: No  Physical Therapy Consult: No  Occupational Therapy Consult: No  Speech Therapy Consult: No  Current Support Network: Own Home, Lives with Spouse  Confirm Follow Up Transport: Family  Discharge Location  Discharge Placement: Home    Heather Whyte MSW, LSW  Supervisee in [facility]  [phone]

## 2020-11-14 NOTE — PROGRESS NOTES
Hospitalist Progress Note    NAME: Balta Waterman   :  1945   MRN:  [id number]     I reviewed pertinent labs and imaging, and discussed /agreed on the plan of care with Dr. Moises Barkley. Assessment / Plan:  Symptomatic anemia POA   anemia of chronic blood loss POA   upper GI bleed from GIST tumor at GE junction POA  CT abdomen and pelvis with contrast= negative active bleed, gastric mass lesion unchanged, increased bilateral pleural effusion moderate now  HgB stable at 8.4 this am  Received 2 units of PRBC's on 2020  Bilateral pleural effusions (moderate) POA-? Malignant versus due to CHF  Acute on chronic systolic CHF POA -EF on last echo was 30 to 35% with mild LVH  Hypertension POA    Amlodipine daily    Coreg BID     Lasix daily     Cozaar Monitor  Hypercholesterolemia   Lipitor daily  CAD  Diabetes type 2 insulin-dependent  Hypoglycemic    Home lantus dose decreased with parameters    Diabetic full liquid diet    [place]  Gastrointestinal tumor of the stomach POA GIST    Appreciate Oncology input     Surgery consult      30.0 - 39.9 Obese / Body mass index is 35.12 kg/m². Code status: Full  Prophylaxis: SCD's  Recommended Disposition: Home w/Family     Subjective:     Chief Complaint / Reason for Physician Visit  Resting comfortably at this time. Will wean oxygen. Discussed with RN events overnight. Review of Systems:  Symptom Y/N Comments  Symptom Y/N Comments   Fever/Chills n   Chest Pain n    Poor Appetite n   Edema n    Cough n   Abdominal Pain     Sputum n   Joint Pain     SOB/ARENAS n   Pruritis/Rash     Nausea/vomit n   Tolerating PT/OT     Diarrhea n   Tolerating Diet y    Constipation n   Other       Could NOT obtain due to:      Objective:     VITALS:   Last 24hrs VS reviewed since prior progress note.  Most recent are:  Patient Vitals for the past 24 hrs:   Temp Pulse Resp BP SpO2   20 1523 98.2 °F (36.8 °C) 65 18 120/67 94 %   20 0746 97.3 °F (36.3 °C) 63 18 135/74 100 % 11/14/20 0410 97.8 °F (36.6 °C) 64 18 (!) 151/68 --   11/14/20 0334 97.5 °F (36.4 °C) 60 18 (!) 130/55 100 %   11/14/20 0309 -- 62 18 (!) 149/49 100 %   11/14/20 0140 97.9 °F (36.6 °C) 75 18 (!) 151/78 100 %   11/14/20 0110 98.7 °F (37.1 °C) 75 18 (!) 154/74 100 %   11/14/20 0055 99 °F (37.2 °C) 73 18 138/69 100 %   11/14/20 0040 99 °F (37.2 °C) 72 18 134/69 100 %   11/14/20 0025 98.7 °F (37.1 °C) 75 18 118/66 99 %   11/13/20 2342 99.1 °F (37.3 °C) 66 18 (!) 110/59 100 %   11/13/20 1950 98.1 °F (36.7 °C) 75 18 (!) 116/55 100 %     No intake or output data in the 24 hours ending 11/14/20 1613     PHYSICAL EXAM:  General:  Alert, cooperative, no acute distress    EENT:   Anicteric sclerae. normocephalic  Resp:  CTA bilaterally, no wheezing or rales. No accessory muscle use  CV:  Regular  rhythm,  No edema  GI:  Soft, Non distended, Non tender.  +Bowel sounds  Neurologic:  Alert and oriented X 3, normal speech,   Psych:   Good insight. Not anxious nor agitated  Skin:  No rashes. No jaundice    Reviewed most current lab test results and cultures  YES  Reviewed most current radiology test results   YES  Review and summation of old records today    NO  Reviewed patient's current orders and MAR    YES  PMH/SH reviewed - no change compared to H&P  ________________________________________________________________________  Care Plan discussed with:    Comments   Patient y    Family      RN y    Care Manager     Consultant                        Multidiciplinary team rounds were held today with , nursing, pharmacist and clinical coordinator. Patient's plan of care was discussed; medications were reviewed and discharge planning was addressed.      ________________________________________________________________________      ________________________________________________________________________  Atiya Shin NP     Procedures: see electronic medical records for all procedures/Xrays and details which were not copied into this note but were reviewed prior to creation of Plan. LABS:  I reviewed today's most current labs and imaging studies.   Pertinent labs include:  Recent Labs     11/14/20  1318 11/13/20  1940 11/13/20  0827 11/12/20 2007   WBC 13.7*  --  14.0* 11.8*   HGB 8.4* 6.3* 6.9* 5.7*   HCT 28.3* 21.8* 23.7* 21.1*     --  348 408*     Recent Labs     11/13/20  0434 11/12/20 2007    142   K 3.8 3.9   * 108   CO2 29 28   GLU 37* 114*   BUN 14 16   CREA 0.87 0.95   CA 8.3* 8.3*   MG 2.4  --    ALB  --  2.5*   TBILI  --  0.2   ALT  --  16       Signed: Timur Gonzalez NP

## 2020-11-14 NOTE — PROGRESS NOTES
Rapid Response Team Note    Called by RN at 3:05 AM to see patient for brief unresponsiveness,  Blood sugar of 26 stat. Upon entering the room the patient was initially unresponsive, getting 1 amp of D50 and started to become increasing responsive and answer orientation question. Events as described by RN caring for patient noted, patient has been hypoglecemic earlier in the evening shift leading to receiving apple juice and glucose tabs whics increased her BS around 2330 to 75. Visit Vitals  BP (!) 149/49   Pulse 62   Temp 97.9 °F (36.6 °C)   Resp 18   Ht 5' 2\" (1.575 m)   Wt 87.1 kg (192 lb)   SpO2 100%   BMI 35.12 kg/m²       Gen: initially unresponsive, then graduall become fully awake  HEENT: wnl  Chest: symmetrical  Abd: soft, non distended  Neuro: answers orientation questions  Ext: DOWNING    Pertinent Recent Labs and Xrays:  Recent Labs     11/13/20  1940 11/13/20  0827 11/12/20 2007   WBC  --  14.0* 11.8*   HGB 6.3* 6.9* 5.7*   HCT 21.8* 23.7* 21.1*   PLT  --  348 408*     Recent Labs     11/13/20  0434 11/12/20 2007    142   K 3.8 3.9   * 108   CO2 29 28   BUN 14 16   CREA 0.87 0.95   GLU 37* 114*   CA 8.3* 8.3*   MG 2.4  --      No results for input(s): INR, INREXT in the last 72 hours. No results for input(s): PH, PCO2, PO2 in the last 72 hours. No results for input(s): PHI, PO2I, PCO2I in the last 72 hours. No results for input(s): CPK, CKNDX, TROIQ in the last 72 hours.     No lab exists for component: CPKMB  Lab Results   Component Value Date/Time    Glucose (POC) 26 (LL) 11/14/2020 03:05 AM    Glucose (POC) 75 11/13/2020 11:30 PM    Glucose (POC) 51 (L) 11/13/2020 10:33 PM    Glucose (POC) 54 (L) 11/13/2020 10:00 PM    Glucose (POC) 155 (H) 11/13/2020 05:06 PM       A/P:  Brief unresponsiveness, BS 26, currently receiving blood transfusion  - 1 amp D50 given  -  IVF with D51/2 NS at 50 ml/hr  -recheck BS @ 90    0440: received call from RN, patient's blood glucose dropped to 26 again, was starting to get lethargic but perked up as soon as D50 IV was administered. Ordered q hourly blood sugar checks for now. Just finished blood transfusion and RN will start ordered IVF which is now D10 at 100 ml/hour. Endocrinology consult ordered.     Harish Amezquita NP  Critical care time unrelated to prior notes:  28

## 2020-11-14 NOTE — PROGRESS NOTES
Oncology End of Shift Note      Bedside shift change report given to KATHERINE Garzon (incoming nurse) by Korey Cooper (outgoing nurse) on Teodora Roles. Report included the following information SBAR, Kardex and MAR. Shift Summary:   Patient was given one unit of PRBC during my shift and the patient tolerated the procedure well without any side effects. Patient's blood sugar was 54 at 2200, apple juice was given and after 15 mins, it was 51. One chewable tablet was given of four, the patients blood sugar was 75, patient was sweating profusely, when her glucose level was checked, it was 26,  Dextrose D50W was given, patient's level was 90 after 15 mins had elapsed. Patient's blood sugar went to 26 again, the NP was notified and Dextrose D50W was administered again, NP informed me that an Endocrinologist consult was submitted. Patient was stable thereafter. Patient did not require any Humalog coverage due to blood sugar level. Patient teaching and routine rounding has been done. Patient has not complained of any pain during my shift. Issues for Physician to Address:       Patient on Cardiac Monitoring?     [] Yes  [x] No    Rhythm:      Rufus Scale:     Rufus Score: 20        If Rufus Scale less than 15   Patient Mobility Ordered    Speciality Bed Ordered        Boots Applied                        Shift Events        Korey Cooper

## 2020-11-14 NOTE — PROGRESS NOTES
RAPID RESPONSE TEAM    Responded to overhead adult rapid response to room 1120 for hypoglycemia. BG 26, patient unresponsive. D50 25 grams given IV, patient quickly responded, alert and oriented. Patient had prior hypoglycemic episode which responded to juice and glucose tabs. NP Evelene [place] responded to bedside, NP to put in orders. Repeat BG 90. Patient to remain in room at this time. Patient Vitals for the past 4 hrs:   Temp Pulse Resp BP SpO2   11/14/20 0309 -- 62 18 (!) 149/49 100 %   11/14/20 0140 97.9 °F (36.6 °C) 75 18 (!) 151/78 100 %   11/14/20 0110 98.7 °F (37.1 °C) 75 18 (!) 154/74 100 %   11/14/20 0055 99 °F (37.2 °C) 73 18 138/69 100 %   11/14/20 0040 99 °F (37.2 °C) 72 18 134/69 100 %   11/14/20 0025 98.7 °F (37.1 °C) 75 18 118/66 99 %   11/13/20 2342 99.1 °F (37.3 °C) 66 18 (!) 110/59 100 %         Please call with any needs or concerns.      Joe Caraballo  Rapid Response KATHERINE Oglesby

## 2020-11-15 LAB
GLUCOSE BLD STRIP.AUTO-MCNC: 166 MG/DL (ref 65–100)
GLUCOSE BLD STRIP.AUTO-MCNC: 205 MG/DL (ref 65–100)
GLUCOSE BLD STRIP.AUTO-MCNC: 275 MG/DL (ref 65–100)
GLUCOSE BLD STRIP.AUTO-MCNC: 284 MG/DL (ref 65–100)
GLUCOSE BLD STRIP.AUTO-MCNC: 285 MG/DL (ref 65–100)
GLUCOSE BLD STRIP.AUTO-MCNC: 45 MG/DL (ref 65–100)
GLUCOSE BLD STRIP.AUTO-MCNC: 46 MG/DL (ref 65–100)
GLUCOSE BLD STRIP.AUTO-MCNC: 51 MG/DL (ref 65–100)
GLUCOSE BLD STRIP.AUTO-MCNC: 82 MG/DL (ref 65–100)
GLUCOSE BLD STRIP.AUTO-MCNC: 97 MG/DL (ref 65–100)
SERVICE CMNT-IMP: ABNORMAL
SERVICE CMNT-IMP: NORMAL
SERVICE CMNT-IMP: NORMAL

## 2020-11-15 PROCEDURE — 97162 PT EVAL MOD COMPLEX 30 MIN: CPT

## 2020-11-15 PROCEDURE — 74011250636 HC RX REV CODE- 250/636: Performed by: NURSE PRACTITIONER

## 2020-11-15 PROCEDURE — 74011250637 HC RX REV CODE- 250/637: Performed by: INTERNAL MEDICINE

## 2020-11-15 PROCEDURE — 65270000015 HC RM PRIVATE ONCOLOGY

## 2020-11-15 PROCEDURE — 74011000258 HC RX REV CODE- 258: Performed by: NURSE PRACTITIONER

## 2020-11-15 PROCEDURE — 94760 N-INVAS EAR/PLS OXIMETRY 1: CPT

## 2020-11-15 PROCEDURE — 74011636637 HC RX REV CODE- 636/637: Performed by: NURSE PRACTITIONER

## 2020-11-15 PROCEDURE — 97116 GAIT TRAINING THERAPY: CPT

## 2020-11-15 PROCEDURE — 74011250636 HC RX REV CODE- 250/636: Performed by: INTERNAL MEDICINE

## 2020-11-15 PROCEDURE — 82962 GLUCOSE BLOOD TEST: CPT

## 2020-11-15 PROCEDURE — 74011000250 HC RX REV CODE- 250: Performed by: INTERNAL MEDICINE

## 2020-11-15 PROCEDURE — C9113 INJ PANTOPRAZOLE SODIUM, VIA: HCPCS | Performed by: INTERNAL MEDICINE

## 2020-11-15 PROCEDURE — 99232 SBSQ HOSP IP/OBS MODERATE 35: CPT | Performed by: SURGERY

## 2020-11-15 PROCEDURE — 77010033678 HC OXYGEN DAILY

## 2020-11-15 RX ORDER — HYDROXYZINE 25 MG/1
25 TABLET, FILM COATED ORAL
Status: DISCONTINUED | OUTPATIENT
Start: 2020-11-15 | End: 2020-11-20 | Stop reason: HOSPADM

## 2020-11-15 RX ADMIN — SODIUM CHLORIDE 40 MG: 9 INJECTION, SOLUTION INTRAMUSCULAR; INTRAVENOUS; SUBCUTANEOUS at 15:53

## 2020-11-15 RX ADMIN — IRON SUCROSE 200 MG: 20 INJECTION, SOLUTION INTRAVENOUS at 17:29

## 2020-11-15 RX ADMIN — INSULIN GLARGINE 10 UNITS: 100 INJECTION, SOLUTION SUBCUTANEOUS at 10:00

## 2020-11-15 RX ADMIN — CARVEDILOL 12.5 MG: 12.5 TABLET, FILM COATED ORAL at 10:56

## 2020-11-15 RX ADMIN — SODIUM CHLORIDE: 234 INJECTION, SOLUTION INTRAVENOUS at 13:14

## 2020-11-15 RX ADMIN — DULOXETINE HYDROCHLORIDE 60 MG: 30 CAPSULE, DELAYED RELEASE ORAL at 10:56

## 2020-11-15 RX ADMIN — LOSARTAN POTASSIUM 100 MG: 100 TABLET, FILM COATED ORAL at 10:56

## 2020-11-15 RX ADMIN — CARVEDILOL 12.5 MG: 12.5 TABLET, FILM COATED ORAL at 17:29

## 2020-11-15 RX ADMIN — FUROSEMIDE 60 MG: 10 INJECTION, SOLUTION INTRAMUSCULAR; INTRAVENOUS at 17:29

## 2020-11-15 RX ADMIN — ATORVASTATIN CALCIUM 40 MG: 40 TABLET, FILM COATED ORAL at 21:43

## 2020-11-15 RX ADMIN — Medication 10 ML: at 15:59

## 2020-11-15 RX ADMIN — AMLODIPINE BESYLATE 5 MG: 5 TABLET ORAL at 10:57

## 2020-11-15 RX ADMIN — FUROSEMIDE 60 MG: 10 INJECTION, SOLUTION INTRAMUSCULAR; INTRAVENOUS at 15:53

## 2020-11-15 NOTE — PROGRESS NOTES
Hospitalist Progress Note    NAME: Amol Diallo   :  1945   MRN:  [id number]     I reviewed pertinent labs and imaging, and discussed /agreed on the plan of care with Dr. Gurvinder Longoria. 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 Malignant versus due to CHF  · Acute on chronic systolic CHF POA  LV: Estimated LVEF is 30 - 35%. Normal cavity size. Mild concentric hypertrophy. Moderate-to-severely reduced systolic function. TV: Mild to moderate tricuspid valve regurgitation is present. 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 Code status: Full  Prophylaxis: SCD's  Recommended Disposition: Home w/Family     Subjective:     Chief Complaint / Reason for Physician Visit  Patient sitting on side of the bed. Denies abdominal pain. Scheduled for surgery this week. Discussed with RN events overnight.      Review of Systems:  Symptom Y/N Comments  Symptom Y/N Comments   Fever/Chills n   Chest Pain n    Poor Appetite n   Edema n    Cough n   Abdominal Pain     Sputum n   Joint Pain n    SOB/ARENAS n   Pruritis/Rash n    Nausea/vomit n   Tolerating PT/OT     Diarrhea n   Tolerating Diet     Constipation n   Other       Could NOT obtain due to:      Objective:     VITALS:   Last 24hrs VS reviewed since prior progress note. Most recent are:  Patient Vitals for the past 24 hrs:   Temp Pulse Resp BP SpO2   11/15/20 0728 98.1 °F (36.7 °C) 67 18 115/69 100 %   11/14/20 2325 98.7 °F (37.1 °C) 93 18 116/65 100 %   11/14/20 1935 98.4 °F (36.9 °C) 71 18 132/82 100 %   11/14/20 1523 98.2 °F (36.8 °C) 65 18 120/67 94 %     No intake or output data in the 24 hours ending 11/15/20 1256     PHYSICAL EXAM:  General:  Alert, cooperative, no acute distress    EENT:  Anicteric sclerae. Normocephalic  Resp:  CTA bilaterally, no wheezing or rales. No accessory muscle use  CV:  Regular  rhythm,  No edema  GI:  Soft, Non distended, Non tender.  +Bowel sounds  Neurologic:  Alert and oriented X 3, normal speech,   Psych:   Good insight. Not anxious nor agitated  Skin:  No rashes. No jaundice    Reviewed most current lab test results and cultures  YES  Reviewed most current radiology test results   YES  Review and summation of old records today    NO  Reviewed patient's current orders and MAR    YES  PMH/ reviewed - no change compared to H&P  ________________________________________________________________________  Care Plan discussed with:    Comments   Patient y    Family      RN y    Care Manager     Consultant                        Multidiciplinary team rounds were held today with , nursing, pharmacist and clinical coordinator. Patient's plan of care was discussed; medications were reviewed and discharge planning was addressed. ________________________________________________________________________  To  ________________________________________________________________________  Leandro Baltazar NP     Procedures: see electronic medical records for all procedures/Xrays and details which were not copied into this note but were reviewed prior to creation of Plan. LABS:  I reviewed today's most current labs and imaging studies.   Pertinent labs include:  Recent Labs     11/14/20  2466 11/13/20  1940 11/13/20  0827 11/12/20 2007   WBC 13.7*  --  14.0* 11.8*   HGB 8.4* 6.3* 6.9* 5.7*   HCT 28.3* 21.8* 23.7* 21.1*     --  348 408*     Recent Labs     11/13/20  0434 11/12/20 2007    142   K 3.8 3.9   * 108   CO2 29 28   GLU 37* 114*   BUN 14 16   CREA 0.87 0.95   CA 8.3* 8.3*   MG 2.4  --    ALB  --  2.5*   TBILI  --  0.2   ALT  --  16       Signed: Ember Saavedra, NP

## 2020-11-15 NOTE — PROGRESS NOTES
RAPID RESPONSE TEAM- Follow Up    Rounded on patient due to recent rapid response for hypoglycemia. Discussed with primary RNKimmy.  No acute concerns, VSS, MEWS 1. Patient is alert, in bed, watching TV, NAD. Patient Vitals for the past 12 hrs:   Temp Pulse Resp BP SpO2   11/14/20 1935 98.4 °F (36.9 °C) 71 18 132/82 100 %   11/14/20 1523 98.2 °F (36.8 °C) 65 18 120/67 94 %     Glucose (POC)   Date Value Ref Range Status   11/14/2020 96 65 - 100 mg/dL Final     Comment:     (NOTE)  The Accu-Chek Inform II glucometer is not FDA cleared for critically   ill patient use. A study was performed validating the equivalence of   glucometer and clinical laboratory results on this patient   population. Despite the study, use of glucometers with capillary   specimens from critically ill patients, regardless of their location,   makes the test high complexity and requires the performing individual   to comply with CLIA requirements more stringent than those for waived   testing in the hospital setting. Critical thinking skills are   necessary to determine a potentially critically ill patients status   prior to using a glucometer. No RRT interventions indicated at this time. Please call with any questions or concerns.      Toni Cheek  Rapid Response KATHERINE Shea

## 2020-11-15 NOTE — PROGRESS NOTES
Oncology End of Shift Note      Bedside shift change report given to Brittny Clarke RN (incoming nurse) by Meryl Allen RN (outgoing nurse) on Oralee Chad. Report included the following information SBAR, Kardex, Intake/Output and MAR. Shift Summary: Patient stable through shift, blood sugar remained in the 80s and low 90s during shift, no insulin coverage needed. Patient had no complaints of pain, all scheduled meds and hourly rounding provided. Issues for Physician to Address:       Patient on Cardiac Monitoring?     [] Yes  [x] No    Rhythm:      Rufus Scale:     Rufus Score: 18        If Rufus Scale less than 15   Patient Mobility Ordered  No  Speciality Bed Ordered   No     Boots Applied                  No            Meryl Allen RN

## 2020-11-15 NOTE — PROGRESS NOTES
Admit Date: 2020    Subjective:     Patient has no complaints. No hematemesis     Objective:     Blood pressure 116/65, pulse 93, temperature 98.7 °F (37.1 °C), resp. rate 18, height 5' 2\" (1.575 m), weight 192 lb (87.1 kg), SpO2 100 %. Temp (24hrs), Av.4 °F (36.9 °C), Min:98.2 °F (36.8 °C), Max:98.7 °F (37.1 °C)      Physical Exam:  GENERAL: alert, cooperative, no distress, appears stated age, LUNG: clear to auscultation bilaterally, HEART: regular rate and rhythm, ABDOMEN: soft, non-tender.  Bowel sounds normal. No masses,  no organomegaly, EXTREMITIES:  extremities normal, atraumatic, no cyanosis or edema    Labs:   Recent Results (from the past 24 hour(s))   GLUCOSE, POC    Collection Time: 20  9:44 AM   Result Value Ref Range    Glucose (POC) 88 65 - 100 mg/dL    Performed by Diana Cobian RN    GLUCOSE, POC    Collection Time: 20 10:59 AM   Result Value Ref Range    Glucose (POC) 81 65 - 100 mg/dL    Performed by Sri Lind (PCT)    CBC W/O DIFF    Collection Time: 20  1:18 PM   Result Value Ref Range    WBC 13.7 (H) 3.6 - 11.0 K/uL    RBC 3.44 (L) 3.80 - 5.20 M/uL    HGB 8.4 (L) 11.5 - 16.0 g/dL    HCT 28.3 (L) 35.0 - 47.0 %    MCV 82.3 80.0 - 99.0 FL    MCH 24.4 (L) 26.0 - 34.0 PG    MCHC 29.7 (L) 30.0 - 36.5 g/dL    RDW 16.8 (H) 11.5 - 14.5 %    PLATELET 411 764 - 337 K/uL    MPV 10.4 8.9 - 12.9 FL    NRBC 1.4 (H) 0  WBC    ABSOLUTE NRBC 0.19 (H) 0.00 - 0.01 K/uL   GLUCOSE, POC    Collection Time: 20  4:05 PM   Result Value Ref Range    Glucose (POC) 96 65 - 100 mg/dL    Performed by Lujean Galeazzi    GLUCOSE, POC    Collection Time: 20  9:34 PM   Result Value Ref Range    Glucose (POC) 115 (H) 65 - 100 mg/dL    Performed by [facility] PCT    GLUCOSE, POC    Collection Time: 11/15/20 12:29 AM   Result Value Ref Range    Glucose (POC) 97 65 - 100 mg/dL    Performed by [facility] AT [facility] PCT        Data Review images and reports reviewed    Assessment:     Active Problems:    Upper GI bleed (11/12/2020)      Anemia, blood loss (11/12/2020)      GIST, malignant ([facility] [address] (11/12/2020)        Plan/Recommendations/Medical Decision Making:     Continue present treatment   Repeat Hb 8.4 yesterday after transfusion  Appears stable at present  [facility] proximal resection later this week. Chilo Velásquez.  Yohan Connell MD, [facility]

## 2020-11-15 NOTE — PROGRESS NOTES
Problem: Mobility Impaired (Adult and Pediatric)  Goal: *Acute Goals and Plan of Care (Insert Text)  Description: FUNCTIONAL STATUS PRIOR TO ADMISSION: Patient was modified independent using a SPC vs rolling walker for functional mobility. She reports she occasionally uses nothing. Patient is a poor historian. HOME SUPPORT PRIOR TO ADMISSION: The patient lived with her  who assists with LB dressing. Physical Therapy Goals  Initiated 11/15/2020  1. Patient will move from supine to sit and sit to supine , scoot up and down, and roll side to side in bed with independence within 7 day(s). 2.  Patient will transfer from bed to chair and chair to bed with modified independence using the least restrictive device within 7 day(s). 3.  Patient will perform sit to stand with modified independence within 7 day(s). 4.  Patient will ambulate with modified independence for 150 feet with the least restrictive device within 7 day(s). 5.  Patient will ascend/descend 3 stairs with one sided handrail(s) with modified independence within 7 day(s). Outcome: Not Met   PHYSICAL THERAPY EVALUATION  Patient: Bipin Saucedo (64 y.o. female)  Date: 11/15/2020  Primary Diagnosis: Upper GI bleed [K92.2]  Anemia, blood loss [D50.0]  GIST, malignant ([facility] [address] [C49. A0]        Precautions:   Fall    ASSESSMENT  Based on the objective data described below, the patient presents with decreased strength, decreased functional mobility, impaired balance, unsteady gait, and confusion following admission for GI bleed. Patient functioning below her baseline due to strength and endurance. Patient required CGA for all functional mobility with RW. She demonstrates unsteady gait with shuffled steps and slow gait speed. Patient with trunk sway in standing without AD support. RN requesting to have patient return to supine due to low BS. VSS on 2L O2 and patient does not wear O2 at home.      Current Level of Function Impacting Discharge (mobility/balance): CGA with RW    Functional Outcome Measure: The patient scored 55/100 on the Barthel outcome measure which is indicative of moderate functional impairment. Other factors to consider for discharge: impaired balance, fall risk, confused     Patient will benefit from skilled therapy intervention to address the above noted impairments. PLAN :  Recommendations and Planned Interventions: bed mobility training, transfer training, gait training, therapeutic exercises, patient and family training/education, and therapeutic activities      Frequency/Duration: Patient will be followed by physical therapy:  4 times a week to address goals.     Recommendation for discharge: (in order for the patient to meet his/her long term goals)  Physical therapy at least 2 days/week in the home AND ensure assist and/or supervision for safety with functional mobility and ADLS    This discharge recommendation:  Has not yet been discussed the attending provider and/or case management    IF patient discharges home will need the following DME: patient owns DME required for discharge         SUBJECTIVE:   Patient stated Bushra Gomez got up to the bathroom when I wasn't supposed to.    OBJECTIVE DATA SUMMARY:   HISTORY:    Past Medical History:   Diagnosis Date    Anemia, unspecified 10/6/2020    Arrhythmia     Arthritis     CAD (coronary artery disease)     Cancer ([facility] [address]     Chronic bronchitis ([facility] [address]     per pt:  as of 1/9/15 pt denies any ARENAS or SOB    Diabetes ([facility] [address] Dx approx 2009    Dr Jahaira Woodward (in connect care)    GERD (gastroesophageal reflux disease)     Hypercholesteremia     Hypertension      Past Surgical History:   Procedure Laterality Date    HX CHOLECYSTECTOMY  6/12    HX COLONOSCOPY      HX CORONARY STENT PLACEMENT  8/25/2015    HX DILATION AND CURETTAGE      UPPER GI ENDOSCOPY,BIOPSY  10/6/2020            Personal factors and/or comorbidities impacting plan of care: fall risk, decreased endurance    Home Situation  Home Environment: Private residence  # Steps to Enter: 3  Rails to Enter: No  One/Two Story Residence: One story  Living Alone: No  Support Systems: Spouse/Significant Other/Partner  Patient Expects to be Discharged to[de-identified] Unknown  Current DME Used/Available at Home: Cane, straight, Walker, rolling  Tub or Shower Type: Shower    EXAMINATION/PRESENTATION/DECISION MAKING:   Critical Behavior:              Hearing: Auditory  Auditory Impairment: None  Skin:    Edema:   Range Of Motion:  AROM: Generally decreased, functional           PROM: Within functional limits           Strength:    Strength: Generally decreased, functional                    Tone & Sensation:   Tone: Normal                              Coordination:  Coordination: Within functional limits  Vision:      Functional Mobility:  Bed Mobility:  Rolling: Contact guard assistance  Supine to Sit: Contact guard assistance  Sit to Supine: Contact guard assistance  Scooting: Contact guard assistance  Transfers:  Sit to Stand: Contact guard assistance  Stand to Sit: Contact guard assistance                       Balance:   Sitting: Intact; Without support  Standing: Impaired; With support  Standing - Static: Good  Standing - Dynamic : Fair  Ambulation/Gait Training:  Distance (ft): 30 Feet (ft)  Assistive Device: Gait belt;Walker, rolling  Ambulation - Level of Assistance: Contact guard assistance     Gait Description (WDL): Exceptions to WDL  Gait Abnormalities: Decreased step clearance;Shuffling gait;Trunk sway increased        Base of Support: Widened     Speed/Tiffany: Pace decreased (<100 feet/min)  Step Length: Right shortened;Left shortened                      Functional Measure:  Barthel Index:    Bathin  Bladder: 10  Bowels: 10  Groomin  Dressin  Feeding: 10  Mobility: 0  Stairs: 0  Toilet Use: 5  Transfer (Bed to Chair and Back): 10  Total: 55/100       The Barthel ADL Index: Guidelines  1.  The index should be used as a record of what a patient does, not as a record of what a patient could do. 2. The main aim is to establish degree of independence from any help, physical or verbal, however minor and for whatever reason. 3. The need for supervision renders the patient not independent. 4. A patient's performance should be established using the best available evidence. Asking the patient, friends/relatives and nurses are the usual sources, but direct observation and common sense are also important. However direct testing is not needed. 5. Usually the patient's performance over the preceding 24-48 hours is important, but occasionally longer periods will be relevant. 6. Middle categories imply that the patient supplies over 50 per cent of the effort. 7. Use of aids to be independent is allowed. Armani Valle., Barthel, D.W. (2578). Functional evaluation: the Barthel Index. [address] [facility] (14)2. Idalia Kerr juno YOUNG Benitez, Corona Rangel., Veda Dover., Gennaro, [facility] (1999). Measuring the change indisability after inpatient rehabilitation; comparison of the responsiveness of the Barthel Index and Functional Happy Measure. Journal of Neurology, Neurosurgery, and Psychiatry, 66(4), 678-749. Tommie Bhatia, N.J.A, DIAMOND Davis, & Estuardo Florence, M.A. (2004.) Assessment of post-stroke quality of life in cost-effectiveness studies: The usefulness of the Barthel Index and the EuroQoL-5D.  Quality of Life Research, 15, 259-92            Physical Therapy Evaluation Charge Determination   History Examination Presentation Decision-Making   MEDIUM  Complexity : 1-2 comorbidities / personal factors will impact the outcome/ POC  MEDIUM Complexity : 3 Standardized tests and measures addressing body structure, function, activity limitation and / or participation in recreation  MEDIUM Complexity : Evolving with changing characteristics  Other outcome measures Barthel   MEDIUM      Based on the above components, the patient evaluation is determined to be of the following complexity level: MEDIUM      Activity Tolerance:   Fair and requires rest breaks    After treatment patient left in no apparent distress:   Supine in bed, Call bell within reach, and Side rails x 3    COMMUNICATION/EDUCATION:   The patients plan of care was discussed with: Registered nurse and Case management. Fall prevention education was provided and the patient/caregiver indicated understanding., Patient/family have participated as able in goal setting and plan of care. , and Patient/family agree to work toward stated goals and plan of care.     Thank you for this referral.  Sarah Garcia, PT, DPT   Time Calculation: 18 mins

## 2020-11-15 NOTE — PROGRESS NOTES
Problem: Falls - Risk of  Goal: *Absence of Falls  11/15/2020 1449 by Neftaly Rinaldi RN  Outcome: Progressing Towards Goal  Note: Fall Risk Interventions:  Mobility Interventions: Patient to call before getting OOB, Utilize walker, cane, or other assistive device         Medication Interventions: Evaluate medications/consider consulting pharmacy    Elimination Interventions: Toileting schedule/hourly rounds           11/15/2020 1110 by Radha Ramos RN  Outcome: Progressing Towards Goal  Note: Fall Risk Interventions:  Mobility Interventions: Patient to call before getting OOB, Utilize walker, cane, or other assistive device         Medication Interventions: Evaluate medications/consider consulting pharmacy    Elimination Interventions: Toileting schedule/hourly rounds              Problem: Falls - Risk of  Goal: *Absence of Falls  11/15/2020 1449 by Neftaly Rinaldi RN  Outcome: Progressing Towards Goal  Note: Fall Risk Interventions:  Mobility Interventions: Patient to call before getting OOB, Utilize walker, cane, or other assistive device         Medication Interventions: Evaluate medications/consider consulting pharmacy    Elimination Interventions: Toileting schedule/hourly rounds           11/15/2020 1110 by Radha Ramos RN  Outcome: Progressing Towards Goal  Note: Fall Risk Interventions:  Mobility Interventions: Patient to call before getting OOB, Utilize walker, cane, or other assistive device         Medication Interventions: Evaluate medications/consider consulting pharmacy    Elimination Interventions:  Toileting schedule/hourly rounds              Problem: Patient Education: Go to Patient Education Activity  Goal: Patient/Family Education  11/15/2020 1449 by Radha Ramos RN  Outcome: Progressing Towards Goal  11/15/2020 1110 by Radha Ramos RN  Outcome: Progressing Towards Goal     Problem: Patient Education: Go to Patient Education Activity  Goal: Patient/Family Education  11/15/2020 1449 by Elizabeth Harry RN  Outcome: Progressing Towards Goal  11/15/2020 1110 by Elizabeth Harry RN  Outcome: Progressing Towards Goal     Problem: Pressure Injury - Risk of  Goal: *Prevention of pressure injury  11/15/2020 1449 by Armando Rinaldi RN  Outcome: Progressing Towards Goal  Note: Pressure Injury Interventions:  Sensory Interventions: Check visual cues for pain    Moisture Interventions: Absorbent underpads, Check for incontinence Q2 hours and as needed    Activity Interventions: Pressure redistribution bed/mattress(bed type)    Mobility Interventions: Pressure redistribution bed/mattress (bed type)    Nutrition Interventions: Document food/fluid/supplement intake                  11/15/2020 1110 by Armando Rinaldi RN  Outcome: Progressing Towards Goal  Note: Pressure Injury Interventions:  Sensory Interventions: Check visual cues for pain    Moisture Interventions: Absorbent underpads, Check for incontinence Q2 hours and as needed    Activity Interventions: Pressure redistribution bed/mattress(bed type)    Mobility Interventions: Pressure redistribution bed/mattress (bed type)    Nutrition Interventions: Document food/fluid/supplement intake

## 2020-11-15 NOTE — PROGRESS NOTES
Problem: Falls - Risk of  Goal: *Absence of Falls  Outcome: Progressing Towards Goal  Note: Fall Risk Interventions:  Mobility Interventions: Patient to call before getting OOB, Utilize walker, cane, or other assistive device         Medication Interventions: Evaluate medications/consider consulting pharmacy    Elimination Interventions: Toileting schedule/hourly rounds              Problem: Falls - Risk of  Goal: *Absence of Falls  Outcome: Progressing Towards Goal  Note: Fall Risk Interventions:  Mobility Interventions: Patient to call before getting OOB, Utilize walker, cane, or other assistive device         Medication Interventions: Evaluate medications/consider consulting pharmacy    Elimination Interventions:  Toileting schedule/hourly rounds              Problem: Patient Education: Go to Patient Education Activity  Goal: Patient/Family Education  Outcome: Progressing Towards Goal     Problem: Patient Education: Go to Patient Education Activity  Goal: Patient/Family Education  Outcome: Progressing Towards Goal     Problem: Pressure Injury - Risk of  Goal: *Prevention of pressure injury  Outcome: Progressing Towards Goal  Note: Pressure Injury Interventions:  Sensory Interventions: Check visual cues for pain    Moisture Interventions: Absorbent underpads, Check for incontinence Q2 hours and as needed    Activity Interventions: Pressure redistribution bed/mattress(bed type)    Mobility Interventions: Pressure redistribution bed/mattress (bed type)    Nutrition Interventions: Document food/fluid/supplement intake                     Problem: Pressure Injury - Risk of  Goal: *Prevention of pressure injury  Outcome: Progressing Towards Goal  Note: Pressure Injury Interventions:  Sensory Interventions: Check visual cues for pain    Moisture Interventions: Absorbent underpads, Check for incontinence Q2 hours and as needed    Activity Interventions: Pressure redistribution bed/mattress(bed type)    Mobility Interventions: Pressure redistribution bed/mattress (bed type)    Nutrition Interventions: Document food/fluid/supplement intake

## 2020-11-15 NOTE — PROGRESS NOTES
Oncology End of Shift Note      Bedside shift change report given to Anju Arnold RN (incoming nurse) by Aurora Sotelo (outgoing nurse) on [facility]. Report included the following information SBAR, Kardex and MAR. Shift Summary:   Patient's IV was leaking, we tried re-establishing but was unsuccessful, the Nursing Supervisor was notified and she suggested that I call the Rapid Response Nurse as per protocol. ED was contacted as well. At the change of shift, patient was still without a peripheral IV. Humalog was held due to the patient's blood sugar reading. Patient's blood sugar levels were stable throughout my shift. Patient teaching and routine rounding has been done. Issues for Physician to Address:       Patient on Cardiac Monitoring?     [] Yes  [] No    Rhythm:      Rufus Scale:     Rufus Score: 20        If Rufus Scale less than 15   Patient Mobility Ordered    Speciality Bed Ordered        Boots Applied                        Shift Events        Aurora Sotelo

## 2020-11-16 ENCOUNTER — APPOINTMENT (OUTPATIENT)
Dept: GENERAL RADIOLOGY | Age: 75
DRG: 374 | End: 2020-11-16
Attending: SURGERY
Payer: MEDICARE

## 2020-11-16 LAB
ABO + RH BLD: NORMAL
ALBUMIN SERPL-MCNC: 1.9 G/DL (ref 3.5–5)
ALBUMIN/GLOB SERPL: 0.6 {RATIO} (ref 1.1–2.2)
ALP SERPL-CCNC: 54 U/L (ref 45–117)
ALT SERPL-CCNC: 12 U/L (ref 12–78)
ANION GAP SERPL CALC-SCNC: 7 MMOL/L (ref 5–15)
AST SERPL-CCNC: 13 U/L (ref 15–37)
BILIRUB SERPL-MCNC: 0.3 MG/DL (ref 0.2–1)
BLD PROD TYP BPU: NORMAL
BLOOD BANK CMNT PATIENT-IMP: NORMAL
BLOOD GROUP ANTIBODIES SERPL: NORMAL
BPU ID: NORMAL
BUN SERPL-MCNC: 27 MG/DL (ref 6–20)
BUN/CREAT SERPL: 13 (ref 12–20)
CALCIUM SERPL-MCNC: 7.1 MG/DL (ref 8.5–10.1)
CHLORIDE SERPL-SCNC: 108 MMOL/L (ref 97–108)
CO2 SERPL-SCNC: 25 MMOL/L (ref 21–32)
CREAT SERPL-MCNC: 2.14 MG/DL (ref 0.55–1.02)
CROSSMATCH RESULT,%XM: NORMAL
ERYTHROCYTE [DISTWIDTH] IN BLOOD BY AUTOMATED COUNT: 17.9 % (ref 11.5–14.5)
GLOBULIN SER CALC-MCNC: 3.3 G/DL (ref 2–4)
GLUCOSE BLD STRIP.AUTO-MCNC: 189 MG/DL (ref 65–100)
GLUCOSE BLD STRIP.AUTO-MCNC: 191 MG/DL (ref 65–100)
GLUCOSE BLD STRIP.AUTO-MCNC: 200 MG/DL (ref 65–100)
GLUCOSE BLD STRIP.AUTO-MCNC: 232 MG/DL (ref 65–100)
GLUCOSE BLD STRIP.AUTO-MCNC: 241 MG/DL (ref 65–100)
GLUCOSE BLD STRIP.AUTO-MCNC: 255 MG/DL (ref 65–100)
GLUCOSE BLD STRIP.AUTO-MCNC: 280 MG/DL (ref 65–100)
GLUCOSE SERPL-MCNC: 218 MG/DL (ref 65–100)
HCT VFR BLD AUTO: 24.3 % (ref 35–47)
HCT VFR BLD AUTO: 29.3 % (ref 35–47)
HGB BLD-MCNC: 6.9 G/DL (ref 11.5–16)
HGB BLD-MCNC: 8.6 G/DL (ref 11.5–16)
HISTORY CHECKED?,CKHIST: NORMAL
MAGNESIUM SERPL-MCNC: 2.1 MG/DL (ref 1.6–2.4)
MCH RBC QN AUTO: 23.9 PG (ref 26–34)
MCHC RBC AUTO-ENTMCNC: 28.4 G/DL (ref 30–36.5)
MCV RBC AUTO: 84.1 FL (ref 80–99)
NRBC # BLD: 0.17 K/UL (ref 0–0.01)
NRBC BLD-RTO: 1.2 PER 100 WBC
PLATELET # BLD AUTO: 239 K/UL (ref 150–400)
PMV BLD AUTO: 10.4 FL (ref 8.9–12.9)
POTASSIUM SERPL-SCNC: 4.4 MMOL/L (ref 3.5–5.1)
PROT SERPL-MCNC: 5.2 G/DL (ref 6.4–8.2)
RBC # BLD AUTO: 2.89 M/UL (ref 3.8–5.2)
SERVICE CMNT-IMP: ABNORMAL
SODIUM SERPL-SCNC: 140 MMOL/L (ref 136–145)
SPECIMEN EXP DATE BLD: NORMAL
STATUS OF UNIT,%ST: NORMAL
UNIT DIVISION, %UDIV: 0
WBC # BLD AUTO: 13.8 K/UL (ref 3.6–11)

## 2020-11-16 PROCEDURE — 99233 SBSQ HOSP IP/OBS HIGH 50: CPT | Performed by: SURGERY

## 2020-11-16 PROCEDURE — 74011250636 HC RX REV CODE- 250/636: Performed by: INTERNAL MEDICINE

## 2020-11-16 PROCEDURE — 85018 HEMOGLOBIN: CPT

## 2020-11-16 PROCEDURE — 74011000250 HC RX REV CODE- 250: Performed by: INTERNAL MEDICINE

## 2020-11-16 PROCEDURE — 77030038269 HC DRN EXT URIN PURWCK BARD -A

## 2020-11-16 PROCEDURE — P9016 RBC LEUKOCYTES REDUCED: HCPCS

## 2020-11-16 PROCEDURE — 73562 X-RAY EXAM OF KNEE 3: CPT

## 2020-11-16 PROCEDURE — 94760 N-INVAS EAR/PLS OXIMETRY 1: CPT

## 2020-11-16 PROCEDURE — 74011250637 HC RX REV CODE- 250/637: Performed by: INTERNAL MEDICINE

## 2020-11-16 PROCEDURE — 80053 COMPREHEN METABOLIC PANEL: CPT

## 2020-11-16 PROCEDURE — 36415 COLL VENOUS BLD VENIPUNCTURE: CPT

## 2020-11-16 PROCEDURE — 74011636637 HC RX REV CODE- 636/637: Performed by: NURSE PRACTITIONER

## 2020-11-16 PROCEDURE — 74011000258 HC RX REV CODE- 258: Performed by: NURSE PRACTITIONER

## 2020-11-16 PROCEDURE — 86920 COMPATIBILITY TEST SPIN: CPT

## 2020-11-16 PROCEDURE — 82962 GLUCOSE BLOOD TEST: CPT

## 2020-11-16 PROCEDURE — 86922 COMPATIBILITY TEST ANTIGLOB: CPT

## 2020-11-16 PROCEDURE — 36430 TRANSFUSION BLD/BLD COMPNT: CPT

## 2020-11-16 PROCEDURE — 86900 BLOOD TYPING SEROLOGIC ABO: CPT

## 2020-11-16 PROCEDURE — 71046 X-RAY EXAM CHEST 2 VIEWS: CPT

## 2020-11-16 PROCEDURE — 74011250636 HC RX REV CODE- 250/636: Performed by: NURSE PRACTITIONER

## 2020-11-16 PROCEDURE — 83735 ASSAY OF MAGNESIUM: CPT

## 2020-11-16 PROCEDURE — 65270000015 HC RM PRIVATE ONCOLOGY

## 2020-11-16 PROCEDURE — 86921 COMPATIBILITY TEST INCUBATE: CPT

## 2020-11-16 PROCEDURE — 77010033678 HC OXYGEN DAILY

## 2020-11-16 PROCEDURE — 85027 COMPLETE CBC AUTOMATED: CPT

## 2020-11-16 PROCEDURE — C9113 INJ PANTOPRAZOLE SODIUM, VIA: HCPCS | Performed by: INTERNAL MEDICINE

## 2020-11-16 RX ORDER — SODIUM CHLORIDE 9 MG/ML
250 INJECTION, SOLUTION INTRAVENOUS AS NEEDED
Status: DISCONTINUED | OUTPATIENT
Start: 2020-11-16 | End: 2020-11-20 | Stop reason: HOSPADM

## 2020-11-16 RX ADMIN — CARVEDILOL 12.5 MG: 12.5 TABLET, FILM COATED ORAL at 17:51

## 2020-11-16 RX ADMIN — INSULIN LISPRO 2 UNITS: 100 INJECTION, SOLUTION INTRAVENOUS; SUBCUTANEOUS at 17:51

## 2020-11-16 RX ADMIN — ACETAMINOPHEN 650 MG: 325 TABLET ORAL at 14:48

## 2020-11-16 RX ADMIN — SODIUM CHLORIDE: 234 INJECTION, SOLUTION INTRAVENOUS at 12:57

## 2020-11-16 RX ADMIN — Medication 10 ML: at 15:02

## 2020-11-16 RX ADMIN — INSULIN GLARGINE 10 UNITS: 100 INJECTION, SOLUTION SUBCUTANEOUS at 12:55

## 2020-11-16 RX ADMIN — FUROSEMIDE 60 MG: 10 INJECTION, SOLUTION INTRAMUSCULAR; INTRAVENOUS at 17:50

## 2020-11-16 RX ADMIN — LOSARTAN POTASSIUM 100 MG: 100 TABLET, FILM COATED ORAL at 08:36

## 2020-11-16 RX ADMIN — FUROSEMIDE 60 MG: 10 INJECTION, SOLUTION INTRAMUSCULAR; INTRAVENOUS at 08:31

## 2020-11-16 RX ADMIN — Medication 10 ML: at 23:57

## 2020-11-16 RX ADMIN — DULOXETINE HYDROCHLORIDE 60 MG: 30 CAPSULE, DELAYED RELEASE ORAL at 08:35

## 2020-11-16 RX ADMIN — ATORVASTATIN CALCIUM 40 MG: 40 TABLET, FILM COATED ORAL at 23:56

## 2020-11-16 RX ADMIN — SODIUM CHLORIDE 40 MG: 9 INJECTION, SOLUTION INTRAMUSCULAR; INTRAVENOUS; SUBCUTANEOUS at 08:31

## 2020-11-16 RX ADMIN — SODIUM CHLORIDE: 234 INJECTION, SOLUTION INTRAVENOUS at 00:58

## 2020-11-16 RX ADMIN — INSULIN LISPRO 1 UNITS: 100 INJECTION, SOLUTION INTRAVENOUS; SUBCUTANEOUS at 23:56

## 2020-11-16 RX ADMIN — CARVEDILOL 12.5 MG: 12.5 TABLET, FILM COATED ORAL at 08:36

## 2020-11-16 RX ADMIN — AMLODIPINE BESYLATE 5 MG: 5 TABLET ORAL at 08:36

## 2020-11-16 NOTE — PROGRESS NOTES
RAPID RESPONSE TEAM- Follow Up    Rounded on patient due to recent rapid response for hypoglycemia. Discussed with primary RN, Tara Astudillo. No acute concerns, VSS, MEWS 1. Patient resting in bed at this time. NAD. Patient Vitals for the past 12 hrs:   Temp Pulse Resp BP SpO2   11/15/20 1935 98.6 °F (37 °C) 76 18 (!) 126/55 98 %   11/15/20 1505 98.9 °F (37.2 °C) 83 18 139/71 100 %     Glucose (POC)   Date Value Ref Range Status   11/15/2020 205 (H) 65 - 100 mg/dL Final     Comment:     (NOTE)  The Accu-Chek Inform II glucometer is not FDA cleared for critically   ill patient use. A study was performed validating the equivalence of   glucometer and clinical laboratory results on this patient   population. Despite the study, use of glucometers with capillary   specimens from critically ill patients, regardless of their location,   makes the test high complexity and requires the performing individual   to comply with CLIA requirements more stringent than those for waived   testing in the hospital setting. Critical thinking skills are   necessary to determine a potentially critically ill patients status   prior to using a glucometer. No RRT interventions indicated at this time. Please call with any questions or concerns.      Jesusita Gaytan  Rapid Response RN  Yury Bejarano

## 2020-11-16 NOTE — PROGRESS NOTES
Problem: Falls - Risk of  Goal: *Absence of Falls  Description: Document Richard Merlin Fall Risk and appropriate interventions in the flowsheet. Outcome: Progressing Towards Goal  Note: Fall Risk Interventions:  Mobility Interventions: Bed/chair exit alarm, Communicate number of staff needed for ambulation/transfer, Patient to call before getting OOB, Utilize walker, cane, or other assistive device         Medication Interventions: Evaluate medications/consider consulting pharmacy, Patient to call before getting OOB, Teach patient to arise slowly    Elimination Interventions: Toileting schedule/hourly rounds              Problem: Pressure Injury - Risk of  Goal: *Prevention of pressure injury  Description: Document Rufus Scale and appropriate interventions in the flowsheet.   Outcome: Progressing Towards Goal  Note: Pressure Injury Interventions:  Sensory Interventions: Assess changes in LOC, Check visual cues for pain, Keep linens dry and wrinkle-free, Maintain/enhance activity level    Moisture Interventions: Absorbent underpads, Check for incontinence Q2 hours and as needed, Internal/External urinary devices    Activity Interventions: Increase time out of bed, Pressure redistribution bed/mattress(bed type)    Mobility Interventions: Pressure redistribution bed/mattress (bed type)    Nutrition Interventions: Document food/fluid/supplement intake

## 2020-11-16 NOTE — PROGRESS NOTES
Hospitalist Progress Note    NAME: Chino Martínez   :  1945   MRN:  [id number]     I reviewed pertinent labs and imaging, and discussed /agreed on the plan of care with Dr. Da Jeffery. 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 Malignant versus due to CHF  · Acute on chronic systolic CHF POA  LV: Estimated LVEF is 30 - 35%. Normal cavity size. Mild concentric hypertrophy. Moderate-to-severely reduced systolic function. TV: Mild to moderate tricuspid valve regurgitation is present. 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 Code status: Full  Prophylaxis: SCD's  Recommended Disposition:  PT, OT, RN     Subjective:     Chief Complaint / Reason for Physician Visit  Patient resting at this time. She states she did not get much sleep last night, She kept getting woke up. Discussed with RN events overnight.      Review of Systems:  Symptom Y/N Comments  Symptom Y/N Comments   Fever/Chills n   Chest Pain n    Poor Appetite n   Edema n    Cough n   Abdominal Pain     Sputum n   Joint Pain     SOB/ARENAS n   Pruritis/Rash Nausea/vomit n   Tolerating PT/OT     Diarrhea n   Tolerating Diet y    Constipation n   Other       Could NOT obtain due to:      Objective:     VITALS:   Last 24hrs VS reviewed since prior progress note. Most recent are:  Patient Vitals for the past 24 hrs:   Temp Pulse Resp BP SpO2   11/16/20 1713 97.7 °F (36.5 °C) 65 18 125/69 99 %   11/16/20 1615 98.1 °F (36.7 °C) 70 18 117/63 100 %   11/16/20 1541 98 °F (36.7 °C) 68 19 121/68 99 %   11/16/20 1520 97.9 °F (36.6 °C) 67 20 135/68 99 %   11/16/20 1505 98 °F (36.7 °C) 67 18 (!) 141/78 100 %   11/16/20 1451 97.6 °F (36.4 °C) 68 20 (!) 143/58 98 %   11/16/20 1256 -- -- -- -- 96 %   11/16/20 0815 98.4 °F (36.9 °C) 73 18 133/65 99 %   11/15/20 2334 98.7 °F (37.1 °C) 72 18 124/63 98 %   11/15/20 1935 98.6 °F (37 °C) 76 18 (!) 126/55 98 %       Intake/Output Summary (Last 24 hours) at 11/16/2020 1759  Last data filed at 11/16/2020 0004  Gross per 24 hour   Intake 500 ml   Output 500 ml   Net 0 ml        PHYSICAL EXAM:  General:  Alert, cooperative, no acute distress, obese   EENT:   Anicteric sclerae. normocephalic  Resp:  CTA bilaterally with diminished lower bases, no wheezing or rales. No                               accessory muscle use  CV:  Regular  rhythm,  No edema  GI:  Soft, Non distended, Non tender.  +Bowel sounds  Neurologic:  Alert and oriented X 3, normal speech,   Psych:   Good insight. Not anxious nor agitated  Skin:  No rashes.   No jaundice    Reviewed most current lab test results and cultures  YES  Reviewed most current radiology test results   YES  Review and summation of old records today    NO  Reviewed patient's current orders and MAR    YES  PMH/SH reviewed - no change compared to H&P  ________________________________________________________________________  Care Plan discussed with:    Comments   Patient y    Family      RN y    Care Manager y    Consultant                        Multidiciplinary team rounds were held today with case manager, nursing, pharmacist and clinical coordinator. Patient's plan of care was discussed; medications were reviewed and discharge planning was addressed. ________________________________________________________________________    ________________________________________________________________________  Pauly Mccormick NP     Procedures: see electronic medical records for all procedures/Xrays and details which were not copied into this note but were reviewed prior to creation of Plan. LABS:  I reviewed today's most current labs and imaging studies.   Pertinent labs include:  Recent Labs     11/16/20 0415 11/14/20  1318 11/13/20  1940   WBC 13.8* 13.7*  --    HGB 6.9* 8.4* 6.3*   HCT 24.3* 28.3* 21.8*    295  --      Recent Labs     11/16/20 0415      K 4.4      CO2 25   *   BUN 27*   CREA 2.14*   CA 7.1*   MG 2.1   ALB 1.9*   TBILI 0.3   ALT 12       Signed: Pauly Mccormick NP

## 2020-11-16 NOTE — PROGRESS NOTES
Surgical Progress Note  11/16/20        Patient seen and examined by me today. 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 Anemia. Hgb 6.9. Bilateral pleural effusions. Continued SOB and dyspnea on exertion. Malnutrition. Albumin 1.9. The GIST is the source of her anemia. Its location make written makes resection complex and high risk. It will likely require distal esophagectomy and partial gastrectomy. The anastomosis will be in the mediastinum. This is a very high risk anastomosis and the best of circumstances. Presently, the patient is anemic. She needs to be transfused to a hemoglobin of at least 10. She has bilateral pleural effusions and is gasping when I examine her today. I will order a PA and lateral for today. There has not been a chest x-ray since 11/13. The effusions are likely at least partially related to her malnutrition. Prior to surgery, her albumin needs to be at least 3.5. She should be on a full liquid diet so as to not cause mechanical trauma of the mass at her GE junction. She can get over 2000 myron daily by drinking 6 Ensure products daily. Would discontinue the pure wick as this essentially guarantees that she will not ambulate. She needs to ambulate in order to resolve her effusions and improve her pulmonary status overall. She cannot have surgery until the above issues are addressed and improved. Subjective:     Chief Complaint:  Tired. Review of Systems:   yes/no  yes/no   Fever n Abdominal pain n   Chills n Flatus y   Chest pain n Nausea n   Cough y Vomiting n   Sputum n Constipation n   SOB y Diarrhea n   Dysuria n Tolerating diet  y      []       Unable to obtain  ROS due to:_____________________    Objective:     VITALS:   Last 24hrs VS reviewed since prior hospitalist progress note.  Most recent are:  Visit Vitals  /65 (BP 1 Location: Left arm, BP Patient Position: At rest)   Pulse 73   Temp 98.4 °F (36.9 °C)   Resp 18   Ht 5' 2\" (1.575 m)   Wt 87.1 kg (192 lb)   SpO2 96%   BMI 35.12 kg/m²     Temp (24hrs), Av.7 °F (37.1 °C), Min:98.4 °F (36.9 °C), Max:98.9 °F (37.2 °C)      Intake/Output Summary (Last 24 hours) at 2020 1345  Last data filed at 2020 0004  Gross per 24 hour   Intake 500 ml   Output 500 ml   Net 0 ml        PHYSICAL EXAM:  General appearance  Alert, cooperative, no distress, appears stated age. Eyes  Anicteric. Neck Supple. Lungs   Wheeze, shallow. Heart  Regular rate and rhythm. No murmur, rub or gallop. Abdomen   Soft. Bowel sounds normal.  Non tender. Extremities + edema               Neurologic Without overt sensory or motor deficit        Lab Data Reviewed: (see below)    Medications Reviewed: (see below)    PMH/ reviewed - no change compared to H&P  ________________________________________________________________________  LABS:  Recent Labs     20  1318   WBC 13.8* 13.7*   HGB 6.9* 8.4*   HCT 24.3* 28.3*    295     Recent Labs     20      K 4.4      CO2 25   BUN 27*   CREA 2.14*   *   CA 7.1*   MG 2.1     Recent Labs     20   ALT 12   AP 54   TBILI 0.3   TP 5.2*   ALB 1.9*   GLOB 3.3     No results for input(s): INR, PTP, APTT, INREXT in the last 72 hours. No results for input(s): FE, TIBC, PSAT, FERR in the last 72 hours. No results for input(s): PH, PCO2, PO2 in the last 72 hours. No results for input(s): CPK, CKMB in the last 72 hours.     No lab exists for component: TROPONINI  Lab Results   Component Value Date/Time    Glucose (POC) 189 (H) 2020 12:03 PM    Glucose (POC) 191 (H) 2020 08:20 AM    Glucose (POC) 241 (H) 2020 05:59 AM    Glucose (POC) 255 (H) 2020 03:19 AM    Glucose (POC) 280 (H) 2020 01:08 AM       MEDICATIONS:  Current Facility-Administered Medications Medication Dose Route Frequency    0.9% sodium chloride infusion 250 mL  250 mL IntraVENous PRN    hydrOXYzine HCL (ATARAX) tablet 25 mg  25 mg Oral QHS PRN    insulin lispro (HUMALOG) injection   SubCUTAneous AC&HS    glucose chewable tablet 16 g  4 Tab Oral PRN    dextrose (D50W) injection syrg 12.5-25 g  12.5-25 g IntraVENous PRN    glucagon (GLUCAGEN) injection 1 mg  1 mg IntraMUSCular PRN    sodium chloride 0.9 % in dextrose 10% 1,040 mL infusion   IntraVENous CONTINUOUS    amLODIPine (NORVASC) tablet 5 mg  5 mg Oral DAILY    atorvastatin (LIPITOR) tablet 40 mg  40 mg Oral QHS    carvediloL (COREG) tablet 12.5 mg  12.5 mg Oral BID WITH MEALS    DULoxetine (CYMBALTA) capsule 60 mg  60 mg Oral DAILY    glucose chewable tablet 16 g  4 Tab Oral PRN    dextrose (D50W) injection syrg 12.5-25 g  12.5-25 g IntraVENous PRN    glucagon (GLUCAGEN) injection 1 mg  1 mg IntraMUSCular PRN    losartan (COZAAR) tablet 100 mg  100 mg Oral DAILY    pantoprazole (PROTONIX) 40 mg in 0.9% sodium chloride 10 mL injection  40 mg IntraVENous DAILY    furosemide (LASIX) injection 60 mg  60 mg IntraVENous BID    sodium chloride (NS) flush 5-40 mL  5-40 mL IntraVENous Q8H    sodium chloride (NS) flush 5-40 mL  5-40 mL IntraVENous PRN    acetaminophen (TYLENOL) tablet 650 mg  650 mg Oral Q6H PRN    Or    acetaminophen (TYLENOL) suppository 650 mg  650 mg Rectal Q6H PRN    polyethylene glycol (MIRALAX) packet 17 g  17 g Oral DAILY PRN    ondansetron (ZOFRAN) injection 4 mg  4 mg IntraVENous Q6H PRN    insulin glargine (LANTUS) injection 10 Units  10 Units SubCUTAneous DAILY    0.9% sodium chloride infusion 250 mL  250 mL IntraVENous PRN    0.9% sodium chloride infusion 250 mL  250 mL IntraVENous PRN

## 2020-11-16 NOTE — PROGRESS NOTES
Oncology End of Shift Note      Bedside shift change report given to Hitesh Catalan (incoming nurse) by Sammy Dumont RN (outgoing nurse) on [facility]. Report included the following information SBAR.       Shift Summary: IV, meds, blood sugars      Issues for Physician to Address:            Rufus Scale:     Rufus Score: 16        If Rufus Scale less than 15   Patient Mobility Ordered  No  Speciality Bed Ordered   No     Boots Applied                  No      Shift Events        Ayo Rinaldi, RN

## 2020-11-16 NOTE — PROGRESS NOTES
Bedside shift change report given to Doreen Hendrix (oncoming nurse) by Ye Rob student RN (offgoing nurse). Report included the following information SBAR, MAR and Recent Results. Shift summary: pt slept through the night. Pt on 2L/min oxygen via nasal cannula. Scheduled meds given, no PRN meds needed. No complaints of pain. Pt used purwick, did not get up from bed. Blood glucose monitored every 1-2 hours per NP statement. Insulin was held to prevent hypoglycemia. Labs drawn.     Issues for physician to address: POC glucose order

## 2020-11-16 NOTE — PROGRESS NOTES
Physical Therapy    Chart reviewed and discussed with RN, patient has Hgb 6.9 and pending transfusion. Plan to defer PT and continue to follow.     Humberto Garcia

## 2020-11-17 ENCOUNTER — APPOINTMENT (OUTPATIENT)
Dept: GENERAL RADIOLOGY | Age: 75
DRG: 374 | End: 2020-11-17
Attending: NURSE PRACTITIONER
Payer: MEDICARE

## 2020-11-17 LAB
ANION GAP SERPL CALC-SCNC: 7 MMOL/L (ref 5–15)
ARTERIAL PATENCY WRIST A: YES
BACTERIA SPEC CULT: NORMAL
BACTERIA SPEC CULT: NORMAL
BASE EXCESS BLDA CALC-SCNC: 0.8 MMOL/L
BDY SITE: ABNORMAL
BNP SERPL-MCNC: ABNORMAL PG/ML
BUN SERPL-MCNC: 29 MG/DL (ref 6–20)
BUN/CREAT SERPL: 17 (ref 12–20)
CALCIUM SERPL-MCNC: 7.9 MG/DL (ref 8.5–10.1)
CHLORIDE SERPL-SCNC: 109 MMOL/L (ref 97–108)
CO2 SERPL-SCNC: 26 MMOL/L (ref 21–32)
CREAT SERPL-MCNC: 1.68 MG/DL (ref 0.55–1.02)
EPAP/CPAP/PEEP, PAPEEP: 8
FIO2 ON VENT: 100 %
GLUCOSE BLD STRIP.AUTO-MCNC: 109 MG/DL (ref 65–100)
GLUCOSE BLD STRIP.AUTO-MCNC: 110 MG/DL (ref 65–100)
GLUCOSE BLD STRIP.AUTO-MCNC: 119 MG/DL (ref 65–100)
GLUCOSE BLD STRIP.AUTO-MCNC: 159 MG/DL (ref 65–100)
GLUCOSE BLD STRIP.AUTO-MCNC: 283 MG/DL (ref 65–100)
GLUCOSE SERPL-MCNC: 100 MG/DL (ref 65–100)
GRAM STN SPEC: NORMAL
GRAM STN SPEC: NORMAL
HCO3 BLDA-SCNC: 26 MMOL/L (ref 22–26)
HCT VFR BLD AUTO: 34.3 % (ref 35–47)
HGB BLD-MCNC: 10.7 G/DL (ref 11.5–16)
IPAP/PIP, IPAPIP: 16
PCO2 BLDA: 42 MMHG (ref 35–45)
PH BLDA: 7.4 [PH] (ref 7.35–7.45)
PO2 BLDA: 308 MMHG (ref 80–100)
POTASSIUM SERPL-SCNC: 4.5 MMOL/L (ref 3.5–5.1)
SAO2 % BLD: 100 % (ref 92–97)
SAO2% DEVICE SAO2% SENSOR NAME: ABNORMAL
SERVICE CMNT-IMP: ABNORMAL
SERVICE CMNT-IMP: NORMAL
SODIUM SERPL-SCNC: 142 MMOL/L (ref 136–145)
SPECIMEN SITE: ABNORMAL

## 2020-11-17 PROCEDURE — 94660 CPAP INITIATION&MGMT: CPT

## 2020-11-17 PROCEDURE — 94760 N-INVAS EAR/PLS OXIMETRY 1: CPT

## 2020-11-17 PROCEDURE — 74011250637 HC RX REV CODE- 250/637: Performed by: INTERNAL MEDICINE

## 2020-11-17 PROCEDURE — 36415 COLL VENOUS BLD VENIPUNCTURE: CPT

## 2020-11-17 PROCEDURE — 74011250636 HC RX REV CODE- 250/636

## 2020-11-17 PROCEDURE — 74011000250 HC RX REV CODE- 250

## 2020-11-17 PROCEDURE — 82803 BLOOD GASES ANY COMBINATION: CPT

## 2020-11-17 PROCEDURE — 71045 X-RAY EXAM CHEST 1 VIEW: CPT

## 2020-11-17 PROCEDURE — 74011000250 HC RX REV CODE- 250: Performed by: INTERNAL MEDICINE

## 2020-11-17 PROCEDURE — 36430 TRANSFUSION BLD/BLD COMPNT: CPT

## 2020-11-17 PROCEDURE — 77010033678 HC OXYGEN DAILY

## 2020-11-17 PROCEDURE — 77030013038 HC MSK CPAP FISP -A

## 2020-11-17 PROCEDURE — 83880 ASSAY OF NATRIURETIC PEPTIDE: CPT

## 2020-11-17 PROCEDURE — 74011000250 HC RX REV CODE- 250: Performed by: NURSE PRACTITIONER

## 2020-11-17 PROCEDURE — 74011250636 HC RX REV CODE- 250/636: Performed by: INTERNAL MEDICINE

## 2020-11-17 PROCEDURE — 82962 GLUCOSE BLOOD TEST: CPT

## 2020-11-17 PROCEDURE — C9113 INJ PANTOPRAZOLE SODIUM, VIA: HCPCS | Performed by: INTERNAL MEDICINE

## 2020-11-17 PROCEDURE — 80048 BASIC METABOLIC PNL TOTAL CA: CPT

## 2020-11-17 PROCEDURE — 85018 HEMOGLOBIN: CPT

## 2020-11-17 PROCEDURE — P9016 RBC LEUKOCYTES REDUCED: HCPCS

## 2020-11-17 PROCEDURE — 5A09357 ASSISTANCE WITH RESPIRATORY VENTILATION, LESS THAN 24 CONSECUTIVE HOURS, CONTINUOUS POSITIVE AIRWAY PRESSURE: ICD-10-PCS | Performed by: INTERNAL MEDICINE

## 2020-11-17 PROCEDURE — 74011636637 HC RX REV CODE- 636/637: Performed by: NURSE PRACTITIONER

## 2020-11-17 PROCEDURE — 65610000006 HC RM INTENSIVE CARE

## 2020-11-17 PROCEDURE — 74011250636 HC RX REV CODE- 250/636: Performed by: NURSE PRACTITIONER

## 2020-11-17 PROCEDURE — 36600 WITHDRAWAL OF ARTERIAL BLOOD: CPT

## 2020-11-17 PROCEDURE — 87635 SARS-COV-2 COVID-19 AMP PRB: CPT

## 2020-11-17 PROCEDURE — 77030012793 HC CIRC VNTLTR FISP -B

## 2020-11-17 PROCEDURE — 99231 SBSQ HOSP IP/OBS SF/LOW 25: CPT | Performed by: INTERNAL MEDICINE

## 2020-11-17 RX ORDER — FUROSEMIDE 10 MG/ML
40 INJECTION INTRAMUSCULAR; INTRAVENOUS 2 TIMES DAILY
Status: DISCONTINUED | OUTPATIENT
Start: 2020-11-17 | End: 2020-11-19

## 2020-11-17 RX ORDER — ALBUTEROL SULFATE 0.83 MG/ML
SOLUTION RESPIRATORY (INHALATION)
Status: COMPLETED
Start: 2020-11-17 | End: 2020-11-17

## 2020-11-17 RX ORDER — BUDESONIDE 0.25 MG/2ML
250 INHALANT ORAL ONCE
Status: COMPLETED | OUTPATIENT
Start: 2020-11-17 | End: 2020-11-17

## 2020-11-17 RX ORDER — LEVOFLOXACIN 5 MG/ML
750 INJECTION, SOLUTION INTRAVENOUS
Status: DISCONTINUED | OUTPATIENT
Start: 2020-11-17 | End: 2020-11-17

## 2020-11-17 RX ORDER — ALBUTEROL SULFATE 0.83 MG/ML
SOLUTION RESPIRATORY (INHALATION)
Status: DISPENSED
Start: 2020-11-17 | End: 2020-11-17

## 2020-11-17 RX ORDER — IPRATROPIUM BROMIDE 0.5 MG/2.5ML
SOLUTION RESPIRATORY (INHALATION)
Status: COMPLETED
Start: 2020-11-17 | End: 2020-11-17

## 2020-11-17 RX ORDER — DIPHENHYDRAMINE HYDROCHLORIDE 50 MG/ML
INJECTION, SOLUTION INTRAMUSCULAR; INTRAVENOUS
Status: COMPLETED
Start: 2020-11-17 | End: 2020-11-17

## 2020-11-17 RX ORDER — ALBUTEROL SULFATE 0.83 MG/ML
5 SOLUTION RESPIRATORY (INHALATION)
Status: COMPLETED | OUTPATIENT
Start: 2020-11-17 | End: 2020-11-17

## 2020-11-17 RX ORDER — IPRATROPIUM BROMIDE AND ALBUTEROL SULFATE 2.5; .5 MG/3ML; MG/3ML
3 SOLUTION RESPIRATORY (INHALATION)
Status: DISPENSED | OUTPATIENT
Start: 2020-11-17 | End: 2020-11-17

## 2020-11-17 RX ORDER — BUDESONIDE 0.25 MG/2ML
INHALANT ORAL
Status: DISPENSED
Start: 2020-11-17 | End: 2020-11-17

## 2020-11-17 RX ORDER — SODIUM CHLORIDE 9 MG/ML
25 INJECTION, SOLUTION INTRAVENOUS CONTINUOUS
Status: DISCONTINUED | OUTPATIENT
Start: 2020-11-17 | End: 2020-11-18

## 2020-11-17 RX ORDER — ARFORMOTEROL TARTRATE 15 UG/2ML
SOLUTION RESPIRATORY (INHALATION)
Status: DISPENSED
Start: 2020-11-17 | End: 2020-11-17

## 2020-11-17 RX ORDER — LEVOFLOXACIN 5 MG/ML
750 INJECTION, SOLUTION INTRAVENOUS EVERY 24 HOURS
Status: DISCONTINUED | OUTPATIENT
Start: 2020-11-18 | End: 2020-11-17

## 2020-11-17 RX ORDER — FUROSEMIDE 10 MG/ML
40 INJECTION INTRAMUSCULAR; INTRAVENOUS ONCE
Status: COMPLETED | OUTPATIENT
Start: 2020-11-17 | End: 2020-11-17

## 2020-11-17 RX ORDER — FUROSEMIDE 10 MG/ML
60 INJECTION INTRAMUSCULAR; INTRAVENOUS DAILY
Status: DISCONTINUED | OUTPATIENT
Start: 2020-11-18 | End: 2020-11-17

## 2020-11-17 RX ORDER — IPRATROPIUM BROMIDE AND ALBUTEROL SULFATE 2.5; .5 MG/3ML; MG/3ML
3 SOLUTION RESPIRATORY (INHALATION)
Status: DISCONTINUED | OUTPATIENT
Start: 2020-11-17 | End: 2020-11-20 | Stop reason: HOSPADM

## 2020-11-17 RX ORDER — LEVOFLOXACIN 5 MG/ML
750 INJECTION, SOLUTION INTRAVENOUS
Status: DISCONTINUED | OUTPATIENT
Start: 2020-11-17 | End: 2020-11-20 | Stop reason: HOSPADM

## 2020-11-17 RX ORDER — DIPHENHYDRAMINE HYDROCHLORIDE 50 MG/ML
50 INJECTION, SOLUTION INTRAMUSCULAR; INTRAVENOUS ONCE
Status: COMPLETED | OUTPATIENT
Start: 2020-11-17 | End: 2020-11-17

## 2020-11-17 RX ORDER — ALBUTEROL SULFATE 0.83 MG/ML
2.5 SOLUTION RESPIRATORY (INHALATION)
Status: COMPLETED | OUTPATIENT
Start: 2020-11-17 | End: 2020-11-17

## 2020-11-17 RX ORDER — FUROSEMIDE 10 MG/ML
INJECTION INTRAMUSCULAR; INTRAVENOUS
Status: DISPENSED
Start: 2020-11-17 | End: 2020-11-17

## 2020-11-17 RX ADMIN — INSULIN LISPRO 2 UNITS: 100 INJECTION, SOLUTION INTRAVENOUS; SUBCUTANEOUS at 21:41

## 2020-11-17 RX ADMIN — METHYLPREDNISOLONE SODIUM SUCCINATE 125 MG: 125 INJECTION, POWDER, FOR SOLUTION INTRAMUSCULAR; INTRAVENOUS at 06:42

## 2020-11-17 RX ADMIN — LEVOFLOXACIN 750 MG: 5 INJECTION, SOLUTION INTRAVENOUS at 11:52

## 2020-11-17 RX ADMIN — DIPHENHYDRAMINE HYDROCHLORIDE 50 MG: 50 INJECTION, SOLUTION INTRAMUSCULAR; INTRAVENOUS at 06:42

## 2020-11-17 RX ADMIN — FUROSEMIDE 40 MG: 10 INJECTION, SOLUTION INTRAMUSCULAR; INTRAVENOUS at 06:59

## 2020-11-17 RX ADMIN — IPRATROPIUM BROMIDE 0.5 MG: 0.5 SOLUTION RESPIRATORY (INHALATION) at 06:43

## 2020-11-17 RX ADMIN — Medication 1 AMPULE: at 11:56

## 2020-11-17 RX ADMIN — DIPHENHYDRAMINE HYDROCHLORIDE 50 MG: 50 INJECTION, SOLUTION INTRAMUSCULAR; INTRAVENOUS at 07:03

## 2020-11-17 RX ADMIN — SODIUM CHLORIDE 40 MG: 9 INJECTION, SOLUTION INTRAMUSCULAR; INTRAVENOUS; SUBCUTANEOUS at 08:56

## 2020-11-17 RX ADMIN — Medication 10 ML: at 09:04

## 2020-11-17 RX ADMIN — ALBUTEROL SULFATE 2.5 MG: 2.5 SOLUTION RESPIRATORY (INHALATION) at 08:00

## 2020-11-17 RX ADMIN — Medication 10 ML: at 22:00

## 2020-11-17 RX ADMIN — SODIUM CHLORIDE 25 ML/HR: 900 INJECTION, SOLUTION INTRAVENOUS at 09:04

## 2020-11-17 RX ADMIN — BUDESONIDE 250 MCG: 0.25 INHALANT RESPIRATORY (INHALATION) at 07:13

## 2020-11-17 RX ADMIN — ATORVASTATIN CALCIUM 40 MG: 40 TABLET, FILM COATED ORAL at 21:40

## 2020-11-17 RX ADMIN — RACEPINEPHRINE HYDROCHLORIDE 0.5 ML: 11.25 SOLUTION RESPIRATORY (INHALATION) at 06:50

## 2020-11-17 RX ADMIN — FUROSEMIDE 40 MG: 10 INJECTION, SOLUTION INTRAMUSCULAR; INTRAVENOUS at 17:22

## 2020-11-17 RX ADMIN — METHYLPREDNISOLONE SODIUM SUCCINATE 60 MG: 125 INJECTION, POWDER, FOR SOLUTION INTRAMUSCULAR; INTRAVENOUS at 08:55

## 2020-11-17 RX ADMIN — Medication 10 ML: at 08:56

## 2020-11-17 RX ADMIN — ALBUTEROL SULFATE 2.5 MG: 2.5 SOLUTION RESPIRATORY (INHALATION) at 06:43

## 2020-11-17 RX ADMIN — Medication 1 AMPULE: at 21:43

## 2020-11-17 RX ADMIN — CARVEDILOL 12.5 MG: 12.5 TABLET, FILM COATED ORAL at 17:24

## 2020-11-17 RX ADMIN — METHYLPREDNISOLONE SODIUM SUCCINATE 125 MG: 125 INJECTION, POWDER, FOR SOLUTION INTRAMUSCULAR; INTRAVENOUS at 07:04

## 2020-11-17 RX ADMIN — FUROSEMIDE 60 MG: 10 INJECTION, SOLUTION INTRAMUSCULAR; INTRAVENOUS at 08:56

## 2020-11-17 RX ADMIN — ALBUTEROL SULFATE 5 MG: 2.5 SOLUTION RESPIRATORY (INHALATION) at 08:00

## 2020-11-17 NOTE — CONSULTS
[facility] INTENSIVIST Consult Service Note  Pulmonary, Critical Care, and Sleep Medicine    Name: Kaylyn Hernandez MRN: [id number]   : 1945 Hospital: [facility]   Date: 2020  Admission date: 2020 Hospital Day: 6       Subjective/Interval History:   Seen earlier today on rounds. Pt is unstable and acutely ill in the CCU. Patient was re-evaluated multiple times with repeated discussions with CCU team throughout the day    IMPRESSION:   1. Acute Respiratory Failure with Hypoxia,   2. Acute pulmonary edmea vs transfusion reaction- TACO? Doubt TRALI  3. 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 5. Anemia. Hgb 6.9.   6. Bilateral pleural effusions. Continued SOB and dyspnea on exertion. 7. Malnutrition. Albumin 1.9.    8. DM 2 uncontrolled- labile BS low and high  9. Body mass index is 35.12 kg/m². 10. Additional workup outlined below  11. Multiorgan dysfunction as outlined above: Pt has one or more acute or chronic illnesses with severe exacerbation with progression or side effects of treatment that poses a threat to life or bodily function  12. Pt is unstable, unpredictable needing more CCU monitoring; at high risk of sudden decline and decompensation with life threatening consequenses and continued end organ dysfunction and failure  13. Pt is critically ill. Time spent with pt and staff actively rendering care, managing pt and coordinating care as stated below;  45 minutes, exclusive of any procedures      RECOMMENDATIONS/PLAN:   1. CCU monitoring  2. Check proBNP- if high consult  3. Supplemental O2 for refractory hypoxia to keep sats > 93%  4. BIPAP for non invasive ventilatory life support to avoid worsening respiratory acidosis, hypercacarbic or hypoxic respiratory arrest  5. Intubate and place on vent if NIV fails  6. Transfuse prn to maintain Hgb > 7  7. Glucose monitoring and SSI  8. Replete electrolytes  9.  Labs to follow electrolytes, renal function and and blood counts  10. Bronchial hygiene with respiratory therapy techniques, bronchodilators  11. Pt needs IV fluids with additives and Drug therapy requiring intensive monitoring for toxicity  12. Prescription drug management with home med reconciliation reviewed  13. DVT, SUP prophylaxis  14. Will be available to assist in medical management while in the CCU pending disposition         Subjective/Initial History:   I have reviewed the flowsheet and previous days notes. Seen earlier today on rounds. I was asked by Ama Capone MD to see Lauri Gerardo a 70 y.o.  female  in consultation for a chief complaint of acute respiratory failure      Excerpts from admission 11/12/2020 and consult notes reviewed as follows:     \"Lain Velarde is a 70 y.o.  female who presents with above complaints from home. 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 Patient has since stopped taking her aspirin and Plavix, but was lost to follow-up and has not followed up with general surgeon Dr. Isabel Savage or the oncology for any treatment for her cancer. Patient has seen her stools to be dark for the past few days but no obvious blood. Patient was found with hemoglobin of 5.7 on the blood work in the ED with CT abdomen and pelvis negative for any active bleeding but evident of her gastric mass which is unchanged but increased pleural effusion bilaterally was noted.'    Pt seen by Surgery. Anemia. Was transfused. Last night RRT call. Notes reviewed. 4392: Responded to RRT in rm 1120. Upon arrival, patient breathing 38-40 times a minute with increased WOB and accessory muscle use. Audible wheezing noted. Placed on 15L NRB.  Mild upper airway stridor. Patient unable to speak in full sentences. Per primary RN, patient had finished 1PRBC blood transfusion when patient started complaining of SOB. /95. HR . Per previous documentation, pt's HR was 60-70. Temp 37.8. Purveyor NP at bedside. Concern for blood transfusion reaction. 125mg IVsolu-medrol and 50 mg IV benadryl administered per this RN. Stat CXR already obtained. 5819: Albuterol neb administered per respiratory therapy. 3920: Repeat SBP 140s. SpO2 100% on NRB. Patient still not able to speak in full sentences. Increasing upper airway stridor. 0.5cc Racemic epi neb given per RT.    2826: 40mg IV lasix administered by this RN; verbal order from [address] NP. Patient on scheduled po lasix.   0708: Second dose of albuterol neb administered by RT per Purveyor NP. Consult placed to pulmonology. 0538: Pulmicort neb administer by RT per Purveyor NP.  0732: Verbal orderplaced for Covid r/o per purveyor NP. Patient transferred to CCU rm     Pt now in CCU. D.w RRT . Pt in extremis with gilberto wheezing, labored. Edema noted. Dyspneic on NRB mask. CXR noted. Echo from last month noted. The estimated EF is 30 - 35%. Moderate-to-severely reduced systolic function. Lasix ordered. Nebs ordered. D/w nursing and RT. BIPAP placed    3:00 PM Pt settled down and ABG reviewed. ProBNP high cardiology consulted     Patient PCP: Radha Uriarte MD  PMH:  has a past medical history of Anemia, unspecified (10/6/2020), Arrhythmia, Arthritis, CAD (coronary artery disease), Cancer ([facility] [address] Chronic bronchitis ([address] [address] Diabetes ([facility] [address] (Dx approx 2009), GERD (gastroesophageal reflux disease), Hypercholesteremia, and Hypertension. PSH:   has a past surgical history that includes hx dilation and curettage; hx colonoscopy; hx cholecystectomy (6/12); hx coronary stent placement (8/25/2015); and upper gi endoscopy,biopsy (10/6/2020).    FHX: family history includes Diabetes in her mother; HIV/AIDS in her brother; Heart Disease in her brother, brother, and mother; Hypertension in her mother; Stroke in her brother and father. SHX:  reports that she has quit smoking. She has never used smokeless tobacco. She reports that she does not drink alcohol or use drugs. ROS:A comprehensive review of systems was negative except for that written in the HPI. Allergies   Allergen Reactions    Metformin Other (comments)     GI side effects.  Not a true allergy      MEDS:   Current Facility-Administered Medications   Medication    albuterol-ipratropium (DUO-NEB) 2.5 MG-0.5 MG/3 ML    albuterol-ipratropium (DUO-NEB) 2.5 MG-0.5 MG/3 ML    furosemide (LASIX) 10 mg/mL injection    albuterol (PROVENTIL VENTOLIN) 2.5 mg /3 mL (0.083 %) nebulizer solution    albuterol (PROVENTIL VENTOLIN) nebulizer solution 2.5 mg    budesonide (PULMICORT) 0.25 mg/2 mL nebulizer suspension    arformoteroL (BROVANA) 15 mcg/2 mL neb solution    levoFLOXacin (LEVAQUIN) 750 mg in D5W IVPB    albuterol (PROVENTIL VENTOLIN) nebulizer solution 5 mg    methylPREDNISolone (PF) (Solu-MEDROL) injection 60 mg    0.9% sodium chloride infusion 250 mL    hydrOXYzine HCL (ATARAX) tablet 25 mg    insulin lispro (HUMALOG) injection    glucose chewable tablet 16 g    dextrose (D50W) injection syrg 12.5-25 g    glucagon (GLUCAGEN) injection 1 mg    sodium chloride 0.9 % in dextrose 10% 1,040 mL infusion    amLODIPine (NORVASC) tablet 5 mg    atorvastatin (LIPITOR) tablet 40 mg    carvediloL (COREG) tablet 12.5 mg    DULoxetine (CYMBALTA) capsule 60 mg    glucose chewable tablet 16 g    dextrose (D50W) injection syrg 12.5-25 g    glucagon (GLUCAGEN) injection 1 mg    losartan (COZAAR) tablet 100 mg    pantoprazole (PROTONIX) 40 mg in 0.9% sodium chloride 10 mL injection    furosemide (LASIX) injection 60 mg    sodium chloride (NS) flush 5-40 mL    sodium chloride (NS) flush 5-40 mL    acetaminophen (TYLENOL) tablet 650 mg    Or    acetaminophen (TYLENOL) suppository 650 mg    polyethylene glycol (MIRALAX) packet 17 g    ondansetron (ZOFRAN) injection 4 mg    insulin glargine (LANTUS) injection 10 Units    0.9% sodium chloride infusion 250 mL    0.9% sodium chloride infusion 250 mL        Current Facility-Administered Medications:     albuterol-ipratropium (DUO-NEB) 2.5 MG-0.5 MG/3 ML, 3 mL, Nebulization, NOW, Purkellyor, Atoosa, ACNP    albuterol-ipratropium (DUO-NEB) 2.5 MG-0.5 MG/3 ML, 3 mL, Nebulization, Q4H PRN, Truman Atrosasa, ACNP    furosemide (LASIX) 10 mg/mL injection, , , ,     albuterol (PROVENTIL VENTOLIN) 2.5 mg /3 mL (0.083 %) nebulizer solution, , , ,     albuterol (PROVENTIL VENTOLIN) nebulizer solution 2.5 mg, 2.5 mg, Nebulization, NOW, Shoshanaor Atoosa, ACNP    budesonide (PULMICORT) 0.25 mg/2 mL nebulizer suspension, , , ,     arformoteroL (BROVANA) 15 mcg/2 mL neb solution, , , ,     levoFLOXacin (LEVAQUIN) 750 mg in D5W IVPB, 750 mg, IntraVENous, Q48H, Purkellyor, Atoosa, ACNP    albuterol (PROVENTIL VENTOLIN) nebulizer solution 5 mg, 5 mg, Nebulization, NOW, Radha Gabriel MD    methylPREDNISolone (PF) (Solu-MEDROL) injection 60 mg, 60 mg, IntraVENous, Q6H, Pascual Leone MD    0.9% sodium chloride infusion 250 mL, 250 mL, IntraVENous, PRN, Yuri Shah, NP    hydrOXYzine HCL (ATARAX) tablet 25 mg, 25 mg, Oral, QHS PRN, Bassam Laurent NP    insulin lispro (HUMALOG) injection, , SubCUTAneous, AC&HS, SalRebecca cuevas, NP, 1 Units at 11/16/20 2356    glucose chewable tablet 16 g, 4 Tab, Oral, PRN, Celestia Rebecca Ramirez, NP    dextrose (D50W) injection syrg 12.5-25 g, 12.5-25 g, IntraVENous, PRN, SalabaRebecca davis NP, 25 g at 11/14/20 0430    glucagon (GLUCAGEN) injection 1 mg, 1 mg, IntraMUSCular, PRN, CelestRebecca Brito, NP    sodium chloride 0.9 % in dextrose 10% 1,040 mL infusion, , IntraVENous, CONTINUOUS, Rebecca Lopez NP, Last Rate: 100 mL/hr at 11/16/20 1257    amLODIPine (NORVASC) tablet 5 mg, 5 mg, Oral, DAILY, Paula WALKER MD, 5 mg at 11/16/20 0836    atorvastatin (LIPITOR) tablet 40 mg, 40 mg, Oral, QHS, Nadege Simons MD, 40 mg at 11/16/20 2356    carvediloL (COREG) tablet 12.5 mg, 12.5 mg, Oral, BID WITH MEALS, Nadege Burger MD, 12.5 mg at 11/16/20 1751    DULoxetine (CYMBALTA) capsule 60 mg, 60 mg, Oral, DAILY, Nadege Burger MD, 60 mg at 11/16/20 0835    glucose chewable tablet 16 g, 4 Tab, Oral, PRN, Paula WALKER MD, 16 g at 11/13/20 2237    dextrose (D50W) injection syrg 12.5-25 g, 12.5-25 g, IntraVENous, PRN, Paula WALKER MD, 12.5 g at 11/14/20 0306    glucagon (GLUCAGEN) injection 1 mg, 1 mg, IntraMUSCular, PRN, Marcos Schroeder MD    losartan (COZAAR) tablet 100 mg, 100 mg, Oral, DAILY, Nadege Burger MD, 100 mg at 11/16/20 0836    pantoprazole (PROTONIX) 40 mg in 0.9% sodium chloride 10 mL injection, 40 mg, IntraVENous, DAILY, Nadege Burger MD, 40 mg at 11/16/20 0831    furosemide (LASIX) injection 60 mg, 60 mg, IntraVENous, BID, Nadege Burger MD, 60 mg at 11/16/20 1750    sodium chloride (NS) flush 5-40 mL, 5-40 mL, IntraVENous, Q8H, Nadege Simons MD, 10 mL at 11/16/20 2357    sodium chloride (NS) flush 5-40 mL, 5-40 mL, IntraVENous, PRN, Marcos Schroeder MD    acetaminophen (TYLENOL) tablet 650 mg, 650 mg, Oral, Q6H PRN, 650 mg at 11/16/20 1448 **OR** acetaminophen (TYLENOL) suppository 650 mg, 650 mg, Rectal, Q6H PRN, Nadege Burger MD    polyethylene glycol (MIRALAX) packet 17 g, 17 g, Oral, DAILY PRN, Nadege Burger MD    ondansetron (ZOFRAN) injection 4 mg, 4 mg, IntraVENous, Q6H PRN, Nadege Burger MD    insulin glargine (LANTUS) injection 10 Units, 10 Units, SubCUTAneous, DAILY, Galaviz, Kassy F, NP, 10 Units at 11/16/20 1255    0.9% sodium chloride infusion 250 mL, 250 mL, IntraVENous, PRN, Mena Agra F, NP    0.9% sodium chloride infusion 250 mL, 250 mL, IntraVENous, PRN, Cheri Silver, Carlos ESCAMILLA MD      Objective:     Vital Signs: Telemetry:    normal sinus rhythm Intake/Output:   Visit Vitals  BP (!) 168/95 (BP 1 Location: Right arm, BP Patient Position: At rest)   Pulse 100   Temp 100 °F (37.8 °C)   Resp (!) 36   Ht 5' 2\" (1.575 m)   Wt 87.1 kg (192 lb)   SpO2 100%   BMI 35.12 kg/m²       Temp (24hrs), Av.3 °F (36.8 °C), Min:97.6 °F (36.4 °C), Max:100 °F (37.8 °C)        O2 Device: Room air O2 Flow Rate (L/min): 1 l/min         Body mass index is 35.12 kg/m². Wt Readings from Last 4 Encounters:   20 87.1 kg (192 lb)   10/16/20 87.7 kg (193 lb 6.4 oz)   10/07/20 92.1 kg (203 lb 1.6 oz)   20 99.2 kg (218 lb 12.8 oz)        No intake or output data in the 24 hours ending 20 0747    Last shift:      No intake/output data recorded. Last 3 shifts: 11/15 1901 -  0700  In: 500 [I.V.:500]  Out: 500 [Urine:500]       Hemodynamics:    CO:    CI:    CVP:    SVR:   PAP Systolic:    PAP Diastolic:    PVR:    RU42:       Ventilator Settings:      Mode Rate TV Press PEEP FiO2 PIP Min. Vent                            Physical Exam:    General:  female; NRB  HEENT: NCAT, poor dentition, lips and mucosa dry  Eyes: anicteric; conjunctiva clear  Neck: no nodes, 1-2+ accessory MM use. Chest: no deformity,   Cardiac: regular; no murmur  Lungs: distant breath sounds; wheezes,   Abd: soft, NT, hypoactive BS, OBESE  Ext: no edema; no joint swelling;  No clubbing  : NO mccoy,   Neuro: very dyspneic- one word; follows commands; alert;  Psych- no agitation, oriented to person; unable to assess  Skin: warm, dry, no cyanosis;   Pulses: 1-2+ Bilateral pedal, radial  Capillary: brisk; pale      Labs:    Recent Labs     20  0415 20  1318   WBC  --  13.8* 13.7*   HGB 8.6* 6.9* 8.4*   PLT  --  239 295     Recent Labs     20      K 4.4      CO2 25   *   BUN 27*   CREA 2.14*   CA 7.1*   MG 2.1   ALB 1.9*   ALT 12     No results for input(s): PH, PCO2, PO2, HCO3, FIO2 in the last 72 hours. No results for input(s): CPK, CKNDX, TROIQ in the last 72 hours. No lab exists for component: CPKMB  No results found for: BNPP, BNP   Lab Results   Component Value Date/Time    Culture result: Culture performed on Fluid swab specimen 11/13/2020 09:38 AM    Culture result: NO GROWTH 3 DAYS 11/13/2020 09:38 AM    Culture result: MIXED UROGENITAL SHIRA ISOLATED 08/25/2015 03:47 PM      Lab Results   Component Value Date/Time     (H) 08/25/2015 02:34 PM       Imaging:  I have personally reviewed the patients radiographs and have reviewed the reports:    CXR Results  (Last 48 hours)               11/17/20 0639  XR CHEST PORT Final result    Impression:  IMPRESSION:       Increased moderate pulmonary edema. Stable small left pleural effusion and left   basilar opacity. Narrative:  EXAM:  XR CHEST PORT       INDICATION: Shortness of breath and wheezing       COMPARISON: 11/16/2020       TECHNIQUE: Portable AP upright chest view at 0631 hours       FINDINGS: The cardiomediastinal contours are stable. There are increased moderate diffuse interstitial opacities. A small left   pleural effusion and left basilar opacity are stable. There is no pneumothorax. The bones and upper abdomen are stable. 11/16/20 2159  XR CHEST PA LAT Final result    Impression:  IMPRESSION:       Stable small left pleural effusion, left basilar opacity, and mild diffuse   interstitial opacities. Narrative:  EXAM:  XR CHEST PA LAT       INDICATION: Dyspnea. Pleural effusions. COMPARISON: 11/13/2020       TECHNIQUE: PA and lateral chest views       FINDINGS: There is stable cardiac silhouette enlargement. There is a stable small left pleural effusion, left basilar opacity, and mild   diffuse interstitial opacities. There is no pneumothorax. The bones and upper   abdomen are stable.                Results from [facility] Encounter encounter on 11/12/20   XR CHEST PORT    Narrative EXAM:  XR CHEST PORT    INDICATION: Shortness of breath and wheezing    COMPARISON: 11/16/2020    TECHNIQUE: Portable AP upright chest view at 0631 hours    FINDINGS: The cardiomediastinal contours are stable. There are increased moderate diffuse interstitial opacities. A small left  pleural effusion and left basilar opacity are stable. There is no pneumothorax. The bones and upper abdomen are stable. Impression IMPRESSION:    Increased moderate pulmonary edema. Stable small left pleural effusion and left  basilar opacity. XR KNEE LT 3 V    Narrative EXAM: XR KNEE LT 3 V    INDICATION: Left knee pain. COMPARISON: None. FINDINGS: Three views of the left knee demonstrate no acute fracture or bony  erosion. There is moderate patellofemoral and mild medial lateral joint space  narrowing with small osteophytes. There is a probable trace joint effusion. Impression IMPRESSION: No acute bony abnormality. Tricompartmental degenerative changes. Probable trace joint effusion. XR CHEST PA LAT    Narrative EXAM:  XR CHEST PA LAT    INDICATION: Dyspnea. Pleural effusions. COMPARISON: 11/13/2020    TECHNIQUE: PA and lateral chest views    FINDINGS: There is stable cardiac silhouette enlargement. There is a stable small left pleural effusion, left basilar opacity, and mild  diffuse interstitial opacities. There is no pneumothorax. The bones and upper  abdomen are stable. Impression IMPRESSION:    Stable small left pleural effusion, left basilar opacity, and mild diffuse  interstitial opacities. Results from [place] encounter on 11/12/20   CT ABD PELV W WO CONT    Narrative EXAM: CT ABD PELV W WO CONT  CLINICAL HISTORY: GI bleed  INDICATION: GI bleed  COMPARISON: 10/4/2020.   CONTRAST: Isovue 370  TECHNIQUE:   Multislice helical CT was performed of the abdomen and pelvis prior to and  following uneventful rapid bolus intravenous contrast administration. Oral  contrast was not administered. Contiguous 5 mm axial images were reconstructed  and lung and soft tissue windows were generated. Coronal and sagittal  reformations were generated. CT dose reduction was achieved through use of a  standardized protocol tailored for this examination and automatic exposure  control for dose modulation. FINDINGS:    There is no evidence of active extravasation. Residual oral contrast material in  colonic diverticula redemonstrated. LUNG BASES:The heart is enlarged. Atherosclerotic disease is present. Anemia. Increased now moderate to large bilateral pleural effusions. Pericardial  effusion as well. LIVER: Fatty sparing adjacent to the fissure Several calcifications are likely  related to prior granulomatous disease. BILIARY TREE: Status post cholecystectomy. CBD is not dilated. SPLEEN: within normal limits. PANCREAS: No focal abnormality. ADRENALS: Unremarkable. KIDNEYS/URETERS: Left renal calculus. No hydronephrosis. STOMACH: Gastric mass lesion near the fundus of stomach not changed  SMALL BOWEL: Unremarkable  COLON: No evidence of active extravasation Several colonic diverticula are  present. APPENDIX: Unremarkable. PERITONEUM: No ascites or pneumoperitoneum. RETROPERITONEUM: No lymphadenopathy or aortic aneurysm. REPRODUCTIVE ORGANS: Uterine fibroids  URINARY BLADDER: No mass or calculus. BONES: No destructive bone lesion. ABDOMINAL WALL: No mass or hernia. ADDITIONAL COMMENTS: N/A      Impression IMPRESSION:  There is no evidence of active extravasation. Gastric mass lesion is not significantly changed compared to prior examinations. Increased now moderate bilateral pleural effusions with bibasilar atelectasis. Status post cholecystectomy. Nonobstructive stone in the left kidney. Several small uterine fibroids.                This care involved high complexity medical decision making to treat acute and unstable vital organ system failure, and to prevent further life threatening deterioration of the patients condition. I personally:  · Reviewed the flowsheet and previous days notes  · Reviewed and summarized records or history from previous days note or discussions with staff, family  · Parenteral controlled substances - Reviewed/ Adjusted / Anahy Bouillon / Started  · High Risk Drug therapy requiring intensive monitoring for toxicity: eg steroids, pressors, antibiotics  · Reviewed and/or ordered Clinical lab tests  · Reviewed and/or ordered Radiology tests  · Reviewed and/or ordered of Medicine tests  · Independently visualized radiologic Images  · Reviewed the patients ECG / Telemetry  · Reviewed and/or adjusted Ventilator / NiPPV settings  ·  discussed my assessment/management with : Consultants, Nursing, Pharmacy, Case Management, PT, OT, Respiratory Therapy, Hospitalist and Family for coordination of care           Thank you for allowing us to participate in the care of this patient. We will follow along with you until they no longer require CCU services.     Jersey Velázquez MD

## 2020-11-17 NOTE — PROGRESS NOTES
Problem: Falls - Risk of  Goal: *Absence of Falls  Description: Document Devon Moreland Fall Risk and appropriate interventions in the flowsheet. Outcome: Progressing Towards Goal  Note: Fall Risk Interventions:  Mobility Interventions: Bed/chair exit alarm         Medication Interventions: Evaluate medications/consider consulting pharmacy    Elimination Interventions: Call light in reach              Problem: Pressure Injury - Risk of  Goal: *Prevention of pressure injury  Description: Document Rufus Scale and appropriate interventions in the flowsheet.   Outcome: Progressing Towards Goal  Note: Pressure Injury Interventions:  Sensory Interventions: Assess changes in LOC    Moisture Interventions: Absorbent underpads    Activity Interventions: Increase time out of bed    Mobility Interventions: HOB 30 degrees or less, Pressure redistribution bed/mattress (bed type)    Nutrition Interventions: Document food/fluid/supplement intake

## 2020-11-17 NOTE — PROGRESS NOTES
Events noted. Not a surgical candidate presently due to comorbid issues. Will follow chart peripherally while she's here to follow her progress in hopes that she becomes more appropriate for surgical intervention for gastric GIST.

## 2020-11-17 NOTE — PROGRESS NOTES
Pharmacy Automatic Renal Dosing Protocol - Antimicrobials    Indication for Antimicrobials: HAP     Current Regimen of Each Antimicrobial:  Levaquin 500mg IV q24h x 7 days (Start Date ; Day # 1)    Significant Cultures:    Pleural Fluid- NG x 3 days, pending    Radiology / Imaging results: (X-ray, CT scan or MRI):    CXR: IMPRESSION:     Increased moderate pulmonary edema. Stable small left pleural effusion and left  basilar opacity. Paralysis, amputations, malnutrition:      Labs:  Recent Labs     20  0415 20  1318   CREA 2.14*  --    BUN 27*  --    WBC 13.8* 13.7*     Temp (24hrs), Av.3 °F (36.8 °C), Min:97.6 °F (36.4 °C), Max:100 °F (37.8 °C)      Is the Patient on Dialysis? No    Creatinine Clearance (mL/min):   CrCl (Actual Body Weight): 32.2  CrCl (Adjusted Body Weight): 24.0  CrCl (Ideal Body Weight): 18.5    Impression/Plan:   Due to CrCl of 24 mL/min, will adjust Levaquin dose to 750mg IV q48h x 7 days, per protocol  Antimicrobial stop date      Pharmacy will follow daily and adjust medications as appropriate for renal function and/or serum levels. Thank you,  Sly Del Real    Recommended duration of therapy  [email or web address]/[facility]/Pharmacy/Clinical%20Companion/[email or web address]%[email or web address]. docx    Renal Dosing  [email or web address]/[facility]/Pharmacy/Clinical%20Companion/Renal%[id number]. pdf

## 2020-11-17 NOTE — PROGRESS NOTES
Received message from patient's nurse Ariane Sage stating :    Patient has developed wheezing and shortness of breath. I put patient on 2 L/min nasal cannula and she is still experiencing shortness of breath and wheezing. Discussion / orders:    · Stat chest x-ray  · DuoNeb x1 now  · DuoNeb every 4 hours as needed for wheezing/shortness of breath  · Respiratory care consult           Please note that this note was dictated using Dragon computer voice recognition software. Quite often unanticipated grammatical, syntax, homophones, and other interpretive errors are inadvertently transcribed by the computer software. Please disregard these errors. Please excuse any errors that have escaped final proofreading.

## 2020-11-17 NOTE — PROGRESS NOTES
Comprehensive Nutrition Assessment    Type and Reason for Visit: Initial, Consult    Nutrition Recommendations/Plan:   Continue Full Liquids and Ensure Enlive for now  Consider initiation of nutrition support (TF vs TPN) if pt remains unable to take much PO    Nutrition Assessment:   Pt admitted with upper GI bleed. PMH:  CAD, GIST, GERD, HTN, DM. Consult received, chart reviewed. Pt is COVID R/O so unable to enter the room. Pt is also BiPAP dependent so unable to take any PO today, RN reports she might be able to take meds with sips later tonight if her BIPAP needs continue to decrease. 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 Unable to speak with pt and MST not filled out so unable to determine malnutrition status. Based on her current wt (which has no source) she is down 12% in the past 11 months. She appears to be down ~10lb in the past month. Currently given the decline in her status she is not a surgical candidate, however she may need nutrition support to optimize her for surgery in the future if she remains unable to take in PO. Pt also have refractory hypoglycemia, endocrinology consulted. Ensure Enlive contains 45g CHO per bottle, not ideal in the setting of DM but as pt will likely only be able to take sips currently, will leave on board for now and explore more appropriate diabetic options (such as Glucerna, Ensure Mx Protein) upon F/U.    Wt Readings from Last 10 Encounters:   11/12/20 87.1 kg (192 lb)   10/16/20 87.7 kg (193 lb 6.4 oz)   10/07/20 92.1 kg (203 lb 1.6 oz)   01/27/20 99.2 kg (218 lb 12.8 oz)   10/21/19 98.5 kg (217 lb 3.2 oz)   07/18/19 92.4 kg (203 lb 12.8 oz)   04/12/19 93.7 kg (206 lb 9.6 oz)   01/08/19 95.5 kg (210 lb 9.6 oz)   09/24/18 92 kg (202 lb 12.8 oz)   06/19/18 88.7 kg (195 lb 9.6 oz)       Malnutrition Assessment:  Malnutrition Status:     UTD      Estimated Daily Nutrient Needs:  Energy (kcal): MSJ 1750 (1330 x 1.3); Weight Used for Energy Requirements: Current  Protein (g): 75-100g (1.5-2gPro/kg IBW); Weight Used for Protein Requirements: Current  Fluid (ml/day): 1800mL; Method Used for Fluid Requirements: 1 ml/kcal      Nutrition Related Findings:  Meds: lasix, lantus, humalog, levaquin, protonix, [email or web address]. Edema: +1-all extremities. BM 11/16      Wounds:    None       Current Nutrition Therapies:  DIET FULL LIQUID  DIET NUTRITIONAL SUPPLEMENTS Breakfast, AM Snack, Lunch, PM Snack, Dinner, [employer]; Ensure Enlive    Anthropometric Measures:  · Height:  5' 2\" (157.5 cm)  · Current Body Wt:  87.1 kg (192 lb 0.3 oz)   · Ideal Body Wt:  110 lbs:  174.6 %     Nutrition Diagnosis:   · Inadequate protein-energy intake related to impaired respiratory function as evidenced by NPO or clear liquid status due to medical condition, intake 0-25%      Nutrition Interventions:   Food and/or Nutrient Delivery: Continue current diet, Continue oral nutrition supplement  Nutrition Education and Counseling: Education not appropriate  Coordination of Nutrition Care: Continue to monitor while inpatient, Interdisciplinary rounds    Goals:  Pt will consume >50% of meals/supplements or alternative nutrition support will be started in 2-4 days. Nutrition Monitoring and Evaluation:   Behavioral-Environmental Outcomes: None identified  Food/Nutrient Intake Outcomes: Supplement intake, Food and nutrient intake  Physical Signs/Symptoms Outcomes: Biochemical data, GI status, Weight, Fluid status or edema    Discharge Planning:     Too soon to determine     Electronically signed by Yazmin Gerardo RD, [address]  on 11/17/2020 at 2:23 PM    Contact: [phone]

## 2020-11-17 NOTE — PROGRESS NOTES
Physical Therapy    Chart reviewed, patient with rapid response called this morning and transferred to CCU, now on Bipap. Plan to defer today and continue to follow.     Stanley Merchant

## 2020-11-17 NOTE — CONSULTS
This is a 77-year-old -American female with a history of type 2 diabetes mellitus followed by Dr. Jeronimo Chung last seen in January 2020 with a recent diagnosis of a gastric tumor (presumably GIST) and iron deficiency anemia due to an upper GI bleed ( hemoglobin 5.7) admitted with increasing shortness of breath. She is status post 2 units of packed red blood cells. During her hospitalization she developed several episodes of hypoglycemia for which we are consulted. It appears that she has been taking 108 units of Tresiba insulin daily at home along with glipizide 10 mg twice daily. Most recent A1c was 7.2%. I note that several hypoglycemia episodes occurred following admission and then following 100 units of Lantus insulin. The last episode of hypoglycemia occurred on the 15th. Most recently, the patient has been admitted to the intensive care unit with an exacerbation of her shortness of breath. Subsequently, the patient has developed hyperglycemia exacerbated by glucocorticoids and D10 infusion. Examination limited  by droplet isolation  Blood Pressure 134/62  Respirations 20  Pulse 72  O2 100% on 50%    Hemoglobin 6.9, 8.6 following transfusion  Normal serum electrolytes, glucose 218, BUN 27, creatinine 2.14    Impression hypoglycemia related to 100+ units of long-acting insulin (1.25 units/kg) , now resolved. Recommendations: Given her current clinical status, she will likely require minimal insulin. This is apparent in her response to 10 units of glargine insulin only. I will continue to follow with you because I would anticipate an increase in her insulin requirements as her clinical status evolves.

## 2020-11-17 NOTE — ROUTINE PROCESS
1520--Bedside shift change report given to Deidra King (oncoming nurse) by Rylan Hyatt (offgoing nurse). Report included the following information SBAR, Kardex and MAR.     1900- Report given to the oncoming shift.

## 2020-11-17 NOTE — INTERDISCIPLINARY ROUNDS
Interdisciplinary team rounds were held 11/17/2020 with the following team members:Care Management, Diabetes Treatment Specialist, Nursing, Nutrition, Palliative Care, Pharmacy, Physical Therapy, Physician, Respiratory Therapy and Clinical Coordinator. Plan of care discussed. See clinical pathway and/or care plan for interventions and desired outcomes.

## 2020-11-17 NOTE — PROGRESS NOTES
5932 - patient on unit from RR transfer. Dr. Yrn Maria at bedside. Patient placed on Bipap. New orders received from Dr. Yrn Maria.     0800 - Primary Nurse Paula Lopez, KATHERINE and Pauly Massey RN performed a dual skin assessment on this patient No impairment noted  Rufus score is 14    0845 - report received from Oncology RN Vincent 80. 1401 - Informed Dr. Yrn Maria of BNP lab value. New order for Cardiology consult obtained. 1515 - report given to Roberto Valdez RN.

## 2020-11-17 NOTE — CONSULTS
Consult    NAME: Harvie Schirmer   :  1945   MRN:  [id number]     Date/Time:  2020 3:10 PM    Patient PCP: Pedro Hicks MD  ________________________________________________________________________    Cardiac Assessment/Plan:     1) CAD: Distal RCA ENENA 2015 for NQWMI; Med Rx mid circ and diffusely dz diagonals. *NQWMI 10/2020 (trop 9), c/w type II from profound anemia. * EF 30-35% 10/6/20. 2) HTN  3) DM  4) Dyslipidemia  5) CKD III: Cr 1.43/gfr 42 2020 (Dr Didier Simpson). 6) Obesity: ?NANY. 7) Non-compliant with some meds per pt 10/2020. Poor compliance with f/u OV. 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 Recurrent anemia, s/p transfusion. GUANACO improving  Being ruled out for COVID  Bilateral pleural effusions s/p right thorocentesis. Currently on bipap    - holding aspirin  - Cont coreg  - Holding cozaar until cr improves  - Increase lasix to 40mg iv bid, follow bmp  - Cont statin    Limited history and physical due to isolation and COVID rule out.             Echo 10/6/20:   · LV: Estimated LVEF is 30 - 35%. Normal cavity size. Mild concentric hypertrophy. Moderate-to-severely reduced systolic function. · TV: Mild to moderate tricuspid valve regurgitation is present.        [x]        High complexity decision making was performed        Subjective:   CHIEF COMPLAINT: Dyspnea    HISTORY OF PRESENT ILLNESS:        Harvie Schirmer is a 70 y.o.  female who presents with above complaints from home. 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   Patient has since stopped taking her aspirin and Plavix, but was lost to follow-up and has not followed up with general surgeon Dr. Garcia Booth or the oncology for any treatment for her cancer. Patient has seen her stools to be dark for the past few days but no obvious blood.     Patient was found with hemoglobin of 5.7 on the blood work in the ED with CT abdomen and pelvis negative for any active bleeding but evident of her gastric mass which is unchanged but increased pleural effusion bilaterally was noted. We were asked to consult for work up and evaluation of the above problems. Past Medical History:   Diagnosis Date    Anemia, unspecified 10/6/2020    Arrhythmia     Arthritis     CAD (coronary artery disease)     [facility] ([facility] [address]     Chronic bronchitis ([address]     per pt:  as of 1/9/15 pt denies any ARENAS or SOB    Diabetes ([facility] [address] Dx approx 2009    Dr Angelica Quezada (in [facility])    GERD (gastroesophageal reflux disease)     Hypercholesteremia     Hypertension       Past Surgical History:   Procedure Laterality Date    HX CHOLECYSTECTOMY  6/12    HX COLONOSCOPY      HX CORONARY STENT PLACEMENT  8/25/2015    HX DILATION AND CURETTAGE      UPPER GI ENDOSCOPY,BIOPSY  10/6/2020          Allergies   Allergen Reactions    Metformin Other (comments)     GI side effects. Not a true allergy      Meds:  See below  Social History     Tobacco Use    Smoking status: Former Smoker    Smokeless tobacco: Never Used   Substance Use Topics    Alcohol use: No      Family History   Problem Relation Age of Onset    Diabetes Mother     Heart Disease Mother     Hypertension Mother     Stroke Father     Heart Disease Brother     Heart Disease Brother     Stroke Brother     HIV/AIDS Brother        REVIEW OF SYSTEMS:     []         Unable to obtain  ROS due to ---   [x]         Total of 12 systems reviewed as follows:     Total of 12 systems reviewed as follows:       POSITIVE= Bold text  Negative = normal text  General:  fever, chills, sweats, generalized weakness, weight loss/gain,      loss of appetite   Eyes:    blurred vision, eye pain, loss of vision, double vision  ENT: rhinorrhea, pharyngitis   Respiratory:   cough, sputum production, SOB, ARENAS, wheezing, pleuritic pain   Cardiology:   chest pain, palpitations, orthopnea, PND, edema, syncope   Gastrointestinal:  abdominal pain , N/V, diarrhea, dysphagia, constipation, bleeding   Genitourinary:  frequency, urgency, dysuria, hematuria, incontinence   Muskuloskeletal :  arthralgia, myalgia, back pain  Hematology:  easy bruising, nose or gum bleeding, lymphadenopathy   Dermatological: rash, ulceration, pruritis, color change / jaundice  Endocrine:   hot flashes or polydipsia   Neurological:  headache, dizziness, confusion, focal weakness, paresthesia,     Speech difficulties, memory loss, gait difficulty  Psychological: Feelings of anxiety, depression, agitation    Objective:      Physical Exam:    Last 24hrs VS reviewed since prior progress note. Most recent are:    Visit Vitals  /63   Pulse (!) 55   Temp 98.5 °F (36.9 °C)   Resp 14   Ht 5' 2\" (1.575 m)   Wt 87.1 kg (192 lb)   SpO2 100%   BMI 35.12 kg/m²       Intake/Output Summary (Last 24 hours) at 11/17/2020 1510  Last data filed at 11/17/2020 1429  Gross per 24 hour   Intake 375 ml   Output 730 ml   Net -355 ml        General Appearance: Well developed,chronically ill, alert & oriented x 3,    no acute distress. Ears/Nose/Mouth/Throat: Pupils equal and round, Hearing grossly normal.  Neck: Supple. JVP within normal limits. Carotids good upstrokes, with no bruit. Chest: Lungs ronchi to auscultation bilaterally. Cardiovascular: Regular rate and rhythm, S1S2 normal, no murmur, rubs, gallops. Abdomen: Soft, non-tender, bowel sounds are active. No organomegaly. Extremities: Trace edema bilaterally. Femoral pulses +2, Distal Pulses +1. Skin: Warm and dry. Neuro: CN II-XII grossly intact, Strength and sensation grossly intact. Data:      Prior to Admission medications    Medication Sig Start Date End Date Taking?  Authorizing Provider   pantoprazole (PROTONIX) 40 mg tablet Take 1 Tab by mouth daily for 30 days. 10/22/20 11/21/20  Tara Crespo MD   DULoxetine (CYMBALTA) 60 mg capsule Take 1 Cap by mouth daily. 10/14/20   Phoebe Lower, NP   losartan (COZAAR) 100 mg tablet Take 1 Tab by mouth daily. 10/14/20   Phoebe Lower, NP   carvediloL (COREG) 12.5 mg tablet Take 1 Tab by mouth two (2) times daily (with meals). 10/13/20   Phoebe Lower, NP   amLODIPine (NORVASC) 5 mg tablet Take 1 Tab by mouth daily. 10/14/20   Phoebe Lower, NP   ergocalciferol (ERGOCALCIFEROL) 1,250 mcg (50,000 unit) capsule Take 50,000 Units by mouth every seven (7) days. On Friday    Provider, Historical   torsemide (DEMADEX) 20 mg tablet Take 40 mg by mouth daily. Provider, Historical   insulin degludec (TRESIBA FLEXTOUCH U-200) 200 unit/mL (3 mL) inpn 108 units daily 1/27/20   Dominik Malik MD   glipiZIDE (GLUCOTROL) 10 mg tablet Take 1 Tab by mouth two (2) times a day. 1/27/20   Dominik Malik MD   atorvastatin (LIPITOR) 40 mg tablet Take 1 Tab by mouth nightly. 1/27/20   Dominik Malik MD   aspirin 81 mg chewable tablet Take 1 Tab by mouth daily.  10/26/17   Dominik Malik MD       Recent Results (from the past 24 hour(s))   GLUCOSE, POC    Collection Time: 11/16/20  4:33 PM   Result Value Ref Range    Glucose (POC) 200 (H) 65 - 100 mg/dL    Performed by Shanon Gallo (CON)    HGB & HCT    Collection Time: 11/16/20  7:13 PM   Result Value Ref Range    HGB 8.6 (L) 11.5 - 16.0 g/dL    HCT 29.3 (L) 35.0 - 47.0 %   GLUCOSE, POC    Collection Time: 11/16/20 10:45 PM   Result Value Ref Range    Glucose (POC) 232 (H) 65 - 100 mg/dL    Performed by [facility]    GLUCOSE, POC    Collection Time: 11/17/20  6:04 AM   Result Value Ref Range    Glucose (POC) 109 (H) 65 - 100 mg/dL    Performed by [facility] AT [facility] PCT    GLUCOSE, POC    Collection Time: 11/17/20  8:54 AM   Result Value Ref Range    Glucose (POC) 119 (H) 65 - 100 mg/dL    Performed by [address] [place] [address] GAS, ARTERIAL    Collection Time: 11/17/20  9:00 AM   Result Value Ref Range    pH 7.40 7.35 - 7.45      PCO2 42 35.0 - 45.0 mmHg    PO2 308 (H) 80 - 100 mmHg    O2  (H) 92 - 97 %    BICARBONATE 26 22 - 26 mmol/L    BASE EXCESS 0.8 mmol/L    O2 METHOD BIPAP      FIO2 100 %    IPAP/PIP 16      EPAP/CPAP/PEEP 8      Sample source ARTERIAL      SITE LEFT RADIAL      ROWENA'S TEST YES     SARS-COV-2    Collection Time: 11/17/20  9:40 AM   Result Value Ref Range    Specimen source Nasopharyngeal      SARS-CoV-2 PENDING     SARS-CoV-2 PENDING     Specimen source Nasopharyngeal      COVID-19 rapid test PENDING     Specimen type NP Swab      Health status PENDING     COVID-19 PENDING    HGB & HCT    Collection Time: 11/17/20 10:40 AM   Result Value Ref Range    HGB 10.7 (L) 11.5 - 16.0 g/dL    HCT 34.3 (L) 35.0 - 47.0 %   NT-PRO BNP    Collection Time: 11/17/20 10:43 AM   Result Value Ref Range    NT pro-BNP 28,194 (H) <137 PG/ML   METABOLIC PANEL, BASIC    Collection Time: 11/17/20 10:43 AM   Result Value Ref Range    Sodium 142 136 - 145 mmol/L    Potassium 4.5 3.5 - 5.1 mmol/L    Chloride 109 (H) 97 - 108 mmol/L    CO2 26 21 - 32 mmol/L    Anion gap 7 5 - 15 mmol/L    Glucose 100 65 - 100 mg/dL    BUN 29 (H) 6 - 20 MG/DL    Creatinine 1.68 (H) 0.55 - 1.02 MG/DL    BUN/Creatinine ratio 17 12 - 20      GFR est AA 36 (L) >60 ml/min/1.73m2    GFR est non-AA 30 (L) >60 ml/min/1.73m2    Calcium 7.9 (L) 8.5 - 10.1 MG/DL   GLUCOSE, POC    Collection Time: 11/17/20 11:54 AM   Result Value Ref Range    Glucose (POC) 110 (H) 65 - 100 mg/dL    Performed by Ozzie Moreno

## 2020-11-17 NOTE — PROGRESS NOTES
Rapid Response Team Note    Called by RN at [phone] to see patient for respiratory distress stat. Upon entering the room the patient is noted to be in noticeable respiratory distress, audible wheezing, accessory muscle use and inability to speak full sentences secondary to shortness of breath. Patient complains of difficulty breathing    Patient's nurse reports that patient had received 1 unit of packed red blood cells during dayshift yesterday and she has just completed her second unit when she started to have shortness of breath and wheezing. She is also febrile with temp of 100 °F  Of note is that patient's nurse had reached out to me earlier via messaging and I had ordered stat chest x-ray, nebulizer treatment, his respiratory care consult. Mikel Bauman is a 70 y.o. 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 She was also found to have bilateral pleural effusion and has been on Lasix. She was transfused 1 unit packed red blood cell when she had been in the emergency room and was started on IV Protonix. General surgery was consulted and proximal resection was planned. Per general surgery GIST is a source of anemia and that resection distal sulfate check to me and partial gastrectomy and anastomosis would not be possible unless patient's albumin is 3.5 and hemoglobin is at least to 10 prior to surgery. Allergies   Allergen Reactions    Metformin Other (comments)     GI side effects.  Not a true allergy          Current Facility-Administered Medications:     albuterol-ipratropium (DUO-NEB) 2.5 MG-0.5 MG/3 ML, 3 mL, Nebulization, NOW, Purkellyor, Atoosa, ACNP    albuterol-ipratropium (DUO-NEB) 2.5 MG-0.5 MG/3 ML, 3 mL, Nebulization, Q4H PRN, Purveyor, Atoosa, ACNP    furosemide (LASIX) 10 mg/mL injection, , , ,     albuterol (PROVENTIL VENTOLIN) 2.5 mg /3 mL (0.083 %) nebulizer solution, , , ,    albuterol (PROVENTIL VENTOLIN) nebulizer solution 2.5 mg, 2.5 mg, Nebulization, NOW, Purveyor, Atoosa, ACNP    budesonide (PULMICORT) 0.25 mg/2 mL nebulizer suspension, , , ,     arformoteroL (BROVANA) 15 mcg/2 mL neb solution, , , ,     methylPREDNISolone (PF) (Solu-MEDROL) injection 60 mg, 60 mg, IntraVENous, Q12H, Purkellyor, Atoosa, ACNP    levoFLOXacin (LEVAQUIN) 750 mg in D5W IVPB, 750 mg, IntraVENous, Q48H, Shoshanaor, Sherlynsa, ACNP    0.9% sodium chloride infusion 250 mL, 250 mL, IntraVENous, PRN, Yuri Shah, NP    hydrOXYzine HCL (ATARAX) tablet 25 mg, 25 mg, Oral, QHS PRN, Bassam Laurent NP    insulin lispro (HUMALOG) injection, , SubCUTAneous, AC&HS, Rebecca Lopez NP, 1 Units at 11/16/20 2356    glucose chewable tablet 16 g, 4 Tab, Oral, PRN, Rebecca Gillespie, NP    dextrose (D50W) injection syrg 12.5-25 g, 12.5-25 g, IntraVENous, PRN, Rebecca Lopez NP, 25 g at 11/14/20 0430    glucagon (GLUCAGEN) injection 1 mg, 1 mg, IntraMUSCular, PRN, Rebecca Gillespie, NP    sodium chloride 0.9 % in dextrose 10% 1,040 mL infusion, , IntraVENous, CONTINUOUS, Rebecca Lopez, NP, Last Rate: 100 mL/hr at 11/16/20 1257    amLODIPine (NORVASC) tablet 5 mg, 5 mg, Oral, DAILY, Nadege Simons MD, 5 mg at 11/16/20 0836    atorvastatin (LIPITOR) tablet 40 mg, 40 mg, Oral, QHS, Nadege Simons MD, 40 mg at 11/16/20 2356    carvediloL (COREG) tablet 12.5 mg, 12.5 mg, Oral, BID WITH MEALS, Nadege Simons MD, 12.5 mg at 11/16/20 1751    DULoxetine (CYMBALTA) capsule 60 mg, 60 mg, Oral, DAILY, Nadege Simons MD, 60 mg at 11/16/20 0835    glucose chewable tablet 16 g, 4 Tab, Oral, PRN, Mona WALKER MD, 16 g at 11/13/20 2237    dextrose (D50W) injection syrg 12.5-25 g, 12.5-25 g, IntraVENous, PRN, Nadege Kaminski MD, 12.5 g at 11/14/20 0306    glucagon (GLUCAGEN) injection 1 mg, 1 mg, IntraMUSCular, PRN, Mona WALKER MD    losartan (COZAAR) tablet 100 mg, 100 mg, Oral, DAILY, Laury Ramírez MD, 100 mg at 11/16/20 0836    pantoprazole (PROTONIX) 40 mg in 0.9% sodium chloride 10 mL injection, 40 mg, IntraVENous, DAILY, Nadege Stout MD, 40 mg at 11/16/20 0831    furosemide (LASIX) injection 60 mg, 60 mg, IntraVENous, BID, Nadege Stout MD, 60 mg at 11/16/20 1750    sodium chloride (NS) flush 5-40 mL, 5-40 mL, IntraVENous, Q8H, SimonsNadege MD, 10 mL at 11/16/20 2357    sodium chloride (NS) flush 5-40 mL, 5-40 mL, IntraVENous, PRN, Laury Ramírez MD    acetaminophen (TYLENOL) tablet 650 mg, 650 mg, Oral, Q6H PRN, 650 mg at 11/16/20 1448 **OR** acetaminophen (TYLENOL) suppository 650 mg, 650 mg, Rectal, Q6H PRN, Nadege Stout MD    polyethylene glycol (MIRALAX) packet 17 g, 17 g, Oral, DAILY PRN, Nadege Stout MD    ondansetron (ZOFRAN) injection 4 mg, 4 mg, IntraVENous, Q6H PRN, Nadege Stout MD    insulin glargine (LANTUS) injection 10 Units, 10 Units, SubCUTAneous, DAILY, Raegan Armando F, NP, 10 Units at 11/16/20 1255    0.9% sodium chloride infusion 250 mL, 250 mL, IntraVENous, PRN, Alfonzo Gibson, NP    0.9% sodium chloride infusion 250 mL, 250 mL, IntraVENous, PRN, Wayna Riedel I, MD       Visit Vitals  BP (!) 168/95 (BP 1 Location: Right arm, BP Patient Position: At rest)   Pulse 100   Temp 100 °F (37.8 °C)   Resp (!) 36   Ht 5' 2\" (1.575 m)   Wt 87.1 kg (192 lb)   SpO2 100%   BMI 35.12 kg/m²          Review of Systems   Respiratory: Positive for cough, chest tightness, shortness of breath, wheezing and stridor. Physical Exam  Constitutional:       General: She is in acute distress. Appearance: She is ill-appearing. Cardiovascular:      Rate and Rhythm: Tachycardia present. Pulmonary:      Effort: Respiratory distress present. Breath sounds: Stridor present. Wheezing present. Neurological:      Mental Status: She is alert.               Pertinent Recent Labs and Xrays:    Recent Labs 11/16/20 1913 11/16/20 0415 11/14/20  1318   WBC  --  13.8* 13.7*   HGB 8.6* 6.9* 8.4*   HCT 29.3* 24.3* 28.3*   PLT  --  239 295     Recent Labs     11/16/20 0415      K 4.4      CO2 25   BUN 27*   CREA 2.14*   *   CA 7.1*   MG 2.1     No results for input(s): INR, INREXT in the last 72 hours. No results for input(s): PH, PCO2, PO2 in the last 72 hours. No results for input(s): PHI, PO2I, PCO2I in the last 72 hours. No results for input(s): CPK, CKNDX, TROIQ in the last 72 hours. No lab exists for component: CPKMB  Lab Results   Component Value Date/Time    Glucose (POC) 109 (H) 11/17/2020 06:04 AM    Glucose (POC) 232 (H) 11/16/2020 10:45 PM    Glucose (POC) 200 (H) 11/16/2020 04:33 PM    Glucose (POC) 189 (H) 11/16/2020 12:03 PM    Glucose (POC) 191 (H) 11/16/2020 08:20 AM          Recent Imaging studies(If Any)    CXR Results  (Last 48 hours)               11/17/20 0639  XR CHEST PORT Final result    Impression:  IMPRESSION:       Increased moderate pulmonary edema. Stable small left pleural effusion and left   basilar opacity. Narrative:  EXAM:  XR CHEST PORT       INDICATION: Shortness of breath and wheezing       COMPARISON: 11/16/2020       TECHNIQUE: Portable AP upright chest view at 0631 hours       FINDINGS: The cardiomediastinal contours are stable. There are increased moderate diffuse interstitial opacities. A small left   pleural effusion and left basilar opacity are stable. There is no pneumothorax. The bones and upper abdomen are stable. 11/16/20 2159  XR CHEST PA LAT Final result    Impression:  IMPRESSION:       Stable small left pleural effusion, left basilar opacity, and mild diffuse   interstitial opacities. Narrative:  EXAM:  XR CHEST PA LAT       INDICATION: Dyspnea. Pleural effusions. COMPARISON: 11/13/2020       TECHNIQUE: PA and lateral chest views       FINDINGS: There is stable cardiac silhouette enlargement. There is a stable small left pleural effusion, left basilar opacity, and mild   diffuse interstitial opacities. There is no pneumothorax. The bones and upper   abdomen are stable. Echo Results  (Last 48 hours)    None           CT Results  (Last 48 hours)    None           EKG RESULTS     Procedure Component Value Units Date/Time    EKG 12 LEAD INITIAL [[id number]] Collected:  11/12/20 1657    Order Status:  Completed Updated:  11/13/20 1143     Ventricular Rate 77 BPM      Atrial Rate 77 BPM      P-R Interval 100 ms      QRS Duration 92 ms      Q-T Interval 424 ms      QTC Calculation (Bezet) 479 ms      Calculated P Axis 47 degrees      Calculated R Axis 16 degrees      Calculated T Axis -127 degrees      Diagnosis --     Sinus rhythm with short NV  Left ventricular hypertrophy with repolarization abnormality  When compared with ECG of 04-OCT-2020 12:51,  ST no longer depressed in Anterior leads  T wave inversion now evident in Inferior leads  Confirmed by RUDY Willis ([id number]) on 11/13/2020 11:43:03 AM             10/04/20   ECHO ADULT COMPLETE 10/06/2020 10/6/2020    Narrative · LV: Estimated LVEF is 30 - 35%. Normal cavity size. Mild concentric   hypertrophy. Moderate-to-severely reduced systolic function. · TV: Mild to moderate tricuspid valve regurgitation is present. Signed by: Crystal Mcfadden MD            Recent Microbiology Data(If Any)  . All Micro Results     Procedure Component Value Units Date/Time    CULTURE, BODY FLUID [[id number]] Collected:  11/13/20 0938    Order Status:  Completed Specimen:   Body Fluid from Pleural Fluid Updated:  11/16/20 0905     Special Requests: NO SPECIAL REQUESTS        GRAM STAIN RARE WBCS SEEN         NO ORGANISMS SEEN        Culture result:       Culture performed on Fluid swab specimen            NO GROWTH 3 DAYS                LAB DATA REVIEWED:    Recent Results (from the past 24 hour(s))   RBC, ALLOCATE    Collection Time: 11/16/20  7:45 AM   Result Value Ref Range    HISTORY CHECKED?  Historical check performed    GLUCOSE, POC    Collection Time: 11/16/20  8:20 AM   Result Value Ref Range    Glucose (POC) 191 (H) 65 - 100 mg/dL    Performed by Hayden Holbrook (RN)    TYPE & SCREEN    Collection Time: 11/16/20  8:59 AM   Result Value Ref Range    Crossmatch Expiration 11/19/2020,2359     ABO/Rh(D) A POSITIVE     Antibody screen NEG     Comment Previously identified anti A1     Unit number [id number]     Blood component type  LR     Unit division 00     Status of unit ISSUED     Crossmatch result Compatible     Unit number [id number]     Blood component type  LR     Unit division 00     Status of unit ALLOCATED     Crossmatch result Compatible     Unit number [id number]     Blood component type  LR     Unit division 00     Status of unit ISSUED     Crossmatch result Compatible    GLUCOSE, POC    Collection Time: 11/16/20 12:03 PM   Result Value Ref Range    Glucose (POC) 189 (H) 65 - 100 mg/dL    Performed by Candido Lozada    GLUCOSE, POC    Collection Time: 11/16/20  4:33 PM   Result Value Ref Range    Glucose (POC) 200 (H) 65 - 100 mg/dL    Performed by Shanon Gallo (JO)    HGB & HCT    Collection Time: 11/16/20  7:13 PM   Result Value Ref Range    HGB 8.6 (L) 11.5 - 16.0 g/dL    HCT 29.3 (L) 35.0 - 47.0 %   GLUCOSE, POC    Collection Time: 11/16/20 10:45 PM   Result Value Ref Range    Glucose (POC) 232 (H) 65 - 100 mg/dL    Performed by [facility] PCT    GLUCOSE, POC    Collection Time: 11/17/20  6:04 AM   Result Value Ref Range    Glucose (POC) 109 (H) 65 - 100 mg/dL    Performed by [facility] PCT             Assessment, action, and plan    Respiratory distress with persistent wheezing and stridor    · Patient was placed on nonrebreather  · Benadryl 50 mg IV ordered and administered  · Solu-Medrol 125 mg IV ordered and administered  · Racemic epi ordered and administered  · Lasix 40 mg IV ordered and administered  · Albuterol nebulizer ordered and administered  · Pulmicort nebulizer ordered and administered    Patient continued to be in respiratory distress with significant wheezing and stridors and increased work of breath and accessory muscle use with retractions despite above interventions. · Started on 750 mg IV daily  · Solu-Medrol 60 mg IV every 12 hours  · Arterial blood gas  · Rapid Covid test  · Place patient on BiPAP  · Transfer to critical care unit. Signed by:  Gladys Alejandre DNP, ACNP-BC    Critical care time unrelated to prior notes: 60 minutes    Please note that this note was dictated using Dragon computer voice recognition software. Quite often unanticipated grammatical, syntax, homophones, and other interpretive errors are inadvertently transcribed by the computer software. Please disregard these errors. Please excuse any errors that have escaped final proofreading.

## 2020-11-17 NOTE — PROGRESS NOTES
I reviewed pertinent labs and imaging, and discussed /agreed on the plan of care with Dr. Para Goodpasture. Hospitalist Progress Note    NAME: Sanford Bautista   :  1945   MRN:  [id number]       Assessment / Plan:    Acute respiratory distress after 2nd unit PRBC   CXR - IMPRESSION:  Increased moderate pulmonary edema. Stable small left pleural effusion and left  basilar opacity. 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 Malignant versus due to CHF  · Acute on chronic systolic CHF POA  LV: Estimated LVEF is 30 - 35%. Normal cavity size. Mild concentric hypertrophy. Moderate-to-severely reduced systolic function. TV: Mild to moderate tricuspid valve regurgitation is present. 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       Body mass index is 35.12 kg/m².     Code status: Full  Prophylaxis: SCD's  Recommended Disposition: TBD Subjective:     Chief Complaint / Reason for Physician Visit  \"SOB \". Discussed with RN events overnight. Pt seen in bed with Bipap on resting states feels better on Bipap    Review of Systems:  Symptom Y/N Comments  Symptom Y/N Comments   Fever/Chills n   Chest Pain n    Poor Appetite n   Edema n    Cough n   Abdominal Pain n    Sputum n   Joint Pain     SOB/ARENAS y   Pruritis/Rash     Nausea/vomit n   Tolerating PT/OT     Diarrhea n   Tolerating Diet     Constipation    Other       Could NOT obtain due to:      Objective:     VITALS:   Last 24hrs VS reviewed since prior progress note. Most recent are:  Patient Vitals for the past 24 hrs:   Temp Pulse Resp BP SpO2   11/17/20 0739 99.3 °F (37.4 °C) 93 (!) 36 (!) 144/52 100 %   11/17/20 0643 100 °F (37.8 °C) 100 (!) 36 (!) 168/95 100 %   11/17/20 0457 99.1 °F (37.3 °C) 79 18 (!) 178/83 95 %   11/17/20 0357 98.6 °F (37 °C) 77 18 (!) 163/78 96 %   11/17/20 0327 98 °F (36.7 °C) 79 18 (!) 160/80 96 %   11/17/20 0257 98.3 °F (36.8 °C) 73 18 (!) 156/70 97 %   11/16/20 2320 98.3 °F (36.8 °C) 73 18 139/69 98 %   11/16/20 1944 98 °F (36.7 °C) 84 18 (!) 141/69 98 %   11/16/20 1713 97.7 °F (36.5 °C) 65 18 125/69 99 %   11/16/20 1615 98.1 °F (36.7 °C) 70 18 117/63 100 %   11/16/20 1541 98 °F (36.7 °C) 68 19 121/68 99 %   11/16/20 1520 97.9 °F (36.6 °C) 67 20 135/68 99 %   11/16/20 1505 98 °F (36.7 °C) 67 18 (!) 141/78 100 %   11/16/20 1451 97.6 °F (36.4 °C) 68 20 (!) 143/58 98 %   11/16/20 1256 -- -- -- -- 96 %     No intake or output data in the 24 hours ending 11/17/20 0818     PHYSICAL EXAM:  General: WD, WN. Alert, cooperative, no acute distress    EENT:  EOMI. Anicteric sclerae. MMM  Resp:  wheezing ,  Rales noted L>R. No accessory muscle use  CV:  S1S2,  No edema  GI:  Soft, Non distended, Non tender.  +Bowel sounds  Neurologic:  Alert and oriented X 3, normal speech,   Psych:   Good insight. Not anxious nor agitated  Skin:  No rashes.   No jaundice    Reviewed most current lab test results and cultures  YES  Reviewed most current radiology test results   YES  Review and summation of old records today    NO  Reviewed patient's current orders and MAR    YES  PMH/SH reviewed - no change compared to H&P  ________________________________________________________________________  Care Plan discussed with:    Comments   Patient x    Family      RN x    Care Manager     Consultant                        Multidiciplinary team rounds were held today with , nursing, pharmacist and clinical coordinator. Patient's plan of care was discussed; medications were reviewed and discharge planning was addressed. ________________________________________________________________________  Total NON critical care TIME: 30  Minutes    Total CRITICAL CARE TIME Spent:   Minutes non procedure based      Comments   >50% of visit spent in counseling and coordination of care     ________________________________________________________________________  Ezra Arvizu. Kelsea Sy NP     Procedures: see electronic medical records for all procedures/Xrays and details which were not copied into this note but were reviewed prior to creation of Plan. LABS:  I reviewed today's most current labs and imaging studies. Pertinent labs include:  Recent Labs     11/16/20  1913 11/16/20 0415 11/14/20  1318   WBC  --  13.8* 13.7*   HGB 8.6* 6.9* 8.4*   HCT 29.3* 24.3* 28.3*   PLT  --  239 295     Recent Labs     11/16/20  0415      K 4.4      CO2 25   *   BUN 27*   CREA 2.14*   CA 7.1*   MG 2.1   ALB 1.9*   TBILI 0.3   ALT 12       Signed: Mei Sy NP

## 2020-11-17 NOTE — PROGRESS NOTES
Oncology End of Shift Note      Bedside shift change report given to Francisca Paez RN (incoming nurse) by Dorcas Denney (outgoing nurse) on [facility]. Report included the following information SBAR, Kardex, Intake/Output, MAR, Accordion and Recent Results. Shift Summary: Pt remained stable throughout night up until about 0620 this a.m. Scheduled meds given. Labs not drawn due to blood running. PT became SOB and began wheezing this morning closer to the end of her infusion. 2l/min NC placed and pt sat up in bed. NP notified with new orders put in, other nurse informed and rapid response team called for further assessment/treatment. Possible blood reaction and/or pleural congestion (per NP and rapid response nurse). Issues for Physician to Address:       Patient on Cardiac Monitoring?     [] Yes  [] No    Rhythm:      Rufus Scale:     Rufus Score: 624 [facility]

## 2020-11-17 NOTE — PROGRESS NOTES
Oncology End of Shift Note      Bedside shift change report given to [facility], RN (incoming nurse) by Tawnya Wilkinson RN (outgoing nurse[facility]. Report included the following information SBAR, Kardex and ED Summary. Shift Summary: Pt had busy shift. One unit of blood transfused,  BM late in shift. Voided around 1300mL. Issues for Physician to Address:       Patient on Cardiac Monitoring?     [] Yes  [x] No    Rhythm:      Rufus Scale:     Rufus Score: 16        If Rufus Scale less than 15   Patient Mobility Ordered  No  Speciality Bed Ordered   No     Boots Applied                  No      Shift Events        Tawnya Wilkinson RN

## 2020-11-17 NOTE — PROGRESS NOTES
RAPID RESPONSE TEAM     8439: Responded to RRT in rm 1120. Upon arrival, patient breathing 38-40 times a minute with increased WOB and accessory muscle use. Audible wheezing noted. Placed on 15L NRB. Mild upper airway stridor. Patient unable to speak in full sentences. Per primary RN, patient had finished 1PRBC blood transfusion when patient started complaining of SOB. /95. HR . Per previous documentation, pt's HR was 60-70. Temp 37.8. Purveyor NP at bedside. Concern for blood transfusion reaction. 125mg IVsolu-medrol and 50 mg IV benadryl administered per this RN. Stat CXR already obtained. 4170: Albuterol neb administered per respiratory therapy. 2133: Repeat SBP 140s. SpO2 100% on NRB. Patient still not able to speak in full sentences. Increasing upper airway stridor. 0.5cc Racemic epi neb given per RT.    8771: 40mg IV lasix administered by this RN; verbal order from [address] NP. Patient on scheduled po lasix.   0708: Second dose of albuterol neb administered by RT per Purveyor NP. Consult placed to pulmonology. 9750: Pulmicort neb administer by RT per Purveyor NP.  0732: Verbal orderplaced for Covid r/o per purveyor NP. Patient transferred to CCU rm [id number]. Kathleen Ruvalcaba MD at bedside to assess patient. XR Results (most recent):  Results from Hospital Encounter encounter on 11/12/20   XR CHEST PORT    Narrative EXAM:  XR CHEST PORT    INDICATION: Shortness of breath and wheezing    COMPARISON: 11/16/2020    TECHNIQUE: Portable AP upright chest view at 0631 hours    FINDINGS: The cardiomediastinal contours are stable. There are increased moderate diffuse interstitial opacities. A small left  pleural effusion and left basilar opacity are stable. There is no pneumothorax. The bones and upper abdomen are stable. Impression IMPRESSION:    Increased moderate pulmonary edema. Stable small left pleural effusion and left  basilar opacity.          Clifford Mendoza RN   RRT  Ext. [phone]

## 2020-11-17 NOTE — PROGRESS NOTES
Care Management:    KOFI: TBD    Admitted with GI bleed . Currently in the ICU on BiPAP. Patient has a gastric tumor and surgery is planned for next week. PTA Pt lives in a 1 story home with 1 step to enter with her . Pt is independent in ADL/IADL needs at baseline. Pt has access to DME in the home to include a cane and walker. Pt has not utilized SNF or home health prior to admission. Pt does not drive at baseline. Pts family to transport at discharge. Pt plans on returning home at discharge. Chart reviewed and we will cont to follow for discharge needs as indicated.      Crissy Deutsch RN ACM [id number]

## 2020-11-18 LAB
ANION GAP SERPL CALC-SCNC: 3 MMOL/L (ref 5–15)
ANION GAP SERPL CALC-SCNC: 5 MMOL/L (ref 5–15)
BNP SERPL-MCNC: ABNORMAL PG/ML
BUN SERPL-MCNC: 38 MG/DL (ref 6–20)
BUN SERPL-MCNC: 39 MG/DL (ref 6–20)
BUN/CREAT SERPL: 20 (ref 12–20)
BUN/CREAT SERPL: 21 (ref 12–20)
CALCIUM SERPL-MCNC: 7.2 MG/DL (ref 8.5–10.1)
CALCIUM SERPL-MCNC: 7.7 MG/DL (ref 8.5–10.1)
CHLORIDE SERPL-SCNC: 108 MMOL/L (ref 97–108)
CHLORIDE SERPL-SCNC: 108 MMOL/L (ref 97–108)
CO2 SERPL-SCNC: 25 MMOL/L (ref 21–32)
CO2 SERPL-SCNC: 26 MMOL/L (ref 21–32)
CREAT SERPL-MCNC: 1.82 MG/DL (ref 0.55–1.02)
CREAT SERPL-MCNC: 1.87 MG/DL (ref 0.55–1.02)
GLUCOSE BLD STRIP.AUTO-MCNC: 249 MG/DL (ref 65–100)
GLUCOSE BLD STRIP.AUTO-MCNC: 272 MG/DL (ref 65–100)
GLUCOSE BLD STRIP.AUTO-MCNC: 280 MG/DL (ref 65–100)
GLUCOSE BLD STRIP.AUTO-MCNC: 281 MG/DL (ref 65–100)
GLUCOSE SERPL-MCNC: 285 MG/DL (ref 65–100)
GLUCOSE SERPL-MCNC: 296 MG/DL (ref 65–100)
HEALTH STATUS, XMCV2T: NORMAL
MAGNESIUM SERPL-MCNC: 2.1 MG/DL (ref 1.6–2.4)
MAGNESIUM SERPL-MCNC: 2.2 MG/DL (ref 1.6–2.4)
POTASSIUM SERPL-SCNC: 5.1 MMOL/L (ref 3.5–5.1)
POTASSIUM SERPL-SCNC: 5.2 MMOL/L (ref 3.5–5.1)
SARS-COV-2, COV2: NOT DETECTED
SERVICE CMNT-IMP: ABNORMAL
SODIUM SERPL-SCNC: 137 MMOL/L (ref 136–145)
SODIUM SERPL-SCNC: 138 MMOL/L (ref 136–145)
SOURCE, COVRS: NORMAL
SPECIMEN SOURCE, FCOV2M: NORMAL
SPECIMEN TYPE, XMCV1T: NORMAL

## 2020-11-18 PROCEDURE — 74011250636 HC RX REV CODE- 250/636: Performed by: INTERNAL MEDICINE

## 2020-11-18 PROCEDURE — 65270000029 HC RM PRIVATE

## 2020-11-18 PROCEDURE — 83735 ASSAY OF MAGNESIUM: CPT

## 2020-11-18 PROCEDURE — 97530 THERAPEUTIC ACTIVITIES: CPT

## 2020-11-18 PROCEDURE — 74011250637 HC RX REV CODE- 250/637: Performed by: INTERNAL MEDICINE

## 2020-11-18 PROCEDURE — 74011636637 HC RX REV CODE- 636/637: Performed by: INTERNAL MEDICINE

## 2020-11-18 PROCEDURE — C9113 INJ PANTOPRAZOLE SODIUM, VIA: HCPCS | Performed by: INTERNAL MEDICINE

## 2020-11-18 PROCEDURE — 74011636637 HC RX REV CODE- 636/637: Performed by: NURSE PRACTITIONER

## 2020-11-18 PROCEDURE — 80048 BASIC METABOLIC PNL TOTAL CA: CPT

## 2020-11-18 PROCEDURE — 83880 ASSAY OF NATRIURETIC PEPTIDE: CPT

## 2020-11-18 PROCEDURE — 74011000250 HC RX REV CODE- 250: Performed by: INTERNAL MEDICINE

## 2020-11-18 PROCEDURE — 82962 GLUCOSE BLOOD TEST: CPT

## 2020-11-18 PROCEDURE — 36415 COLL VENOUS BLD VENIPUNCTURE: CPT

## 2020-11-18 RX ORDER — INSULIN GLARGINE 100 [IU]/ML
30 INJECTION, SOLUTION SUBCUTANEOUS DAILY
Status: DISCONTINUED | OUTPATIENT
Start: 2020-11-18 | End: 2020-11-19

## 2020-11-18 RX ADMIN — AMLODIPINE BESYLATE 5 MG: 5 TABLET ORAL at 08:48

## 2020-11-18 RX ADMIN — ATORVASTATIN CALCIUM 40 MG: 40 TABLET, FILM COATED ORAL at 21:00

## 2020-11-18 RX ADMIN — INSULIN LISPRO 2 UNITS: 100 INJECTION, SOLUTION INTRAVENOUS; SUBCUTANEOUS at 08:58

## 2020-11-18 RX ADMIN — INSULIN LISPRO 3 UNITS: 100 INJECTION, SOLUTION INTRAVENOUS; SUBCUTANEOUS at 11:11

## 2020-11-18 RX ADMIN — FUROSEMIDE 40 MG: 10 INJECTION, SOLUTION INTRAMUSCULAR; INTRAVENOUS at 08:53

## 2020-11-18 RX ADMIN — Medication 1 AMPULE: at 11:10

## 2020-11-18 RX ADMIN — FUROSEMIDE 40 MG: 10 INJECTION, SOLUTION INTRAMUSCULAR; INTRAVENOUS at 17:02

## 2020-11-18 RX ADMIN — Medication 10 ML: at 17:02

## 2020-11-18 RX ADMIN — SODIUM CHLORIDE 25 ML/HR: 900 INJECTION, SOLUTION INTRAVENOUS at 05:21

## 2020-11-18 RX ADMIN — CARVEDILOL 12.5 MG: 12.5 TABLET, FILM COATED ORAL at 17:00

## 2020-11-18 RX ADMIN — INSULIN LISPRO 2 UNITS: 100 INJECTION, SOLUTION INTRAVENOUS; SUBCUTANEOUS at 21:11

## 2020-11-18 RX ADMIN — SODIUM CHLORIDE 40 MG: 9 INJECTION, SOLUTION INTRAMUSCULAR; INTRAVENOUS; SUBCUTANEOUS at 08:54

## 2020-11-18 RX ADMIN — INSULIN LISPRO 3 UNITS: 100 INJECTION, SOLUTION INTRAVENOUS; SUBCUTANEOUS at 16:59

## 2020-11-18 RX ADMIN — Medication 1 AMPULE: at 20:59

## 2020-11-18 RX ADMIN — Medication 10 ML: at 13:52

## 2020-11-18 RX ADMIN — Medication 10 ML: at 05:17

## 2020-11-18 RX ADMIN — CARVEDILOL 12.5 MG: 12.5 TABLET, FILM COATED ORAL at 08:48

## 2020-11-18 RX ADMIN — Medication 10 ML: at 21:00

## 2020-11-18 RX ADMIN — INSULIN GLARGINE 30 UNITS: 100 INJECTION, SOLUTION SUBCUTANEOUS at 11:10

## 2020-11-18 NOTE — PROGRESS NOTES
Hospitalist Progress Note    NAME: Balta Waterman   :  1945   MRN:  [id number]       Assessment / Plan:  Acute respiratory distress -  Due to Fluid Overload s/p PRBC transfusion  CXR - IMPRESSION:  Increased moderate pulmonary edema. Stable small left pleural effusion and left  basilar opacity. 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 Malignant versus due to CHF  Acute on chronic systolic CHF POA  LV: Estimated LVEF is 30 - 35%. Normal cavity size. Mild concentric hypertrophy. Moderate-to-severely reduced systolic function. TV: Mild to moderate tricuspid valve regurgitation is present. 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       Body mass index is 35.12 kg/m².     Code status: Full  Prophylaxis: SCD's  Recommended Disposition: TBD     Subjective:     Chief Complaint / Reason for Physician Visit :F/U SOB/Pulm edema/Acute on chronic CHF, GUANACO, Anemia, gastric mass, GI bleeding  \"I am much better \". Discussed with RN events overnight. off BIPAP now, s/p IV diruesis    Review of Systems:  Symptom Y/N Comments  Symptom Y/N Comments   Fever/Chills n   Chest Pain n    Poor Appetite n   Edema n    Cough n   Abdominal Pain n    Sputum n   Joint Pain     SOB/ARENAS y improved  Pruritis/Rash     Nausea/vomit n   Tolerating PT/OT y    Diarrhea n   Tolerating Diet y Diabetic full liquids only   Constipation    Other       Could NOT obtain due to:      Objective:     VITALS:   Last 24hrs VS reviewed since prior progress note. Most recent are:  Patient Vitals for the past 24 hrs:   Temp Pulse Resp BP SpO2   11/18/20 1610 98.6 °F (37 °C) 72 16 (!) 155/82 99 %   11/18/20 1343 97.7 °F (36.5 °C) 71 16 (!) 154/81 97 %   11/18/20 1300 -- 62 17 (!) 123/57 97 %   11/18/20 1200 -- 64 19 (!) 118/54 98 %   11/18/20 1100 -- 66 19 (!) 129/58 100 %   11/18/20 1000 -- 60 20 (!) 130/58 99 %   11/18/20 0900 -- 67 20 (!) 145/73 98 %   11/18/20 0800 97.9 °F (36.6 °C) 65 16 137/64 98 %   11/18/20 0700 -- 70 20 133/62 98 %   11/18/20 0600 -- 69 18 (!) 143/63 97 %   11/18/20 0500 -- 70 18 (!) 141/65 98 %   11/18/20 0400 98.7 °F (37.1 °C) 72 22 (!) 136/56 98 %   11/18/20 0300 -- 70 19 (!) 139/59 97 %   11/18/20 0200 -- 68 20 136/60 98 %   11/18/20 0100 -- 66 21 (!) 139/52 99 %   11/18/20 0000 98.5 °F (36.9 °C) 71 22 (!) 130/58 98 %   11/17/20 2300 -- 70 22 (!) 140/56 97 %   11/17/20 2200 -- 66 21 (!) 140/59 99 %   11/17/20 2100 -- 65 21 (!) 113/46 95 %   11/17/20 2000 98 °F (36.7 °C) 71 26 (!) 128/53 100 %   11/17/20 1900 -- 71 24 (!) 137/52 99 %   11/17/20 1800 -- 64 21 (!) 129/57 96 %   11/17/20 1700 -- 69 20 (!) 126/52 96 %       Intake/Output Summary (Last 24 hours) at 11/18/2020 1618  Last data filed at 11/18/2020 1300  Gross per 24 hour   Intake 1134.16 ml   Output 1161 ml   Net -26.84 ml        PHYSICAL EXAM:  General: WD, WN.  Alert, cooperative, no acute distress    EENT:  EOMI. Anicteric sclerae. MMM  Resp:  wheezing ,  Rales noted L>R. No accessory muscle use  CV:  S1S2,  No edema  GI:  Soft, Non distended, Non tender.  +Bowel sounds  Neurologic:  Alert and oriented X 3, normal speech,   Psych:   Good insight. Not anxious nor agitated  Skin:  No rashes. No jaundice    Reviewed most current lab test results and cultures  YES  Reviewed most current radiology test results   YES  Review and summation of old records today    NO  Reviewed patient's current orders and MAR    YES  PMH/SH reviewed - no change compared to H&P  ________________________________________________________________________  Care Plan discussed with:    Comments   Patient x    Family      RN x    Care Manager     Consultant                        Multidiciplinary team rounds were held today with , nursing, pharmacist and clinical coordinator. Patient's plan of care was discussed; medications were reviewed and discharge planning was addressed. ________________________________________________________________________  Total NON critical care TIME: 26  Minutes    Total CRITICAL CARE TIME Spent:   Minutes non procedure based      Comments   >50% of visit spent in counseling and coordination of care     ________________________________________________________________________  Regino Kaiser MD     Procedures: see electronic medical records for all procedures/Xrays and details which were not copied into this note but were reviewed prior to creation of Plan. LABS:  I reviewed today's most current labs and imaging studies.   Pertinent labs include:  Recent Labs     11/17/20  1040 11/16/20  1913 11/16/20  0415   WBC  --   --  13.8*   HGB 10.7* 8.6* 6.9*   HCT 34.3* 29.3* 24.3*   PLT  --   --  239     Recent Labs     11/18/20  0515 11/18/20  0410 11/17/20  1043 11/16/20 0415    138 142 140   K 5.1 5.2* 4.5 4.4    108 109* 108   CO2 26 25 26 25   GLU 285* 296* 100 218*   BUN 38* 39* 29* 27*   CREA 1.87* 1.82* 1.68* 2.14*   CA 7.7* 7.2* 7.9* 7.1*   MG 2.2 2.1  --  2.1   ALB  --   --   --  1.9*   TBILI  --   --   --  0.3   ALT  --   --   --  12       Signed: Colonel Sofia MD

## 2020-11-18 NOTE — PROGRESS NOTES
Events noted. Not a surgical candidate presently due to comorbid issues. Will follow chart peripherally while she's here to follow her progress in hopes that she becomes more appropriate for surgical intervention for gastric GIST. Endoscopic or laparoscopic transgastric resection remains a possibility for the sole purpose of reducing her risk of recurrent bleeding. A negative margin would not be achieved but Gleevec may be able to keep the tumor from becoming problematic again during her lifetime. Not that previous biopsy di not reveal malignant GIST.

## 2020-11-18 NOTE — PROGRESS NOTES
0700-Bedside shift change report given to Nataliia Lopez (oncoming nurse) by Yodit Robertson (offgoing nurse). Report included the following information SBAR, Kardex and MAR.     3992- Patient assessed. See flowsheet. 1000- Bed bath given by PCT. 1030- Rounds completed. Discussed glycemic control and the patient's diet. Will discuss diet advancement with surgeon. The patient is on a full liquid diet for now. 1116- Reassessed. No changes. 1300- Report called to Zarina Acevedo RN.    7271- Patient transported to [facility] accompanied by tech.

## 2020-11-18 NOTE — PROGRESS NOTES
TRANSFER - IN REPORT:    Verbal report received from [facility] 72. on Neris Linares  being received from CCU for routine progression of care      Report consisted of patients Situation, Background, Assessment and   Recommendations(SBAR). Information from the following report(s) SBAR, ED Summary, Procedure Summary, Intake/Output, MAR, Recent Results and Cardiac Rhythm NSR was reviewed with the receiving nurse. Opportunity for questions and clarification was provided. Assessment completed upon patients arrival to unit and care assumed.

## 2020-11-18 NOTE — PROGRESS NOTES
Spiritual Care Assessment/Progress Note  [facility]      NAME: Etelvina Wu      MRN: [id number]  AGE: 70 y.o. SEX: female  Mosque Affiliation: Mormonism   Language: English     11/18/2020     Total Time (in minutes): 5950     Spiritual Assessment begun in [facility] 3 MED TELE through conversation with:         [x]Patient        [] Family    [] Friend(s)        Reason for Consult: Initial/Spiritual assessment, patient floor     Spiritual beliefs: (Please include comment if needed)     [] Identifies with a paulette tradition:         [] Supported by a paulette community:            [] Claims no spiritual orientation:           [] Seeking spiritual identity:                [] Adheres to an individual form of spirituality:           [x] Not able to assess:                           Identified resources for coping:      [] Prayer                               [] Music                  [] Guided Imagery     [] Family/friends                 [] Pet visits     [] Devotional reading                         [x] Unknown     [] Other:                                             Interventions offered during this visit: (See comments for more details)    Patient Interventions: Initial/Spiritual assessment, patient floor           Plan of Care:     [x] Support spiritual and/or cultural needs    [] Support AMD and/or advance care planning process      [] Support grieving process   [] Coordinate Rites and/or Rituals    [] Coordination with community clergy   [] No spiritual needs identified at this time   [] Detailed Plan of Care below (See Comments)  [] Make referral to Music Therapy  [] Make referral to Pet Therapy     [] Make referral to Addiction services  [] Make referral to [facility]  [] Make referral to Spiritual Care Partner  [] No future visits requested        [x] Follow up visits as needed     Comments:   Patient is currently on strict isolation precautions.   Consulted with nurse, Rajiv Hicks, prior to attempt to visit to assess patient's possible availability for visit via telephone. PCT was going in to room for vitals and offered to ask patient if she would like the  to call. PCT returned a moment later and stated patient declined visit. Chaplains available as needed.      ANDIE Eden, [facility], Staff [address] [place] [address]    Rosalio Humphrey Paging Service  [phone])

## 2020-11-18 NOTE — PROGRESS NOTES
[facility] INTENSIVIST Consult Service Note  Pulmonary, Critical Care, and Sleep Medicine    Name: Misa Iraheta MRN: [id number]   : 1945 Hospital: [facility]   Date: 2020  Admission date: 2020 Hospital Day: 7       Subjective/Interval History:    Pt is unstable and acutely ill in the CCU. Patient was re-evaluated multiple times with repeated discussions with CCU team throughout the day    2020 appreciate Dr. Brianda Mariscal help. On room air. No SOB. No fever. Off BIPAP. Hungry      IMPRESSION:   1. Acute Respiratory Failure with Hypoxia,   2. covid PUI  3. Acute pulmonary edmea vs transfusion reaction- TACO? Doubt TRALI  4. 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 6. Anemia. Hgb 6.9.   7. Bilateral pleural effusions. Continued SOB and dyspnea on exertion. 8. Malnutrition. Albumin 1.9.    9. DM 2 uncontrolled- labile BS low and high  Body mass index is 35.12 kg/m². 10. Additional workup outlined below  11. Multiorgan dysfunction as outlined above: Pt has one or more acute or chronic illnesses with severe exacerbation with progression or side effects of treatment that poses a threat to life or bodily function  12. Pt is unstable, unpredictable needing more CCU monitoring; at high risk of sudden decline and decompensation with life threatening consequenses and continued end organ dysfunction and failure  13. Pt is critically ill. Time spent with pt and staff actively rendering care, managing pt and coordinating care as stated below;  45 minutes, exclusive of any procedures      RECOMMENDATIONS/PLAN:   1. Transfer to floor  2. Await covid test  3. Prn Supplemental O2 for refractory hypoxia to keep sats > 93%  4. D/c BIPAP   5. Diet per surgery  6. Transfuse prn to maintain Hgb > 7  7. Glucose monitoring and SSI  8. Replete electrolytes  9. Prescription drug management with home med reconciliation reviewed  10.  DVT prophylaxis  11. Will be available to assist in medical management while in the CCU pending disposition         Subjective/Initial History:   I have reviewed the flowsheet and previous days notes. Seen earlier today on rounds. I was asked by Jazlyn Fuentes MD to see Bipin Saucedo a 70 y.o.  female  in consultation for a chief complaint of acute respiratory failure      Excerpts from admission 11/12/2020 and consult notes reviewed as follows:     \"Lani Rose is a 70 y.o.  female who presents with above complaints from home. 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 Patient has since stopped taking her aspirin and Plavix, but was lost to follow-up and has not followed up with general surgeon Dr. Shant Sarah or the oncology for any treatment for her cancer. Patient has seen her stools to be dark for the past few days but no obvious blood. Patient was found with hemoglobin of 5.7 on the blood work in the ED with CT abdomen and pelvis negative for any active bleeding but evident of her gastric mass which is unchanged but increased pleural effusion bilaterally was noted.'    Pt seen by Surgery. Anemia. Was transfused. Last night RRT call. Notes reviewed. 2308: Responded to RRT in rm 1120. Upon arrival, patient breathing 38-40 times a minute with increased WOB and accessory muscle use. Audible wheezing noted. Placed on 15L NRB. Mild upper airway stridor. Patient unable to speak in full sentences. Per primary RN, patient had finished 1PRBC blood transfusion when patient started complaining of SOB. /95. HR . Per previous documentation, pt's HR was 60-70. Temp 37.8. Purveyor NP at bedside. Concern for blood transfusion reaction. 125mg IVsolu-medrol and 50 mg IV benadryl administered per this RN. Stat CXR already obtained. 4888: Albuterol neb administered per respiratory therapy. 8471: Repeat SBP 140s. SpO2 100% on NRB. Patient still not able to speak in full sentences. Increasing upper airway stridor. 0.5cc Racemic epi neb given per RT.    7019: 40mg IV lasix administered by this RN; verbal order from [address] NP. Patient on scheduled po lasix.   0708: Second dose of albuterol neb administered by RT per Purveyor NP. Consult placed to pulmonology. 6174: Pulmicort neb administer by RT per Purveyor NP.  0732: Verbal orderplaced for Covid r/o per purveyor NP. Patient transferred to CCU rm     Pt now in CCU. D.w RRT . Pt in extremis with gilberto wheezing, labored. Edema noted. Dyspneic on NRB mask. CXR noted. Echo from last month noted. The estimated EF is 30 - 35%. Moderate-to-severely reduced systolic function. Lasix ordered. Nebs ordered. D/w nursing and RT. BIPAP placed    3:00 PM Pt settled down and ABG reviewed. ProBNP high cardiology consulted     Patient PCP: Bill De Jesus MD  PMH:  has a past medical history of Anemia, unspecified (10/6/2020), Arrhythmia, Arthritis, CAD (coronary artery disease), Cancer ([facility] Chronic bronchitis ([facility] Diabetes ([facility] (Dx approx 2009), GERD (gastroesophageal reflux disease), Hypercholesteremia, and Hypertension. PSH:   has a past surgical history that includes hx dilation and curettage; hx colonoscopy; hx cholecystectomy (6/12); hx coronary stent placement (8/25/2015); and upper gi endoscopy,biopsy (10/6/2020). FHX: family history includes Diabetes in her mother; HIV/AIDS in her brother; Heart Disease in her brother, brother, and mother; Hypertension in her mother; Stroke in her brother and father. SHX:  reports that she has quit smoking. She has never used smokeless tobacco. She reports that she does not drink alcohol or use drugs.     ROS:A comprehensive review of systems was negative except for that written in the HPI. Allergies   Allergen Reactions    Metformin Other (comments)     GI side effects.  Not a true allergy      MEDS:   Current Facility-Administered Medications   Medication    albuterol-ipratropium (DUO-NEB) 2.5 MG-0.5 MG/3 ML    0.9% sodium chloride infusion    levoFLOXacin (LEVAQUIN) 750 mg in D5W IVPB    alcohol 62% (NOZIN) nasal  1 Ampule    furosemide (LASIX) injection 40 mg    0.9% sodium chloride infusion 250 mL    hydrOXYzine HCL (ATARAX) tablet 25 mg    insulin lispro (HUMALOG) injection    glucose chewable tablet 16 g    dextrose (D50W) injection syrg 12.5-25 g    glucagon (GLUCAGEN) injection 1 mg    amLODIPine (NORVASC) tablet 5 mg    atorvastatin (LIPITOR) tablet 40 mg    carvediloL (COREG) tablet 12.5 mg    DULoxetine (CYMBALTA) capsule 60 mg    pantoprazole (PROTONIX) 40 mg in 0.9% sodium chloride 10 mL injection    sodium chloride (NS) flush 5-40 mL    sodium chloride (NS) flush 5-40 mL    acetaminophen (TYLENOL) tablet 650 mg    Or    acetaminophen (TYLENOL) suppository 650 mg    polyethylene glycol (MIRALAX) packet 17 g    ondansetron (ZOFRAN) injection 4 mg    insulin glargine (LANTUS) injection 10 Units        Current Facility-Administered Medications:     albuterol-ipratropium (DUO-NEB) 2.5 MG-0.5 MG/3 ML, 3 mL, Nebulization, Q4H PRN, Janiya Laughlin, ACNP    0.9% sodium chloride infusion, 25 mL/hr, IntraVENous, CONTINUOUS, Marilyn WILSON MD, Last Rate: 25 mL/hr at 11/18/20 0521, 25 mL/hr at 11/18/20 0521    levoFLOXacin (LEVAQUIN) 750 mg in D5W IVPB, 750 mg, IntraVENous, Q48H, Marilyn WILSON MD, Last Rate: 100 mL/hr at 11/17/20 1152, 750 mg at 11/17/20 1152    alcohol 62% (NOZIN) nasal  1 Ampule, 1 Ampule, Topical, Q12H, Hunter Reeves MD, 1 Ampule at 11/17/20 2143    furosemide (LASIX) injection 40 mg, 40 mg, IntraVENous, BID, Cuong Melton MD, 40 mg at 11/17/20 1722    0.9% sodium chloride infusion 250 mL, 250 mL, IntraVENous, PRN, Shun Estes NP    hydrOXYzine HCL (ATARAX) tablet 25 mg, 25 mg, Oral, QHS PRN, Shun Estes NP    insulin lispro (HUMALOG) injection, , SubCUTAneous, AC&HS, Rebecca Lopez NP, 2 Units at 11/17/20 2141    glucose chewable tablet 16 g, 4 Tab, Oral, PRN, Rebecca Villatoro NP    dextrose (D50W) injection syrg 12.5-25 g, 12.5-25 g, IntraVENous, PRN, Rebecca Lopez, NP, 25 g at 11/14/20 0430    glucagon (GLUCAGEN) injection 1 mg, 1 mg, IntraMUSCular, PRN, Rebecca Villatoro NP    amLODIPine (NORVASC) tablet 5 mg, 5 mg, Oral, DAILY, Nadege Simons MD, Stopped at 11/17/20 0900    atorvastatin (LIPITOR) tablet 40 mg, 40 mg, Oral, QHS, Nadege Simons MD, 40 mg at 11/17/20 2140    carvediloL (COREG) tablet 12.5 mg, 12.5 mg, Oral, BID WITH MEALS, Nadege Iraheta MD, 12.5 mg at 11/17/20 1724    DULoxetine (CYMBALTA) capsule 60 mg, 60 mg, Oral, DAILY, Nadege Simons MD, Stopped at 11/17/20 0900    pantoprazole (PROTONIX) 40 mg in 0.9% sodium chloride 10 mL injection, 40 mg, IntraVENous, DAILY, Nadege Simons MD, 40 mg at 11/17/20 0856    sodium chloride (NS) flush 5-40 mL, 5-40 mL, IntraVENous, Q8H, Nadege Simons MD, 10 mL at 11/18/20 0517    sodium chloride (NS) flush 5-40 mL, 5-40 mL, IntraVENous, PRN, Neftaly WALKER MD, 10 mL at 11/17/20 0904    acetaminophen (TYLENOL) tablet 650 mg, 650 mg, Oral, Q6H PRN, 650 mg at 11/16/20 1448 **OR** acetaminophen (TYLENOL) suppository 650 mg, 650 mg, Rectal, Q6H PRN, Nadege Iraheta MD    polyethylene glycol (MIRALAX) packet 17 g, 17 g, Oral, DAILY PRN, Nadege Iraheta MD    ondansetron (ZOFRAN) injection 4 mg, 4 mg, IntraVENous, Q6H PRN, Nadege Iraheta MD    insulin glargine (LANTUS) injection 10 Units, 10 Units, SubCUTAneous, DAILY, Shun Estes NP, Stopped at 11/17/20 1200      Objective:     Vital Signs: Telemetry:    normal sinus rhythm Intake/Output:   Visit Vitals  /62   Pulse 70   Temp 98.7 °F (37.1 °C)   Resp 20   Ht 5' 2\" (1.575 m)   Wt 87.1 kg (192 lb)   SpO2 98%   BMI 35.12 kg/m²       Temp (24hrs), Av.4 °F (36.9 °C), Min:98 °F (36.7 °C), Max:98.7 °F (37.1 °C)        O2 Device: Room air O2 Flow Rate (L/min): 1 l/min         Body mass index is 35.12 kg/m². Wt Readings from Last 4 Encounters:   20 87.1 kg (192 lb)   10/16/20 87.7 kg (193 lb 6.4 oz)   10/07/20 92.1 kg (203 lb 1.6 oz)   20 99.2 kg (218 lb 12.8 oz)          Intake/Output Summary (Last 24 hours) at 2020 0751  Last data filed at 2020 0700  Gross per 24 hour   Intake 548.33 ml   Output 1506 ml   Net -957.67 ml       Last shift:      No intake/output data recorded. Last 3 shifts:  1901 -  0700  In: 923.3 [I.V.:548.3]  Out: 1506 [Urine:1506]       Hemodynamics:    CO:    CI:    CVP:    SVR:   PAP Systolic:    PAP Diastolic:    PVR:    ZT00:       Ventilator Settings:      Mode Rate TV Press PEEP FiO2 PIP Min. Vent               40 %     7.2 l/min      Physical Exam:    General:  female; NRB  HEENT: NCAT, poor dentition, lips and mucosa dry  Eyes: anicteric; conjunctiva clear  Neck: no nodes, 1-2+ accessory MM use. Chest: no deformity,   Cardiac: regular; no murmur  Lungs: distant breath sounds; wheezes,   Abd: soft, NT, hypoactive BS, OBESE  Ext: no edema; no joint swelling;  No clubbing  : NO mccoy,   Neuro: very dyspneic- one word; follows commands; alert;  Psych- no agitation, oriented to person; unable to assess  Skin: warm, dry, no cyanosis;   Pulses: 1-2+ Bilateral pedal, radial  Capillary: brisk; pale      Labs:    Recent Labs     20  1040 20  1913 20   WBC  --   --  13.8*   HGB 10.7* 8.6* 6.9*   PLT  --   --  239     Recent Labs     20  0515 20  0410 20    138 142 140   K 5.1 5.2* 4.5 4.4    108 109* 108   CO2 26 25 26 25   * 296* 100 218*   BUN 38* 39* 29* 27*   CREA 1.87* 1.82* 1.68* 2.14*   CA 7.7* 7.2* 7.9* 7.1*   MG 2.2 2.1  --  2.1   ALB  --   --   --  1.9*   ALT  --   --   --  12     Recent Labs     11/17/20  0900   PH 7.40   PCO2 42   PO2 308*   HCO3 26   FIO2 100     No results for input(s): CPK, CKNDX, TROIQ in the last 72 hours. No lab exists for component: CPKMB  No results found for: BNPP, BNP   Lab Results   Component Value Date/Time    Culture result: Culture performed on Fluid swab specimen 11/13/2020 09:38 AM    Culture result: NO GROWTH 4 DAYS 11/13/2020 09:38 AM    Culture result: MIXED UROGENITAL SHIRA ISOLATED 08/25/2015 03:47 PM      Lab Results   Component Value Date/Time     (H) 08/25/2015 02:34 PM       Imaging:  I have personally reviewed the patients radiographs and have reviewed the reports:    CXR Results  (Last 48 hours)               11/17/20 0639  XR CHEST PORT Final result    Impression:  IMPRESSION:       Increased moderate pulmonary edema. Stable small left pleural effusion and left   basilar opacity. Narrative:  EXAM:  XR CHEST PORT       INDICATION: Shortness of breath and wheezing       COMPARISON: 11/16/2020       TECHNIQUE: Portable AP upright chest view at 0631 hours       FINDINGS: The cardiomediastinal contours are stable. There are increased moderate diffuse interstitial opacities. A small left   pleural effusion and left basilar opacity are stable. There is no pneumothorax. The bones and upper abdomen are stable. 11/16/20 2159  XR CHEST PA LAT Final result    Impression:  IMPRESSION:       Stable small left pleural effusion, left basilar opacity, and mild diffuse   interstitial opacities. Narrative:  EXAM:  XR CHEST PA LAT       INDICATION: Dyspnea. Pleural effusions. COMPARISON: 11/13/2020       TECHNIQUE: PA and lateral chest views       FINDINGS: There is stable cardiac silhouette enlargement.        There is a stable small left pleural effusion, left basilar opacity, and mild   diffuse interstitial opacities. There is no pneumothorax. The bones and upper   abdomen are stable. Results from Hospital Encounter encounter on 11/12/20   XR CHEST PORT    Narrative EXAM:  XR CHEST PORT    INDICATION: Shortness of breath and wheezing    COMPARISON: 11/16/2020    TECHNIQUE: Portable AP upright chest view at 0631 hours    FINDINGS: The cardiomediastinal contours are stable. There are increased moderate diffuse interstitial opacities. A small left  pleural effusion and left basilar opacity are stable. There is no pneumothorax. The bones and upper abdomen are stable. Impression IMPRESSION:    Increased moderate pulmonary edema. Stable small left pleural effusion and left  basilar opacity. XR KNEE LT 3 V    Narrative EXAM: XR KNEE LT 3 V    INDICATION: Left knee pain. COMPARISON: None. FINDINGS: Three views of the left knee demonstrate no acute fracture or bony  erosion. There is moderate patellofemoral and mild medial lateral joint space  narrowing with small osteophytes. There is a probable trace joint effusion. Impression IMPRESSION: No acute bony abnormality. Tricompartmental degenerative changes. Probable trace joint effusion. XR CHEST PA LAT    Narrative EXAM:  XR CHEST PA LAT    INDICATION: Dyspnea. Pleural effusions. COMPARISON: 11/13/2020    TECHNIQUE: PA and lateral chest views    FINDINGS: There is stable cardiac silhouette enlargement. There is a stable small left pleural effusion, left basilar opacity, and mild  diffuse interstitial opacities. There is no pneumothorax. The bones and upper  abdomen are stable. Impression IMPRESSION:    Stable small left pleural effusion, left basilar opacity, and mild diffuse  interstitial opacities.        Results from [place] encounter on 11/12/20   CT ABD PELV W WO CONT    Narrative EXAM: CT ABD PELV W WO CONT  CLINICAL HISTORY: GI bleed  INDICATION: GI bleed  COMPARISON: 10/4/2020. CONTRAST: Isovue 370  TECHNIQUE:   Multislice helical CT was performed of the abdomen and pelvis prior to and  following uneventful rapid bolus intravenous contrast administration. Oral  contrast was not administered. Contiguous 5 mm axial images were reconstructed  and lung and soft tissue windows were generated. Coronal and sagittal  reformations were generated. CT dose reduction was achieved through use of a  standardized protocol tailored for this examination and automatic exposure  control for dose modulation. FINDINGS:    There is no evidence of active extravasation. Residual oral contrast material in  colonic diverticula redemonstrated. LUNG BASES:The heart is enlarged. Atherosclerotic disease is present. Anemia. Increased now moderate to large bilateral pleural effusions. Pericardial  effusion as well. LIVER: Fatty sparing adjacent to the fissure Several calcifications are likely  related to prior granulomatous disease. BILIARY TREE: Status post cholecystectomy. CBD is not dilated. SPLEEN: within normal limits. PANCREAS: No focal abnormality. ADRENALS: Unremarkable. KIDNEYS/URETERS: Left renal calculus. No hydronephrosis. STOMACH: Gastric mass lesion near the fundus of stomach not changed  SMALL BOWEL: Unremarkable  COLON: No evidence of active extravasation Several colonic diverticula are  present. APPENDIX: Unremarkable. PERITONEUM: No ascites or pneumoperitoneum. RETROPERITONEUM: No lymphadenopathy or aortic aneurysm. REPRODUCTIVE ORGANS: Uterine fibroids  URINARY BLADDER: No mass or calculus. BONES: No destructive bone lesion. ABDOMINAL WALL: No mass or hernia. ADDITIONAL COMMENTS: N/A      Impression IMPRESSION:  There is no evidence of active extravasation. Gastric mass lesion is not significantly changed compared to prior examinations. Increased now moderate bilateral pleural effusions with bibasilar atelectasis. Status post cholecystectomy.   Nonobstructive stone in the left kidney. Several small uterine fibroids. This care involved high complexity medical decision making to treat acute and unstable vital organ system failure, and to prevent further life threatening deterioration of the patients condition. I personally:  · Reviewed the flowsheet and previous days notes  · Reviewed and summarized records or history from previous days note or discussions with staff, family  · Parenteral controlled substances - Reviewed/ Adjusted / Estel Leather / Started  · High Risk Drug therapy requiring intensive monitoring for toxicity: eg steroids, pressors, antibiotics  · Reviewed and/or ordered Clinical lab tests  · Reviewed and/or ordered Radiology tests  · Reviewed and/or ordered of Medicine tests  · Independently visualized radiologic Images  · Reviewed the patients ECG / Telemetry  · Reviewed and/or adjusted Ventilator / NiPPV settings  ·  discussed my assessment/management with : Consultants, Nursing, Pharmacy, Case Management, PT, OT, Respiratory Therapy, Hospitalist and Family for coordination of care           Thank you for allowing us to participate in the care of this patient. We will follow along with you until they no longer require CCU services.     Ivette Vasquez MD

## 2020-11-18 NOTE — PROGRESS NOTES
1900 - Bedside shift change report given to Enid Montague RN (oncoming nurse) by Dang Gonzalez RN (offgoing nurse). Report included the following information SBAR, Kardex, ED Summary, Intake/Output, MAR, Recent Results and Cardiac Rhythm NSR.   2000 - Shift assessment complete, see flowsheets for details. Pt is alert to self, time, and place, disoriented to situation (unaware of transfer and RRT this morning). Pupils brisk and round 3mm. NSR on monitor. Lung sounds diminished; on rm air O2 sats 95-98%. No complaints of SOB or chest pain. ABD soft and obese, BS active. Ivey in place draining yellow clear urine. Pulses palpable in all extremities, and pt moves all extremities purposefully. No complaints of pain. Pt asking about her glasses, unable to find glasses in room. Pt was unaware that she was transferred to the CCU this morning; reoriented pt on what had happened throughout the day. Will call oncology to see if glasses were left downstairs. 0000 - Reassessment complete, no changes documented. Pt denies any SOB or chest pain. O2 sats 100%. Ivey care completed. 0400 - Reassessment complete, see flowsheets for changes. Pt more anxious and tearful asking about her glasses. Explained that I will ask the day nurse about them. Pt also stated that she is sad about being in the hospital. Provided pt with comforting words and reassurance. 0500 - Lab called about hemolyzed labs; redrew and sent down.   0700 - Bedside shift change report given to Dang oGnzalez RN (oncoming nurse) by Nany Segundo (offgoing nurse). Report included the following information SBAR, Kardex, Intake/Output, MAR, Recent Results and Cardiac Rhythm NSR.   0715 - Nurse from oncology brought up pt's glasses.

## 2020-11-18 NOTE — PROGRESS NOTES
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    *NQWMI 10/2020 (trop 9), c/w type II from profound anemia.    * EF 30-35% 10/6/20. 2) HTN  3) DM  4) Dyslipidemia  5) CKD III: Cr 1.43/gfr 42 7/2020 (Dr Joshua Waldrop). 6) Obesity: ?NANY. 7) Non-compliant with some meds per pt 10/2020. Poor compliance with f/u OV. 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     Recurrent anemia, s/p transfusion. GUANACO improving  Being ruled out for COVID  Bilateral pleural effusions s/p right thorocentesis. Currently on bipap     - holding aspirin  - Cont coreg  - Holding cozaar until cr improves  - Increase lasix to 40mg iv bid, follow bmp  - Cont statin     Limited history and physical due to isolation and COVID rule out.         For other plans, see orders. High complexity decision making was performed  X Yes   High-risk of decompensation with multiple organ involvement X Yes   Hospital problem list   Active Hospital Problems    Diagnosis Date Noted    Upper GI bleed 11/12/2020    Anemia, blood loss 11/12/2020    GIST, malignant ([facility] [address] 11/12/2020           Echo 10/6/20:   · LV: Estimated LVEF is 30 - 35%. Normal cavity size. Mild concentric hypertrophy. Moderate-to-severely reduced systolic function. · TV: Mild to moderate tricuspid valve regurgitation is present.      _________________________________________________________________________    11/18: BPs 110s-140s; HR 60-70s; 98% RA. Hg 10.7; Cr 1.87; BUN 38; K 5.1. COVID pending. Cardiac meds: norvasc 5; lipitor 40; coreg 12.5 bid; lasix 40 IV bid; (ARB on hold). No new cardiac recs.  PO diuretics soon.  ________________________________________________________________________________    Tony Devi noted POA: \"Lani Del Castillo is a 70 y.o.   female who presents with above complaints from home. Patient present with chief complaint of worsening shortness of breath/dyspnea on exertion for the past 3 days, history of shortness of breath for the past 2 months since she was diagnosed with blood loss anemia due to GIST tumor at GE junction and stomach last admission month ago. Patient has since stopped taking her aspirin and Plavix, but was lost to follow-up and has not followed up with general surgeon Dr. Ariane Fragoso the oncology for any treatment for her cancer. Patient has seen her stools to be dark for the past few days but no obvious blood.     Patient was found with hemoglobin of 5.7 on the blood work in the ED with CT abdomen and pelvis negative for any active bleeding but evident of her gastric mass which is unchanged but increased pleural effusion bilaterally was noted. \"       Subjective: Acosta Boss reports to nursing (per covid protocol assessment)      Chest pain X none  consistent with:  Non-cardiac CP         Atypical CP     None now  On going  Anginal CP     Dyspnea  none  at rest  with exertion        x improved  unchanged  worse              PND X none  overnight       Orthopnea X none  improved  unchanged  worse   Presyncope X none  improved  unchanged  worse     ROS Hematuria:  Yes X No Dysuria:  Yes X No                (2+  other systems) Cough: Yes X No Sputum: Yes X No                 BRBPR:  Yes X No Melena: Yes X No   No change in family and social history from H&P/Consult note.     Objective: in discussion with nursing, per covid protocol assessment    Physical Exam:  Overall VSSAF;    Visit Vitals  /62   Pulse 70   Temp 98.7 °F (37.1 °C)   Resp 20   Ht 5' 2\" (1.575 m)   Wt 87.1 kg (192 lb)   SpO2 98%   BMI 35.12 kg/m²     Temp (24hrs), Av.4 °F (36.9 °C), Min:98 °F (36.7 °C), Max:98.7 °F (37.1 °C)    Patient Vitals for the past 8 hrs:   Pulse   11/18/20 0700 70   11/18/20 0600 69   11/18/20 0500 70   11/18/20 0400 72   11/18/20 0300 70   11/18/20 0200 68   11/18/20 0100 66     Patient Vitals for the past 8 hrs:   Resp   11/18/20 0700 20   11/18/20 0600 18   11/18/20 0500 18   11/18/20 0400 22   11/18/20 0300 19   11/18/20 0200 20   11/18/20 0100 21     Patient Vitals for the past 8 hrs:   BP   11/18/20 0700 133/62   11/18/20 0600 (!) 143/63   11/18/20 0500 (!) 141/65   11/18/20 0400 (!) 136/56   11/18/20 0300 (!) 139/59   11/18/20 0200 136/60   11/18/20 0100 (!) 139/52       Intake/Output Summary (Last 24 hours) at 11/18/2020 0804  Last data filed at 11/18/2020 0700  Gross per 24 hour   Intake 548.33 ml   Output 1506 ml   Net -957.67 ml     General Appearance: Well developed, well nourished, no acute distress. Resp:   Nl resp effort. Abdomen:   Soft, non-tender,    Skin:  Neuro: Warm and dry. Mildly sleepu/seems O, grossly nonfocal          cath site intact w/o hematoma or new bruit; distal pulse unchanged  Yes   Data Review:     Telemetry independently reviewed x sinus  chronic afib  parox afib  NSVT     ECG independently reviewed  NSR  afib  no significant changes  NSST-Tw chgs   x no new ECG provided for review   Lab results reviewed as noted below. Current medications reviewed as noted below. Recent Labs     11/17/20  0900   PH 7.40   PCO2 42   PO2 308*     No results for input(s): CPK, CKMB, CKNDX, TROIQ in the last 72 hours.   Recent Labs     11/18/20  0515 11/18/20  0410 11/17/20  1043 11/17/20  1040 11/16/20  1913 11/16/20  0415    138 142  --   --  140   K 5.1 5.2* 4.5  --   --  4.4    108 109*  --   --  108   CO2 26 25 26  --   --  25   BUN 38* 39* 29*  --   --  27*   CREA 1.87* 1.82* 1.68*  --   --  2.14*   GFRAA 32* 33* 36*  --   --  27*   * 296* 100  --   --  218*   CA 7.7* 7.2* 7.9*  --   --  7.1*   ALB  --   --   --   --   --  1.9*   WBC  -- --   --   --   --  13.8*   HGB  --   --   --  10.7* 8.6* 6.9*   HCT  --   --   --  34.3* 29.3* 24.3*   PLT  --   --   --   --   --  239     Recent Labs     11/16/20  0415   AP 54   TBILI 0.3   TP 5.2*   ALB 1.9*   GLOB 3.3     No results for input(s): INR, PTP, APTT, INREXT in the last 72 hours. No results for input(s): FE, TIBC, PSAT, FERR in the last 72 hours.    Lab Results   Component Value Date/Time    Glucose (POC) 283 (H) 11/17/2020 09:38 PM    Glucose (POC) 159 (H) 11/17/2020 05:21 PM    Glucose (POC) 110 (H) 11/17/2020 11:54 AM    Glucose (POC) 119 (H) 11/17/2020 08:54 AM    Glucose (POC) 109 (H) 11/17/2020 06:04 AM       Current Facility-Administered Medications   Medication Dose Route Frequency    albuterol-ipratropium (DUO-NEB) 2.5 MG-0.5 MG/3 ML  3 mL Nebulization Q4H PRN    levoFLOXacin (LEVAQUIN) 750 mg in D5W IVPB  750 mg IntraVENous Q48H    alcohol 62% (NOZIN) nasal  1 Ampule  1 Ampule Topical Q12H    furosemide (LASIX) injection 40 mg  40 mg IntraVENous BID    0.9% sodium chloride infusion 250 mL  250 mL IntraVENous PRN    hydrOXYzine HCL (ATARAX) tablet 25 mg  25 mg Oral QHS PRN    insulin lispro (HUMALOG) injection   SubCUTAneous AC&HS    glucose chewable tablet 16 g  4 Tab Oral PRN    dextrose (D50W) injection syrg 12.5-25 g  12.5-25 g IntraVENous PRN    glucagon (GLUCAGEN) injection 1 mg  1 mg IntraMUSCular PRN    amLODIPine (NORVASC) tablet 5 mg  5 mg Oral DAILY    atorvastatin (LIPITOR) tablet 40 mg  40 mg Oral QHS    carvediloL (COREG) tablet 12.5 mg  12.5 mg Oral BID WITH MEALS    DULoxetine (CYMBALTA) capsule 60 mg  60 mg Oral DAILY    pantoprazole (PROTONIX) 40 mg in 0.9% sodium chloride 10 mL injection  40 mg IntraVENous DAILY    sodium chloride (NS) flush 5-40 mL  5-40 mL IntraVENous Q8H    sodium chloride (NS) flush 5-40 mL  5-40 mL IntraVENous PRN    acetaminophen (TYLENOL) tablet 650 mg  650 mg Oral Q6H PRN    Or    acetaminophen (TYLENOL) suppository 650 mg  650 mg Rectal Q6H PRN    polyethylene glycol (MIRALAX) packet 17 g  17 g Oral DAILY PRN    ondansetron (ZOFRAN) injection 4 mg  4 mg IntraVENous Q6H PRN    insulin glargine (LANTUS) injection 10 Units  10 Units SubCUTAneous DAILY         Kay Tiwari MD

## 2020-11-18 NOTE — PROGRESS NOTES
Interdisciplinary team rounds were held  11/18/2020  with the following team members: Care Management, Diabetes Treatment Specialist, Nursing, Nutrition, Palliative Care, Pharmacy, Physical Therapy, Physician, Respiratory Therapy and Wound Care. Plan of care discussed. Goal: See MD orders and progress notes for further  interventions and desired outcomes.

## 2020-11-18 NOTE — PROGRESS NOTES
Nutrition Note    Chart reviewed, case discussed during CCU rounds. Pt off of BiPAP and able to eat now. Per discussion during IDR rounds RN to ask surgeon about advancement past full liquids given GIST at esophageal junction. K up to 5.1, will change supplements to Nepro (provides 425kcals and 19gPro per bottle) but it is also CHO steady so appropriate for diabetics. Will continue to monitor pt's case closely, thank you!     Electronically signed by Barbara Estrada RD, [address]  on 11/18/2020 at 11:06 AM    Contact: [phone]

## 2020-11-18 NOTE — PROGRESS NOTES
Problem: Mobility Impaired (Adult and Pediatric)  Goal: *Acute Goals and Plan of Care (Insert Text)  Description: FUNCTIONAL STATUS PRIOR TO ADMISSION: Patient was modified independent using a SPC vs rolling walker for functional mobility. She reports she occasionally uses nothing. Patient is a poor historian. HOME SUPPORT PRIOR TO ADMISSION: The patient lived with her  who assists with LB dressing. Physical Therapy Goals  Initiated 11/15/2020  1. Patient will move from supine to sit and sit to supine , scoot up and down, and roll side to side in bed with independence within 7 day(s). 2.  Patient will transfer from bed to chair and chair to bed with modified independence using the least restrictive device within 7 day(s). 3.  Patient will perform sit to stand with modified independence within 7 day(s). 4.  Patient will ambulate with modified independence for 150 feet with the least restrictive device within 7 day(s). 5.  Patient will ascend/descend 3 stairs with one sided handrail(s) with modified independence within 7 day(s). Outcome: Progressing Towards Goal   PHYSICAL THERAPY TREATMENT  Patient: Bella Terrazas (80 y.o. 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 ASSESSMENT  Patient continues with skilled PT services and is starting again to progress towards goals. Pt now in CCU awaiting COVID test results, now on room air. Pt cleared for session. Pt able to tolerate gentle AROM ankle pumps and assisted hip/knee flexion with stable vitals. Pt does report 3/10 pain L knee in bed. Pt is min A to come to EOB. She sits with good balance. Vitals stable to sitting. Pt cued for PLB and controlling breathing rate. RR remains in low 20s at rest and higher 20s with activity.  Pt able to stand using bedside chair for UE support with CGA. She side stepped x1 with min A to adjust position at EOB. Pt needing CGA to sit but mod A to return to lying in bed. Pt's resp rate increased to 31 with effort to transition into bed. BP stable lying to sitting. Unable to take BP standing due to tolerance and need for assist for balance. Sats on room air stable at % throughout session. Pt c/o: no dizziness, just fatigue. Does state L knee pain increases with WB. RN in at end of session to assist pulling up in bed. Current Level of Function Impacting Discharge (mobility/balance): min to CGA    Other factors to consider for discharge: new COVID rule out; progression to be determined         PLAN :  Patient continues to benefit from skilled intervention to address the above impairments. Continue treatment per established plan of care. to address goals. Recommendation for discharge: (in order for the patient to meet his/her long term goals)  Physical therapy at least 2 days/week in the home AND ensure assist and/or supervision for safety with mobility and amb    This discharge recommendation:  Has been made in collaboration with the attending provider and/or case management    IF patient discharges home will need the following DME: patient owns DME required for discharge       SUBJECTIVE:   Patient stated My knee still hurts.     OBJECTIVE DATA SUMMARY:   Critical Behavior:  Neurologic State: Alert  Orientation Level: Oriented X4  Cognition: Appropriate decision making     Functional Mobility Training:  Bed Mobility:  Rolling: Minimum assistance  Supine to Sit: Minimum assistance  Sit to Supine: Moderate assistance  Scooting: Contact guard assistance        Transfers:  Sit to Stand: Contact guard assistance  Stand to Sit: Contact guard assistance                             Balance:  Sitting: Intact  Standing: Impaired  Standing - Static: Fair; Other (comment)(BUe support)  Ambulation/Gait Training: Gait Description (WDL): (2 side steps toward EOB with UE support and min A)                Activity Tolerance:   Fair    After treatment patient left in no apparent distress:   Supine in bed, Call bell within reach, Caregiver / family present, and Side rails x 3    COMMUNICATION/COLLABORATION:   The patients plan of care was discussed with: Registered nurse.      Madeleine Homans Doornik, PT   Time Calculation: 30 mins

## 2020-11-19 LAB
ANION GAP SERPL CALC-SCNC: 3 MMOL/L (ref 5–15)
BUN SERPL-MCNC: 44 MG/DL (ref 6–20)
BUN/CREAT SERPL: 29 (ref 12–20)
CALCIUM SERPL-MCNC: 7.9 MG/DL (ref 8.5–10.1)
CHLORIDE SERPL-SCNC: 108 MMOL/L (ref 97–108)
CO2 SERPL-SCNC: 27 MMOL/L (ref 21–32)
CREAT SERPL-MCNC: 1.52 MG/DL (ref 0.55–1.02)
ERYTHROCYTE [DISTWIDTH] IN BLOOD BY AUTOMATED COUNT: 18.4 % (ref 11.5–14.5)
GLUCOSE BLD STRIP.AUTO-MCNC: 137 MG/DL (ref 65–100)
GLUCOSE BLD STRIP.AUTO-MCNC: 148 MG/DL (ref 65–100)
GLUCOSE BLD STRIP.AUTO-MCNC: 178 MG/DL (ref 65–100)
GLUCOSE BLD STRIP.AUTO-MCNC: 74 MG/DL (ref 65–100)
GLUCOSE SERPL-MCNC: 103 MG/DL (ref 65–100)
HCT VFR BLD AUTO: 33.2 % (ref 35–47)
HGB BLD-MCNC: 10.1 G/DL (ref 11.5–16)
MAGNESIUM SERPL-MCNC: 2.1 MG/DL (ref 1.6–2.4)
MCH RBC QN AUTO: 25.7 PG (ref 26–34)
MCHC RBC AUTO-ENTMCNC: 30.4 G/DL (ref 30–36.5)
MCV RBC AUTO: 84.5 FL (ref 80–99)
NRBC # BLD: 0 K/UL (ref 0–0.01)
NRBC BLD-RTO: 0 PER 100 WBC
PLATELET # BLD AUTO: 251 K/UL (ref 150–400)
PMV BLD AUTO: 10 FL (ref 8.9–12.9)
POTASSIUM SERPL-SCNC: 4.5 MMOL/L (ref 3.5–5.1)
RBC # BLD AUTO: 3.93 M/UL (ref 3.8–5.2)
SERVICE CMNT-IMP: ABNORMAL
SERVICE CMNT-IMP: NORMAL
SODIUM SERPL-SCNC: 138 MMOL/L (ref 136–145)
WBC # BLD AUTO: 13 K/UL (ref 3.6–11)

## 2020-11-19 PROCEDURE — 97110 THERAPEUTIC EXERCISES: CPT

## 2020-11-19 PROCEDURE — 85027 COMPLETE CBC AUTOMATED: CPT

## 2020-11-19 PROCEDURE — 74011250636 HC RX REV CODE- 250/636: Performed by: INTERNAL MEDICINE

## 2020-11-19 PROCEDURE — 97530 THERAPEUTIC ACTIVITIES: CPT

## 2020-11-19 PROCEDURE — 99232 SBSQ HOSP IP/OBS MODERATE 35: CPT | Performed by: SURGERY

## 2020-11-19 PROCEDURE — 74011636637 HC RX REV CODE- 636/637: Performed by: INTERNAL MEDICINE

## 2020-11-19 PROCEDURE — C9113 INJ PANTOPRAZOLE SODIUM, VIA: HCPCS | Performed by: INTERNAL MEDICINE

## 2020-11-19 PROCEDURE — 83735 ASSAY OF MAGNESIUM: CPT

## 2020-11-19 PROCEDURE — 82962 GLUCOSE BLOOD TEST: CPT

## 2020-11-19 PROCEDURE — 36415 COLL VENOUS BLD VENIPUNCTURE: CPT

## 2020-11-19 PROCEDURE — 97165 OT EVAL LOW COMPLEX 30 MIN: CPT

## 2020-11-19 PROCEDURE — 65270000029 HC RM PRIVATE

## 2020-11-19 PROCEDURE — 74011250637 HC RX REV CODE- 250/637: Performed by: INTERNAL MEDICINE

## 2020-11-19 PROCEDURE — 80048 BASIC METABOLIC PNL TOTAL CA: CPT

## 2020-11-19 PROCEDURE — 97535 SELF CARE MNGMENT TRAINING: CPT

## 2020-11-19 PROCEDURE — 94760 N-INVAS EAR/PLS OXIMETRY 1: CPT

## 2020-11-19 RX ORDER — LOSARTAN POTASSIUM 50 MG/1
50 TABLET ORAL DAILY
Status: DISCONTINUED | OUTPATIENT
Start: 2020-11-19 | End: 2020-11-20 | Stop reason: HOSPADM

## 2020-11-19 RX ORDER — INSULIN GLARGINE 100 [IU]/ML
20 INJECTION, SOLUTION SUBCUTANEOUS DAILY
Status: DISCONTINUED | OUTPATIENT
Start: 2020-11-19 | End: 2020-11-20 | Stop reason: HOSPADM

## 2020-11-19 RX ORDER — FUROSEMIDE 40 MG/1
40 TABLET ORAL DAILY
Status: DISCONTINUED | OUTPATIENT
Start: 2020-11-19 | End: 2020-11-20 | Stop reason: HOSPADM

## 2020-11-19 RX ADMIN — DULOXETINE HYDROCHLORIDE 60 MG: 30 CAPSULE, DELAYED RELEASE ORAL at 09:32

## 2020-11-19 RX ADMIN — Medication 10 ML: at 14:00

## 2020-11-19 RX ADMIN — LEVOFLOXACIN 750 MG: 5 INJECTION, SOLUTION INTRAVENOUS at 10:55

## 2020-11-19 RX ADMIN — Medication 10 ML: at 06:08

## 2020-11-19 RX ADMIN — CARVEDILOL 12.5 MG: 12.5 TABLET, FILM COATED ORAL at 09:32

## 2020-11-19 RX ADMIN — ATORVASTATIN CALCIUM 40 MG: 40 TABLET, FILM COATED ORAL at 22:01

## 2020-11-19 RX ADMIN — LOSARTAN POTASSIUM 50 MG: 50 TABLET, FILM COATED ORAL at 09:32

## 2020-11-19 RX ADMIN — CARVEDILOL 12.5 MG: 12.5 TABLET, FILM COATED ORAL at 18:48

## 2020-11-19 RX ADMIN — Medication 10 ML: at 22:00

## 2020-11-19 RX ADMIN — INSULIN GLARGINE 20 UNITS: 100 INJECTION, SOLUTION SUBCUTANEOUS at 14:09

## 2020-11-19 RX ADMIN — ACETAMINOPHEN 650 MG: 325 TABLET ORAL at 03:47

## 2020-11-19 RX ADMIN — AMLODIPINE BESYLATE 5 MG: 5 TABLET ORAL at 09:32

## 2020-11-19 RX ADMIN — Medication 1 AMPULE: at 09:00

## 2020-11-19 RX ADMIN — SODIUM CHLORIDE 40 MG: 9 INJECTION, SOLUTION INTRAMUSCULAR; INTRAVENOUS; SUBCUTANEOUS at 09:32

## 2020-11-19 RX ADMIN — FUROSEMIDE 40 MG: 40 TABLET ORAL at 09:32

## 2020-11-19 NOTE — PROGRESS NOTES
Pt was in the ICU. Now on RA. Will sign off now. Will be available as needed. Please call if any issues.

## 2020-11-19 NOTE — PROGRESS NOTES
End of Shift Note    Bedside shift change report given to Omar Martinez (oncoming nurse) by Ysabel Zarate RN (offgoing nurse). Report included the following information Kardex, MAR and Recent Results    Shift worked:  7p-7a   Shift summary and any significant changes:     Covid neg, moved to room #0283       Concerns for physician to address:    Zone phone for oncoming shift:   [phone]     Activity:  Activity Level: Up with Assistance  Number times ambulated in hallways past shift: 0  Number of times OOB to chair past shift: 0    Cardiac:   Cardiac Monitoring: No        Access:   Current line(s): PIV     Genitourinary:   Urinary status: mccoy    Respiratory:   O2 Device: Room air  Chronic home O2 use?: NO  Incentive spirometer at bedside: NO     GI:  Last Bowel Movement Date: 11/16/20  Current diet:  DIET DIABETIC FULL LIQUID  DIET NUTRITIONAL SUPPLEMENTS Breakfast, AM Snack, Lunch, PM Snack, Dinner, HS Snack; Nepro  Passing flatus:yes   Tolerating current diet: YES  % Diet Eaten: 100 %    Pain Management:   Patient states pain is manageable on current regimen: N/A    Skin:  Rufus Score: 19  Interventions: increase time out of bed    Patient Safety:  Fall Score:  Total Score: 3  Interventions: bed/chair alarm, gripper socks and pt to call before getting OOB  High Fall Risk: Yes    Length of Stay:  Expected LOS: 5d 9h  Actual LOS: [address] RN

## 2020-11-19 NOTE — PROGRESS NOTES
----- Message from Karen Griffiths sent at 8/5/2019  8:46 AM CDT -----  Contact: Markos Grissom @ [facility] Phone# [phone], fax# [phone]  Patient is calling for an RX refill or new RX.  Is this a refill or new RX:  Refill  RX name and strength: montelukast (SINGULAIR) 10 mg tablet  Directions (copy/paste from chart):  N/A  Is this a 30 day or 90 day RX:  90  Local pharmacy or mail order pharmacy:  [facility] - [place], [place] - [address] [facility]  Pharmacy name and phone # Moise Phone # [phone], Fax# [phone]    Cardiology Progress Note  11/19/2020     Admit Date: 11/12/2020  Admit Diagnosis: Upper GI bleed [K92.2]  Anemia, blood loss [D50.0]  GIST, malignant ([address] [C49. A0]  CC: none currently  Cardiac Assessment/Plan:     Cardiac Assessment/Plan (as noted by Dr Brenda Diaz):     1) CAD: Distal RCA NEENA 8/2015 for NQWMI; Med Rx mid circ and diffusely dz diagonals.    *NQWMI 10/2020 (trop 9), c/w type II from profound anemia.    * EF 30-35% 10/6/20. 2) HTN  3) DM  4) Dyslipidemia  5) CKD III: Cr 1.43/gfr 42 7/2020 (Dr Alfonzo Caputo). 6) Obesity: ?NANY. 7) Non-compliant with some meds per pt 10/2020. Poor compliance with f/u OV. 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     Recurrent anemia, s/p transfusion. GUANACO improving  Being ruled out for COVID  Bilateral pleural effusions s/p right thorocentesis. Currently on bipap     - holding aspirin  - Cont coreg  - Holding cozaar until cr improves  - Increase lasix to 40mg iv bid, follow bmp  - Cont statin    For other plans, see orders.       Echo 10/6/20:   · LV: Estimated LVEF is 30 - 35%. Normal cavity size. Mild concentric hypertrophy. Moderate-to-severely reduced systolic function. · TV: Mild to moderate tricuspid valve regurgitation is present.      _________________________________________________________________________     11/18: BPs 110s-140s; HR 60-70s; 98% RA. Hg 10.7; Cr 1.87; BUN 38; K 5.1. COVID pending. Cardiac meds: norvasc 5; lipitor 40; coreg 12.5 bid; lasix 40 IV bid; (ARB on hold). No new cardiac recs. PO diuretics soon. 11/19: COVID neg; No CP; dyspnea GREATLY improved. BPs 110s-150s; HR 60-70s (no tele). 100% RA. Neg 4L. Hg 10.1. BUN 44; Cr 1.52. Cardiac meds: norvasc 5; lipitor 40; coreg 12.5 bid; lasix 40 PO qday; Losartan 50 qday (just restarted). No new cardiac recs.  Dispo per primary team (dep on Hg stability). ________________________________________________________________________________      As noted POA: \"Lani Del Castillo is a 70 y.o.   female who presents with above complaints from home. Patient present with chief complaint of worsening shortness of breath/dyspnea on exertion for the past 3 days, history of shortness of breath for the past 2 months since she was diagnosed with blood loss anemia due to GIST tumor at GE junction and stomach last admission month ago. Patient has since stopped taking her aspirin and Plavix, but was lost to follow-up and has not followed up with general surgeon Dr. Michael Master the oncology for any treatment for her cancer. Patient has seen her stools to be dark for the past few days but no obvious blood.     Patient was found with hemoglobin of 5.7 on the blood work in the ED with CT abdomen and pelvis negative for any active bleeding but evident of her gastric mass which is unchanged but increased pleural effusion bilaterally was noted. \"      Hospital problem list     Active Hospital Problems    Diagnosis Date Noted    Upper GI bleed 2020    Anemia, blood loss 2020    GIST, malignant ([facility] 2020        Subjective: Dragan Reynolds reports       Chest pain X none  consistent with:  Non-cardiac CP         Atypical CP     None now  On going  Anginal CP     Dyspnea  none  at rest  with exertion        x improved  unchanged  worse              PND X none  overnight       Orthopnea X none  improved  unchanged  worse   Presyncope X none  improved  unchanged  worse     Ambulated in hallway without symptoms   Yes   Ambulated in room without symptoms  Yes   Objective:     Physical Exam:  Overall VSSAF;    Visit Vitals  BP (!) 159/65 (BP 1 Location: Right arm, BP Patient Position: At rest)   Pulse 65   Temp 97.8 °F (36.6 °C)   Resp 16   Ht 5' 2\" (1.575 m)   Wt 87.1 kg (192 lb)   SpO2 100%   BMI 35.12 kg/m²     Temp (24hrs), Av.1 °F (36.7 °C), Min:97.7 °F (36.5 °C), Max:98.6 °F (37 °C)    Patient Vitals for the past 8 hrs:   Pulse   11/19/20 1039 65   11/19/20 0747 65     Patient Vitals for the past 8 hrs:   Resp   11/19/20 1039 16   11/19/20 0747 17     Patient Vitals for the past 8 hrs:   BP   11/19/20 1039 (!) 159/65   11/19/20 0747 (!) 154/74       Intake/Output Summary (Last 24 hours) at 11/19/2020 1048  Last data filed at 11/19/2020 0650  Gross per 24 hour   Intake 320.83 ml   Output 2250 ml   Net -1929.17 ml     General Appearance: Well developed, well nourished, no acute distress. Ears/Nose/Mouth/Throat:   Normal MM; anicteric. JVP: WNL   Resp:   Lungs clear to auscultation bilaterally. Nl resp effort. Cardiovascular:  RRR, S1, S2 normal, no new murmur. No gallop or rub. Abdomen:   Soft, non-tender, bowel sounds are present. Extremities: Trivial edema bilaterally. Skin:  Neuro: Warm and dry. A/O x3, grossly nonfocal       cath site intact w/o hematoma or new bruit; distal pulse unchanged  Yes   Data Review:     Telemetry none  sinus  chronic afib  parox afib  NSVT     ECG independently reviewed  NSR  afib  no significant changes  NSST-Tw chgs     x no new ECG provided for review   Lab results reviewed as noted below. Current medications reviewed as noted below. Recent Labs     11/17/20  0900   PH 7.40   PCO2 42   PO2 308*     No results for input(s): CPK, CKMB, CKNDX, TROIQ in the last 72 hours.   Recent Labs     11/19/20  0113 11/18/20  0515 11/18/20  0410  11/17/20  1040 11/16/20  1913    137 138   < >  --   --    K 4.5 5.1 5.2*   < >  --   --     108 108   < >  --   --    CO2 27 26 25   < >  --   --    BUN 44* 38* 39*   < >  --   --    CREA 1.52* 1.87* 1.82*   < >  --   --    GFRAA 41* 32* 33*   < >  --   --    * 285* 296*   < >  --   --    CA 7.9* 7.7* 7.2*   < >  --   --    WBC 13.0*  --   --   --   --   --    HGB 10.1*  --   --   --  10.7* 8.6*   HCT 33.2*  --   --   --  34.3* 29.3*     --   --   --   -- --     < > = values in this interval not displayed. Lab Results   Component Value Date/Time    Cholesterol, total 179 02/16/2015 09:56 AM    HDL Cholesterol 29 (L) 02/16/2015 09:56 AM    LDL, calculated 74 02/16/2015 09:56 AM    Triglyceride 378 (H) 02/16/2015 09:56 AM     No results for input(s): AP, TBIL, TP, ALB, GLOB, GGT, AML, LPSE in the last 72 hours. No lab exists for component: SGOT, GPT, AMYP, HLPSE  No results for input(s): INR, PTP, APTT, INREXT in the last 72 hours.    No components found for: GLPOC    Current Facility-Administered Medications   Medication Dose Route Frequency    insulin glargine (LANTUS) injection 20 Units  20 Units SubCUTAneous DAILY    losartan (COZAAR) tablet 50 mg  50 mg Oral DAILY    furosemide (LASIX) tablet 40 mg  40 mg Oral DAILY    albuterol-ipratropium (DUO-NEB) 2.5 MG-0.5 MG/3 ML  3 mL Nebulization Q4H PRN    levoFLOXacin (LEVAQUIN) 750 mg in D5W IVPB  750 mg IntraVENous Q48H    alcohol 62% (NOZIN) nasal  1 Ampule  1 Ampule Topical Q12H    0.9% sodium chloride infusion 250 mL  250 mL IntraVENous PRN    hydrOXYzine HCL (ATARAX) tablet 25 mg  25 mg Oral QHS PRN    insulin lispro (HUMALOG) injection   SubCUTAneous AC&HS    glucose chewable tablet 16 g  4 Tab Oral PRN    dextrose (D50W) injection syrg 12.5-25 g  12.5-25 g IntraVENous PRN    glucagon (GLUCAGEN) injection 1 mg  1 mg IntraMUSCular PRN    amLODIPine (NORVASC) tablet 5 mg  5 mg Oral DAILY    atorvastatin (LIPITOR) tablet 40 mg  40 mg Oral QHS    carvediloL (COREG) tablet 12.5 mg  12.5 mg Oral BID WITH MEALS    DULoxetine (CYMBALTA) capsule 60 mg  60 mg Oral DAILY    pantoprazole (PROTONIX) 40 mg in 0.9% sodium chloride 10 mL injection  40 mg IntraVENous DAILY    sodium chloride (NS) flush 5-40 mL  5-40 mL IntraVENous Q8H    sodium chloride (NS) flush 5-40 mL  5-40 mL IntraVENous PRN    acetaminophen (TYLENOL) tablet 650 mg  650 mg Oral Q6H PRN    Or    acetaminophen (TYLENOL) suppository 650 mg  650 mg Rectal Q6H PRN    polyethylene glycol (MIRALAX) packet 17 g  17 g Oral DAILY PRN    ondansetron (ZOFRAN) injection 4 mg  4 mg IntraVENous Q6H PRN        Camron Phillips MD

## 2020-11-19 NOTE — PROGRESS NOTES
Bedside and Verbal shift change report given to Bryanna Bruce (oncoming nurse) by Kacy Lei (offgoing nurse). Report included the following information SBAR, Kardex and MAR.

## 2020-11-19 NOTE — PROGRESS NOTES
KOFI plan:    * Disposition: Home health PT/OT/SN - referral sent to [facility]  * Transport at D/C: Daughter in law vs Friend  * OP F/U: PCP, Gen Surgery, Endo, Cardio  * 2nd IMM letter    CM met with pt at the bedside to discuss the recommendation of [facility]. Pt is amendable to [facility] and did not have a preference of agency. CM sent referral to [facility] via [employer] for their Cardiac Program. Pt stated either her DIL or friend will transport her home tomorrow. CM will follow-up in the morning.     Ace Senate, [address] [facility],[address]  [phone]

## 2020-11-19 NOTE — PROGRESS NOTES
Problem: Falls - Risk of  Goal: *Absence of Falls  Description: Document Fernando Click Fall Risk and appropriate interventions in the flowsheet.   Outcome: Progressing Towards Goal  Note: Fall Risk Interventions:  Mobility Interventions: Patient to call before getting OOB, Communicate number of staff needed for ambulation/transfer, Bed/chair exit alarm         Medication Interventions: Evaluate medications/consider consulting pharmacy, Teach patient to arise slowly    Elimination Interventions: Call light in reach, Patient to call for help with toileting needs

## 2020-11-19 NOTE — PROGRESS NOTES
Problem: Self Care Deficits Care Plan (Adult)  Goal: *Acute Goals and Plan of Care (Insert Text)  Description:   FUNCTIONAL STATUS PRIOR TO ADMISSION: Patient was modified independent using a rolling walker or single point cane for functional mobility. Patient reports she was completing all ADLs independently unless her LLE was painful and required assist with socks/shoes for LLE from . Patient has a shower chair and sits to shower. Patient reports she no longer drives. HOME SUPPORT: The patient lived with  who provided assist as needed. Occupational Therapy Goals  Initiated 2020  1. Patient will perform lower body dressing with supervision/set-up within 7 day(s). 2.  Patient will perform grooming standing at sink with contact guard assist within 7 day(s). 3.  Patient will perform sponge bath with minimal assistance within 7 day(s). 4.  Patient will perform toilet transfers with contact guard assist within 7 day(s). Outcome: Not Met   OCCUPATIONAL THERAPY EVALUATION  Patient: Acosta Boss (18 y.o. female)  Date: 2020  Primary Diagnosis: Upper GI bleed [K92.2]  Anemia, blood loss [D50.0]  GIST, malignant ([facility] [address] [C49. A0]        Precautions: Fall    ASSESSMENT  Based on the objective data described below, the patient presents with  activity tolerance and functional mobility secondary to impaired standing balance, decreased endurance, general weakness, and L knee pain. Patient is functioning below her modified independent baseline for self-care and functional mobility. Overall, patient is set-up/supervision to max assist for self-care and CGA to min assist for functional mobility/transfers. Patient appears SOB with activity but recovers with seated rest breaks. Patient would benefit from skilled OT services during acute hospital stay to increase independence in self-care.      Current Level of Function Impacting Discharge (ADLs/self-care): set-up/supervision to max assist for self-care. Functional Outcome Measure: The patient scored 40 on the Barthel Index outcome measure which is indicative of 60% functional impairment. Other factors to consider for discharge: Patient lives with  who provides assistance when needed. Patient will benefit from skilled therapy intervention to address the above noted impairments. PLAN :  Recommendations and Planned Interventions: self care training, functional mobility training, therapeutic exercise, balance training, therapeutic activities, endurance activities, patient education, home safety training, and family training/education    Frequency/Duration: Patient will be followed by occupational therapy 3 times a week to address goals. Recommendation for discharge: (in order for the patient to meet his/her long term goals)  Occupational therapy at least 2 days/week in the home AND ensure assist and/or supervision for safety with ADLs and functional mobility. This discharge recommendation:  Has not yet been discussed the attending provider and/or case management    IF patient discharges home will need the following DME: patient owns DME required for discharge       SUBJECTIVE:   Patient stated I feel okay today.     OBJECTIVE DATA SUMMARY:   HISTORY:   Past Medical History:   Diagnosis Date    Anemia, unspecified 10/6/2020    Arrhythmia     Arthritis     CAD (coronary artery disease)     Cancer ([facility] [address]     Chronic bronchitis ([address]     per pt:  as of 1/9/15 pt denies any ARENAS or SOB    Diabetes ([address] Dx approx 2009    Dr Leslie Noonan (in [facility])    GERD (gastroesophageal reflux disease)     Hypercholesteremia     Hypertension      Past Surgical History:   Procedure Laterality Date    HX CHOLECYSTECTOMY  6/12    HX COLONOSCOPY      HX CORONARY STENT PLACEMENT  8/25/2015    HX DILATION AND CURETTAGE      UPPER GI ENDOSCOPY,BIOPSY  10/6/2020            Expanded or extensive additional review of patient history:     Home Situation  Home Environment: Private residence  # Steps to Enter: 1  Rails to Enter: Yes  Hand Rails : Bilateral  Wheelchair Ramp: No  One/Two Story Residence: One story  Living Alone: No  Support Systems: Spouse/Significant Other/Partner  Patient Expects to be Discharged to[de-identified] Private residence  Current DME Used/Available at Home: Anabel Jacks, rolling, Miri Lion, straight, Shower chair, Grab bars, Raised toilet seat  Tub or Shower Type: Tub/Shower combination    Hand dominance: Right    EXAMINATION OF PERFORMANCE DEFICITS:  Cognitive/Behavioral Status:  Neurologic State: Alert; Appropriate for age  Orientation Level: Oriented X4  Cognition: Follows commands  Perception: Appears intact  Perseveration: No perseveration noted  Safety/Judgement: Decreased awareness of need for assistance;Decreased awareness of need for safety    Hearing: Auditory  Auditory Impairment: None    Vision/Perceptual:    Acuity: Within Defined Limits    Corrective Lenses: Reading glasses    Range of Motion:  AROM: Generally decreased, functional  PROM: Generally decreased, functional    Strength:  Strength: Generally decreased, functional    Coordination:  Coordination: Within functional limits  Fine Motor Skills-Upper: Left Intact; Right Intact    Gross Motor Skills-Upper: Left Intact; Right Intact    Tone & Sensation:  Tone: Normal  Sensation: Intact    Balance:  Sitting: Intact  Standing: Impaired; With support  Standing - Static: Good  Standing - Dynamic : Fair    Functional Mobility and Transfers for ADLs:  Bed Mobility:  Rolling: Contact guard assistance  Supine to Sit: Minimum assistance  Sit to Supine: Minimum assistance  Scooting: Contact guard assistance    Transfers:  Sit to Stand: Contact guard assistance  Stand to Sit: Contact guard assistance    ADL Assessment:  Feeding: Setup;Supervision  Oral Facial Hygiene/Grooming: Setup;Supervision  Bathing:  Moderate assistance  Upper Body Dressing: Contact guard assistance  Lower Body Dressing: Minimum assistance; Additional time  Toileting: Maximum assistance(mccoy catheter)    ADL Intervention and task modifications:  Upper Body Dressing Assistance  Shirt simulation with hospital gown: Contact guard assistance  Cues: Tactile cues provided;Verbal cues provided    Lower Body Dressing Assistance  Socks: Minimum assistance  Leg Crossed Method Used: No  Position Performed: Seated edge of bed  Cues: Physical assistance;Verbal cues provided    Cognitive Retraining  Safety/Judgement: Decreased awareness of need for assistance;Decreased awareness of need for safety    Functional Measure:  Barthel Index:    Bathin  Bladder: 0  Bowels: 10  Groomin  Dressin  Feeding: 10  Mobility: 0  Stairs: 0  Toilet Use: 5  Transfer (Bed to Chair and Back): 5  Total: 40/100        The Barthel ADL Index: Guidelines  1. The index should be used as a record of what a patient does, not as a record of what a patient could do. 2. The main aim is to establish degree of independence from any help, physical or verbal, however minor and for whatever reason. 3. The need for supervision renders the patient not independent. 4. A patient's performance should be established using the best available evidence. Asking the patient, friends/relatives and nurses are the usual sources, but direct observation and common sense are also important. However direct testing is not needed. 5. Usually the patient's performance over the preceding 24-48 hours is important, but occasionally longer periods will be relevant. 6. Middle categories imply that the patient supplies over 50 per cent of the effort. 7. Use of aids to be independent is allowed. [facility]., Barthel, D.W. (5170). Functional evaluation: the Barthel Index. [facility] (14)2. Craig Pool juno YOUNG Benitez, Aidan Tuttle., Mary George., [place], [address]  Measuring the change indisability after inpatient rehabilitation; comparison of the responsiveness of the Barthel Index and Functional [place]idalgo Measure. Journal of Neurology, Neurosurgery, and Psychiatry, 66(4), 362-160. KRYSTINA Cohen, DIAMOND Davis, & Freedom Payton M.A. (2004.) Assessment of post-stroke quality of life in cost-effectiveness studies: The usefulness of the Barthel Index and the EuroQoL-5D. Quality of Life Research, 13, 381-97     Based on the above components, the patient evaluation is determined to be of the following complexity level: LOW   Pain Rating:  Patient c/o 8/10 L knee pain. Activity Tolerance:   Fair, requires frequent rest breaks, and observed SOB with activity    After treatment patient left in no apparent distress:    Supine in bed, Call bell within reach, and Side rails x 3    COMMUNICATION/EDUCATION:   The patients plan of care was discussed with: Physical therapist and Registered nurse. Home safety education was provided and the patient/caregiver indicated understanding., Patient/family have participated as able in goal setting and plan of care. , and Patient/family agree to work toward stated goals and plan of care. This patients plan of care is appropriate for delegation to [facility].     Thank you for this referral.  Mindi Hamilton OTR/L  Time Calculation: 29 mins

## 2020-11-19 NOTE — PROGRESS NOTES
Hospitalist Progress Note    NAME: Sathya Hewitt   :  1945   MRN:  [id number]       Assessment / Plan:  Acute respiratory distress - resolved now  Due to Fluid Overload s/p PRBC transfusion- s/p IV lasix BID  CXR - IMPRESSION:  Increased moderate pulmonary edema. Stable small left pleural effusion and left  basilar opacity. 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 Malignant versus due to CHF  Acute on chronic systolic CHF POA  LV: Estimated LVEF is 30 - 35%. Normal cavity size. Mild concentric hypertrophy. Moderate-to-severely reduced systolic function. TV: Mild to moderate tricuspid valve regurgitation is present.   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       Body mass index is 35.12 kg/m². Code status: Full  Prophylaxis: SCD's  Recommended Disposition: IP PT/OT eval today for DC planning     Subjective:     Chief Complaint / Reason for Physician Visit :F/U SOB/Pulm edema/Acute on chronic CHF, GUANACO, Anemia, gastric mass (GIST), GI bleeding  \"I am much better \". Discussed with RN events overnight. off BIPAP now, s/p IV diruesis    Review of Systems:  Symptom Y/N Comments  Symptom Y/N Comments   Fever/Chills n   Chest Pain n    Poor Appetite n   Edema n    Cough n   Abdominal Pain n    Sputum n   Joint Pain     SOB/ARENAS y improved  Pruritis/Rash     Nausea/vomit n   Tolerating PT/OT y    Diarrhea n   Tolerating Diet y Diabetic full liquids    Constipation    Other       Could NOT obtain due to:      Objective:     VITALS:   Last 24hrs VS reviewed since prior progress note. Most recent are:  Patient Vitals for the past 24 hrs:   Temp Pulse Resp BP SpO2   11/19/20 0747 97.8 °F (36.6 °C) 65 17 (!) 154/74 100 %   11/18/20 2255 98.6 °F (37 °C) 67 16 (!) 151/70 100 %   11/18/20 1610 98.6 °F (37 °C) 72 16 (!) 155/82 99 %   11/18/20 1343 97.7 °F (36.5 °C) 71 16 (!) 154/81 97 %   11/18/20 1300 -- 62 17 (!) 123/57 97 %   11/18/20 1200 -- 64 19 (!) 118/54 98 %   11/18/20 1100 -- 66 19 (!) 129/58 100 %   11/18/20 1000 -- 60 20 (!) 130/58 99 %   11/18/20 0900 -- 67 20 (!) 145/73 98 %       Intake/Output Summary (Last 24 hours) at 11/19/2020 0820  Last data filed at 11/19/2020 2578  Gross per 24 hour   Intake 560.83 ml   Output 2250 ml   Net -1689.17 ml        I had a face to face encounter and independently examined this patient on 11/19/2020, as outlined below:    PHYSICAL EXAM:  General: WD, WN. Alert, cooperative, no acute distress    EENT:  EOMI. Anicteric sclerae. MMM  Resp:  wheezing ,  Rales noted L>R.   No accessory muscle use  CV:  S1S2,  No edema  GI:  Soft, Non distended, Non tender.  +Bowel sounds  Neurologic:  Alert and oriented X 3, normal speech,   Psych:   Good insight. Not anxious nor agitated  Skin:  No rashes. No jaundice    Reviewed most current lab test results and cultures  YES  Reviewed most current radiology test results   YES  Review and summation of old records today    NO  Reviewed patient's current orders and MAR    YES  PMH/SH reviewed - no change compared to H&P  ________________________________________________________________________  Care Plan discussed with:    Comments   Patient x    Family      RN x    Care Manager x    Consultant                        Multidiciplinary team rounds were held today with , nursing, pharmacist and clinical coordinator. Patient's plan of care was discussed; medications were reviewed and discharge planning was addressed. ________________________________________________________________________  Total NON critical care TIME: 26  Minutes    Total CRITICAL CARE TIME Spent:   Minutes non procedure based      Comments   >50% of visit spent in counseling and coordination of care     ________________________________________________________________________  Yasmin Licea MD     Procedures: see electronic medical records for all procedures/Xrays and details which were not copied into this note but were reviewed prior to creation of Plan. LABS:  I reviewed today's most current labs and imaging studies.   Pertinent labs include:  Recent Labs     11/19/20 0113 11/17/20 1040 11/16/20  1913   WBC 13.0*  --   --    HGB 10.1* 10.7* 8.6*   HCT 33.2* 34.3* 29.3*     --   --      Recent Labs     11/19/20 0113 11/18/20  0515 11/18/20  0410    137 138   K 4.5 5.1 5.2*    108 108   CO2 27 26 25   * 285* 296*   BUN 44* 38* 39*   CREA 1.52* 1.87* 1.82*   CA 7.9* 7.7* 7.2*   MG 2.1 2.2 2.1       Signed: Yasmin Licea MD

## 2020-11-19 NOTE — PROGRESS NOTES
I changed her diet back to full liquid diet. The location of her GIST is such that when she eats solid food it traumatizes the GIST and she bleeds again. Liquid diet should help avoid this. I also changed her back to Ensure rather than Nepro since no one can drink 6 Nepro's in a day. She is on a sliding scale, so I would rather have her on a better tasting protein drink so that her nutritional status improves. Perhaps we could then operate.

## 2020-11-19 NOTE — PROGRESS NOTES
Surgical Progress Note  11/19/20        Patient seen and examined by me today. Attending provider:  Riya Holden MD   PCP:  Get Aguilar MD         Assessment     Plan  Gastric GIST at GE junction. Anemia improved. Hgb 10.1    Bilateral pleural effusions. No SOB presently. Malnutrition. Albumin 1.9 on 11/16. The GIST is the source of her anemia. Its location makes resection complex and high risk. It will likely require distal esophagectomy and partial gastrectomy. The anastomosis will be in the mediastinum. This is a very high risk anastomosis in the best of circumstances. Prior to surgery, her albumin needs to be at least 3.5. She should be on a full liquid diet so as to not cause mechanical trauma of the mass at her GE junction which has caused recurrent bleeding. She can get over 2000 myron daily by drinking 6 Ensure products daily. I changed her diet back to full liquid diet. The location of her GIST is such that when she eats solid food it traumatizes the GIST and she bleeds again. Liquid diet should help avoid this. I also changed her back to Ensure rather than Nepro since no one can drink 6 Nepro's in a day. She is on a sliding scale, so I would rather have her on a better tasting protein drink so that her nutritional status improves. Perhaps we could then operate. Subjective:     Chief Complaint:  Feels well. Review of Systems:   yes/no  yes/no   Fever n Abdominal pain n   Chills n Flatus y   Chest pain n Nausea n   Cough y Vomiting n   Sputum n Constipation n   SOB y Diarrhea n   Dysuria n Tolerating diet  y      []       Unable to obtain  ROS due to:_____________________    Objective:     VITALS:   Last 24hrs VS reviewed since prior hospitalist progress note.  Most recent are:  Visit Vitals  /61 (BP 1 Location: Right arm, BP Patient Position: Sitting)   Pulse 71   Temp 98.4 °F (36.9 °C)   Resp 16   Ht 5' 2\" (1.575 m)   Wt 87.1 kg (192 lb) SpO2 98%   BMI 35.12 kg/m²     Temp (24hrs), Av.2 °F (36.8 °C), Min:97.8 °F (36.6 °C), Max:98.6 °F (37 °C)      Intake/Output Summary (Last 24 hours) at 2020 1819  Last data filed at 2020 0650  Gross per 24 hour   Intake --   Output 1550 ml   Net -1550 ml        PHYSICAL EXAM:  General appearance  Alert, cooperative, no distress, appears stated age. Eyes  Anicteric. Neck Supple. Lungs   CTAB. Heart  Regular rate and rhythm. No murmur, rub or gallop. Abdomen   Soft. Bowel sounds normal.  Non tender. Neurologic Without overt sensory or motor deficit        Lab Data Reviewed: (see below)    Medications Reviewed: (see below)    PMH/ reviewed - no change compared to H&P  ________________________________________________________________________  LABS:  Recent Labs     20  0113 20  1040   WBC 13.0*  --    HGB 10.1* 10.7*   HCT 33.2* 34.3*     --      Recent Labs     20  0113 20  0515 20  0410    137 138   K 4.5 5.1 5.2*    108 108   CO2 27 26 25   BUN 44* 38* 39*   CREA 1.52* 1.87* 1.82*   * 285* 296*   CA 7.9* 7.7* 7.2*   MG 2.1 2.2 2.1     No results for input(s): ALT, AP, TBIL, TBILI, TP, ALB, GLOB, GGT, AML, LPSE in the last 72 hours. No lab exists for component: SGOT, GPT, AMYP, HLPSE  No results for input(s): INR, PTP, APTT, INREXT, INREXT in the last 72 hours. No results for input(s): FE, TIBC, PSAT, FERR in the last 72 hours. Recent Labs     20  0900   PH 7.40   PCO2 42   PO2 308*     No results for input(s): CPK, CKMB in the last 72 hours.     No lab exists for component: TROPONINI  Lab Results   Component Value Date/Time    Glucose (POC) 137 (H) 2020 04:22 PM    Glucose (POC) 148 (H) 2020 11:04 AM    Glucose (POC) 74 2020 07:51 AM    Glucose (POC) 272 (H) 2020 08:51 PM    Glucose (POC) 280 (H) 2020 04:39 PM       MEDICATIONS:  Current Facility-Administered Medications   Medication Dose Route Frequency    insulin glargine (LANTUS) injection 20 Units  20 Units SubCUTAneous DAILY    losartan (COZAAR) tablet 50 mg  50 mg Oral DAILY    furosemide (LASIX) tablet 40 mg  40 mg Oral DAILY    albuterol-ipratropium (DUO-NEB) 2.5 MG-0.5 MG/3 ML  3 mL Nebulization Q4H PRN    levoFLOXacin (LEVAQUIN) 750 mg in D5W IVPB  750 mg IntraVENous Q48H    alcohol 62% (NOZIN) nasal  1 Ampule  1 Ampule Topical Q12H    0.9% sodium chloride infusion 250 mL  250 mL IntraVENous PRN    hydrOXYzine HCL (ATARAX) tablet 25 mg  25 mg Oral QHS PRN    insulin lispro (HUMALOG) injection   SubCUTAneous AC&HS    glucose chewable tablet 16 g  4 Tab Oral PRN    dextrose (D50W) injection syrg 12.5-25 g  12.5-25 g IntraVENous PRN    glucagon (GLUCAGEN) injection 1 mg  1 mg IntraMUSCular PRN    amLODIPine (NORVASC) tablet 5 mg  5 mg Oral DAILY    atorvastatin (LIPITOR) tablet 40 mg  40 mg Oral QHS    carvediloL (COREG) tablet 12.5 mg  12.5 mg Oral BID WITH MEALS    DULoxetine (CYMBALTA) capsule 60 mg  60 mg Oral DAILY    pantoprazole (PROTONIX) 40 mg in 0.9% sodium chloride 10 mL injection  40 mg IntraVENous DAILY    sodium chloride (NS) flush 5-40 mL  5-40 mL IntraVENous Q8H    sodium chloride (NS) flush 5-40 mL  5-40 mL IntraVENous PRN    acetaminophen (TYLENOL) tablet 650 mg  650 mg Oral Q6H PRN    Or    acetaminophen (TYLENOL) suppository 650 mg  650 mg Rectal Q6H PRN    polyethylene glycol (MIRALAX) packet 17 g  17 g Oral DAILY PRN    ondansetron (ZOFRAN) injection 4 mg  4 mg IntraVENous Q6H PRN

## 2020-11-19 NOTE — PROGRESS NOTES
RAPID RESPONSE TEAM- Follow Up     Rounded on patient due to transfer from CCU. Initially admitted for GIB. Was a RRT call on 11/17 for acute respiratory distress with hypoxia; Covid -. Spoke with primary RN Ramona. No acute concerns at this time. Patient on RA. VSS. H & H stable. No RRT interventions indicated at this time. Please call back if needed.      Clifford Mendoza RN  RRT  Ext. [phone]    Patient Vitals for the past 12 hrs:   Temp Pulse Resp BP SpO2   11/18/20 2255 98.6 °F (37 °C) 67 16 (!) 151/70 100 %   11/18/20 1610 98.6 °F (37 °C) 72 16 (!) 155/82 99 %   11/18/20 1343 97.7 °F (36.5 °C) 71 16 (!) 154/81 97 %   11/18/20 1300 -- 62 17 (!) 123/57 97 %

## 2020-11-19 NOTE — PROGRESS NOTES
PT visit attempted reported she had just gotten up with OT but agreeable to getting up later. Will defer at this time and continue to follow.

## 2020-11-19 NOTE — PROGRESS NOTES
Problem: Mobility Impaired (Adult and Pediatric)  Goal: *Acute Goals and Plan of Care (Insert Text)  Description: FUNCTIONAL STATUS PRIOR TO ADMISSION: Patient was modified independent using a SPC vs rolling walker for functional mobility. She reports she occasionally uses nothing. Patient is a poor historian. HOME SUPPORT PRIOR TO ADMISSION: The patient lived with her  who assists with LB dressing. Physical Therapy Goals  Initiated 11/15/2020  1. Patient will move from supine to sit and sit to supine , scoot up and down, and roll side to side in bed with independence within 7 day(s). 2.  Patient will transfer from bed to chair and chair to bed with modified independence using the least restrictive device within 7 day(s). 3.  Patient will perform sit to stand with modified independence within 7 day(s). 4.  Patient will ambulate with modified independence for 150 feet with the least restrictive device within 7 day(s). 5.  Patient will ascend/descend 3 stairs with one sided handrail(s) with modified independence within 7 day(s). Outcome: Progressing Towards Goal   PHYSICAL THERAPY TREATMENT  Patient: Teodora Roles (86 y.o. 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 ASSESSMENT  Patient continues with skilled PT services and is progressing towards goals. Pt presents with decreased strength and increased LLE pain with mobility due to arthritis. Pt performed bed mobility at [facility] for LLE with cueing for hand placement. Pt performed a sit to stand at [facility] with cueing for hand placement. Pt ambulated 6ft to chair with RW at [facility]. Pt presented with delayed advancement of the LLE due to p! Pt performed seated exercises for strength.  Pt is moving with increased pain, but mobility improving from last session. Current Level of Function Impacting Discharge (mobility/balance): bed mobility at [facility], sit to stand and ambulation at [facility]    Other factors to consider for discharge: pain         PLAN :  Patient continues to benefit from skilled intervention to address the above impairments. Continue treatment per established plan of care. to address goals. Recommendation for discharge: (in order for the patient to meet his/her long term goals)  Physical therapy at least 2 days/week in the home     This discharge recommendation:  Has been made in collaboration with the attending provider and/or case management    IF patient discharges home will need the following DME: patient owns DME required for discharge       SUBJECTIVE:   Patient stated I knew you were coming back.     OBJECTIVE DATA SUMMARY:   Critical Behavior:  Neurologic State: Alert, Appropriate for age  Orientation Level: Oriented X4  Cognition: Follows commands  Safety/Judgement: Decreased awareness of need for assistance, Decreased awareness of need for safety  Functional Mobility Training:  Bed Mobility:  Rolling: Contact guard assistance;Minimum assistance(Grant for LLE)  Supine to Sit: Contact guard assistance;Minimum assistance  Sit to Supine: Contact guard assistance;Minimum assistance  Scooting: Contact guard assistance     Transfers:  Sit to Stand: Contact guard assistance  Stand to Sit: Contact guard assistance        Balance:  Sitting: Intact  Standing: Impaired; Without support  Standing - Static: Good  Standing - Dynamic : Fair  Ambulation/Gait Training:  Distance (ft): 6 Feet (ft)  Assistive Device: Walker, rolling;Gait belt  Ambulation - Level of Assistance: Contact guard assistance     Gait Abnormalities: Decreased step clearance; Antalgic     Base of Support: Widened     Speed/Tiffany: Slow;Pace decreased (<100 feet/min); Delayed(delayed advancement due to LLE p!)  Step Length: Left shortened;Right shortened  Therapeutic Exercises: sitting      EXERCISE   Sets   Reps   Active Active Assist   Passive Self ROM   Comments   Ankle Pumps 1 10 [x] [] [] []    Long Arc Quads 1 10 [x] [] [] []    Marching 1 10 [x] [] [] []       Pain Rating:  Pt with c/o pain in LLE    Activity Tolerance:   Fair    After treatment patient left in no apparent distress:   Sitting in chair and Call bell within reach    COMMUNICATION/COLLABORATION:   The patients plan of care was discussed with: Registered nurse.      MELI Friedman   Time Calculation: 25 mins

## 2020-11-20 VITALS
DIASTOLIC BLOOD PRESSURE: 63 MMHG | TEMPERATURE: 99.1 F | HEART RATE: 73 BPM | HEIGHT: 62 IN | BODY MASS INDEX: 35.33 KG/M2 | OXYGEN SATURATION: 100 % | WEIGHT: 192 LBS | SYSTOLIC BLOOD PRESSURE: 173 MMHG | RESPIRATION RATE: 16 BRPM

## 2020-11-20 LAB
ABO + RH BLD: NORMAL
ALBUMIN SERPL-MCNC: 1.7 G/DL (ref 3.5–5)
ALBUMIN/GLOB SERPL: 0.5 {RATIO} (ref 1.1–2.2)
ALP SERPL-CCNC: 48 U/L (ref 45–117)
ALT SERPL-CCNC: 14 U/L (ref 12–78)
ANION GAP SERPL CALC-SCNC: 5 MMOL/L (ref 5–15)
AST SERPL-CCNC: 22 U/L (ref 15–37)
BILIRUB SERPL-MCNC: 0.4 MG/DL (ref 0.2–1)
BLD PROD TYP BPU: NORMAL
BLOOD BANK CMNT PATIENT-IMP: NORMAL
BLOOD GROUP ANTIBODIES SERPL: NORMAL
BPU ID: NORMAL
BUN SERPL-MCNC: 29 MG/DL (ref 6–20)
BUN/CREAT SERPL: 23 (ref 12–20)
CALCIUM SERPL-MCNC: 7.5 MG/DL (ref 8.5–10.1)
CHLORIDE SERPL-SCNC: 110 MMOL/L (ref 97–108)
CO2 SERPL-SCNC: 26 MMOL/L (ref 21–32)
CREAT SERPL-MCNC: 1.27 MG/DL (ref 0.55–1.02)
CROSSMATCH RESULT,%XM: NORMAL
GLOBULIN SER CALC-MCNC: 3.4 G/DL (ref 2–4)
GLUCOSE BLD STRIP.AUTO-MCNC: 90 MG/DL (ref 65–100)
GLUCOSE SERPL-MCNC: 86 MG/DL (ref 65–100)
HCT VFR BLD AUTO: 32.8 % (ref 35–47)
HGB BLD-MCNC: 9.9 G/DL (ref 11.5–16)
POTASSIUM SERPL-SCNC: 4 MMOL/L (ref 3.5–5.1)
PROT SERPL-MCNC: 5.1 G/DL (ref 6.4–8.2)
SERVICE CMNT-IMP: NORMAL
SODIUM SERPL-SCNC: 141 MMOL/L (ref 136–145)
SPECIMEN EXP DATE BLD: NORMAL
STATUS OF UNIT,%ST: NORMAL
UNIT DIVISION, %UDIV: 0

## 2020-11-20 PROCEDURE — 80053 COMPREHEN METABOLIC PANEL: CPT

## 2020-11-20 PROCEDURE — 85018 HEMOGLOBIN: CPT

## 2020-11-20 PROCEDURE — 74011250637 HC RX REV CODE- 250/637: Performed by: INTERNAL MEDICINE

## 2020-11-20 PROCEDURE — C9113 INJ PANTOPRAZOLE SODIUM, VIA: HCPCS | Performed by: INTERNAL MEDICINE

## 2020-11-20 PROCEDURE — 36415 COLL VENOUS BLD VENIPUNCTURE: CPT

## 2020-11-20 PROCEDURE — 74011250636 HC RX REV CODE- 250/636: Performed by: INTERNAL MEDICINE

## 2020-11-20 PROCEDURE — 82962 GLUCOSE BLOOD TEST: CPT

## 2020-11-20 PROCEDURE — 94760 N-INVAS EAR/PLS OXIMETRY 1: CPT

## 2020-11-20 RX ORDER — LEVOFLOXACIN 750 MG/1
750 TABLET ORAL DAILY
Qty: 2 TAB | Refills: 0 | Status: SHIPPED | OUTPATIENT
Start: 2020-11-20 | End: 2020-11-24

## 2020-11-20 RX ORDER — INSULIN DEGLUDEC INJECTION 200 U/ML
INJECTION, SOLUTION SUBCUTANEOUS
Qty: 18 PEN | Refills: 3 | Status: SHIPPED
Start: 2020-11-20 | End: 2021-01-07

## 2020-11-20 RX ORDER — AMLODIPINE BESYLATE 10 MG/1
10 TABLET ORAL DAILY
Qty: 30 TAB | Refills: 0 | Status: SHIPPED | OUTPATIENT
Start: 2020-11-20

## 2020-11-20 RX ORDER — GLIPIZIDE 5 MG/1
5 TABLET ORAL 2 TIMES DAILY
Qty: 60 TAB | Refills: 0 | Status: SHIPPED | OUTPATIENT
Start: 2020-11-20 | End: 2021-01-21 | Stop reason: SDUPTHER

## 2020-11-20 RX ORDER — PEDIATRIC NUTRIT, IRON/DHA/ARA 4G/150KCAL
POWDER (GRAM) ORAL
Qty: 180 BOTTLE | Refills: 1 | Status: SHIPPED | OUTPATIENT
Start: 2020-11-20

## 2020-11-20 RX ORDER — PANTOPRAZOLE SODIUM 40 MG/1
40 TABLET, DELAYED RELEASE ORAL DAILY
Qty: 30 TAB | Refills: 1 | Status: SHIPPED | OUTPATIENT
Start: 2020-11-20 | End: 2020-12-20

## 2020-11-20 RX ADMIN — AMLODIPINE BESYLATE 5 MG: 5 TABLET ORAL at 08:26

## 2020-11-20 RX ADMIN — CARVEDILOL 12.5 MG: 12.5 TABLET, FILM COATED ORAL at 08:26

## 2020-11-20 RX ADMIN — FUROSEMIDE 40 MG: 40 TABLET ORAL at 08:26

## 2020-11-20 RX ADMIN — Medication 10 ML: at 05:53

## 2020-11-20 RX ADMIN — SODIUM CHLORIDE 40 MG: 9 INJECTION, SOLUTION INTRAMUSCULAR; INTRAVENOUS; SUBCUTANEOUS at 08:26

## 2020-11-20 RX ADMIN — LOSARTAN POTASSIUM 50 MG: 50 TABLET, FILM COATED ORAL at 08:26

## 2020-11-20 RX ADMIN — DULOXETINE HYDROCHLORIDE 60 MG: 30 CAPSULE, DELAYED RELEASE ORAL at 08:26

## 2020-11-20 NOTE — PROGRESS NOTES
Patient was discharged home with . IVs were taken out and patient was removed from telemetry. Discharge instructions were provided and explained to the patient. Room was checked for all patient belongings.

## 2020-11-20 NOTE — PROGRESS NOTES
KOFI plan:     * Disposition: Home health PT/OT/SN - accepted by [facility]  * Transport at D/C: Family around 10:30 am  * OP F/U: PCP on 11/30/20, Gen Surgery on 12/11/20, Cardio on 1/6/21    Medicare pt has received, reviewed, and signed 2nd IM letter informing them of their right to appeal the discharge. Signed copy has been placed on pt bedside chart. Pt verbalized understanding of the plan. No further concerns indicated at this time. AVS updated. Patient is ready for discharge from a Care Management standpoint. RN informed. Care Management Interventions  PCP Verified by CM: Yes  Last Visit to PCP: 11/14/19  Mode of Transport at Discharge:  Other (see comment)(family)  Hospital Transport Time of Discharge: 1030  Transition of Care Consult (CM Consult): [address]: No  MyChart Signup: No  Discharge Durable Medical Equipment: No  Physical Therapy Consult: Yes  Occupational Therapy Consult: Yes  Speech Therapy Consult: No  Current Support Network: Own Home, Lives with Spouse  Confirm Follow Up Transport: Family  The Plan for Transition of Care is Related to the Following Treatment Goals : home health  The Patient and/or Patient Representative was Provided with a Choice of Provider and Agrees with the Discharge Plan?: Yes  Freedom of Choice List was Provided with Basic Dialogue that Supports the Patient's Individualized Plan of Care/Goals, Treatment Preferences and Shares the Quality Data Associated with the Providers?: Yes  Discharge Location  Discharge Placement: Home with home health    Bertha Bang, [address] [place] [address]  [phone]

## 2020-11-20 NOTE — PROGRESS NOTES
Cardiology Progress Note  11/20/2020     Admit Date: 11/12/2020  Admit Diagnosis: Upper GI bleed [K92.2]  Anemia, blood loss [D50.0]  GIST, malignant ([address] [C49. A0]  CC: none currently  Cardiac Assessment/Plan:     Cardiac Assessment/Plan (as noted by Dr Ruma Alarcon):     1) CAD: Distal RCA NEENA 8/2015 for NQWMI; Med Rx mid circ and diffusely dz diagonals.    *NQWMI 10/2020 (trop 9), c/w type II from profound anemia.    * EF 30-35% 10/6/20. 2) HTN  3) DM  4) Dyslipidemia  5) CKD III: Cr 1.43/gfr 42 7/2020 (Dr Kelvin Viera). 6) Obesity: ?NANY. 7) Non-compliant with some meds per pt 10/2020. Poor compliance with f/u OV. 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     Recurrent anemia, s/p transfusion. GUANACO improving  Being ruled out for COVID  Bilateral pleural effusions s/p right thorocentesis. Currently on bipap     - holding aspirin  - Cont coreg  - Holding cozaar until cr improves  - Increase lasix to 40mg iv bid, follow bmp  - Cont statin    For other plans, see orders.       Echo 10/6/20:   · LV: Estimated LVEF is 30 - 35%. Normal cavity size. Mild concentric hypertrophy. Moderate-to-severely reduced systolic function. · TV: Mild to moderate tricuspid valve regurgitation is present.      _________________________________________________________________________     11/18: BPs 110s-140s; HR 60-70s; 98% RA. Hg 10.7; Cr 1.87; BUN 38; K 5.1. COVID pending. Cardiac meds: norvasc 5; lipitor 40; coreg 12.5 bid; lasix 40 IV bid; (ARB on hold). No new cardiac recs. PO diuretics soon. 11/19: COVID neg; No CP; dyspnea GREATLY improved. BPs 110s-150s; HR 60-70s (no tele). 100% RA. Neg 4L. Hg 10.1. BUN 44; Cr 1.52. Cardiac meds: norvasc 5; lipitor 40; coreg 12.5 bid; lasix 40 PO qday; Losartan 50 qday (just restarted). No new cardiac recs. Dispo per primary team (dep on Hg stability). 11/20: BPs 130-150; HR 60-70s; 100% on RA.   Hg 9.9; Cr 1.27 (gfr 50); BUN 29; Cardiac meds: norvasc 5; lipitor 40; coreg 12.5 bid; lasix 40 PO qday; Losartan 50 qday. Discharge plan noted. Can f/u with me in a month (non-compliant in the past). ________________________________________________________________________________      As noted POA: \"Lani Del Castillo is a 70 y.o.   female who presents with above complaints from home. Patient present with chief complaint of worsening shortness of breath/dyspnea on exertion for the past 3 days, history of shortness of breath for the past 2 months since she was diagnosed with blood loss anemia due to GIST tumor at GE junction and stomach last admission month ago. Patient has since stopped taking her aspirin and Plavix, but was lost to follow-up and has not followed up with general surgeon Dr. Abdirizak Fitch the oncology for any treatment for her cancer. Patient has seen her stools to be dark for the past few days but no obvious blood.     Patient was found with hemoglobin of 5.7 on the blood work in the ED with CT abdomen and pelvis negative for any active bleeding but evident of her gastric mass which is unchanged but increased pleural effusion bilaterally was noted. \"      Hospital problem list     Active Hospital Problems    Diagnosis Date Noted    Upper GI bleed 11/12/2020    Anemia, blood loss 11/12/2020    GIST, malignant ([facility] [address] 11/12/2020        Subjective: Neal Good reports       Chest pain X none  consistent with:  Non-cardiac CP         Atypical CP     None now  On going  Anginal CP     Dyspnea  none  at rest  with exertion        x improved  unchanged  worse              PND X none  overnight       Orthopnea X none  improved  unchanged  worse   Presyncope X none  improved  unchanged  worse     Ambulated in hallway without symptoms   Yes   Ambulated in room without symptoms  Yes   Objective:     Physical Exam:  Overall VSSAF;    Visit Vitals  BP (!) 173/63 (BP 1 Location: Right arm, BP Patient Position: At rest;Sitting) Pulse 73   Temp 99.1 °F (37.3 °C)   Resp 16   Ht 5' 2\" (1.575 m)   Wt 87.1 kg (192 lb)   SpO2 100%   BMI 35.12 kg/m²     Temp (24hrs), Av.5 °F (36.9 °C), Min:97.8 °F (36.6 °C), Max:99.1 °F (37.3 °C)    Patient Vitals for the past 8 hrs:   Pulse   20 0736 73     Patient Vitals for the past 8 hrs:   Resp   20 0736 16     Patient Vitals for the past 8 hrs:   BP   20 0736 (!) 173/63       Intake/Output Summary (Last 24 hours) at 2020 7151  Last data filed at 2020 5081  Gross per 24 hour   Intake --   Output 300 ml   Net -300 ml     General Appearance: Well developed, well nourished, no acute distress. Ears/Nose/Mouth/Throat:   Normal MM; anicteric. JVP: WNL   Resp:   Lungs clear to auscultation bilaterally. Nl resp effort. Cardiovascular:  RRR, S1, S2 normal, no new murmur. No gallop or rub. Abdomen:   Soft, non-tender, bowel sounds are present. Extremities: Trivial edema bilaterally. Skin:  Neuro: Warm and dry. A/O x3, grossly nonfocal       cath site intact w/o hematoma or new bruit; distal pulse unchanged  Yes   Data Review:     Telemetry none  sinus  chronic afib  parox afib  NSVT     ECG independently reviewed  NSR  afib  no significant changes  NSST-Tw chgs     x no new ECG provided for review   Lab results reviewed as noted below. Current medications reviewed as noted below. Recent Labs     20  0900   PH 7.40   PCO2 42   PO2 308*     No results for input(s): CPK, CKMB, CKNDX, TROIQ in the last 72 hours.   Recent Labs     20  0544 20  0113 20  0515  20  1040    138 137   < >  --    K 4.0 4.5 5.1   < >  --    * 108 108   < >  --    CO2 26 27 26   < >  --    BUN 29* 44* 38*   < >  --    CREA 1.27* 1.52* 1.87*   < >  --    GFRAA 50* 41* 32*   < >  --    GLU 86 103* 285*   < >  --    CA 7.5* 7.9* 7.7*   < >  --    ALB 1.7*  --   --   --   --    WBC  --  13.0*  --   --   --    HGB 9.9* 10.1*  --   --  10.7*   HCT 32.8* 33.2* --   --  34.3*   PLT  --  251  --   --   --     < > = values in this interval not displayed. Lab Results   Component Value Date/Time    Cholesterol, total 179 02/16/2015 09:56 AM    HDL Cholesterol 29 (L) 02/16/2015 09:56 AM    LDL, calculated 74 02/16/2015 09:56 AM    Triglyceride 378 (H) 02/16/2015 09:56 AM     Recent Labs     11/20/20  0544   AP 48   TP 5.1*   ALB 1.7*   GLOB 3.4     No results for input(s): INR, PTP, APTT, INREXT, INREXT in the last 72 hours.    No components found for: GLPOC    Current Facility-Administered Medications   Medication Dose Route Frequency    insulin glargine (LANTUS) injection 20 Units  20 Units SubCUTAneous DAILY    losartan (COZAAR) tablet 50 mg  50 mg Oral DAILY    furosemide (LASIX) tablet 40 mg  40 mg Oral DAILY    albuterol-ipratropium (DUO-NEB) 2.5 MG-0.5 MG/3 ML  3 mL Nebulization Q4H PRN    levoFLOXacin (LEVAQUIN) 750 mg in D5W IVPB  750 mg IntraVENous Q48H    alcohol 62% (NOZIN) nasal  1 Ampule  1 Ampule Topical Q12H    0.9% sodium chloride infusion 250 mL  250 mL IntraVENous PRN    hydrOXYzine HCL (ATARAX) tablet 25 mg  25 mg Oral QHS PRN    insulin lispro (HUMALOG) injection   SubCUTAneous AC&HS    glucose chewable tablet 16 g  4 Tab Oral PRN    dextrose (D50W) injection syrg 12.5-25 g  12.5-25 g IntraVENous PRN    glucagon (GLUCAGEN) injection 1 mg  1 mg IntraMUSCular PRN    amLODIPine (NORVASC) tablet 5 mg  5 mg Oral DAILY    atorvastatin (LIPITOR) tablet 40 mg  40 mg Oral QHS    carvediloL (COREG) tablet 12.5 mg  12.5 mg Oral BID WITH MEALS    DULoxetine (CYMBALTA) capsule 60 mg  60 mg Oral DAILY    pantoprazole (PROTONIX) 40 mg in 0.9% sodium chloride 10 mL injection  40 mg IntraVENous DAILY    sodium chloride (NS) flush 5-40 mL  5-40 mL IntraVENous Q8H    sodium chloride (NS) flush 5-40 mL  5-40 mL IntraVENous PRN    acetaminophen (TYLENOL) tablet 650 mg  650 mg Oral Q6H PRN    Or    acetaminophen (TYLENOL) suppository 650 mg  650 mg Rectal Q6H PRN    polyethylene glycol (MIRALAX) packet 17 g  17 g Oral DAILY PRN    ondansetron (ZOFRAN) injection 4 mg  4 mg IntraVENous Q6H PRN        Emanuel Presley MD

## 2020-11-20 NOTE — PROGRESS NOTES
Problem: Falls - Risk of  Goal: *Absence of Falls  Description: Document Marguerite Wylie Fall Risk and appropriate interventions in the flowsheet.   Outcome: Progressing Towards Goal  Note: Fall Risk Interventions:  Mobility Interventions: Bed/chair exit alarm         Medication Interventions: Patient to call before getting OOB    Elimination Interventions: Bed/chair exit alarm

## 2020-11-20 NOTE — PROGRESS NOTES
End of Shift Note    Bedside shift change report given to Miguel Loza (oncoming nurse) by Margarette Goodell (offgoing nurse). Report included the following information SBAR, Kardex, Procedure Summary, Intake/Output, MAR and Recent Results    Shift worked:  9423-3609   Shift summary and any significant changes:            Concerns for physician to address:     Zone phone for oncoming shift:   [phone]     Activity:  Activity Level: Up with Assistance  Number times ambulated in hallways past shift: 0  Number of times OOB to chair past shift: 0    Cardiac:   Cardiac Monitoring: No      Cardiac Rhythm: Normal sinus rhythm    Access:   Current line(s): PIV     Genitourinary:   Urinary status: voiding    Respiratory:   O2 Device: Room air  Chronic home O2 use?: NO  Incentive spirometer at bedside: NO     GI:  Last Bowel Movement Date: 11/16/20  Current diet:  DIET FULL LIQUID  DIET NUTRITIONAL SUPPLEMENTS Breakfast, AM Snack, Lunch, PM Snack, Dinner, [employer]; Ensure Enlive  Passing flatus: YES  Tolerating current diet: YES  % Diet Eaten: 100 %    Pain Management:   Patient states pain is manageable on current regimen: YES    Skin:  Rufus Score: 19  Interventions: increase time out of bed and nutritional support     Patient Safety:  Fall Score:  Total Score: 3  Interventions: assistive device (walker, cane, etc) and pt to call before getting OOB  High Fall Risk: Yes    Length of Stay:  Expected LOS: 5d 9h  Actual LOS: [facility]

## 2020-11-20 NOTE — DISCHARGE INSTRUCTIONS
HOSPITALIST DISCHARGE INSTRUCTIONS    NAME: Misa Iraheta   :  1945   MRN:  [id number]     Date/Time:  2020 8:19 AM    ADMIT DATE: 2020     DISCHARGE DATE: 2020     DISCHARGE DIAGNOSIS:  Symptomatic anemia POA - Hb stable at 9.9 today   anemia of chronic blood loss POA   upper GI bleed from GIST tumor at GE junction POA  Gastrointestinal tumor of the stomach POA GIST  Protein Calorie Malnutrition secondary to GIST- cont Ensure Enlive 4-6 x daily as recommended along with Liquid diet only to prevent GI bleeding  GUANACO -resolved, Cr improved to 1.2 & stable now, resume home diuretic  Acute respiratory distress - resolved now  Due to Fluid Overload s/p PRBC transfusion- s/p IV lasix BID  Bilateral pleural effusions (moderate) POA-? Malignant versus due to CHF  Acute on chronic systolic CHF POA  LV: Estimated LVEF is 30 - 35%. Normal cavity size. Mild concentric hypertrophy. Moderate-to-severely reduced systolic function. Hypertension POA  Hypercholesterolemia  CAD  Diabetes type 2 insulin-dependent  Full code    Active Problems:    Upper GI bleed (2020)      Anemia, blood loss (2020)      GIST, malignant ([address] (2020)         MEDICATIONS:  As per medication reconciliation  list  · It is important that you take the medication exactly as they are prescribed. · Keep your medication in the bottles provided by the pharmacist and keep a list of the medication names, dosages, and times to be taken in your wallet. · Do not take other medications without consulting your doctor.      Pain Management: per above medications    What to do at Home    Recommended diet:  Diabetic Liquid diet with Ensure Enlive supplements 4-6 times daily as able as recommended by Gen Surgery Dr Jorge Thomas to consider surgery in future when nutritional status improved significantly    Recommended activity: Activity as tolerated    If you have questions regarding the hospital related prescriptions or hospital related issues please call Romy Greer at . If you experience any of the following symptoms then please call your primary care physician or return to the emergency room if you cannot get hold of your doctor:  Fever, chills, nausea, vomiting, diarrhea, change in mentation, falling, bleeding, shortness of breath, ***    Follow Up:  Dr. Rober Otero MD  you are to call and set up an appointment to see them in 7-10 days. Dr Norwood Severin ([facility] surgery) in 4 weeks for follow up  Dr Heri Lim ([address] [place] [address]) for CHF follow up      Information obtained by :  I understand that if any problems occur once I am at home I am to contact my physician. I understand and acknowledge receipt of the instructions indicated above.                                                                                                                                            Physician's or R.N.'s Signature                                                                  Date/Time                                                                                                                                              Patient or Representative Signature                                                          Date/Time

## 2020-11-20 NOTE — DISCHARGE SUMMARY
Hospitalist Discharge Summary     Patient ID:  Barry Sandhoff  [id number]  89 y.o.  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 Malignant versus due to CHF  Acute on chronic systolic CHF POA  LV: Estimated LVEF is 30 - 35%. Normal cavity size. Mild concentric hypertrophy. Moderate-to-severely reduced systolic function. Hypertension POA  Hypercholesterolemia  CAD  Diabetes type 2 insulin-dependent  Full code          CONSULTATIONS:  IP CONSULT TO ONCOLOGY  IP CONSULT TO ENDOCRINOLOGY  IP CONSULT TO GENERAL SURGERY  IP CONSULT TO PULMONOLOGY  IP CONSULT TO CARDIOLOGY    Excerpted HPI from H&P of Padmini Haas MD:  \"64 y.o.  female who presents with above complaints from home. 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 Patient has since stopped taking her aspirin and Plavix, but was lost to follow-up and has not followed up with general surgeon Dr. Héctor Richardosn or the oncology for any treatment for her cancer.   Patient has seen her stools to be dark for the past few days but no obvious blood.     Patient was found with hemoglobin of 5.7 on the blood work in the ED with CT abdomen and pelvis negative for any active bleeding but evident of her gastric mass which is unchanged but increased pleural effusion bilaterally was noted.     We were asked to admit for work up and evaluation of the above problems. \"    ______________________________________________________________________  DISCHARGE SUMMARY/HOSPITAL COURSE:  for full details see H&P, daily progress notes, labs, consult notes. Acute respiratory distress - resolved now  Due to Fluid Overload s/p PRBC transfusion- s/p IV lasix BID  CXR - IMPRESSION:  Increased moderate pulmonary edema. Stable small left pleural effusion and left  basilar opacity. 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 Malignant versus due to CHF  Acute on chronic systolic CHF POA  LV: Estimated LVEF is 30 - 35%. Normal cavity size. Mild concentric hypertrophy. Moderate-to-severely reduced systolic function.   TV: Mild to moderate tricuspid valve regurgitation is present. 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 Pertinent Findings:  Gen:    Not in distress  Chest: Clear lungs  CVS:   Regular rhythm. No edema  Abd:  Soft, not distended, not tender  Neuro:  Alert, oriented x 3  _______________________________________________________________________  DISCHARGE MEDICATIONS:   Current Discharge Medication List      START taking these medications    Details   levoFLOXacin (Levaquin) 750 mg tablet Take 1 Tab by mouth daily for 2 days. Qty: 2 Tab, Refills: 0      food supplemt, lactose-reduced (Ensure Enlive) 0.08 gram-1.5 kcal/mL liqd Take 6 x daily as recommended by Surgery to improve nutritional status for Possible surgery in future  Qty: 180 Bottle, Refills: 1         CONTINUE these medications which have CHANGED    Details   glipiZIDE (GLUCOTROL) 5 mg tablet Take 1 Tab by mouth two (2) times a day. Qty: 60 Tab, Refills: 0      pantoprazole (PROTONIX) 40 mg tablet Take 1 Tab by mouth daily for 30 days. Qty: 30 Tab, Refills: 1      insulin degludec Nicklas Bloodgood FlexTouch U-200) 200 unit/mL (3 mL) inpn 20 units daily  Qty: 18 Pen, Refills: 3      amLODIPine (NORVASC) 10 mg tablet Take 1 Tab by mouth daily. Qty: 30 Tab, Refills: 0         CONTINUE these medications which have NOT CHANGED    Details   DULoxetine (CYMBALTA) 60 mg capsule Take 1 Cap by mouth daily. Qty: 60 Cap, Refills: 0      losartan (COZAAR) 100 mg tablet Take 1 Tab by mouth daily.   Qty: 60 Tab, Refills: 0      carvediloL (COREG) 12.5 mg tablet Take 1 Tab by mouth two (2) times daily (with meals). Qty: 60 Tab, Refills: 0      ergocalciferol (ERGOCALCIFEROL) 1,250 mcg (50,000 unit) capsule Take 50,000 Units by mouth every seven (7) days. On Friday      torsemide (DEMADEX) 20 mg tablet Take 40 mg by mouth daily. atorvastatin (LIPITOR) 40 mg tablet Take 1 Tab by mouth nightly. Qty: 90 Tab, Refills: 3         STOP taking these medications       aspirin 81 mg chewable tablet Comments:   Reason for Stopping:                 Patient Follow Up Instructions: Activity: Activity as tolerated  Diet: Full liquid diabetic diet as recommended by general surgery      Follow-up with Dr Deven Alfaro (General surgery)  in 2-4 weeks for future surgical options when nutrition improved.       Follow-up Information     Follow up With Specialties Details Why Contact Twyla Zamarripa MD Internal Medicine   [id number]  [facility]  [phone]          ________________________________________________________________    Risk of deterioration: Low    Condition at Discharge:  Stable  __________________________________________________________________    Disposition  Home with family and home health services    ____________________________________________________________________    Code Status: Full Code  ___________________________________________________________________      Total time in minutes spent coordinating this discharge (includes going over instructions, follow-up, prescriptions, and preparing report for sign off to her PCP) :  >30 minutes    Signed:  Padmini Haas MD

## 2020-11-22 NOTE — PROGRESS NOTES
15 11 11/22 20 Pt chart accessed to answer family questions via phone about medications after pt discharge

## 2020-11-23 ENCOUNTER — PATIENT OUTREACH (OUTPATIENT)
Dept: CASE MANAGEMENT | Age: 75
End: 2020-11-23

## 2020-11-23 NOTE — PROGRESS NOTES
Patient was admitted to [facility] on  and discharged on  for GI bleed/GIST. Outreach made within 2 business days of discharge: Yes    Top Discharge Challenges to be reviewed by the provider   Additional needs identified to be addressed with provider yes    medications- patient needs glucometer/test strips/lancet prescription, advance care planning- needs ACP discussion-CTN will follow up next contact and Ensure enlive supplement- needs to take 4-6 bottles daily. She was not able to fill at pharmacy. Not sure if insurance will cover. She may need another prescriptions. Discussed COVID-19 related testing which was available at this time. Test results were negative on 2020. Patient informed of results, if available? yes   Method of communication with provider : VPI provider       Advance Care Planning:   Does patient have an Advance Directive:  patient needs follow up-next contact    Inpatient Readmission Risk score: 21  Was this a readmission? yes   Patient stated reason for the admission: SOB  Patients top risk factors for readmission: lack of knowledge about disease, medical condition and utilization of services  Interventions to address risk factors: discussed red flags with patient. Set priorities for care. She will follow up with PCP. She request prescription for new glucometer and start checking BG at home. She will attempt to drink at least 4 ensure enlive daily and work to increase to 6 bottles a day. Care Transition Nurse (CTN) contacted the patient by telephone to perform post hospital discharge assessment. Verified name and  with patient as identifiers. Provided introduction to self, and explanation of the CTN role. CTN reviewed discharge instructions, medical action plan and red flags with patient who verbalized understanding. Patient given an opportunity to ask questions and does not have any further questions or concerns at this time.  The patient agrees to contact the PCP office for questions related to their healthcare. Medication reconciliation was performed with patient, who verbalizes understanding of administration of home medications. Advised obtaining a 90-day supply of all daily and as-needed medications. Referral to Pharm D needed: no     Home Health/Outpatient orders at discharge: home health care, PT, OT and [facility]: [facility]  Date of initial visit: 11/23/2020    Durable Medical Equipment ordered at discharge: NA    Covid Risk Education    Patient has following risk factors of: immunocompromised, diabetes and chronic kidney disease. Education provided regarding infection prevention, and signs and symptoms of COVID-19 and when to seek medical attention with patient who verbalized understanding. Discussed exposure protocols and quarantine From CDC: Are you at higher risk for severe illness?  and given an opportunity for questions and concerns. The patient agrees to contact the COVID-19 hotline [phone] or PCP office for questions related to COVID-19. For more information on steps you can take to protect yourself, see CDC's How to Protect Yourself     Discussed follow-up appointments. If no appointment was previously scheduled, appointment scheduling offered: yes  [facility] follow up appointment(s):   Future Appointments   Date Time Provider Clifford Taylor   12/11/2020 10:00 AM Ike Reis MD [facility]     Non-[facility] follow up appointment(s):   [email or web address]    Plan for follow-up call in 10-14 days based on severity of symptoms and risk factors. CTN provided contact information for future needs. Goals Addressed                 This Visit's Progress     Attends follow-up appointments as directed.         11/23/2020  Patient will attend follow up appts-  [email or web address]  [email or web address]    [facility]- [facility] for PT/OT/SN       Understands red flags post discharge. 11/23/2020  Patient will attempt to drink at least 4 bottles of ensure enlive daily, with goal of 6 bottles to improve nutritional status. Patient will work with [place] [facility] PT/OT to increase strength and mobility. She will report any s/s of active bleeding, increased SOB, dizzy or lightheadedness.

## 2020-11-24 ENCOUNTER — PATIENT OUTREACH (OUTPATIENT)
Dept: CASE MANAGEMENT | Age: 75
End: 2020-11-24

## 2020-11-24 NOTE — PROGRESS NOTES
CTN follow up on nutritional supplement for patient. 1243pm  [facility] with local [employer] rep.     0320pm  Contacted patient to verify insurance for Ensure supplement. 0325pm  Contacted [employer] insurance to discuss supplement coverage. Given # for patient to contact of [phone].    0344pm  Contact [employer] dietician line ([phone]) who directed caller to pathway plus program ([phone]). S/w patients  to relay above information. CTN gave patient's  phone # to contact Nanci Parry. Instructed him on importance of patient getting supplements daily,  verbalized understanding. Ensure coupons mailed to patient's home address. Plan to follow up with PCP office next week.

## 2020-12-08 NOTE — ADDENDUM NOTE
Addendum  created 12/08/20 1247 by Ann Marie Chapman MD    [address] recorded in [address] [place] [address], [facility] filed

## 2020-12-09 ENCOUNTER — PATIENT OUTREACH (OUTPATIENT)
Dept: CASE MANAGEMENT | Age: 75
End: 2020-12-09

## 2020-12-09 NOTE — PROGRESS NOTES
Attempted to contact patient for follow up today, MM requesting CB. Goals      Attends follow-up appointments as directed. 12/10/20  Unable to reach patient for follow up call. Patient has appt with gen surg tomorrow, . When monitor for plan. 2020  Patient will attend follow up appts-  Dr. [email or web address]  [email or web address]. [email or web address]    [facility]- [facility] for PT/OT/SN       Understands red flags post discharge. 2020  Patient will attempt to drink at least 4 bottles of ensure enlive daily, with goal of 6 bottles to improve nutritional status. Patient will work with [facility] PT/OT to increase strength and mobility. She will report any s/s of active bleeding, increased SOB, dizzy or lightheadedness.

## 2020-12-11 ENCOUNTER — OFFICE VISIT (OUTPATIENT)
Dept: SURGERY | Age: 75
End: 2020-12-11
Payer: MEDICARE

## 2020-12-11 VITALS
HEART RATE: 76 BPM | DIASTOLIC BLOOD PRESSURE: 72 MMHG | BODY MASS INDEX: 34.96 KG/M2 | RESPIRATION RATE: 16 BRPM | WEIGHT: 190 LBS | SYSTOLIC BLOOD PRESSURE: 162 MMHG | TEMPERATURE: 97.3 F | HEIGHT: 62 IN | OXYGEN SATURATION: 96 %

## 2020-12-11 DIAGNOSIS — D21.4 GIST, NON-MALIGNANT: Primary | ICD-10-CM

## 2020-12-11 PROCEDURE — G8536 NO DOC ELDER MAL SCRN: HCPCS | Performed by: SURGERY

## 2020-12-11 PROCEDURE — 3288F FALL RISK ASSESSMENT DOCD: CPT | Performed by: SURGERY

## 2020-12-11 PROCEDURE — 1090F PRES/ABSN URINE INCON ASSESS: CPT | Performed by: SURGERY

## 2020-12-11 PROCEDURE — G8400 PT W/DXA NO RESULTS DOC: HCPCS | Performed by: SURGERY

## 2020-12-11 PROCEDURE — 1100F PTFALLS ASSESS-DOCD GE2>/YR: CPT | Performed by: SURGERY

## 2020-12-11 PROCEDURE — 1111F DSCHRG MED/CURRENT MED MERGE: CPT | Performed by: SURGERY

## 2020-12-11 PROCEDURE — G9899 SCRN MAM PERF RSLTS DOC: HCPCS | Performed by: SURGERY

## 2020-12-11 PROCEDURE — G8427 DOCREV CUR MEDS BY ELIG CLIN: HCPCS | Performed by: SURGERY

## 2020-12-11 PROCEDURE — 99213 OFFICE O/P EST LOW 20 MIN: CPT | Performed by: SURGERY

## 2020-12-11 PROCEDURE — 3017F COLORECTAL CA SCREEN DOC REV: CPT | Performed by: SURGERY

## 2020-12-11 PROCEDURE — G8417 CALC BMI ABV UP PARAM F/U: HCPCS | Performed by: SURGERY

## 2020-12-11 PROCEDURE — G8753 SYS BP > OR = 140: HCPCS | Performed by: SURGERY

## 2020-12-11 PROCEDURE — G8432 DEP SCR NOT DOC, RNG: HCPCS | Performed by: SURGERY

## 2020-12-11 PROCEDURE — G8754 DIAS BP LESS 90: HCPCS | Performed by: SURGERY

## 2020-12-11 NOTE — Clinical Note
12/23/2020 Patient: Merced Red YOB: 1945 Date of Visit: 12/11/2020 Edison Bean MD 
[id number] Via Fax: [phone] Dear Edison Bean MD, Thank you for referring Ms. Merced Red to [address] for evaluation. My notes for this consultation are attached. If you have questions, please do not hesitate to call me. I look forward to following your patient along with you.  
 
 
Sincerely, 
 
Edmond Tovar MD

## 2020-12-11 NOTE — PROGRESS NOTES
Identified pt with two pt identifiers(name and ). Reviewed record in preparation for visit and have obtained necessary documentation. All patient medications has been reviewed. Chief Complaint   Patient presents with    Follow-up     3 week hospital follow up       Health Maintenance Due   Topic    DTaP/Tdap/Td series (1 - Tdap)    Shingrix Vaccine Age 49> (1 of 2)    Bone Densitometry (Dexa) Screening     Pneumococcal 65+ years (1 of 1 - PPSV23)    Medicare Yearly Exam     Lipid Screen     Flu Vaccine (1)    MICROALBUMIN Q1     GLAUCOMA SCREENING Q2Y        Vitals:    20 1219   BP: (!) 162/72   Pulse: 76   Resp: 16   Temp: 97.3 °F (36.3 °C)   TempSrc: Temporal   SpO2: 96%   Weight: 86.2 kg (190 lb)   Height: 5' 2\" (1.575 m)   PainSc:   0 - No pain       4. Have you been to the ER, urgent care clinic since your last visit? Hospitalized since your last visit? No    5. Have you seen or consulted any other health care providers outside of the [address] [place] [address] since your last visit? Include any pap smears or colon screening. No      Patient is accompanied by mother I have received verbal consent from Shamar Carbajal to discuss any/all medical information while they are present in the room.

## 2020-12-15 ENCOUNTER — APPOINTMENT (OUTPATIENT)
Dept: CT IMAGING | Age: 75
End: 2020-12-15
Attending: EMERGENCY MEDICINE
Payer: MEDICARE

## 2020-12-15 ENCOUNTER — HOSPITAL ENCOUNTER (EMERGENCY)
Age: 75
Discharge: HOME | End: 2020-12-16
Attending: EMERGENCY MEDICINE
Payer: MEDICARE

## 2020-12-15 ENCOUNTER — TELEPHONE (OUTPATIENT)
Dept: SURGERY | Age: 75
End: 2020-12-15

## 2020-12-15 DIAGNOSIS — E16.2 HYPOGLYCEMIA: Primary | ICD-10-CM

## 2020-12-15 DIAGNOSIS — S09.90XA CLOSED HEAD INJURY, INITIAL ENCOUNTER: ICD-10-CM

## 2020-12-15 LAB
ALBUMIN SERPL-MCNC: 2.5 G/DL (ref 3.5–5)
ALBUMIN/GLOB SERPL: 0.5 {RATIO} (ref 1.1–2.2)
ALP SERPL-CCNC: 69 U/L (ref 45–117)
ALT SERPL-CCNC: 24 U/L (ref 12–78)
ANION GAP SERPL CALC-SCNC: 0 MMOL/L (ref 5–15)
AST SERPL-CCNC: 28 U/L (ref 15–37)
BASOPHILS # BLD: 0 K/UL (ref 0–0.1)
BASOPHILS NFR BLD: 0 % (ref 0–1)
BILIRUB SERPL-MCNC: 0.3 MG/DL (ref 0.2–1)
BUN SERPL-MCNC: 21 MG/DL (ref 6–20)
BUN/CREAT SERPL: 23 (ref 12–20)
CALCIUM SERPL-MCNC: 8.7 MG/DL (ref 8.5–10.1)
CHLORIDE SERPL-SCNC: 108 MMOL/L (ref 97–108)
CO2 SERPL-SCNC: 30 MMOL/L (ref 21–32)
CREAT SERPL-MCNC: 0.9 MG/DL (ref 0.55–1.02)
DIFFERENTIAL METHOD BLD: ABNORMAL
EOSINOPHIL # BLD: 0.2 K/UL (ref 0–0.4)
EOSINOPHIL NFR BLD: 2 % (ref 0–7)
ERYTHROCYTE [DISTWIDTH] IN BLOOD BY AUTOMATED COUNT: 18.4 % (ref 11.5–14.5)
GLOBULIN SER CALC-MCNC: 4.8 G/DL (ref 2–4)
GLUCOSE BLD STRIP.AUTO-MCNC: 54 MG/DL (ref 65–100)
GLUCOSE BLD STRIP.AUTO-MCNC: 69 MG/DL (ref 65–100)
GLUCOSE BLD STRIP.AUTO-MCNC: 95 MG/DL (ref 65–100)
GLUCOSE SERPL-MCNC: 51 MG/DL (ref 65–100)
HCT VFR BLD AUTO: 41.4 % (ref 35–47)
HGB BLD-MCNC: 12.2 G/DL (ref 11.5–16)
IMM GRANULOCYTES # BLD AUTO: 0 K/UL (ref 0–0.04)
IMM GRANULOCYTES NFR BLD AUTO: 0 % (ref 0–0.5)
LYMPHOCYTES # BLD: 2.2 K/UL (ref 0.8–3.5)
LYMPHOCYTES NFR BLD: 21 % (ref 12–49)
MCH RBC QN AUTO: 25.3 PG (ref 26–34)
MCHC RBC AUTO-ENTMCNC: 29.5 G/DL (ref 30–36.5)
MCV RBC AUTO: 85.9 FL (ref 80–99)
MONOCYTES # BLD: 0.5 K/UL (ref 0–1)
MONOCYTES NFR BLD: 5 % (ref 5–13)
NEUTS SEG # BLD: 7.5 K/UL (ref 1.8–8)
NEUTS SEG NFR BLD: 72 % (ref 32–75)
NRBC # BLD: 0 K/UL (ref 0–0.01)
NRBC BLD-RTO: 0 PER 100 WBC
PLATELET # BLD AUTO: 307 K/UL (ref 150–400)
PMV BLD AUTO: 9.3 FL (ref 8.9–12.9)
POTASSIUM SERPL-SCNC: 5.1 MMOL/L (ref 3.5–5.1)
PROT SERPL-MCNC: 7.3 G/DL (ref 6.4–8.2)
RBC # BLD AUTO: 4.82 M/UL (ref 3.8–5.2)
SERVICE CMNT-IMP: ABNORMAL
SERVICE CMNT-IMP: NORMAL
SERVICE CMNT-IMP: NORMAL
SODIUM SERPL-SCNC: 138 MMOL/L (ref 136–145)
WBC # BLD AUTO: 10.5 K/UL (ref 3.6–11)

## 2020-12-15 PROCEDURE — 80053 COMPREHEN METABOLIC PANEL: CPT

## 2020-12-15 PROCEDURE — 82962 GLUCOSE BLOOD TEST: CPT

## 2020-12-15 PROCEDURE — 72125 CT NECK SPINE W/O DYE: CPT

## 2020-12-15 PROCEDURE — 36415 COLL VENOUS BLD VENIPUNCTURE: CPT

## 2020-12-15 PROCEDURE — 70450 CT HEAD/BRAIN W/O DYE: CPT

## 2020-12-15 PROCEDURE — 85025 COMPLETE CBC W/AUTO DIFF WBC: CPT

## 2020-12-15 PROCEDURE — 99285 EMERGENCY DEPT VISIT HI MDM: CPT

## 2020-12-15 NOTE — TELEPHONE ENCOUNTER
Patient would like to schedule surgery. Her sugar keeps dropping so I advised her to call her PCP or Endocrinologist. Patient understands.

## 2020-12-15 NOTE — TELEPHONE ENCOUNTER
Encouraged patient to F/U with PCP or Endocrinologist to control blood sugar after which surgery can be scheduled. Express understanding. Provider aware.

## 2020-12-16 ENCOUNTER — TELEPHONE (OUTPATIENT)
Dept: ENDOCRINOLOGY | Age: 75
End: 2020-12-16

## 2020-12-16 VITALS
BODY MASS INDEX: 34.96 KG/M2 | OXYGEN SATURATION: 97 % | SYSTOLIC BLOOD PRESSURE: 185 MMHG | WEIGHT: 190 LBS | HEIGHT: 62 IN | RESPIRATION RATE: 18 BRPM | DIASTOLIC BLOOD PRESSURE: 66 MMHG | HEART RATE: 69 BPM

## 2020-12-16 LAB
APPEARANCE UR: CLEAR
BACTERIA URNS QL MICRO: NEGATIVE /HPF
BILIRUB UR QL: NEGATIVE
COLOR UR: ABNORMAL
EPITH CASTS URNS QL MICRO: ABNORMAL /LPF
GLUCOSE BLD STRIP.AUTO-MCNC: 118 MG/DL (ref 65–100)
GLUCOSE BLD STRIP.AUTO-MCNC: 75 MG/DL (ref 65–100)
GLUCOSE BLD STRIP.AUTO-MCNC: 93 MG/DL (ref 65–100)
GLUCOSE UR STRIP.AUTO-MCNC: NEGATIVE MG/DL
HGB UR QL STRIP: NEGATIVE
KETONES UR QL STRIP.AUTO: NEGATIVE MG/DL
LEUKOCYTE ESTERASE UR QL STRIP.AUTO: NEGATIVE
NITRITE UR QL STRIP.AUTO: NEGATIVE
PH UR STRIP: 7.5 [PH] (ref 5–8)
PROT UR STRIP-MCNC: >300 MG/DL
RBC #/AREA URNS HPF: ABNORMAL /HPF (ref 0–5)
SERVICE CMNT-IMP: ABNORMAL
SERVICE CMNT-IMP: NORMAL
SERVICE CMNT-IMP: NORMAL
SP GR UR REFRACTOMETRY: 1.02 (ref 1–1.03)
UA: UC IF INDICATED,UAUC: ABNORMAL
UROBILINOGEN UR QL STRIP.AUTO: 1 EU/DL (ref 0.2–1)
WBC URNS QL MICRO: ABNORMAL /HPF (ref 0–4)

## 2020-12-16 PROCEDURE — 82962 GLUCOSE BLOOD TEST: CPT

## 2020-12-16 PROCEDURE — 81001 URINALYSIS AUTO W/SCOPE: CPT

## 2020-12-16 NOTE — TELEPHONE ENCOUNTER
----- Message from Jillian Hernandez MD sent at 12/16/2020  9:17 AM EST -----  Regarding: FW: Diabetes management  We have not seen her in almost a year and I don't see that she has a follow up visit. Can you call her and see how much insulin she has been taking and how often she is getting the low blood sugars? Thank you,    Imani Masonlock  ----- Message -----  From: Denise Disla MD  Sent: 12/16/2020   8:39 AM EST  To: Jillian Hernandez MD  Subject: Diabetes management                              Hi Dr. Chaparrita Husain,   I had one of your patients in the ED last night (Ms. Marge Lea) who reportedly has been having recurrent hypoglycemic episodes at home. She was low for EMS who brought her in. We were able to get her blood sugar headed in the right direction and she was feeling back to baseline prior to discharge. Her  noted that she has been having these episodes for the last several days when waking in the morning, however, she also notes she hasn't had a glucometer for the last 2 years to actually check what her sugar has been running. I wrote her a prescription to get a replacement, but also instructed her to contact your office for further guidance on her insulin management, recommending she hold off on her insulin until she was able to speak with someone in your office. Any help you could provide to her and her  would be greatly appreciated.     Hope you have a nice day,     Rogers Narvaez MD

## 2020-12-16 NOTE — ED PROVIDER NOTES
EMERGENCY DEPARTMENT HISTORY AND PHYSICAL EXAM     ----------------------------------------------------------------------------  Please note that this dictation was completed with [employer], the [employer] voice recognition software. Quite often unanticipated grammatical, syntax, homophones, and other interpretive errors are inadvertently transcribed by the computer software. Please disregard these errors. Please excuse any errors that have escaped final proofreading  ----------------------------------------------------------------------------      Date: 12/15/2020  Patient Name: Sathya Hewitt    History of Presenting Illness     Chief Complaint   Patient presents with    Low Blood Sugar     pt states that her BG keeps \"dropping\" - insulin dependent; pt notes that she does not have a glucometer at home - ate candy and drank OJ before she came    Fall     pt reports that she suffered a GLF as she was trying to get into the car to come to ED - states that she hit her head on the concrete, but denies LOC    Other     pt also reports \"I have a mass in my stomach, and I am anemic\"       History Provided By:  Patient, EMS and     HPI: Sathya Hewitt is a 70 y.o. female, with significant pmhx of DM, XOL, HTN, chronic anemia and stomach mass who presents via EMS to the ED with c/o low blood sugar. Patient's  notes that she has been having ongoing issues with low blood sugar in the morning. Notes that she went to bed feeling fine after eating a peanut butter and jelly sandwich at around 8 PM.   reports she was later \"talking out of her head\" prompting them to come to the ED for further Eval.  Pt reportedly ate candy and drank juice prior to coming in. Patient's  notes she also suffered a fall this evening during her hypoglycemic episode and struck her head. Does not recall the events of tonight and does not note feeling bad at time of my evaluation.       Blood sugar in the high 60s on arrival to emergency department and eating a meal at time of my evaluation. Patient also specifically denies any associated fevers, chills, CP, SOB, nausea, vomiting, diarrhea, abd pain, changes in BM, urinary sxs, or headache. Patient notes taking daily injection of insulin type medication without recent changes. Social Hx: denies tobacco  denies EtOH , denies Illicit Drugs    There are no other complaints, changes, or physical findings at this time. PCP: Ivan Alicia MD    Allergies   Allergen Reactions    Metformin Other (comments)     GI side effects. Not a true allergy       Current Outpatient Medications   Medication Sig Dispense Refill    glipiZIDE (GLUCOTROL) 5 mg tablet Take 1 Tab by mouth two (2) times a day. 60 Tab 0    pantoprazole (PROTONIX) 40 mg tablet Take 1 Tab by mouth daily for 30 days. 30 Tab 1    insulin degludec Roxie Alken FlexTouch U-200) 200 unit/mL (3 mL) inpn 20 units daily 18 Pen 3    amLODIPine (NORVASC) 10 mg tablet Take 1 Tab by mouth daily. 30 Tab 0    food supplemt, lactose-reduced (Ensure Enlive) 0.08 gram-1.5 kcal/mL liqd Take 6 x daily as recommended by Surgery to improve nutritional status for Possible surgery in future 180 Bottle 1    DULoxetine (CYMBALTA) 60 mg capsule Take 1 Cap by mouth daily. 60 Cap 0    losartan (COZAAR) 100 mg tablet Take 1 Tab by mouth daily. 60 Tab 0    carvediloL (COREG) 12.5 mg tablet Take 1 Tab by mouth two (2) times daily (with meals). 60 Tab 0    ergocalciferol (ERGOCALCIFEROL) 1,250 mcg (50,000 unit) capsule Take 50,000 Units by mouth every seven (7) days. On Friday      torsemide (DEMADEX) 20 mg tablet Take 40 mg by mouth daily.  atorvastatin (LIPITOR) 40 mg tablet Take 1 Tab by mouth nightly.  80 Tab 3       Past History     Past Medical History:  Past Medical History:   Diagnosis Date    Anemia, unspecified 10/6/2020    Arrhythmia     Arthritis     CAD (coronary artery disease)     Cancer (HCC)     Chronic bronchitis ([facility] [address]     per pt:  as of 1/9/15 pt denies any ARENAS or SOB    Diabetes ([facility] [address] Dx approx 2009    Dr Leslie Noonan (in [facility])    GERD (gastroesophageal reflux disease)     Hypercholesteremia     Hypertension        Past Surgical History:  Past Surgical History:   Procedure Laterality Date    HX CHOLECYSTECTOMY  6/12    HX COLONOSCOPY      HX CORONARY STENT PLACEMENT  8/25/2015    HX DILATION AND CURETTAGE      UPPER GI ENDOSCOPY,BIOPSY  10/6/2020            Family History:  Family History   Problem Relation Age of Onset    Diabetes Mother     Heart Disease Mother     Hypertension Mother     Stroke Father     Heart Disease Brother     Heart Disease Brother     Stroke Brother     HIV/AIDS Brother        Social History:  Social History     Tobacco Use    Smoking status: Former Smoker    Smokeless tobacco: Never Used   Substance Use Topics    Alcohol use: No    Drug use: No       Allergies: Allergies   Allergen Reactions    Metformin Other (comments)     GI side effects. Not a true allergy         Review of Systems   Review of Systems   Constitutional: Negative. Negative for fever. Eyes: Negative. Respiratory: Negative. Negative for shortness of breath. Cardiovascular: Negative for chest pain. Gastrointestinal: Negative for abdominal pain, nausea and vomiting. Endocrine: Negative. Genitourinary: Negative. Negative for difficulty urinating, dysuria and hematuria. Musculoskeletal: Negative. Negative for neck pain. Skin: Negative. Neurological: Negative. Psychiatric/Behavioral: Positive for confusion. Negative for suicidal ideas. All other systems reviewed and are negative. Physical Exam   Physical Exam  Vitals signs and nursing note reviewed. Constitutional:       General: She is not in acute distress. Appearance: She is well-developed. She is obese. She is not diaphoretic. HENT:      Head: Normocephalic and atraumatic.       Nose: Nose normal.   Eyes: General: No scleral icterus. Conjunctiva/sclera: Conjunctivae normal.   Neck:      Musculoskeletal: Normal range of motion. Trachea: No tracheal deviation. Cardiovascular:      Rate and Rhythm: Normal rate and regular rhythm. Heart sounds: Normal heart sounds. No murmur. No friction rub. Pulmonary:      Effort: Pulmonary effort is normal. No respiratory distress. Breath sounds: Normal breath sounds. No stridor. No wheezing or rales. Abdominal:      General: Bowel sounds are normal. There is no distension. Palpations: Abdomen is soft. Tenderness: There is no abdominal tenderness. There is no rebound. Musculoskeletal: Normal range of motion. General: No tenderness. Skin:     General: Skin is warm and dry. Findings: No rash. Neurological:      General: No focal deficit present. Mental Status: She is alert and oriented to person, place, and time. Mental status is at baseline. Cranial Nerves: No cranial nerve deficit. Psychiatric:         Speech: Speech normal.         Behavior: Behavior normal.         Thought Content:  Thought content normal.         Judgment: Judgment normal.           Diagnostic Study Results     Labs -     Recent Results (from the past 12 hour(s))   GLUCOSE, POC    Collection Time: 12/15/20  9:12 PM   Result Value Ref Range    Glucose (POC) 54 (L) 65 - 100 mg/dL    Performed by [facility]    GLUCOSE, POC    Collection Time: 12/15/20  9:28 PM   Result Value Ref Range    Glucose (POC) 69 65 - 100 mg/dL    Performed by [facility]    CBC WITH AUTOMATED DIFF    Collection Time: 12/15/20  9:41 PM   Result Value Ref Range    WBC 10.5 3.6 - 11.0 K/uL    RBC 4.82 3.80 - 5.20 M/uL    HGB 12.2 11.5 - 16.0 g/dL    HCT 41.4 35.0 - 47.0 %    MCV 85.9 80.0 - 99.0 FL    MCH 25.3 (L) 26.0 - 34.0 PG    MCHC 29.5 (L) 30.0 - 36.5 g/dL    RDW 18.4 (H) 11.5 - 14.5 %    PLATELET 617 108 - 056 K/uL    MPV 9.3 8.9 - 12.9 FL    NRBC 0.0 0  WBC ABSOLUTE NRBC 0.00 0.00 - 0.01 K/uL    NEUTROPHILS 72 32 - 75 %    LYMPHOCYTES 21 12 - 49 %    MONOCYTES 5 5 - 13 %    EOSINOPHILS 2 0 - 7 %    BASOPHILS 0 0 - 1 %    IMMATURE GRANULOCYTES 0 0.0 - 0.5 %    ABS. NEUTROPHILS 7.5 1.8 - 8.0 K/UL    ABS. LYMPHOCYTES 2.2 0.8 - 3.5 K/UL    ABS. MONOCYTES 0.5 0.0 - 1.0 K/UL    ABS. EOSINOPHILS 0.2 0.0 - 0.4 K/UL    ABS. BASOPHILS 0.0 0.0 - 0.1 K/UL    ABS. IMM. GRANS. 0.0 0.00 - 0.04 K/UL    DF AUTOMATED     METABOLIC PANEL, COMPREHENSIVE    Collection Time: 12/15/20  9:41 PM   Result Value Ref Range    Sodium 138 136 - 145 mmol/L    Potassium 5.1 3.5 - 5.1 mmol/L    Chloride 108 97 - 108 mmol/L    CO2 30 21 - 32 mmol/L    Anion gap 0 (L) 5 - 15 mmol/L    Glucose 51 (L) 65 - 100 mg/dL    BUN 21 (H) 6 - 20 MG/DL    Creatinine 0.90 0.55 - 1.02 MG/DL    BUN/Creatinine ratio 23 (H) 12 - 20      GFR est AA >60 >60 ml/min/1.73m2    GFR est non-AA >60 >60 ml/min/1.73m2    Calcium 8.7 8.5 - 10.1 MG/DL    Bilirubin, total 0.3 0.2 - 1.0 MG/DL    ALT (SGPT) 24 12 - 78 U/L    AST (SGOT) 28 15 - 37 U/L    Alk.  phosphatase 69 45 - 117 U/L    Protein, total 7.3 6.4 - 8.2 g/dL    Albumin 2.5 (L) 3.5 - 5.0 g/dL    Globulin 4.8 (H) 2.0 - 4.0 g/dL    A-G Ratio 0.5 (L) 1.1 - 2.2     GLUCOSE, POC    Collection Time: 12/15/20 11:47 PM   Result Value Ref Range    Glucose (POC) 95 65 - 100 mg/dL    Performed by [facility]    GLUCOSE, POC    Collection Time: 12/16/20  1:19 AM   Result Value Ref Range    Glucose (POC) 75 65 - 100 mg/dL    Performed by [facility]    URINALYSIS W/ REFLEX CULTURE    Collection Time: 12/16/20  1:35 AM    Specimen: Urine   Result Value Ref Range    Color YELLOW/STRAW      Appearance CLEAR CLEAR      Specific gravity 1.019 1.003 - 1.030      pH (UA) 7.5 5.0 - 8.0      Protein >300 (A) NEG mg/dL    Glucose Negative NEG mg/dL    Ketone Negative NEG mg/dL    Bilirubin Negative NEG      Blood Negative NEG      Urobilinogen 1.0 0.2 - 1.0 EU/dL    Nitrites Negative NEG      Leukocyte Esterase Negative NEG      WBC 5-10 0 - 4 /hpf    RBC 0-5 0 - 5 /hpf    Epithelial cells FEW FEW /lpf    Bacteria Negative NEG /hpf    UA:UC IF INDICATED CULTURE NOT INDICATED BY UA RESULT CNI     GLUCOSE, POC    Collection Time: 12/16/20  2:12 AM   Result Value Ref Range    Glucose (POC) 93 65 - 100 mg/dL    Performed by [facility]    GLUCOSE, POC    Collection Time: 12/16/20  2:49 AM   Result Value Ref Range    Glucose (POC) 118 (H) 65 - 100 mg/dL    Performed by [facility]        Radiologic Studies -   CT HEAD WO CONT   Final Result   IMPRESSION:    No acute intracranial process. 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 Additional scattered   small foci of remote infarction are demonstrated. Imaging findings consistent with mild chronic microvascular ischemic change. There is a mild degree of cerebral atrophy. CT SPINE CERV WO CONT   Final Result   IMPRESSION:   There is no acute fracture or dislocation identified. Small to moderate bilateral pleural effusions. CT Results  (Last 48 hours)               12/15/20 2300  CT HEAD WO CONT Final result    Impression:  IMPRESSION:    No acute intracranial process. 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 Additional scattered   small foci of remote infarction are demonstrated. Imaging findings consistent with mild chronic microvascular ischemic change. There is a mild degree of cerebral atrophy. Narrative:  CLINICAL HISTORY: Status post fall   INDICATION: Status post fall   COMPARISON: 8/25/2015. CT dose reduction was achieved through use of a standardized protocol tailored   for this examination and automatic exposure control for dose modulation. TECHNIQUE: Serial axial images with a collimation of 5 mm were obtained from the   skull base through the vertex     FINDINGS:    There is sulcal and ventricular prominence.  Confluent periventricular and   scattered foci of hypodensity in the cerebral white matter. There is no evidence   of an acute infarction, hemorrhage, or mass-effect. There is no evidence of   midline shift or hydrocephalus. Posterior fossa structures are unremarkable. No   extra-axial collections are seen. Mastoid air cells are well pneumatized and clear. Remote encephalomalacia in the right occipital lobe related to remote infarct. Remote infarction in the inferior left cerebellum small. Remote infarct in the   posterior right basal ganglia. 12/15/20 2300  CT SPINE CERV WO CONT Final result    Impression:  IMPRESSION:   There is no acute fracture or dislocation identified. Small to moderate bilateral pleural effusions. Narrative:  EXAM:  CT CERVICAL SPINE WITHOUT CONTRAST EXAM: CT SPINE CERV WO CONT   CLINICAL HISTORY: fall   INDICATION: fall   COMPARISON:  None   TECHNIQUE:  Axial neck CT was performed. Noncontrast imaging obtained. Coronal   and sagittal reconstructions were performed. CT dose reduction was achieved through use of a standardized protocol tailored   for this examination and automatic exposure control for dose modulation. Osseous/bone algorithm was utilized. FINDINGS:   The vertebral bodies are anatomically aligned. There is no evidence of fracture   or subluxation. The prevertebral soft tissues are grossly within normal limits. The atlantodental interval is within normal limits. The craniocervical junction   is intact. Loss of normal cervical lordosis. Multilevel canal and foraminal stenoses. Small   to moderate bilateral pleural effusions           CXR Results  (Last 48 hours)    None            Medical Decision Making   I am the first provider for this patient. I reviewed the vital signs, available nursing notes, past medical history, past surgical history, family history and social history. Vital Signs-Reviewed the patient's vital signs.   Patient Vitals for the past 12 hrs: Pulse Resp BP SpO2   12/16/20 0030   (!) 185/66 97 %   12/16/20 0000   (!) 186/75 97 %   12/15/20 2330   (!) 182/77 98 %   12/15/20 2230    99 %   12/15/20 2200   (!) 187/76 99 %   12/15/20 2107 69 18 (!) 185/79 100 %       Pulse Oximetry Analysis - 100% on RA, normal  Cardiac Monitor:   Rate: 69 bpm  Rhythm: Normal sinus      Provider Notes (Medical Decision Making):     DDX:  Hypoglycemic episode, medication reaction, closed head injury, intracranial bleed, C-spine injury    Plan:  Labs, CT patient, Accu-Chek, head CT, C-spine CT    Impression:  Hypoglycemia, insulin dependent DM    ED Course:   Initial assessment performed. The patients presenting problems have been discussed, and they are in agreement with the care plan formulated and outlined with them. I have encouraged them to ask questions as they arise throughout their visit. I reviewed the nursing notes and and vital signs from today's visit, as well as the electronic medical record system for any past medical records that were available that may contribute to the patients current condition, including previous  emergency department visit and admission for shortness of breath    Nursing notes will be reviewed as they become available in realtime while the pt has been in the ED. Bartolo Spicer MD    HYPERTENSION COUNSELING:  Patient made aware of their elevated blood pressure and is instructed to follow up this week with their Primary Care or [address] [id number] for a recheck (should they be discharged.) Patient is counseled regarding consequences of chronic, uncontrolled hypertension including kidney disease, heart disease, stroke or even death. Patient states their understanding      I personally reviewed/interpreted pt's imaging. Agree with official read by radiology as noted above. Bartolo Spicer MD    PROGRESS NOTE:  5424  Pt noted to have minor dip in bg on recheck. Will have pt eat some more and recheck sugar.   If improved and trending upward x2, will plan for dc home  Isiah Ramirez MD      [id number]  Progress note:  Pt noted to be feeling better, ready for discharge. Discussed lab and imaging findings with pt, specifically noting negative head and c spine ct. Pt will follow up with endocrine as instructed. All questions have been answered, pt voiced understanding and agreement with plan. Specific return precautions provided in addition to instructions for pt to return to the ED immediately should sx worsen at any time. Isiah Ramirez MD           Critical Care Time:     none      Diagnosis     Clinical Impression:   1. Hypoglycemia    2. Closed head injury, initial encounter        PLAN:  1. Discharge Medication List as of 12/16/2020 12:59 AM        2. Follow-up Information     Follow up With Specialties Details Why Contact Info    Karishma Fernandez MD Internal Medicine Schedule an appointment as soon as possible for a visit in 2 days  [id number]  [place]  [phone]          Return to ED if worse     Disposition:  0310  The patient's results have been reviewed with family and/or caregiver. They verbally convey their understanding and agreement of the patient's signs, symptoms, diagnosis, treatment and prognosis and additionally agree to follow up as recommended in the discharge instructions or to return to the Emergency Room should the patient's condition change prior to their follow-up appointment. The family and/or caregiver verbally agrees with the care-plan and all of their questions have been answered. The discharge instructions have also been provided to the them with educational information regarding the patient's diagnosis as well a list of reasons why the patient would want to return to the ER prior to their follow-up appointment should their condition change.   Isiah Ramirez MD

## 2020-12-16 NOTE — TELEPHONE ENCOUNTER
Spoke with . He states that he has not gotten a glucometer yet because of his back pain. Encouraged to get a glucometer ASAP. Advised if ins does not cover the glucometer written by ER doctor, get glucometer from [facility].  states that patient is not taking any insulin, per orders from ER doctor. She had been taking 20 units of [place] daily. Feels better today. She was released from ER this am with a BS of 118. Advised  to call me on Friday with blood sugar readings. Advised  to schedule an appt ASAP. The call was sent to  to schedule.

## 2020-12-16 NOTE — DISCHARGE INSTRUCTIONS
Diabetes Blood Sugar Emergencies: Your Action Plan  How can you prevent a blood sugar emergency? An important part of living with diabetes is keeping your blood sugar in your target range. You'll need to know what to do if it's too high or too low. Managing your blood sugar levels helps you avoid emergencies. This care sheet will teach you about the signs of high and low blood sugar. It will help you make an action plan with your doctor for when these signs occur. Low blood sugar is more likely to happen if you take certain medicines for diabetes. It can also happen if you skip a meal, drink alcohol, or exercise more than usual.  You may get high blood sugar if you eat differently than you normally do. One example is eating more carbohydrate than usual. Having a cold, the flu, or other sudden illness can also cause high blood sugar levels. Levels can also rise if you miss a dose of medicine. Any change in how you take your medicine may affect your blood sugar level. So it's important to work with your doctor before you make any changes. Check your blood sugar  Work with your doctor to fill in the blank spaces below that apply to you. Track your levels, know your target range, and write down ways you can get your blood sugar back in your target range. A log book can help you track your levels. Take the book to all of your medical appointments. · Check your blood sugar _____ times a day, at these times:________________________________________________. (For example: Before meals, at bedtime, before exercise, during exercise, other.)  · Your blood sugar target range before a meal is ___________________. Your blood sugar target range after a meal is _______________________. · Do this--___________________________________________________--to get your blood sugar back within your safe range if your blood sugar results are _________________________________________.  (For example: Less than 70 or above 250 mg/dL.)  Call your doctor when your blood sugar results are ___________________________________. (For example: Less than 70 or above 250 mg/dL.)  What are the symptoms of low and high blood sugar? Common symptoms of low blood sugar are sweating and feeling shaky, weak, hungry, or confused. Symptoms can start quickly. Common symptoms of high blood sugar are feeling very thirsty or very hungry. You may also pass urine more often than usual. You may have blurry vision and may lose weight without trying. But some people may have high or low blood sugar without having any symptoms. That's a good reason to check your blood sugar on a regular schedule. What should you do if you have symptoms? Work with your doctor to fill in the blank spaces below that apply to you. Low blood sugar  If you have symptoms of low blood sugar, check your blood sugar. If it's below _____ ( for example, below 70), eat or drink a quick-sugar food that has about 15 grams of carbohydrate. Your goal is to get your level back to your safe range. Check your blood sugar again 15 minutes later. If it's still not in your target range, take another 15 grams of carbohydrate and check your blood sugar again in 15 minutes. Repeat this until you reach your target. Then go back to your regular testing schedule. Children usually need less than 15 grams of carbohydrate. Check with your doctor or diabetes educator for the amount that is right for your child. When you have low blood sugar, it's best to stop or reduce any physical activity until your blood sugar is back in your target range and is stable. If you must stay active, eat or drink 30 grams of carbohydrate. Then check your blood sugar again in 15 minutes. If it's not in your target range, take another 30 grams of carbohydrates. Check your blood sugar again in 15 minutes. Keep doing this until you reach your target. You can then go back to your regular testing schedule.   If your symptoms or blood sugar levels are getting worse or have not improved after 15 minutes, seek medical care right away. Here are some examples of quick-sugar foods with 15 grams of carbohydrate:  · 3 or 4 glucose tablets  · 1 tablespoon (3 teaspoons) table sugar  · ½ cup to ¾ cup (4 to 6 ounces) of fruit juice or regular (not diet) soda  · Hard candy (such as 6 Life Savers)  High blood sugar  If you have symptoms of high blood sugar, check your blood sugar. Your goal is to get your level back to your target range. If it's above ______ ( for example, above 250), follow these steps:  · If you missed a dose of your diabetes medicine, take it now. Take only the amount of medicine that you have been prescribed. Do not take more or less medicine. · Give yourself insulin if your doctor has prescribed it for high blood sugar. · Test for ketones, if the doctor told you to do so. If the results of the ketone test show a moderate-to-large amount of ketones, call the doctor for advice. · Wait 30 minutes after you take the extra insulin or the missed medicine. Check your blood sugar again. If your symptoms or blood sugar levels are getting worse or have not improved after taking these steps, seek medical care right away. Follow-up care is a key part of your treatment and safety. Be sure to make and go to all appointments, and call your doctor if you are having problems. It's also a good idea to know your test results and keep a list of the medicines you take. Where can you learn more? Go to http://www.gray.com/  Enter J544 in the search box to learn more about \"Diabetes Blood Sugar Emergencies: Your Action Plan. \"  Current as of: December 20, 2019               Content Version: 12.6  © 9049-7669 [employer]. Care instructions adapted under license by [employer] (which disclaims liability or warranty for this information).  If you have questions about a medical condition or this instruction, always ask your healthcare professional. [place] [address] any warranty or liability for your use of this information.          Patient Education        Learning About a Closed Head Injury  What is a closed head injury? A closed head injury happens when your head gets hit hard. The strong force of the blow causes your brain to shake in your skull. This movement can cause the brain to bruise, swell, or tear. Sometimes nerves or blood vessels also get damaged. This can cause bleeding in or around the brain. A concussion is a type of closed head injury. What are the symptoms? If you have a mild concussion, you may have a mild headache or feel \"not quite right. \" These symptoms are common. They usually go away over a few days to 4 weeks. But sometimes after a concussion, you feel like you can't function as well as before the injury. And you have new symptoms. This is called postconcussive syndrome. You may:  · Find it harder to solve problems, think, concentrate, or remember. · Have headaches. · Have changes in your sleep patterns, such as not being able to sleep or sleeping all the time. · Have changes in your personality. · Not be interested in your usual activities. · Feel angry or anxious without a clear reason. · Lose your sense of taste or smell. · Be dizzy, lightheaded, or unsteady. It may be hard to stand or walk. How is a closed head injury treated? Any person who may have a concussion needs to see a doctor. Some people have to stay in the hospital to be watched. Others can go home safely. If you go home, follow your doctor's instructions. He or she will tell you if you need someone to watch you closely for the next 24 hours or longer. Rest is the best treatment. Get plenty of sleep at night. And try to rest during the day. · Avoid activities that are physically or mentally demanding. These include housework, exercise, and schoolwork.  And don't play video games, send text messages, or use the computer. You may need to change your school or work schedule to be able to avoid these activities. · Ask your doctor when it's okay to drive, ride a bike, or operate machinery. · Take an over-the-counter pain medicine, such as acetaminophen (Tylenol), ibuprofen (Advil, Motrin), or naproxen (Aleve). Be safe with medicines. Read and follow all instructions on the label. · Check with your doctor before you use any other medicines for pain. · Do not drink alcohol or use illegal drugs. They can slow recovery. They can also increase your risk of getting a second head injury. Follow-up care is a key part of your treatment and safety. Be sure to make and go to all appointments, and call your doctor if you are having problems. It's also a good idea to know your test results and keep a list of the medicines you take. Where can you learn more? Go to http://adriano-nevaeh.info/  Enter E235 in the search box to learn more about \"Learning About a Closed Head Injury. \"  Current as of: November 20, 2019               Content Version: 12.6  © 9677-9633 [employer]. Care instructions adapted under license by [employer] (which disclaims liability or warranty for this information). If you have questions about a medical condition or this instruction, always ask your healthcare professional. [address] any warranty or liability for your use of this information.

## 2020-12-17 ENCOUNTER — TELEPHONE (OUTPATIENT)
Dept: ENDOCRINOLOGY | Age: 75
End: 2020-12-17

## 2020-12-17 RX ORDER — LANCETS
EACH MISCELLANEOUS
Qty: 400 EACH | Refills: 3 | Status: SHIPPED | OUTPATIENT
Start: 2020-12-17

## 2020-12-17 NOTE — TELEPHONE ENCOUNTER
FELIPE: Spoke with . He will get a glucometer today. Advised to check wife's blood sugar ACL, ACD and at bedtime. Tomorrow check blood sugar ACB. Advised to write down what she eats for meals. Advised to call me tomorrow with the readings/food diary. Gave  my direct number.  states will call me tomorrow.

## 2020-12-17 NOTE — TELEPHONE ENCOUNTER
----- Message from Francheska Aj sent at 12/17/2020  9:15 AM EST -----  Regarding: Dr. Nixon Dub: [phone]  General Message/Vendor Calls    Caller's first and last name:       Reason for call: BS levels going up and down. Callback required yes/no and why: yes      Best contact number(s): [phone]      Details to clarify the request: Wanted to advised that pt has been having trouble with BS levels. Pt was advised to stop taking her insulin at a recent ED visit at [facility] on 12/15/20. Pt's  is very concerned and would like to speak with Dr. Georgina Lewis.       Francheska Aj

## 2020-12-18 ENCOUNTER — TELEPHONE (OUTPATIENT)
Dept: ENDOCRINOLOGY | Age: 75
End: 2020-12-18

## 2020-12-21 ENCOUNTER — PATIENT OUTREACH (OUTPATIENT)
Dept: CASE MANAGEMENT | Age: 75
End: 2020-12-21

## 2020-12-21 NOTE — PROGRESS NOTES
Patient has graduated from the Transitions of Care Coordination  program on 12/21/2020. Patient/family has the ability to self-manage at this time Care management goals have been completed. Patient was not referred to the [place] team for further management. Patient was seen in the ED, last week for hypoglycemia. Patient reports since then she has been in contact with Endo and monitor BG levels closely. She denies any low readings and is feeling much better. Goals Addressed                 This Visit's Progress     COMPLETED: Attends follow-up appointments as directed. 12/21/20  Patient will see Dr. Amaya Peter on 1/7/2021. She is encouraged to reach out to Dr. Ferris's office to discuss surgery if BS are stable and cleared by endo. 12/10/20  Unable to reach patient for follow up call. Patient has appt with gen surg tomorrow, 12/11. When monitor for plan. 11/23/2020  Patient will attend follow up appts-  Dr. [email or web address]  Dr. [email or web address]    [facility]- All about Care for PT/OT/SN       COMPLETED: Understands red flags post discharge. 12/21/20  Patient was in ED on 12/15, with low BG levels. She has since gotten a home glucometer and been in touch with her endocrinologist to discuss her BS. She was told to hold her insulin when she was in the ED and says BG has been better since then. She is eating and drinking with no issues. She has slight discomfort in her abdominal area but states it has improved. She will follow up with Dr. Amaya Peter ([email or web address]) in January. She is waiting to see endo to make sure BG is stable for surgery. She hopes to be able to schedule surgery soon. Labs in ED on 12/15 showed Hgb of 12.2 up from 9.9 at discharge in 11/2020.    11/23/2020  Patient will attempt to drink at least 4 bottles of ensure enlive daily, with goal of 6 bottles to improve nutritional status. Patient will work with [facility] PT/OT to increase strength and mobility.    She will report any s/s of active bleeding, increased SOB, dizzy or lightheadedness. Patient has Care Transition Nurse's contact information for any further questions, concerns, or needs.   Patients upcoming visits:    Future Appointments   Date Time Provider Clifford Taylor   1/7/2021 12:30 PM Blanche Smith MD [facility] [address] BS AMB

## 2020-12-23 NOTE — PROGRESS NOTES
To: Nuria Blevins MD, Catrachita Mazariegos MD, Fritz Gutierrez MD    From: Clayton Dorado MD    Thank you. Encounter Date: 12/11/2020    Subjective:      Trace Banks is a 70 y.o. female presents for follow-up after 2 recent admission. 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 November for dyspnea/pleural effusions. Feeling stronger now. On soft diet. No black stools. Objective:     Visit Vitals  BP (!) 162/72 (BP 1 Location: Right arm, BP Patient Position: Sitting)   Pulse 76   Temp 97.3 °F (36.3 °C) (Temporal)   Resp 16   Ht 5' 2\" (1.575 m)   Wt 86.2 kg (190 lb)   SpO2 96%   BMI 34.75 kg/m²       General:  alert, cooperative, no distress, appears stated age   Abdomen: soft, bowel sounds active, non-tender   Extremities:  Her hands are less edematous. Her legs  Are less edematous. Assessment:     Gastric cardia GIST. Chronic deconditioning. Acute malnutrition. Relative anemia. Plan: Will get her on schedule for surgery in 2 weeks or so. Needs to improve conditioning and nutritional satus. Advised to walk 30 min daily and drink at least 5 Ensure or Boost per day.       Clayton Dorado MD

## 2021-01-07 ENCOUNTER — VIRTUAL VISIT (OUTPATIENT)
Dept: ENDOCRINOLOGY | Age: 76
End: 2021-01-07
Payer: MEDICARE

## 2021-01-07 DIAGNOSIS — E78.2 MIXED HYPERLIPIDEMIA: ICD-10-CM

## 2021-01-07 DIAGNOSIS — Z79.4 TYPE 2 DIABETES MELLITUS WITH DIABETIC POLYNEUROPATHY, WITH LONG-TERM CURRENT USE OF INSULIN (HCC): Primary | ICD-10-CM

## 2021-01-07 DIAGNOSIS — I10 ESSENTIAL HYPERTENSION: ICD-10-CM

## 2021-01-07 DIAGNOSIS — E11.42 TYPE 2 DIABETES MELLITUS WITH DIABETIC POLYNEUROPATHY, WITH LONG-TERM CURRENT USE OF INSULIN (HCC): Primary | ICD-10-CM

## 2021-01-07 PROCEDURE — 99443 PR PHYS/QHP TELEPHONE EVALUATION 21-30 MIN: CPT | Performed by: INTERNAL MEDICINE

## 2021-01-07 NOTE — PROGRESS NOTES
Chief Complaint   Patient presents with    Diabetes     pcp and pharmacy verified. DOXY. ME   Records since last visit reviewed. **THIS IS A VIRTUAL VISIT VIA A TELEPHONE ENCOUNTER. PATIENT AGREED TO HAVE THEIR CARE DELIVERED OVER THE PHONE IN PLACE OF THEIR REGULARLY SCHEDULED OFFICE VISIT**      History of Present Illness: Jonathan Mortensen is a 70 y.o. female here for follow up of diabetes. She was diagnosed with diabetes about around . I have not seen Ms Nasra Santa since 2020. At that time her A1C was 7.2%. She was taking [place] U-200 and Glipizide 10mg every day. In 2020 she was in the hospital for a cough and as part of her work up she was found to have a stomach mass (presumed GIST). She notes her son  in 2020 secondary to MI. While in the hospital she was having low BGs and her [place] was stopped. She is still taking Glipizide 10mg BID. She has been off the [place] since 2020. She is checking her BGs fasting every day. She notes her BGs are running in the 130-150s range. She reports that since she stopped the [place] she has not had anymore low BGs. Pt has breakfast around 11AM this AM she had a glass of OJ and an egg sandwich. Pt has lunch around 2-3PM, yesterday she had a fish sandwich, no side items and fruit juice. Pt has dinner around 6PM, last night she had chicken, string beans, mashed potatoes and water. Pt notes she has cut out all the snacking during the day. Her weight is currently 190 pounds. She follows with Dr. Miguelangel Ferris of Nephrology for CKD III and Nephropathy. She is followed by Dr. Tyson Mancia of cardiology for HTN and CAD. 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 (record reviewed)    Pt has hx of CAD, neuropathy, and neuropathy for which she is taking Lyrica. Pt also has hx of HLD on Atorvastatin 40mg daily and CT/US evidence of hepatic steatosis.     Pt reports adherance to her BP regimen. She is on BB, ARB, CCB, Clonidine and Lasix. I have previously screened her for endocrine causes of HTN and they were negative. Current Outpatient Medications   Medication Sig    lancets misc PUSH BUTTON LANCETS. Check blood sugar 4 times per day due to hypoglycemic events. Dx:E11.42, E16.2    glipiZIDE (GLUCOTROL) 5 mg tablet Take 1 Tab by mouth two (2) times a day.  amLODIPine (NORVASC) 10 mg tablet Take 1 Tab by mouth daily.  food supplemt, lactose-reduced (Ensure Enlive) 0.08 gram-1.5 kcal/mL liqd Take 6 x daily as recommended by Surgery to improve nutritional status for Possible surgery in future    DULoxetine (CYMBALTA) 60 mg capsule Take 1 Cap by mouth daily.  losartan (COZAAR) 100 mg tablet Take 1 Tab by mouth daily.  carvediloL (COREG) 12.5 mg tablet Take 1 Tab by mouth two (2) times daily (with meals).  torsemide (DEMADEX) 20 mg tablet Take 40 mg by mouth daily.  atorvastatin (LIPITOR) 40 mg tablet Take 1 Tab by mouth nightly. No current facility-administered medications for this visit. Allergies   Allergen Reactions    Metformin Other (comments)     GI side effects. Not a true allergy     Review of Systems:  - Eyes: no blurry vision or double vision  - Cardiovascular: no chest pain  - Respiratory: no shortness of breath  - Musculoskeletal: no myalgias  - Neurological: + numbness/tingling in extremities    Physical Examination:  There were no vitals taken for this visit.   - None      Data Reviewed:   Component      Latest Ref Rng & Units 12/15/2020 12/15/2020           9:41 PM  9:41 PM   WBC      3.6 - 11.0 K/uL  10.5   RBC      3.80 - 5.20 M/uL  4.82   HGB      11.5 - 16.0 g/dL  12.2   HCT      35.0 - 47.0 %  41.4   MCV      80.0 - 99.0 FL  85.9   MCH      26.0 - 34.0 PG  25.3 (L)   MCHC      30.0 - 36.5 g/dL  29.5 (L)   RDW      11.5 - 14.5 %  18.4 (H)   PLATELET      244 - 506 K/uL  307   MPV      8.9 - 12.9 FL  9.3   NRBC      0  WBC  0.0   ABSOLUTE NRBC      0.00 - 0.01 K/uL  0.00   NEUTROPHILS      32 - 75 %  72   LYMPHOCYTES      12 - 49 %  21   MONOCYTES      5 - 13 %  5   EOSINOPHILS      0 - 7 %  2   BASOPHILS      0 - 1 %  0   IMMATURE GRANULOCYTES      0.0 - 0.5 %  0   ABS. NEUTROPHILS      1.8 - 8.0 K/UL  7.5   ABS. LYMPHOCYTES      0.8 - 3.5 K/UL  2.2   ABS. MONOCYTES      0.0 - 1.0 K/UL  0.5   ABS. EOSINOPHILS      0.0 - 0.4 K/UL  0.2   ABS. BASOPHILS      0.0 - 0.1 K/UL  0.0   ABS. IMM. GRANS.      0.00 - 0.04 K/UL  0.0   DF        AUTOMATED   Sodium      136 - 145 mmol/L 138    Potassium      3.5 - 5.1 mmol/L 5.1    Chloride      97 - 108 mmol/L 108    CO2      21 - 32 mmol/L 30    Anion gap      5 - 15 mmol/L 0 (L)    Glucose      65 - 100 mg/dL 51 (L)    BUN      6 - 20 MG/DL 21 (H)    Creatinine      0.55 - 1.02 MG/DL 0.90    BUN/Creatinine ratio      12 - 20   23 (H)    GFR est AA      >60 ml/min/1.73m2 >60    GFR est non-AA      >60 ml/min/1.73m2 >60    Calcium      8.5 - 10.1 MG/DL 8.7    Bilirubin, total      0.2 - 1.0 MG/DL 0.3    ALT      12 - 78 U/L 24    AST      15 - 37 U/L 28    Alk. phosphatase      45 - 117 U/L 69    Protein, total      6.4 - 8.2 g/dL 7.3    Albumin      3.5 - 5.0 g/dL 2.5 (L)    Globulin      2.0 - 4.0 g/dL 4.8 (H)    A-G Ratio      1.1 - 2.2   0.5 (L)        Assessment/Plan:   1) DM > Pt reports that she is taking Glipizide 10mg BID and her BGs have been running in the 130-150s range and has not had any low BGs since stopping the insulin. Will order an A1C her goal A1C with her age and co-morbidities is under 8.0%. With her hx of neuropathy and calluses, she would benefit from DM shoes and inserts     2) HTN > Pt is on an ARB for renal protection. Will order a urine MA    3) HLD > Pt reports she is taking Atrovastain 40mg daily, she is followed by Dr. Gail Borges of cardiology. Will order a lipid panel. Pt voices understanding and agreement with the plan.   Pain noted and pt was recommended to call her PCP for further evaluation and treatment, as needed      I spent 25 minutes of total time during this virtual visit with a telephone encounter. All questions were answered and a copy of the instructions were sent to her via [employer]/a letter. Copy sent to:  Wendy Herrera and Raina Alexander

## 2021-01-10 LAB
ALBUMIN/CREAT UR: 3265 MG/G CREAT (ref 0–29)
CHOLEST SERPL-MCNC: 245 MG/DL (ref 100–199)
CREAT UR-MCNC: 137.2 MG/DL
EST. AVERAGE GLUCOSE BLD GHB EST-MCNC: 134 MG/DL
HBA1C MFR BLD: 6.3 % (ref 4.8–5.6)
HDLC SERPL-MCNC: 39 MG/DL
INTERPRETATION, 910389: NORMAL
LDLC SERPL CALC-MCNC: 163 MG/DL (ref 0–99)
Lab: NORMAL
MICROALBUMIN UR-MCNC: 4479 UG/ML
TRIGL SERPL-MCNC: 229 MG/DL (ref 0–149)
VLDLC SERPL CALC-MCNC: 43 MG/DL (ref 5–40)

## 2021-01-21 RX ORDER — GLIPIZIDE 5 MG/1
5 TABLET ORAL 2 TIMES DAILY
Qty: 60 TAB | Refills: 5 | Status: SHIPPED | OUTPATIENT
Start: 2021-01-21 | End: 2022-07-13

## 2021-01-21 NOTE — TELEPHONE ENCOUNTER
----- Message from Aiyana Rosales sent at 1/21/2021  8:28 AM EST -----  Regarding: Dr. Galvez Render (if not patient): Home health nurse      Relationship of caller (if not patient): n/a      Best contact number(s):      Name of medication and dosage if known: \"glipizide 5mg twice daily\"      Is patient out of this medication (yes/no): no      Pharmacy name: [facility] listed in chart? (yes/no): yes  Pharmacy phone number:      Details to clarify the request: n/a      Aiyana Rosales

## 2021-03-22 ENCOUNTER — OFFICE VISIT (OUTPATIENT)
Dept: SURGERY | Age: 76
End: 2021-03-22
Payer: MEDICARE

## 2021-03-22 VITALS
HEIGHT: 62 IN | HEART RATE: 88 BPM | OXYGEN SATURATION: 97 % | WEIGHT: 176.7 LBS | BODY MASS INDEX: 32.52 KG/M2 | TEMPERATURE: 97.5 F | RESPIRATION RATE: 18 BRPM | DIASTOLIC BLOOD PRESSURE: 84 MMHG | SYSTOLIC BLOOD PRESSURE: 172 MMHG

## 2021-03-22 DIAGNOSIS — D21.4 GIST, NON-MALIGNANT: Primary | ICD-10-CM

## 2021-03-22 PROCEDURE — G8432 DEP SCR NOT DOC, RNG: HCPCS | Performed by: SURGERY

## 2021-03-22 PROCEDURE — G8754 DIAS BP LESS 90: HCPCS | Performed by: SURGERY

## 2021-03-22 PROCEDURE — 3017F COLORECTAL CA SCREEN DOC REV: CPT | Performed by: SURGERY

## 2021-03-22 PROCEDURE — 3288F FALL RISK ASSESSMENT DOCD: CPT | Performed by: SURGERY

## 2021-03-22 PROCEDURE — G8753 SYS BP > OR = 140: HCPCS | Performed by: SURGERY

## 2021-03-22 PROCEDURE — 1090F PRES/ABSN URINE INCON ASSESS: CPT | Performed by: SURGERY

## 2021-03-22 PROCEDURE — G8536 NO DOC ELDER MAL SCRN: HCPCS | Performed by: SURGERY

## 2021-03-22 PROCEDURE — 1100F PTFALLS ASSESS-DOCD GE2>/YR: CPT | Performed by: SURGERY

## 2021-03-22 PROCEDURE — G8427 DOCREV CUR MEDS BY ELIG CLIN: HCPCS | Performed by: SURGERY

## 2021-03-22 PROCEDURE — G8417 CALC BMI ABV UP PARAM F/U: HCPCS | Performed by: SURGERY

## 2021-03-22 PROCEDURE — 99214 OFFICE O/P EST MOD 30 MIN: CPT | Performed by: SURGERY

## 2021-03-22 PROCEDURE — G8400 PT W/DXA NO RESULTS DOC: HCPCS | Performed by: SURGERY

## 2021-03-22 RX ORDER — PANTOPRAZOLE SODIUM 40 MG/1
40 TABLET, DELAYED RELEASE ORAL DAILY
COMMUNITY
Start: 2021-01-13

## 2021-03-22 NOTE — Clinical Note
4/8/2021 Patient: Briscoe Fleischer YOB: 1945 Date of Visit: 3/22/2021 Crista Garduno MD 
[address] [facility] [address] [address] Fax: [phone] Adrián Carlisle MD 
[address] [facility] [address] Via Fax: [phone] Dear MD Adrián Tobin Cea, MD, Thank you for [facility]  [facility]. My notes for this consultation are attached. If you have questions, please do not hesitate to call me. I look forward to following your patient along with you.  
 
 
Sincerely, 
 
Mariann Christian MD

## 2021-03-22 NOTE — PROGRESS NOTES
To: Oksana Leiva MD, Samara Tavares MD, Gurmeet Astorga MD    From: Sebas Camargo MD    Thank you. Encounter Date: 3/22/2021    Subjective:      Laverne Aguila is a 78 y.o. 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 November for dyspnea/pleural effusions. She is much more active recently. She is eating well. No black stools. Objective:     Visit Vitals  BP (!) 172/84 (BP 1 Location: Left upper arm, BP Patient Position: Sitting, BP Cuff Size: Adult)   Pulse 88   Temp 97.5 °F (36.4 °C) (Temporal)   Resp 18   Ht 5' 2\" (1.575 m)   Wt 80.2 kg (176 lb 11.2 oz)   SpO2 97%   BMI 32.32 kg/m²       General:  alert, cooperative, no distress, appears stated age   Abdomen: soft, bowel sounds active, non-tender         Assessment:     Gastric cardia GIST. Chronic deconditioning. Acute malnutrition. Relative anemia. Plan: Will get her on schedule for surgery. We will send for full preadmission testing. She may require cardiac evaluation. She may require transfusions prior to surgery. Advised to walk 30 min daily and drink at least 5 Ensure or Boost per day.       Sebas Camargo MD

## 2021-03-22 NOTE — PROGRESS NOTES
Chief Complaint   Patient presents with    Advice Only     discuss surgery abdominal mass       1. Have you been to the ER, urgent care clinic since your last visit? Hospitalized since your last visit? ER, blood sugar problem 2021.    2. Have you seen or consulted any other health care providers outside of the [address] [place] [address] since your last visit? Include any pap smears or colon screening.  No

## 2021-03-30 ENCOUNTER — HOSPITAL ENCOUNTER (OUTPATIENT)
Dept: PREADMISSION TESTING | Age: 76
Discharge: HOME | End: 2021-03-30
Attending: SURGERY
Payer: MEDICARE

## 2021-03-30 ENCOUNTER — HOSPITAL ENCOUNTER (EMERGENCY)
Age: 76
Discharge: HOME | End: 2021-03-30
Attending: EMERGENCY MEDICINE
Payer: MEDICARE

## 2021-03-30 VITALS
TEMPERATURE: 99.1 F | WEIGHT: 175.04 LBS | HEART RATE: 63 BPM | DIASTOLIC BLOOD PRESSURE: 63 MMHG | HEIGHT: 62 IN | BODY MASS INDEX: 32.21 KG/M2 | RESPIRATION RATE: 20 BRPM | SYSTOLIC BLOOD PRESSURE: 184 MMHG | OXYGEN SATURATION: 100 %

## 2021-03-30 VITALS
OXYGEN SATURATION: 100 % | RESPIRATION RATE: 18 BRPM | BODY MASS INDEX: 32.21 KG/M2 | SYSTOLIC BLOOD PRESSURE: 168 MMHG | HEIGHT: 62 IN | TEMPERATURE: 99 F | HEART RATE: 87 BPM | WEIGHT: 175.04 LBS | DIASTOLIC BLOOD PRESSURE: 54 MMHG

## 2021-03-30 DIAGNOSIS — D63.8 ANEMIA, CHRONIC DISEASE: Primary | ICD-10-CM

## 2021-03-30 LAB
ALBUMIN SERPL-MCNC: 2.7 G/DL (ref 3.5–5)
ALBUMIN/GLOB SERPL: 0.7 {RATIO} (ref 1.1–2.2)
ALP SERPL-CCNC: 53 U/L (ref 45–117)
ALT SERPL-CCNC: 16 U/L (ref 12–78)
ANION GAP SERPL CALC-SCNC: 5 MMOL/L (ref 5–15)
APTT PPP: 21.2 SEC (ref 22.1–31)
AST SERPL-CCNC: 11 U/L (ref 15–37)
BILIRUB SERPL-MCNC: 0.1 MG/DL (ref 0.2–1)
BUN SERPL-MCNC: 40 MG/DL (ref 6–20)
BUN/CREAT SERPL: 25 (ref 12–20)
CALCIUM SERPL-MCNC: 8.5 MG/DL (ref 8.5–10.1)
CHLORIDE SERPL-SCNC: 103 MMOL/L (ref 97–108)
CO2 SERPL-SCNC: 30 MMOL/L (ref 21–32)
CREAT SERPL-MCNC: 1.59 MG/DL (ref 0.55–1.02)
ERYTHROCYTE [DISTWIDTH] IN BLOOD BY AUTOMATED COUNT: 16.1 % (ref 11.5–14.5)
GLOBULIN SER CALC-MCNC: 4.1 G/DL (ref 2–4)
GLUCOSE SERPL-MCNC: 244 MG/DL (ref 65–100)
HCT VFR BLD AUTO: 20.7 % (ref 35–47)
HGB BLD-MCNC: 5.7 G/DL (ref 11.5–16)
HISTORY CHECKED?,CKHIST: NORMAL
HISTORY CHECKED?,CKHIST: NORMAL
INR PPP: 1 (ref 0.9–1.1)
MCH RBC QN AUTO: 22.9 PG (ref 26–34)
MCHC RBC AUTO-ENTMCNC: 27.5 G/DL (ref 30–36.5)
MCV RBC AUTO: 83.1 FL (ref 80–99)
NRBC # BLD: 0 K/UL (ref 0–0.01)
NRBC BLD-RTO: 0 PER 100 WBC
PLATELET # BLD AUTO: 404 K/UL (ref 150–400)
PMV BLD AUTO: 9.2 FL (ref 8.9–12.9)
POTASSIUM SERPL-SCNC: 4.2 MMOL/L (ref 3.5–5.1)
PROT SERPL-MCNC: 6.8 G/DL (ref 6.4–8.2)
PROTHROMBIN TIME: 10.3 SEC (ref 9–11.1)
RBC # BLD AUTO: 2.49 M/UL (ref 3.8–5.2)
SODIUM SERPL-SCNC: 138 MMOL/L (ref 136–145)
THERAPEUTIC RANGE,PTTT: ABNORMAL SECS (ref 58–77)
WBC # BLD AUTO: 11 K/UL (ref 3.6–11)

## 2021-03-30 PROCEDURE — 36430 TRANSFUSION BLD/BLD COMPNT: CPT

## 2021-03-30 PROCEDURE — 85730 THROMBOPLASTIN TIME PARTIAL: CPT

## 2021-03-30 PROCEDURE — 85610 PROTHROMBIN TIME: CPT

## 2021-03-30 PROCEDURE — 86901 BLOOD TYPING SEROLOGIC RH(D): CPT

## 2021-03-30 PROCEDURE — 86920 COMPATIBILITY TEST SPIN: CPT

## 2021-03-30 PROCEDURE — 36415 COLL VENOUS BLD VENIPUNCTURE: CPT

## 2021-03-30 PROCEDURE — 80053 COMPREHEN METABOLIC PANEL: CPT

## 2021-03-30 PROCEDURE — 86922 COMPATIBILITY TEST ANTIGLOB: CPT

## 2021-03-30 PROCEDURE — 99284 EMERGENCY DEPT VISIT MOD MDM: CPT

## 2021-03-30 PROCEDURE — 86921 COMPATIBILITY TEST INCUBATE: CPT

## 2021-03-30 PROCEDURE — 85027 COMPLETE CBC AUTOMATED: CPT

## 2021-03-30 PROCEDURE — P9016 RBC LEUKOCYTES REDUCED: HCPCS

## 2021-03-30 RX ORDER — SODIUM CHLORIDE 9 MG/ML
250 INJECTION, SOLUTION INTRAVENOUS AS NEEDED
Status: CANCELLED | OUTPATIENT
Start: 2021-04-08

## 2021-03-30 RX ORDER — SODIUM CHLORIDE 9 MG/ML
250 INJECTION, SOLUTION INTRAVENOUS AS NEEDED
Status: DISCONTINUED | OUTPATIENT
Start: 2021-04-08 | End: 2021-03-30

## 2021-03-30 RX ORDER — SODIUM CHLORIDE 9 MG/ML
250 INJECTION, SOLUTION INTRAVENOUS AS NEEDED
Status: DISCONTINUED | OUTPATIENT
Start: 2021-03-30 | End: 2021-03-31 | Stop reason: HOSPADM

## 2021-03-30 NOTE — PERIOP NOTES
Incentive Spirometer        Using the incentive spirometer helps expand the small air sacs of your lungs, helps you breathe deeply, and helps improve your lung function. Use your incentive spirometer twice a day (10 breaths each time) prior to surgery. How to Use Your Incentive Spirometer:  1. Hold the incentive spirometer in an upright position. 2. Breathe out as usual.   3. Place the mouthpiece in your mouth and seal your lips tightly around it. 4. Take a deep breath. Breathe in slowly and as deeply as possible. Keep the blue flow rate guide between the arrows. 5. Hold your breath as long as possible. Then exhale slowly and allow the piston to fall to the bottom of the column. 6. Rest for a few seconds and repeat steps one through five at least 10 times. PAT Tidal Volume_______1200___________  x_____2___________  Date________3/30/21_______________    Arce Heft THE INCENTIVE SPIROMETER WITH YOU TO THE HOSPITAL ON THE DAY OF YOUR SURGERY. Opportunity given to ask and answer questions as well as to observe return demonstration.     Patient signature_____________________________    Witness____________________________

## 2021-03-30 NOTE — PERIOP NOTES
Hibiclens/Chlorhexidine    Preventing Infections Before and After - Your Surgery    IMPORTANT INSTRUCTIONS    Please read and follow these instructions carefully. If you are unable to comply with the below instructions your procedure will be cancelled. Every Night for Three (3) nights before your surgery:  1. Shower with an antibacterial soap, such as Dial, or the soap provided at your preassessment appointment. A shower is better than a bath for cleaning your skin. 2. If needed, ask someone to help you reach all areas of your body. Dont forget to clean your belly button with every shower. The night before your surgery: If you lose your Hibiclens/chlorhexidine please contact surgery center or you can purchase it at a local pharmacy  1. On the night before your surgery, shower with an antibacterial soap, such as Dial, or the soap provided at your preassessment appointment. 2. With one packet of Hibiclens/Chlorhexidine in hand, turn water off.  3. Apply Hibiclens antiseptic skin cleanser with a clean, freshly washed washcloth. ? Gently apply to your body from chin to toes (except the genital area) and especially the area(s) where your incision(s) will be. ? Leave Hibiclens/Chlorhexidine on your skin for at least 20 seconds. CAUTION: If needed, Hibiclens/chlorhexidine may be used to clean the folds of skin of the legs (such as in the area of the groin) and on your buttocks and hips. However, do not use Hibiclens/Chlorhexidine above the neck or in the genital area (your bottom) or put inside any area of your body. 4. Turn the water back on and rinse. 5. Dry gently with a clean, freshly washed towel. 6. After your shower, do not use any powder, deodorant, perfumes or lotion. 7. Use clean, freshly washed towels and washcloths every time you shower. 8. Wear clean, freshly washed pajamas to bed the night before surgery. 9. Sleep on clean, freshly washed sheets.   10. Do not allow pets to sleep in your bed with you. The Morning of your surgery:  1. Shower again thoroughly with an antibacterial soap, such as Dial or the soap provided at your preassessment appointment. If needed, ask someone for help to reach all areas of your body. Dont forget to clean your belly button! Rinse. 2. Dry gently with a clean, freshly washed towel. 3. After your shower, do not use any powder, deodorant, perfumes or lotion prior to surgery. 4. Put on clean, freshly washed clothing. Tips to help prevent infections after your surgery:  1. Protect your surgical wound from germs:  ? Hand washing is the most important thing you and your caregivers can do to prevent infections. ? Keep your bandage clean and dry! ? Do not touch your surgical wound. 2. Use clean, freshly washed towels and washcloths every time you shower; do not share bath linens with others. 3. Until your surgical wound is healed, wear clothing and sleep on bed linens each day that are clean and freshly washed. 4. Do not allow pets to sleep in your bed with you or touch your surgical wound. 5. Do not smoke - smoking delays wound healing. This may be a good time to stop smoking. 6. If you have diabetes, it is important for you to manage your blood sugar levels properly before your surgery as well as after your surgery. Poorly managed blood sugar levels slow down wound healing and prevent you from healing completely. If you lose your Hibiclens/chlorhexidine, please call the [facility], or it is available for purchase at your pharmacy.                ___________________      ___________________      ________________  (Signature of Patient)          (Witness)                   (Date and Time)

## 2021-03-30 NOTE — ED PROVIDER NOTES
EMERGENCY DEPARTMENT HISTORY AND PHYSICAL EXAM      Date: 3/30/2021  Patient Name: Shawna Garcia  Patient Age and Sex: 78 y.o. female     History of Presenting Illness     Chief Complaint   Patient presents with    Abnormal Lab Results     sent from outpatient surgery where her hgb was 5, admitted in [facility] for a GI bleed       History Provided By: Patient    HPI: Shawna Garcia is a 74-year-old female with a history of anemia presenting for abnormal lab values. Patient states that she has a surgery for a abdominal mass removal by Dr. Veronica Neal on April 8. Today she had lab work done and was called that her hemoglobin was low and to come to the ER for transfusion. Patient states that she has had 8 blood transfusions in the past because of her history of anemia. She denies any hematochezia, melena, hemoptysis. Denies any significant abdominal pain and states that she has mild abdominal pain in the right side because of the abdominal mass. States that she has been feeling slightly tired but not significant fatigue or lightheadedness or syncope. There are no other complaints, changes, or physical findings at this time. PCP: Monty Mays MD    Current Facility-Administered Medications on File Prior to Encounter   Medication Dose Route Frequency Provider Last Rate Last Admin    [DISCONTINUED] 0.9% sodium chloride infusion 250 mL  250 mL IntraVENous PRN Dionicio Danielson MD         Current Outpatient Medications on File Prior to Encounter   Medication Sig Dispense Refill    multivitamin, tx-iron-ca-min (THERA-M w/ IRON) 9 mg iron-400 mcg tab tablet Take 1 Tab by mouth two (2) times a day. Centrum silver      pantoprazole (PROTONIX) 40 mg tablet       glipiZIDE (GLUCOTROL) 5 mg tablet Take 1 Tab by mouth two (2) times a day. 60 Tab 5    lancets misc PUSH BUTTON LANCETS. Check blood sugar 4 times per day due to hypoglycemic events.   Dx:E11.42, E16.2 400 Each 3    amLODIPine (NORVASC) 10 mg tablet Take 1 Tab by mouth daily. 30 Tab 0    food supplemt, lactose-reduced (Ensure Enlive) 0.08 gram-1.5 kcal/mL liqd Take 6 x daily as recommended by Surgery to improve nutritional status for Possible surgery in future (Patient taking differently: Take 6 x daily as recommended by Surgery to improve nutritional status for Possible surgery in future. takes about 3) 180 Bottle 1    DULoxetine (CYMBALTA) 60 mg capsule Take 1 Cap by mouth daily. 60 Cap 0    losartan (COZAAR) 100 mg tablet Take 1 Tab by mouth daily. 60 Tab 0    carvediloL (COREG) 12.5 mg tablet Take 1 Tab by mouth two (2) times daily (with meals). 60 Tab 0    torsemide (DEMADEX) 20 mg tablet Take 40 mg by mouth daily.  atorvastatin (LIPITOR) 40 mg tablet Take 1 Tab by mouth nightly. 80 Tab 3       Past History     Past Medical History:  Past Medical History:   Diagnosis Date    Anemia, unspecified 10/6/2020    Arrhythmia     Arthritis     CAD (coronary artery disease)     Cancer ([address]     Chronic bronchitis ([address]     per pt:  as of 1/9/15 pt denies any ARENAS or SOB    Diabetes ([address] Dx approx 2009    Dr Susan Downey (in [facility])    GERD (gastroesophageal reflux disease)     Hypercholesteremia     Hypertension        Past Surgical History:  Past Surgical History:   Procedure Laterality Date    HX CHOLECYSTECTOMY  6/12    HX COLONOSCOPY      HX CORONARY STENT PLACEMENT  8/25/2015    HX DILATION AND CURETTAGE      SD CARDIAC SURG PROCEDURE UNLIST  2015    UPPER GI ENDOSCOPY,BIOPSY  10/6/2020            Family History:  Family History   Problem Relation Age of Onset    Diabetes Mother     Heart Disease Mother     Hypertension Mother     Stroke Father     Heart Disease Brother     Heart Disease Brother     Stroke Brother     HIV/AIDS Brother        Social History:  Social History     Tobacco Use    Smoking status: Former Smoker    Smokeless tobacco: Never Used   Substance Use Topics    Alcohol use: No    Drug use:  No Allergies: Allergies   Allergen Reactions    Metformin Other (comments)     GI side effects. Not a true allergy         Review of Systems   Review of Systems   Constitutional: Positive for fatigue. Negative for chills and fever. Respiratory: Negative for cough and shortness of breath. Cardiovascular: Negative for chest pain. Gastrointestinal: Negative for abdominal pain, constipation, diarrhea, nausea and vomiting. Genitourinary: Negative for dysuria, frequency and hematuria. Neurological: Negative for weakness and numbness. All other systems reviewed and are negative. Physical Exam   Physical Exam  Constitutional:       General: She is not in acute distress. Appearance: She is well-developed. HENT:      Head: Normocephalic and atraumatic. Nose: Nose normal.      Mouth/Throat:      Mouth: Mucous membranes are moist.   Eyes:      Extraocular Movements: Extraocular movements intact. Conjunctiva/sclera: Conjunctivae normal.   Neck:      Musculoskeletal: Normal range of motion. Cardiovascular:      Comments: Well perfused  Pulmonary:      Effort: Pulmonary effort is normal. No respiratory distress. Abdominal:      Tenderness: There is abdominal tenderness. Comments: Mild right-sided abdominal pain   Musculoskeletal: Normal range of motion. Neurological:      General: No focal deficit present. Mental Status: She is alert and oriented to person, place, and time.    Psychiatric:         Mood and Affect: Mood normal.          Diagnostic Study Results     Labs -     Recent Results (from the past 12 hour(s))   CBC W/O DIFF    Collection Time: 03/30/21  2:12 PM   Result Value Ref Range    WBC 11.0 3.6 - 11.0 K/uL    RBC 2.49 (L) 3.80 - 5.20 M/uL    HGB 5.7 (LL) 11.5 - 16.0 g/dL    HCT 20.7 (L) 35.0 - 47.0 %    MCV 83.1 80.0 - 99.0 FL    MCH 22.9 (L) 26.0 - 34.0 PG    MCHC 27.5 (L) 30.0 - 36.5 g/dL    RDW 16.1 (H) 11.5 - 14.5 %    PLATELET 628 (H) 954 - 400 K/uL    MPV 9.2 8.9 - 12.9 FL    NRBC 0.0 0  WBC    ABSOLUTE NRBC 0.00 0.00 - 4.21 K/uL   METABOLIC PANEL, COMPREHENSIVE    Collection Time: 03/30/21  2:12 PM   Result Value Ref Range    Sodium 138 136 - 145 mmol/L    Potassium 4.2 3.5 - 5.1 mmol/L    Chloride 103 97 - 108 mmol/L    CO2 30 21 - 32 mmol/L    Anion gap 5 5 - 15 mmol/L    Glucose 244 (H) 65 - 100 mg/dL    BUN 40 (H) 6 - 20 MG/DL    Creatinine 1.59 (H) 0.55 - 1.02 MG/DL    BUN/Creatinine ratio 25 (H) 12 - 20      GFR est AA 38 (L) >60 ml/min/1.73m2    GFR est non-AA 32 (L) >60 ml/min/1.73m2    Calcium 8.5 8.5 - 10.1 MG/DL    Bilirubin, total 0.1 (L) 0.2 - 1.0 MG/DL    ALT (SGPT) 16 12 - 78 U/L    AST (SGOT) 11 (L) 15 - 37 U/L    Alk. phosphatase 53 45 - 117 U/L    Protein, total 6.8 6.4 - 8.2 g/dL    Albumin 2.7 (L) 3.5 - 5.0 g/dL    Globulin 4.1 (H) 2.0 - 4.0 g/dL    A-G Ratio 0.7 (L) 1.1 - 2.2     PROTHROMBIN TIME + INR    Collection Time: 03/30/21  2:12 PM   Result Value Ref Range    INR 1.0 0.9 - 1.1      Prothrombin time 10.3 9.0 - 11.1 sec   PTT    Collection Time: 03/30/21  2:12 PM   Result Value Ref Range    aPTT 21.2 (L) 22.1 - 31.0 sec    aPTT, therapeutic range     58.0 - 77.0 SECS   RBC, ALLOCATE    Collection Time: 03/30/21  4:00 PM   Result Value Ref Range    HISTORY CHECKED? Historical check performed        Radiologic Studies -   No orders to display     CT Results  (Last 48 hours)    None        CXR Results  (Last 48 hours)    None            Medical Decision Making   I am the first provider for this patient. I reviewed the vital signs, available nursing notes, past medical history, past surgical history, family history and social history. Vital Signs-Reviewed the patient's vital signs.   Patient Vitals for the past 12 hrs:   Temp Pulse Resp BP SpO2   03/30/21 1623 99 °F (37.2 °C) 71 16 (!) 148/56 100 %   03/30/21 1521 99 °F (37.2 °C) 75 17 (!) 165/84 100 %       Records Reviewed: Nursing Notes and Old Medical Records    Provider Notes (Medical Decision Making):   Patient presenting for low hemoglobin sent by Dr. Mark Smith for blood transfusion. Plan will be to give her 1 unit of blood. She might have antibodies. No acute work-up needed for her anemia given that she has had a longstanding history of this. She will also need to follow-up with her primary care doctor if she needs any more transfusions. ED Course:   Initial assessment performed. The patients presenting problems have been discussed, and they are in agreement with the care plan formulated and outlined with them. I have encouraged them to ask questions as they arise throughout their visit. Critical Care Time:   CRITICAL CARE NOTE :    4:28 PM    IMPENDING DETERIORATION -Cardiovascular  ASSOCIATED RISK FACTORS - Hypotension and Shock  MANAGEMENT- Bedside Assessment and Supervision of Care  INTERPRETATION -  Blood Pressure and Cardiac Output Measures   INTERVENTIONS - hemodynamic mngmt  CASE REVIEW - Nursing and Family  TREATMENT RESPONSE -Improved  PERFORMED BY - Self    NOTES   :    I have spent 30 minutes of critical care time involved in lab review, consultations with specialist, family decision- making, bedside attention and documentation. During this entire length of time I was immediately available to the patient . Disposition:    Discharge Note:  The patient has been re-evaluated and is ready for discharge. Reviewed available results with patient. Counseled patient on diagnosis and care plan. Patient has expressed understanding, and all questions have been answered. Patient agrees with plan and agrees to follow up as recommended, or to return to the ED if their symptoms worsen. Discharge instructions have been provided and explained to the patient, along with reasons to return to the ED.       PLAN:  Current Discharge Medication List      1.   2.   Follow-up Information     Follow up With Specialties Details Why Skylar Hinojosa MD Internal Medicine Schedule an appointment as soon as possible for a visit  repeat labwork and to decide if any more transfusion needed before surgery Lily Daigle  [phone]          3. Return to ED if worse         Diagnosis     Clinical Impression:   1. Anemia, chronic disease        Attestations:  Dory Curry M.D. Please note that this dictation was completed with [employer], the computer voice recognition software. Quite often unanticipated grammatical, syntax, homophones, and other interpretive errors are inadvertently transcribed by the computer software. Please disregard these errors. Please excuse any errors that have escaped final proofreading. Thank you.

## 2021-03-30 NOTE — PERIOP NOTES
[facility]  Preoperative Instructions        Surgery Date April 8, 2021         Time of Arrival 0730  Contact# [phone]  1. On the day of your surgery, please report to the Surgical Services Registration Desk and sign in at your designated time. The Surgery Center is located to the right of the Emergency Room. 2. You must have someone with you to drive you home. You should not drive a car for 24 hours following surgery. Please make arrangements for a friend or family member to stay with you for the first 24 hours after your surgery. 3. Do not have anything to eat or drink (including water, gum, mints, coffee, juice) after midnight ?4/7/21? Gina Salm ? This may not apply to medications prescribed by your physician. ?(Please note below the special instructions with medications to take the morning of your procedure.)    4. We recommend you do not drink any alcoholic beverages for 24 hours before and after your surgery. 5. Contact your surgeons office for instructions on the following medications: non-steroidal anti-inflammatory drugs (i.e. Advil, Aleve), vitamins, and supplements. (Some surgeons will want you to stop these medications prior to surgery and others may allow you to take them)  **If you are currently taking Plavix, Coumadin, Aspirin and/or other blood-thinning agents, contact your surgeon for instructions. ** Your surgeon will partner with the physician prescribing these medications to determine if it is safe to stop or if you need to continue taking. Please do not stop taking these medications without instructions from your surgeon    6. Wear comfortable clothes. Wear glasses instead of contacts. Do not bring any money or jewelry. Please bring picture ID, insurance card, and any prearranged co-payment or hospital payment. Do not wear make-up, particularly mascara the morning of your surgery. Do not wear nail polish, particularly if you are having foot /hand surgery. Wear your hair loose or down, no ponytails, buns, lisa pins or clips. All body piercings must be removed. Please shower with antibacterial soap for three consecutive days before and on the morning of surgery, but do not apply any lotions, powders or deodorants after the shower on the day of surgery. Please use a fresh towels after each shower. Please sleep in clean clothes and change bed linens the night before surgery. Please do not shave for 48 hours prior to surgery. Shaving of the face is acceptable. 7. You should understand that if you do not follow these instructions your surgery may be cancelled. If your physical condition changes (I.e. fever, cold or flu) please contact your surgeon as soon as possible. 8. It is important that you be on time. If a situation occurs where you may be late, please call [phone] (OR Holding Area). 9. If you have any questions and or problems, please call [phone] (Pre-admission Testing). 10. Your surgery time may be subject to change. You will receive a phone call the evening prior if your time changes. 11.  If having outpatient surgery, you must have someone to drive you here, stay with you during the duration of your stay, and to drive you home at time of discharge. Special Instructions: Follow surgeon instructions    Covid test on Saturday April3 between 07 and 10 am- [place] [facility] of [facility]  We suggest to self quarantine from test day to day of surgery    Practice incentive spirometry twice a day- ten times each- bring day of surgery    TAKE ALL MEDICATIONS DAY OF SURGERY EXCEPT:  Glipizide. , Losartan, multivitamin    I understand a pre-operative phone call will be made to verify my surgery time. In the event that I am not available, I give permission for a message to be left on my answering service and/or with another person?   yes         ___________________      __________   _________    (Signature of Patient)             (Witness) (Date and Time)

## 2021-03-30 NOTE — ADVANCED PRACTICE NURSE
3/30/21 1500 Hgb 5.7 in PAT. Pt denies CP, SOB, ARENAS, weakness, tarry stools or gilberto blood in stool or urine. Denies epistaxis or hematemesis. Hx of admission for UGIB in 11/2020. VSS. Surgeon advised. Pt to ED for evaluation and treatment.

## 2021-03-31 RX ORDER — SODIUM CHLORIDE, SODIUM LACTATE, POTASSIUM CHLORIDE, CALCIUM CHLORIDE 600; 310; 30; 20 MG/100ML; MG/100ML; MG/100ML; MG/100ML
25 INJECTION, SOLUTION INTRAVENOUS CONTINUOUS
Status: CANCELLED | OUTPATIENT
Start: 2021-04-08

## 2021-03-31 NOTE — ED NOTES
KATHERINE Rodarte reviewed discharge instructions with the patient. The patient verbalized understanding. All questions and concerns were addressed. The patient is discharged via wheelchair in the care of family members with instructions and prescriptions in hand. Pt is alert and oriented x 4. Respirations are clear and unlabored.        Pt wheeled to waiting room awaiting ride from [employer]

## 2021-04-01 LAB
ABO + RH BLD: NORMAL
BLD PROD TYP BPU: NORMAL
BLD PROD TYP BPU: NORMAL
BLOOD BANK CMNT PATIENT-IMP: NORMAL
BLOOD GROUP ANTIBODIES SERPL: NORMAL
BPU ID: NORMAL
BPU ID: NORMAL
CROSSMATCH RESULT,%XM: NORMAL
CROSSMATCH RESULT,%XM: NORMAL
SPECIMEN EXP DATE BLD: NORMAL
STATUS OF UNIT,%ST: NORMAL
STATUS OF UNIT,%ST: NORMAL
UNIT DIVISION, %UDIV: 0
UNIT DIVISION, %UDIV: 0

## 2021-04-02 ENCOUNTER — TELEPHONE (OUTPATIENT)
Dept: SURGERY | Age: 76
End: 2021-04-02

## 2021-04-02 NOTE — TELEPHONE ENCOUNTER
Spoke with patient informed her surgery scheduled for 4/8/21 canceled, PCP Dr. Paty Disla @ [facility] family does not feel as if she is strong enough & need cardiac clear, patient will call Dr. cMcray Last office & schedule appointment. Notes faxed to PAT. Express understanding.

## 2021-04-03 ENCOUNTER — HOSPITAL ENCOUNTER (OUTPATIENT)
Dept: PREADMISSION TESTING | Age: 76
Discharge: HOME | End: 2021-04-03

## 2021-04-03 NOTE — PROGRESS NOTES
Placed call to patient when they did not arrive for COVID test during testing hours, patient states that surgery was cancelled

## 2021-04-06 NOTE — PROGRESS NOTES
Called to discuss the fact that she needs cardiac clearance and possible blood transfusions prior to surgery. I asked her if she had an appointment with her cardiologist yet. She said she had not gotten around to calling yet. I offered to do that for her versus bring her into the hospital for transfusion and cardiology consultation. She says she would prefer to do it as an outpatient. We will get her set up with Dr. Luis M GREEN. I told her go to the emergency department for any lightheadedness or dizziness.

## 2021-04-08 ENCOUNTER — TELEPHONE (OUTPATIENT)
Dept: SURGERY | Age: 76
End: 2021-04-08

## 2021-04-08 NOTE — TELEPHONE ENCOUNTER
Patient has appointment scheduled with cardiologist Dr. Anjum Boss, Dr. Sly Watters out on Wednesday 4/14/21 @ 10:15 am. Patient aware.

## 2021-04-08 NOTE — TELEPHONE ENCOUNTER
Still trying to get her set up with Dr. Syed Marie for preoperative evaluation. I will double check on the status of this appointment.

## 2021-06-19 ENCOUNTER — APPOINTMENT (OUTPATIENT)
Dept: GENERAL RADIOLOGY | Age: 76
DRG: 326 | End: 2021-06-19
Attending: EMERGENCY MEDICINE
Payer: MEDICARE

## 2021-06-19 ENCOUNTER — APPOINTMENT (OUTPATIENT)
Dept: CT IMAGING | Age: 76
DRG: 326 | End: 2021-06-19
Attending: EMERGENCY MEDICINE
Payer: MEDICARE

## 2021-06-19 ENCOUNTER — HOSPITAL ENCOUNTER (INPATIENT)
Age: 76
LOS: 12 days | Discharge: NURSING, REHAB OR LONG-TERM CARE | DRG: 326 | End: 2021-07-01
Attending: EMERGENCY MEDICINE | Admitting: HOSPITALIST
Payer: MEDICARE

## 2021-06-19 DIAGNOSIS — E11.42 TYPE 2 DIABETES MELLITUS WITH DIABETIC POLYNEUROPATHY, WITH LONG-TERM CURRENT USE OF INSULIN (HCC): ICD-10-CM

## 2021-06-19 DIAGNOSIS — I21.4 NSTEMI (NON-ST ELEVATED MYOCARDIAL INFARCTION) (HCC): ICD-10-CM

## 2021-06-19 DIAGNOSIS — C49.A4 GIST (GASTROINTESTINAL STROMA TUMOR), MALIGNANT, COLON (HCC): Primary | ICD-10-CM

## 2021-06-19 DIAGNOSIS — K92.2 GASTROINTESTINAL HEMORRHAGE, UNSPECIFIED GASTROINTESTINAL HEMORRHAGE TYPE: ICD-10-CM

## 2021-06-19 DIAGNOSIS — Z79.4 TYPE 2 DIABETES MELLITUS WITH DIABETIC POLYNEUROPATHY, WITH LONG-TERM CURRENT USE OF INSULIN (HCC): ICD-10-CM

## 2021-06-19 LAB
ALBUMIN SERPL-MCNC: 2.3 G/DL (ref 3.5–5)
ALBUMIN/GLOB SERPL: 0.5 {RATIO} (ref 1.1–2.2)
ALP SERPL-CCNC: 153 U/L (ref 45–117)
ALT SERPL-CCNC: 104 U/L (ref 12–78)
ANION GAP SERPL CALC-SCNC: 9 MMOL/L (ref 5–15)
AST SERPL-CCNC: 87 U/L (ref 15–37)
BASE DEFICIT BLD-SCNC: 5.4 MMOL/L
BASOPHILS # BLD: 0 K/UL (ref 0–0.1)
BASOPHILS NFR BLD: 0 % (ref 0–1)
BILIRUB SERPL-MCNC: 0.5 MG/DL (ref 0.2–1)
BUN SERPL-MCNC: 81 MG/DL (ref 6–20)
BUN/CREAT SERPL: 33 (ref 12–20)
CA-I BLD-MCNC: 1.05 MMOL/L (ref 1.12–1.32)
CALCIUM SERPL-MCNC: 8 MG/DL (ref 8.5–10.1)
CHLORIDE BLD-SCNC: 104 MMOL/L (ref 100–108)
CHLORIDE SERPL-SCNC: 102 MMOL/L (ref 97–108)
CO2 BLD-SCNC: 20 MMOL/L (ref 19–24)
CO2 SERPL-SCNC: 22 MMOL/L (ref 21–32)
CREAT SERPL-MCNC: 2.44 MG/DL (ref 0.55–1.02)
CREAT UR-MCNC: 2.5 MG/DL (ref 0.6–1.3)
DIFFERENTIAL METHOD BLD: ABNORMAL
EOSINOPHIL # BLD: 0 K/UL (ref 0–0.4)
EOSINOPHIL NFR BLD: 0 % (ref 0–7)
ERYTHROCYTE [DISTWIDTH] IN BLOOD BY AUTOMATED COUNT: 19.6 % (ref 11.5–14.5)
GLOBULIN SER CALC-MCNC: 4.7 G/DL (ref 2–4)
GLUCOSE BLD STRIP.AUTO-MCNC: 150 MG/DL (ref 74–106)
GLUCOSE BLD STRIP.AUTO-MCNC: 213 MG/DL (ref 65–117)
GLUCOSE SERPL-MCNC: 180 MG/DL (ref 65–100)
HCO3 BLDA-SCNC: 20 MMOL/L
HCT VFR BLD AUTO: 17.8 % (ref 35–47)
HEMOCCULT STL QL: POSITIVE
HGB BLD-MCNC: 4.7 G/DL (ref 11.5–16)
HISTORY CHECKED?,CKHIST: NORMAL
IMM GRANULOCYTES # BLD AUTO: 0.2 K/UL (ref 0–0.04)
IMM GRANULOCYTES NFR BLD AUTO: 1 % (ref 0–0.5)
LACTATE BLD-SCNC: 1.58 MMOL/L (ref 0.4–2)
LYMPHOCYTES # BLD: 2 K/UL (ref 0.8–3.5)
LYMPHOCYTES NFR BLD: 9 % (ref 12–49)
MCH RBC QN AUTO: 17.8 PG (ref 26–34)
MCHC RBC AUTO-ENTMCNC: 26.4 G/DL (ref 30–36.5)
MCV RBC AUTO: 67.4 FL (ref 80–99)
MONOCYTES # BLD: 1.3 K/UL (ref 0–1)
MONOCYTES NFR BLD: 6 % (ref 5–13)
NEUTS SEG # BLD: 18.3 K/UL (ref 1.8–8)
NEUTS SEG NFR BLD: 84 % (ref 32–75)
NRBC # BLD: 1.09 K/UL (ref 0–0.01)
NRBC BLD-RTO: 5 PER 100 WBC
PCO2 BLDV: 34.8 MMHG (ref 41–51)
PH BLDV: 7.36 [PH] (ref 7.32–7.42)
PLATELET # BLD AUTO: 380 K/UL (ref 150–400)
PO2 BLDV: 13 MMHG (ref 25–40)
POTASSIUM BLD-SCNC: 4.6 MMOL/L (ref 3.5–5.5)
POTASSIUM SERPL-SCNC: 5 MMOL/L (ref 3.5–5.1)
PROT SERPL-MCNC: 7 G/DL (ref 6.4–8.2)
RBC # BLD AUTO: 2.64 M/UL (ref 3.8–5.2)
RBC MORPH BLD: ABNORMAL
SERVICE CMNT-IMP: ABNORMAL
SODIUM BLD-SCNC: 136 MMOL/L (ref 136–145)
SODIUM SERPL-SCNC: 133 MMOL/L (ref 136–145)
SPECIMEN SITE: ABNORMAL
TROPONIN I SERPL-MCNC: 3.81 NG/ML
TROPONIN I SERPL-MCNC: 4.5 NG/ML
WBC # BLD AUTO: 21.8 K/UL (ref 3.6–11)

## 2021-06-19 PROCEDURE — 82947 ASSAY GLUCOSE BLOOD QUANT: CPT

## 2021-06-19 PROCEDURE — 82728 ASSAY OF FERRITIN: CPT

## 2021-06-19 PROCEDURE — 84295 ASSAY OF SERUM SODIUM: CPT

## 2021-06-19 PROCEDURE — 85025 COMPLETE CBC W/AUTO DIFF WBC: CPT

## 2021-06-19 PROCEDURE — 86901 BLOOD TYPING SEROLOGIC RH(D): CPT

## 2021-06-19 PROCEDURE — 71045 X-RAY EXAM CHEST 1 VIEW: CPT

## 2021-06-19 PROCEDURE — 77030038269 HC DRN EXT URIN PURWCK BARD -A

## 2021-06-19 PROCEDURE — 99285 EMERGENCY DEPT VISIT HI MDM: CPT

## 2021-06-19 PROCEDURE — 80053 COMPREHEN METABOLIC PANEL: CPT

## 2021-06-19 PROCEDURE — 93005 ELECTROCARDIOGRAM TRACING: CPT

## 2021-06-19 PROCEDURE — 87040 BLOOD CULTURE FOR BACTERIA: CPT

## 2021-06-19 PROCEDURE — P9016 RBC LEUKOCYTES REDUCED: HCPCS

## 2021-06-19 PROCEDURE — 36430 TRANSFUSION BLD/BLD COMPNT: CPT

## 2021-06-19 PROCEDURE — 74011000258 HC RX REV CODE- 258: Performed by: EMERGENCY MEDICINE

## 2021-06-19 PROCEDURE — 65270000015 HC RM PRIVATE ONCOLOGY

## 2021-06-19 PROCEDURE — 2709999900 HC NON-CHARGEABLE SUPPLY

## 2021-06-19 PROCEDURE — 96365 THER/PROPH/DIAG IV INF INIT: CPT

## 2021-06-19 PROCEDURE — 36415 COLL VENOUS BLD VENIPUNCTURE: CPT

## 2021-06-19 PROCEDURE — 74011250636 HC RX REV CODE- 250/636: Performed by: EMERGENCY MEDICINE

## 2021-06-19 PROCEDURE — 84484 ASSAY OF TROPONIN QUANT: CPT

## 2021-06-19 PROCEDURE — 74176 CT ABD & PELVIS W/O CONTRAST: CPT

## 2021-06-19 PROCEDURE — 86920 COMPATIBILITY TEST SPIN: CPT

## 2021-06-19 PROCEDURE — 83880 ASSAY OF NATRIURETIC PEPTIDE: CPT

## 2021-06-19 PROCEDURE — 82962 GLUCOSE BLOOD TEST: CPT

## 2021-06-19 PROCEDURE — 83550 IRON BINDING TEST: CPT

## 2021-06-19 PROCEDURE — 82272 OCCULT BLD FECES 1-3 TESTS: CPT

## 2021-06-19 RX ORDER — ACETAMINOPHEN 325 MG/1
650 TABLET ORAL
Status: DISCONTINUED | OUTPATIENT
Start: 2021-06-19 | End: 2021-06-22 | Stop reason: ALTCHOICE

## 2021-06-19 RX ORDER — SODIUM CHLORIDE 9 MG/ML
250 INJECTION, SOLUTION INTRAVENOUS AS NEEDED
Status: DISCONTINUED | OUTPATIENT
Start: 2021-06-19 | End: 2021-06-22

## 2021-06-19 RX ADMIN — SODIUM CHLORIDE 1000 ML: 900 INJECTION, SOLUTION INTRAVENOUS at 17:35

## 2021-06-19 RX ADMIN — PIPERACILLIN AND TAZOBACTAM 3.38 G: 3; .375 INJECTION, POWDER, LYOPHILIZED, FOR SOLUTION INTRAVENOUS at 18:16

## 2021-06-19 RX ADMIN — SODIUM CHLORIDE 1000 ML: 9 INJECTION, SOLUTION INTRAVENOUS at 17:28

## 2021-06-19 NOTE — ED NOTES
Pt arrives from home via squad reporting a fall aprox at midnight. Pt was too weak to get herself up. Pt called squad this afternoon. Pt arrives to ED weak and moves limbs with difficulty. Pt denies acute pain. Pt reports a cardiac hx of STEMI in 2015 and a current dx of mass in abd: Pt says she is being seen by Alena Chi MD.  Pt denies hitting her head in this fall.

## 2021-06-19 NOTE — ED PROVIDER NOTES
EMERGENCY DEPARTMENT HISTORY AND PHYSICAL EXAM      Date: 6/19/2021  Patient Name: Sathya Hewitt  Patient Age and Sex: 78 y.o. female     History of Presenting Illness     Chief Complaint   Patient presents with    Fall     last night about midnight she fell and sat herself on floor all night. she cannot walk. her blood sugar pta is 305. she has a mass in her stomach.  High Blood Sugar       History Provided By: Patient    HPI: Sathya Hewitt is a 72-year-old female with a history of abdominal mass waiting on surgery from Dr. Rosette Medina presenting with ground-level fall. According to patient and EMS, patient had a ground-level fall around midnight yesterday. Has been feeling very fatigued recently as well as tired because she did not get any sleep yesterday. Yesterday her  wanted to bring her to the hospital but patient had refused. Then today she continued to feel weak and was convinced to come to the ER. Patient states she is just feeling tired and feels like she might be dehydrated. Has chronic abdominal pain secondary to this mass but no new pain. Denies any pain in her extremities, head trauma or loss of consciousness. Has a history of cardiac history but no pain, shortness of breath or headache. There are no other complaints, changes, or physical findings at this time. PCP: Odilia Morris MD    No current facility-administered medications on file prior to encounter. Current Outpatient Medications on File Prior to Encounter   Medication Sig Dispense Refill    multivitamin, tx-iron-ca-min (THERA-M w/ IRON) 9 mg iron-400 mcg tab tablet Take 1 Tab by mouth two (2) times a day. Centrum silver      pantoprazole (PROTONIX) 40 mg tablet       glipiZIDE (GLUCOTROL) 5 mg tablet Take 1 Tab by mouth two (2) times a day. 60 Tab 5    lancets misc PUSH BUTTON LANCETS. Check blood sugar 4 times per day due to hypoglycemic events.   Dx:E11.42, E16.2 400 Each 3    amLODIPine (NORVASC) 10 mg tablet Take 1 Tab by mouth daily. 30 Tab 0    food supplemt, lactose-reduced (Ensure Enlive) 0.08 gram-1.5 kcal/mL liqd Take 6 x daily as recommended by Surgery to improve nutritional status for Possible surgery in future (Patient taking differently: Take 6 x daily as recommended by Surgery to improve nutritional status for Possible surgery in future. takes about 3) 180 Bottle 1    DULoxetine (CYMBALTA) 60 mg capsule Take 1 Cap by mouth daily. 60 Cap 0    losartan (COZAAR) 100 mg tablet Take 1 Tab by mouth daily. 60 Tab 0    carvediloL (COREG) 12.5 mg tablet Take 1 Tab by mouth two (2) times daily (with meals). 60 Tab 0    torsemide (DEMADEX) 20 mg tablet Take 40 mg by mouth daily.  atorvastatin (LIPITOR) 40 mg tablet Take 1 Tab by mouth nightly. 80 Tab 3       Past History     Past Medical History:  Past Medical History:   Diagnosis Date    Anemia, unspecified 10/6/2020    Arrhythmia     Arthritis     CAD (coronary artery disease)     Cancer ([address]     Chronic bronchitis ([address]     per pt:  as of 1/9/15 pt denies any ARENAS or SOB    Diabetes ([address] Dx approx 2009    Dr Alo Wynne (in [facility])    GERD (gastroesophageal reflux disease)     Hypercholesteremia     Hypertension        Past Surgical History:  Past Surgical History:   Procedure Laterality Date    HX CHOLECYSTECTOMY  6/12    HX COLONOSCOPY      HX CORONARY STENT PLACEMENT  8/25/2015    HX DILATION AND CURETTAGE      PA CARDIAC SURG PROCEDURE UNLIST  2015    UPPER GI ENDOSCOPY,BIOPSY  10/6/2020            Family History:  Family History   Problem Relation Age of Onset    Diabetes Mother     Heart Disease Mother     Hypertension Mother     Stroke Father     Heart Disease Brother     Heart Disease Brother     Stroke Brother     HIV/AIDS Brother        Social History:  Social History     Tobacco Use    Smoking status: Former Smoker    Smokeless tobacco: Never Used   Substance Use Topics    Alcohol use: No    Drug use:  No Allergies: Allergies   Allergen Reactions    Metformin Other (comments)     GI side effects. Not a true allergy         Review of Systems   Review of Systems   Constitutional: Positive for fatigue. Negative for chills and fever. Respiratory: Negative for cough and shortness of breath. Cardiovascular: Negative for chest pain. Gastrointestinal: Positive for abdominal pain (chronic). Negative for constipation, diarrhea, nausea and vomiting. Genitourinary: Negative for dysuria, frequency and hematuria. Neurological: Positive for weakness (generallized). Negative for numbness. All other systems reviewed and are negative. Physical Exam   Physical Exam  Vitals and nursing note reviewed. Constitutional:       Appearance: She is well-developed. HENT:      Head: Normocephalic and atraumatic. Nose: Nose normal.      Mouth/Throat:      Mouth: Mucous membranes are dry. Eyes:      Extraocular Movements: Extraocular movements intact. Conjunctiva/sclera: Conjunctivae normal.   Cardiovascular:      Rate and Rhythm: Normal rate and regular rhythm. Pulmonary:      Effort: Pulmonary effort is normal. No respiratory distress. Breath sounds: Normal breath sounds. Abdominal:      General: There is no distension. Palpations: Abdomen is soft. Tenderness: There is no abdominal tenderness. Musculoskeletal:         General: Normal range of motion. Cervical back: Normal range of motion and neck supple. Comments: Patient has 4-5 strength when bending her legs passively. 5 out of 5 strength in her upper extremities. Dry mucous membranes. Skin:     General: Skin is warm and dry. Neurological:      General: No focal deficit present. Mental Status: She is alert and oriented to person, place, and time. Mental status is at baseline.    Psychiatric:         Mood and Affect: Mood normal.          Diagnostic Study Results     Labs -     Recent Results (from the past 12 hour(s))   GLUCOSE, POC    Collection Time: 06/19/21  4:16 PM   Result Value Ref Range    Glucose (POC) 213 (H) 65 - 117 mg/dL    Performed by Luis M LARSON    CBC WITH AUTOMATED DIFF    Collection Time: 06/19/21  4:54 PM   Result Value Ref Range    WBC 21.8 (H) 3.6 - 11.0 K/uL    RBC 2.64 (L) 3.80 - 5.20 M/uL    HGB 4.7 (LL) 11.5 - 16.0 g/dL    HCT 17.8 (LL) 35.0 - 47.0 %    MCV 67.4 (L) 80.0 - 99.0 FL    MCH 17.8 (L) 26.0 - 34.0 PG    MCHC 26.4 (L) 30.0 - 36.5 g/dL    RDW 19.6 (H) 11.5 - 14.5 %    PLATELET 662 049 - 605 K/uL    NRBC 5.0 (H) 0  WBC    ABSOLUTE NRBC 1.09 (H) 0.00 - 0.01 K/uL    NEUTROPHILS 84 (H) 32 - 75 %    LYMPHOCYTES 9 (L) 12 - 49 %    MONOCYTES 6 5 - 13 %    EOSINOPHILS 0 0 - 7 %    BASOPHILS 0 0 - 1 %    IMMATURE GRANULOCYTES 1 (H) 0.0 - 0.5 %    ABS. NEUTROPHILS 18.3 (H) 1.8 - 8.0 K/UL    ABS. LYMPHOCYTES 2.0 0.8 - 3.5 K/UL    ABS. MONOCYTES 1.3 (H) 0.0 - 1.0 K/UL    ABS. EOSINOPHILS 0.0 0.0 - 0.4 K/UL    ABS. BASOPHILS 0.0 0.0 - 0.1 K/UL    ABS. IMM. GRANS. 0.2 (H) 0.00 - 0.04 K/UL    DF SMEAR SCANNED      RBC COMMENTS POLYCHROMASIA  PRESENT        RBC COMMENTS MICROCYTOSIS  2+        RBC COMMENTS ANISOCYTOSIS  1+        RBC COMMENTS POIKILOCYTOSIS  PRESENT        RBC COMMENTS HYPOCHROMIA  3+        RBC COMMENTS OVALOCYTES  PRESENT       METABOLIC PANEL, COMPREHENSIVE    Collection Time: 06/19/21  4:54 PM   Result Value Ref Range    Sodium 133 (L) 136 - 145 mmol/L    Potassium 5.0 3.5 - 5.1 mmol/L    Chloride 102 97 - 108 mmol/L    CO2 22 21 - 32 mmol/L    Anion gap 9 5 - 15 mmol/L    Glucose 180 (H) 65 - 100 mg/dL    BUN 81 (H) 6 - 20 MG/DL    Creatinine 2.44 (H) 0.55 - 1.02 MG/DL    BUN/Creatinine ratio 33 (H) 12 - 20      GFR est AA 23 (L) >60 ml/min/1.73m2    GFR est non-AA 19 (L) >60 ml/min/1.73m2    Calcium 8.0 (L) 8.5 - 10.1 MG/DL    Bilirubin, total 0.5 0.2 - 1.0 MG/DL    ALT (SGPT) 104 (H) 12 - 78 U/L    AST (SGOT) 87 (H) 15 - 37 U/L    Alk.  phosphatase 153 (H) 45 - 117 U/L    Protein, total 7.0 6.4 - 8.2 g/dL    Albumin 2.3 (L) 3.5 - 5.0 g/dL    Globulin 4.7 (H) 2.0 - 4.0 g/dL    A-G Ratio 0.5 (L) 1.1 - 2.2     TROPONIN I    Collection Time: 06/19/21  4:54 PM   Result Value Ref Range    Troponin-I, Qt. 4.50 (H) <0.05 ng/mL   EKG, 12 LEAD, INITIAL    Collection Time: 06/19/21  4:54 PM   Result Value Ref Range    Ventricular Rate 66 BPM    Atrial Rate 66 BPM    P-R Interval 120 ms    QRS Duration 96 ms    Q-T Interval 424 ms    QTC Calculation (Bezet) 444 ms    Calculated P Axis 3 degrees    Calculated R Axis -9 degrees    Calculated T Axis -157 degrees    Diagnosis       Normal sinus rhythm  Left ventricular hypertrophy with repolarization abnormality  When compared with ECG of 12-NOV-2020 16:57,  T wave inversion less evident in Inferior leads     TYPE & SCREEN    Collection Time: 06/19/21  5:36 PM   Result Value Ref Range    Crossmatch Expiration 06/22/2021,2359     ABO/Rh(D) A POSITIVE     Antibody screen NEG     Unit number [id number]     Blood component type [facility]     Unit division 00     Status of unit ALLOCATED     Crossmatch result Compatible     Unit number [id number]     Blood component type [facility]     Unit division 00     Status of unit ALLOCATED     Crossmatch result Compatible     Unit number [id number]     Blood component type [facility]     Unit division 00     Status of unit ISSUED     Crossmatch result Compatible    OCCULT BLOOD, STOOL    Collection Time: 06/19/21  5:36 PM   Result Value Ref Range    Occult blood, stool Positive (A) NEG     BLOOD GAS,CHEM8,LACTIC ACID POC    Collection Time: 06/19/21  7:58 PM   Result Value Ref Range    Calcium, ionized (POC) 1.05 (L) 1.12 - 1.32 mmol/L    BICARBONATE 20 mmol/L    Base deficit (POC) 5.4 mmol/L    Sample source VENOUS BLOOD      CO2, POC 20 19 - 24 MMOL/L    Sodium,  136 - 145 MMOL/L    Potassium, POC 4.6 3.5 - 5.5 MMOL/L    Chloride,  100 - 108 MMOL/L    Glucose,  (H) 74 - 106 MG/DL Creatinine, POC 2.5 (H) 0.6 - 1.3 MG/DL    Lactic Acid (POC) 1.58 0.40 - 2.00 mmol/L    pH, venous (POC) 7.36 7.32 - 7.42      pCO2, venous (POC) 34.8 (L) 41 - 51 MMHG    pO2, venous (POC) 13 (L) 25 - 40 mmHg   TROPONIN I    Collection Time: 06/19/21  8:38 PM   Result Value Ref Range    Troponin-I, Qt. 3.81 (H) <0.05 ng/mL       Radiologic Studies -   XR CHEST PORT   Final Result   Mild-moderate cardiac contour enlargement. No acute pulmonary   findings. CT ABD PELV WO CONT   Final Result      1. Gastric mass again demonstrated. 2. Interval demonstration of hepatic metastatic disease. CT Results  (Last 48 hours)               06/19/21 1834  CT ABD PELV WO CONT Final result    Impression:      1. Gastric mass again demonstrated. 2. Interval demonstration of hepatic metastatic disease. Narrative:  EXAM: CT ABD PELV WO CONT       INDICATION: abd mass with low hgb and wbc 21K       COMPARISON: CT 11/12/2020       CONTRAST:  None. TECHNIQUE:    Thin axial images were obtained through the abdomen and pelvis. Coronal and   sagittal reformats were generated. Oral contrast was not administered. CT dose   reduction was achieved through use of a standardized protocol tailored for this   examination and automatic exposure control for dose modulation. The absence of intravenous and oral contrast material reduces the sensitivity   for evaluation of the bowel, vasculature and solid organs. FINDINGS:    LOWER THORAX: No significant abnormality in the incidentally imaged lower chest.   LIVER: Interval demonstration of numerous hepatic masses in all hepatic segments   measuring up to 2 cm. No intrahepatic bile duct dilation is shown. BILIARY TREE: Status post cholecystectomy. CBD is not dilated. SPLEEN: within normal limits. PANCREAS: No focal abnormality. ADRENALS: Unremarkable. KIDNEYS/URETERS: 4 mm nonobstructing calculus of left lower pole again noted.    STOMACH: Fundic gastric mass R 25 cm demonstrated. SMALL BOWEL: No dilatation or wall thickening. COLON: No dilatation or wall thickening. APPENDIX: Normal.   PERITONEUM: No ascites or pneumoperitoneum. RETROPERITONEUM: Atherosclerotic calcifications extensively shown. No aneurysm   or retroperitoneal mass-adenopathy. REPRODUCTIVE ORGANS: Unremarkable. URINARY BLADDER: No mass or calculus. BONES: No destructive bone lesion. ABDOMINAL WALL: Diffuse subcutaneous edema consistent with anasarca. ADDITIONAL COMMENTS: N/A               CXR Results  (Last 48 hours)               06/19/21 2029  XR CHEST PORT Final result    Impression:  Mild-moderate cardiac contour enlargement. No acute pulmonary   findings. Narrative:  EXAM: Portable AP chest x-ray       INDICATION: SIRS       COMPARISON: 11/17/2020       FINDINGS: A portable AP radiograph of the chest was obtained at 1958 hours. There is no pneumothorax or pleural effusion. The lungs are clear. Mild-moderate   cardiac contour enlargement is shown. No mediastinal or hilar enlargement is   evident. Medical Decision Making   I am the first provider for this patient. I reviewed the vital signs, available nursing notes, past medical history, past surgical history, family history and social history. Vital Signs-Reviewed the patient's vital signs. Patient Vitals for the past 12 hrs:   Temp Pulse Resp BP SpO2   06/19/21 2100 -- 60 23 (!) 132/59 99 %   06/19/21 2030 -- 65 21 (!) 133/56 99 %   06/19/21 2029 -- 65 16 -- 98 %   06/19/21 1800 -- 64 24 (!) 127/56 100 %   06/19/21 1759 -- 62 21 -- 100 %   06/19/21 1733 -- 66 23 138/60 --   06/19/21 1723 -- 63 18 (!) 113/47 100 %   06/19/21 1643 -- -- -- -- 99 %   06/19/21 1612 97.8 °F (36.6 °C) 66 16 (!) 137/58 100 %       Records Reviewed: Nursing Notes and Old Medical Records    Provider Notes (Medical Decision Making):   Patient presenting with generalized fatigue.  DDx: infection, anemia, electrolyte anomoly (hypo or hyperkalemia, hypomagnesemia), hypothyroid, dehydration, depression, CA, ACS. Will obtain EKG, UA, labwork for any urgent/emergent pathology. Regarding her ground-level fall, no signs of any trauma to any certain spot that needs imaging. ED Course:   Initial assessment performed. The patients presenting problems have been discussed, and they are in agreement with the care plan formulated and outlined with them. I have encouraged them to ask questions as they arise throughout their visit. ED Course as of Jun 19 2143   Sat Jun 19, 2021   1721 Patient's hemoglobin came back at 4.7. We will give her 3 units of blood. [JS]   7158 In reviewing patient's chart, has been seen actually by me sometime ago for anemia due to chronic disease and concerns for GI bleed. We will do occult stool here to see if she is actively bleeding. Blood pressures are looking okay. Still waiting on the rest of her labs. She will need admission given how much blood she needs. Likely the cause of her fall and fatigue. [JS]   6512 Patient's white count came back at 21,000. Concern for possible sepsis relating to this abdominal mass. Per chart review, patient has a history of a GIST tumor followed by Dr. Janny Claudio with the plans of getting it surgically removed. Her troponin came back at 4.5 however that is always been the case that she has had elevated troponins. Asked to get sepsis labs and will give her Zosyn as well as get a CT of her abdomen. [JS]   9126 I have discussed with the patient the rationale for blood transfusion, its benefits in treating or preventing symptomatic anemia, acute blood loss, which could lead to fatigue, organ damage or death, and its risk which include: mild transfusion reactions, rare risk of blood borne infection, or more serious but rare allergic reactions.  I have discussed the alternatives to transfusion, including the risk and consequences of not receiving transfusion. The patient had an opportunity to ask questions and had agreed to proceed with transfusion of packed red blood cells. [JS]   1492 Patient's lactate elevated at 2.11. Given the white count elevation and concerns for infection related to her abdominal mass, will call her code sepsis. 1 L of normal saline already ordered in addition to Zosyn. [JS]   1951 CT came back negative for any acute infection. The gist tumor appears to be stable. Spoke with Dr. Sophy Mujica, general surgery who would like patient to be admitted to the medicine service. He will reviewed the chart and consult. [JS]      ED Course User Index  [JS] Rahul Eric MD     Critical Care Time:   CRITICAL CARE NOTE :    9:43 PM  IMPENDING DETERIORATION -Respiratory, Cardiovascular, Metabolic, Renal and Hepatic  ASSOCIATED RISK FACTORS - Hypotension, Shock, Bleeding and Dehydration  MANAGEMENT- Bedside Assessment and Supervision of Care  INTERPRETATION -  CT Scan, ECG, Blood Pressure and Cardiac Output Measures   INTERVENTIONS - hemodynamic mngmt and Metobolic interventions  CASE REVIEW - Hospitalist/Intensivist, Medical Sub-Specialist, Nursing and Family  TREATMENT RESPONSE -Stable  PERFORMED BY - Self    NOTES   :    Marcel ESCAMILLA, have spent 100 minutes of critical care time involved in lab review, consultations with specialist, family decision- making, bedside attention and documentation. This time excludes time spent in any separate billed procedures. During this entire length of time I was immediately available to the patient. Disposition:    Admission Note:  Patient is being admitted to the hospital by Dr. Rachael Su, Service: Hospitalist.  The results of their tests and reasons for their admission have been discussed with them and available family. They convey agreement and understanding for the need to be admitted and for their admission diagnosis. Diagnosis     Clinical Impression:   1.  GIST (gastrointestinal stroma tumor), malignant, colon ([facility]    2. Gastrointestinal hemorrhage, unspecified gastrointestinal hemorrhage type    3. NSTEMI (non-ST elevated myocardial infarction) (HCC)        Attestations:    Elif Little M.D. Please note that this dictation was completed with [employer], the computer voice recognition software. Quite often unanticipated grammatical, syntax, homophones, and other interpretive errors are inadvertently transcribed by the computer software. Please disregard these errors. Please excuse any errors that have escaped final proofreading. Thank you.

## 2021-06-20 PROBLEM — D64.9 ACUTE ON CHRONIC ANEMIA: Status: ACTIVE | Noted: 2021-06-20

## 2021-06-20 PROBLEM — D72.829 LEUKOCYTOSIS: Status: ACTIVE | Noted: 2021-06-20

## 2021-06-20 PROBLEM — I21.4 NSTEMI (NON-ST ELEVATED MYOCARDIAL INFARCTION) (HCC): Status: ACTIVE | Noted: 2021-06-20

## 2021-06-20 PROBLEM — C49.A4 GIST (GASTROINTESTINAL STROMA TUMOR), MALIGNANT, COLON (HCC): Status: ACTIVE | Noted: 2021-06-20

## 2021-06-20 PROBLEM — N18.9 ACUTE KIDNEY INJURY SUPERIMPOSED ON CHRONIC KIDNEY DISEASE (HCC): Status: ACTIVE | Noted: 2021-06-20

## 2021-06-20 PROBLEM — N17.9 ACUTE KIDNEY INJURY SUPERIMPOSED ON CHRONIC KIDNEY DISEASE (HCC): Status: ACTIVE | Noted: 2021-06-20

## 2021-06-20 LAB
ATRIAL RATE: 66 BPM
BASE DEFICIT BLD-SCNC: 3.9 MMOL/L
BNP SERPL-MCNC: ABNORMAL PG/ML
CA-I BLD-MCNC: 1.11 MMOL/L (ref 1.12–1.32)
CALCULATED P AXIS, ECG09: 3 DEGREES
CALCULATED R AXIS, ECG10: -9 DEGREES
CALCULATED T AXIS, ECG11: -157 DEGREES
CHLORIDE BLD-SCNC: 102 MMOL/L (ref 100–108)
CO2 BLD-SCNC: 22 MMOL/L (ref 19–24)
CREAT UR-MCNC: 2.6 MG/DL (ref 0.6–1.3)
DIAGNOSIS, 93000: NORMAL
EST. AVERAGE GLUCOSE BLD GHB EST-MCNC: 140 MG/DL
FERRITIN SERPL-MCNC: 27 NG/ML (ref 26–388)
GLUCOSE BLD STRIP.AUTO-MCNC: 111 MG/DL (ref 65–117)
GLUCOSE BLD STRIP.AUTO-MCNC: 155 MG/DL (ref 74–106)
GLUCOSE BLD STRIP.AUTO-MCNC: 249 MG/DL (ref 65–117)
GLUCOSE BLD STRIP.AUTO-MCNC: 89 MG/DL (ref 65–117)
GLUCOSE BLD STRIP.AUTO-MCNC: 99 MG/DL (ref 65–117)
HBA1C MFR BLD: 6.5 % (ref 4–5.6)
HCO3 BLDA-SCNC: 21 MMOL/L
HCT VFR BLD AUTO: 27 % (ref 35–47)
HGB BLD-MCNC: 8.2 G/DL (ref 11.5–16)
IRON SATN MFR SERPL: 4 % (ref 20–50)
IRON SERPL-MCNC: 11 UG/DL (ref 35–150)
LACTATE BLD-SCNC: 2.07 MMOL/L (ref 0.4–2)
P-R INTERVAL, ECG05: 120 MS
PCO2 BLDV: 38.7 MMHG (ref 41–51)
PH BLDV: 7.35 [PH] (ref 7.32–7.42)
PO2 BLDV: <13 MMHG (ref 25–40)
POTASSIUM BLD-SCNC: 4.9 MMOL/L (ref 3.5–5.5)
PROCALCITONIN SERPL-MCNC: 1.31 NG/ML
Q-T INTERVAL, ECG07: 424 MS
QRS DURATION, ECG06: 96 MS
QTC CALCULATION (BEZET), ECG08: 444 MS
SERVICE CMNT-IMP: ABNORMAL
SERVICE CMNT-IMP: ABNORMAL
SERVICE CMNT-IMP: NORMAL
SODIUM BLD-SCNC: 136 MMOL/L (ref 136–145)
SPECIMEN SITE: ABNORMAL
TIBC SERPL-MCNC: 280 UG/DL (ref 250–450)
TROPONIN I SERPL-MCNC: 4 NG/ML
VENTRICULAR RATE, ECG03: 66 BPM

## 2021-06-20 PROCEDURE — 74011000258 HC RX REV CODE- 258: Performed by: HOSPITALIST

## 2021-06-20 PROCEDURE — P9016 RBC LEUKOCYTES REDUCED: HCPCS

## 2021-06-20 PROCEDURE — 36430 TRANSFUSION BLD/BLD COMPNT: CPT

## 2021-06-20 PROCEDURE — 84145 PROCALCITONIN (PCT): CPT

## 2021-06-20 PROCEDURE — 36415 COLL VENOUS BLD VENIPUNCTURE: CPT

## 2021-06-20 PROCEDURE — 85018 HEMOGLOBIN: CPT

## 2021-06-20 PROCEDURE — 74011250637 HC RX REV CODE- 250/637: Performed by: INTERNAL MEDICINE

## 2021-06-20 PROCEDURE — 74011250636 HC RX REV CODE- 250/636: Performed by: HOSPITALIST

## 2021-06-20 PROCEDURE — 2709999900 HC NON-CHARGEABLE SUPPLY

## 2021-06-20 PROCEDURE — C9113 INJ PANTOPRAZOLE SODIUM, VIA: HCPCS | Performed by: HOSPITALIST

## 2021-06-20 PROCEDURE — 74011000250 HC RX REV CODE- 250: Performed by: HOSPITALIST

## 2021-06-20 PROCEDURE — 74011250636 HC RX REV CODE- 250/636: Performed by: EMERGENCY MEDICINE

## 2021-06-20 PROCEDURE — 65660000000 HC RM CCU STEPDOWN

## 2021-06-20 PROCEDURE — 83036 HEMOGLOBIN GLYCOSYLATED A1C: CPT

## 2021-06-20 PROCEDURE — 99222 1ST HOSP IP/OBS MODERATE 55: CPT | Performed by: SURGERY

## 2021-06-20 PROCEDURE — 82962 GLUCOSE BLOOD TEST: CPT

## 2021-06-20 PROCEDURE — 84484 ASSAY OF TROPONIN QUANT: CPT

## 2021-06-20 RX ORDER — MAGNESIUM SULFATE 100 %
4 CRYSTALS MISCELLANEOUS AS NEEDED
Status: DISCONTINUED | OUTPATIENT
Start: 2021-06-20 | End: 2021-06-22

## 2021-06-20 RX ORDER — ACETAMINOPHEN 325 MG/1
650 TABLET ORAL
Status: DISCONTINUED | OUTPATIENT
Start: 2021-06-20 | End: 2021-06-22 | Stop reason: ALTCHOICE

## 2021-06-20 RX ORDER — ONDANSETRON 4 MG/1
4 TABLET, ORALLY DISINTEGRATING ORAL
Status: DISCONTINUED | OUTPATIENT
Start: 2021-06-20 | End: 2021-07-01 | Stop reason: HOSPADM

## 2021-06-20 RX ORDER — DEXTROSE 50 % IN WATER (D50W) INTRAVENOUS SYRINGE
12.5-25 AS NEEDED
Status: DISCONTINUED | OUTPATIENT
Start: 2021-06-20 | End: 2021-07-01 | Stop reason: HOSPADM

## 2021-06-20 RX ORDER — DULOXETIN HYDROCHLORIDE 30 MG/1
60 CAPSULE, DELAYED RELEASE ORAL DAILY
Status: DISCONTINUED | OUTPATIENT
Start: 2021-06-20 | End: 2021-07-01 | Stop reason: HOSPADM

## 2021-06-20 RX ORDER — AMLODIPINE BESYLATE 5 MG/1
10 TABLET ORAL DAILY
Status: DISCONTINUED | OUTPATIENT
Start: 2021-06-20 | End: 2021-07-01 | Stop reason: HOSPADM

## 2021-06-20 RX ORDER — SODIUM CHLORIDE 0.9 % (FLUSH) 0.9 %
5-40 SYRINGE (ML) INJECTION AS NEEDED
Status: DISCONTINUED | OUTPATIENT
Start: 2021-06-20 | End: 2021-07-01 | Stop reason: HOSPADM

## 2021-06-20 RX ORDER — INSULIN LISPRO 100 [IU]/ML
INJECTION, SOLUTION INTRAVENOUS; SUBCUTANEOUS
Status: DISCONTINUED | OUTPATIENT
Start: 2021-06-20 | End: 2021-06-22

## 2021-06-20 RX ORDER — ATORVASTATIN CALCIUM 40 MG/1
40 TABLET, FILM COATED ORAL
Status: DISCONTINUED | OUTPATIENT
Start: 2021-06-20 | End: 2021-07-01 | Stop reason: HOSPADM

## 2021-06-20 RX ORDER — ONDANSETRON 2 MG/ML
4 INJECTION INTRAMUSCULAR; INTRAVENOUS
Status: DISCONTINUED | OUTPATIENT
Start: 2021-06-20 | End: 2021-07-01 | Stop reason: HOSPADM

## 2021-06-20 RX ORDER — POLYETHYLENE GLYCOL 3350 17 G/17G
17 POWDER, FOR SOLUTION ORAL DAILY PRN
Status: DISCONTINUED | OUTPATIENT
Start: 2021-06-20 | End: 2021-07-01 | Stop reason: HOSPADM

## 2021-06-20 RX ORDER — ACETAMINOPHEN 650 MG/1
650 SUPPOSITORY RECTAL
Status: DISCONTINUED | OUTPATIENT
Start: 2021-06-20 | End: 2021-07-01 | Stop reason: HOSPADM

## 2021-06-20 RX ORDER — SODIUM CHLORIDE 0.9 % (FLUSH) 0.9 %
5-40 SYRINGE (ML) INJECTION EVERY 8 HOURS
Status: DISCONTINUED | OUTPATIENT
Start: 2021-06-20 | End: 2021-07-01 | Stop reason: HOSPADM

## 2021-06-20 RX ADMIN — SODIUM CHLORIDE 10 ML: 9 INJECTION, SOLUTION INTRAMUSCULAR; INTRAVENOUS; SUBCUTANEOUS at 09:36

## 2021-06-20 RX ADMIN — AMLODIPINE BESYLATE 10 MG: 5 TABLET ORAL at 09:32

## 2021-06-20 RX ADMIN — SODIUM CHLORIDE 10 ML: 9 INJECTION, SOLUTION INTRAMUSCULAR; INTRAVENOUS; SUBCUTANEOUS at 13:27

## 2021-06-20 RX ADMIN — PIPERACILLIN AND TAZOBACTAM 3.38 G: 3; .375 INJECTION, POWDER, LYOPHILIZED, FOR SOLUTION INTRAVENOUS at 13:24

## 2021-06-20 RX ADMIN — SODIUM CHLORIDE 250 ML: 9 INJECTION, SOLUTION INTRAVENOUS at 08:34

## 2021-06-20 RX ADMIN — DULOXETINE 60 MG: 30 CAPSULE, DELAYED RELEASE ORAL at 09:32

## 2021-06-20 RX ADMIN — SODIUM CHLORIDE 40 MG: 9 INJECTION INTRAMUSCULAR; INTRAVENOUS; SUBCUTANEOUS at 09:32

## 2021-06-20 NOTE — H&P
Hospitalist Admission Note    NAME: Hortencia Hassan   :  1945   MRN:  [id number]     Date/Time:  2021 12:07 AM    Patient PCP: Roly Deluca MD  _____________________________________________________________________  Given the patient's current clinical presentation, I have a high level of concern for decompensation if discharged from the emergency department. Complex decision making was performed, which includes reviewing the patient's available past medical records, laboratory results, and x-ray films. My assessment of this patient's clinical condition and my plan of care is as follows. Assessment / Plan:  Patient presented with generalized fatigue and found to have GI bleed and severe anemia. She is hemodynamically stable. Hgb is 4.7 and was 5.7 3 months ago. 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 Chest x-ray Mild-moderate cardiac contour enlargement. No acute pulmonary findings. CT Abd:1. Gastric mass again demonstrated. 2. Interval demonstration of hepatic metastatic disease. Seen by general surgery Dariusz Olivera MD and per his note  Patient has been seen By Dr Diya Hernandez but surgery has been delayed due to poor functional status and waiting on cardiac clearance. CT shows the know GE junction GIST and interval development of multiple liver masses. UGI from  10/2020 shows a large mass in the fundus that encroaches the GE junction. Review of endoscopy reports suggest the mass is just below the GE junction but there are no photos to get an idea as to how much normal tissue is at the GE junction. Ideally the best approach would be a wedge resection leaving a tube on normal stomach along the lesser curve. However, I suspect she will require resection with esophagogastric anastomosis. I do not think there will be adequate normal tissue at the GE junction. Patient is not in any shape for urgent surgery at this time. It appears the anemia is chronic. Recommend medical admission. Transfuse to Hgb of 7.0. Cardiology consult. Once she is more stable we can address palliative resection to prevent bleeding. Admit to telemetry, n.p.o., closely monitor H&H and transfuse to keep hemoglobin more than 7, cardiology [place] cardiovascular specialist consulted, surgery on board will follow up their input, resume home medication for other medical problem once patient is okay to take p.o., will order 2D echocardiogram, will have her history of diabetes mellitus we will check A1c level and put on sliding scale for now, will hold IV fluid further secondary to her history of fluid overload torsemide at home,Gastroenterologist Mookie Chappell MD consulted      Other past medical problems  Hypertension  Hypercholesterolemia  CAD  Diabetes type 2 insulin-dependent  Obesity: ?NANY. Code Status: Full code  Surrogate Decision Maker:  Cindi Standard [phone]       DVT Prophylaxis: SCD secondary to GI bleed  GI Prophylaxis: not indicated    Baseline:           Subjective:   CHIEF COMPLAINT: Generalized weakness, ground-level mechanical fall/hyperglycemia  Last night about midnight she fell and sat herself on floor all night. she cannot walk. her blood sugar pta is 305.     HISTORY OF PRESENT ILLNESS:      Marzena Prakash is a 81-year-old female with a history of abdominal mass waiting on surgery from Dr. Orin Borjas presenting with ground-level fall. According to patient and EMS, patient had a ground-level fall around midnight yesterday. Has been feeling very fatigued recently as well as tired because she did not get any sleep yesterday. Yesterday her  wanted to bring her to the hospital but patient had refused. Then today she continued to feel weak and was convinced to come to the ER. Patient states she is just feeling tired and feels like she might be dehydrated. Has chronic abdominal pain secondary to this mass but no new pain. Denies any pain in her extremities, head trauma or loss of consciousness. Has a history of cardiac history but no pain, shortness of breath or headache. There are no other complaints, changes, or physical findings at this time. PCP: Jacqueline Oneal MD       We were asked to admit for work up and evaluation of the above problems. Vital Signs-Reviewed the patient's vital signs.   Patient Vitals for the past 12 hrs:    Temp Pulse Resp BP SpO2   06/19/21 2100 -- 60 23 (!) 132/59 99 %   06/19/21 2030 -- 65 21 (!) 133/56 99 %   06/19/21 2029 -- 65 16 -- 98 %   06/19/21 1800 -- 64 24 (!) 127/56 100 %   06/19/21 1759 -- 62 21 -- 100 %   06/19/21 1733 -- 66 23 138/60 --   06/19/21 1723 -- 63 18 (!) 113/47 100 %   06/19/21 1643 -- -- -- -- 99 %   06/19/21 1612 97.8 °F (36.6 °C) 66 16 (!) 137/58 100 %              Past Medical History:   Diagnosis Date    Anemia, unspecified 10/6/2020    Arrhythmia     Arthritis     CAD (coronary artery disease)     Cancer (HCC)     Chronic bronchitis ([address]     per pt:  as of 1/9/15 pt denies any ARENAS or SOB    Diabetes ([address] Dx approx 2009    Dr Ella Todd (in [facility])    GERD (gastroesophageal reflux disease)     Hypercholesteremia     Hypertension         Past Surgical History:   Procedure Laterality Date    HX CHOLECYSTECTOMY  6/12    HX COLONOSCOPY      HX CORONARY STENT PLACEMENT  8/25/2015    HX DILATION AND CURETTAGE      AR CARDIAC SURG PROCEDURE UNLIST  2015    UPPER GI ENDOSCOPY,BIOPSY  10/6/2020            Social History     Tobacco Use    Smoking status: Former Smoker    Smokeless tobacco: Never Used   Substance Use Topics    Alcohol use: No        Family History   Problem Relation Age of Onset    Diabetes Mother     Heart Disease Mother     Hypertension Mother     Stroke Father     Heart Disease Brother     Heart Disease Brother     Stroke Brother     HIV/AIDS Brother      Allergies   Allergen Reactions    Metformin Other (comments)     GI side effects. Not a true allergy        Prior to Admission medications    Medication Sig Start Date End Date Taking? Authorizing Provider   multivitamin, tx-iron-ca-min (THERA-M w/ IRON) 9 mg iron-400 mcg tab tablet Take 1 Tab by mouth two (2) times a day. Centrum silver    Provider, Historical   pantoprazole (PROTONIX) 40 mg tablet  1/13/21   Provider, Historical   glipiZIDE (GLUCOTROL) 5 mg tablet Take 1 Tab by mouth two (2) times a day. 1/21/21   Ivette Martínez MD   lancets misc PUSH BUTTON LANCETS. Check blood sugar 4 times per day due to hypoglycemic events. Dx:E11.42, E16.2 12/17/20   Ivette Martínez MD   amLODIPine (NORVASC) 10 mg tablet Take 1 Tab by mouth daily. 11/20/20   Monica Jon MD   food supplemt, lactose-reduced (Ensure Enlive) 0.08 gram-1.5 kcal/mL liqd Take 6 x daily as recommended by Surgery to improve nutritional status for Possible surgery in future  Patient taking differently: Take 6 x daily as recommended by Surgery to improve nutritional status for Possible surgery in future. takes about 3 11/20/20   Monica Jon MD   DULoxetine (CYMBALTA) 60 mg capsule Take 1 Cap by mouth daily. 10/14/20   Lalo Wu NP   losartan (COZAAR) 100 mg tablet Take 1 Tab by mouth daily. 10/14/20   Lalo Wu NP   carvediloL (COREG) 12.5 mg tablet Take 1 Tab by mouth two (2) times daily (with meals).  10/13/20   Lalo Wu NP torsemide (DEMADEX) 20 mg tablet Take 40 mg by mouth daily. Provider, Historical   atorvastatin (LIPITOR) 40 mg tablet Take 1 Tab by mouth nightly. 1/27/20   Isreal Mohr MD       REVIEW OF SYSTEMS:     I am not able to complete the review of systems because: The patient is intubated and sedated    The patient has altered mental status due to his acute medical problems    The patient has baseline aphasia from prior stroke(s)    The patient has baseline dementia and is not reliable historian    The patient is in acute medical distress and unable to provide information         Constitutional: Positive for fatigue. Negative for chills and fever. Respiratory: Negative for cough and shortness of breath. Cardiovascular: Negative for chest pain. Gastrointestinal: Positive for abdominal pain (chronic). Negative for constipation, diarrhea, nausea and vomiting. Genitourinary: Negative for dysuria, frequency and hematuria. Neurological: Positive for weakness (generallized). Negative for numbness. All other systems reviewed and are negative. Objective:   VITALS:    Visit Vitals  /61   Pulse 65   Temp 98.2 °F (36.8 °C)   Resp 18   Ht 5' 2\" (1.575 m)   Wt 78.9 kg (174 lb)   SpO2 100%   BMI 31.83 kg/m²       PHYSICAL EXAM:    Constitutional:       Appearance: She is well-developed. HENT:      Head: Normocephalic and atraumatic. Nose: Nose normal.      Mouth/Throat:      Mouth: Mucous membranes are dry. Eyes:      Extraocular Movements: Extraocular movements intact. Conjunctiva/sclera: Conjunctivae normal.   Cardiovascular:      Rate and Rhythm: Normal rate and regular rhythm. Pulmonary:      Effort: Pulmonary effort is normal. No respiratory distress. Breath sounds: Normal breath sounds. Abdominal:      General: There is no distension. Palpations: Abdomen is soft. Tenderness: There is no abdominal tenderness.    Musculoskeletal:         General: Normal range of motion. Cervical back: Normal range of motion and neck supple. Comments: Patient has 4-5 strength when bending her legs passively. 5 out of 5 strength in her upper extremities. Dry mucous membranes. Skin:     General: Skin is warm and dry. Neurological:      General: No focal deficit present. Mental Status: She is alert and oriented to person, place, and time. Mental status is at baseline. Psychiatric:         Mood and Affect: Mood normal.       _______________________________________________________________________  Care Plan discussed with:    Comments   Patient y    Family      RN y    Care Manager                    Consultant:  geovany Jensen   _______________________________________________________________________  Expected  Disposition:   Home with Family x   HH/PT/OT/RN    SNF/LTC    ROSE    ________________________________________________________________________  TOTAL TIME: 72   Minutes    Critical Care Provided     Minutes non procedure based      Comments    x Reviewed previous records   >50% of visit spent in counseling and coordination of care x Discussion with patient and/or family and questions answered       Given the patient's current clinical presentation, I have a high level of concern for decompensation if discharged from the ED. Complex decision making was performed which includes reviewing the patient's available past medical records, laboratory results, and Xray films. I have also directly communicated my plan and discussed this case with the involved ED physician.     ____________________________________________________________________  Eliana Sahu MD    Procedures: see electronic medical records for all procedures/Xrays and details which were not copied into this note but were reviewed prior to creation of Plan.     LAB DATA REVIEWED:    Recent Results (from the past 24 hour(s))   GLUCOSE, POC    Collection Time: 06/19/21  4:16 PM   Result Value Ref Range    Glucose (POC) 213 (H) 65 - 117 mg/dL    Performed by Eduarda LARSON    CBC WITH AUTOMATED DIFF    Collection Time: 06/19/21  4:54 PM   Result Value Ref Range    WBC 21.8 (H) 3.6 - 11.0 K/uL    RBC 2.64 (L) 3.80 - 5.20 M/uL    HGB 4.7 (LL) 11.5 - 16.0 g/dL    HCT 17.8 (LL) 35.0 - 47.0 %    MCV 67.4 (L) 80.0 - 99.0 FL    MCH 17.8 (L) 26.0 - 34.0 PG    MCHC 26.4 (L) 30.0 - 36.5 g/dL    RDW 19.6 (H) 11.5 - 14.5 %    PLATELET 876 351 - 445 K/uL    NRBC 5.0 (H) 0  WBC    ABSOLUTE NRBC 1.09 (H) 0.00 - 0.01 K/uL    NEUTROPHILS 84 (H) 32 - 75 %    LYMPHOCYTES 9 (L) 12 - 49 %    MONOCYTES 6 5 - 13 %    EOSINOPHILS 0 0 - 7 %    BASOPHILS 0 0 - 1 %    IMMATURE GRANULOCYTES 1 (H) 0.0 - 0.5 %    ABS. NEUTROPHILS 18.3 (H) 1.8 - 8.0 K/UL    ABS. LYMPHOCYTES 2.0 0.8 - 3.5 K/UL    ABS. MONOCYTES 1.3 (H) 0.0 - 1.0 K/UL    ABS. EOSINOPHILS 0.0 0.0 - 0.4 K/UL    ABS. BASOPHILS 0.0 0.0 - 0.1 K/UL    ABS. IMM. GRANS. 0.2 (H) 0.00 - 0.04 K/UL    DF SMEAR SCANNED      RBC COMMENTS POLYCHROMASIA  PRESENT        RBC COMMENTS MICROCYTOSIS  2+        RBC COMMENTS ANISOCYTOSIS  1+        RBC COMMENTS POIKILOCYTOSIS  PRESENT        RBC COMMENTS HYPOCHROMIA  3+        RBC COMMENTS OVALOCYTES  PRESENT       METABOLIC PANEL, COMPREHENSIVE    Collection Time: 06/19/21  4:54 PM   Result Value Ref Range    Sodium 133 (L) 136 - 145 mmol/L    Potassium 5.0 3.5 - 5.1 mmol/L    Chloride 102 97 - 108 mmol/L    CO2 22 21 - 32 mmol/L    Anion gap 9 5 - 15 mmol/L    Glucose 180 (H) 65 - 100 mg/dL    BUN 81 (H) 6 - 20 MG/DL    Creatinine 2.44 (H) 0.55 - 1.02 MG/DL    BUN/Creatinine ratio 33 (H) 12 - 20      GFR est AA 23 (L) >60 ml/min/1.73m2    GFR est non-AA 19 (L) >60 ml/min/1.73m2    Calcium 8.0 (L) 8.5 - 10.1 MG/DL    Bilirubin, total 0.5 0.2 - 1.0 MG/DL    ALT (SGPT) 104 (H) 12 - 78 U/L    AST (SGOT) 87 (H) 15 - 37 U/L    Alk.  phosphatase 153 (H) 45 - 117 U/L    Protein, total 7.0 6.4 - 8.2 g/dL    Albumin 2.3 (L) 3.5 - 5.0 g/dL    Globulin 4.7 (H) 2.0 - 4.0 g/dL    A-G Ratio 0.5 (L) 1.1 - 2.2     TROPONIN I    Collection Time: 06/19/21  4:54 PM   Result Value Ref Range    Troponin-I, Qt. 4.50 (H) <0.05 ng/mL   EKG, 12 LEAD, INITIAL    Collection Time: 06/19/21  4:54 PM   Result Value Ref Range    Ventricular Rate 66 BPM    Atrial Rate 66 BPM    P-R Interval 120 ms    QRS Duration 96 ms    Q-T Interval 424 ms    QTC Calculation (Bezet) 444 ms    Calculated P Axis 3 degrees    Calculated R Axis -9 degrees    Calculated T Axis -157 degrees    Diagnosis       Normal sinus rhythm  Left ventricular hypertrophy with repolarization abnormality  When compared with ECG of 12-NOV-2020 16:57,  T wave inversion less evident in Inferior leads     RBC, ALLOCATE    Collection Time: 06/19/21  5:30 PM   Result Value Ref Range    HISTORY CHECKED?  Historical check performed    TYPE & SCREEN    Collection Time: 06/19/21  5:36 PM   Result Value Ref Range    Crossmatch Expiration 06/22/2021,2359     ABO/Rh(D) A POSITIVE     Antibody screen NEG     Unit number [id number]     Blood component type [facility]     Unit division 00     Status of unit ALLOCATED     Crossmatch result Compatible     Unit number [id number]     Blood component type  LR     Unit division 00     Status of unit ALLOCATED     Crossmatch result Compatible     Unit number [id number]     Blood component type [facility]     Unit division 00     Status of unit ISSUED     Crossmatch result Compatible    OCCULT BLOOD, STOOL    Collection Time: 06/19/21  5:36 PM   Result Value Ref Range    Occult blood, stool Positive (A) NEG     BLOOD GAS,CHEM8,LACTIC ACID POC    Collection Time: 06/19/21  7:58 PM   Result Value Ref Range    Calcium, ionized (POC) 1.05 (L) 1.12 - 1.32 mmol/L    BICARBONATE 20 mmol/L    Base deficit (POC) 5.4 mmol/L    Sample source VENOUS BLOOD      CO2, POC 20 19 - 24 MMOL/L    Sodium,  136 - 145 MMOL/L    Potassium, POC 4.6 3.5 - 5.5 MMOL/L    Chloride,  100 - 108 MMOL/L    Glucose,  (H) 74 - 106 MG/DL    Creatinine, POC 2.5 (H) 0.6 - 1.3 MG/DL    Lactic Acid (POC) 1.58 0.40 - 2.00 mmol/L    pH, venous (POC) 7.36 7.32 - 7.42      pCO2, venous (POC) 34.8 (L) 41 - 51 MMHG    pO2, venous (POC) 13 (L) 25 - 40 mmHg   TROPONIN I    Collection Time: 06/19/21  8:38 PM   Result Value Ref Range    Troponin-I, Qt. 3.81 (H) <0.05 ng/mL

## 2021-06-20 NOTE — CONSULTS
GI Consultation Note [facility])    NAME: Ede Aggarwal : 1945 MRN: [id number]   PRIMARY GI: Monique Tamayo MD PCP: Asim Simental MD  Date/Time:  2021 12:34 PM  Subjective:   REASON FOR CONSULT:    Anemia    Claritza Sosa is a 78 y.o.   female who I was asked to see for above. 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 Found to have Hgb of 4.7 gm/dL. Pt denies overt GI bleeding or AP      Past Medical History:   Diagnosis Date    Anemia, unspecified 10/6/2020    Arrhythmia     Arthritis     CAD (coronary artery disease)     Cancer ([facility] [address]     Chronic bronchitis ([facility] [address]     per pt:  as of 1/9/15 pt denies any ARENAS or SOB    Diabetes ([facility] [address] Dx approx     Dr Saima Mann (in connect care)    GERD (gastroesophageal reflux disease)     Hypercholesteremia     Hypertension       Past Surgical History:   Procedure Laterality Date    HX CHOLECYSTECTOMY      HX COLONOSCOPY      HX CORONARY STENT PLACEMENT  2015    HX DILATION AND CURETTAGE      [place] CARDIAC SURG PROCEDURE UNLIST      UPPER GI ENDOSCOPY,BIOPSY  10/6/2020          Social History     Tobacco Use    Smoking status: Former Smoker    Smokeless tobacco: Never Used   Substance Use Topics    Alcohol use: No      Family History   Problem Relation Age of Onset    Diabetes Mother     Heart Disease Mother     Hypertension Mother     Stroke Father     Heart Disease Brother     Heart Disease Brother     Stroke Brother     HIV/AIDS Brother       Allergies   Allergen Reactions    Metformin Other (comments)     GI side effects. Not a true allergy      Home Medications:  Prior to Admission Medications   Prescriptions Last Dose Informant Patient Reported? Taking? DULoxetine (CYMBALTA) 60 mg capsule   No No   Sig: Take 1 Cap by mouth daily. amLODIPine (NORVASC) 10 mg tablet   No No   Sig: Take 1 Tab by mouth daily. atorvastatin (LIPITOR) 40 mg tablet  Self No No   Sig: Take 1 Tab by mouth nightly. carvediloL (COREG) 12.5 mg tablet   No No   Sig: Take 1 Tab by mouth two (2) times daily (with meals). food supplemt, lactose-reduced (Ensure Enlive) 0.08 gram-1.5 kcal/mL liqd   No No   Sig: Take 6 x daily as recommended by Surgery to improve nutritional status for Possible surgery in future   Patient taking differently: Take 6 x daily as recommended by Surgery to improve nutritional status for Possible surgery in future. takes about 3   glipiZIDE (GLUCOTROL) 5 mg tablet   No No   Sig: Take 1 Tab by mouth two (2) times a day. lancets misc   No No   Sig: PUSH BUTTON LANCETS. Check blood sugar 4 times per day due to hypoglycemic events. Dx:E11.42, E16.2   losartan (COZAAR) 100 mg tablet   No No   Sig: Take 1 Tab by mouth daily. multivitamin, tx-iron-ca-min (THERA-M w/ IRON) 9 mg iron-400 mcg tab tablet   Yes No   Sig: Take 1 Tab by mouth two (2) times a day. Centrum silver   pantoprazole (PROTONIX) 40 mg tablet   Yes No   torsemide (DEMADEX) 20 mg tablet  Self Yes No   Sig: Take 40 mg by mouth daily.       Facility-Administered Medications: None     Hospital medications:  Current Facility-Administered Medications   Medication Dose Route Frequency    sodium chloride (NS) flush 5-40 mL  5-40 mL IntraVENous Q8H    sodium chloride (NS) flush 5-40 mL  5-40 mL IntraVENous PRN    acetaminophen (TYLENOL) tablet 650 mg  650 mg Oral Q6H PRN    Or    acetaminophen (TYLENOL) suppository 650 mg  650 mg Rectal Q6H PRN    polyethylene glycol (MIRALAX) packet 17 g  17 g Oral DAILY PRN    ondansetron (ZOFRAN ODT) tablet 4 mg  4 mg Oral Q8H PRN    Or    ondansetron (ZOFRAN) injection 4 mg  4 mg IntraVENous Q6H PRN    pantoprazole (PROTONIX) 40 mg in 0.9% sodium chloride 10 mL injection  40 mg IntraVENous DAILY    insulin lispro (HUMALOG) injection   SubCUTAneous AC&HS    glucose chewable tablet 16 g  4 Tablet Oral PRN    dextrose (D50W) injection syrg 12.5-25 g  12.5-25 g IntraVENous PRN    glucagon (GLUCAGEN) injection 1 mg  1 mg IntraMUSCular PRN    piperacillin-tazobactam (ZOSYN) 3.375 g in 0.9% sodium chloride (MBP/ADV) 100 mL MBP  3.375 g IntraVENous Q12H    DULoxetine (CYMBALTA) capsule 60 mg  60 mg Oral DAILY    atorvastatin (LIPITOR) tablet 40 mg  40 mg Oral QHS    amLODIPine (NORVASC) tablet 10 mg  10 mg Oral DAILY    0.9% sodium chloride infusion 250 mL  250 mL IntraVENous PRN    acetaminophen (TYLENOL) tablet 650 mg  650 mg Oral Q6H PRN     REVIEW OF SYSTEMS:     []     Unable to obtain  ROS due to  []    mental status change  []    sedated   []    intubated   [x]    Total of 11 systems reviewed as follows:  Const:  negative fever, negative chills, negative weight loss  Eyes:   negative diplopia or visual changes, negative eye pain  ENT:   negative coryza, negative sore throat  Resp:   negative cough, hemoptysis, dyspnea  Cards:  negative for chest pain, palpitations, lower extremity edema  :  negative for frequency, dysuria and hematuria  Skin:   negative for rash and pruritus  Heme:  negative for easy bruising and gum/nose bleeding  MS:  negative for myalgias, arthralgias, back pain and muscle weakness  Neurolo:  negative for headaches, dizziness, vertigo, memory problems   Psych:  negative for feelings of anxiety, depression     Pertinent Positives include :    Objective:   VITALS:    Visit Vitals  BP (!) 146/69 (BP 1 Location: Left upper arm, BP Patient Position: At rest)   Pulse 71   Temp 98.6 °F (37 °C)   Resp 20   Ht 5' 2\" (1.575 m)   Wt 78.9 kg (174 lb)   SpO2 100%   BMI 31.83 kg/m²     Temp (24hrs), Av.2 °F (36.8 °C), Min:97.7 °F (36.5 °C), Max:99 °F (37.2 °C)    PHYSICAL EXAM:   General:    Alert, cooperative, no distress, appears stated age. Head:   Normocephalic, without obvious abnormality, atraumatic. Eyes:   Conjunctivae clear, anicteric sclerae. Pupils are equal  Nose:  Nares normal. No drainage or sinus tenderness.   Throat:    Lips, mucosa, and tongue normal.  No Thrush  Neck:  Supple, symmetrical,  no adenopathy, thyroid: non tender  Back:    Symmetric,  No CVA tenderness. Lungs:   CTA bilaterally. No wheezing/rhonchi/rales. Chest wall:  No tenderness or deformity. No Accessory muscle use. Heart:   Regular rate and rhythm,  no murmur, rub or gallop. Abdomen:   Soft, non-tender. Not distended. Bowel sounds normal. No masses  Extremities: Atraumatic, No cyanosis. No edema. No clubbing  Skin:     Texture, turgor normal. No rashes/lesions/jaundice  Lymph: Cervical, supraclavicular normal.  Psych:  Good insight. Not depressed. Not anxious or agitated. Neurologic: EOMs intact. No facial asymmetry. No aphasia or slurred speech. Normal  strength, A/O X 3. LAB DATA REVIEWED:    Recent Results (from the past 48 hour(s))   GLUCOSE, POC    Collection Time: 06/19/21  4:16 PM   Result Value Ref Range    Glucose (POC) 213 (H) 65 - 117 mg/dL    Performed by [employer] EDT    CBC WITH AUTOMATED DIFF    Collection Time: 06/19/21  4:54 PM   Result Value Ref Range    WBC 21.8 (H) 3.6 - 11.0 K/uL    RBC 2.64 (L) 3.80 - 5.20 M/uL    HGB 4.7 (LL) 11.5 - 16.0 g/dL    HCT 17.8 (LL) 35.0 - 47.0 %    MCV 67.4 (L) 80.0 - 99.0 FL    MCH 17.8 (L) 26.0 - 34.0 PG    MCHC 26.4 (L) 30.0 - 36.5 g/dL    RDW 19.6 (H) 11.5 - 14.5 %    PLATELET 716 540 - 234 K/uL    NRBC 5.0 (H) 0  WBC    ABSOLUTE NRBC 1.09 (H) 0.00 - 0.01 K/uL    NEUTROPHILS 84 (H) 32 - 75 %    LYMPHOCYTES 9 (L) 12 - 49 %    MONOCYTES 6 5 - 13 %    EOSINOPHILS 0 0 - 7 %    BASOPHILS 0 0 - 1 %    IMMATURE GRANULOCYTES 1 (H) 0.0 - 0.5 %    ABS. NEUTROPHILS 18.3 (H) 1.8 - 8.0 K/UL    ABS. LYMPHOCYTES 2.0 0.8 - 3.5 K/UL    ABS. MONOCYTES 1.3 (H) 0.0 - 1.0 K/UL    ABS. EOSINOPHILS 0.0 0.0 - 0.4 K/UL    ABS. BASOPHILS 0.0 0.0 - 0.1 K/UL    ABS. IMM.  GRANS. 0.2 (H) 0.00 - 0.04 K/UL    DF SMEAR SCANNED      RBC COMMENTS POLYCHROMASIA  PRESENT        RBC COMMENTS MICROCYTOSIS  2+        RBC COMMENTS ANISOCYTOSIS  1+        RBC COMMENTS POIKILOCYTOSIS  PRESENT        RBC COMMENTS HYPOCHROMIA  3+        RBC COMMENTS OVALOCYTES  PRESENT       METABOLIC PANEL, COMPREHENSIVE    Collection Time: 06/19/21  4:54 PM   Result Value Ref Range    Sodium 133 (L) 136 - 145 mmol/L    Potassium 5.0 3.5 - 5.1 mmol/L    Chloride 102 97 - 108 mmol/L    CO2 22 21 - 32 mmol/L    Anion gap 9 5 - 15 mmol/L    Glucose 180 (H) 65 - 100 mg/dL    BUN 81 (H) 6 - 20 MG/DL    Creatinine 2.44 (H) 0.55 - 1.02 MG/DL    BUN/Creatinine ratio 33 (H) 12 - 20      GFR est AA 23 (L) >60 ml/min/1.73m2    GFR est non-AA 19 (L) >60 ml/min/1.73m2    Calcium 8.0 (L) 8.5 - 10.1 MG/DL    Bilirubin, total 0.5 0.2 - 1.0 MG/DL    ALT (SGPT) 104 (H) 12 - 78 U/L    AST (SGOT) 87 (H) 15 - 37 U/L    Alk. phosphatase 153 (H) 45 - 117 U/L    Protein, total 7.0 6.4 - 8.2 g/dL    Albumin 2.3 (L) 3.5 - 5.0 g/dL    Globulin 4.7 (H) 2.0 - 4.0 g/dL    A-G Ratio 0.5 (L) 1.1 - 2.2     TROPONIN I    Collection Time: 06/19/21  4:54 PM   Result Value Ref Range    Troponin-I, Qt. 4.50 (H) <0.05 ng/mL   EKG, 12 LEAD, INITIAL    Collection Time: 06/19/21  4:54 PM   Result Value Ref Range    Ventricular Rate 66 BPM    Atrial Rate 66 BPM    P-R Interval 120 ms    QRS Duration 96 ms    Q-T Interval 424 ms    QTC Calculation (Bezet) 444 ms    Calculated P Axis 3 degrees    Calculated R Axis -9 degrees    Calculated T Axis -157 degrees    Diagnosis       Normal sinus rhythm  Left ventricular hypertrophy with repolarization abnormality  When compared with ECG of 12-NOV-2020 16:57,  T wave inversion less evident in Inferior leads     RBC, ALLOCATE    Collection Time: 06/19/21  5:30 PM   Result Value Ref Range    HISTORY CHECKED?  Historical check performed    TYPE & SCREEN    Collection Time: 06/19/21  5:36 PM   Result Value Ref Range    Crossmatch Expiration 06/22/2021,2610     ABO/Rh(D) A POSITIVE     Antibody screen NEG     Unit number [id number]     Blood component type  LR     Unit division 00     Status of unit ISSUED     Crossmatch result Compatible     Unit number [id number]     Blood component type  LR     Unit division 00     Status of unit ISSUED     Crossmatch result Compatible     Unit number [id number]     Blood component type  LR     Unit division 00     Status of unit TRANSFUSED     Crossmatch result Compatible    OCCULT BLOOD, STOOL    Collection Time: 06/19/21  5:36 PM   Result Value Ref Range    Occult blood, stool Positive (A) NEG     CULTURE, BLOOD, PAIRED    Collection Time: 06/19/21  6:03 PM    Specimen: Blood   Result Value Ref Range    Special Requests: NO SPECIAL REQUESTS      Culture result: NO GROWTH AFTER 12 HOURS     BLOOD GAS,CHEM8,LACTIC ACID POC    Collection Time: 06/19/21  7:58 PM   Result Value Ref Range    Calcium, ionized (POC) 1.05 (L) 1.12 - 1.32 mmol/L    BICARBONATE 20 mmol/L    Base deficit (POC) 5.4 mmol/L    Sample source VENOUS BLOOD      CO2, POC 20 19 - 24 MMOL/L    Sodium,  136 - 145 MMOL/L    Potassium, POC 4.6 3.5 - 5.5 MMOL/L    Chloride,  100 - 108 MMOL/L    Glucose,  (H) 74 - 106 MG/DL    Creatinine, POC 2.5 (H) 0.6 - 1.3 MG/DL    Lactic Acid (POC) 1.58 0.40 - 2.00 mmol/L    pH, venous (POC) 7.36 7.32 - 7.42      pCO2, venous (POC) 34.8 (L) 41 - 51 MMHG    pO2, venous (POC) 13 (L) 25 - 40 mmHg   TROPONIN I    Collection Time: 06/19/21  8:38 PM   Result Value Ref Range    Troponin-I, Qt. 3.81 (H) <0.05 ng/mL   NT-PRO BNP    Collection Time: 06/19/21  8:38 PM   Result Value Ref Range    NT pro-BNP 15,450 (H) <125 PG/ML   GLUCOSE, POC    Collection Time: 06/20/21  8:55 AM   Result Value Ref Range    Glucose (POC) 89 65 - 117 mg/dL    Performed by Tamiko Roth RN    TROPONIN I    Collection Time: 06/20/21 11:34 AM   Result Value Ref Range    Troponin-I, Qt. 4.00 (H) <0.05 ng/mL   GLUCOSE, POC    Collection Time: 06/20/21 11:49 AM   Result Value Ref Range    Glucose (POC) 99 65 - 117 mg/dL    Performed by Nasim Chan PCT IMAGING RESULTS:   []      I have personally reviewed the actual   []    CXR  []    CT  []     US    Assessment/Plan:      Active Problems:    GI bleed (10/4/2020)      Leukocytosis (6/20/2021)      NSTEMI (non-ST elevated myocardial infarction) ([facility] [address] (6/20/2021)      GIST (gastrointestinal stroma tumor), malignant, colon ([facility] [address] (6/20/2021)      Acute kidney injury superimposed on chronic kidney disease ([facility] [address] (6/20/2021)      Acute on chronic anemia (6/20/2021)    1. Acute on chronic anemia severe anemia  2. Large GE junction GIST, now with metastatic disease  3. NSTEMI  ___________________________________________________  RECOMMENDATIONS:    - transfuse to minimal goal of Hgb >7, may want higher given Cardiac history  - pt clearly intermittently oozing from known large GE junction GIST previously described as ulcerated. No role for repeat endoscopic evaluation.  I personally d/w Dr. Gilbert Noland who agreed with this  - agree with planned surgery    Dr. Chaz Soto to assume GI care tomorrow    ___________________________________________________  Care Plan discussed with:    [x]    Patient   []    Family   []    Nursing   [x]    Attending   ___________________________________________________  GI: Tameka Quintero MD

## 2021-06-20 NOTE — CONSULTS
Consult    Subjective:     Elif Montalvo is a 78 y.o.  female who is being seen for chronic anemia and large GIST at the GE junction. Patient is a poor historian. She states she has been feeling week and had a fall 2 nights ago. She denies bloody emesis and blood in her stool. She does not know much about the plan for her GIST resection. She states she did see cardiology as an outpatient  A few weeks ago and she believes she is ready for surgery. She denies pain and nausea. She is eating a regular diet this morning.       Past Medical History:   Diagnosis Date    Anemia, unspecified 10/6/2020    Arrhythmia     Arthritis     CAD (coronary artery disease)     Cancer ([facility] [address]     Chronic bronchitis ([facility] [address]     per pt:  as of 1/9/15 pt denies any ARENAS or SOB    Diabetes ([facility] [address] Dx approx 2009    Dr Venkat Mistry (in connect care)    GERD (gastroesophageal reflux disease)     Hypercholesteremia     Hypertension       Past Surgical History:   Procedure Laterality Date    HX CHOLECYSTECTOMY  6/12    HX COLONOSCOPY      HX CORONARY STENT PLACEMENT  8/25/2015    HX DILATION AND CURETTAGE      [place] CARDIAC SURG PROCEDURE UNLIST  2015    UPPER GI ENDOSCOPY,BIOPSY  10/6/2020          Family History   Problem Relation Age of Onset    Diabetes Mother     Heart Disease Mother     Hypertension Mother     Stroke Father     Heart Disease Brother     Heart Disease Brother     Stroke Brother     HIV/AIDS Brother       Social History     Tobacco Use    Smoking status: Former Smoker    Smokeless tobacco: Never Used   Substance Use Topics    Alcohol use: No       Current Facility-Administered Medications   Medication Dose Route Frequency Provider Last Rate Last Admin    sodium chloride (NS) flush 5-40 mL  5-40 mL IntraVENous Q8H Elsa Lamar MD   10 mL at 06/20/21 0936    sodium chloride (NS) flush 5-40 mL  5-40 mL IntraVENous PRN Grace Chavez MD        acetaminophen (TYLENOL) tablet 650 mg  650 mg Oral Q6H PRN Azalea Sterling MD        Or    acetaminophen (TYLENOL) suppository 650 mg  650 mg Rectal Q6H PRN Willard, Josi Cramer MD        polyethylene glycol (MIRALAX) packet 17 g  17 g Oral DAILY PRN Willard, Josi Cramer MD        ondansetron (ZOFRAN ODT) tablet 4 mg  4 mg Oral Q8H PRN Willard, Josi Cramer MD        Or    ondansetron (ZOFRAN) injection 4 mg  4 mg IntraVENous Q6H PRN Willard, Josi Cramer MD        pantoprazole (PROTONIX) 40 mg in 0.9% sodium chloride 10 mL injection  40 mg IntraVENous DAILY Willard, Josi Cramer MD   40 mg at 06/20/21 0932    insulin lispro (HUMALOG) injection   SubCUTAneous AC&HS Willard, Josi Cramer MD        glucose chewable tablet 16 g  4 Tablet Oral PRN Willard, Josi Cramer MD        dextrose (D50W) injection syrg 12.5-25 g  12.5-25 g IntraVENous PRN Willard, Josi Cramer MD        glucagon (GLUCAGEN) injection 1 mg  1 mg IntraMUSCular PRN Willard, Josi Cramer MD        piperacillin-tazobactam (ZOSYN) 3.375 g in 0.9% sodium chloride (MBP/ADV) 100 mL MBP  3.375 g IntraVENous Q12H Willard, Josi Cramer MD        DULoxetine (CYMBALTA) capsule 60 mg  60 mg Oral DAILY Dave Cobian MD   60 mg at 06/20/21 0932    atorvastatin (LIPITOR) tablet 40 mg  40 mg Oral QHS Dave Cobian MD        amLODIPine (NORVASC) tablet 10 mg  10 mg Oral DAILY Dave Cobian MD   10 mg at 06/20/21 0932    0.9% sodium chloride infusion 250 mL  250 mL IntraVENous PRN Tiana Denny MD 15 mL/hr at 06/20/21 0834 250 mL at 06/20/21 0834    acetaminophen (TYLENOL) tablet 650 mg  650 mg Oral Q6H PRN Tiana Denny MD            Allergies   Allergen Reactions    Metformin Other (comments)     GI side effects. Not a true allergy        Review of Systems:  A comprehensive review of systems was negative except for that written in the History of Present Illness. Objective: Intake and Output:    No intake/output data recorded.   06/18 1901 - 06/20 0700  In: 1450 [I.V.:1100]  Out: -     Physical Exam:   Visit Vitals  BP (!) 165/81   Pulse 68   Temp 98 °F (36.7 °C) Resp 20   Ht 5' 2\" (1.575 m)   Wt 78.9 kg (174 lb)   SpO2 99%   BMI 31.83 kg/m²     General appearance: alert, cooperative, no distress, appears older than stated age, moderately obese  Eyes: conjunctivae/corneas clear. , EOM's intact. Lungs: clear to auscultation bilaterally  Heart: regular rate and rhythm, S1, S2 normal, no murmur, click, rub or gallop  Abdomen: soft, non-tender. Bowel sounds normal. No masses,  no organomegaly    Data Review:   Recent Results (from the past 24 hour(s))   GLUCOSE, POC    Collection Time: 06/19/21  4:16 PM   Result Value Ref Range    Glucose (POC) 213 (H) 65 - 117 mg/dL    Performed by Manoj Gardner BISI    CBC WITH AUTOMATED DIFF    Collection Time: 06/19/21  4:54 PM   Result Value Ref Range    WBC 21.8 (H) 3.6 - 11.0 K/uL    RBC 2.64 (L) 3.80 - 5.20 M/uL    HGB 4.7 (LL) 11.5 - 16.0 g/dL    HCT 17.8 (LL) 35.0 - 47.0 %    MCV 67.4 (L) 80.0 - 99.0 FL    MCH 17.8 (L) 26.0 - 34.0 PG    MCHC 26.4 (L) 30.0 - 36.5 g/dL    RDW 19.6 (H) 11.5 - 14.5 %    PLATELET 333 438 - 041 K/uL    NRBC 5.0 (H) 0  WBC    ABSOLUTE NRBC 1.09 (H) 0.00 - 0.01 K/uL    NEUTROPHILS 84 (H) 32 - 75 %    LYMPHOCYTES 9 (L) 12 - 49 %    MONOCYTES 6 5 - 13 %    EOSINOPHILS 0 0 - 7 %    BASOPHILS 0 0 - 1 %    IMMATURE GRANULOCYTES 1 (H) 0.0 - 0.5 %    ABS. NEUTROPHILS 18.3 (H) 1.8 - 8.0 K/UL    ABS. LYMPHOCYTES 2.0 0.8 - 3.5 K/UL    ABS. MONOCYTES 1.3 (H) 0.0 - 1.0 K/UL    ABS. EOSINOPHILS 0.0 0.0 - 0.4 K/UL    ABS. BASOPHILS 0.0 0.0 - 0.1 K/UL    ABS. IMM.  GRANS. 0.2 (H) 0.00 - 0.04 K/UL    DF SMEAR SCANNED      RBC COMMENTS POLYCHROMASIA  PRESENT        RBC COMMENTS MICROCYTOSIS  2+        RBC COMMENTS ANISOCYTOSIS  1+        RBC COMMENTS POIKILOCYTOSIS  PRESENT        RBC COMMENTS HYPOCHROMIA  3+        RBC COMMENTS OVALOCYTES  PRESENT       METABOLIC PANEL, COMPREHENSIVE    Collection Time: 06/19/21  4:54 PM   Result Value Ref Range    Sodium 133 (L) 136 - 145 mmol/L    Potassium 5.0 3.5 - 5.1 mmol/L Chloride 102 97 - 108 mmol/L    CO2 22 21 - 32 mmol/L    Anion gap 9 5 - 15 mmol/L    Glucose 180 (H) 65 - 100 mg/dL    BUN 81 (H) 6 - 20 MG/DL    Creatinine 2.44 (H) 0.55 - 1.02 MG/DL    BUN/Creatinine ratio 33 (H) 12 - 20      GFR est AA 23 (L) >60 ml/min/1.73m2    GFR est non-AA 19 (L) >60 ml/min/1.73m2    Calcium 8.0 (L) 8.5 - 10.1 MG/DL    Bilirubin, total 0.5 0.2 - 1.0 MG/DL    ALT (SGPT) 104 (H) 12 - 78 U/L    AST (SGOT) 87 (H) 15 - 37 U/L    Alk. phosphatase 153 (H) 45 - 117 U/L    Protein, total 7.0 6.4 - 8.2 g/dL    Albumin 2.3 (L) 3.5 - 5.0 g/dL    Globulin 4.7 (H) 2.0 - 4.0 g/dL    A-G Ratio 0.5 (L) 1.1 - 2.2     TROPONIN I    Collection Time: 06/19/21  4:54 PM   Result Value Ref Range    Troponin-I, Qt. 4.50 (H) <0.05 ng/mL   EKG, 12 LEAD, INITIAL    Collection Time: 06/19/21  4:54 PM   Result Value Ref Range    Ventricular Rate 66 BPM    Atrial Rate 66 BPM    P-R Interval 120 ms    QRS Duration 96 ms    Q-T Interval 424 ms    QTC Calculation (Bezet) 444 ms    Calculated P Axis 3 degrees    Calculated R Axis -9 degrees    Calculated T Axis -157 degrees    Diagnosis       Normal sinus rhythm  Left ventricular hypertrophy with repolarization abnormality  When compared with ECG of 12-NOV-2020 16:57,  T wave inversion less evident in Inferior leads     RBC, ALLOCATE    Collection Time: 06/19/21  5:30 PM   Result Value Ref Range    HISTORY CHECKED?  Historical check performed    TYPE & SCREEN    Collection Time: 06/19/21  5:36 PM   Result Value Ref Range    Crossmatch Expiration 06/22/2021,2359     ABO/Rh(D) A POSITIVE     Antibody screen NEG     Unit number [id number]     Blood component type [facility]     Unit division 00     Status of unit ISSUED     Crossmatch result Compatible     Unit number [id number]     Blood component type [facility]     Unit division 00     Status of unit ISSUED     Crossmatch result Compatible     Unit number [id number]     Blood component type [facility]     Unit division 00 Status of unit TRANSFUSED     Crossmatch result Compatible    OCCULT BLOOD, STOOL    Collection Time: 06/19/21  5:36 PM   Result Value Ref Range    Occult blood, stool Positive (A) NEG     CULTURE, BLOOD, PAIRED    Collection Time: 06/19/21  6:03 PM    Specimen: Blood   Result Value Ref Range    Special Requests: NO SPECIAL REQUESTS      Culture result: NO GROWTH AFTER 12 HOURS     BLOOD GAS,CHEM8,LACTIC ACID POC    Collection Time: 06/19/21  7:58 PM   Result Value Ref Range    Calcium, ionized (POC) 1.05 (L) 1.12 - 1.32 mmol/L    BICARBONATE 20 mmol/L    Base deficit (POC) 5.4 mmol/L    Sample source VENOUS BLOOD      CO2, POC 20 19 - 24 MMOL/L    Sodium,  136 - 145 MMOL/L    Potassium, POC 4.6 3.5 - 5.5 MMOL/L    Chloride,  100 - 108 MMOL/L    Glucose,  (H) 74 - 106 MG/DL    Creatinine, POC 2.5 (H) 0.6 - 1.3 MG/DL    Lactic Acid (POC) 1.58 0.40 - 2.00 mmol/L    pH, venous (POC) 7.36 7.32 - 7.42      pCO2, venous (POC) 34.8 (L) 41 - 51 MMHG    pO2, venous (POC) 13 (L) 25 - 40 mmHg   TROPONIN I    Collection Time: 06/19/21  8:38 PM   Result Value Ref Range    Troponin-I, Qt. 3.81 (H) <0.05 ng/mL   NT-PRO BNP    Collection Time: 06/19/21  8:38 PM   Result Value Ref Range    NT pro-BNP 15,450 (H) <125 PG/ML   GLUCOSE, POC    Collection Time: 06/20/21  8:55 AM   Result Value Ref Range    Glucose (POC) 89 65 - 117 mg/dL    Performed by Bienvenido Ray RN      Endoscopy photos found:                Assessment/plan: Active Problems:    GI bleed (10/4/2020)      Leukocytosis (6/20/2021)      NSTEMI (non-ST elevated myocardial infarction) ([facility] [address] (6/20/2021)      [facility]gastro[facility] stroma [facility] (6/20/2021)      [facility] (6/20/2021)      Acute on chronic anemia (6/20/2021)      Large ulcerated GIST at the GE junction.   On review of endoscopy photos I am more confident it can be resected without requiring an anastomosis. Recommend cardiac risk assessment. Will discuss with Dr Hannah Joaquin about timing of surgery. Will make NPO tonight.

## 2021-06-20 NOTE — PROGRESS NOTES
Bedside shift change report given to Ismael Higginbotham RN (oncoming nurse) by Pat Franco RN (offgoing nurse). Report included the following information SBAR, Kardex, ED Summary, Procedure Summary, Intake/Output, MAR and Recent Results.

## 2021-06-20 NOTE — ACP (ADVANCE CARE PLANNING)
Advance Care Planning Note      NAME: Ekaterina Reynoso   :  1945   MRN:  [id number]     Date/Time:  2021 12:49 AM    Active Diagnoses:  Hospital Problems  Date Reviewed: 2021        Codes Class Noted POA    Leukocytosis ICD-10-CM: W06.560  ICD-9-CM: 288.60  2021 Unknown        NSTEMI (non-ST elevated myocardial infarction) [facility]) ICD-10-CM: I21.4  ICD-9-CM: 410.70  2021 Unknown        GIST (gastrointestinal stroma tumor), malignant, colon ([facility] ICD-10-CM: C49. A4  ICD-9-CM: 153.9  2021 Unknown        Acute kidney injury superimposed on chronic kidney disease ([facility] ICD-10-CM: N17.9, N18.9  ICD-9-CM: 866.00, 585.9  2021 Unknown        Acute on chronic anemia ICD-10-CM: D64.9  ICD-9-CM: 285.9  2021 Unknown        GI bleed ICD-10-CM: K92.2  ICD-9-CM: 578.9  10/4/2020 Unknown              These active diagnoses are of sufficient risk that focused discussion on advance care planning is indicated in order to allow the patient to thoughtfully consider personal goals of care, and if situations arise that prevent the ability to personally give input, to ensure appropriate representation of their personal desires for different levels and aggressiveness of care. Discussion:   Code status addressed and wants to be a Full Code. Patient wants central line and vasopressors if needed. Patient would also want a feeding tube, if needed, for nutritional support. Patient  would like to assign    Deborah Quiles [phone]      as the surrogate decision maker. Persons present and participating in discussion: Lannis Becker, Hulen Cockayne, MD,       Time Spent:   Total time spent face-to-face in education and discussion:   16  minutes.          Hulen Cockayne, MD   Hospitalist

## 2021-06-20 NOTE — PROGRESS NOTES
End of Shift Note    Bedside shift change report given to geetha (oncoming nurse) by Samuel Gabriel RN (offgoing nurse).   Report included the following information SBAR, Kardex and MAR    Shift worked:  7a-7p     Shift summary and any significant changes:     Pt stable through shift, meds givne according to mar, hourly rounding, education done, 1 unit of blood infused, labs drawn, consults called, hourly rounding, education done, pt did not complain of pain     Concerns for physician to address:       Zone phone for oncoming shift:

## 2021-06-20 NOTE — PROGRESS NOTES
Transition of Care Plan:    RUR: 24 %  Disposition: Home with spouse   Follow up appointments: PCP,Specialists  DME needed: Pt has a RW and cane at home  Transportation at Discharge:Patient's friend to transport   [place] or means to access home:  Yes      IM Medicare letter: 2nd IM before discharge   Caregiver Contact:  Marlena Aldrich [phone]  Discharge Caregiver contacted prior to discharge? Unit CM to continue to follow for discharge needs and planning. Reason for Admission:  Acute on Chronic blood loss anemia                 RUR Score:     24 %             PCP: First and Last name:   Gemma Chavez MD     Name of Practice:    Are you a current patient: Yes/No: Yes   Approximate date of last visit:  2021   Can you participate in a virtual visit if needed:  yes    Do you (patient/family) have any concerns for transition/discharge? No concerns expressed at this time               Plan for utilizing home health:   TBD     Current Advanced Directive/Advance Care Plan:  Full Code  Advance Care Planning     General Advance Care Planning (ACP) Conversation    Date of Conversation: 2021  Conducted with: Patient with Decision Making Capacity    Healthcare Decision Maker: Today we documented Decision Maker(s) consistent with Legal Next of Kin hierarchy. Content/Action Overview:   DECLINED ACP conversation - will revisit periodically   Reviewed DNR/DNI and patient elects Full Code (Attempt Resuscitation)    Length of Voluntary ACP Conversation in minutes:  <16 minutes (Non-Billable)    Micah Botello RN           Transition of Care Plan:        CM met with patient at the bedside to discuss discharge planning. Patient name, , and demographics all verified in chart. Patient lives in a one level home with her . Patient states that she is independent of her ADLS. Patient states she ha access to a RW and cane, but she does not use them.  Patient states that she does not drive, but her  drives sometimes. Other times for transportation they rely on family and friends. Patient's preferred pharmacy is [facility] for short term prescriptions and [facility] for long term. Patient has no SNF or IPR history, but does have a HH history with an unknown agency. Patient's Cardiologist is Dr. Yash Jones. Care Management Interventions  PCP Verified by CM: Yes  Mode of Transport at Discharge: Other (see comment) (Patient friend to transport )  Transition of Care Consult (CM Consult): Discharge Planning  Discharge Durable Medical Equipment: No  Physical Therapy Consult: No  Occupational Therapy Consult: No  Speech Therapy Consult: No  Current Support Network: Lives with Spouse  Confirm Follow Up Transport: Friends  Discharge Location  Discharge Placement: Home with family assistance    Unit CM to continue to follow for discharge needs and planning.     Uriah Tsang, ADDIEN, RN, BSW   RN Care Manager

## 2021-06-20 NOTE — ED NOTES
TRANSFER - OUT REPORT:    Verbal report given to Rosa Hale RN(name) on Annetta Prasad  being transferred to Onc(unit) for routine progression of care       Report consisted of patients Situation, Background, Assessment and   Recommendations(SBAR). Information from the following report(s) SBAR, ED Summary, [facility] and Recent Results was reviewed with the receiving nurse. Lines:   Peripheral IV 06/19/21 Left Antecubital (Active)       Peripheral IV 06/19/21 Right Antecubital (Active)   Site Assessment Clean, dry, & intact 06/19/21 1812   Phlebitis Assessment 0 06/19/21 1812   Infiltration Assessment 0 06/19/21 1812   Dressing Status Clean, dry, & intact 06/19/21 1812   Dressing Type Transparent;Tape 06/19/21 1812   Hub Color/Line Status Pink;Patent; Flushed 06/19/21 1812        Opportunity for questions and clarification was provided.       Patient transported with:   Monitor  Registered Nurse

## 2021-06-20 NOTE — PROGRESS NOTES
Hospitalist Progress Note    NAME: Malena Cowan   :  1945   MRN:  [id number]       Assessment / Plan:  Acute on chronic blood loss anemia in the setting of GIST  CT a/p showed gastric mass, interval demonstration of hepatic metastatic disease. Presenting with hgb 4.7, receiving 3rd units prbc  Check hgb post transfusion  IV PPI  gen surg evaluated, possible palliative resection to prevent bleeding once hemodynamically stable and cardiac clearance. Leukocytosis  Check procalcitonin. F/u with UA and Bcx. CXR neg, CT a/p as above  Empiric IV zosyn    HTN  Elevated troponin  Chronic systolic heart failure, EF 30-35% in 10/20  EKG neg for acute ischemia. Trend trop  Check Echo  Resume coreg. Currently euvolemic. Monitor vol status while receiving PRBC   Cardiology consulted     GUANACO on CKD3  Likely due to prerenal from blood loss  Monitor labs  Hold nephrotoxic agents  Follows with Dr Max Hobson    T2DM  SSI fo rnow    Obesity / Body mass index is 31.83 kg/m². Estimated discharge date:   Barriers:clinical condition    Code status: Full  Prophylaxis: Hep SQ  Recommended Disposition: Home w/Family     Subjective:     Chief Complaint / Reason for Physician Visit  Pt awake, fatigue, no new complaint. Discussed with RN events overnight. Review of Systems:  Symptom Y/N Comments  Symptom Y/N Comments   Fever/Chills    Chest Pain     Poor Appetite    Edema     Cough    Abdominal Pain     Sputum    Joint Pain     SOB/ARENAS    Pruritis/Rash     Nausea/vomit    Tolerating PT/OT     Diarrhea    Tolerating Diet     Constipation    Other       Could NOT obtain due to:      Objective:     VITALS:   Last 24hrs VS reviewed since prior progress note.  Most recent are:  Patient Vitals for the past 24 hrs:   Temp Pulse Resp BP SpO2   21 0818 98.4 °F (36.9 °C) 68 20 (!) 160/74 99 %   21 0601 98.5 °F (36.9 °C) 66 18 138/70 95 %   21 0455 99 °F (37.2 °C) 66 18 139/71 100 %   21 0341 98.2 °F (36.8 °C) 67 18 (!) 135/56 100 %   06/20/21 0322 98.1 °F (36.7 °C) 66 17 (!) 142/58 100 %   06/20/21 0309 98.1 °F (36.7 °C) 67 17 (!) 144/60 100 %   06/20/21 0250 98.2 °F (36.8 °C) 62 17 128/63 100 %   06/19/21 2250 98.2 °F (36.8 °C) 65 18 116/61 100 %   06/19/21 2226 97.7 °F (36.5 °C) 66 -- (!) 126/45 100 %   06/19/21 2210 98.3 °F (36.8 °C) 73 18 (!) 126/54 100 %   06/19/21 2142 97.7 °F (36.5 °C) 61 19 (!) 126/55 100 %   06/19/21 2100 -- 60 23 (!) 132/59 99 %   06/19/21 2030 -- 65 21 (!) 133/56 99 %   06/19/21 2029 -- 65 16 -- 98 %   06/19/21 1800 -- 64 24 (!) 127/56 100 %   06/19/21 1759 -- 62 21 -- 100 %   06/19/21 1733 -- 66 23 138/60 --   06/19/21 1723 -- 63 18 (!) 113/47 100 %   06/19/21 1643 -- -- -- -- 99 %   06/19/21 1612 97.8 °F (36.6 °C) 66 16 (!) 137/58 100 %       Intake/Output Summary (Last 24 hours) at 6/20/2021 0830  Last data filed at 6/19/2021 2142  Gross per 24 hour   Intake 1450 ml   Output --   Net 1450 ml        I had a face to face encounter and independently examined this patient on 6/20/2021, as outlined below:  PHYSICAL EXAM:  General: WD, WN. Alert, cooperative, no acute distress    EENT:  EOMI. Anicteric sclerae. MMM  Resp:  CTA bilaterally, no wheezing or rales. No accessory muscle use  CV:  Regular  rhythm,  No edema  GI:  Soft, Non distended, Non tender. +Bowel sounds  Neurologic:  Alert and oriented X 3, normal speech  Psych:   Good insight. Not anxious nor agitated  Skin:  No rashes.   No jaundice    Reviewed most current lab test results and cultures  YES  Reviewed most current radiology test results   YES  Review and summation of old records today    NO  Reviewed patient's current orders and MAR    YES  PMH/SH reviewed - no change compared to H&P  ________________________________________________________________________  Care Plan discussed with:    Comments   Patient x    Family      RN x    Care Manager     Consultant                        Multidiciplinary team rounds were held today with , nursing, pharmacist and clinical coordinator. Patient's plan of care was discussed; medications were reviewed and discharge planning was addressed. ________________________________________________________________________  Total NON critical care TIME:  35   Minutes    Total CRITICAL CARE TIME Spent:   Minutes non procedure based      Comments   >50% of visit spent in counseling and coordination of care     ________________________________________________________________________  Mendel Dakin, MD     Procedures: see electronic medical records for all procedures/Xrays and details which were not copied into this note but were reviewed prior to creation of Plan. LABS:  I reviewed today's most current labs and imaging studies.   Pertinent labs include:  Recent Labs     06/19/21  1654   WBC 21.8*   HGB 4.7*   HCT 17.8*        Recent Labs     06/19/21  1654   *   K 5.0      CO2 22   *   BUN 81*   CREA 2.44*   CA 8.0*   ALB 2.3*   TBILI 0.5   *       Signed: Mendel Dakin, MD

## 2021-06-21 ENCOUNTER — APPOINTMENT (OUTPATIENT)
Dept: NON INVASIVE DIAGNOSTICS | Age: 76
DRG: 326 | End: 2021-06-21
Attending: HOSPITALIST
Payer: MEDICARE

## 2021-06-21 ENCOUNTER — ANESTHESIA EVENT (OUTPATIENT)
Dept: SURGERY | Age: 76
DRG: 326 | End: 2021-06-21
Payer: MEDICARE

## 2021-06-21 LAB
ECHO AV AREA PEAK VELOCITY: 2.46 CM2
ECHO AV AREA/BSA PEAK VELOCITY: 1.4 CM2/M2
ECHO AV PEAK GRADIENT: 6.76 MMHG
ECHO AV PEAK VELOCITY: 129.97 CM/S
ECHO EST RA PRESSURE: 10 MMHG
ECHO LA AREA 4C: 21.44 CM2
ECHO LA TO AORTIC ROOT RATIO: 1.17
ECHO LA VOL 4C: 62.08 ML (ref 22–52)
ECHO LA VOLUME INDEX A4C: 34.49 ML/M2 (ref 16–28)
ECHO LV E' LATERAL VELOCITY: 7.21 CM/S
ECHO LV E' SEPTAL VELOCITY: 4.1 CM/S
ECHO LV EDV A4C: 167.98 ML
ECHO LV EDV INDEX A4C: 93.3 ML/M2
ECHO LV EJECTION FRACTION A4C: 50 PERCENT
ECHO LV ESV A4C: 83.78 ML
ECHO LV ESV INDEX A4C: 46.5 ML/M2
ECHO LV INTERNAL DIMENSION DIASTOLIC MMODE: 5.77 CM
ECHO LV INTERNAL DIMENSION SYSTOLIC MMODE: 4.12 CM
ECHO LV IVSD MMODE: 1.15 CM
ECHO LV IVSS MMODE: 1.41 CM
ECHO LV POSTERIOR WALL DIASTOLIC MMODE: 1.24 CM
ECHO LV POSTERIOR WALL SYSTOLIC MMODE: 1.79 CM
ECHO LVOT DIAM: 2.15 CM
ECHO LVOT PEAK GRADIENT: 3.07 MMHG
ECHO LVOT PEAK VELOCITY: 87.67 CM/S
ECHO MV A VELOCITY: 71.14 CM/S
ECHO MV E DECELERATION TIME (DT): 244.97 MS
ECHO MV E VELOCITY: 90.59 CM/S
ECHO MV E/A RATIO: 1.27
ECHO MV E/E' LATERAL: 12.56
ECHO MV E/E' RATIO (AVERAGED): 17.33
ECHO MV E/E' SEPTAL: 22.1
ECHO MV EROA PISA: 0.55 CM2
ECHO MV REGURGITANT RADIUS PISA: 1.12 CM
ECHO MV REGURGITANT VOLUME: 87.6 ML
ECHO MV REGURGITANT VTIA: 158.97 CM
ECHO PV PEAK INSTANTANEOUS GRADIENT SYSTOLIC: 1.39 MMHG
ECHO PV REGURGITANT MAX VELOCITY: 99.49 CM/S
ECHO RIGHT VENTRICULAR SYSTOLIC PRESSURE (RVSP): 56.87 MMHG
ECHO RV INTERNAL DIMENSION: 3.86 CM
ECHO TV REGURGITANT MAX VELOCITY: 342.26 CM/S
ECHO TV REGURGITANT PEAK GRADIENT: 46.87 MMHG
GLUCOSE BLD STRIP.AUTO-MCNC: 107 MG/DL (ref 65–117)
GLUCOSE BLD STRIP.AUTO-MCNC: 130 MG/DL (ref 65–117)
GLUCOSE BLD STRIP.AUTO-MCNC: 206 MG/DL (ref 65–117)
GLUCOSE BLD STRIP.AUTO-MCNC: 206 MG/DL (ref 65–117)
MR PISA PV: 453.15 CM/S
SERVICE CMNT-IMP: ABNORMAL
SERVICE CMNT-IMP: NORMAL

## 2021-06-21 PROCEDURE — 94760 N-INVAS EAR/PLS OXIMETRY 1: CPT

## 2021-06-21 PROCEDURE — 74011250636 HC RX REV CODE- 250/636: Performed by: HOSPITALIST

## 2021-06-21 PROCEDURE — 93306 TTE W/DOPPLER COMPLETE: CPT

## 2021-06-21 PROCEDURE — 74011000258 HC RX REV CODE- 258: Performed by: HOSPITALIST

## 2021-06-21 PROCEDURE — 82962 GLUCOSE BLOOD TEST: CPT

## 2021-06-21 PROCEDURE — 74011636637 HC RX REV CODE- 636/637: Performed by: HOSPITALIST

## 2021-06-21 PROCEDURE — 97161 PT EVAL LOW COMPLEX 20 MIN: CPT

## 2021-06-21 PROCEDURE — 74011000250 HC RX REV CODE- 250: Performed by: HOSPITALIST

## 2021-06-21 PROCEDURE — 74011250636 HC RX REV CODE- 250/636: Performed by: INTERNAL MEDICINE

## 2021-06-21 PROCEDURE — 74011250637 HC RX REV CODE- 250/637: Performed by: INTERNAL MEDICINE

## 2021-06-21 PROCEDURE — 65660000000 HC RM CCU STEPDOWN

## 2021-06-21 PROCEDURE — 97116 GAIT TRAINING THERAPY: CPT

## 2021-06-21 PROCEDURE — 99231 SBSQ HOSP IP/OBS SF/LOW 25: CPT | Performed by: SURGERY

## 2021-06-21 PROCEDURE — C9113 INJ PANTOPRAZOLE SODIUM, VIA: HCPCS | Performed by: HOSPITALIST

## 2021-06-21 RX ORDER — TORSEMIDE 20 MG/1
40 TABLET ORAL DAILY
Status: DISCONTINUED | OUTPATIENT
Start: 2021-06-21 | End: 2021-07-01 | Stop reason: HOSPADM

## 2021-06-21 RX ORDER — SODIUM CHLORIDE 9 MG/ML
250 INJECTION, SOLUTION INTRAVENOUS AS NEEDED
Status: DISCONTINUED | OUTPATIENT
Start: 2021-06-21 | End: 2021-06-22

## 2021-06-21 RX ADMIN — ATORVASTATIN CALCIUM 40 MG: 40 TABLET, FILM COATED ORAL at 00:06

## 2021-06-21 RX ADMIN — IRON SUCROSE 300 MG: 20 INJECTION, SOLUTION INTRAVENOUS at 00:04

## 2021-06-21 RX ADMIN — SODIUM CHLORIDE 40 MG: 9 INJECTION INTRAMUSCULAR; INTRAVENOUS; SUBCUTANEOUS at 11:15

## 2021-06-21 RX ADMIN — SODIUM CHLORIDE 10 ML: 9 INJECTION, SOLUTION INTRAMUSCULAR; INTRAVENOUS; SUBCUTANEOUS at 18:24

## 2021-06-21 RX ADMIN — DULOXETINE 60 MG: 30 CAPSULE, DELAYED RELEASE ORAL at 11:15

## 2021-06-21 RX ADMIN — INSULIN LISPRO 3 UNITS: 100 INJECTION, SOLUTION INTRAVENOUS; SUBCUTANEOUS at 00:06

## 2021-06-21 RX ADMIN — PIPERACILLIN AND TAZOBACTAM 3.38 G: 3; .375 INJECTION, POWDER, LYOPHILIZED, FOR SOLUTION INTRAVENOUS at 13:38

## 2021-06-21 RX ADMIN — SODIUM CHLORIDE 10 ML: 9 INJECTION, SOLUTION INTRAMUSCULAR; INTRAVENOUS; SUBCUTANEOUS at 22:32

## 2021-06-21 RX ADMIN — AMLODIPINE BESYLATE 10 MG: 5 TABLET ORAL at 11:15

## 2021-06-21 RX ADMIN — IRON SUCROSE 300 MG: 20 INJECTION, SOLUTION INTRAVENOUS at 23:09

## 2021-06-21 RX ADMIN — ATORVASTATIN CALCIUM 40 MG: 40 TABLET, FILM COATED ORAL at 22:30

## 2021-06-21 RX ADMIN — INSULIN LISPRO 2 UNITS: 100 INJECTION, SOLUTION INTRAVENOUS; SUBCUTANEOUS at 22:30

## 2021-06-21 RX ADMIN — PIPERACILLIN AND TAZOBACTAM 3.38 G: 3; .375 INJECTION, POWDER, LYOPHILIZED, FOR SOLUTION INTRAVENOUS at 00:06

## 2021-06-21 RX ADMIN — INSULIN LISPRO 3 UNITS: 100 INJECTION, SOLUTION INTRAVENOUS; SUBCUTANEOUS at 18:23

## 2021-06-21 NOTE — PROGRESS NOTES
Patient off the floor for ?echo.   Hgb 8.2 yesterday, up from 5.7 after 3 units of PRBCs    Gilberto [facility]  06/21/21  11:02 AM

## 2021-06-21 NOTE — ANESTHESIA PREPROCEDURE EVALUATION
Anesthetic History   No history of anesthetic complications            Review of Systems / Medical History  Patient summary reviewed and pertinent labs reviewed    Pulmonary    COPD (no recent problems): mild            Comments: Former smoker    Chronic Bronchitis   Neuro/Psych   Within defined limits           Cardiovascular    Hypertension: well controlled  Valvular problems/murmurs: tricuspid insufficiency and mitral insufficiency    CHF  Dysrhythmias   Past MI, CAD, cardiac stents and hyperlipidemia    Exercise tolerance: >4 METS  Comments: Elevated Troponin - awaiting Cardiology clearance (6/21/21)    H/O NSTEMI (non-ST elevated myocardial infarction)  - 6/20/21    S/P PTCA with stents x 2 (8/25/15)    TTE (6/21/21):  ·LV: Estimated LVEF is 35 - 40%. Visually measured ejection fraction. Mildly dilated left ventricle. Mild concentric hypertrophy. Moderately reduced systolic function. Left ventricular diastolic dysfunction. ·RV: Mildly dilated right ventricle. Borderline low systolic function. ·MV: Mitral valve thickening. Moderate mitral valve regurgitation is present. ·TV: Mild tricuspid valve regurgitation is present. ·PA: Moderate pulmonary hypertension. Pulmonary arterial systolic pressure is 57 mmHg. ·IVC: Mildly elevated central venous pressure (8 mmHg); IVC diameter is less than 21 mm and collapses less than 50% with respiration.     ECG (6/19/21):  Normal sinus rhythm   Left ventricular hypertrophy with repolarization abnormality   When compared with ECG of 12-NOV-2020 16:57,   T wave inversion less evident in Inferior leads    GI/Hepatic/Renal     GERD: well controlled    Renal disease: CRI and ARF  Liver disease    Comments: Ulcerated GIST (gastrointestinal stroma tumor)  GI bleed    Acute kidney injury superimposed on chronic kidney disease (CRI, Stage III) Endo/Other    Diabetes (runs in the 200's): poorly controlled, type 2, using insulin    Obesity, arthritis, cancer and anemia  Pertinent negatives: No morbid obesity  Comments: GIST (gastrointestinal stroma tumor)  Leukocytosis    Anemia - Hgb initially 4.7 on 6/19/21;  Hgb = 8.2 on 6/21/21 after multiple units PRBCs Other Findings   Comments:              Physical Exam    Airway  Mallampati: III  TM Distance: 4 - 6 cm  Neck ROM: normal range of motion   Mouth opening: Normal     Cardiovascular    Rhythm: regular  Rate: normal      Pertinent negatives: No murmur   Dental    Dentition: Caps/crowns and Poor dentition  Comments: Upper front & left (2)   Pulmonary  Breath sounds clear to auscultation               Abdominal  GI exam deferred       Other Findings            Anesthetic Plan    ASA: 4  Anesthesia type: general    Monitoring Plan: Arterial line and BIS      Induction: Intravenous  Anesthetic plan and risks discussed with: Patient

## 2021-06-21 NOTE — PROGRESS NOTES
OK to proceed with scheduled surgical procedure tomorrow pending cardiology clearance, and assuming no acute changes in clinical status or lab derangements overnight and patient remains NPO overnight.

## 2021-06-21 NOTE — PROGRESS NOTES
Problem: Mobility Impaired (Adult and Pediatric)  Goal: *Acute Goals and Plan of Care (Insert Text)  Description: FUNCTIONAL STATUS PRIOR TO ADMISSION: Pt reports independence with ambulation and ADLs however reports owning RW and cane, stating she is \"supposed to use them\" but doesn't. Reports history of 4 recent falls and that she has been nonambulatory since most recent fall 3 days ago. States she is mostly sedentary at home. HOME SUPPORT PRIOR TO ADMISSION: The patient lived with , stating \"they help each other out. \"    Physical Therapy Goals  Initiated 6/21/2021  1. Patient will move from supine to sit and sit to supine , scoot up and down, and roll side to side in bed with supervision/set-up within 7 day(s). 2.  Patient will transfer from bed to chair and chair to bed with minimal assistance/contact guard assist using the least restrictive device within 7 day(s). 3.  Patient will perform sit to stand with minimal assistance/contact guard assist within 7 day(s). 4.  Patient will ambulate with minimal assistance/contact guard assist for 50 feet with the least restrictive device within 7 day(s). Outcome: Progressing Towards Goal   PHYSICAL THERAPY EVALUATION  Patient: Bianca Gould (84 y.o. female)  Date: 6/21/2021  Primary Diagnosis: GI bleed [K92.2]  Acute on chronic anemia [D64.9]  GIST (gastrointestinal stroma tumor), malignant, colon ([facility] Frandy Needy. A4]  NSTEMI (non-ST elevated myocardial infarction) ([facility] [I21.4]  Leukocytosis [D72.829]  Acute kidney injury superimposed on chronic kidney disease ([facility] [N17.9, N18.9]  Procedure(s) (LRB):  LAPAROSCOPIC PARTIAL GASTRECTOMY (N/A)     Precautions:        ASSESSMENT  Based on the objective data described below, the patient presents with BLE weakness (R>L), decreased endurance/activity tolerance, decreased insight, poor motivation, impaired standing balance, and overall impaired functional mobility.  Pt required increased encouragement for minimal participation with therapy, only agreeable to mobilization to bedside chair before declining further participation. Pt sit>>stand w minAx1 and ambulated 5-6 steps w/ HHA and modAx1. Gait unsteady with pt demonstrating increased trunk flexion and shuffled gait pattern. Pt would benefit from gait training with use of rolling walker during next therapy session however pt seems resistance to idea of assistive device. Noted plan for potential surgery while inpatient. Given mobility deficits demonstrated this date in addition to extensive falls history and caregiver burden, recommend additional rehab at discharge. Current Level of Function Impacting Discharge (mobility/balance): Grant for sit<>stand, modA for brief ambulation from EOB>>bedside chair    Functional Outcome Measure: The patient scored 45/100 on the Barthel Index outcome measure which is indicative of moderate impairment in ADLs and functional mobility. Other factors to consider for discharge: potential surgery? Hx of multiple falls, caregiver burden     Patient will benefit from skilled therapy intervention to address the above noted impairments. PLAN :  Recommendations and Planned Interventions: bed mobility training, transfer training, gait training, therapeutic exercises, patient and family training/education, and therapeutic activities      Frequency/Duration: Patient will be followed by physical therapy:  4 times a week to address goals. Recommendation for discharge: (in order for the patient to meet his/her long term goals)  Therapy up to 5 days/week in SNF setting    This discharge recommendation:  Has been made in collaboration with the attending provider and/or case management    IF patient discharges home will need the following DME: to be determined (TBD)         SUBJECTIVE:   Patient stated This chocolate pudding tastes so good.     OBJECTIVE DATA SUMMARY:   HISTORY:    Past Medical History:   Diagnosis Date    Anemia, unspecified 10/6/2020    Arrhythmia     Arthritis     CAD (coronary artery disease)     Cancer (HCC)     Chronic bronchitis ([facility]     per pt:  as of 1/9/15 pt denies any ARENAS or SOB    Diabetes ([facility] Dx approx 2009    Dr Chiquita Jean (in [facility])    GERD (gastroesophageal reflux disease)     Hypercholesteremia     Hypertension      Past Surgical History:   Procedure Laterality Date    HX CHOLECYSTECTOMY  6/12    HX COLONOSCOPY      HX CORONARY STENT PLACEMENT  8/25/2015    HX DILATION AND CURETTAGE      PA CARDIAC SURG PROCEDURE UNLIST  2015    UPPER GI ENDOSCOPY,BIOPSY  10/6/2020            Personal factors and/or comorbidities impacting plan of care:     Home Situation  Home Environment: Private residence  # Steps to Enter: 1  Rails to Enter: Yes  Hand Rails : Bilateral  One/Two Story Residence: One story  Living Alone: No  Support Systems: Spouse/Significant Other/Partner  Patient Expects to be Discharged to[de-identified] [employer]  Current DME Used/Available at Home: Octavia Au, straight, Walker, rolling, Shower chair, Grab bars  Tub or Shower Type: Tub/Shower combination    EXAMINATION/PRESENTATION/DECISION MAKING:   Critical Behavior:  Neurologic State: Alert  Orientation Level: Oriented X4  Cognition: Appropriate decision making, Follows commands     Hearing:   Auditory  Auditory Impairment: None  Skin:  intact  Edema: none noted  Range Of Motion:  AROM: Generally decreased, functional                       Strength:    Strength: Generally decreased, functional                    Tone & Sensation:   Tone: Normal                              Coordination:  Coordination: Generally decreased, functional  Vision:      Functional Mobility:  Bed Mobility:  Rolling: Contact guard assistance  Supine to Sit: Contact guard assistance     Scooting: Stand-by assistance  Transfers:  Sit to Stand: Minimum assistance  Stand to Sit: Minimum assistance        Bed to Chair: Moderate assistance              Balance:   Sitting: Intact  Standing: Impaired; With support  Standing - Static: Fair;Constant support  Standing - Dynamic : Fair;Constant support  Ambulation/Gait Training:  Distance (ft): 2 Feet (ft)  Assistive Device: Gait belt  Ambulation - Level of Assistance: Moderate assistance        Gait Abnormalities: Decreased step clearance;Shuffling gait (trunk flexion)        Base of Support: Widened     Speed/Tiffany: Shuffled  Step Length: Left shortened;Right shortened                  Functional Measure:  Barthel Index:    Bathin  Bladder: 5  Bowels: 5  Groomin  Dressin  Feeding: 10  Mobility: 0  Stairs: 0  Toilet Use: 5  Transfer (Bed to Chair and Back): 10  Total: 45/100       The Barthel ADL Index: Guidelines  1. The index should be used as a record of what a patient does, not as a record of what a patient could do. 2. The main aim is to establish degree of independence from any help, physical or verbal, however minor and for whatever reason. 3. The need for supervision renders the patient not independent. 4. A patient's performance should be established using the best available evidence. Asking the patient, friends/relatives and nurses are the usual sources, but direct observation and common sense are also important. However direct testing is not needed. 5. Usually the patient's performance over the preceding 24-48 hours is important, but occasionally longer periods will be relevant. 6. Middle categories imply that the patient supplies over 50 per cent of the effort. 7. Use of aids to be independent is allowed. Dinora Ceja., Barthel, D.W. (1222). Functional evaluation: the Barthel Index. [address] [facility] (14)2. YOUNG Mead, Pablo Corey., [facility], [address] [place] [address] (). Measuring the change indisability after inpatient rehabilitation; comparison of the responsiveness of the Barthel Index and Functional [place] Measure. Journal of Neurology, Neurosurgery, and Psychiatry, 66(4), 972-667.   KRYSTINA Ho, Sagar Kern M.A. (2004.) Assessment of post-stroke quality of life in cost-effectiveness studies: The usefulness of the Barthel Index and the EuroQoL-5D. Quality of Life Research, 15, 245-14           Physical Therapy Evaluation Charge Determination   History Examination Presentation Decision-Making   MEDIUM  Complexity : 1-2 comorbidities / personal factors will impact the outcome/ POC  MEDIUM Complexity : 3 Standardized tests and measures addressing body structure, function, activity limitation and / or participation in recreation  MEDIUM Complexity : Evolving with changing characteristics  MEDIUM Complexity : FOTO score of 26-74      Based on the above components, the patient evaluation is determined to be of the following complexity level: MEDIUM    Pain Rating:  Denied c/o pain    Activity Tolerance:   VSS however fair overall as pt fatigues quickly    After treatment patient left in no apparent distress:   Sitting in chair and Call bell within reach    COMMUNICATION/EDUCATION:   The patients plan of care was discussed with: Registered nurse. Fall prevention education was provided and the patient/caregiver indicated understanding., Patient/family have participated as able in goal setting and plan of care. , and Patient/family agree to work toward stated goals and plan of care.     Thank you for this referral.  Wan Gonsales, PT, DPT   Time Calculation: 19 mins

## 2021-06-21 NOTE — PROGRESS NOTES
Bedside shift change report given to KATHERINE Kwon (oncoming nurse) by João Concepcion RN (offgoing nurse). Report included the following information SBAR, Kardex, ED Summary, Procedure Summary, Intake/Output, MAR and Recent Results.

## 2021-06-21 NOTE — PROGRESS NOTES
Gastroenterology Daily Progress Note [address]   [facility] [address] [place] Date: 6/19/2021     F/u anemia in the setting of gist    Subjective:       Back from echo  Feels fine. No GIB, abd pain, N/V, dysphagia, weight loss, cp.       Current Facility-Administered Medications   Medication Dose Route Frequency    [Held by provider] torsemide (DEMADEX) tablet 40 mg  40 mg Oral DAILY    sodium chloride (NS) flush 5-40 mL  5-40 mL IntraVENous Q8H    sodium chloride (NS) flush 5-40 mL  5-40 mL IntraVENous PRN    acetaminophen (TYLENOL) tablet 650 mg  650 mg Oral Q6H PRN    Or    acetaminophen (TYLENOL) suppository 650 mg  650 mg Rectal Q6H PRN    polyethylene glycol (MIRALAX) packet 17 g  17 g Oral DAILY PRN    ondansetron (ZOFRAN ODT) tablet 4 mg  4 mg Oral Q8H PRN    Or    ondansetron (ZOFRAN) injection 4 mg  4 mg IntraVENous Q6H PRN    pantoprazole (PROTONIX) 40 mg in 0.9% sodium chloride 10 mL injection  40 mg IntraVENous DAILY    insulin lispro (HUMALOG) injection   SubCUTAneous AC&HS    glucose chewable tablet 16 g  4 Tablet Oral PRN    dextrose (D50W) injection syrg 12.5-25 g  12.5-25 g IntraVENous PRN    glucagon (GLUCAGEN) injection 1 mg  1 mg IntraMUSCular PRN    piperacillin-tazobactam (ZOSYN) 3.375 g in 0.9% sodium chloride (MBP/ADV) 100 mL MBP  3.375 g IntraVENous Q12H    DULoxetine (CYMBALTA) capsule 60 mg  60 mg Oral DAILY    atorvastatin (LIPITOR) tablet 40 mg  40 mg Oral QHS    amLODIPine (NORVASC) tablet 10 mg  10 mg Oral DAILY    iron sucrose (VENOFER) 300 mg in 0.9% sodium chloride 250 mL IVPB  300 mg IntraVENous Q24H    0.9% sodium chloride infusion 250 mL  250 mL IntraVENous PRN    acetaminophen (TYLENOL) tablet 650 mg  650 mg Oral Q6H PRN        Objective:     Visit Vitals  BP (!) 133/57 (BP 1 Location: Right arm, BP Patient Position: At rest)   Pulse 65   Temp 98.4 °F (36.9 °C)   Resp 18   Ht 5' 2\" (1.575 m)   Wt 78.9 kg (173 lb 15.1 oz)   SpO2 99%   BMI 31.81 kg/m²   Blood pressure (!) 133/57, pulse 65, temperature 98.4 °F (36.9 °C), resp. rate 18, height 5' 2\" (1.575 m), weight 78.9 kg (173 lb 15.1 oz), SpO2 99 %.    06/21 0701 - 06/21 1900  In: -   Out: 800 [Urine:800]    06/19 1901 - 06/21 0700  In: 1757.9 [I.V.:1100]  Out: -       Intake/Output Summary (Last 24 hours) at 6/21/2021 1233  Last data filed at 6/21/2021 1100  Gross per 24 hour   Intake --   Output 800 ml   Net -800 ml         Physical Exam:       General: bf in nad  Chest:  CTA, No rhonchi, rales or rubs.   Heart: S1, S2, RRR  GI: Soft, NT, ND + bowel sounds  Extremities: no edema   CNS: CN II-XII normal.      Labs:       Recent Results (from the past 24 hour(s))   HEMOGLOBIN A1C WITH EAG    Collection Time: 06/20/21  1:58 PM   Result Value Ref Range    Hemoglobin A1c 6.5 (H) 4.0 - 5.6 %    Est. average glucose 140 mg/dL   HGB & HCT    Collection Time: 06/20/21  1:58 PM   Result Value Ref Range    HGB 8.2 (L) 11.5 - 16.0 g/dL    HCT 27.0 (L) 35.0 - 47.0 %   GLUCOSE, POC    Collection Time: 06/20/21  4:48 PM   Result Value Ref Range    Glucose (POC) 111 65 - 117 mg/dL    Performed by Jeanna TANG    GLUCOSE, POC    Collection Time: 06/20/21 10:12 PM   Result Value Ref Range    Glucose (POC) 249 (H) 65 - 117 mg/dL    Performed by Anson Maki RN    GLUCOSE, POC    Collection Time: 06/21/21  8:40 AM   Result Value Ref Range    Glucose (POC) 130 (H) 65 - 117 mg/dL    Performed by Crissie Handing    GLUCOSE, POC    Collection Time: 06/21/21 11:16 AM   Result Value Ref Range    Glucose (POC) 107 65 - 117 mg/dL    Performed by Crissie Handing    ECHO ADULT COMPLETE    Collection Time: 06/21/21 11:23 AM   Result Value Ref Range    LVOT d 2.15 cm    LV Ejection Fraction MOD 4C 50 percent    LV ED Vol A4C 167.98 mL    LV ES Vol A4C 83.78 mL    IVSd (M-mode) 1.15 (A) cm    IVSs (M-mode) 1.41 cm    LVIDd (M-mode) 5.77 (A) cm    LVIDs (M-mode) 4.12 cm    LVPWd (M-mode) 1.24 (A) cm LVPWs (M-mode) 1.79 cm    LVOT Peak Gradient 3.07 mmHg    LVOT Peak Velocity 87.67 cm/s    RVIDd 3.86 cm    RVSP 56.87 mmHg    LA Area 4C 21.44 cm2    LA Vol 4C 62.08 (A) 22.00 - 52.00 mL    Left Atrium to Aortic Root Ratio 1.17     Est. RA Pressure 10.00 mmHg    Aortic Valve Area by Continuity of Peak Velocity 2.46 cm2    AoV PG 6.76 mmHg    Aortic Valve Systolic Peak Velocity 592.79 cm/s    PISA MR Rad 1.12 cm    MV A Oren 71.14 cm/s    Mitral Valve E Wave Deceleration Time 244.97 ms    MV E Oren 90.59 cm/s    E/E' ratio (averaged) 17.33     E/E' lateral 12.56     E/E' septal 22.10     LV E' Lateral Velocity 7.21 cm/s    LV E' Septal Velocity 4.10 cm/s    Mitral Effective Regurgitant Orifice Area 0.55 cm2    MV regurgitant volume 87.60 mL    Mitral Regurgitant PISA Peak Velocity 453.15 cm/s    Mitral Regurgitant Velocity Time Integral 158.97 cm    Pulmonic Valve Systolic Peak Instantaneous Gradient 1.39 mmHg    Pulmonic Regurgitant End Max Velocity 99.49 cm/s    Triscuspid Valve Regurgitation Peak Gradient 46.87 mmHg    TR Max Velocity 342.26 cm/s    MV E/A 1.27     LA Vol Index 34.49 16.00 - 28.00 ml/m2    LVED Vol Index A4C 93.3 mL/m2    LVES Vol Index A4C 46.5 mL/m2    MARIA EUGENIA/BSA Pk Oren 1.4 cm2/m2   LABRCNT(wbc:2,hgb:2,hct:2,plt:2,)  Recent Labs     06/19/21  1654   *   K 5.0      CO2 22   BUN 81*   CREA 2.44*   *   CA 8.0*   LABRCNT(sgot:3,gpt:3,ap:3,tbiL:3,TP:3,ALB:3,GLOB:3,ggt:3,aml:3,amyp:3,lpse:3,hlpse:3)No results for input(s): INR, PTP, APTT, INREXT in the last 72 hours. Recent Labs     06/19/21  1654   *   TP 7.0   ALB 2.3*   GLOB 4.7*   BRIEFLAB(B12,FOL,FOLAT,RBCF)No results found for: FOL, RBCFLABRCNT(CPK:3,CpKMB:3,ckndx:3,troiq:3)No components found for: GLPOCBRIEFLAB(CHOL,CHOLX,CHOLP,CHLST,CHOLV,HDL,HDLC,HDLP,LDL,DLDL,LDLC,DLDLP,TGL,TGLX,TRIGL,TRIGP,CHHD,CHHDX)No results for input(s): PH, PCO2, PO2 in the last 72 hours. LABRCNT(CPK:3,CpKMB:3,ckndx:3,troiq:3)Hansa Claudio, PA  Recent Labs     06/20/21  1134 06/19/21 2038 06/19/21  1654   TROIQ 4.00* 3.81* 4.50*   MEShannon Linda Claudio, [address]      Problem List:     Active Problems:    GI bleed (10/4/2020)      Leukocytosis (6/20/2021)      NSTEMI (non-ST elevated myocardial infarction) ([facility] (6/20/2021)      GIST (gastrointestinal stroma tumor), malignant, colon ([facility] (6/20/2021)      Acute kidney injury superimposed on chronic kidney disease ([facility] (6/20/2021)      Acute on chronic anemia (6/20/2021)        Impression:  Aenmia  Ulcerated GIST  Elevated troponin         Plan:  GIST was large, ulcerated on prior EGD and is likely oozing which explains the anemia. No plans for repeat endoscopic procedures. After blood transfusion, hgb is 8.2. No overt bleeding. Eating and drinking well. Dr. Tee Ellis plans surgical resection of GIST while inpatient. Awaiting cardiology consult. Transfuse as needed. Will sign off and see again on request.  Will need outpatient follow up with Dr. Jeannette Lewis. IRVING Becerra  12:34 PM   6/21/2021  [id number] [facility], [address] [place] [address] [place]: [phone]      I agree with above note and plan. No endoscopic re-evaluation planned of this known gastric mass/GIST  which needs a surgical resection. Cardiac work up as pre plan. Oncology to follow up post resection. We will otherwise sign off and see on request only. I discussed the case with the Advance Practice Provider. I have personally reviewed the history and independently examined the patient. I have reviewed the chart and agree with the documentation recorded by the Mid Level Provider, including the assessment, treatment plan, and disposition. Chelsea Hernández MD

## 2021-06-21 NOTE — PROGRESS NOTES
Patient has been lost to follow-up twice. She tells me she has since seen cardiology as an outpatient. She was not sure what cardiologist she saw. She comes back in again with anemia and elevated cardiac enzymes secondary to the severe anemia. She has been transfused and has responded well to that. She had an echocardiogram yesterday. Cardiology is seeing the patient today. No bloody bowel movements today. Her abdominal exam is benign. We will plan for laparoscopic partial gastrectomy. Timing will depend on whether or not cardiology feels further work-up is needed preoperatively. I tentatively have her on the schedule for tomorrow afternoon.

## 2021-06-21 NOTE — CONSULTS
IP Cardiology Consult       Date of consult:  06/21/21  Date of admission: 6/19/2021  Primary Cardiologist: Was seen previously by Dr Saundra Dao, seen by me for preOP in 4.2021   Physician Requesting consult: Dr Trevin Segura:    1. Severe and recurrent anemia with HgB in 4s due to bleeding from stromal tumor, trop elevation with profound anemia likely type 2   2 CAD: Distal RCA NEENA 8/2015 for NQWMI; Med Rx mid circ and diffusely dz diagonals. 3 Systolic heart failure with EF 35%   4 HTN  5 DM  6 Dyslipidemia  7 CKD III: Cr 1.43/gfr 42 7/2020 (Dr Linnie Homans). 8 Obesity: ?NANY. 9 Non-compliant with some meds per pt 10/2020. Poor compliance with f/u OV. 20 Anemia POA (Hg 4.5): gastric mass: GI stromal tumor: needs resection. Recommendations:    1. Trop elevation in the setting of profound anemia is likely type 2 - no chest pain, no significant STT changes. Has EF of 35-40% unchanged compared to prior. No HF symptoms. She likely has underlying CAD but no symptoms. Benefit of surgery outweighs the risk. 2. Acceptable risk to proceed for GI surgery   3. Not on ASA due to GI bleeds  4. Cont lipitor   5. Resume coreg and losartan- she was on - for CMP as blood pressure tolerates   6. Avoid volume overload     Thank you for this consult and allowing me to take part in this patients care. Please call with questions. [x]        High complexity decision making was performed      CC / Reason for consult: Pre OP     History of the presenting illness:  Elif Montalvo is a 78 y.o. female with CAD, SHF with EF 30-35%, CKD, NANY, non compliant with follow up visits   Anemia with HgB in 4s in past with GI stromal tumor in 10/2020 and at that time Trop increased to 10- type 2. Was seen by me in 4/2021 For preOP eval and was advised that she is at acceptable risk since she did not have cardiac symptoms. Surgery is still pending.     She came to hospital with bleeding and has significant anemia with HgB in 4s. S/p 3 units of PRBC. HgB now 8.2. Has troponin elevation in the setting of severe anemia. EKG showed NSR, mild LVH and repl abn - mild STT abn. Not significantly different. Has no chest pain. EGD showed ulcerated GIST. Card consulted for pre OP eval.     Past Medical History:   Diagnosis Date    Anemia, unspecified 10/6/2020    Arrhythmia     Arthritis     CAD (coronary artery disease)     Cancer ([facility]     Chronic bronchitis ([facility]     per pt:  as of 1/9/15 pt denies any ARENAS or SOB    Diabetes ([facility] Dx approx 2009    Dr Manjeet Perez (in [facility])    GERD (gastroesophageal reflux disease)     Hypercholesteremia     Hypertension        Prior to Admission medications    Medication Sig Start Date End Date Taking? Authorizing Provider   multivitamin, tx-iron-ca-min (THERA-M w/ IRON) 9 mg iron-400 mcg tab tablet Take 1 Tab by mouth two (2) times a day. Centrum silver    Provider, Historical   pantoprazole (PROTONIX) 40 mg tablet  1/13/21   Provider, Historical   glipiZIDE (GLUCOTROL) 5 mg tablet Take 1 Tab by mouth two (2) times a day. 1/21/21   Ifeoma Mckay MD   lancets misc PUSH BUTTON LANCETS. Check blood sugar 4 times per day due to hypoglycemic events. Dx:E11.42, E16.2 12/17/20   Ifeoma Mckay MD   amLODIPine (NORVASC) 10 mg tablet Take 1 Tab by mouth daily. 11/20/20   Emile Acuna MD   food supplemt, lactose-reduced (Ensure Enlive) 0.08 gram-1.5 kcal/mL liqd Take 6 x daily as recommended by Surgery to improve nutritional status for Possible surgery in future  Patient taking differently: Take 6 x daily as recommended by Surgery to improve nutritional status for Possible surgery in future. takes about 3 11/20/20   Emile Acuna MD   DULoxetine (CYMBALTA) 60 mg capsule Take 1 Cap by mouth daily. 10/14/20   Woody Mast NP   losartan (COZAAR) 100 mg tablet Take 1 Tab by mouth daily.  10/14/20   Woody Mast NP   carvediloL (COREG) 12.5 mg tablet Take 1 Tab by mouth two (2) times daily (with meals). 10/13/20   Susan Varma NP   torsemide (DEMADEX) 20 mg tablet Take 40 mg by mouth daily. Provider, Historical   atorvastatin (LIPITOR) 40 mg tablet Take 1 Tab by mouth nightly. 1/27/20   Danay Jones MD       Family History   Problem Relation Age of Onset    Diabetes Mother     Heart Disease Mother     Hypertension Mother     Stroke Father     Heart Disease Brother     Heart Disease Brother     Stroke Brother     HIV/AIDS Brother           Social History     Socioeconomic History    Marital status:      Spouse name: Not on file    Number of children: Not on file    Years of education: Not on file    Highest education level: Not on file   Occupational History    Not on file   Tobacco Use    Smoking status: Former Smoker    Smokeless tobacco: Never Used   Substance and Sexual Activity    Alcohol use: No    Drug use: No    Sexual activity: Not on file   Other Topics Concern    Not on file   Social History Narrative    Not on file     Social Determinants of Health     Financial Resource Strain:     Difficulty of Paying Living Expenses:    Food Insecurity:     Worried About Running Out of Food in the Last Year:     [address] in the Last Year:    Transportation Needs:     Lack of Transportation (Medical):      Lack of Transportation (Non-Medical):    Physical Activity:     Days of Exercise per Week:     Minutes of Exercise per Session:    Stress:     Feeling of Stress :    Social Connections:     Frequency of Communication with Friends and Family:     Frequency of Social Gatherings with Friends and Family:     Attends [employer] Services:     Active Member of Clubs or Organizations:     Attends Club or Organization Meetings:     Marital Status:    Intimate Partner Violence:     Fear of Current or Ex-Partner:     Emotionally Abused:     Physically Abused:     Sexually Abused:          ROS      Total of 12 systems reviewed, all systems review was negative except Pertinent Positives included in HPI     Visit Vitals  BP (!) 129/58 (BP 1 Location: Right upper arm, BP Patient Position: Sitting)   Pulse 67   Temp 98.6 °F (37 °C)   Resp 16   Ht 5' 2\" (1.575 m)   Wt 78.9 kg (173 lb 15.1 oz)   SpO2 100%   BMI 31.81 kg/m²       Physical Exam  Examination:     General: Alert + Oriented x3, no acute distress   HEENT: Normocephalic aromatic, MMM   Neck: Supple, JVP- not well appreciated   RS: Non labored, clear   CVS: Regular rate and rhythm, S1S2, no murmur   Abd: Soft, non tender, non distended   Lower extremity: Warm to touch, Edema- None   Skin: Warm and dry, No significant bruises or rash   CNS: Oriented x3, no focal neuro deficit     Lab review:  BMP:   No results found for: NA, K, CL, CO2, AGAP, GLU, BUN, CREA, GFRAA, GFRNA     CBC:  Lab Results   Component Value Date/Time    WBC 21.8 (H) 06/19/2021 04:54 PM    HGB 8.2 (L) 06/20/2021 01:58 PM    HCT 27.0 (L) 06/20/2021 01:58 PM    PLATELET 402 31/41/0694 04:54 PM    MCV 67.4 (L) 06/19/2021 04:54 PM       All Cardiac Markers in the last 24 hours:  No results found for: CPK, CK, CKMMB, CKMB, RCK3, CKMBT, CKMBPOC, CKNDX, CKND1, JESSE, TROPT, TROIQ, MAURO, TROPT, TNIPOC, BNP, BNPP, BNPNT    Data review:    Tele: NSR    EKG tracing personally reviewed:   NSR, LVH, STT abn, no significant changed     Echocardiogram:  EF 35-40%    Other cardiac testing:      Other imaging:        Signed:  Rand Ayers MD  Interventional Cardiology  6/21/2021

## 2021-06-21 NOTE — PROGRESS NOTES
Hospitalist Progress Note    NAME: Kenia Bowens   :  1945   MRN:  [id number]       Assessment / Plan:  Acute on chronic blood loss anemia in the setting of GIST  CT a/p showed gastric mass, interval demonstration of hepatic metastatic disease. Presenting with hgb 4.7, s/p 3 units PRBC. Hgb now at 8.2  IV PPI  gen surg evaluated, possible palliative resection to prevent bleeding, awaiting for cardia clearance     Leukocytosis  procalcitonin wnl. .  UA ordered but was not sent, however pt has no urinary symptoms. Bcx NTD. CXR neg, CT a/p as above  Will discontinue zosyn  Awaiting for labs    HTN  Elevated troponin  Chronic systolic heart failure, EF 30-35% in 10/20  EKG neg for acute ischemia. Trend trop  Echo pending  Resume torsemide if cr improves. Awaiting for labs. cont coreg  Cardiology consulted     GUANACO on CKD3  Likely due to prerenal from blood loss  Monitor labs  Hold nephrotoxic agents  Follows with Dr Moran Phoebe    T2DM  SSI    Obesity / Body mass index is 31.83 kg/m². Estimated discharge date:   Barriers:clinical condition    Code status: Full  Prophylaxis: Hep SQ  Recommended Disposition: Home w/Family     Subjective:     Chief Complaint / Reason for Physician Visit  Pt without complaints. Slept well overnight. No bloody BM for 24 hrs per pt.  has mild leg edema. Discussed with RN events overnight. Review of Systems:  Symptom Y/N Comments  Symptom Y/N Comments   Fever/Chills    Chest Pain     Poor Appetite    Edema     Cough    Abdominal Pain     Sputum    Joint Pain     SOB/ARENAS    Pruritis/Rash     Nausea/vomit    Tolerating PT/OT     Diarrhea    Tolerating Diet     Constipation    Other       Could NOT obtain due to:      Objective:     VITALS:   Last 24hrs VS reviewed since prior progress note.  Most recent are:  Patient Vitals for the past 24 hrs:   Temp Pulse Resp BP SpO2   21 0800 98.3 °F (36.8 °C) 61 16 (!) 143/76 97 %   21 0352 98.2 °F (36.8 °C) 67 18 (!) 152/65 99 %   06/20/21 1937 98.3 °F (36.8 °C) 64 19 (!) 124/47 100 %   06/20/21 1534 98.5 °F (36.9 °C) 68 22 (!) 158/68 98 %   06/20/21 1143 98.6 °F (37 °C) 71 20 (!) 146/69 100 %   06/20/21 1016 -- 68 20 (!) 165/81 99 %   06/20/21 0929 98 °F (36.7 °C) 69 22 (!) 160/86 100 %   06/20/21 0857 98.7 °F (37.1 °C) 66 22 (!) 150/77 99 %       Intake/Output Summary (Last 24 hours) at 6/21/2021 0565  Last data filed at 6/20/2021 1129  Gross per 24 hour   Intake 307.9 ml   Output --   Net 307.9 ml        I had a face to face encounter and independently examined this patient on 6/21/2021, as outlined below:  PHYSICAL EXAM:  General: WD, WN. Alert, cooperative, no acute distress    EENT:  EOMI. Anicteric sclerae. MMM  Resp:  CTA bilaterally, no wheezing or rales. No accessory muscle use  CV:  Regular  rhythm,  mild b/l LE edema  GI:  Soft, Non distended, Non tender. +Bowel sounds  Neurologic:  Alert and oriented X 3, normal speech  Psych:   Good insight. Not anxious nor agitated  Skin:  No rashes. No jaundice    Reviewed most current lab test results and cultures  YES  Reviewed most current radiology test results   YES  Review and summation of old records today    NO  Reviewed patient's current orders and MAR    YES  PMH/SH reviewed - no change compared to H&P  ________________________________________________________________________  Care Plan discussed with:    Comments   Patient x    Family      RN x    Care Manager     Consultant                        Multidiciplinary team rounds were held today with , nursing, pharmacist and clinical coordinator. Patient's plan of care was discussed; medications were reviewed and discharge planning was addressed.      ________________________________________________________________________  Total NON critical care TIME:  35   Minutes    Total CRITICAL CARE TIME Spent:   Minutes non procedure based      Comments   >50% of visit spent in counseling and coordination of care ________________________________________________________________________  Robert Jacobson MD     Procedures: see electronic medical records for all procedures/Xrays and details which were not copied into this note but were reviewed prior to creation of Plan. LABS:  I reviewed today's most current labs and imaging studies.   Pertinent labs include:  Recent Labs     06/20/21  1358 06/19/21  1654   WBC  --  21.8*   HGB 8.2* 4.7*   HCT 27.0* 17.8*   PLT  --  380     Recent Labs     06/19/21  1654   *   K 5.0      CO2 22   *   BUN 81*   CREA 2.44*   CA 8.0*   ALB 2.3*   TBILI 0.5   *       Signed: Robert Jacobson MD

## 2021-06-22 ENCOUNTER — APPOINTMENT (OUTPATIENT)
Dept: GENERAL RADIOLOGY | Age: 76
DRG: 326 | End: 2021-06-22
Attending: SURGERY
Payer: MEDICARE

## 2021-06-22 ENCOUNTER — ANESTHESIA (OUTPATIENT)
Dept: SURGERY | Age: 76
DRG: 326 | End: 2021-06-22
Payer: MEDICARE

## 2021-06-22 LAB
ANION GAP SERPL CALC-SCNC: 6 MMOL/L (ref 5–15)
BUN SERPL-MCNC: 48 MG/DL (ref 6–20)
BUN/CREAT SERPL: 36 (ref 12–20)
CALCIUM SERPL-MCNC: 7.6 MG/DL (ref 8.5–10.1)
CHLORIDE SERPL-SCNC: 111 MMOL/L (ref 97–108)
CO2 SERPL-SCNC: 24 MMOL/L (ref 21–32)
COMMENT, HOLDF: NORMAL
CREAT SERPL-MCNC: 1.35 MG/DL (ref 0.55–1.02)
ERYTHROCYTE [DISTWIDTH] IN BLOOD BY AUTOMATED COUNT: 23.8 % (ref 11.5–14.5)
GLUCOSE BLD STRIP.AUTO-MCNC: 150 MG/DL (ref 65–117)
GLUCOSE BLD STRIP.AUTO-MCNC: 159 MG/DL (ref 65–117)
GLUCOSE BLD STRIP.AUTO-MCNC: 89 MG/DL (ref 65–117)
GLUCOSE BLD STRIP.AUTO-MCNC: 94 MG/DL (ref 65–117)
GLUCOSE SERPL-MCNC: 84 MG/DL (ref 65–100)
HCT VFR BLD AUTO: 25.4 % (ref 35–47)
HCT VFR BLD AUTO: 33.1 % (ref 35–47)
HGB BLD-MCNC: 7.5 G/DL (ref 11.5–16)
HGB BLD-MCNC: 9.4 G/DL (ref 11.5–16)
HISTORY CHECKED?,CKHIST: NORMAL
MCH RBC QN AUTO: 22.6 PG (ref 26–34)
MCHC RBC AUTO-ENTMCNC: 29.5 G/DL (ref 30–36.5)
MCV RBC AUTO: 76.5 FL (ref 80–99)
NRBC # BLD: 0.78 K/UL (ref 0–0.01)
NRBC BLD-RTO: 5.1 PER 100 WBC
PLATELET # BLD AUTO: 246 K/UL (ref 150–400)
PMV BLD AUTO: 9.8 FL (ref 8.9–12.9)
POTASSIUM SERPL-SCNC: 4.3 MMOL/L (ref 3.5–5.1)
RBC # BLD AUTO: 3.32 M/UL (ref 3.8–5.2)
SAMPLES BEING HELD,HOLD: NORMAL
SERVICE CMNT-IMP: ABNORMAL
SERVICE CMNT-IMP: ABNORMAL
SERVICE CMNT-IMP: NORMAL
SERVICE CMNT-IMP: NORMAL
SODIUM SERPL-SCNC: 141 MMOL/L (ref 136–145)
WBC # BLD AUTO: 15.4 K/UL (ref 3.6–11)

## 2021-06-22 PROCEDURE — 30233N1 TRANSFUSION OF NONAUTOLOGOUS RED BLOOD CELLS INTO PERIPHERAL VEIN, PERCUTANEOUS APPROACH: ICD-10-PCS | Performed by: INTERNAL MEDICINE

## 2021-06-22 PROCEDURE — 0FB03ZX EXCISION OF LIVER, PERCUTANEOUS APPROACH, DIAGNOSTIC: ICD-10-PCS | Performed by: SURGERY

## 2021-06-22 PROCEDURE — 76010000172 HC OR TIME 2.5 TO 3 HR INTENSV-TIER 1: Performed by: SURGERY

## 2021-06-22 PROCEDURE — 77030003029 HC SUT VCRL J&J -B: Performed by: SURGERY

## 2021-06-22 PROCEDURE — 74011250637 HC RX REV CODE- 250/637: Performed by: SURGERY

## 2021-06-22 PROCEDURE — 77030026438 HC STYL ET INTUB CARD -A: Performed by: ANESTHESIOLOGY

## 2021-06-22 PROCEDURE — 2709999900 HC NON-CHARGEABLE SUPPLY: Performed by: SURGERY

## 2021-06-22 PROCEDURE — 86901 BLOOD TYPING SEROLOGIC RH(D): CPT

## 2021-06-22 PROCEDURE — P9016 RBC LEUKOCYTES REDUCED: HCPCS

## 2021-06-22 PROCEDURE — 77030016151 HC PROTCTR LNS DFOG COVD -B: Performed by: SURGERY

## 2021-06-22 PROCEDURE — 77030002996 HC SUT SLK J&J -A: Performed by: SURGERY

## 2021-06-22 PROCEDURE — 77030008684 HC TU ET CUF COVD -B: Performed by: ANESTHESIOLOGY

## 2021-06-22 PROCEDURE — 77030040361 HC SLV COMPR DVT MDII -B: Performed by: SURGERY

## 2021-06-22 PROCEDURE — 43611 EXCISION OF STOMACH LESION: CPT | Performed by: SURGERY

## 2021-06-22 PROCEDURE — 2709999900 HC NON-CHARGEABLE SUPPLY

## 2021-06-22 PROCEDURE — 05HM33Z INSERTION OF INFUSION DEVICE INTO RIGHT INTERNAL JUGULAR VEIN, PERCUTANEOUS APPROACH: ICD-10-PCS | Performed by: INTERNAL MEDICINE

## 2021-06-22 PROCEDURE — 88341 IMHCHEM/IMCYTCHM EA ADD ANTB: CPT

## 2021-06-22 PROCEDURE — 36600 WITHDRAWAL OF ARTERIAL BLOOD: CPT

## 2021-06-22 PROCEDURE — 88309 TISSUE EXAM BY PATHOLOGIST: CPT

## 2021-06-22 PROCEDURE — 88331 PATH CONSLTJ SURG 1 BLK 1SPC: CPT

## 2021-06-22 PROCEDURE — 74011000258 HC RX REV CODE- 258: Performed by: HOSPITALIST

## 2021-06-22 PROCEDURE — 77030034628 HC LIGASURE LAP SEAL DIV MD COVD -F: Performed by: SURGERY

## 2021-06-22 PROCEDURE — 88313 SPECIAL STAINS GROUP 2: CPT

## 2021-06-22 PROCEDURE — 74011000250 HC RX REV CODE- 250: Performed by: SURGERY

## 2021-06-22 PROCEDURE — 77030013079 HC BLNKT BAIR HGGR 3M -A: Performed by: ANESTHESIOLOGY

## 2021-06-22 PROCEDURE — 77030002966 HC SUT PDS J&J -A: Performed by: SURGERY

## 2021-06-22 PROCEDURE — 74011000250 HC RX REV CODE- 250: Performed by: HOSPITALIST

## 2021-06-22 PROCEDURE — 74011000258 HC RX REV CODE- 258: Performed by: SURGERY

## 2021-06-22 PROCEDURE — 71045 X-RAY EXAM CHEST 1 VIEW: CPT

## 2021-06-22 PROCEDURE — 74011250637 HC RX REV CODE- 250/637: Performed by: INTERNAL MEDICINE

## 2021-06-22 PROCEDURE — 88342 IMHCHEM/IMCYTCHM 1ST ANTB: CPT

## 2021-06-22 PROCEDURE — 86920 COMPATIBILITY TEST SPIN: CPT

## 2021-06-22 PROCEDURE — 76060000036 HC ANESTHESIA 2.5 TO 3 HR: Performed by: SURGERY

## 2021-06-22 PROCEDURE — 74011250636 HC RX REV CODE- 250/636: Performed by: SURGERY

## 2021-06-22 PROCEDURE — 97116 GAIT TRAINING THERAPY: CPT

## 2021-06-22 PROCEDURE — 77030003481 HC NDL BIOP GUN BARD -B: Performed by: SURGERY

## 2021-06-22 PROCEDURE — 74011000250 HC RX REV CODE- 250: Performed by: NURSE ANESTHETIST, CERTIFIED REGISTERED

## 2021-06-22 PROCEDURE — 86922 COMPATIBILITY TEST ANTIGLOB: CPT

## 2021-06-22 PROCEDURE — 77030031139 HC SUT VCRL2 J&J -A: Performed by: SURGERY

## 2021-06-22 PROCEDURE — 47001 NDL BIOPSY LVR TM OTH MAJ PX: CPT | Performed by: SURGERY

## 2021-06-22 PROCEDURE — 85027 COMPLETE CBC AUTOMATED: CPT

## 2021-06-22 PROCEDURE — 77030012770 HC TRCR OPT FX AMR -B: Performed by: SURGERY

## 2021-06-22 PROCEDURE — 77030009527 HC GEL PRT SYS AMR -E: Performed by: SURGERY

## 2021-06-22 PROCEDURE — 77030009968 HC RELD STPLR ENDOSC J&J -D: Performed by: SURGERY

## 2021-06-22 PROCEDURE — 77030020053 HC ELECTRD LAPSCP COVD -B: Performed by: SURGERY

## 2021-06-22 PROCEDURE — 77030008756 HC TU IRR SUC STRY -B: Performed by: SURGERY

## 2021-06-22 PROCEDURE — C9113 INJ PANTOPRAZOLE SODIUM, VIA: HCPCS | Performed by: HOSPITALIST

## 2021-06-22 PROCEDURE — 85018 HEMOGLOBIN: CPT

## 2021-06-22 PROCEDURE — 74011250636 HC RX REV CODE- 250/636: Performed by: NURSE ANESTHETIST, CERTIFIED REGISTERED

## 2021-06-22 PROCEDURE — 77030013252 HC APPL DUPLOSPRY BAXT -B: Performed by: SURGERY

## 2021-06-22 PROCEDURE — C9113 INJ PANTOPRAZOLE SODIUM, VIA: HCPCS | Performed by: SURGERY

## 2021-06-22 PROCEDURE — C1751 CATH, INF, PER/CENT/MIDLINE: HCPCS | Performed by: ANESTHESIOLOGY

## 2021-06-22 PROCEDURE — 36415 COLL VENOUS BLD VENIPUNCTURE: CPT

## 2021-06-22 PROCEDURE — 77030005513 HC CATH URETH FOL11 MDII -B: Performed by: SURGERY

## 2021-06-22 PROCEDURE — 97110 THERAPEUTIC EXERCISES: CPT

## 2021-06-22 PROCEDURE — 77030013567 HC DRN WND RESERV BARD -A: Performed by: SURGERY

## 2021-06-22 PROCEDURE — 74011000258 HC RX REV CODE- 258: Performed by: NURSE ANESTHETIST, CERTIFIED REGISTERED

## 2021-06-22 PROCEDURE — 76210000017 HC OR PH I REC 1.5 TO 2 HR: Performed by: SURGERY

## 2021-06-22 PROCEDURE — 82962 GLUCOSE BLOOD TEST: CPT

## 2021-06-22 PROCEDURE — 74011250636 HC RX REV CODE- 250/636: Performed by: HOSPITALIST

## 2021-06-22 PROCEDURE — 77030020061 HC IV BLD WRMR ADMIN SET 3M -B: Performed by: ANESTHESIOLOGY

## 2021-06-22 PROCEDURE — 77030008608 HC TRCR ENDOSC SMTH AMR -B: Performed by: SURGERY

## 2021-06-22 PROCEDURE — 77030018673: Performed by: SURGERY

## 2021-06-22 PROCEDURE — 77030027876 HC STPLR ENDOSC FLX PWR J&J -G1: Performed by: SURGERY

## 2021-06-22 PROCEDURE — 65660000000 HC RM CCU STEPDOWN

## 2021-06-22 PROCEDURE — 77030002933 HC SUT MCRYL J&J -A: Performed by: SURGERY

## 2021-06-22 PROCEDURE — 94760 N-INVAS EAR/PLS OXIMETRY 1: CPT

## 2021-06-22 PROCEDURE — 0DB64ZZ EXCISION OF STOMACH, PERCUTANEOUS ENDOSCOPIC APPROACH: ICD-10-PCS | Performed by: SURGERY

## 2021-06-22 PROCEDURE — 86921 COMPATIBILITY TEST INCUBATE: CPT

## 2021-06-22 PROCEDURE — 88307 TISSUE EXAM BY PATHOLOGIST: CPT

## 2021-06-22 PROCEDURE — 77030020829: Performed by: SURGERY

## 2021-06-22 PROCEDURE — 77030010507 HC ADH SKN DERMBND J&J -B: Performed by: SURGERY

## 2021-06-22 PROCEDURE — 80048 BASIC METABOLIC PNL TOTAL CA: CPT

## 2021-06-22 RX ORDER — LIDOCAINE HYDROCHLORIDE 10 MG/ML
0.1 INJECTION, SOLUTION EPIDURAL; INFILTRATION; INTRACAUDAL; PERINEURAL AS NEEDED
Status: DISCONTINUED | OUTPATIENT
Start: 2021-06-22 | End: 2021-06-22 | Stop reason: HOSPADM

## 2021-06-22 RX ORDER — GLYCOPYRROLATE 0.2 MG/ML
INJECTION INTRAMUSCULAR; INTRAVENOUS AS NEEDED
Status: DISCONTINUED | OUTPATIENT
Start: 2021-06-22 | End: 2021-06-22 | Stop reason: HOSPADM

## 2021-06-22 RX ORDER — MIDAZOLAM HYDROCHLORIDE 1 MG/ML
1 INJECTION, SOLUTION INTRAMUSCULAR; INTRAVENOUS AS NEEDED
Status: DISCONTINUED | OUTPATIENT
Start: 2021-06-22 | End: 2021-06-22 | Stop reason: HOSPADM

## 2021-06-22 RX ORDER — NEOSTIGMINE METHYLSULFATE 1 MG/ML
INJECTION, SOLUTION INTRAVENOUS AS NEEDED
Status: DISCONTINUED | OUTPATIENT
Start: 2021-06-22 | End: 2021-06-22 | Stop reason: HOSPADM

## 2021-06-22 RX ORDER — SODIUM CHLORIDE, SODIUM LACTATE, POTASSIUM CHLORIDE, CALCIUM CHLORIDE 600; 310; 30; 20 MG/100ML; MG/100ML; MG/100ML; MG/100ML
INJECTION, SOLUTION INTRAVENOUS
Status: DISCONTINUED | OUTPATIENT
Start: 2021-06-22 | End: 2021-06-22 | Stop reason: HOSPADM

## 2021-06-22 RX ORDER — SODIUM CHLORIDE 9 MG/ML
250 INJECTION, SOLUTION INTRAVENOUS AS NEEDED
Status: DISCONTINUED | OUTPATIENT
Start: 2021-06-22 | End: 2021-06-26

## 2021-06-22 RX ORDER — METOPROLOL TARTRATE 5 MG/5ML
2.5 INJECTION INTRAVENOUS EVERY 6 HOURS
Status: DISCONTINUED | OUTPATIENT
Start: 2021-06-22 | End: 2021-06-25

## 2021-06-22 RX ORDER — BUPIVACAINE HYDROCHLORIDE AND EPINEPHRINE 5; 5 MG/ML; UG/ML
INJECTION, SOLUTION EPIDURAL; INTRACAUDAL; PERINEURAL AS NEEDED
Status: DISCONTINUED | OUTPATIENT
Start: 2021-06-22 | End: 2021-06-22

## 2021-06-22 RX ORDER — ONDANSETRON 2 MG/ML
INJECTION INTRAMUSCULAR; INTRAVENOUS AS NEEDED
Status: DISCONTINUED | OUTPATIENT
Start: 2021-06-22 | End: 2021-06-22 | Stop reason: HOSPADM

## 2021-06-22 RX ORDER — SODIUM CHLORIDE, SODIUM LACTATE, POTASSIUM CHLORIDE, CALCIUM CHLORIDE 600; 310; 30; 20 MG/100ML; MG/100ML; MG/100ML; MG/100ML
25 INJECTION, SOLUTION INTRAVENOUS CONTINUOUS
Status: DISCONTINUED | OUTPATIENT
Start: 2021-06-22 | End: 2021-06-22 | Stop reason: HOSPADM

## 2021-06-22 RX ORDER — DEXAMETHASONE SODIUM PHOSPHATE 4 MG/ML
INJECTION, SOLUTION INTRA-ARTICULAR; INTRALESIONAL; INTRAMUSCULAR; INTRAVENOUS; SOFT TISSUE AS NEEDED
Status: DISCONTINUED | OUTPATIENT
Start: 2021-06-22 | End: 2021-06-22 | Stop reason: HOSPADM

## 2021-06-22 RX ORDER — FENTANYL CITRATE 50 UG/ML
INJECTION, SOLUTION INTRAMUSCULAR; INTRAVENOUS AS NEEDED
Status: DISCONTINUED | OUTPATIENT
Start: 2021-06-22 | End: 2021-06-22 | Stop reason: HOSPADM

## 2021-06-22 RX ORDER — HYDROMORPHONE HYDROCHLORIDE 2 MG/ML
INJECTION, SOLUTION INTRAMUSCULAR; INTRAVENOUS; SUBCUTANEOUS AS NEEDED
Status: DISCONTINUED | OUTPATIENT
Start: 2021-06-22 | End: 2021-06-22 | Stop reason: HOSPADM

## 2021-06-22 RX ORDER — SODIUM CHLORIDE 9 MG/ML
50 INJECTION, SOLUTION INTRAVENOUS CONTINUOUS
Status: DISCONTINUED | OUTPATIENT
Start: 2021-06-22 | End: 2021-06-25

## 2021-06-22 RX ORDER — ONDANSETRON 2 MG/ML
4 INJECTION INTRAMUSCULAR; INTRAVENOUS AS NEEDED
Status: DISCONTINUED | OUTPATIENT
Start: 2021-06-22 | End: 2021-06-22 | Stop reason: HOSPADM

## 2021-06-22 RX ORDER — SUCCINYLCHOLINE CHLORIDE 20 MG/ML
INJECTION INTRAMUSCULAR; INTRAVENOUS AS NEEDED
Status: DISCONTINUED | OUTPATIENT
Start: 2021-06-22 | End: 2021-06-22 | Stop reason: HOSPADM

## 2021-06-22 RX ORDER — INSULIN LISPRO 100 [IU]/ML
INJECTION, SOLUTION INTRAVENOUS; SUBCUTANEOUS EVERY 6 HOURS
Status: DISCONTINUED | OUTPATIENT
Start: 2021-06-23 | End: 2021-07-01 | Stop reason: HOSPADM

## 2021-06-22 RX ORDER — HYDROMORPHONE HCL/0.9% NACL/PF 0.5 MG/ML
PLASTIC BAG, INJECTION (ML) INTRAVENOUS CONTINUOUS
Status: DISCONTINUED | OUTPATIENT
Start: 2021-06-22 | End: 2021-06-25

## 2021-06-22 RX ORDER — HYDROMORPHONE HCL/0.9% NACL/PF 0.5 MG/ML
PLASTIC BAG, INJECTION (ML) INTRAVENOUS
Status: DISPENSED
Start: 2021-06-22 | End: 2021-06-23

## 2021-06-22 RX ORDER — FENTANYL CITRATE 50 UG/ML
50 INJECTION, SOLUTION INTRAMUSCULAR; INTRAVENOUS AS NEEDED
Status: DISCONTINUED | OUTPATIENT
Start: 2021-06-22 | End: 2021-06-22 | Stop reason: HOSPADM

## 2021-06-22 RX ORDER — ROCURONIUM BROMIDE 10 MG/ML
INJECTION, SOLUTION INTRAVENOUS AS NEEDED
Status: DISCONTINUED | OUTPATIENT
Start: 2021-06-22 | End: 2021-06-22 | Stop reason: HOSPADM

## 2021-06-22 RX ORDER — PROPOFOL 10 MG/ML
INJECTION, EMULSION INTRAVENOUS AS NEEDED
Status: DISCONTINUED | OUTPATIENT
Start: 2021-06-22 | End: 2021-06-22 | Stop reason: HOSPADM

## 2021-06-22 RX ORDER — FENTANYL CITRATE 50 UG/ML
25 INJECTION, SOLUTION INTRAMUSCULAR; INTRAVENOUS
Status: DISCONTINUED | OUTPATIENT
Start: 2021-06-22 | End: 2021-06-22 | Stop reason: HOSPADM

## 2021-06-22 RX ORDER — HYDROMORPHONE HYDROCHLORIDE 1 MG/ML
.2-.5 INJECTION, SOLUTION INTRAMUSCULAR; INTRAVENOUS; SUBCUTANEOUS
Status: DISCONTINUED | OUTPATIENT
Start: 2021-06-22 | End: 2021-06-22 | Stop reason: HOSPADM

## 2021-06-22 RX ORDER — METOPROLOL TARTRATE 5 MG/5ML
2.5 INJECTION INTRAVENOUS
Status: DISCONTINUED | OUTPATIENT
Start: 2021-06-22 | End: 2021-06-22

## 2021-06-22 RX ADMIN — PHENYLEPHRINE HYDROCHLORIDE 10 MCG/MIN: 10 INJECTION INTRAVENOUS at 14:25

## 2021-06-22 RX ADMIN — SUCCINYLCHOLINE CHLORIDE 200 MG: 20 INJECTION, SOLUTION INTRAMUSCULAR; INTRAVENOUS at 13:55

## 2021-06-22 RX ADMIN — FENTANYL CITRATE 50 MCG: 50 INJECTION, SOLUTION INTRAMUSCULAR; INTRAVENOUS at 14:10

## 2021-06-22 RX ADMIN — GLYCOPYRROLATE 0.6 MG: 0.2 INJECTION, SOLUTION INTRAMUSCULAR; INTRAVENOUS at 16:05

## 2021-06-22 RX ADMIN — PIPERACILLIN AND TAZOBACTAM 3.38 G: 3; .375 INJECTION, POWDER, LYOPHILIZED, FOR SOLUTION INTRAVENOUS at 18:49

## 2021-06-22 RX ADMIN — METOPROLOL TARTRATE 2.5 MG: 5 INJECTION INTRAVENOUS at 18:49

## 2021-06-22 RX ADMIN — HYDROMORPHONE HYDROCHLORIDE 0.6 MG: 2 INJECTION, SOLUTION INTRAMUSCULAR; INTRAVENOUS; SUBCUTANEOUS at 14:21

## 2021-06-22 RX ADMIN — Medication 1 AMPULE: at 21:49

## 2021-06-22 RX ADMIN — METOPROLOL TARTRATE 2.5 MG: 5 INJECTION INTRAVENOUS at 23:04

## 2021-06-22 RX ADMIN — ROCURONIUM BROMIDE 10 MG: 10 INJECTION INTRAVENOUS at 14:40

## 2021-06-22 RX ADMIN — PROPOFOL 150 MG: 10 INJECTION, EMULSION INTRAVENOUS at 13:54

## 2021-06-22 RX ADMIN — PIPERACILLIN AND TAZOBACTAM 3.38 G: 3; .375 INJECTION, POWDER, LYOPHILIZED, FOR SOLUTION INTRAVENOUS at 23:04

## 2021-06-22 RX ADMIN — HYDROMORPHONE HYDROCHLORIDE 0.4 MG: 2 INJECTION, SOLUTION INTRAMUSCULAR; INTRAVENOUS; SUBCUTANEOUS at 14:56

## 2021-06-22 RX ADMIN — AMLODIPINE BESYLATE 10 MG: 5 TABLET ORAL at 12:16

## 2021-06-22 RX ADMIN — SODIUM CHLORIDE 40 MG: 9 INJECTION INTRAMUSCULAR; INTRAVENOUS; SUBCUTANEOUS at 21:48

## 2021-06-22 RX ADMIN — Medication: at 17:55

## 2021-06-22 RX ADMIN — DEXAMETHASONE SODIUM PHOSPHATE 8 MG: 4 INJECTION, SOLUTION INTRAMUSCULAR; INTRAVENOUS at 14:28

## 2021-06-22 RX ADMIN — ONDANSETRON HYDROCHLORIDE 4 MG: 2 INJECTION, SOLUTION INTRAMUSCULAR; INTRAVENOUS at 15:55

## 2021-06-22 RX ADMIN — SODIUM CHLORIDE 3.38 G: 900 INJECTION INTRAVENOUS at 14:00

## 2021-06-22 RX ADMIN — SODIUM CHLORIDE 10 ML: 9 INJECTION, SOLUTION INTRAMUSCULAR; INTRAVENOUS; SUBCUTANEOUS at 12:16

## 2021-06-22 RX ADMIN — DULOXETINE 60 MG: 30 CAPSULE, DELAYED RELEASE ORAL at 12:16

## 2021-06-22 RX ADMIN — SODIUM CHLORIDE 40 MG: 9 INJECTION INTRAMUSCULAR; INTRAVENOUS; SUBCUTANEOUS at 12:16

## 2021-06-22 RX ADMIN — SODIUM CHLORIDE 3.38 G: 900 INJECTION INTRAVENOUS at 15:45

## 2021-06-22 RX ADMIN — SODIUM CHLORIDE 75 ML/HR: 9 INJECTION, SOLUTION INTRAVENOUS at 17:28

## 2021-06-22 RX ADMIN — PIPERACILLIN AND TAZOBACTAM 3.38 G: 3; .375 INJECTION, POWDER, LYOPHILIZED, FOR SOLUTION INTRAVENOUS at 01:32

## 2021-06-22 RX ADMIN — ROCURONIUM BROMIDE 50 MG: 10 INJECTION INTRAVENOUS at 14:00

## 2021-06-22 RX ADMIN — IRON SUCROSE 300 MG: 20 INJECTION, SOLUTION INTRAVENOUS at 23:04

## 2021-06-22 RX ADMIN — SODIUM CHLORIDE 10 ML: 9 INJECTION, SOLUTION INTRAMUSCULAR; INTRAVENOUS; SUBCUTANEOUS at 21:49

## 2021-06-22 RX ADMIN — SODIUM CHLORIDE, POTASSIUM CHLORIDE, SODIUM LACTATE AND CALCIUM CHLORIDE: 600; 310; 30; 20 INJECTION, SOLUTION INTRAVENOUS at 13:40

## 2021-06-22 RX ADMIN — FENTANYL CITRATE 50 MCG: 50 INJECTION, SOLUTION INTRAMUSCULAR; INTRAVENOUS at 13:54

## 2021-06-22 RX ADMIN — NEOSTIGMINE METHYLSULFATE 5 MG: 1 INJECTION, SOLUTION INTRAVENOUS at 16:05

## 2021-06-22 RX ADMIN — SUGAMMADEX 200 MG: 100 INJECTION, SOLUTION INTRAVENOUS at 16:13

## 2021-06-22 NOTE — PROGRESS NOTES
End of Shift Note    Bedside shift change report given to Adriano Diaz RN (oncoming nurse) by Marek Sanchez RN (offgoing nurse). Report included the following information SBAR, Kardex, Intake/Output and MAR    Shift worked:  [id number]     Shift summary and any significant changes:     Patient stable through shift, no complaints of pain, all scheduled meds and frequent rounding provided. PT transferred patient to recliner after lunch, 2 assist to get patient back to bed, weakness in BLEs. Concerns for physician to address:       Zone phone for oncoming shift:          Activity:  Activity Level: Up with Assistance  Number times ambulated in hallways past shift:  0  Number of times OOB to chair past shift: 1    Cardiac:   Cardiac Monitoring: Yes           Access:   Current line(s): PIV     Genitourinary:   Urinary status: voiding and external catheter    Respiratory:   O2 Device: None (Room air)  Chronic home O2 use?: NO  Incentive spirometer at bedside: NO     GI:  Last Bowel Movement Date: 06/18/21  Current diet:  ADULT DIET Full Liquid  DIET NPO  Passing flatus: YES  Tolerating current diet: YES       Pain Management:   Patient states pain is manageable on current regimen: YES    Skin:  Rufus Score: 19  Interventions: float heels, increase time out of bed, PT/OT consult and internal/external urinary devices    Patient Safety:  Fall Score:  Total Score: 4  Interventions: assistive device (walker, cane, etc), gripper socks and pt to call before getting OOB  High Fall Risk: Yes    Length of Stay:  Expected LOS: 5d 12h  Actual LOS: 2      Marek Sanchez RN

## 2021-06-22 NOTE — PROGRESS NOTES
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 Presenting with hgb 4.7, s/p 3 units PRBC. Hgb now at 7.5  IV PPI  gen surg evaluated, plan for laparoscopic partial gastrectomy today    Leukocytosis,likely reactiveprocalcitonin wnl.  UA neg/no urinary symptoms. Bcx NTD. CXR neg, CT a/p as above  Stop  zosyn    HTN  Elevated troponin  Chronic systolic heart failure, EF 30-35% in 10/20, EF 35-40% on repeat Echo  EKG neg for acute ischemia. Cont' cardiac meds. Not on ASA due to Gi bleed  Cardiology following    GUANACO on CKD3  Likely due to prerenal from blood loss  Monitor labs  Hold nephrotoxic agents  Follows with Dr Will Hoover    T2DM  SSI    Obesity / Body mass index is 31.81 kg/m². Estimated discharge date:   Barriers:clinical condition    Code status: Full  Prophylaxis: Hep SQ  Recommended Disposition: Home w/Family     Subjective:     Chief Complaint / Reason for Physician Visit  Pt without complaints. Discussed with RN events overnight. Review of Systems:  Symptom Y/N Comments  Symptom Y/N Comments   Fever/Chills    Chest Pain     Poor Appetite    Edema     Cough    Abdominal Pain     Sputum    Joint Pain     SOB/ARENAS    Pruritis/Rash     Nausea/vomit    Tolerating PT/OT     Diarrhea    Tolerating Diet     Constipation    Other       Could NOT obtain due to:      Objective:     VITALS:   Last 24hrs VS reviewed since prior progress note.  Most recent are:  Patient Vitals for the past 24 hrs:   Temp Pulse Resp BP SpO2   21 1108 97.9 °F (36.6 °C) 70 16 (!) 148/56 100 %   21 0613 98.1 °F (36.7 °C) 70 16 (!) 153/62 99 %   21 2319 98.2 °F (36.8 °C) 73 16 (!) 146/64 100 %   21 1938 98.1 °F (36.7 °C) (!) 101 16 (!) 142/49 95 %   21 1545 98.6 °F (37 °C) 67 16 (!) 129/58 100 % 06/21/21 1200 98.4 °F (36.9 °C) 65 18 (!) 133/57 99 %       Intake/Output Summary (Last 24 hours) at 6/22/2021 1124  Last data filed at 6/22/2021 1012  Gross per 24 hour   Intake --   Output 750 ml   Net -750 ml        I had a face to face encounter and independently examined this patient on 6/22/2021, as outlined below:  PHYSICAL EXAM:  General: WD, WN. Alert, cooperative, no acute distress    EENT:  EOMI. Anicteric sclerae. MMM  Resp:  CTA bilaterally, no wheezing or rales. No accessory muscle use  CV:  Regular  rhythm,  mild b/l LE edema  GI:  Soft, Non distended, Non tender. +Bowel sounds  Neurologic:  Alert and oriented X 3, normal speech  Psych:   Good insight. Not anxious nor agitated  Skin:  No rashes. No jaundice    Reviewed most current lab test results and cultures  YES  Reviewed most current radiology test results   YES  Review and summation of old records today    NO  Reviewed patient's current orders and MAR    YES  PMH/SH reviewed - no change compared to H&P  ________________________________________________________________________  Care Plan discussed with:    Comments   Patient x    Family      RN x    Care Manager     Consultant                        Multidiciplinary team rounds were held today with , nursing, pharmacist and clinical coordinator. Patient's plan of care was discussed; medications were reviewed and discharge planning was addressed. ________________________________________________________________________  Total NON critical care TIME:  35   Minutes    Total CRITICAL CARE TIME Spent:   Minutes non procedure based      Comments   >50% of visit spent in counseling and coordination of care     ________________________________________________________________________  Antony Pedro MD     Procedures: see electronic medical records for all procedures/Xrays and details which were not copied into this note but were reviewed prior to creation of Plan.       LABS:  I reviewed today's most current labs and imaging studies.   Pertinent labs include:  Recent Labs     06/22/21  0532 06/20/21  1358 06/19/21  1654   WBC 15.4*  --  21.8*   HGB 7.5* 8.2* 4.7*   HCT 25.4* 27.0* 17.8*     --  380     Recent Labs     06/22/21  0532 06/19/21  1654    133*   K 4.3 5.0   * 102   CO2 24 22   GLU 84 180*   BUN 48* 81*   CREA 1.35* 2.44*   CA 7.6* 8.0*   ALB  --  2.3*   TBILI  --  0.5   ALT  --  104*       Signed: Ishmael Enamorado MD

## 2021-06-22 NOTE — PROGRESS NOTES
Problem: Mobility Impaired (Adult and Pediatric)  Goal: *Acute Goals and Plan of Care (Insert Text)  Description: FUNCTIONAL STATUS PRIOR TO ADMISSION: Pt reports independence with ambulation and ADLs however reports owning RW and cane, stating she is \"supposed to use them\" but doesn't. Reports history of 4 recent falls and that she has been nonambulatory since most recent fall 3 days ago. States she is mostly sedentary at home. HOME SUPPORT PRIOR TO ADMISSION: The patient lived with , stating \"they help each other out. \"    Physical Therapy Goals  Initiated 6/21/2021  1. Patient will move from supine to sit and sit to supine , scoot up and down, and roll side to side in bed with supervision/set-up within 7 day(s). 2.  Patient will transfer from bed to chair and chair to bed with minimal assistance/contact guard assist using the least restrictive device within 7 day(s). 3.  Patient will perform sit to stand with minimal assistance/contact guard assist within 7 day(s). 4.  Patient will ambulate with minimal assistance/contact guard assist for 50 feet with the least restrictive device within 7 day(s). Outcome: Progressing Towards Goal   PHYSICAL THERAPY TREATMENT  Patient: Lucy Harvey (32 y.o. female)  Date: 6/22/2021  Diagnosis: GI bleed [K92.2]  Acute on chronic anemia [D64.9]  GIST (gastrointestinal stroma tumor), malignant, colon ([facility] Viraj Calvin. A4]  NSTEMI (non-ST elevated myocardial infarction) ([facility] [I21.4]  Leukocytosis [D72.829]  Acute kidney injury superimposed on chronic kidney disease ([facility] [address] [N17.9, N18.9] <principal problem not specified>  Procedure(s) (LRB):  LAPAROSCOPIC PARTIAL GASTRECTOMY (N/A) Day of Surgery  Precautions:fall    Chart, physical therapy assessment, plan of care and goals were reviewed. ASSESSMENT  Patient continues with skilled PT services and is progressing towards goals. Pt doing much better this session in all areas.  She was cooperative and talkative and following commands. She was able to come to the EOB with min A, scoot with SBA. She was able to come to stand with min A and amb with RW with CGA for 25'. She participated well in seated AROM exercise hip flexion, hip abd/add, knee ext, ankle dorsiflexion and plantarflexion all x10 reps with cues. Pt going for surgery this pm. If she continues to improve after surgery, she may be able to return home with family A. She would still need A with OOB activity. She benefits from use of RW. Current Level of Function Impacting Discharge (mobility/balance): Overall SBA to min A; improved with use of RW    Other factors to consider for discharge: increased fall risk, previously fairly sedentary by report         PLAN :  Patient continues to benefit from skilled intervention to address the above impairments. Continue treatment per established plan of care. to address goals. Recommendation for discharge: (in order for the patient to meet his/her long term goals)  To be determined: pending progress - pt doing better with mobility, would need 24 hr assist with OOB activity - if available could go home with HHPT; however if decline post surgery today or does not have assist at home, would need SNF rehab prior to return home    This discharge recommendation:  Has been made in collaboration with the attending provider and/or case management    IF patient discharges home will need the following DME: rolling walker       SUBJECTIVE:   Patient stated Now that I'm not supposed to eat today, I'm hungry for everything.     OBJECTIVE DATA SUMMARY:   Critical Behavior:  Neurologic State: Alert  Orientation Level: Oriented X4  Cognition: Follows commands     Functional Mobility Training:  Bed Mobility:  Rolling: Contact guard assistance  Supine to Sit: Minimum assistance     Scooting: Stand-by assistance        Transfers:  Sit to Stand: Minimum assistance  Stand to Sit: Contact guard assistance        Bed to Chair: Contact guard assistance; Other (comment) (once standing to turn with RW)                    Balance:  Sitting: Intact  Standing: Impaired  Standing - Static: Fair; Other (comment) (RW)  Standing - Dynamic : Fair; Other (comment) (RW)  Ambulation/Gait Training:  Distance (ft): 25 Feet (ft)  Assistive Device: Gait belt;Walker, rolling  Ambulation - Level of Assistance: Contact guard assistance        Gait Abnormalities: Decreased step clearance        Base of Support: Widened     Speed/Tiffany: Slow;Pace decreased (<100 feet/min)  Step Length: Right shortened;Left shortened                Activity Tolerance:   Good, VSS    After treatment patient left in no apparent distress:   Sitting in chair and Call bell within reach    COMMUNICATION/COLLABORATION:   The patients plan of care was discussed with: Registered nurse and Case management.      Ashia Jaeger, PT   Time Calculation: 23 mins

## 2021-06-22 NOTE — PERIOP NOTES
Handoff Report from Operating Room to PACU    Report received from [facility] 6471 and 1815 [facility] regarding Anthony Avila. Surgeon(s):  Samara Cazares MD  And Procedure(s) (LRB):  LAPAROSCOPIC PARTIAL GASTRECTOMY WITH CORE LIVER BIOPSY (N/A)  confirmed   with allergies and dressings discussed. Anesthesia type, drugs, patient history, complications, estimated blood loss, vital signs, intake and output, and last pain medication, lines, reversal medications and temperature were reviewed. 1700- H&H sent. NG tube at 60 CM at right nare. Flush with 30cc  saline and then flushed with 30 cc air. NG tube hooked to low suction. Bloody out put noted. 46- Dr. Nneka Ruth at bedside to Flush NG tube. Orders noted . 1399- Radiology aware of xray order. 1747- Xray at [address] discontinued per Order. Pressure held until Hemostasis archived. Pressure dressing applied. 1820- TRANSFER - OUT REPORT:    Verbal report given to DANNY Bowers RN (name) on Anthony Avila  being transferred to [facility](unit) for routine progression of care       Report consisted of patients Situation, Background, Assessment and   Recommendations(SBAR). Information from the following report(s) OR Summary, Procedure Summary, Intake/Output, MAR, Recent Results and Cardiac Rhythm SR with BBB was reviewed with the receiving nurse. Lines:   Triple Lumen 06/22/21 Right (Active)   Central Line Being Utilized Yes 06/22/21 1815   Criteria for Appropriate Use Hemodynamically unstable, requiring monitoring lines, vasopressors, or volume resuscitation 06/22/21 1815   Site Assessment Dry; Intact 06/22/21 1815   Infiltration Assessment 0 06/22/21 1815   Affected Extremity/Extremities Color distal to insertion site pink (or appropriate for race) 06/22/21 1815   Proximal Hub Color/Line Status Blue; Infusing 06/22/21 1815   Positive Blood Return (Medial Site) Yes 06/22/21 1815   Medial Hub Color/Line Status White;Capped 06/22/21 1815 Positive Blood Return (Lateral Site) Yes 06/22/21 1815   Distal Hub Color/Line Status Brown;Capped 06/22/21 1815   Positive Blood Return (Site #3) Yes 06/22/21 1815   Alcohol Cap Used Yes 06/22/21 1815        Opportunity for questions and clarification was provided. Patient transported with:   Monitor  O2 @ 2l liters  Registered Nurse  Tech   NG tube at 60 CM at the nare. PCA verified with receiving RN at bedside. Att empted to call patient's  and update on patient care and new room number. No answer. Previous floor RN also called and gave report to receiving  floor RN.   Patient resting easily responds to voice, follows all commands and oriented x4

## 2021-06-22 NOTE — PROGRESS NOTES
Received notification from bedside RN about patient with regards to: difficult stick, unable to obtain specimen for labs with multiple attempt    Intervention given: arterial stick per RT for lab draw x 1 ordered

## 2021-06-22 NOTE — PROGRESS NOTES
End of Shift Note    Bedside shift change report given to Saqib Malik (oncoming nurse) by Ernestina Browne (offgoing nurse). Report included the following information SBAR, Kardex and MAR    Shift worked:  7p-7a     Shift summary and any significant changes:    Scheduled medications have been given, see MAR. Patient did not complain of any pian during my shift. IV has been flushed and patent . IV on her right anticubital was removed due to leaking. NP was notified of PICC team not being able to get this patient's lab yesterday, a ART stick was ordered and done by Respiratory Therapist.  Blood Bank wanted to get updates on the remaining blood transfusion. Concerns for physician to address:  Patient is edematous. Zone phone for oncoming shift:          Activity:  Activity Level: Up with Assistance  Number times ambulated in hallways past shift: 0  Number of times OOB to chair past shift: 0    Cardiac:   Cardiac Monitoring: Yes      Cardiac Rhythm: Sinus Rhythm    Access:   Current line(s): PIV     Genitourinary:   Urinary status: external catheter    Respiratory:   O2 Device: None (Room air)  Chronic home O2 use?: NO  Incentive spirometer at bedside: NO     GI:  Last Bowel Movement Date: 06/18/21  Current diet:  DIET NPO  Passing flatus: YES  Tolerating current diet: YES       Pain Management:   Patient states pain is manageable on current regimen: YES    Skin:  Rufus Score: 17  Interventions: float heels and internal/external urinary devices    Patient Safety:  Fall Score:  Total Score: 4  Interventions: bed/chair alarm and gripper socks  High Fall Risk: Yes    Length of Stay:  Expected LOS: 5d 12h  Actual LOS: [address]

## 2021-06-22 NOTE — ANESTHESIA PROCEDURE NOTES
Central Line Placement    Performed by: Dorcas Saeed MD  Authorized by: Dorcas Saeed MD     Indications: vascular access and central pressure monitoring  Preanesthetic Checklist: patient identified, risks and benefits discussed, anesthesia consent, site marked, patient being monitored and timeout performed      Pre-procedure: All elements of maximal sterile barrier technique followed?  Yes    2% Chlorhexidine for cutaneous antisepsis, Hand hygiene performed prior to catheter insertion and Ultrasound guidance    Sterile Ultrasound Technique followed?: Yes            Procedure:   Prep:  ChloraPrep  Location: internal jugular  Orientation:  Right  Patient position:  Trendelenburg  Catheter type:  Triple lumen    Catheter length:  16 cm  Number of attempts:  1  Successful placement: Yes      Assessment:   Post-procedure:  Catheter secured, sterile dressing applied and sterile dressing with CHG applied  Assessment:  Blood return through all ports, free fluid flow and guidewire removal verified  Insertion:  Uncomplicated  Patient tolerance:  Patient tolerated the procedure well with no immediate complications

## 2021-06-22 NOTE — PROGRESS NOTES
End of Shift Note    Bedside shift change report given to Isis Lees (oncoming nurse) by Drew Harry RN (offgoing nurse). Report included the following information SBAR, Kardex and ED Summary    Shift worked:  7a-3p     Shift summary and any significant changes:     Pt had good shift. She went to surgery in afternoon. Concerns for physician to address:  none     Zone phone for oncoming shift:   [phone]       Activity:  Activity Level: Up with Assistance  Number times ambulated in hallways past shift: 1  Number of times OOB to chair past shift: 3    Cardiac:   Cardiac Monitoring: Yes      Cardiac Rhythm: Sinus Rhythm    Access:   Current line(s): PIV     Genitourinary:   Urinary status: voiding    Respiratory:   O2 Device: None (Room air)  Chronic home O2 use?: NO  Incentive spirometer at bedside: NO     GI:  Last Bowel Movement Date: 06/18/21  Current diet:  DIET NPO  Passing flatus: YES  Tolerating current diet: YES       Pain Management:   Patient states pain is manageable on current regimen: YES    Skin:  Rufus Score: 18  Interventions: PT/OT consult    Patient Safety:  Fall Score:  Total Score: 4  Interventions: gripper socks  High Fall Risk: Yes    Length of Stay:  Expected LOS: 5d 12h  Actual LOS: Bryanna Hogue, RN

## 2021-06-22 NOTE — ANESTHESIA POSTPROCEDURE EVALUATION
Procedure(s):  LAPAROSCOPIC PARTIAL GASTRECTOMY WITH CORE LIVER BIOPSY. general    Anesthesia Post Evaluation        Patient location during evaluation: PACU  Note status: Adequate. Level of consciousness: responsive to verbal stimuli and sleepy but conscious  Pain management: satisfactory to patient  Airway patency: patent  Anesthetic complications: no  Cardiovascular status: acceptable  Respiratory status: acceptable  Hydration status: acceptable  Comments: +Post-Anesthesia Evaluation and Assessment    Patient: rEwin Morris MRN: [id number]  SSN: [id number]   YOB: 1945  Age: 78 y.o. Sex: female      Cardiovascular Function/Vital Signs    /67   Pulse 69   Temp 36.6 °C (97.8 °F)   Resp 15   Ht 5' 2\" (1.575 m)   Wt 78.9 kg (173 lb 15.1 oz)   SpO2 98%   Breastfeeding No   BMI 31.81 kg/m²     Patient is status post Procedure(s):  LAPAROSCOPIC PARTIAL GASTRECTOMY WITH CORE LIVER BIOPSY. Nausea/Vomiting: Controlled. Postoperative hydration reviewed and adequate. Pain:  Pain Scale 1: FLACC (06/22/21 1705)  Pain Intensity 1: 0 (06/22/21 1705)   Managed. Neurological Status:   Neuro (WDL): Exceptions to WDL (06/22/21 1635)   At baseline. Mental Status and Level of Consciousness: Arousable. Pulmonary Status:   O2 Device: Nasal cannula (06/22/21 1800)   Adequate oxygenation and airway patent. Complications related to anesthesia: None    Post-anesthesia assessment completed. No concerns. Signed By: Navin Triplett MD    6/22/2021  Post anesthesia nausea and vomiting:  controlled      INITIAL Post-op Vital signs:   Vitals Value Taken Time   /67 06/22/21 1800   Temp 36.6 °C (97.8 °F) 06/22/21 1639   Pulse 69 06/22/21 1808   Resp 13 06/22/21 1808   SpO2 99 % 06/22/21 1808   Vitals shown include unvalidated device data.

## 2021-06-22 NOTE — PROGRESS NOTES
Transition of Care Plan:     RUR: 26 %  Disposition: Home with spouse   Follow up appointments: PCP,Specialists  DME needed: Pt has a RW and cane at home  Transportation at Discharge:Patient's friend to transport   [place] or means to access home:  Yes      IM Medicare letter: 2nd IM before discharge   Caregiver Contact:  Johnnie Graham [phone]  Discharge Caregiver contacted prior to discharge? Unit CM to continue to follow for discharge needs and planning. Chart reviewed. CM continuing to follow for discharge planning. Pt is not yet medically stable for discharge today. Plan for surgery today. CM will continue to be available should disposition needs arise.     MADELEINE Eaton   Care Manager, ED [facility]  [phone]

## 2021-06-22 NOTE — BRIEF OP NOTE
[id number]  Brief Postoperative Note    Patient: Bahvani Hudson  YOB: 1945  MRN: [id number]    Date of Procedure: 6/22/2021     Pre-Op Diagnosis: GASTRIC cardia GIST    Post-Op Diagnosis: Same as preoperative diagnosis. Mets to liver. Procedure(s):  LAPAROSCOPIC PARTIAL GASTRECTOMY WITH CORE LIVER BIOPSY    Surgeon(s):  Hamida Robles MD    Surgical Assistant: Surg Asst-1: Gladys Peterson    Anesthesia: General     Estimated Blood Loss (mL): less than 50     Complications: None immediate    Specimens:   ID Type Source Tests Collected by Time Destination   1 : liver biopsy Frozen Section   Hamida Robles MD 6/22/2021 1426 Pathology   2 : Gastric Cardia mass Preservative Stomach, Cardia  Hamida Robles MD 6/22/2021 1518 Pathology        Implants: * No implants in log *    Drains:   Glynn-Cheema Drain 06/22/21 Left Abdomen (Active)       Nasogastric Tube 06/22/21 (Active)       Findings: many liver mets - frozen showed spindle cells. Gastric cardia mass.       Electronically Signed by Shaista Meyer MD on 6/22/2021 at 3:57 PM

## 2021-06-22 NOTE — PERIOP NOTES
TRANSFER - IN REPORT:    Verbal report received from Ten Sheppard RN(name) on Kenji Suero  being received from Oncology Room 1114(unit) for ordered procedure      Report consisted of patients Situation, Background, Assessment and   Recommendations(SBAR). Information from the following report(s) SBAR, Kardex, Intake/Output, MAR, Recent Results, Med Rec Status, Cardiac Rhythm NSR and Procedure Verification was reviewed with the receiving nurse. Opportunity for questions and clarification was provided. Assessment completed upon patients arrival to unit and care assumed.

## 2021-06-22 NOTE — PROGRESS NOTES
1838- Pt arrived to room. VSS. Pt drowsy but awakens to voice. No complaints of pain. PCA available. 1900- Report given to oncoming nurse by Whitney Belle RN.

## 2021-06-23 LAB
ABO + RH BLD: NORMAL
ANION GAP SERPL CALC-SCNC: 9 MMOL/L (ref 5–15)
BASOPHILS # BLD: 0 K/UL (ref 0–0.1)
BASOPHILS NFR BLD: 0 % (ref 0–1)
BLD PROD TYP BPU: NORMAL
BLOOD BANK CMNT PATIENT-IMP: NORMAL
BLOOD GROUP ANTIBODIES SERPL: NORMAL
BPU ID: NORMAL
BUN SERPL-MCNC: 48 MG/DL (ref 6–20)
BUN/CREAT SERPL: 33 (ref 12–20)
CALCIUM SERPL-MCNC: 7.7 MG/DL (ref 8.5–10.1)
CHLORIDE SERPL-SCNC: 114 MMOL/L (ref 97–108)
CO2 SERPL-SCNC: 20 MMOL/L (ref 21–32)
CREAT SERPL-MCNC: 1.44 MG/DL (ref 0.55–1.02)
CROSSMATCH RESULT,%XM: NORMAL
DIFFERENTIAL METHOD BLD: ABNORMAL
EOSINOPHIL # BLD: 0 K/UL (ref 0–0.4)
EOSINOPHIL NFR BLD: 0 % (ref 0–7)
ERYTHROCYTE [DISTWIDTH] IN BLOOD BY AUTOMATED COUNT: 23.6 % (ref 11.5–14.5)
GLUCOSE BLD STRIP.AUTO-MCNC: 172 MG/DL (ref 65–117)
GLUCOSE BLD STRIP.AUTO-MCNC: 188 MG/DL (ref 65–117)
GLUCOSE BLD STRIP.AUTO-MCNC: 191 MG/DL (ref 65–117)
GLUCOSE BLD STRIP.AUTO-MCNC: 292 MG/DL (ref 65–117)
GLUCOSE SERPL-MCNC: 171 MG/DL (ref 65–100)
HCT VFR BLD AUTO: 31.4 % (ref 35–47)
HGB BLD-MCNC: 9 G/DL (ref 11.5–16)
IMM GRANULOCYTES # BLD AUTO: 0.2 K/UL (ref 0–0.04)
IMM GRANULOCYTES NFR BLD AUTO: 1 % (ref 0–0.5)
LYMPHOCYTES # BLD: 0.9 K/UL (ref 0.8–3.5)
LYMPHOCYTES NFR BLD: 4 % (ref 12–49)
MCH RBC QN AUTO: 23.3 PG (ref 26–34)
MCHC RBC AUTO-ENTMCNC: 28.7 G/DL (ref 30–36.5)
MCV RBC AUTO: 81.3 FL (ref 80–99)
MONOCYTES # BLD: 0.9 K/UL (ref 0–1)
MONOCYTES NFR BLD: 4 % (ref 5–13)
NEUTS SEG # BLD: 19.7 K/UL (ref 1.8–8)
NEUTS SEG NFR BLD: 91 % (ref 32–75)
NRBC # BLD: 0.61 K/UL (ref 0–0.01)
NRBC BLD-RTO: 2.8 PER 100 WBC
PLATELET # BLD AUTO: 240 K/UL (ref 150–400)
PMV BLD AUTO: 9.2 FL (ref 8.9–12.9)
POTASSIUM SERPL-SCNC: 5.2 MMOL/L (ref 3.5–5.1)
RBC # BLD AUTO: 3.86 M/UL (ref 3.8–5.2)
RBC MORPH BLD: ABNORMAL
SERVICE CMNT-IMP: ABNORMAL
SODIUM SERPL-SCNC: 143 MMOL/L (ref 136–145)
SPECIMEN EXP DATE BLD: NORMAL
STATUS OF UNIT,%ST: NORMAL
UNIT DIVISION, %UDIV: 0
WBC # BLD AUTO: 21.7 K/UL (ref 3.6–11)

## 2021-06-23 PROCEDURE — 74011250636 HC RX REV CODE- 250/636: Performed by: SURGERY

## 2021-06-23 PROCEDURE — 74011000250 HC RX REV CODE- 250: Performed by: SURGERY

## 2021-06-23 PROCEDURE — 77010033678 HC OXYGEN DAILY

## 2021-06-23 PROCEDURE — 82962 GLUCOSE BLOOD TEST: CPT

## 2021-06-23 PROCEDURE — 74011636637 HC RX REV CODE- 636/637: Performed by: SURGERY

## 2021-06-23 PROCEDURE — 65660000000 HC RM CCU STEPDOWN

## 2021-06-23 PROCEDURE — 80048 BASIC METABOLIC PNL TOTAL CA: CPT

## 2021-06-23 PROCEDURE — 74011250636 HC RX REV CODE- 250/636: Performed by: INTERNAL MEDICINE

## 2021-06-23 PROCEDURE — 74011000258 HC RX REV CODE- 258: Performed by: SURGERY

## 2021-06-23 PROCEDURE — C9113 INJ PANTOPRAZOLE SODIUM, VIA: HCPCS | Performed by: SURGERY

## 2021-06-23 PROCEDURE — 36415 COLL VENOUS BLD VENIPUNCTURE: CPT

## 2021-06-23 PROCEDURE — 85025 COMPLETE CBC W/AUTO DIFF WBC: CPT

## 2021-06-23 PROCEDURE — 2709999900 HC NON-CHARGEABLE SUPPLY

## 2021-06-23 PROCEDURE — 74011250637 HC RX REV CODE- 250/637: Performed by: SURGERY

## 2021-06-23 PROCEDURE — 99223 1ST HOSP IP/OBS HIGH 75: CPT | Performed by: INTERNAL MEDICINE

## 2021-06-23 RX ADMIN — SODIUM CHLORIDE 40 MG: 9 INJECTION INTRAMUSCULAR; INTRAVENOUS; SUBCUTANEOUS at 21:06

## 2021-06-23 RX ADMIN — SODIUM CHLORIDE 10 ML: 9 INJECTION, SOLUTION INTRAMUSCULAR; INTRAVENOUS; SUBCUTANEOUS at 21:09

## 2021-06-23 RX ADMIN — METOPROLOL TARTRATE 2.5 MG: 5 INJECTION INTRAVENOUS at 11:05

## 2021-06-23 RX ADMIN — METOPROLOL TARTRATE 2.5 MG: 5 INJECTION INTRAVENOUS at 17:14

## 2021-06-23 RX ADMIN — Medication 1 AMPULE: at 21:06

## 2021-06-23 RX ADMIN — SODIUM CHLORIDE 10 ML: 9 INJECTION, SOLUTION INTRAMUSCULAR; INTRAVENOUS; SUBCUTANEOUS at 14:04

## 2021-06-23 RX ADMIN — METOPROLOL TARTRATE 2.5 MG: 5 INJECTION INTRAVENOUS at 05:02

## 2021-06-23 RX ADMIN — SODIUM CHLORIDE 40 MG: 9 INJECTION INTRAMUSCULAR; INTRAVENOUS; SUBCUTANEOUS at 08:51

## 2021-06-23 RX ADMIN — MAGNESIUM SULFATE HEPTAHYDRATE: 500 INJECTION, SOLUTION INTRAMUSCULAR; INTRAVENOUS at 17:12

## 2021-06-23 RX ADMIN — PIPERACILLIN AND TAZOBACTAM 3.38 G: 3; .375 INJECTION, POWDER, LYOPHILIZED, FOR SOLUTION INTRAVENOUS at 05:00

## 2021-06-23 RX ADMIN — Medication 1 AMPULE: at 08:52

## 2021-06-23 RX ADMIN — IRON SUCROSE 200 MG: 20 INJECTION, SOLUTION INTRAVENOUS at 14:03

## 2021-06-23 NOTE — PROGRESS NOTES
1900: Bedside shift change report given to Isael Lehman (oncoming nurse) by Marylene Brakeman RN (offgoing nurse). Report included the following information SBAR, Kardex, ED Summary, Intake/Output, MAR, Recent Results and Cardiac Rhythm NSR.     2330: NGT at 60cm R nare. Flush with 30cc saline and 30 cc air. NGT at intermittent suction. PRECIOUS drain emptied. The documentation for this period is being entered following the guidelines as defined in the [address] downtime policy by Wing Ant RN.        1804: NGT at [address] Flush with 30cc saline and 30 cc air. NGT at intermittent suction. PRECIOUS drain emptied. End of Shift Note    Bedside shift change report given to JOSE Smith (oncoming nurse) by Wing Ant RN (offgoing nurse). Report included the following information SBAR, Kardex, ED Summary, Procedure Summary, Intake/Output, MAR, Recent Results and Cardiac Rhythm NSR/Sinus Kay Villalobos    Shift worked:  [id number]     Shift summary and any significant changes:     Patient drowsy throughout the night but alert and responds to voice. NGT @ 60cm, intermittent suction and flushing with water and air q6h. PRECIOUS drain emptied q4h.       Concerns for physician to address:  K 5.1; patient drowsy but PCA not being utilized     Zone phone for [facility] shift:          Activity:  Activity Level: Bed Rest  Number times ambulated in hallways past shift: 0  Number of times OOB to chair past shift: 0    Cardiac:   Cardiac Monitoring: Yes      Cardiac Rhythm: Sinus Rhythm    Access:   Current line(s): central line     Genitourinary:   Urinary status: mccoy    Respiratory:   O2 Device: Nasal cannula  Chronic home O2 use?: NO  Incentive spirometer at bedside: NO     GI:  Last Bowel Movement Date: 06/18/21  Current diet:  DIET NPO  Passing flatus: YES  Tolerating current diet: YES       Pain Management:   Patient states pain is manageable on current regimen: YES    Skin:  Rufus Score: 18  Interventions: float heels, increase time out of bed, PT/OT consult and internal/external urinary devices    Patient Safety:  Fall Score:  Total Score: 4  Interventions: bed/chair alarm, assistive device (walker, cane, etc), gripper socks, pt to call before getting OOB and stay with me (per policy)  High Fall Risk: Yes    Length of Stay:  Expected LOS: 5d 12h  Actual LOS: [address], RN

## 2021-06-23 NOTE — CONSULTS
[address] [facility] [address], [address] [facility], [address] [place] [address], [address] [place] [address]  [phone]        Brief Note      Consult received. Note to follow.        Signed by: Sarah Ken MD                     June 23, 2021

## 2021-06-23 NOTE — PROGRESS NOTES
Attending provider:  Mihai Escobedo MD   PCP:  Cammy Zapata MD    Subjective:    Patient seen and examined by me today with nurse practitioner Trevor Martinez. Patient complains of incisional pain. She says the PCA helps. No other complaints. Objective:    Blood pressure (!) 152/69, pulse 64, temperature 98.1 °F (36.7 °C), resp. rate 20, height 5' 2\" (1.575 m), weight 96.3 kg (212 lb 4.9 oz), SpO2 97 %, not currently breastfeeding. Drains -serosanguineous  Exam - A&O, NAD.  CTAB, RRR. Abdomen soft, ND, NABS, appropriately TTP, CDI. No edema. Assessment:  Procedure(s):  LAPAROSCOPIC PARTIAL GASTRECTOMY WITH CORE LIVER BIOPSY 6/22/2021    Active Hospital Problems    Diagnosis Date Noted    Leukocytosis 06/20/2021    NSTEMI (non-ST elevated myocardial infarction) ([facility] 06/20/2021    GIST (gastrointestinal stroma tumor), malignant, colon ([facility] 06/20/2021    Acute kidney injury superimposed on chronic kidney disease ([facility] 06/20/2021    Acute on chronic anemia 06/20/2021    GI bleed 10/04/2020     Plan:  Started TPN today and continue as long as the NG tube is in place. Will adjust as per nutrition recommendations tomorrow. Continue NG tube at least 5 days postop. Continue PRECIOUS. Discussed surgical findings with Dr. Maricarmen Taylor.   Discussed with Dr. Nayana Olson.

## 2021-06-23 NOTE — PROGRESS NOTES
[facility]  at [facility] [address], [address] [facility], [address] [place], [place] [phone]         Oncology Note        Patient: Marzena Prakash MRN: [id number]  SSN: [id number]    YOB: 1945  Age: 78 y.o. Sex: female        Reason for Consultation:      1. 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 Iron deficiency anemia    Subjective:      Marzena Prakash is a 78 y.o. female who I am seeing for a diagnosis of GIST and iron deficiency anemia. She presented with severe anemia late last year. 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 Surgery was delayed due to cardiac clearance. Recent imaging shows metastasis to the liver and RP nodes. She underwent a partial gastrectomy yesterday. She is NPO. She feels thirsty and hungry. Pain is controlled.        Review of Systems:    Constitutional: positive for fatigue  Eyes: negative  Ears, Nose, Mouth, Throat, and Face: negative  Respiratory: negative  Cardiovascular: negative  Gastrointestinal: positive for melena  Genitourinary:negative  Integument/Breast: negative  Hematologic/Lymphatic: negative  Musculoskeletal:negative  Neurological: negative    Past Medical History:   Diagnosis Date    Anemia, unspecified 10/6/2020    Arrhythmia     Arthritis     CAD (coronary artery disease)     Cancer ([address]     Chronic bronchitis ([address]     per pt:  as of 1/9/15 pt denies any ARENAS or SOB    Diabetes ([address] Dx approx 2009    Dr Marilee Alarcon (in connect care)    GERD (gastroesophageal reflux disease)     Hypercholesteremia     Hypertension      Past Surgical History:   Procedure Laterality Date    HX CHOLECYSTECTOMY  6/12    HX COLONOSCOPY      HX CORONARY STENT PLACEMENT  8/25/2015    HX DILATION AND CURETTAGE      ND CARDIAC SURG PROCEDURE UNLIST  2015    UPPER GI ENDOSCOPY,BIOPSY  10/6/2020           Family History   Problem Relation Age of Onset    Diabetes Mother     Heart Disease Mother  Hypertension Mother     Stroke Father     Heart Disease Brother     Heart Disease Brother     Stroke Brother     HIV/AIDS Brother      Social History     Tobacco Use    Smoking status: Former Smoker    Smokeless tobacco: Never Used   Substance Use Topics    Alcohol use: No      Prior to Admission medications    Medication Sig Start Date End Date Taking? Authorizing Provider   multivitamin, tx-iron-ca-min (THERA-M w/ IRON) 9 mg iron-400 mcg tab tablet Take 1 Tab by mouth two (2) times a day. Centrum silver   Yes Provider, Historical   glipiZIDE (GLUCOTROL) 5 mg tablet Take 1 Tab by mouth two (2) times a day. 1/21/21  Yes Karri Manriquez MD   lancets misc PUSH BUTTON LANCETS. Check blood sugar 4 times per day due to hypoglycemic events. Dx:E11.42, E16.2 12/17/20  Yes Karri Manriquez MD   amLODIPine (NORVASC) 10 mg tablet Take 1 Tab by mouth daily. 11/20/20  Yes Chuck Long MD   food supplemt, lactose-reduced (Ensure Enlive) 0.08 gram-1.5 kcal/mL liqd Take 6 x daily as recommended by Surgery to improve nutritional status for Possible surgery in future  Patient taking differently: Take 6 x daily as recommended by Surgery to improve nutritional status for Possible surgery in future. takes about 3 11/20/20  Yes Chuck Long MD   DULoxetine (CYMBALTA) 60 mg capsule Take 1 Cap by mouth daily. 10/14/20  Yes Barbara Kim NP   losartan (COZAAR) 100 mg tablet Take 1 Tab by mouth daily. 10/14/20  Yes Barbara Kim NP   atorvastatin (LIPITOR) 40 mg tablet Take 1 Tab by mouth nightly. 1/27/20  Yes Karri Manriquez MD   pantoprazole (PROTONIX) 40 mg tablet  1/13/21   Provider, Historical   carvediloL (COREG) 12.5 mg tablet Take 1 Tab by mouth two (2) times daily (with meals). Patient not taking: Reported on 6/22/2021 10/13/20   Keaton Jenkins NP   torsemide [facility]) 20 mg tablet Take 40 mg by mouth daily.   Patient not taking: Reported on 6/22/2021    Provider, Historical Allergies   Allergen Reactions    Metformin Other (comments)     GI side effects. Not a true allergy           Objective:     Vitals:    06/23/21 0445 06/23/21 0715 06/23/21 1017 06/23/21 1102   BP: (!) 141/60 (!) 135/48 (!) 145/64 (!) 144/61   Pulse: 67 65 65 65   Resp: 17 14 29 23   Temp: 97.9 °F (36.6 °C) 97.9 °F (36.6 °C) 98.1 °F (36.7 °C) 97.8 °F (36.6 °C)   SpO2: 100% 98% 96% 100%   Weight: 212 lb 4.9 oz (96.3 kg)      Height:                Physical Exam:    GENERAL: alert, cooperative, no distress, appears stated age  EYE: conjunctivae/corneas clear. PERRL, EOM's intact  LYMPHATIC: Cervical, supraclavicular, and axillary nodes normal.   THROAT & NECK: normal and no erythema or exudates noted. LUNG: clear to auscultation bilaterally  HEART: regular rate and rhythm, no murmur  ABDOMEN: soft, non-tender palpation  EXTREMITIES: no cyanosis or edema  SKIN: Normal, no rash or ecchymosis  NEUROLOGIC: AOx3. Grossly sensory and motor function are normal.       Lab Results   Component Value Date/Time    WBC 21.7 (H) 06/23/2021 05:09 AM    HGB 9.0 (L) 06/23/2021 05:09 AM    HCT 31.4 (L) 06/23/2021 05:09 AM    PLATELET 601 19/53/0700 05:09 AM    MCV 81.3 06/23/2021 05:09 AM       Lab Results   Component Value Date/Time    Sodium 143 06/23/2021 05:09 AM    Potassium 5.2 (H) 06/23/2021 05:09 AM    Chloride 114 (H) 06/23/2021 05:09 AM    CO2 20 (L) 06/23/2021 05:09 AM    Anion gap 9 06/23/2021 05:09 AM    Glucose 171 (H) 06/23/2021 05:09 AM    BUN 48 (H) 06/23/2021 05:09 AM    Creatinine 1.44 (H) 06/23/2021 05:09 AM    BUN/Creatinine ratio 33 (H) 06/23/2021 05:09 AM    GFR est AA 43 (L) 06/23/2021 05:09 AM    GFR est non-AA 36 (L) 06/23/2021 05:09 AM    Calcium 7.7 (L) 06/23/2021 05:09 AM    Bilirubin, total 0.5 06/19/2021 04:54 PM    Alk.  phosphatase 153 (H) 06/19/2021 04:54 PM    Protein, total 7.0 06/19/2021 04:54 PM    Albumin 2.3 (L) 06/19/2021 04:54 PM    Globulin 4.7 (H) 06/19/2021 04:54 PM    A-G Ratio 0.5 (L) 06/19/2021 04:54 PM    ALT (SGPT) 104 (H) 06/19/2021 04:54 PM    AST (SGOT) 87 (H) 06/19/2021 04:54 PM     CT Results (most recent):  Results from [place] encounter on 06/19/21    CT ABD PELV WO CONT    Narrative  EXAM: CT ABD PELV WO CONT    INDICATION: abd mass with low hgb and wbc 21K    COMPARISON: CT 11/12/2020    CONTRAST:  None. TECHNIQUE:  Thin axial images were obtained through the abdomen and pelvis. Coronal and  sagittal reformats were generated. Oral contrast was not administered. CT dose  reduction was achieved through use of a standardized protocol tailored for this  examination and automatic exposure control for dose modulation. The absence of intravenous and oral contrast material reduces the sensitivity  for evaluation of the bowel, vasculature and solid organs. FINDINGS:  LOWER THORAX: No significant abnormality in the incidentally imaged lower chest.  LIVER: Interval demonstration of numerous hepatic masses in all hepatic segments  measuring up to 2 cm. No intrahepatic bile duct dilation is shown. BILIARY TREE: Status post cholecystectomy. CBD is not dilated. SPLEEN: within normal limits. PANCREAS: No focal abnormality. ADRENALS: Unremarkable. KIDNEYS/URETERS: 4 mm nonobstructing calculus of left lower pole again noted. STOMACH: Fundic gastric mass R 25 cm demonstrated. SMALL BOWEL: No dilatation or wall thickening. COLON: No dilatation or wall thickening. APPENDIX: Normal.  PERITONEUM: No ascites or pneumoperitoneum. RETROPERITONEUM: Atherosclerotic calcifications extensively shown. No aneurysm  or retroperitoneal mass-adenopathy. REPRODUCTIVE ORGANS: Unremarkable. URINARY BLADDER: No mass or calculus. BONES: No destructive bone lesion. ABDOMINAL WALL: Diffuse subcutaneous edema consistent with anasarca. ADDITIONAL COMMENTS: N/A    Impression  1. Gastric mass again demonstrated. 2. Interval demonstration of hepatic metastatic disease.         Assessment: 1. Gastrointestinal tumor of the stomach with metastasis to the liver and retroperitoneal nodes    ECOG PS 2  Intent of Treatment - palliative  Prognosis: guarded    S/P partial gastrectomy 06/22/2021  Give metastatic disease, she would be a candidate for palliative treatment  I would obtain KIT sequencing on the tumor  A possible liver biopsy in the future  Ultimately she will benefit from systemic treatment with Imatinib. 2. Iron deficiency anemia    IV iron       Plan:       1. IV iron  2.  Follow up out patient after discharge from the hospital      Signed By: Gilbert Rios MD     June 23, 2021

## 2021-06-23 NOTE — PROGRESS NOTES
Progress Note      6/23/2021 8:25 AM  NAME: Bhavani Hudson   MRN:  [id number]   Admit Diagnosis: GI bleed [K92.2]  Acute on chronic anemia [D64.9]  GIST (gastrointestinal stroma tumor), malignant, colon ([facility] [place] A4]  NSTEMI (non-ST elevated myocardial infarction) (HCC) [I21.4]  Leukocytosis [D72.829]  Acute kidney injury superimposed on chronic kidney disease ([facility] [N17.9, N18.9]    Primary Cardiologist: Was seen previously by Dr Florida Smith, Seen by me for preOP in 4/ 2021   Physician Requesting consult: Dr Pam Wilhelm         Assessment:     1. Severe and recurrent anemia with HgB in 4s due to bleeding from stromal tumor, trop elevation with profound anemia likely type 2   2 CAD: Distal RCA NEENA 8/2015 for NQWMI; Med Rx mid circ and diffusely dz diagonals. 3 Systolic heart failure with EF 35%   4 HTN  5 DM  6 Dyslipidemia  7 CKD III: Cr 1.43/gfr 42 7/2020 (Dr Sam Spence). 8 Obesity: ?NANY. 9 Non-compliant with some meds per pt 10/2020. Poor compliance with f/u OV. 20 Anemia POA (Hg 4.5): gastric mass: GI stromal tumor: needs resection.                  Recommendations:     1. Trop elevation in the setting of profound anemia is likely type 2 - no chest pain, no significant STT changes. Has EF of 35-40% unchanged compared to prior. No HF symptoms. She likely has underlying CAD but no symptoms. Benefit of surgery outweighs the risk. Acceptable risk to proceed for GI surgery   2. S/p surgery, doing well   3. Not on ASA due to GI bleeds  4. Resume coreg and losartan- she was on - for CMP as blood pressure tolerates and when started on PO meds    5. Avoid volume overload       Will see as needed bases, call if any question. Follow up in clinic in 4 wks with Dr Kody Thomas   [x]? High complexity decision making was performed        Subjective:     HPI:  No CP or sob. Wants to drink water     ROS:     Objective:      Physical Exam:    Last 24hrs VS reviewed since prior progress note.  Most recent are:    Visit Vitals  BP (!) 135/48 (BP 1 Location: Right upper arm, BP Patient Position: At rest)   Pulse 65   Temp 97.9 °F (36.6 °C)   Resp 14   Ht 5' 2\" (1.575 m)   Wt 96.3 kg (212 lb 4.9 oz)   SpO2 98%   Breastfeeding No   BMI 38.83 kg/m²       Intake/Output Summary (Last 24 hours) at 6/23/2021 0825  Last data filed at 6/23/2021 0445  Gross per 24 hour   Intake 3596.25 ml   Output 1798 ml   Net 1798.25 ml          General: Alert and oriented x3, no acute distress   Neck: Supple   Respiratory: No respiratory distress, clear lung sound   Cardiovascular: Regular rate rhythm, S1S2, no murmur   Neuro: moves all extremities, oriented x3   Skin: warm and dry   Extremity:trace edema, warm to touch      Data Review    Telemetry: normal sinus rhythm        Lab Data Personally Reviewed:    Recent Labs     06/23/21  0509 06/22/21  1659 06/22/21  0532 06/22/21  0532   WBC 21.7*  --   --  15.4*   HGB 9.0* 9.4*   < > 7.5*   HCT 31.4* 33.1*   < > 25.4*     --   --  246    < > = values in this interval not displayed. No results for input(s): INR, PTP, APTT, INREXT in the last 72 hours. Recent Labs     06/23/21  0509 06/22/21  0532    141   K 5.2* 4.3   * 111*   CO2 20* 24   BUN 48* 48*   CREA 1.44* 1.35*   * 84   CA 7.7* 7.6*     Recent Labs     06/20/21  1134   TROIQ 4.00*     Lab Results   Component Value Date/Time    Cholesterol, total 245 (H) 01/08/2021 11:14 AM    HDL Cholesterol 39 (L) 01/08/2021 11:14 AM    LDL, calculated 163 (H) 01/08/2021 11:14 AM    LDL, calculated 74 02/16/2015 09:56 AM    Triglyceride 229 (H) 01/08/2021 11:14 AM       No results for input(s): AP, TBIL, TP, ALB, GLOB, GGT, AML, LPSE in the last 72 hours. No lab exists for component: SGOT, GPT, AMYP, HLPSE  No results for input(s): PH, PCO2, PO2 in the last 72 hours.     Medications Personally Reviewed:    Current Facility-Administered Medications   Medication Dose Route Frequency    0.9% sodium chloride infusion 250 mL  250 mL IntraVENous PRN    HYDROmorphone (PF) 25 mg/50 mL (DILAUDID) PCA   IntraVENous CONTINUOUS    metoprolol (LOPRESSOR) injection 2.5 mg  2.5 mg IntraVENous Q6H    alcohol 62% (NOZIN) nasal  1 Ampule  1 Ampule Topical Q12H    piperacillin-tazobactam (ZOSYN) 3.375 g in 0.9% sodium chloride (MBP/ADV) 100 mL MBP  3.375 g IntraVENous Q6H    pantoprazole (PROTONIX) 40 mg in 0.9% sodium chloride 10 mL injection  40 mg IntraVENous Q12H    prochlorperazine (COMPAZINE) with saline injection 10 mg  10 mg IntraVENous Q6H PRN    0.9% sodium chloride infusion  25 mL/hr IntraVENous CONTINUOUS    insulin lispro (HUMALOG) injection   SubCUTAneous Q6H    [Held by provider] torsemide (DEMADEX) tablet 40 mg  40 mg Oral DAILY    sodium chloride (NS) flush 5-40 mL  5-40 mL IntraVENous Q8H    sodium chloride (NS) flush 5-40 mL  5-40 mL IntraVENous PRN    acetaminophen (TYLENOL) suppository 650 mg  650 mg Rectal Q6H PRN    [Held by provider] polyethylene glycol (MIRALAX) packet 17 g  17 g Oral DAILY PRN    ondansetron (ZOFRAN ODT) tablet 4 mg  4 mg Oral Q8H PRN    Or    ondansetron (ZOFRAN) injection 4 mg  4 mg IntraVENous Q6H PRN    dextrose (D50W) injection syrg 12.5-25 g  12.5-25 g IntraVENous PRN    glucagon (GLUCAGEN) injection 1 mg  1 mg IntraMUSCular PRN    [Held by provider] DULoxetine (CYMBALTA) capsule 60 mg  60 mg Oral DAILY    [Held by provider] atorvastatin (LIPITOR) tablet 40 mg  40 mg Oral QHS    [Held by provider] amLODIPine (NORVASC) tablet 10 mg  10 mg Oral DAILY              Silvana Rao MD

## 2021-06-23 NOTE — PROGRESS NOTES
Hospitalist Progress Note    NAME: Kenij Suero   :  1945   MRN:  [id number]       Assessment / Plan:  Acute on chronic blood loss anemia in the setting of GIST with mets to liver  CT a/p showed gastric mass, interval demonstration of hepatic metastatic disease. Presenting with hgb 4.7, s/p 3 units PRBC. Hgb stable  Status post laparoscopic partial gastrectomy with core liver biopsy, will follow with path  NG tube in place, keep n.p.o. and continue IV PPI  PCA pump, wean as tolerated  Possibly starting TPN today, will defer to surgery team, appreciate Jc   Consult oncology    Leukocytosis,likely reactive  procalcitonin wnl.  UA neg/no urinary symptoms. Bcx NTD. CXR neg, CT a/p as above  Zosyn discontinued on   Trend CBC    HTN  Elevated troponin  Chronic systolic heart failure, EF 30-35% in 10/20, EF 35-40% on repeat Echo  EKG neg for acute ischemia. Change to IV metoprolol. Patient is not on ASA due to Gi bleed  Cardiology following, signed off on     GUANACO on CKD3  Mild hyperkalemia, K5.2  Likely due to prerenal from blood loss  Repeat labs in the a.m. Hold nephrotoxic agents  Follows with Dr Will Hoover    T2DM  SSI    Obesity / Body mass index is 38.83 kg/m². Estimated discharge date: To be determined  Barriers clinical condition    Code status: Full  Prophylaxis: Hep SQ  Recommended Disposition: Home w/Family     Subjective:     Chief Complaint / Reason for Physician Visit  Pt patient is awake, complains of abdominal pain however has not utilized PCA pump today. Discussed with RN events overnight.      Review of Systems:  Symptom Y/N Comments  Symptom Y/N Comments   Fever/Chills    Chest Pain     Poor Appetite    Edema     Cough    Abdominal Pain     Sputum    Joint Pain     SOB/ARENAS    Pruritis/Rash     Nausea/vomit    Tolerating PT/OT     Diarrhea    Tolerating Diet     Constipation    Other       Could NOT obtain due to:      Objective:     VITALS:   Last 24hrs VS reviewed since prior progress note. Most recent are:  Patient Vitals for the past 24 hrs:   Temp Pulse Resp BP SpO2   06/23/21 1102 97.8 °F (36.6 °C) 65 23 (!) 144/61 100 %   06/23/21 1017 98.1 °F (36.7 °C) 65 29 (!) 145/64 96 %   06/23/21 0715 97.9 °F (36.6 °C) 65 14 (!) 135/48 98 %   06/23/21 0445 97.9 °F (36.6 °C) 67 17 (!) 141/60 100 %   06/23/21 0000 -- 62 -- -- --   06/22/21 2330 97.7 °F (36.5 °C) 62 10 (!) 129/58 100 %   06/22/21 2307 97.7 °F (36.5 °C) -- -- -- --   06/22/21 1930 97.4 °F (36.3 °C) 61 16 (!) 146/68 100 %   06/22/21 1845 97.6 °F (36.4 °C) 69 9 (!) 157/61 100 %   06/22/21 1840 -- 68 11 (!) 151/61 100 %   06/22/21 1838 -- -- -- (!) 167/72 100 %   06/22/21 1815 98.1 °F (36.7 °C) 68 12 128/70 99 %   06/22/21 1800 -- 69 15 138/67 98 %   06/22/21 1745 -- 69 11 (!) 141/56 98 %   06/22/21 1730 -- 71 14 (!) 121/56 97 %   06/22/21 1715 -- 72 16 (!) 126/57 100 %   06/22/21 1705 -- -- -- -- 100 %   06/22/21 1700 -- 72 13 (!) 146/64 100 %   06/22/21 1655 -- 73 14 (!) 144/64 99 %   06/22/21 1650 -- 73 16 (!) 149/61 100 %   06/22/21 1645 -- 73 13 (!) 146/66 100 %   06/22/21 1640 -- 74 12 130/67 100 %   06/22/21 1639 97.8 °F (36.6 °C) 74 12 (!) 147/61 100 %   06/22/21 1635 -- 73 12 (!) 147/61 99 %   06/22/21 1304 98.5 °F (36.9 °C) 75 15 (!) 152/67 99 %       Intake/Output Summary (Last 24 hours) at 6/23/2021 1127  Last data filed at 6/23/2021 1102  Gross per 24 hour   Intake 3626.25 ml   Output 1048 ml   Net 2578.25 ml        I had a face to face encounter and independently examined this patient on 6/23/2021, as outlined below:  PHYSICAL EXAM:  General: WD, WN. Alert, cooperative, no acute distress    EENT:  EOMI. Anicteric sclerae. MMM  Resp:  CTA bilaterally, no wheezing or rales. No accessory muscle use  CV:  Regular  rhythm,  mild b/l LE edema  GI:  Soft, Non distended, + tender. Neurologic:  Alert and oriented X 3, normal speech  Psych:   Good insight. Not anxious nor agitated  Skin:  No rashes.   No jaundice    Reviewed most current lab test results and cultures  YES  Reviewed most current radiology test results   YES  Review and summation of old records today    NO  Reviewed patient's current orders and MAR    YES  PMH/SH reviewed - no change compared to H&P  ________________________________________________________________________  Care Plan discussed with:    Comments   Patient x    Family      RN x    Care Manager     Consultant  x                      Multidiciplinary team rounds were held today with , nursing, pharmacist and clinical coordinator. Patient's plan of care was discussed; medications were reviewed and discharge planning was addressed. ________________________________________________________________________  Total NON critical care TIME:  35   Minutes    Total CRITICAL CARE TIME Spent:   Minutes non procedure based      Comments   >50% of visit spent in counseling and coordination of care     ________________________________________________________________________  Hao Santos MD     Procedures: see electronic medical records for all procedures/Xrays and details which were not copied into this note but were reviewed prior to creation of Plan. LABS:  I reviewed today's most current labs and imaging studies.   Pertinent labs include:  Recent Labs     06/23/21  0509 06/22/21  1659 06/22/21  0532   WBC 21.7*  --  15.4*   HGB 9.0* 9.4* 7.5*   HCT 31.4* 33.1* 25.4*     --  246     Recent Labs     06/23/21  0509 06/22/21  0532    141   K 5.2* 4.3   * 111*   CO2 20* 24   * 84   BUN 48* 48*   CREA 1.44* 1.35*   CA 7.7* 7.6*       Signed: Hao Santos MD

## 2021-06-23 NOTE — PROGRESS NOTES
Physical Therapy    Chart reviewed, patient received in bed, very sleepy and declines participating in therapy today.   Plan to defer and see tomorrow for re-eval.    Lane Quiles

## 2021-06-24 LAB
ANION GAP SERPL CALC-SCNC: 7 MMOL/L (ref 5–15)
BACTERIA SPEC CULT: NORMAL
BASOPHILS # BLD: 0 K/UL (ref 0–0.1)
BASOPHILS NFR BLD: 0 % (ref 0–1)
BUN SERPL-MCNC: 50 MG/DL (ref 6–20)
BUN/CREAT SERPL: 30 (ref 12–20)
CALCIUM SERPL-MCNC: 7.9 MG/DL (ref 8.5–10.1)
CHLORIDE SERPL-SCNC: 114 MMOL/L (ref 97–108)
CO2 SERPL-SCNC: 23 MMOL/L (ref 21–32)
CREAT SERPL-MCNC: 1.64 MG/DL (ref 0.55–1.02)
DIFFERENTIAL METHOD BLD: ABNORMAL
EOSINOPHIL # BLD: 0 K/UL (ref 0–0.4)
EOSINOPHIL NFR BLD: 0 % (ref 0–7)
ERYTHROCYTE [DISTWIDTH] IN BLOOD BY AUTOMATED COUNT: 24.6 % (ref 11.5–14.5)
GLUCOSE BLD STRIP.AUTO-MCNC: 233 MG/DL (ref 65–117)
GLUCOSE BLD STRIP.AUTO-MCNC: 276 MG/DL (ref 65–117)
GLUCOSE BLD STRIP.AUTO-MCNC: 308 MG/DL (ref 65–117)
GLUCOSE SERPL-MCNC: 293 MG/DL (ref 65–100)
HCT VFR BLD AUTO: 30.6 % (ref 35–47)
HGB BLD-MCNC: 8.6 G/DL (ref 11.5–16)
IMM GRANULOCYTES # BLD AUTO: 0.2 K/UL (ref 0–0.04)
IMM GRANULOCYTES NFR BLD AUTO: 1 % (ref 0–0.5)
LYMPHOCYTES # BLD: 0.9 K/UL (ref 0.8–3.5)
LYMPHOCYTES NFR BLD: 5 % (ref 12–49)
MCH RBC QN AUTO: 23.2 PG (ref 26–34)
MCHC RBC AUTO-ENTMCNC: 28.1 G/DL (ref 30–36.5)
MCV RBC AUTO: 82.5 FL (ref 80–99)
MONOCYTES # BLD: 1.3 K/UL (ref 0–1)
MONOCYTES NFR BLD: 7 % (ref 5–13)
NEUTS SEG # BLD: 14.6 K/UL (ref 1.8–8)
NEUTS SEG NFR BLD: 86 % (ref 32–75)
NRBC # BLD: 1.17 K/UL (ref 0–0.01)
NRBC BLD-RTO: 6.9 PER 100 WBC
PHOSPHATE SERPL-MCNC: 3.9 MG/DL (ref 2.6–4.7)
PLATELET # BLD AUTO: 242 K/UL (ref 150–400)
PMV BLD AUTO: 9.1 FL (ref 8.9–12.9)
POTASSIUM SERPL-SCNC: 4.8 MMOL/L (ref 3.5–5.1)
RBC # BLD AUTO: 3.71 M/UL (ref 3.8–5.2)
SERVICE CMNT-IMP: ABNORMAL
SERVICE CMNT-IMP: NORMAL
SODIUM SERPL-SCNC: 144 MMOL/L (ref 136–145)
WBC # BLD AUTO: 16.9 K/UL (ref 3.6–11)

## 2021-06-24 PROCEDURE — 74011250636 HC RX REV CODE- 250/636: Performed by: NURSE PRACTITIONER

## 2021-06-24 PROCEDURE — 74011000250 HC RX REV CODE- 250: Performed by: NURSE PRACTITIONER

## 2021-06-24 PROCEDURE — 74011636637 HC RX REV CODE- 636/637: Performed by: INTERNAL MEDICINE

## 2021-06-24 PROCEDURE — 74011250636 HC RX REV CODE- 250/636: Performed by: SURGERY

## 2021-06-24 PROCEDURE — 36415 COLL VENOUS BLD VENIPUNCTURE: CPT

## 2021-06-24 PROCEDURE — 65270000029 HC RM PRIVATE

## 2021-06-24 PROCEDURE — 80048 BASIC METABOLIC PNL TOTAL CA: CPT

## 2021-06-24 PROCEDURE — 97164 PT RE-EVAL EST PLAN CARE: CPT

## 2021-06-24 PROCEDURE — 74011636637 HC RX REV CODE- 636/637: Performed by: NURSE PRACTITIONER

## 2021-06-24 PROCEDURE — 99233 SBSQ HOSP IP/OBS HIGH 50: CPT | Performed by: CLINICAL NURSE SPECIALIST

## 2021-06-24 PROCEDURE — 74011250637 HC RX REV CODE- 250/637: Performed by: INTERNAL MEDICINE

## 2021-06-24 PROCEDURE — 74011250636 HC RX REV CODE- 250/636: Performed by: INTERNAL MEDICINE

## 2021-06-24 PROCEDURE — 97530 THERAPEUTIC ACTIVITIES: CPT

## 2021-06-24 PROCEDURE — 74011250637 HC RX REV CODE- 250/637: Performed by: SURGERY

## 2021-06-24 PROCEDURE — 97116 GAIT TRAINING THERAPY: CPT

## 2021-06-24 PROCEDURE — 99356 PR PROLONGED SVC I/P OR OBS SETTING 1ST HOUR: CPT | Performed by: CLINICAL NURSE SPECIALIST

## 2021-06-24 PROCEDURE — 77030038269 HC DRN EXT URIN PURWCK BARD -A

## 2021-06-24 PROCEDURE — C9113 INJ PANTOPRAZOLE SODIUM, VIA: HCPCS | Performed by: SURGERY

## 2021-06-24 PROCEDURE — 85025 COMPLETE CBC W/AUTO DIFF WBC: CPT

## 2021-06-24 PROCEDURE — 74011000258 HC RX REV CODE- 258: Performed by: NURSE PRACTITIONER

## 2021-06-24 PROCEDURE — 51798 US URINE CAPACITY MEASURE: CPT

## 2021-06-24 PROCEDURE — 84100 ASSAY OF PHOSPHORUS: CPT

## 2021-06-24 PROCEDURE — 82962 GLUCOSE BLOOD TEST: CPT

## 2021-06-24 PROCEDURE — 74011000250 HC RX REV CODE- 250: Performed by: SURGERY

## 2021-06-24 PROCEDURE — 2709999900 HC NON-CHARGEABLE SUPPLY

## 2021-06-24 RX ADMIN — Medication 1 AMPULE: at 20:10

## 2021-06-24 RX ADMIN — SODIUM CHLORIDE 40 MG: 9 INJECTION INTRAMUSCULAR; INTRAVENOUS; SUBCUTANEOUS at 20:16

## 2021-06-24 RX ADMIN — SODIUM CHLORIDE 10 ML: 9 INJECTION, SOLUTION INTRAMUSCULAR; INTRAVENOUS; SUBCUTANEOUS at 22:40

## 2021-06-24 RX ADMIN — METOPROLOL TARTRATE 2.5 MG: 5 INJECTION INTRAVENOUS at 17:00

## 2021-06-24 RX ADMIN — MAGNESIUM SULFATE HEPTAHYDRATE: 500 INJECTION, SOLUTION INTRAMUSCULAR; INTRAVENOUS at 17:02

## 2021-06-24 RX ADMIN — SODIUM CHLORIDE 40 MG: 9 INJECTION INTRAMUSCULAR; INTRAVENOUS; SUBCUTANEOUS at 08:09

## 2021-06-24 RX ADMIN — INSULIN LISPRO 7 UNITS: 100 INJECTION, SOLUTION INTRAVENOUS; SUBCUTANEOUS at 06:13

## 2021-06-24 RX ADMIN — HUMAN INSULIN 8 UNITS: 100 INJECTION, SUSPENSION SUBCUTANEOUS at 20:02

## 2021-06-24 RX ADMIN — SODIUM CHLORIDE 10 ML: 9 INJECTION, SOLUTION INTRAMUSCULAR; INTRAVENOUS; SUBCUTANEOUS at 06:16

## 2021-06-24 RX ADMIN — IRON SUCROSE 200 MG: 20 INJECTION, SOLUTION INTRAVENOUS at 13:06

## 2021-06-24 RX ADMIN — INSULIN LISPRO 5 UNITS: 100 INJECTION, SOLUTION INTRAVENOUS; SUBCUTANEOUS at 13:06

## 2021-06-24 RX ADMIN — METOPROLOL TARTRATE 2.5 MG: 5 INJECTION INTRAVENOUS at 00:47

## 2021-06-24 RX ADMIN — METOPROLOL TARTRATE 2.5 MG: 5 INJECTION INTRAVENOUS at 06:14

## 2021-06-24 RX ADMIN — Medication 1 AMPULE: at 08:10

## 2021-06-24 RX ADMIN — INSULIN LISPRO 3 UNITS: 100 INJECTION, SOLUTION INTRAVENOUS; SUBCUTANEOUS at 17:07

## 2021-06-24 RX ADMIN — SODIUM CHLORIDE 10 ML: 9 INJECTION, SOLUTION INTRAMUSCULAR; INTRAVENOUS; SUBCUTANEOUS at 13:25

## 2021-06-24 RX ADMIN — NITROGLYCERIN 1 INCH: 20 OINTMENT TOPICAL at 20:03

## 2021-06-24 RX ADMIN — INSULIN LISPRO 5 UNITS: 100 INJECTION, SOLUTION INTRAVENOUS; SUBCUTANEOUS at 00:47

## 2021-06-24 RX ADMIN — METOPROLOL TARTRATE 2.5 MG: 5 INJECTION INTRAVENOUS at 12:59

## 2021-06-24 NOTE — PROGRESS NOTES
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 Presenting with hgb 4.7, s/p 3 units PRBC. Hgb stable  Status post laparoscopic partial gastrectomy with core liver biopsy, path pending. NG tube in place, n.p.o. and continue IV PPI  PCA pump, wean as tolerated  TPN per surgery, appreciate recs   Oncology and surgery following    Leukocytosis,likely reactive  procalcitonin wnl.  UA neg/no urinary symptoms. Bcx NTD. CXR neg, CT a/p as above  Zosyn discontinued on   Trend CBC    HTN  Elevated troponin  Chronic systolic heart failure, EF 30-35% in 10/20, EF 35-40% on repeat Echo  EKG neg for acute ischemia. Change to IV metoprolol. Patient is not on ASA due to GI bleed  Cardiology following, signed off on      GUANACO on CKD3  Mild hyperkalemia, K5.2  Likely due to prerenal from blood loss  incr IVF  Hold nephrotoxic agents  Follows with Dr Rouse Innocent    T2DM  SSI      Obesity / Body mass index is 38.1 kg/m². Estimated discharge date: To be determined  Barriers clinical condition    Code status: Full  Prophylaxis: Hep SQ  Recommended Disposition: Home w/Family     Subjective:     Chief Complaint / Reason for Physician Visit  Pt patient is awake, NAD. \"I want something to drink\". Discussed with RN events overnight. Review of Systems:  Symptom Y/N Comments  Symptom Y/N Comments   Fever/Chills    Chest Pain     Poor Appetite    Edema     Cough    Abdominal Pain     Sputum    Joint Pain     SOB/ARENAS    Pruritis/Rash     Nausea/vomit    Tolerating PT/OT     Diarrhea    Tolerating Diet     Constipation    Other       Could NOT obtain due to:      Objective:     VITALS:   Last 24hrs VS reviewed since prior progress note.  Most recent are:  Patient Vitals for the past 24 hrs:   Temp Pulse Resp BP SpO2   06/24/21 1020 98.1 °F (36.7 °C) 66 16 (!) 151/66 100 %   06/24/21 0733 98.1 °F (36.7 °C) 73 21 (!) 152/68 100 %   06/24/21 0409 97.6 °F (36.4 °C) 68 20 (!) 154/67 100 %   06/23/21 2328 97.4 °F (36.3 °C) 65 20 (!) 151/63 100 %   06/23/21 2326 97.4 °F (36.3 °C) -- -- -- --   06/23/21 1918 97.6 °F (36.4 °C) 63 19 (!) 150/63 98 %   06/23/21 1714 -- 64 -- (!) 147/62 --   06/23/21 1457 98.1 °F (36.7 °C) 64 20 (!) 152/69 97 %       Intake/Output Summary (Last 24 hours) at 6/24/2021 1156  Last data filed at 6/24/2021 1103  Gross per 24 hour   Intake 1697.28 ml   Output 1275 ml   Net 422.28 ml        I had a face to face encounter and independently examined this patient on 6/24/2021, as outlined below:  PHYSICAL EXAM:  General: WD, WN. Alert, cooperative, no acute distress    EENT:  EOMI. Anicteric sclerae. MMM  Resp:  CTA bilaterally, no wheezing or rales. No accessory muscle use  CV:  Regular  rhythm,  mild b/l LE edema  GI:  Soft, Non distended, + tender. Neurologic:  Alert and oriented X 3, normal speech  Psych:   Good insight. Not anxious nor agitated  Skin:  No rashes. No jaundice    Reviewed most current lab test results and cultures  YES  Reviewed most current radiology test results   YES  Review and summation of old records today    NO  Reviewed patient's current orders and MAR    YES  PMH/SH reviewed - no change compared to H&P  ________________________________________________________________________  Care Plan discussed with:    Comments   Patient x    Family      RN x    Care Manager     Consultant  x                      Multidiciplinary team rounds were held today with , nursing, pharmacist and clinical coordinator. Patient's plan of care was discussed; medications were reviewed and discharge planning was addressed.      ________________________________________________________________________  Total NON critical care TIME:  35 Minutes    Total CRITICAL CARE TIME Spent:   Minutes non procedure based      Comments   >50% of visit spent in counseling and coordination of care     ________________________________________________________________________  Ishmael Enamorado MD     Procedures: see electronic medical records for all procedures/Xrays and details which were not copied into this note but were reviewed prior to creation of Plan. LABS:  I reviewed today's most current labs and imaging studies. Pertinent labs include:  Recent Labs     06/24/21  0411 06/23/21  0509 06/22/21  1659 06/22/21  0532 06/22/21  0532   WBC 16.9* 21.7*  --   --  15.4*   HGB 8.6* 9.0* 9.4*   < > 7.5*   HCT 30.6* 31.4* 33.1*   < > 25.4*    240  --   --  246    < > = values in this interval not displayed.      Recent Labs     06/24/21  0411 06/23/21  0509 06/22/21  0532    143 141   K 4.8 5.2* 4.3   * 114* 111*   CO2 23 20* 24   * 171* 84   BUN 50* 48* 48*   CREA 1.64* 1.44* 1.35*   CA 7.9* 7.7* 7.6*   PHOS 3.9  --   --        Signed: Ishmael Enamorado MD

## 2021-06-24 NOTE — PROGRESS NOTES
Comprehensive Nutrition Assessment    Type and Reason for Visit: Initial, Consult    Nutrition Recommendations/Plan:   TPN: Switch to AA5% D15% and advance rate to 63 mL/hr today (1074 kcal, 76g protein, 227g protein)           If lytes stable and BG under better control, advance tomorrow to goal rate of AA5% D15% @ 83 mL/hr and add 500 mL 20% lipids 3x/week (1842 kcal, 100g protein, 299g protein)    Nutrition Assessment:     Patient medically noted for acute on chronic anemia. GI bleed due to GIST, acute on CKD, and s/p laparoscopic partial gastrectomy. Patient NPO with NGT to suction. TPN started yesterday afternoon. Lytes WNL but BG increased. TPN recommendations provided above to meet 100% of estimated kcal/protein needs. Consider DM management consult to assist in BG control. NGT to stay in place at least 5 days per surgeon. Estimated Daily Nutrient Needs:  Energy (kcal): 1824 kcal (1403 x 1. 3AF); Weight Used for Energy Requirements: Current  Protein (g): 95-113g (1.0-1.2 g/kg bw); Weight Used for Protein Requirements: Current  Fluid (ml/day): 1800 mL; Method Used for Fluid Requirements: 1 ml/kcal    Nutrition Related Findings:       K+ 4.8, -[phone], A1c 6.5%, Phos 3.9  BM 6/18  Humalog, Lopressor, Protonix    Wounds:    Surgical incision       Current Nutrition Therapies:  DIET NPO  TPN ADULT - CENTRAL  Current Parenteral Nutrition Orders:  · Type and Formula: AA5% D20%   · Lipids: None  · Duration: Continuous  · Rate/Volume: 42 mL/hr  · Current PN Order Provides: 887 kcal, 50g protein, 202g CHO  · Goal PN Orders Provides: 1842 kcal, 100g protein, 299g CHO    Anthropometric Measures:  · Height:  5' 2\" (157.5 cm)  · Current Body Wt:  94.5 kg (208 lb 5.4 oz)   · Admission Body Wt:  174 lb    · BMI Category:  Obese class 2 (BMI 35.0-39. 9)       Nutrition Diagnosis:   Altered GI function related to altered GI structure as evidenced by NPO or clear liquid status due to medical condition, nutrition support-parenteral nutrition    Nutrition Interventions:   Food and/or Nutrient Delivery: Modify parenteral nutrition  Nutrition Education and Counseling: No recommendations at this time  Coordination of Nutrition Care: Continue to monitor while inpatient, Interdisciplinary rounds    Goals:  TPN at goal with lytes WNL and BG trend <200 next 2-4 days       Nutrition Monitoring and Evaluation:   Behavioral-Environmental Outcomes: None identified  Food/Nutrient Intake Outcomes: Parenteral nutrition intake/tolerance  Physical Signs/Symptoms Outcomes: Biochemical data, GI status, Hemodynamic status, Skin, Weight    Discharge Planning:     Too soon to determine     Electronically signed by Jj Myers RD on 6/24/2021 at 9:12 AM    Contact: ext [phone]

## 2021-06-24 NOTE — PROGRESS NOTES
0700- Report given to Miguel Friedman RN by off going nurse. 1300- Pt up to recliner with PT.    1430- Pt assisted to bed. 1545- Pt voided 50 ml urine this morning after mccoy removal but nothing since. Bladder scan now reveals 367 ml. Dr. Ryann Rutherford notified. 1615- 350 ml of urine drained via straight cath. 1900- Report given to oncoming nurse by Miguel Friedman RN.

## 2021-06-24 NOTE — DIABETES MGMT
[facility]    CLINICAL NURSE SPECIALIST CONSULT     Initial Presentation   Hortencia Hassan is a 78 y.o. female admitted 6/19/21 from the ER with abdominal mass and pain. Also, notes fatigue and fall. Afebrile. Normotensive. 02 sats 100%  Lab results include WBC 21.8; low H&H. Elevated liver enzymes, creatinine & troponins. GFR 23. CXR:   Mild-moderate cardiac contour enlargement. No acute pulmonary   findings. CT ABd:   1. Gastric mass again demonstrated. 2. Interval demonstration of hepatic metastatic disease. HX:   Past Medical History:   Diagnosis Date    Anemia, unspecified 10/6/2020    Arrhythmia     Arthritis     CAD (coronary artery disease)     Cancer ([address]     Chronic bronchitis ([address]     per pt:  as of 1/9/15 pt denies any ARENAS or SOB    Diabetes ([address] Dx approx 2009    Dr Alo Wynne [facility]in [facility]    GERD (gastroesophageal reflux disease)     Hypercholesteremia     Hypertension       DX: Acute on Chronic anemia. GUANACO. GI bleed  GIST (gastrointestinal stroma tumor), malignant, colon (HCC)    Gastrointestinal hemorrhage, unspecified gastrointestinal hemorrhage type    NSTEMI (non-ST elevated myocardial infarction) ([address]      Treatment plan     TX: Blood transfusion. ABx. TPN (beginning 6/23/21)  6/22/21 LAPAROSCOPIC PARTIAL GASTRECTOMY WITH CORE LIVER BIOPSY  PT/OT    Hospital course   Clinical progress has been complicated by new diagnosis of cancer. Diabetes    Patient has known Type 2 diabetes, treated with oral agent PTA. Family history positive for diabetes in mother. Admission  and A1c 6.9% (6/20/21) indicate acceptable diabetes control.    Ambulatory blood glucose management provided by endocrinologist, Urban Duvall MD.  Tamera Ryan by Provider for advanced diabetes nursing assessment and care, specifically related to   [x] Inpatient management strategy    Diabetes-related medical history - deferred    Diabetes medication history  Drug class Currently in use Discontinued Never used   Biguanide      DDP-4 inhibitor       Sulfonylurea Glucotrol 5mg twice daily     Thiazolidinedione      GLP-1 RA      SGLT-2 inhibitors      Basal insulin      Bolus insulin      Fixed Dose  Combinations        Subjective   Drowsy    Patient reports the following home diabetes self-care practices - deferred     Objective   Physical exam  General Ill-appearing obese AA female  Neuro  Drowsy but arousable   Vital Signs Afebrile. Hypertensive  Visit Vitals  BP (!) 172/69   Pulse 66   Temp 97.5 °F (36.4 °C)   Resp 16   Ht 5' 2\" (1.575 m)   Wt 94.5 kg (208 lb 5.4 oz)   SpO2 98%   Breastfeeding No   BMI 38.10 kg/m²     Laboratory  Tests Today 6/23 6/22 6/19/21   A1c    6.5%  (6/20/21)    171 84 180   Anion gap 7 9 6 9   Serum triglycerides       WBC 16.9 21.7 15.4 21.8   Serum creatinine `.64 1.44 1.35 2.44   GFR 37 43 46 23   AST    87   ALT    104   Procalcitonin    1.31   (6/20/21)   NT pro-BNP    15,450   Troponin    3.81     Factors impacting BG management  Factor Dose Comments   Nutrition:  TPN   200 grams/24 hrs    Drugs:  Steroids  Blood transfusion(s)   Dexamethasone 8mg (6/22/21)  PRBC   Increase insulin resistance  A1cs inaccurate   Pain Dilaudid PCA      Blood glucose pattern        Assessment and Plan   Nursing Diagnosis Risk for unstable blood glucose pattern   Nursing Intervention Domain 5259 Decision-making Support   Nursing Interventions Examined current inpatient diabetes control   Explored factors facilitating and impeding inpatient management     Evaluation   This obese AA female, with Type 2 diabetes, did achieve diabetes control prior to admission, as evidenced by admission BG of 180 and A1c of 6.9%. During this hospitalization, the patient achieved BG control until given dexamethasone (6/22/21) and TPN was initiated 6/23/21. Although there is insulin in the TPN, it is insufficient to override current insulin needs.  I would add a basal dose of insulin tonight, and consider increasing insulin in the TPN tomorrow. Recommendations     [x] Use of Subcutaneous Insulin Order set (3076)  Insulin Dosing Specific recommendation   Basal                                      (Based on weight, BMI & GFR)   [x] 0.3 units/kg/D   Lantus insulin 30 units D   Nutritional                                      (Based on CHO/dextrose load)     Corrective                                       (Useful in adjusting insulin dosing) [x] HIGH sensitivity        Billing Code(s)   [x] 93614 IP subsequent hospital care - 35 minutes [x] 47944 Prolonged Services - 65 minutes     Before making these care recommendations, I personally reviewed the hospitalization record, including notes, laboratory & diagnostic data and current medications, and xamined the patient at the bedside (circumstances permitting) before making care recommendations.      Total minutes: 121 LOLLY Horvath  Diabetes Clinical Nurse Specialist  [facility]  Access via [address] [place] [address]

## 2021-06-24 NOTE — PROGRESS NOTES
Problem: Mobility Impaired (Adult and Pediatric)  Goal: *Acute Goals and Plan of Care (Insert Text)  Description: FUNCTIONAL STATUS PRIOR TO ADMISSION: Pt reports independence with ambulation and ADLs however reports owning RW and cane, stating she is \"supposed to use them\" but doesn't. Reports history of 4 recent falls and that she has been nonambulatory since most recent fall 3 days ago. States she is mostly sedentary at home. HOME SUPPORT PRIOR TO ADMISSION: The patient lived with , stating \"they help each other out. \"    Physical Therapy Goals  Initiated 6/21/2021  1. Patient will move from supine to sit and sit to supine , scoot up and down, and roll side to side in bed with supervision/set-up within 7 day(s). 2.  Patient will transfer from bed to chair and chair to bed with minimal assistance/contact guard assist using the least restrictive device within 7 day(s). 3.  Patient will perform sit to stand with minimal assistance/contact guard assist within 7 day(s). 4.  Patient will ambulate with minimal assistance/contact guard assist for 50 feet with the least restrictive device within 7 day(s). Re-Eval following partial gastrectomy 6/24/21. Goals remain appropriate. 1.  Patient will move from supine to sit and sit to supine , scoot up and down, and roll side to side in bed with supervision/set-up within 7 day(s). 2.  Patient will transfer from bed to chair and chair to bed with minimal assistance/contact guard assist using the least restrictive device within 7 day(s). 3.  Patient will perform sit to stand with minimal assistance/contact guard assist within 7 day(s). 4.  Patient will ambulate with minimal assistance/contact guard assist for 50 feet with the least restrictive device within 7 day(s).      Outcome: Not Met    PHYSICAL THERAPY REEVALUATION  Patient: Kemal Zamora (89 y.o. female)  Date: 6/24/2021  Primary Diagnosis: GI bleed [K92.2]  Acute on chronic anemia [D64.9]  GIST (gastrointestinal stroma tumor), malignant, colon ([facility] [C49. A4]  NSTEMI (non-ST elevated myocardial infarction) ([facility] [I21.4]  Leukocytosis [D72.829]  Acute kidney injury superimposed on chronic kidney disease ([facility] [N17.9, N18.9]  Procedure(s) (LRB):  LAPAROSCOPIC PARTIAL GASTRECTOMY WITH CORE LIVER BIOPSY (N/A) 2 Days Post-Op   Precautions: falls         ASSESSMENT  Based on the objective data described below, the patient presents with decreased tolerance of activity, expected post op pain and drowsiness, general weakness, impaired functional mobility and increased falls risk after partial gastrectomy and liver biopsy. Patient received in bed and agreeable to participate with encouragement, remains drowsy but had improved attention once mobilized. Patient instructed in using log rolling technique and required min assist x 2 to come to sit EOB, required support with challenges. Patient came to stand with min assist and ambulated short distance to chair, gait slow and unsteady and required min assist x 2 (hand held) to stabilize. Patient left sitting up in chair with call bell in reach, is well below baseline and will likely require rehab in SNF unless markedly improved while inpatient. Current Level of Function Impacting Discharge (mobility/balance): min assist x 2 to chair    Functional Outcome Measure: The patient scored 25/100 on the Barthel outcome measure which is indicative of severe functional mobility    Other factors to consider for discharge: sedentary at baseline, length of hospital stay, falls risk, amount of assistance at home     Patient will benefit from skilled therapy intervention to address the above noted impairments.        PLAN :  Recommendations and Planned Interventions: bed mobility training, transfer training, gait training, therapeutic exercises, patient and family training/education, and therapeutic activities      Frequency/Duration: Patient will be followed by physical therapy: 4 times a week to address goals. Recommendation for discharge: (in order for the patient to meet his/her long term goals)  Therapy up to 5 days/week in SNF setting    This discharge recommendation:  A follow-up discussion with the attending provider and/or case management is planned    Equipment recommendations for successful discharge (if) home: patient owns DME required for discharge         SUBJECTIVE:   Patient stated Lestine Chelly.     OBJECTIVE DATA SUMMARY:   HISTORY:    Past Medical History:   Diagnosis Date    Anemia, unspecified 10/6/2020    Arrhythmia     Arthritis     CAD (coronary artery disease)     Cancer ([facility]     Chronic bronchitis ([facility]     per pt:  as of 1/9/15 pt denies any ARENAS or SOB    Diabetes ([facility] Dx approx 2009    Dr Gladys Saldivar (in [facility])    GERD (gastroesophageal reflux disease)     Hypercholesteremia     Hypertension      Past Surgical History:   Procedure Laterality Date    HX CHOLECYSTECTOMY  6/12    HX COLONOSCOPY      HX CORONARY STENT PLACEMENT  8/25/2015    HX DILATION AND CURETTAGE      OK CARDIAC SURG PROCEDURE UNLIST  2015    UPPER GI ENDOSCOPY,BIOPSY  10/6/2020          Hospital course since last seen and reason for reevaluation: Patient had partial gastrectomy and liver biopsy    Personal factors and/or comorbidities impacting plan of care: sedentary, CAD    Home Situation  Home Environment: Private residence  # Steps to Enter: 1  Rails to Enter: Yes  Hand Rails : Bilateral  One/Two Story Residence: One story  Living Alone: No  Support Systems: Spouse/Significant Other/Partner  Patient Expects to be Discharged to[de-identified] [employer]  Current DME Used/Available at Home: [address], [place], straight, Walker, rolling, Shower chair, Grab bars  Tub or Shower Type: Tub/Shower combination    EXAMINATION/PRESENTATION/DECISION MAKING:   Critical Behavior:  Neurologic State: Eyes open spontaneously, Drowsy  Orientation Level: Oriented X4  Cognition: Appropriate decision making, Decreased attention/concentration, Follows commands     Hearing: Auditory  Auditory Impairment: None  Range Of Motion:  AROM: Generally decreased, functional                       Strength:    Strength: Generally decreased, functional                    Tone & Sensation:   Tone: Normal                              Coordination:  Coordination: Generally decreased, functional  Vision:      Functional Mobility:  Bed Mobility:  Rolling: Minimum assistance;Assist x2  Supine to Sit: Minimum assistance;Assist x2        Transfers:  Sit to Stand: Minimum assistance  Stand to Sit: Minimum assistance        Bed to Chair: Minimum assistance              Balance:   Sitting: Impaired  Sitting - Static: Good (unsupported)  Sitting - Dynamic: Fair (occasional)  Standing: Impaired  Standing - Static: Constant support; Fair  Standing - Dynamic : Constant support; Fair  Ambulation/Gait Training:  Distance (ft): 4 Feet (ft)     Ambulation - Level of Assistance: Minimal assistance;Assist x2 (hand held)                 Base of Support: Widened     Speed/Tiffany: Slow  Step Length: Left shortened;Right shortened                     Stairs:                Functional Measure:  Barthel Index:    Bathin  Bladder: 0  Bowels: 5  Groomin  Dressin  Feedin  Mobility: 0  Stairs: 0  Toilet Use: 5  Transfer (Bed to Chair and Back): 5  Total: 25/100       The Barthel ADL Index: Guidelines  1. The index should be used as a record of what a patient does, not as a record of what a patient could do. 2. The main aim is to establish degree of independence from any help, physical or verbal, however minor and for whatever reason. 3. The need for supervision renders the patient not independent. 4. A patient's performance should be established using the best available evidence. Asking the patient, friends/relatives and nurses are the usual sources, but direct observation and common sense are also important. However direct testing is not needed.   5. Usually the patient's performance over the preceding 24-48 hours is important, but occasionally longer periods will be relevant. 6. Middle categories imply that the patient supplies over 50 per cent of the effort. 7. Use of aids to be independent is allowed. Dajuan Acosta., Barthel, D.W. (1459). Functional evaluation: the Barthel Index. [address] [facility] (14)2. Jordon Bellernie juno YOUNG Benitez, Mark Mcclendon., Zaira Tsang., [place], [address] [place] [address] Ave (1999). Measuring the change indisability after inpatient rehabilitation; comparison of the responsiveness of the Barthel Index and Functional [place] Measure. Journal of Neurology, Neurosurgery, and Psychiatry, 66(4), 120-558. Fredi Dalton, N.J.A, DIAMOND Davis, & Sujata Ratliff MROSALBA. (2004.) Assessment of post-stroke quality of life in cost-effectiveness studies: The usefulness of the Barthel Index and the EuroQoL-5D. Quality of Life Research, 13, 723-29               Activity Tolerance:   Fair and requires rest breaks    After treatment patient left in no apparent distress:   Sitting in chair and Call bell within reach    COMMUNICATION/EDUCATION:   The patients plan of care was discussed with: Registered nurse. Fall prevention education was provided and the patient/caregiver indicated understanding. and Patient/family agree to work toward stated goals and plan of care.     Thank you for this referral.  Lindsay Garcia, PT   Time Calculation: 34 mins

## 2021-06-24 NOTE — PROGRESS NOTES
Attending provider:  Lizy Lacey MD   PCP:  Mal Alston MD    Subjective:    Patient seen and examined by me today with nurse practitioner Sharmaine Martinez. Pain a bit better today. Objective:    Blood pressure (!) 163/81, pulse 66, temperature 97.5 °F (36.4 °C), resp. rate 16, height 5' 2\" (1.575 m), weight 94.5 kg (208 lb 5.4 oz), SpO2 98 %, not currently breastfeeding. Drains -serosanguineous  Exam - A&O, NAD.  CTAB, RRR. Abdomen soft, ND, NABS, appropriately TTP, CDI. No edema. Assessment:  Procedure(s):  LAPAROSCOPIC PARTIAL GASTRECTOMY WITH CORE LIVER BIOPSY 6/22/2021    Active Hospital Problems    Diagnosis Date Noted    Leukocytosis 06/20/2021    NSTEMI (non-ST elevated myocardial infarction) ([facility] 06/20/2021    GIST (gastrointestinal stroma tumor), malignant, colon ([facility] 06/20/2021    Acute kidney injury superimposed on chronic kidney disease ([facility] 06/20/2021    Acute on chronic anemia 06/20/2021    GI bleed 10/04/2020     Plan:  Cont TPN as long as the NG tube is in place. Thx to nutrition for recs. Continue NG tube at least 5 days postop. Continue PRECIOUS. Ambulate.

## 2021-06-24 NOTE — PROGRESS NOTES
End of Shift Note    Bedside shift change report given to Tay Rogers RN (oncoming nurse) by Gwynneth Spatz (offgoing nurse). Report included the following information SBAR, Kardex, Intake/Output and Recent Results    Shift worked:  7p-7a     Shift summary and any significant changes:     none;pt rested all night; nothing to eat or drink     Concerns for physician to address:  none     Zone phone for oncoming shift:       Activity:  Activity Level: Bed Rest  Number times ambulated in hallways past shift: 0  Number of times OOB to chair past shift: 0    Cardiac:   Cardiac Monitoring: Yes      Cardiac Rhythm: Sinus Rhythm    Access:   Current line(s): central line     Genitourinary:   Urinary status: mccoy    Respiratory:   O2 Device: None (Room air)  Chronic home O2 use?: NO  Incentive spirometer at bedside: NO     GI:  Last Bowel Movement Date: 06/18/21  Current diet:  DIET NPO  TPN ADULT - CENTRAL  Passing flatus: YES  Tolerating current diet: YES       Pain Management:   Patient states pain is manageable on current regimen: YES    Skin:  Rufus Score: 18  Interventions: turn team, speciality bed, float heels and increase time out of bed    Patient Safety:  Fall Score:  Total Score: 4  Interventions: bed/chair alarm, assistive device (walker, cane, etc) and gripper socks  High Fall Risk: Yes    Length of Stay:  Expected LOS: 5d 12h  Actual LOS: [address]

## 2021-06-25 LAB
ANION GAP SERPL CALC-SCNC: 6 MMOL/L (ref 5–15)
BASOPHILS # BLD: 0 K/UL (ref 0–0.1)
BASOPHILS NFR BLD: 0 % (ref 0–1)
BUN SERPL-MCNC: 46 MG/DL (ref 6–20)
BUN/CREAT SERPL: 37 (ref 12–20)
CALCIUM SERPL-MCNC: 8.2 MG/DL (ref 8.5–10.1)
CHLORIDE SERPL-SCNC: 117 MMOL/L (ref 97–108)
CO2 SERPL-SCNC: 23 MMOL/L (ref 21–32)
CREAT SERPL-MCNC: 1.25 MG/DL (ref 0.55–1.02)
DIFFERENTIAL METHOD BLD: ABNORMAL
EOSINOPHIL # BLD: 0 K/UL (ref 0–0.4)
EOSINOPHIL NFR BLD: 0 % (ref 0–7)
ERYTHROCYTE [DISTWIDTH] IN BLOOD BY AUTOMATED COUNT: 26.1 % (ref 11.5–14.5)
GLUCOSE BLD STRIP.AUTO-MCNC: 150 MG/DL (ref 65–117)
GLUCOSE BLD STRIP.AUTO-MCNC: 179 MG/DL (ref 65–117)
GLUCOSE BLD STRIP.AUTO-MCNC: 202 MG/DL (ref 65–117)
GLUCOSE BLD STRIP.AUTO-MCNC: 215 MG/DL (ref 65–117)
GLUCOSE BLD STRIP.AUTO-MCNC: 216 MG/DL (ref 65–117)
GLUCOSE SERPL-MCNC: 188 MG/DL (ref 65–100)
HCT VFR BLD AUTO: 31.6 % (ref 35–47)
HGB BLD-MCNC: 9.1 G/DL (ref 11.5–16)
IMM GRANULOCYTES # BLD AUTO: 0 K/UL (ref 0–0.04)
IMM GRANULOCYTES NFR BLD AUTO: 0 % (ref 0–0.5)
LYMPHOCYTES # BLD: 1.1 K/UL (ref 0.8–3.5)
LYMPHOCYTES NFR BLD: 6 % (ref 12–49)
MCH RBC QN AUTO: 23.9 PG (ref 26–34)
MCHC RBC AUTO-ENTMCNC: 28.8 G/DL (ref 30–36.5)
MCV RBC AUTO: 82.9 FL (ref 80–99)
METAMYELOCYTES NFR BLD MANUAL: 2 %
MONOCYTES # BLD: 1.1 K/UL (ref 0–1)
MONOCYTES NFR BLD: 6 % (ref 5–13)
MYELOCYTES NFR BLD MANUAL: 2 %
NEUTS BAND NFR BLD MANUAL: 2 %
NEUTS SEG # BLD: 15.5 K/UL (ref 1.8–8)
NEUTS SEG NFR BLD: 82 % (ref 32–75)
NRBC # BLD: 0.46 K/UL (ref 0–0.01)
NRBC BLD-RTO: 2.5 PER 100 WBC
PLATELET # BLD AUTO: 231 K/UL (ref 150–400)
PMV BLD AUTO: 9.4 FL (ref 8.9–12.9)
POTASSIUM SERPL-SCNC: 4.3 MMOL/L (ref 3.5–5.1)
RBC # BLD AUTO: 3.81 M/UL (ref 3.8–5.2)
RBC MORPH BLD: ABNORMAL
SERVICE CMNT-IMP: ABNORMAL
SODIUM SERPL-SCNC: 146 MMOL/L (ref 136–145)
WBC # BLD AUTO: 18.5 K/UL (ref 3.6–11)

## 2021-06-25 PROCEDURE — 74011250637 HC RX REV CODE- 250/637: Performed by: SURGERY

## 2021-06-25 PROCEDURE — 74011250636 HC RX REV CODE- 250/636: Performed by: INTERNAL MEDICINE

## 2021-06-25 PROCEDURE — 97530 THERAPEUTIC ACTIVITIES: CPT

## 2021-06-25 PROCEDURE — 74011636637 HC RX REV CODE- 636/637: Performed by: INTERNAL MEDICINE

## 2021-06-25 PROCEDURE — 74011250637 HC RX REV CODE- 250/637: Performed by: INTERNAL MEDICINE

## 2021-06-25 PROCEDURE — 36415 COLL VENOUS BLD VENIPUNCTURE: CPT

## 2021-06-25 PROCEDURE — 65270000029 HC RM PRIVATE

## 2021-06-25 PROCEDURE — 74011250636 HC RX REV CODE- 250/636: Performed by: SURGERY

## 2021-06-25 PROCEDURE — C9113 INJ PANTOPRAZOLE SODIUM, VIA: HCPCS | Performed by: SURGERY

## 2021-06-25 PROCEDURE — 74011000250 HC RX REV CODE- 250: Performed by: NURSE PRACTITIONER

## 2021-06-25 PROCEDURE — 82962 GLUCOSE BLOOD TEST: CPT

## 2021-06-25 PROCEDURE — 97116 GAIT TRAINING THERAPY: CPT

## 2021-06-25 PROCEDURE — 97535 SELF CARE MNGMENT TRAINING: CPT

## 2021-06-25 PROCEDURE — 74011000250 HC RX REV CODE- 250: Performed by: INTERNAL MEDICINE

## 2021-06-25 PROCEDURE — 74011636637 HC RX REV CODE- 636/637: Performed by: NURSE PRACTITIONER

## 2021-06-25 PROCEDURE — 97165 OT EVAL LOW COMPLEX 30 MIN: CPT

## 2021-06-25 PROCEDURE — 85025 COMPLETE CBC W/AUTO DIFF WBC: CPT

## 2021-06-25 PROCEDURE — 99232 SBSQ HOSP IP/OBS MODERATE 35: CPT | Performed by: CLINICAL NURSE SPECIALIST

## 2021-06-25 PROCEDURE — 80048 BASIC METABOLIC PNL TOTAL CA: CPT

## 2021-06-25 PROCEDURE — 74011000250 HC RX REV CODE- 250: Performed by: SURGERY

## 2021-06-25 PROCEDURE — 74011000258 HC RX REV CODE- 258: Performed by: NURSE PRACTITIONER

## 2021-06-25 PROCEDURE — 74011250636 HC RX REV CODE- 250/636: Performed by: NURSE PRACTITIONER

## 2021-06-25 RX ORDER — MORPHINE SULFATE 2 MG/ML
1 INJECTION, SOLUTION INTRAMUSCULAR; INTRAVENOUS
Status: DISCONTINUED | OUTPATIENT
Start: 2021-06-25 | End: 2021-07-01 | Stop reason: HOSPADM

## 2021-06-25 RX ORDER — FUROSEMIDE 10 MG/ML
40 INJECTION INTRAMUSCULAR; INTRAVENOUS ONCE
Status: COMPLETED | OUTPATIENT
Start: 2021-06-25 | End: 2021-06-25

## 2021-06-25 RX ORDER — METOPROLOL TARTRATE 5 MG/5ML
5 INJECTION INTRAVENOUS EVERY 6 HOURS
Status: DISCONTINUED | OUTPATIENT
Start: 2021-06-25 | End: 2021-06-29

## 2021-06-25 RX ORDER — HYDRALAZINE HYDROCHLORIDE 20 MG/ML
10 INJECTION INTRAMUSCULAR; INTRAVENOUS
Status: DISCONTINUED | OUTPATIENT
Start: 2021-06-25 | End: 2021-07-01 | Stop reason: HOSPADM

## 2021-06-25 RX ADMIN — METOPROLOL TARTRATE 5 MG: 5 INJECTION INTRAVENOUS at 11:15

## 2021-06-25 RX ADMIN — SODIUM CHLORIDE 40 MG: 9 INJECTION INTRAMUSCULAR; INTRAVENOUS; SUBCUTANEOUS at 20:45

## 2021-06-25 RX ADMIN — INSULIN LISPRO 3 UNITS: 100 INJECTION, SOLUTION INTRAVENOUS; SUBCUTANEOUS at 00:40

## 2021-06-25 RX ADMIN — NITROGLYCERIN 1 INCH: 20 OINTMENT TOPICAL at 08:11

## 2021-06-25 RX ADMIN — IRON SUCROSE 200 MG: 20 INJECTION, SOLUTION INTRAVENOUS at 13:08

## 2021-06-25 RX ADMIN — INSULIN LISPRO 3 UNITS: 100 INJECTION, SOLUTION INTRAVENOUS; SUBCUTANEOUS at 12:00

## 2021-06-25 RX ADMIN — METOPROLOL TARTRATE 2.5 MG: 5 INJECTION INTRAVENOUS at 06:13

## 2021-06-25 RX ADMIN — MAGNESIUM SULFATE HEPTAHYDRATE: 500 INJECTION, SOLUTION INTRAMUSCULAR; INTRAVENOUS at 18:09

## 2021-06-25 RX ADMIN — HUMAN INSULIN 8 UNITS: 100 INJECTION, SUSPENSION SUBCUTANEOUS at 08:14

## 2021-06-25 RX ADMIN — SODIUM CHLORIDE 10 ML: 9 INJECTION, SOLUTION INTRAMUSCULAR; INTRAVENOUS; SUBCUTANEOUS at 06:16

## 2021-06-25 RX ADMIN — SODIUM CHLORIDE 50 ML/HR: 9 INJECTION, SOLUTION INTRAVENOUS at 07:57

## 2021-06-25 RX ADMIN — NITROGLYCERIN 1 INCH: 20 OINTMENT TOPICAL at 17:41

## 2021-06-25 RX ADMIN — SODIUM CHLORIDE 40 MG: 9 INJECTION INTRAMUSCULAR; INTRAVENOUS; SUBCUTANEOUS at 08:10

## 2021-06-25 RX ADMIN — SODIUM CHLORIDE 10 ML: 9 INJECTION, SOLUTION INTRAMUSCULAR; INTRAVENOUS; SUBCUTANEOUS at 21:10

## 2021-06-25 RX ADMIN — Medication 1 AMPULE: at 08:10

## 2021-06-25 RX ADMIN — METOPROLOL TARTRATE 2.5 MG: 5 INJECTION INTRAVENOUS at 00:40

## 2021-06-25 RX ADMIN — HUMAN INSULIN 12 UNITS: 100 INJECTION, SUSPENSION SUBCUTANEOUS at 17:40

## 2021-06-25 RX ADMIN — METOPROLOL TARTRATE 5 MG: 5 INJECTION INTRAVENOUS at 17:40

## 2021-06-25 RX ADMIN — SODIUM CHLORIDE 30 ML: 9 INJECTION, SOLUTION INTRAMUSCULAR; INTRAVENOUS; SUBCUTANEOUS at 14:36

## 2021-06-25 RX ADMIN — Medication 1 AMPULE: at 20:45

## 2021-06-25 RX ADMIN — FUROSEMIDE 40 MG: 10 INJECTION, SOLUTION INTRAMUSCULAR; INTRAVENOUS at 17:40

## 2021-06-25 RX ADMIN — INSULIN LISPRO 3 UNITS: 100 INJECTION, SOLUTION INTRAVENOUS; SUBCUTANEOUS at 17:40

## 2021-06-25 NOTE — PROGRESS NOTES
Received notification from bedside RN about patient with regards to: recurrent urinary retention, was already straight cath x 1 earlier in the shift, minimal urine output with recent 337 ml with bladder scan  VS: 164/74, HR 68, RR 18, O2 sat 97% on RA    Intervention given: Supra pubic warm compress PRN to stimulate voiding

## 2021-06-25 NOTE — PROGRESS NOTES
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 Presenting with hgb 4.7, s/p 3 units PRBC. Hgb stable  Status post laparoscopic partial gastrectomy with core liver biopsy, path pending. NG tube in place, n.p.o. and continue IV PPI  Dc PCA pump. Add IV morphine prn  TPN per surgery, appreciate recs   Oncology and surgery following    Leukocytosis, likely reactive  procalcitonin wnl.  UA neg/no urinary symptoms. Bcx NTD. CXR neg, CT a/p as above  Zosyn discontinued on   Trend CBC    HTN  Elevated troponin  Chronic systolic heart failure, EF 30-35% in 10/20, EF 35-40% on repeat Echo  EKG neg for acute ischemia. Noted pt is more edematous today. Increase to IV metoprolol. Add nitrobid BID. IV lasix 40mg x1, stop IVF  Patient is not on ASA due to GI bleed  Cardiology following, signed off on      GUANACO on CKD3, improved  Mild hyperkalemia, K5.2  Likely due to prerenal from blood loss  Stop IVF  Hold nephrotoxic agents  Follows with Dr Sandra Lafleur    T2DM  SSI  Adding NPH BID, titrate prn        Obesity / Body mass index is 38.47 kg/m². Estimated discharge date: To be determined  Barriers clinical condition    Code status: Full  Prophylaxis: Hep SQ  Recommended Disposition: Home w/Family     Subjective:     Chief Complaint / Reason for Physician Visit  Pt patient is awake, NAD. Appears stable, not in pain. Discussed with RN events overnight.      Review of Systems:  Symptom Y/N Comments  Symptom Y/N Comments   Fever/Chills    Chest Pain     Poor Appetite    Edema     Cough    Abdominal Pain     Sputum    Joint Pain     SOB/ARENAS    Pruritis/Rash     Nausea/vomit    Tolerating PT/OT     Diarrhea    Tolerating Diet     Constipation    Other Could NOT obtain due to:      Objective:     VITALS:   Last 24hrs VS reviewed since prior progress note. Most recent are:  Patient Vitals for the past 24 hrs:   Temp Pulse Resp BP SpO2   06/25/21 1024 97.9 °F (36.6 °C) 86 29 (!) 179/79 96 %   06/25/21 0752 97.8 °F (36.6 °C) 86 29 (!) 183/79 95 %   06/25/21 0403 97.9 °F (36.6 °C) 78 22 (!) 173/76 97 %   06/24/21 2257 97.6 °F (36.4 °C) 68 18 (!) 164/74 97 %   06/24/21 1921 97.9 °F (36.6 °C) 65 16 (!) 174/73 97 %   06/24/21 1657 -- 66 -- (!) 172/69 --   06/24/21 1431 97.5 °F (36.4 °C) 66 16 (!) 163/81 98 %   06/24/21 1259 -- 66 -- (!) 148/59 --       Intake/Output Summary (Last 24 hours) at 6/25/2021 1251  Last data filed at 6/25/2021 1000  Gross per 24 hour   Intake 2513.59 ml   Output 810 ml   Net 1703.59 ml        I had a face to face encounter and independently examined this patient on 6/25/2021, as outlined below:  PHYSICAL EXAM:  General: WD, WN. Alert, cooperative, no acute distress    EENT:  EOMI. Anicteric sclerae. MMM  Resp:  CTA bilaterally, no wheezing or rales. No accessory muscle use  CV:  Regular  rhythm,  mild b/l LE and UE edema  GI:  Soft, Non distended, + tender. Neurologic:  Alert and oriented X 3, normal speech  Psych:   Not anxious nor agitated  Skin:  No rashes. No jaundice    Reviewed most current lab test results and cultures  YES  Reviewed most current radiology test results   YES  Review and summation of old records today    NO  Reviewed patient's current orders and MAR    YES  PMH/SH reviewed - no change compared to H&P  ________________________________________________________________________  Care Plan discussed with:    Comments   Patient x    Family      RN x    Care Manager     Consultant                        Multidiciplinary team rounds were held today with , nursing, pharmacist and clinical coordinator. Patient's plan of care was discussed; medications were reviewed and discharge planning was addressed. ________________________________________________________________________  Total NON critical care TIME:  35   Minutes    Total CRITICAL CARE TIME Spent:   Minutes non procedure based      Comments   >50% of visit spent in counseling and coordination of care     ________________________________________________________________________  Duncan Alcala MD     Procedures: see electronic medical records for all procedures/Xrays and details which were not copied into this note but were reviewed prior to creation of Plan. LABS:  I reviewed today's most current labs and imaging studies.   Pertinent labs include:  Recent Labs     06/25/21  0403 06/24/21  0411 06/23/21  0509   WBC 18.5* 16.9* 21.7*   HGB 9.1* 8.6* 9.0*   HCT 31.6* 30.6* 31.4*    242 240     Recent Labs     06/25/21  0403 06/24/21  0411 06/23/21  0509   * 144 143   K 4.3 4.8 5.2*   * 114* 114*   CO2 23 23 20*   * 293* 171*   BUN 46* 50* 48*   CREA 1.25* 1.64* 1.44*   CA 8.2* 7.9* 7.7*   PHOS  --  3.9  --        Signed: Duncan Alcala MD

## 2021-06-25 NOTE — PROGRESS NOTES
Attending provider:  Valery Lynch MD   PCP:  Pj Aleman MD    Subjective:    Patient seen and examined by me today with nurse practitioner Tamika Chowdary. Not much pain. Taking a lot of help to mobilize. Objective:    Blood pressure (!) 169/85, pulse 80, temperature 98.1 °F (36.7 °C), resp. rate 24, height 5' 2\" (1.575 m), weight 95.4 kg (210 lb 5.1 oz), SpO2 91 %, not currently breastfeeding. Drains -serosanguineous  Exam - A&O, NAD.  CTAB, RRR. Abdomen soft, ND, NABS, appropriately TTP, CDI. No edema. Assessment:  Procedure(s):  LAPAROSCOPIC PARTIAL GASTRECTOMY WITH CORE LIVER BIOPSY 6/22/2021    Active Hospital Problems    Diagnosis Date Noted    Leukocytosis 06/20/2021    NSTEMI (non-ST elevated myocardial infarction) ([facility] 06/20/2021    GIST (gastrointestinal stroma tumor), malignant, colon ([facility] 06/20/2021    Acute kidney injury superimposed on chronic kidney disease ([facility] 06/20/2021    Acute on chronic anemia 06/20/2021    GI bleed 10/04/2020     Plan:  Cont TPN and NG until Monday. Will clamp NG trial Monday. Continue PRECIOUS. Ambulate. May need rehab.

## 2021-06-25 NOTE — DIABETES MGMT
[facility]    CLINICAL NURSE SPECIALIST CONSULT     Initial Presentation   Florence Jaquez is a 78 y.o. female admitted 6/19/21 from the ER with abdominal mass and pain. Also, notes fatigue and fall. Afebrile. Normotensive. 02 sats 100%  Lab results include WBC 21.8; low H&H. Elevated liver enzymes, creatinine & troponins. GFR 23. CXR:   Mild-moderate cardiac contour enlargement. No acute pulmonary   findings. CT ABd:   1. Gastric mass again demonstrated. 2. Interval demonstration of hepatic metastatic disease. HX:   Past Medical History:   Diagnosis Date    Anemia, unspecified 10/6/2020    Arrhythmia     Arthritis     CAD (coronary artery disease)     Cancer ([address]     Chronic bronchitis ([address]     per pt:  as of 1/9/15 pt denies any ARENAS or SOB    Diabetes ([address] Dx approx 2009    Dr Cristino Roa (in connect care)    GERD (gastroesophageal reflux disease)     Hypercholesteremia     Hypertension       DX: Acute on Chronic anemia. GUANACO. GI bleed  GIST (gastrointestinal stroma tumor), malignant, colon (HCC)    Gastrointestinal hemorrhage, unspecified gastrointestinal hemorrhage type    NSTEMI (non-ST elevated myocardial infarction) ([address]      Treatment plan     TX: Blood transfusion. ABx. TPN (beginning 6/23/21)  6/22/21 LAPAROSCOPIC PARTIAL GASTRECTOMY WITH CORE LIVER BIOPSY  PT/OT    Hospital course   Clinical progress has been complicated by new diagnosis of cancer. Diabetes    Patient has known Type 2 diabetes, treated with oral agent PTA. Family history positive for diabetes in mother. Admission  and A1c 6.9% (6/20/21) indicate acceptable diabetes control.    Ambulatory blood glucose management provided by endocrinologist, Sarbjit Yanez MD.  Chris Pratt by Provider for advanced diabetes nursing assessment and care, specifically related to   [x] Inpatient management strategy    Diabetes-related medical history - deferred    Diabetes medication history  Drug class Currently in use Discontinued Never used   Biguanide      DDP-4 inhibitor       Sulfonylurea Glucotrol 5mg twice daily     Thiazolidinedione      GLP-1 RA      SGLT-2 inhibitors      Basal insulin      Bolus insulin      Fixed Dose  Combinations        Subjective   \"I am not nauseous. \"    Patient reports the following home diabetes self-care practices - deferred     Objective   Physical exam  General Obese AA female in no acute distress  Neuro  Alert & oriented  Vital Signs Afebrile. Hypertensive  Visit Vitals  BP (!) 169/85 (BP 1 Location: Left upper arm, BP Patient Position: Lying)   Pulse 80   Temp 98.1 °F (36.7 °C)   Resp 24   Ht 5' 2\" (1.575 m)   Wt 95.4 kg (210 lb 5.1 oz)   SpO2 91%   Breastfeeding No   BMI 38.47 kg/m²     Laboratory  Tests 6/25 6/24 6/23 6/22 6/19/21   A1c     6.5%  (6/20/21)    293 171 84 180   Anion gap 6 7 9 6 9   Serum triglycerides        WBC 18.5 16.9 21.7 15.4 21.8   Serum creatinine 1.25 1.64 1.44 1.35 2.44   GFR 51 37 43 46 23   AST     87   ALT     104   Procalcitonin     1.31   (6/20/21)   NT pro-BNP     15,450   Troponin     3.81     Factors impacting BG management  Factor Dose Comments   Nutrition:  TPN   200 grams/24 hrs  (20 units of insulin)    Drugs:  Steroids  Blood transfusion(s)   Dexamethasone 8mg (6/22/21)  PRBC   Increase insulin resistance  A1cs inaccurate   Pain Tylenol PRN None reported     Blood glucose pattern        Assessment and Plan   Nursing Diagnosis Risk for unstable blood glucose pattern   Nursing Intervention Domain 5251 Decision-making Support   Nursing Interventions Examined current inpatient diabetes control   Explored factors facilitating and impeding inpatient management     Evaluation   This obese AA female, with Type 2 diabetes, did achieve diabetes control prior to admission, as evidenced by admission BG of 180 and A1c of 6.9%.  During this hospitalization, the patient achieved BG control until given dexamethasone (6/22/21) and TPN was initiated 6/23/21. Although there is insulin in the TPN, it was insufficient to override current insulin needs, so basal insulin was initiated yesterday (6/24/21). Based on today's BG trend, would increase NPH insulin. Recommendations     [x] Use of Subcutaneous Insulin Order set (9406)  Insulin Dosing Specific recommendation   Basal                                      (Based on weight, BMI & GFR)      Increase NPH insulin to 12 units twice daily   Nutritional                                      (Based on CHO/dextrose load)     Corrective                                       (Useful in adjusting insulin dosing) [x] HIGH sensitivity        Billing Code(s)   [x] 02282 IP subsequent hospital care - 25 minutes      Before making these care recommendations, I personally reviewed the hospitalization record, including notes, laboratory & diagnostic data and current medications, and xamined the patient at the bedside (circumstances permitting) before making care recommendations.      Total minutes: [address] [place], CNS  Diabetes Clinical Nurse Specialist  [facility]  Access via [address] [place] [address]

## 2021-06-25 NOTE — PROGRESS NOTES
Transition of Care Plan:     RUR: 26 %  Disposition: Home with spouse   Follow up appointments: PCP,Specialists  DME needed: Pt has a RW and cane at home  Transportation at Discharge:Patient's friend to [place] or means to access home:  Yes      IM Medicare letter: 2nd IM before discharge   Caregiver Contact:  Vijay Jones [phone]  Discharge Caregiver contacted prior to discharge?   Unit CM to continue to follow for discharge needs and planning      Patient remains in the pcu being monitored. CM spoke with her about recommendations for dcp when medically stable. She would like to speak with her family regarding this. Will continue to monitor. Yessi Wills RN BSN CRM        [phone]

## 2021-06-25 NOTE — PROGRESS NOTES
End of Shift Note    Bedside shift change report given to Stefany Daniels (oncoming nurse) by Adonis Caruso (offgoing nurse). Report included the following information SBAR, Kardex, Intake/Output, MAR and Recent Results    Shift worked:  7p-7a     Shift summary and any significant changes:     pt rested through the night; she was straight cathed on day shift 1x; bladder scanned her and she had 337 ml, applied a warm suprapubic compress and she voided on her own; no signs or symptoms of distress     Concerns for physician to address: none   Zone phone for oncoming shift:  [phone]     Activity:  Activity Level: Bed Rest  Number times ambulated in hallways past shift: 0  Number of times OOB to chair past shift: 0    Cardiac:   Cardiac Monitoring: Yes      Cardiac Rhythm: Sinus Rhythm    Access:   Current line(s): central line     Genitourinary:   Urinary status: voiding    Respiratory:   O2 Device: None (Room air)  Chronic home O2 use?: NO  Incentive spirometer at bedside: NO     GI:  Last Bowel Movement Date: 06/18/21  Current diet:  TPN ADULT - CENTRAL  DIET NPO Ice Chips  Passing flatus: YES  Tolerating current diet: YES       Pain Management:   Patient states pain is manageable on current regimen: YES    Skin:  Rufus Score: 18  Interventions: turn team, speciality bed, float heels, increase time out of bed and PT/OT consult    Patient Safety:  Fall Score:  Total Score: 4  Interventions: bed/chair alarm, assistive device (walker, cane, etc), gripper socks and pt to call before getting OOB  High Fall Risk: Yes    Length of Stay:  Expected LOS: 9d 16h  Actual LOS: [address]

## 2021-06-25 NOTE — PROGRESS NOTES
Problem: Self Care Deficits Care Plan (Adult)  Goal: *Acute Goals and Plan of Care (Insert Text)  Description:   FUNCTIONAL STATUS PRIOR TO ADMISSION: Patient was independent and active without use of DME. Patient was independent for basic and instrumental ADLs. Patient's  has assisted her in the past for LB dressing if needed. HOME SUPPORT: The patient lived with  but did not require assist.    Occupational Therapy Goals  Initiated 6/25/2021  1. Patient will perform grooming standing at sink with supervision/set-up within 7 day(s). 2.  Patient will perform upper body dressing with supervision/set-up within 7 day(s). 3.  Patient will perform lower body dressing using AE PRN with supervision/set-up within 7 day(s). 4.  Patient will perform toilet transfers with supervision/set-up within 7 day(s). 5.  Patient will perform all aspects of toileting with supervision/set-up within 7 day(s). Outcome: Not Met   OCCUPATIONAL THERAPY EVALUATION  Patient: Ventura Gross (30 y.o. female)  Date: 6/25/2021  Primary Diagnosis: GI bleed [K92.2]  Acute on chronic anemia [D64.9]  GIST (gastrointestinal stroma tumor), malignant, colon ([facility] García Rashid. A4]  NSTEMI (non-ST elevated myocardial infarction) ([facility] [I21.4]  Leukocytosis [D72.829]  Acute kidney injury superimposed on chronic kidney disease ([facility] [N17.9, N18.9]  Procedure(s) (LRB):  LAPAROSCOPIC PARTIAL GASTRECTOMY WITH CORE LIVER BIOPSY (N/A) 3 Days Post-Op   Precautions: Fall risk       ASSESSMENT  Based on the objective data described below, the patient presents with decreased independence in self-care and functional mobility secondary to general weakness, impaired balance, ARENAS, and poor activity tolerance. Patient is functioning below her independent baseline for self-care and functional mobility, now completing self-care with set-up/supervision to total assist and functional mobility with min assist to min assist x2 using RW.  Patient received semisupine in chair and agreeable for therapy. While sitting EOB, patient c/o feeling cold and required min assist for UB dressing. Patient transferred to bedside chair with min assist using RW and noted to have ARENAS. Patient provided wash cloth to wash face and required cueing for safety around her NG tube. Patient was left sitting in chair with all needs met. Patient would benefit from skilled OT services during acute hospital stay, especially education on AE for LB dressing. Recommend patient discharge to SNF rehab, pending progress. Current Level of Function Impacting Discharge (ADLs/self-care): set-up/supervision to total assist for self-care, min to min assist x2 for functional mobility     Functional Outcome Measure: The patient scored 30 on the Barthel Index outcome measure which is indicative of 70% functional impairment. Other factors to consider for discharge: fall risk, abdominal incision and drain      Patient will benefit from skilled therapy intervention to address the above noted impairments. PLAN :  Recommendations and Planned Interventions: self care training, functional mobility training, therapeutic exercise, balance training, therapeutic activities, endurance activities, patient education, home safety training, and family training/education    Frequency/Duration: Patient will be followed by occupational therapy 4 times a week to address goals. Recommendation for discharge: (in order for the patient to meet his/her long term goals)  Therapy up to 5 days/week in SNF setting, pending progress     This discharge recommendation:  Has been made in collaboration with the attending provider and/or case management    IF patient discharges home will need the following DME: TBD pending progress       SUBJECTIVE:   Patient stated I just want something to eat.     OBJECTIVE DATA SUMMARY:   HISTORY:   Past Medical History:   Diagnosis Date    Anemia, unspecified 10/6/2020    Arrhythmia     Arthritis CAD (coronary artery disease)     Cancer (HCC)     Chronic bronchitis ([facility]     per pt:  as of 1/9/15 pt denies any ARENAS or SOB    Diabetes ([facility] Dx approx 2009    Dr Gladys Saldivar (in [facility])    GERD (gastroesophageal reflux disease)     Hypercholesteremia     Hypertension      Past Surgical History:   Procedure Laterality Date    HX CHOLECYSTECTOMY  6/12    HX COLONOSCOPY      HX CORONARY STENT PLACEMENT  8/25/2015    HX DILATION AND CURETTAGE      CT CARDIAC SURG PROCEDURE UNLIST  2015    UPPER GI ENDOSCOPY,BIOPSY  10/6/2020            Expanded or extensive additional review of patient history:     Home Situation  Home Environment: Private residence  # Steps to Enter: 1  Rails to Enter: Yes  Hand Rails : Bilateral  One/Two Story Residence: One story  Living Alone: No  Support Systems: Spouse/Significant Other/Partner  Patient Expects to be Discharged to[de-identified] [employer]  Current DME Used/Available at Home: Rise Carpen, straight, Walker, rolling, Shower chair, Grab bars  Tub or Shower Type: Tub/Shower combination    Hand dominance: Right    EXAMINATION OF PERFORMANCE DEFICITS:  Cognitive/Behavioral Status:  Neurologic State: Alert  Orientation Level: Oriented X4  Cognition: Appropriate decision making; Appropriate safety awareness; Follows commands  Perception: Appears intact  Perseveration: No perseveration noted  Safety/Judgement: Awareness of environment; Fall prevention    Hearing: Auditory  Auditory Impairment: None    Vision/Perceptual:    Acuity: Within Defined Limits    Corrective Lenses: Reading glasses    Range of Motion:  AROM: Generally decreased, functional    Strength:  Strength: Generally decreased, functional    Coordination:  Coordination: Generally decreased, functional  Fine Motor Skills-Upper: Left Intact; Right Intact    Gross Motor Skills-Upper: Left Intact; Right Intact    Tone & Sensation:  Tone: Normal    Balance:  Sitting: Impaired  Sitting - Static: Good (unsupported)  Sitting - Dynamic: Fair (occasional)  Standing: Impaired  Standing - Static: Constant support;Good  Standing - Dynamic : Constant support; Fair    Functional Mobility and Transfers for ADLs:  Bed Mobility:  Rolling: Minimum assistance;Assist x2  Supine to Sit: Minimum assistance;Assist x2    Transfers:  Sit to Stand: Minimum assistance  Stand to Sit: Minimum assistance  Bed to Chair: Minimum assistance    ADL Assessment:  Feeding:  (NPO)  Oral Facial Hygiene/Grooming: Setup;Supervision (seated)  Bathing: Moderate assistance  Upper Body Dressing: Minimum assistance  Lower Body Dressing: Total assistance  Toileting: Moderate assistance    ADL Intervention and task modifications:    Grooming  Position Performed: Seated in chair  Washing Face: Set-up; Supervision  Cues: Verbal cues provided (for safety with NG tube)    Upper Body Dressing Assistance  Shirt simulation with hospital gown: Minimum  assistance (seated EOB)  Cues: Physical assistance; Tactile cues provided;Verbal cues provided    Toileting  Bladder Hygiene: Moderate assistance  Clothing Management: Minimum assistance  Cues: Tactile cues provided;Verbal cues provided    Cognitive Retraining  Safety/Judgement: Awareness of environment; Fall prevention    Functional Measure:  Barthel Index:    Bathin  Bladder: 5  Bowels: 5  Groomin  Dressin  Feedin  Mobility: 0  Stairs: 0  Toilet Use: 5  Transfer (Bed to Chair and Back): 5  Total: 30/100        The Barthel ADL Index: Guidelines  1. The index should be used as a record of what a patient does, not as a record of what a patient could do. 2. The main aim is to establish degree of independence from any help, physical or verbal, however minor and for whatever reason. 3. The need for supervision renders the patient not independent. 4. A patient's performance should be established using the best available evidence.  Asking the patient, friends/relatives and nurses are the usual sources, but direct observation and common sense are also important. However direct testing is not needed. 5. Usually the patient's performance over the preceding 24-48 hours is important, but occasionally longer periods will be relevant. 6. Middle categories imply that the patient supplies over 50 per cent of the effort. 7. Use of aids to be independent is allowed. Sindhu Castorena., Barthel, D.W. (0821). Functional evaluation: the Barthel Index. [address] (14)2. Dwight Alarcon juno YOUNG Benitez, Tutu Anna., Declan Gavin, Gennaro, [address] [facility] (1999). Measuring the change indisability after inpatient rehabilitation; comparison of the responsiveness of the Barthel Index and Functional Gates Measure. Journal of Neurology, Neurosurgery, and Psychiatry, 66(4), 663-128. KRYSTINA Rascon, DIAMOND Davis, & Yuri De La Cruz M.A. (2004.) Assessment of post-stroke quality of life in cost-effectiveness studies: The usefulness of the Barthel Index and the EuroQoL-5D. Quality of Life Research, 13, 266-09      Based on the above components, the patient evaluation is determined to be of the following complexity level: MEDIUM  Pain Rating:  Patient did not c/o pain during session. Activity Tolerance:   Fair, requires rest breaks, and observed SOB with activity    After treatment patient left in no apparent distress:    Sitting in chair and Call bell within reach    COMMUNICATION/EDUCATION:   The patients plan of care was discussed with: Physical therapist and Registered nurse. Home safety education was provided and the patient/caregiver indicated understanding., Patient/family have participated as able in goal setting and plan of care. , and Patient/family agree to work toward stated goals and plan of care. This patients plan of care is appropriate for delegation to [facility].     Thank you for this referral.  Zeny Vazquez OTR/L  Time Calculation: 28 mins

## 2021-06-25 NOTE — PROGRESS NOTES
0730 - [facility]    0800 - Assessment completed. 9024 - Normal saline and dilaudid PCA stopped per Dr. Jordan Lee (Hospitalist). Received order for morphine PRN. 1200 - Reassessment completed. No changes noted. 1230 - Patient up to chair with physical therapy. 1510 - TRANSFER - OUT REPORT:    Verbal report given to SurgicalTele Nurse(name) on Willma Ano  being transferred to Surgical Telemetry(unit) for routine progression of care       Report consisted of patients Situation, Background, Assessment and   Recommendations(SBAR). Information from the following report(s) SBAR, Kardex, [facility] and Recent Results was reviewed with the receiving nurse. Lines:   Triple Lumen 06/22/21 Right (Active)   Central Line Being Utilized Yes 06/25/21 1454   Criteria for Appropriate Use Total parenteral nutrition 06/25/21 1454   Site Assessment Clean, dry, & intact 06/25/21 1454   Infiltration Assessment 0 06/25/21 1454   Affected Extremity/Extremities Color distal to insertion site pink (or appropriate for race); Pulses palpable;Range of motion performed 06/25/21 1454   Date of Last Dressing Change 06/23/21 06/25/21 1454   Dressing Status Clean, dry, & intact 06/25/21 1454   Dressing Type Disk with Chlorhexadine gluconate (CHG) 06/25/21 1454   Action Taken Open ports on tubing capped 06/25/21 1454   Proximal Hub Color/Line Status Blue; Infusing 06/25/21 1454   Positive Blood Return (Medial Site) Yes 06/25/21 1454   Medial Hub Color/Line Status White; Infusing 06/25/21 1454   Positive Blood Return (Lateral Site) Yes 06/25/21 1454   Distal Hub Color/Line Status Brown;Flushed;Capped 06/25/21 1454   Positive Blood Return (Site #3) Yes 06/25/21 1454   External Catheter Length (cm) 0 centimeters 06/23/21 1420   Alcohol Cap Used Yes 06/25/21 1454        Opportunity for questions and clarification was provided.       Patient transported with:   Monitor  Patient's medications from home  Registered Nurse  [facility]

## 2021-06-26 ENCOUNTER — APPOINTMENT (OUTPATIENT)
Dept: GENERAL RADIOLOGY | Age: 76
DRG: 326 | End: 2021-06-26
Attending: SURGERY
Payer: MEDICARE

## 2021-06-26 LAB
ABO + RH BLD: NORMAL
ANION GAP SERPL CALC-SCNC: 5 MMOL/L (ref 5–15)
BASOPHILS # BLD: 0 K/UL (ref 0–0.1)
BASOPHILS NFR BLD: 0 % (ref 0–1)
BLD PROD TYP BPU: NORMAL
BLD PROD TYP BPU: NORMAL
BLOOD BANK CMNT PATIENT-IMP: NORMAL
BLOOD GROUP ANTIBODIES SERPL: NORMAL
BPU ID: NORMAL
BPU ID: NORMAL
BUN SERPL-MCNC: 40 MG/DL (ref 6–20)
BUN/CREAT SERPL: 39 (ref 12–20)
CALCIUM SERPL-MCNC: 7.9 MG/DL (ref 8.5–10.1)
CHLORIDE SERPL-SCNC: 118 MMOL/L (ref 97–108)
CO2 SERPL-SCNC: 23 MMOL/L (ref 21–32)
CREAT SERPL-MCNC: 1.02 MG/DL (ref 0.55–1.02)
CROSSMATCH RESULT,%XM: NORMAL
CROSSMATCH RESULT,%XM: NORMAL
DIFFERENTIAL METHOD BLD: ABNORMAL
EOSINOPHIL # BLD: 0.2 K/UL (ref 0–0.4)
EOSINOPHIL NFR BLD: 1 % (ref 0–7)
ERYTHROCYTE [DISTWIDTH] IN BLOOD BY AUTOMATED COUNT: 26.8 % (ref 11.5–14.5)
GLUCOSE BLD STRIP.AUTO-MCNC: 109 MG/DL (ref 65–117)
GLUCOSE BLD STRIP.AUTO-MCNC: 119 MG/DL (ref 65–117)
GLUCOSE BLD STRIP.AUTO-MCNC: 131 MG/DL (ref 65–117)
GLUCOSE BLD STRIP.AUTO-MCNC: 138 MG/DL (ref 65–117)
GLUCOSE SERPL-MCNC: 138 MG/DL (ref 65–100)
HCT VFR BLD AUTO: 31.1 % (ref 35–47)
HGB BLD-MCNC: 8.8 G/DL (ref 11.5–16)
IMM GRANULOCYTES # BLD AUTO: 0.3 K/UL (ref 0–0.04)
IMM GRANULOCYTES NFR BLD AUTO: 2 % (ref 0–0.5)
LYMPHOCYTES # BLD: 1.6 K/UL (ref 0.8–3.5)
LYMPHOCYTES NFR BLD: 10 % (ref 12–49)
MAGNESIUM SERPL-MCNC: 2.5 MG/DL (ref 1.6–2.4)
MCH RBC QN AUTO: 23.9 PG (ref 26–34)
MCHC RBC AUTO-ENTMCNC: 28.3 G/DL (ref 30–36.5)
MCV RBC AUTO: 84.5 FL (ref 80–99)
MONOCYTES # BLD: 0.8 K/UL (ref 0–1)
MONOCYTES NFR BLD: 5 % (ref 5–13)
NEUTS SEG # BLD: 13.5 K/UL (ref 1.8–8)
NEUTS SEG NFR BLD: 82 % (ref 32–75)
NRBC # BLD: 0.13 K/UL (ref 0–0.01)
NRBC BLD-RTO: 0.8 PER 100 WBC
PLATELET # BLD AUTO: 219 K/UL (ref 150–400)
PMV BLD AUTO: 9.7 FL (ref 8.9–12.9)
POTASSIUM SERPL-SCNC: 3.9 MMOL/L (ref 3.5–5.1)
RBC # BLD AUTO: 3.68 M/UL (ref 3.8–5.2)
RBC MORPH BLD: ABNORMAL
SERVICE CMNT-IMP: ABNORMAL
SERVICE CMNT-IMP: NORMAL
SODIUM SERPL-SCNC: 146 MMOL/L (ref 136–145)
SPECIMEN EXP DATE BLD: NORMAL
STATUS OF UNIT,%ST: NORMAL
STATUS OF UNIT,%ST: NORMAL
UNIT DIVISION, %UDIV: 0
UNIT DIVISION, %UDIV: 0
WBC # BLD AUTO: 16.4 K/UL (ref 3.6–11)

## 2021-06-26 PROCEDURE — 74011000250 HC RX REV CODE- 250: Performed by: SURGERY

## 2021-06-26 PROCEDURE — 74011250636 HC RX REV CODE- 250/636: Performed by: INTERNAL MEDICINE

## 2021-06-26 PROCEDURE — 74011000258 HC RX REV CODE- 258: Performed by: SURGERY

## 2021-06-26 PROCEDURE — 74011250637 HC RX REV CODE- 250/637: Performed by: INTERNAL MEDICINE

## 2021-06-26 PROCEDURE — 74011250637 HC RX REV CODE- 250/637: Performed by: SURGERY

## 2021-06-26 PROCEDURE — 74011000250 HC RX REV CODE- 250: Performed by: INTERNAL MEDICINE

## 2021-06-26 PROCEDURE — 71046 X-RAY EXAM CHEST 2 VIEWS: CPT

## 2021-06-26 PROCEDURE — 74011250636 HC RX REV CODE- 250/636: Performed by: SURGERY

## 2021-06-26 PROCEDURE — 99024 POSTOP FOLLOW-UP VISIT: CPT | Performed by: SURGERY

## 2021-06-26 PROCEDURE — 94760 N-INVAS EAR/PLS OXIMETRY 1: CPT

## 2021-06-26 PROCEDURE — 77010033678 HC OXYGEN DAILY

## 2021-06-26 PROCEDURE — 85025 COMPLETE CBC W/AUTO DIFF WBC: CPT

## 2021-06-26 PROCEDURE — C9113 INJ PANTOPRAZOLE SODIUM, VIA: HCPCS | Performed by: SURGERY

## 2021-06-26 PROCEDURE — 36415 COLL VENOUS BLD VENIPUNCTURE: CPT

## 2021-06-26 PROCEDURE — 74011636637 HC RX REV CODE- 636/637: Performed by: SURGERY

## 2021-06-26 PROCEDURE — 74011636637 HC RX REV CODE- 636/637: Performed by: INTERNAL MEDICINE

## 2021-06-26 PROCEDURE — 65270000029 HC RM PRIVATE

## 2021-06-26 PROCEDURE — 83735 ASSAY OF MAGNESIUM: CPT

## 2021-06-26 PROCEDURE — 80048 BASIC METABOLIC PNL TOTAL CA: CPT

## 2021-06-26 PROCEDURE — 82962 GLUCOSE BLOOD TEST: CPT

## 2021-06-26 RX ORDER — FUROSEMIDE 10 MG/ML
40 INJECTION INTRAMUSCULAR; INTRAVENOUS ONCE
Status: COMPLETED | OUTPATIENT
Start: 2021-06-26 | End: 2021-06-26

## 2021-06-26 RX ORDER — ENALAPRILAT 1.25 MG/ML
0.62 INJECTION INTRAVENOUS EVERY 6 HOURS
Status: DISCONTINUED | OUTPATIENT
Start: 2021-06-26 | End: 2021-06-26

## 2021-06-26 RX ORDER — FUROSEMIDE 10 MG/ML
20 INJECTION INTRAMUSCULAR; INTRAVENOUS ONCE
Status: COMPLETED | OUTPATIENT
Start: 2021-06-26 | End: 2021-06-26

## 2021-06-26 RX ORDER — ENALAPRILAT 1.25 MG/ML
1.25 INJECTION INTRAVENOUS EVERY 6 HOURS
Status: DISCONTINUED | OUTPATIENT
Start: 2021-06-26 | End: 2021-06-26

## 2021-06-26 RX ORDER — ENALAPRILAT 1.25 MG/ML
0.62 INJECTION INTRAVENOUS EVERY 6 HOURS
Status: DISCONTINUED | OUTPATIENT
Start: 2021-06-26 | End: 2021-06-29

## 2021-06-26 RX ADMIN — METOPROLOL TARTRATE 5 MG: 5 INJECTION INTRAVENOUS at 12:26

## 2021-06-26 RX ADMIN — SODIUM CHLORIDE 10 ML: 9 INJECTION, SOLUTION INTRAMUSCULAR; INTRAVENOUS; SUBCUTANEOUS at 06:00

## 2021-06-26 RX ADMIN — HUMAN INSULIN 12 UNITS: 100 INJECTION, SUSPENSION SUBCUTANEOUS at 08:16

## 2021-06-26 RX ADMIN — Medication 1 AMPULE: at 08:15

## 2021-06-26 RX ADMIN — ENALAPRILAT 0.62 MG: 1.25 INJECTION INTRAVENOUS at 12:26

## 2021-06-26 RX ADMIN — METOPROLOL TARTRATE 5 MG: 5 INJECTION INTRAVENOUS at 23:13

## 2021-06-26 RX ADMIN — METOPROLOL TARTRATE 5 MG: 5 INJECTION INTRAVENOUS at 00:00

## 2021-06-26 RX ADMIN — ENALAPRILAT 0.62 MG: 1.25 INJECTION INTRAVENOUS at 23:13

## 2021-06-26 RX ADMIN — SODIUM CHLORIDE 10 ML: 9 INJECTION, SOLUTION INTRAMUSCULAR; INTRAVENOUS; SUBCUTANEOUS at 21:29

## 2021-06-26 RX ADMIN — NITROGLYCERIN 1 INCH: 20 OINTMENT TOPICAL at 00:00

## 2021-06-26 RX ADMIN — SODIUM CHLORIDE 40 MG: 9 INJECTION INTRAMUSCULAR; INTRAVENOUS; SUBCUTANEOUS at 21:28

## 2021-06-26 RX ADMIN — NITROGLYCERIN 1 INCH: 20 OINTMENT TOPICAL at 23:13

## 2021-06-26 RX ADMIN — FUROSEMIDE 20 MG: 10 INJECTION, SOLUTION INTRAMUSCULAR; INTRAVENOUS at 14:58

## 2021-06-26 RX ADMIN — SODIUM CHLORIDE 40 MG: 9 INJECTION INTRAMUSCULAR; INTRAVENOUS; SUBCUTANEOUS at 08:16

## 2021-06-26 RX ADMIN — SODIUM CHLORIDE 10 ML: 9 INJECTION, SOLUTION INTRAMUSCULAR; INTRAVENOUS; SUBCUTANEOUS at 14:00

## 2021-06-26 RX ADMIN — FUROSEMIDE 40 MG: 10 INJECTION, SOLUTION INTRAMUSCULAR; INTRAVENOUS at 09:14

## 2021-06-26 RX ADMIN — ENALAPRILAT 0.62 MG: 1.25 INJECTION INTRAVENOUS at 17:26

## 2021-06-26 RX ADMIN — MAGNESIUM SULFATE HEPTAHYDRATE: 500 INJECTION, SOLUTION INTRAMUSCULAR; INTRAVENOUS at 17:20

## 2021-06-26 RX ADMIN — FUROSEMIDE 40 MG: 10 INJECTION, SOLUTION INTRAMUSCULAR; INTRAVENOUS at 21:28

## 2021-06-26 RX ADMIN — NITROGLYCERIN 1 INCH: 20 OINTMENT TOPICAL at 17:24

## 2021-06-26 RX ADMIN — HUMAN INSULIN 12 UNITS: 100 INJECTION, SUSPENSION SUBCUTANEOUS at 17:23

## 2021-06-26 RX ADMIN — METOPROLOL TARTRATE 5 MG: 5 INJECTION INTRAVENOUS at 17:29

## 2021-06-26 RX ADMIN — METOPROLOL TARTRATE 5 MG: 5 INJECTION INTRAVENOUS at 06:21

## 2021-06-26 RX ADMIN — Medication 1 AMPULE: at 21:29

## 2021-06-26 RX ADMIN — NITROGLYCERIN 1 INCH: 20 OINTMENT TOPICAL at 06:21

## 2021-06-26 RX ADMIN — NITROGLYCERIN 1 INCH: 20 OINTMENT TOPICAL at 12:26

## 2021-06-26 NOTE — PROGRESS NOTES
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 Presenting with hgb 4.7, s/p 3 units PRBC. Hgb stable  Status post laparoscopic partial gastrectomy with core liver biopsy, path pending. NG tube in place, n.p.o. and continue IV PPI  Dc PCA pump. Cont' IV morphine prn  Cont' PRECIOUS  TPN per surgery, appreciate recs   Oncology and surgery following  PT/OT, up in chair, may need rehab at discharge    Leukocytosis, likely reactive  procalcitonin wnl.  UA neg/no urinary symptoms. Bcx NTD. CXR neg, CT a/p as above  Zosyn discontinued on   Wbc trending down, follow daily, if worst, consider repeat CT a/p    HTN  Elevated troponin  Chronic systolic heart failure, EF 30-35% in 10/20, EF 35-40% on repeat Echo  EKG neg for acute ischemia. Noted pt is more edematous today. BP elevated in the 190s. No prn meds used overnight. Add IV lasix 40mg x1 for edema, re-evaluate daily  Adding IV vasotec for better BP control  Cont' nitrobid, IV metoprolol  PTA meds held due to NPO status. Patient is not on ASA due to GI bleed  Cardiology following, signed off on      GUANACO on CKD3, improved  Mild hyperkalemia, K5.2  Likely due to prerenal from blood loss  Hold nephrotoxic agents  Follows with Dr Aris Camacho    T2DM  SSI  Cont' NPH BID, titrate prn        Obesity / Body mass index is 38.47 kg/m². Estimated discharge date: To be determined  Barriers clinical condition    Code status: Full  Prophylaxis: Hep SQ  Recommended Disposition: Home w/Family     Subjective:     Chief Complaint / Reason for Physician Visit  Pt patient is awake, NAD. Does not appear to be in pain. Discussed with RN events overnight.      Review of Systems:  Symptom Y/N Comments  Symptom Y/N Comments   Fever/Chills    Chest Pain     Poor Appetite    Edema y B/l arms/legs   Cough    Abdominal Pain     Sputum    Joint Pain     SOB/ARENAS    Pruritis/Rash     Nausea/vomit    Tolerating PT/OT     Diarrhea    Tolerating Diet     Constipation    Other       Could NOT obtain due to:      Objective:     VITALS:   Last 24hrs VS reviewed since prior progress note. Most recent are:  Patient Vitals for the past 24 hrs:   Temp Pulse Resp BP SpO2   06/26/21 0814 98.7 °F (37.1 °C) 72 18 (!) 179/86 97 %   06/26/21 0415 98.8 °F (37.1 °C) 71 18 (!) 191/80 98 %   06/26/21 0008 98.8 °F (37.1 °C) 69 19 (!) 145/75 98 %   06/26/21 0000 -- 70 -- -- --   06/25/21 2046 98.3 °F (36.8 °C) 68 20 (!) 166/61 95 %   06/25/21 1559 98.1 °F (36.7 °C) 80 24 (!) 169/85 91 %   06/25/21 1454 -- 78 24 (!) 165/66 95 %   06/25/21 1138 -- 77 (!) 31 (!) 171/79 95 %   06/25/21 1024 97.9 °F (36.6 °C) 86 29 (!) 179/79 96 %       Intake/Output Summary (Last 24 hours) at 6/26/2021 0901  Last data filed at 6/26/2021 8836  Gross per 24 hour   Intake 30 ml   Output 810 ml   Net -780 ml        I had a face to face encounter and independently examined this patient on 6/26/2021, as outlined below:  PHYSICAL EXAM:  General: WD, WN. Alert, cooperative, no acute distress    EENT:  EOMI. Anicteric sclerae. MMM  Resp:  CTA bilaterally, no wheezing or rales. No accessory muscle use  CV:  Regular  rhythm,  mild b/l LE and UE edema  GI:  Soft, Non distended, + tender. Neurologic:  Alert and oriented X 3, normal speech  Psych:   Not anxious nor agitated  Skin:  No rashes.   No jaundice    Reviewed most current lab test results and cultures  YES  Reviewed most current radiology test results   YES  Review and summation of old records today    NO  Reviewed patient's current orders and MAR    YES  PMH/SH reviewed - no change compared to H&P  ________________________________________________________________________  Care Plan discussed with:    Comments   Patient x Family      RN x    Care Manager     Consultant                        Multidiciplinary team rounds were held today with , nursing, pharmacist and clinical coordinator. Patient's plan of care was discussed; medications were reviewed and discharge planning was addressed. ________________________________________________________________________  Total NON critical care TIME:  35   Minutes    Total CRITICAL CARE TIME Spent:   Minutes non procedure based      Comments   >50% of visit spent in counseling and coordination of care     ________________________________________________________________________  Bessy Ponce MD     Procedures: see electronic medical records for all procedures/Xrays and details which were not copied into this note but were reviewed prior to creation of Plan. LABS:  I reviewed today's most current labs and imaging studies.   Pertinent labs include:  Recent Labs     06/26/21  0353 06/25/21  0403 06/24/21  0411   WBC 16.4* 18.5* 16.9*   HGB 8.8* 9.1* 8.6*   HCT 31.1* 31.6* 30.6*    231 242     Recent Labs     06/26/21  0353 06/25/21  0403 06/24/21  0411   * 146* 144   K 3.9 4.3 4.8   * 117* 114*   CO2 23 23 23   * 188* 293*   BUN 40* 46* 50*   CREA 1.02 1.25* 1.64*   CA 7.9* 8.2* 7.9*   MG 2.5*  --   --    PHOS  --   --  3.9       Signed: Bessy Ponce MD

## 2021-06-26 NOTE — PROGRESS NOTES
Admit Date: 2021    POD 4 Days Post-Op    Procedure:  Procedure(s):  LAPAROSCOPIC PARTIAL GASTRECTOMY WITH CORE LIVER BIOPSY    HOSPITAL DAY:     ANTIBIOTICS:       Subjective:     Patient with no complaints, NG tube in place, TPN restored, and reordered, chest x-ray with possibly CHF pulmonary edema, have ordered some Lasix. White blood cell count remaining elevated at 16,000 but better likely reactive, from what I understand from hospitalist note procalcitonin normal    Review of Systems  Patient without much complaints  Objective:     Blood pressure (!) 168/68, pulse 75, temperature 98.9 °F (37.2 °C), resp. rate 22, height 5' 2\" (1.575 m), weight 95.4 kg (210 lb 5.1 oz), SpO2 96 %, not currently breastfeeding.     Temp (24hrs), Av.6 °F (37 °C), Min:98.1 °F (36.7 °C), Max:98.9 °F (37.2 °C)          Physical Exam  Abdomen soft incisions clean NG tube in place, breath sounds bilaterally, no acute abdominal surgical findings    Labs:   Recent Results (from the past 24 hour(s))   GLUCOSE, POC    Collection Time: 21  4:16 PM   Result Value Ref Range    Glucose (POC) 202 (H) 65 - 117 mg/dL    Performed by Karen TANG    GLUCOSE, POC    Collection Time: 21 11:21 PM   Result Value Ref Range    Glucose (POC) 150 (H) 65 - 117 mg/dL    Performed by Marilou Carballo    CBC WITH AUTOMATED DIFF    Collection Time: 21  3:53 AM   Result Value Ref Range    WBC 16.4 (H) 3.6 - 11.0 K/uL    RBC 3.68 (L) 3.80 - 5.20 M/uL    HGB 8.8 (L) 11.5 - 16.0 g/dL    HCT 31.1 (L) 35.0 - 47.0 %    MCV 84.5 80.0 - 99.0 FL    MCH 23.9 (L) 26.0 - 34.0 PG    MCHC 28.3 (L) 30.0 - 36.5 g/dL    RDW 26.8 (H) 11.5 - 14.5 %    PLATELET 545 072 - 932 K/uL    MPV 9.7 8.9 - 12.9 FL    NRBC 0.8 (H) 0  WBC    ABSOLUTE NRBC 0.13 (H) 0.00 - 0.01 K/uL    NEUTROPHILS 82 (H) 32 - 75 %    LYMPHOCYTES 10 (L) 12 - 49 %    MONOCYTES 5 5 - 13 %    EOSINOPHILS 1 0 - 7 %    BASOPHILS 0 0 - 1 %    IMMATURE GRANULOCYTES 2 (H) 0.0 - 0.5 %    ABS. NEUTROPHILS 13.5 (H) 1.8 - 8.0 K/UL    ABS. LYMPHOCYTES 1.6 0.8 - 3.5 K/UL    ABS. MONOCYTES 0.8 0.0 - 1.0 K/UL    ABS. EOSINOPHILS 0.2 0.0 - 0.4 K/UL    ABS. BASOPHILS 0.0 0.0 - 0.1 K/UL    ABS. IMM.  GRANS. 0.3 (H) 0.00 - 0.04 K/UL    DF SMEAR SCANNED      RBC COMMENTS POLYCHROMASIA  PRESENT        RBC COMMENTS ANISOCYTOSIS  2+        RBC COMMENTS NOA CELLS  PRESENT        RBC COMMENTS HYPOCHROMIA  1+       METABOLIC PANEL, BASIC    Collection Time: 06/26/21  3:53 AM   Result Value Ref Range    Sodium 146 (H) 136 - 145 mmol/L    Potassium 3.9 3.5 - 5.1 mmol/L    Chloride 118 (H) 97 - 108 mmol/L    CO2 23 21 - 32 mmol/L    Anion gap 5 5 - 15 mmol/L    Glucose 138 (H) 65 - 100 mg/dL    BUN 40 (H) 6 - 20 MG/DL    Creatinine 1.02 0.55 - 1.02 MG/DL    BUN/Creatinine ratio 39 (H) 12 - 20      GFR est AA >60 >60 ml/min/1.73m2    GFR est non-AA 53 (L) >60 ml/min/1.73m2    Calcium 7.9 (L) 8.5 - 10.1 MG/DL   MAGNESIUM    Collection Time: 06/26/21  3:53 AM   Result Value Ref Range    Magnesium 2.5 (H) 1.6 - 2.4 mg/dL   GLUCOSE, POC    Collection Time: 06/26/21  6:18 AM   Result Value Ref Range    Glucose (POC) 138 (H) 65 - 117 mg/dL    Performed by Celesta Champagne    GLUCOSE, POC    Collection Time: 06/26/21 10:50 AM   Result Value Ref Range    Glucose (POC) 119 (H) 65 - 117 mg/dL    Performed by José Miguel Watson (DEONTE RN)        Data Review images and reports reviewed    Assessment:     Active Problems:    GI bleed (10/4/2020)      Leukocytosis (6/20/2021)      NSTEMI (non-ST elevated myocardial infarction) ([facility] (6/20/2021)      GIST (gastrointestinal stroma tumor), malignant, colon ([facility] [facility] (6/20/2021)      Acute kidney injury superimposed on chronic kidney disease ([facility] (6/20/2021)      Acute on chronic anemia (6/20/2021)        Plan/Recommendations/Medical Decision Making:     Continue present treatment  Continue NG tube, TPN, IV Lasix pulmonary edema, out of bed, PT and OT working with, follow-up white blood cell count  FACE TO FACE time including review of any indicated imaging, discussion with patient, and other providers, exam and discussion with patient:   15       minutes    Olaf Landry MD , [facility]

## 2021-06-26 NOTE — PROGRESS NOTES
TPN ion adjustment    Sodium Chloride changed to Sodium Acetate given increasing serum chloride; watch serum sodium and consider adjustment down if above normal limit .     Thank you,  Tiara Alvarado, [facility]      Recent Labs     21  4235 21  0403 21  0411   CREA 1.02 1.25* 1.64*   BUN 40* 46* 50*   WBC 16.4* 18.5* 16.9*   BANDS  --  2  --    * 146* 144   K 3.9 4.3 4.8   MG 2.5*  --   --    CA 7.9* 8.2* 7.9*   PHOS  --   --  3.9   HGB 8.8* 9.1* 8.6*   HCT 31.1* 31.6* 30.6*    231 242     Temp (24hrs), Av.4 °F (36.9 °C), Min:97.9 °F (36.6 °C), Max:98.8 °F (37.1 °C)

## 2021-06-27 LAB
ANION GAP SERPL CALC-SCNC: 5 MMOL/L (ref 5–15)
BASOPHILS # BLD: 0 K/UL (ref 0–0.1)
BASOPHILS NFR BLD: 0 % (ref 0–1)
BUN SERPL-MCNC: 36 MG/DL (ref 6–20)
BUN/CREAT SERPL: 39 (ref 12–20)
CALCIUM SERPL-MCNC: 8.5 MG/DL (ref 8.5–10.1)
CHLORIDE SERPL-SCNC: 115 MMOL/L (ref 97–108)
CO2 SERPL-SCNC: 26 MMOL/L (ref 21–32)
CREAT SERPL-MCNC: 0.93 MG/DL (ref 0.55–1.02)
DIFFERENTIAL METHOD BLD: ABNORMAL
EOSINOPHIL # BLD: 0.3 K/UL (ref 0–0.4)
EOSINOPHIL NFR BLD: 2 % (ref 0–7)
ERYTHROCYTE [DISTWIDTH] IN BLOOD BY AUTOMATED COUNT: 28.6 % (ref 11.5–14.5)
GLUCOSE BLD STRIP.AUTO-MCNC: 103 MG/DL (ref 65–117)
GLUCOSE BLD STRIP.AUTO-MCNC: 139 MG/DL (ref 65–117)
GLUCOSE BLD STRIP.AUTO-MCNC: 64 MG/DL (ref 65–117)
GLUCOSE BLD STRIP.AUTO-MCNC: 99 MG/DL (ref 65–117)
GLUCOSE SERPL-MCNC: 85 MG/DL (ref 65–100)
HCT VFR BLD AUTO: 32.6 % (ref 35–47)
HGB BLD-MCNC: 9.3 G/DL (ref 11.5–16)
IMM GRANULOCYTES # BLD AUTO: 0.2 K/UL (ref 0–0.04)
IMM GRANULOCYTES NFR BLD AUTO: 1 % (ref 0–0.5)
LYMPHOCYTES # BLD: 1.7 K/UL (ref 0.8–3.5)
LYMPHOCYTES NFR BLD: 11 % (ref 12–49)
MAGNESIUM SERPL-MCNC: 2.1 MG/DL (ref 1.6–2.4)
MCH RBC QN AUTO: 23.9 PG (ref 26–34)
MCHC RBC AUTO-ENTMCNC: 28.5 G/DL (ref 30–36.5)
MCV RBC AUTO: 83.8 FL (ref 80–99)
MONOCYTES # BLD: 0.9 K/UL (ref 0–1)
MONOCYTES NFR BLD: 6 % (ref 5–13)
NEUTS SEG # BLD: 12.6 K/UL (ref 1.8–8)
NEUTS SEG NFR BLD: 80 % (ref 32–75)
NRBC # BLD: 0.02 K/UL (ref 0–0.01)
NRBC BLD-RTO: 0.1 PER 100 WBC
PHOSPHATE SERPL-MCNC: 3.9 MG/DL (ref 2.6–4.7)
PLATELET # BLD AUTO: 224 K/UL (ref 150–400)
PMV BLD AUTO: 9.8 FL (ref 8.9–12.9)
POTASSIUM SERPL-SCNC: 3.6 MMOL/L (ref 3.5–5.1)
RBC # BLD AUTO: 3.89 M/UL (ref 3.8–5.2)
RBC MORPH BLD: ABNORMAL
RBC MORPH BLD: ABNORMAL
SERVICE CMNT-IMP: ABNORMAL
SERVICE CMNT-IMP: ABNORMAL
SERVICE CMNT-IMP: NORMAL
SERVICE CMNT-IMP: NORMAL
SODIUM SERPL-SCNC: 146 MMOL/L (ref 136–145)
WBC # BLD AUTO: 15.7 K/UL (ref 3.6–11)

## 2021-06-27 PROCEDURE — 74011250636 HC RX REV CODE- 250/636: Performed by: HOSPITALIST

## 2021-06-27 PROCEDURE — 74011636637 HC RX REV CODE- 636/637: Performed by: SURGERY

## 2021-06-27 PROCEDURE — 77010033678 HC OXYGEN DAILY

## 2021-06-27 PROCEDURE — 99024 POSTOP FOLLOW-UP VISIT: CPT | Performed by: SURGERY

## 2021-06-27 PROCEDURE — 83735 ASSAY OF MAGNESIUM: CPT

## 2021-06-27 PROCEDURE — 80048 BASIC METABOLIC PNL TOTAL CA: CPT

## 2021-06-27 PROCEDURE — C9113 INJ PANTOPRAZOLE SODIUM, VIA: HCPCS | Performed by: SURGERY

## 2021-06-27 PROCEDURE — 74011000250 HC RX REV CODE- 250: Performed by: SURGERY

## 2021-06-27 PROCEDURE — 74011250636 HC RX REV CODE- 250/636: Performed by: SURGERY

## 2021-06-27 PROCEDURE — 85025 COMPLETE CBC W/AUTO DIFF WBC: CPT

## 2021-06-27 PROCEDURE — 65270000029 HC RM PRIVATE

## 2021-06-27 PROCEDURE — 36415 COLL VENOUS BLD VENIPUNCTURE: CPT

## 2021-06-27 PROCEDURE — 74011250637 HC RX REV CODE- 250/637: Performed by: SURGERY

## 2021-06-27 PROCEDURE — 74011000250 HC RX REV CODE- 250: Performed by: INTERNAL MEDICINE

## 2021-06-27 PROCEDURE — 94760 N-INVAS EAR/PLS OXIMETRY 1: CPT

## 2021-06-27 PROCEDURE — 74011250637 HC RX REV CODE- 250/637: Performed by: INTERNAL MEDICINE

## 2021-06-27 PROCEDURE — 84100 ASSAY OF PHOSPHORUS: CPT

## 2021-06-27 PROCEDURE — 74011636637 HC RX REV CODE- 636/637: Performed by: INTERNAL MEDICINE

## 2021-06-27 PROCEDURE — 74011000258 HC RX REV CODE- 258: Performed by: SURGERY

## 2021-06-27 PROCEDURE — 82962 GLUCOSE BLOOD TEST: CPT

## 2021-06-27 PROCEDURE — 74011636637 HC RX REV CODE- 636/637: Performed by: HOSPITALIST

## 2021-06-27 RX ORDER — FUROSEMIDE 10 MG/ML
40 INJECTION INTRAMUSCULAR; INTRAVENOUS DAILY
Status: DISCONTINUED | OUTPATIENT
Start: 2021-06-27 | End: 2021-06-29

## 2021-06-27 RX ADMIN — ENALAPRILAT 0.62 MG: 1.25 INJECTION INTRAVENOUS at 05:18

## 2021-06-27 RX ADMIN — METOPROLOL TARTRATE 5 MG: 5 INJECTION INTRAVENOUS at 05:18

## 2021-06-27 RX ADMIN — MAGNESIUM SULFATE HEPTAHYDRATE: 500 INJECTION, SOLUTION INTRAMUSCULAR; INTRAVENOUS at 17:57

## 2021-06-27 RX ADMIN — NITROGLYCERIN 1 INCH: 20 OINTMENT TOPICAL at 12:16

## 2021-06-27 RX ADMIN — SODIUM CHLORIDE 40 MG: 9 INJECTION INTRAMUSCULAR; INTRAVENOUS; SUBCUTANEOUS at 08:58

## 2021-06-27 RX ADMIN — HUMAN INSULIN 12 UNITS: 100 INJECTION, SUSPENSION SUBCUTANEOUS at 09:25

## 2021-06-27 RX ADMIN — Medication 1 AMPULE: at 22:21

## 2021-06-27 RX ADMIN — FUROSEMIDE 40 MG: 10 INJECTION, SOLUTION INTRAMUSCULAR; INTRAVENOUS at 15:08

## 2021-06-27 RX ADMIN — HUMAN INSULIN 7 UNITS: 100 INJECTION, SUSPENSION SUBCUTANEOUS at 16:31

## 2021-06-27 RX ADMIN — METOPROLOL TARTRATE 5 MG: 5 INJECTION INTRAVENOUS at 17:34

## 2021-06-27 RX ADMIN — ENALAPRILAT 0.62 MG: 1.25 INJECTION INTRAVENOUS at 17:34

## 2021-06-27 RX ADMIN — NITROGLYCERIN 1 INCH: 20 OINTMENT TOPICAL at 17:34

## 2021-06-27 RX ADMIN — SODIUM CHLORIDE 10 ML: 9 INJECTION, SOLUTION INTRAMUSCULAR; INTRAVENOUS; SUBCUTANEOUS at 06:08

## 2021-06-27 RX ADMIN — SODIUM CHLORIDE 10 ML: 9 INJECTION, SOLUTION INTRAMUSCULAR; INTRAVENOUS; SUBCUTANEOUS at 14:00

## 2021-06-27 RX ADMIN — Medication 1 AMPULE: at 09:00

## 2021-06-27 RX ADMIN — NITROGLYCERIN 1 INCH: 20 OINTMENT TOPICAL at 05:18

## 2021-06-27 RX ADMIN — METOPROLOL TARTRATE 5 MG: 5 INJECTION INTRAVENOUS at 12:15

## 2021-06-27 RX ADMIN — SODIUM CHLORIDE 10 ML: 9 INJECTION, SOLUTION INTRAMUSCULAR; INTRAVENOUS; SUBCUTANEOUS at 22:21

## 2021-06-27 RX ADMIN — ENALAPRILAT 0.62 MG: 1.25 INJECTION INTRAVENOUS at 12:15

## 2021-06-27 RX ADMIN — SODIUM CHLORIDE 40 MG: 9 INJECTION INTRAMUSCULAR; INTRAVENOUS; SUBCUTANEOUS at 22:20

## 2021-06-27 NOTE — PROGRESS NOTES
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 Presenting with hgb 4.7, s/p 3 units PRBC. Hgb stable  Status post laparoscopic partial gastrectomy with core liver biopsy  Pathology: GIST     NG tube in place, n.p.o.   S/p PCA pump. Cont' IV morphine prn  Cont' PRECIOUS  TPN per surgery, appreciate recs   Oncology and surgery following  PT/OT, up in chair, may need rehab at discharge    Leukocytosis, likely reactive  procalcitonin wnl.  UA neg/no urinary symptoms. Bcx NTD. CXR neg, CT a/p as above  Zosyn discontinued on   Wbc trending down, follow daily, if worst, consider repeat CT a/p    HTN  Elevated troponin  Chronic systolic heart failure, EF 30-35% in 10/20, EF 35-40% on repeat Echo  BP high side 160-180, + LE edema   PO meds on hold  Cont  IV vasotec, metoprolol, NTP   Now with LE edema, start daily lasix with close watch   Patient is not on ASA due to GI bleed  Cardiology following, signed off on      GUANACO on CKD3, improved  Mild hyperkalemia, K5.2, resolved   Likely due to prerenal from blood loss  Hold nephrotoxic agents  Follows with Dr Rafael Gooden    T2DM  BP low side    SSI  Cont' NPH BID,will go down on dose from 12 to 7 units now       Obesity / Body mass index is 38.47 kg/m². Estimated discharge date: To be determined  Barriers clinical condition    Code status: Full  Prophylaxis: Hep SQ     Baseline: independent, lives with  at home   Recommended Disposition: Home w/Family     + NG  + R CVP      Subjective:     Chief Complaint / Reason for Physician Visit: GI bleeding , gastric mass   + mild abdominal discomfort   Denies dyspnea/cp     Discussed with RN events overnight.      Review of Systems:  Symptom Y/N Comments Symptom Y/N Comments   Fever/Chills    Chest Pain     Poor Appetite    Edema y B/l arms/legs   Cough    Abdominal Pain     Sputum    Joint Pain     SOB/ARENAS    Pruritis/Rash     Nausea/vomit    Tolerating PT/OT     Diarrhea    Tolerating Diet     Constipation    Other       Could NOT obtain due to:      Objective:     VITALS:   Last 24hrs VS reviewed since prior progress note. Most recent are:  Patient Vitals for the past 24 hrs:   Temp Pulse Resp BP SpO2   06/27/21 1121 98.2 °F (36.8 °C) 64 18 (!) 177/71 98 %   06/27/21 0807 98.8 °F (37.1 °C) 69 18 (!) 160/66 100 %   06/27/21 0517 -- 71 -- (!) 185/88 --   06/27/21 0348 98.3 °F (36.8 °C) 64 18 (!) 159/81 98 %   06/26/21 2313 98.2 °F (36.8 °C) 70 18 (!) 182/88 98 %   06/26/21 1959 98.4 °F (36.9 °C) 66 20 (!) 182/82 96 %   06/26/21 1507 98.3 °F (36.8 °C) 69 22 (!) 157/62 99 %   06/26/21 1458 -- 80 -- (!) 160/68 --       Intake/Output Summary (Last 24 hours) at 6/27/2021 1433  Last data filed at 6/27/2021 0516  Gross per 24 hour   Intake --   Output 100 ml   Net -100 ml        I had a face to face encounter and independently examined this patient on 6/27/2021, as outlined below:  PHYSICAL EXAM:  General: WD, WN. Alert, cooperative, no acute distress    EENT:  EOMI. Anicteric sclerae. MMM  Resp:  Diminished BS bases bilaterally, no wheezing or rales. No accessory muscle use  CV:  Regular  rhythm,  LE edema BL + 2 , + mild UE edema   GI:  Soft, Non distended, + mild tender. Neurologic:  Alert and oriented X 3, normal speech  Psych:   Not anxious nor agitated  Skin:  No rashes.   No jaundice    Reviewed most current lab test results and cultures  YES  Reviewed most current radiology test results   YES  Review and summation of old records today    NO  Reviewed patient's current orders and MAR    YES  PMH/SH reviewed - no change compared to H&P  ________________________________________________________________________  Care Plan discussed with:    Comments   Patient x Family      RN x    Care Manager     Consultant                        Multidiciplinary team rounds were held today with , nursing, pharmacist and clinical coordinator. Patient's plan of care was discussed; medications were reviewed and discharge planning was addressed. ________________________________________________________________________  Total NON critical care TIME:  35   Minutes    Total CRITICAL CARE TIME Spent:   Minutes non procedure based      Comments   >50% of visit spent in counseling and coordination of care y    ________________________________________________________________________  Silvino Graham MD     Procedures: see electronic medical records for all procedures/Xrays and details which were not copied into this note but were reviewed prior to creation of Plan. LABS:  I reviewed today's most current labs and imaging studies.   Pertinent labs include:  Recent Labs     06/27/21  0354 06/26/21  0353 06/25/21  0403   WBC 15.7* 16.4* 18.5*   HGB 9.3* 8.8* 9.1*   HCT 32.6* 31.1* 31.6*    219 231     Recent Labs     06/27/21  0354 06/26/21  0353 06/25/21  0403   * 146* 146*   K 3.6 3.9 4.3   * 118* 117*   CO2 26 23 23   GLU 85 138* 188*   BUN 36* 40* 46*   CREA 0.93 1.02 1.25*   CA 8.5 7.9* 8.2*   MG 2.1 2.5*  --    PHOS 3.9  --   --        Signed: Silvino Graham MD

## 2021-06-27 NOTE — PROGRESS NOTES
Admit Date: 6/19/2021      POD 5 Days Post-Op  6/22/2021      Procedure:  Procedure(s):  LAPAROSCOPIC PARTIAL GASTRECTOMY WITH CORE LIVER BIOPSY        HOSPITAL DAY:     ANTIBIOTICS:      HPI:  Patient comfortable, chest x-ray yesterday showed some pulmonary edema, given another dose of Lasix, white blood cell count improved today down to 15,000, patient with no significant abdominal complaints NG tube in place.     Review of Systems  Fatigue but otherwise no complaints no significant abdominal pain    Temp:  [97.5 °F (36.4 °C)-98.9 °F (37.2 °C)]   Pulse (Heart Rate):  [64-86]   BP: (145-191)/(59-88)   Resp Rate:  [16-31]   O2 Sat (%):  [91 %-100 %]   Weight:  [95.4 kg (210 lb 5.1 oz)]     Physical Exam  Patient alert awake following commands, abdomen soft incisions clean and dry, minimally tender abdomen, no dyspnea lungs are clear    Active Problems:    GI bleed (10/4/2020)      Leukocytosis (6/20/2021)      NSTEMI (non-ST elevated myocardial infarction) ([facility] [address] (6/20/2021)      GIST (gastrointestinal stroma tumor), malignant, colon ([address] (6/20/2021)      Acute kidney injury superimposed on chronic kidney disease ([address] (6/20/2021)      Acute on chronic anemia (6/20/2021)          ASSESSMENT/PLAN  Improving postop, continue current therapy, renew TPN, Dr. Orin Borjas to follow-up with tomorrow and decide when NG tube can be removed      FACE TO FACE time including review of any indicated imaging, discussion with patient, and other providers, exam and discussion with patient:   18        minutes    END:

## 2021-06-28 LAB
ANION GAP SERPL CALC-SCNC: 6 MMOL/L (ref 5–15)
BASOPHILS # BLD: 0 K/UL (ref 0–0.1)
BASOPHILS NFR BLD: 0 % (ref 0–1)
BUN SERPL-MCNC: 33 MG/DL (ref 6–20)
BUN/CREAT SERPL: 39 (ref 12–20)
CALCIUM SERPL-MCNC: 8.3 MG/DL (ref 8.5–10.1)
CHLORIDE SERPL-SCNC: 114 MMOL/L (ref 97–108)
CO2 SERPL-SCNC: 26 MMOL/L (ref 21–32)
CREAT SERPL-MCNC: 0.84 MG/DL (ref 0.55–1.02)
DIFFERENTIAL METHOD BLD: ABNORMAL
EOSINOPHIL # BLD: 0.4 K/UL (ref 0–0.4)
EOSINOPHIL NFR BLD: 2 % (ref 0–7)
ERYTHROCYTE [DISTWIDTH] IN BLOOD BY AUTOMATED COUNT: 29.2 % (ref 11.5–14.5)
GLUCOSE BLD STRIP.AUTO-MCNC: 102 MG/DL (ref 65–117)
GLUCOSE BLD STRIP.AUTO-MCNC: 105 MG/DL (ref 65–117)
GLUCOSE BLD STRIP.AUTO-MCNC: 110 MG/DL (ref 65–117)
GLUCOSE BLD STRIP.AUTO-MCNC: 115 MG/DL (ref 65–117)
GLUCOSE BLD STRIP.AUTO-MCNC: 68 MG/DL (ref 65–117)
GLUCOSE BLD STRIP.AUTO-MCNC: 82 MG/DL (ref 65–117)
GLUCOSE SERPL-MCNC: 102 MG/DL (ref 65–100)
HCT VFR BLD AUTO: 32.1 % (ref 35–47)
HGB BLD-MCNC: 9.1 G/DL (ref 11.5–16)
IMM GRANULOCYTES # BLD AUTO: 0.1 K/UL (ref 0–0.04)
IMM GRANULOCYTES NFR BLD AUTO: 1 % (ref 0–0.5)
LYMPHOCYTES # BLD: 1.5 K/UL (ref 0.8–3.5)
LYMPHOCYTES NFR BLD: 10 % (ref 12–49)
MCH RBC QN AUTO: 24.3 PG (ref 26–34)
MCHC RBC AUTO-ENTMCNC: 28.3 G/DL (ref 30–36.5)
MCV RBC AUTO: 85.6 FL (ref 80–99)
MONOCYTES # BLD: 0.9 K/UL (ref 0–1)
MONOCYTES NFR BLD: 6 % (ref 5–13)
NEUTS SEG # BLD: 12.4 K/UL (ref 1.8–8)
NEUTS SEG NFR BLD: 81 % (ref 32–75)
NRBC # BLD: 0 K/UL (ref 0–0.01)
NRBC BLD-RTO: 0 PER 100 WBC
PLATELET # BLD AUTO: 224 K/UL (ref 150–400)
PMV BLD AUTO: 9.7 FL (ref 8.9–12.9)
POTASSIUM SERPL-SCNC: 3.3 MMOL/L (ref 3.5–5.1)
RBC # BLD AUTO: 3.75 M/UL (ref 3.8–5.2)
SERVICE CMNT-IMP: NORMAL
SODIUM SERPL-SCNC: 146 MMOL/L (ref 136–145)
WBC # BLD AUTO: 15.3 K/UL (ref 3.6–11)

## 2021-06-28 PROCEDURE — 65270000029 HC RM PRIVATE

## 2021-06-28 PROCEDURE — C9113 INJ PANTOPRAZOLE SODIUM, VIA: HCPCS | Performed by: SURGERY

## 2021-06-28 PROCEDURE — 74011636637 HC RX REV CODE- 636/637: Performed by: NURSE PRACTITIONER

## 2021-06-28 PROCEDURE — 74011250636 HC RX REV CODE- 250/636: Performed by: SURGERY

## 2021-06-28 PROCEDURE — 74011000250 HC RX REV CODE- 250: Performed by: SURGERY

## 2021-06-28 PROCEDURE — 74011000250 HC RX REV CODE- 250: Performed by: NURSE PRACTITIONER

## 2021-06-28 PROCEDURE — 74011250636 HC RX REV CODE- 250/636: Performed by: NURSE PRACTITIONER

## 2021-06-28 PROCEDURE — 74011250636 HC RX REV CODE- 250/636: Performed by: HOSPITALIST

## 2021-06-28 PROCEDURE — 74011636637 HC RX REV CODE- 636/637: Performed by: HOSPITALIST

## 2021-06-28 PROCEDURE — 36415 COLL VENOUS BLD VENIPUNCTURE: CPT

## 2021-06-28 PROCEDURE — 82962 GLUCOSE BLOOD TEST: CPT

## 2021-06-28 PROCEDURE — 74011000258 HC RX REV CODE- 258: Performed by: NURSE PRACTITIONER

## 2021-06-28 PROCEDURE — 97110 THERAPEUTIC EXERCISES: CPT

## 2021-06-28 PROCEDURE — 97116 GAIT TRAINING THERAPY: CPT

## 2021-06-28 PROCEDURE — 80048 BASIC METABOLIC PNL TOTAL CA: CPT

## 2021-06-28 PROCEDURE — 85025 COMPLETE CBC W/AUTO DIFF WBC: CPT

## 2021-06-28 PROCEDURE — 74011000250 HC RX REV CODE- 250: Performed by: INTERNAL MEDICINE

## 2021-06-28 PROCEDURE — 74011250637 HC RX REV CODE- 250/637: Performed by: INTERNAL MEDICINE

## 2021-06-28 RX ORDER — POTASSIUM CHLORIDE 29.8 MG/ML
20 INJECTION INTRAVENOUS
Status: COMPLETED | OUTPATIENT
Start: 2021-06-28 | End: 2021-06-29

## 2021-06-28 RX ORDER — POTASSIUM CHLORIDE 29.8 MG/ML
20 INJECTION INTRAVENOUS
Status: DISPENSED | OUTPATIENT
Start: 2021-06-28 | End: 2021-06-28

## 2021-06-28 RX ORDER — OXYCODONE HCL 5 MG/5 ML
5 SOLUTION, ORAL ORAL
Status: DISCONTINUED | OUTPATIENT
Start: 2021-06-28 | End: 2021-07-01 | Stop reason: HOSPADM

## 2021-06-28 RX ADMIN — SODIUM CHLORIDE 10 ML: 9 INJECTION, SOLUTION INTRAMUSCULAR; INTRAVENOUS; SUBCUTANEOUS at 14:07

## 2021-06-28 RX ADMIN — FUROSEMIDE 40 MG: 10 INJECTION, SOLUTION INTRAMUSCULAR; INTRAVENOUS at 11:17

## 2021-06-28 RX ADMIN — METOPROLOL TARTRATE 5 MG: 5 INJECTION INTRAVENOUS at 00:05

## 2021-06-28 RX ADMIN — ENALAPRILAT 0.62 MG: 1.25 INJECTION INTRAVENOUS at 23:14

## 2021-06-28 RX ADMIN — Medication 10 ML: at 12:29

## 2021-06-28 RX ADMIN — POTASSIUM CHLORIDE 20 MEQ: 400 INJECTION, SOLUTION INTRAVENOUS at 19:18

## 2021-06-28 RX ADMIN — METOPROLOL TARTRATE 5 MG: 5 INJECTION INTRAVENOUS at 05:32

## 2021-06-28 RX ADMIN — SODIUM CHLORIDE 40 MG: 9 INJECTION INTRAMUSCULAR; INTRAVENOUS; SUBCUTANEOUS at 11:19

## 2021-06-28 RX ADMIN — SODIUM CHLORIDE 40 MG: 9 INJECTION INTRAMUSCULAR; INTRAVENOUS; SUBCUTANEOUS at 23:14

## 2021-06-28 RX ADMIN — SODIUM CHLORIDE 10 ML: 9 INJECTION, SOLUTION INTRAMUSCULAR; INTRAVENOUS; SUBCUTANEOUS at 23:15

## 2021-06-28 RX ADMIN — NITROGLYCERIN 1 INCH: 20 OINTMENT TOPICAL at 00:05

## 2021-06-28 RX ADMIN — METOPROLOL TARTRATE 5 MG: 5 INJECTION INTRAVENOUS at 23:14

## 2021-06-28 RX ADMIN — HUMAN INSULIN 7 UNITS: 100 INJECTION, SUSPENSION SUBCUTANEOUS at 07:30

## 2021-06-28 RX ADMIN — METOPROLOL TARTRATE 5 MG: 5 INJECTION INTRAVENOUS at 19:02

## 2021-06-28 RX ADMIN — NITROGLYCERIN 1 INCH: 20 OINTMENT TOPICAL at 06:00

## 2021-06-28 RX ADMIN — NITROGLYCERIN 1 INCH: 20 OINTMENT TOPICAL at 19:01

## 2021-06-28 RX ADMIN — ENALAPRILAT 0.62 MG: 1.25 INJECTION INTRAVENOUS at 12:25

## 2021-06-28 RX ADMIN — POTASSIUM CHLORIDE 20 MEQ: 400 INJECTION, SOLUTION INTRAVENOUS at 20:33

## 2021-06-28 RX ADMIN — ENALAPRILAT 0.62 MG: 1.25 INJECTION INTRAVENOUS at 05:32

## 2021-06-28 RX ADMIN — NITROGLYCERIN 1 INCH: 20 OINTMENT TOPICAL at 12:00

## 2021-06-28 RX ADMIN — METOPROLOL TARTRATE 5 MG: 5 INJECTION INTRAVENOUS at 12:24

## 2021-06-28 RX ADMIN — SODIUM CHLORIDE 10 ML: 9 INJECTION, SOLUTION INTRAMUSCULAR; INTRAVENOUS; SUBCUTANEOUS at 05:49

## 2021-06-28 RX ADMIN — ENALAPRILAT 0.62 MG: 1.25 INJECTION INTRAVENOUS at 00:05

## 2021-06-28 RX ADMIN — MAGNESIUM SULFATE HEPTAHYDRATE: 500 INJECTION, SOLUTION INTRAMUSCULAR; INTRAVENOUS at 19:09

## 2021-06-28 RX ADMIN — HUMAN INSULIN 7 UNITS: 100 INJECTION, SUSPENSION SUBCUTANEOUS at 19:05

## 2021-06-28 RX ADMIN — ENALAPRILAT 0.62 MG: 1.25 INJECTION INTRAVENOUS at 19:02

## 2021-06-28 NOTE — PROGRESS NOTES
Problem: Mobility Impaired (Adult and Pediatric)  Goal: *Acute Goals and Plan of Care (Insert Text)  Description: FUNCTIONAL STATUS PRIOR TO ADMISSION: Pt reports independence with ambulation and ADLs however reports owning RW and cane, stating she is \"supposed to use them\" but doesn't. Reports history of 4 recent falls and that she has been nonambulatory since most recent fall 3 days ago. States she is mostly sedentary at home. HOME SUPPORT PRIOR TO ADMISSION: The patient lived with , stating \"they help each other out. \"    Physical Therapy Goals  Initiated 6/21/2021  1. Patient will move from supine to sit and sit to supine , scoot up and down, and roll side to side in bed with supervision/set-up within 7 day(s). 2.  Patient will transfer from bed to chair and chair to bed with minimal assistance/contact guard assist using the least restrictive device within 7 day(s). 3.  Patient will perform sit to stand with minimal assistance/contact guard assist within 7 day(s). 4.  Patient will ambulate with minimal assistance/contact guard assist for 50 feet with the least restrictive device within 7 day(s). Re-Eval following partial gastrectomy 6/24/21. Goals remain appropriate. 1.  Patient will move from supine to sit and sit to supine , scoot up and down, and roll side to side in bed with supervision/set-up within 7 day(s). 2.  Patient will transfer from bed to chair and chair to bed with minimal assistance/contact guard assist using the least restrictive device within 7 day(s). 3.  Patient will perform sit to stand with minimal assistance/contact guard assist within 7 day(s). 4.  Patient will ambulate with minimal assistance/contact guard assist for 50 feet with the least restrictive device within 7 day(s).              Outcome: Progressing Towards Goal   PHYSICAL THERAPY TREATMENT  Patient: Kim Spaulding (39 y.o. female)  Date: 6/28/2021    Diagnosis: GI bleed [K92.2]  Acute on chronic anemia [D64.9]  GIST (gastrointestinal stroma tumor), malignant, colon ([facility] [address] [C49. A4]  NSTEMI (non-ST elevated myocardial infarction) ([facility] [address] [I21.4]  Leukocytosis [D72.829]  Acute kidney injury superimposed on chronic kidney disease (HCC) [N17.9, N18.9]     Procedure(s) (LRB): LAPAROSCOPIC PARTIAL GASTRECTOMY WITH CORE LIVER BIOPSY (N/A) 6 Days Post-Op    Precautions:  falls    Chart, physical therapy assessment, plan of care and goals were reviewed. ASSESSMENT: Patient continues with skilled PT services and is progressing towards goals, no LOB or SOB, fatigues quickly, doing better with bed mob and transfers, did well with ther-ex, vc's for safety and proper RW use. Current Level of Function Impacting Discharge (mobility/balance): min assist x1         PLAN : Patient continues to benefit from skilled intervention to address the above impairments. Continue treatment per established plan of care  to address goals. Recommendation for discharge: (in order for the patient to meet his/her long term goals) Therapy up to 5 days/week in SNF setting    This discharge recommendation: Has been made in collaboration with the attending provider and/or case management    IF patient discharges home will need the following DME:  patient owns DME required for discharge     OBJECTIVE DATA SUMMARY:     Critical Behavior:  Neurologic State: Alert  Orientation Level: Oriented X4  Cognition: Appropriate for age attention/concentration  Safety/Judgement: Awareness of environment, Fall prevention    Functional Mobility Training:  Bed Mobility:  Rolling: Minimum assistance;Assist x1;Additional time  Supine to Sit: Minimum assistance;Assist x1;Additional time  Scooting: Minimum assistance;Assist x1;Additional time  Level of Assistance: Minimum assistance  Interventions: Tactile cues; Verbal cues    Transfers:  Sit to Stand: Contact guard assistance;Assist x1;Additional time  Stand to Sit: Contact guard assistance;Assist x1  Bed to Chair: Contact guard assistance;Assist x1;Additional time  Interventions: Tactile cues; Verbal cues  Level of Assistance: Contact guard assistance;Assist x1;Additional time    Balance:  Sitting: Intact; Without support  Sitting - Static: Good (unsupported)  Sitting - Dynamic: Not tested  Standing: Intact; With support  Standing - Static: Good;Constant support  Standing - Dynamic : Good;Constant support    Ambulation/Gait Training:  Distance (ft): 12 Feet (ft)  Assistive Device: Gait belt;Walker, rolling  Ambulation - Level of Assistance: Contact guard assistance;Assist x1;Additional time  Gait Abnormalities: Decreased step clearance  Right Side Weight Bearing: Full  Left Side Weight Bearing: Full  Base of Support: Widened  Speed/Tiffany: Slow  Step Length: Left shortened;Right shortened    Therapeutic Exercises:   sitting  EXERCISE   Sets   Reps   Active Active Assist   Passive   Comments   Ankle pumps 1 10 [x] [] [] bilat   Heel raises 1 10 [x] [] [] \"   Toe tap 1 10 [x] [] [] \"   Knee ext 1 10 [x] [] [] \"   Hip flex 1 10 [x] [] [] \"     Pain Ratin    Activity Tolerance: Poor    After treatment patient left in no apparent distress: Sitting in chair and Call bell within reach    COMMUNICATION/COLLABORATION:   The patients plan of care was discussed with: Registered nurse.      Catalino Valladares PTA   Time Calculation: 27 mins

## 2021-06-28 NOTE — INTERDISCIPLINARY ROUNDS
Interdisciplinary Rounds were completed on 06/28/21 for this patient. Rounds included nursing, clinical care leader, pharmacy, and case management. Plan of care discussed. See clinical pathway and/or care plan for interventions and desired outcomes.

## 2021-06-28 NOTE — PROGRESS NOTES
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 Presenting with hgb 4.7, s/p 3 units PRBC. Hgb stable  Status post laparoscopic partial gastrectomy with core liver biopsy  Pathology: GIST     NG tube in place, n.p.o.   S/p PCA pump. Cont' IV morphine prn  Cont' PRECIOUS  TPN per surgery, appreciate recs : NG out today and ok to start sips of clear   Oncology and surgery following  PT/OT, up in chair, may need rehab at discharge    Leukocytosis, likely reactive  procalcitonin wnl.  UA neg/no urinary symptoms. Bcx NTD. CXR neg, CT a/p as above  Zosyn discontinued on   Wbc trending down, follow daily, if worst, consider repeat CT a/p    HTN  Elevated troponin  Chronic systolic heart failure, EF 30-35% in 10/20, EF 35-40% on repeat Echo  BP high side 160-180, + LE edema   PO meds on hold but ok to start back per surgery in a crush form, will see how she does today with sips of clear and if ok may changes meds tomorrow   Cont  IV vasotec, metoprolol, NTP    LE edema, started back on daily lasix with close watch    Patient is not on ASA due to GI bleed  Cardiology following, signed off on      GUANACO on CKD3, improved  Mild hyperkalemia, K5.2, resolved   Likely due to prerenal from blood loss  Hold nephrotoxic agents  Follows with Dr Trevin Bang    T2DM  BP low side    SSI  Cont' NPH BID,will go down on dose from 12 to 7 units now       Obesity / Body mass index is 38.47 kg/m². Estimated discharge date:  To be determined  Barriers clinical condition    Code status: Full  Prophylaxis: Hep SQ     Baseline: independent, lives with  at home   Recommended Disposition: Home w/Family     + NG  + R CVP      Subjective:     Chief Complaint / Reason for Physician Visit: GI bleeding , gastric mass   comfortable in bed   No dyspnea   + LE edema     Discussed with RN events overnight. Review of Systems:  Symptom Y/N Comments  Symptom Y/N Comments   Fever/Chills    Chest Pain     Poor Appetite    Edema y B/l arms/legs   Cough    Abdominal Pain     Sputum    Joint Pain     SOB/ARENAS    Pruritis/Rash     Nausea/vomit    Tolerating PT/OT     Diarrhea    Tolerating Diet     Constipation    Other       Could NOT obtain due to:      Objective:     VITALS:   Last 24hrs VS reviewed since prior progress note. Most recent are:  Patient Vitals for the past 24 hrs:   Temp Pulse Resp BP SpO2   06/28/21 1049 98.4 °F (36.9 °C) 68 18 (!) 158/62 100 %   06/28/21 0819 98.3 °F (36.8 °C) 71 18 (!) 166/64 100 %   06/28/21 0519 98.5 °F (36.9 °C) 68 18 (!) 160/68 100 %   06/27/21 2356 98.4 °F (36.9 °C) 63 18 (!) 158/62 98 %   06/27/21 1930 -- 63 -- -- --   06/27/21 1924 98.6 °F (37 °C) 62 18 (!) 152/59 100 %   06/27/21 1533 98.7 °F (37.1 °C) 70 18 (!) 173/83 98 %       Intake/Output Summary (Last 24 hours) at 6/28/2021 1132  Last data filed at 6/28/2021 2541  Gross per 24 hour   Intake --   Output 300 ml   Net -300 ml        I had a face to face encounter and independently examined this patient on 6/28/2021, as outlined below:  PHYSICAL EXAM:  General: WD, WN. Alert, cooperative, no acute distress    EENT:  EOMI. Anicteric sclerae. MMM  Resp:  Diminished BS bases bilaterally, no wheezing or rales. No accessory muscle use  CV:  Regular  rhythm,  LE edema BL + 2 , + mild UE edema   GI:  Soft, Non distended, + mild tender. Neurologic:  Alert and oriented X 3, normal speech  Psych:   Not anxious nor agitated  Skin:  No rashes.   No jaundice    Reviewed most current lab test results and cultures  YES  Reviewed most current radiology test results   YES  Review and summation of old records today    NO  Reviewed patient's current orders and MAR    YES  PMH/SH reviewed - no change compared to H&P  ________________________________________________________________________  Care Plan discussed with:    Comments   Patient x    Family      RN x    Care Manager     Consultant                        Multidiciplinary team rounds were held today with , nursing, pharmacist and clinical coordinator. Patient's plan of care was discussed; medications were reviewed and discharge planning was addressed. ________________________________________________________________________  Total NON critical care TIME:  35   Minutes    Total CRITICAL CARE TIME Spent:   Minutes non procedure based      Comments   >50% of visit spent in counseling and coordination of care y    ________________________________________________________________________  Erasto Harvey MD     Procedures: see electronic medical records for all procedures/Xrays and details which were not copied into this note but were reviewed prior to creation of Plan. LABS:  I reviewed today's most current labs and imaging studies.   Pertinent labs include:  Recent Labs     06/28/21 0358 06/27/21 0354 06/26/21 0353   WBC 15.3* 15.7* 16.4*   HGB 9.1* 9.3* 8.8*   HCT 32.1* 32.6* 31.1*    224 219     Recent Labs     06/28/21 0358 06/27/21 0354 06/26/21 0353   * 146* 146*   K 3.3* 3.6 3.9   * 115* 118*   CO2 26 26 23   * 85 138*   BUN 33* 36* 40*   CREA 0.84 0.93 1.02   CA 8.3* 8.5 7.9*   MG  --  2.1 2.5*   PHOS  --  3.9  --        Signed: Erasto Harvey MD

## 2021-06-28 NOTE — PROGRESS NOTES
Occupational Therapy  Medical record reviewed and nurse cleared pt for therapy. Pt has diet restriction--sips of clears and reports that she is very hungry. Pt declines OT treatment at this time--[facility]  pt refuses to participate despite encouragement. Will continue to follow as  Appropriate.

## 2021-06-28 NOTE — PROGRESS NOTES
Spiritual Care Assessment/Progress Note  [facility]      NAME: Marzena Prakash      MRN: [id number]  AGE: 78 y.o.  SEX: female  Catholic Affiliation: Tenriism   Language: English     6/28/2021     Total Time (in minutes): 11     Spiritual Assessment begun in [facility] through conversation with:         [x]Patient        [] Family    [] Friend(s)        Reason for Consult: Initial/Spiritual assessment, patient floor     Spiritual beliefs: (Please include comment if needed)     [x] Identifies with a paulette tradition:         [] Supported by a paulette community:            [] Claims no spiritual orientation:           [] Seeking spiritual identity:                [] Adheres to an individual form of spirituality:           [] Not able to assess:                           Identified resources for coping:      [x] Prayer                               [] Music                  [] Guided Imagery     [] Family/friends                 [] Pet visits     [] Devotional reading                         [] Unknown     [] Other:                                               Interventions offered during this visit: (See comments for more details)    Patient Interventions: Affirmation of emotions/emotional suffering, Initial/Spiritual assessment, patient floor, Life review/legacy, Prayer (assurance of)           Plan of Care:     [] Support spiritual and/or cultural needs    [] Support AMD and/or advance care planning process      [] Support grieving process   [] Coordinate Rites and/or Rituals    [] Coordination with community clergy   [x] No spiritual needs identified at this time   [] Detailed Plan of Care below (See Comments)  [] Make referral to Music Therapy  [] Make referral to Pet Therapy     [] Make referral to Addiction services  [] Make referral to [facility]  [] Make referral to Spiritual Care Partner  [] No future visits requested        [x] Follow up upon further referrals     Comments: Provided support for this pt in [facility]. Facilitated life review to assess potential support needs or coping strategies. Pt offered review of current situation. Pt appreciated [address] visit, but expressed no support needs at this time. Assured pt of prayers and affirmed ongoing availability of support. Mary nAgeles MDiv.  Staff   Request  Support/Spiritual Care Services via [address] [place] [address]

## 2021-06-28 NOTE — PROGRESS NOTES
Comprehensive Nutrition Assessment    Type and Reason for Visit: Reassess    Nutrition Recommendations/Plan:   · Continue sips of liquids and further diet advancement per surgery. · Continue D15/5%AA @ 63 ml/hr. If to remain on clear liquids then consider increasing TPN to D15/5%AA  @ goal rate of 83 mL/hr and add 500 mL 20% lipids 3x/week to better meet est needs (1842 kcal, 100g protein, 299g CHO). Nutrition Assessment:     6/28: Chart reviewed; med noted for POD#6 for lap partial gastrectomy with core liver biopsy. NGT removed today and allowing sips of clears. Continues on TPN D15/5%AA @ 63 ml/hr which provides daily approx. 1075 kcals/76 g protein. Na+ 146, BG stable. Last Weight Metric  Weight Loss Metrics 6/25/2021 3/30/2021 3/30/2021 3/22/2021 12/15/2020 12/11/2020 11/12/2020   Today's Wt 210 lb 5.1 oz 175 lb 0.7 oz 175 lb 0.7 oz 176 lb 11.2 oz 190 lb 190 lb 192 lb   BMI 38.47 kg/m2 32.02 kg/m2 32.02 kg/m2 32.32 kg/m2 34.75 kg/m2 34.75 kg/m2 35.12 kg/m2     Estimated Daily Nutrient Needs:  Energy (kcal): 1824 kcal (1403 x 1. 3AF); Weight Used for Energy Requirements: Current  Protein (g): 95-113g (1.0-1.2 g/kg bw); Weight Used for Protein Requirements: Current  Fluid (ml/day): 1800 mL; Method Used for Fluid Requirements: 1 ml/kcal    Nutrition Related Findings:  BM: 6/27; Labs: K+ 3.3, Na+ 146; Meds: reviewed      Wounds:    Surgical incision       Current Nutrition Therapies:  TPN ADULT - CENTRAL  DIET NPO Ice Chips, Sips of Clear Liquids, Sips of Water with Meds, Popsicles  TPN ADULT - CENTRAL    Anthropometric Measures:  · Height:  5' 2\" (157.5 cm)  · Current Body Wt:  94.5 kg (208 lb 5.4 oz)   · Admission Body Wt:  174 lb    · Usual Body Wt:        · Ideal Body Wt:  110 lbs:  189.4 %   · BMI Category:  Obese class 2 (BMI 35.0-39. 9)       Nutrition Diagnosis:   · Altered GI function related to altered GI structure as evidenced by NPO or clear liquid status due to medical condition, nutrition support-parenteral nutrition    Nutrition Interventions:   Food and/or Nutrient Delivery: Modify parenteral nutrition  Nutrition Education and Counseling: No recommendations at this time  Coordination of Nutrition Care: Continue to monitor while inpatient, Interdisciplinary rounds    Goals:  TPN at goal with lytes WNL and BG trend <200 next 2-4 days       Nutrition Monitoring and Evaluation:   Behavioral-Environmental Outcomes: None identified  Food/Nutrient Intake Outcomes: Parenteral nutrition intake/tolerance  Physical Signs/Symptoms Outcomes: Biochemical data, GI status, Hemodynamic status, Skin, Weight    Discharge Planning:     Too soon to determine     Electronically signed by Ever Gomez RD on 6/28/2021 at 4:42 PM

## 2021-06-28 NOTE — PROGRESS NOTES
Feels ok. BM's.    abd CDI. D/c NG. Sips of clears. Can convert to PO meds but need to be crushed. Thanks.

## 2021-06-29 LAB
ANION GAP SERPL CALC-SCNC: 5 MMOL/L (ref 5–15)
BASOPHILS # BLD: 0 K/UL (ref 0–0.1)
BASOPHILS NFR BLD: 0 % (ref 0–1)
BUN SERPL-MCNC: 31 MG/DL (ref 6–20)
BUN/CREAT SERPL: 38 (ref 12–20)
CALCIUM SERPL-MCNC: 7.9 MG/DL (ref 8.5–10.1)
CHLORIDE SERPL-SCNC: 114 MMOL/L (ref 97–108)
CO2 SERPL-SCNC: 25 MMOL/L (ref 21–32)
CREAT SERPL-MCNC: 0.81 MG/DL (ref 0.55–1.02)
DIFFERENTIAL METHOD BLD: ABNORMAL
EOSINOPHIL # BLD: 0.3 K/UL (ref 0–0.4)
EOSINOPHIL NFR BLD: 2 % (ref 0–7)
ERYTHROCYTE [DISTWIDTH] IN BLOOD BY AUTOMATED COUNT: 29.6 % (ref 11.5–14.5)
GLUCOSE BLD STRIP.AUTO-MCNC: 256 MG/DL (ref 65–117)
GLUCOSE BLD STRIP.AUTO-MCNC: 68 MG/DL (ref 65–117)
GLUCOSE BLD STRIP.AUTO-MCNC: 90 MG/DL (ref 65–117)
GLUCOSE SERPL-MCNC: 83 MG/DL (ref 65–100)
HCT VFR BLD AUTO: 32.3 % (ref 35–47)
HGB BLD-MCNC: 9.2 G/DL (ref 11.5–16)
IMM GRANULOCYTES # BLD AUTO: 0.1 K/UL (ref 0–0.04)
IMM GRANULOCYTES NFR BLD AUTO: 1 % (ref 0–0.5)
LYMPHOCYTES # BLD: 1.6 K/UL (ref 0.8–3.5)
LYMPHOCYTES NFR BLD: 11 % (ref 12–49)
MCH RBC QN AUTO: 24.1 PG (ref 26–34)
MCHC RBC AUTO-ENTMCNC: 28.5 G/DL (ref 30–36.5)
MCV RBC AUTO: 84.8 FL (ref 80–99)
MONOCYTES # BLD: 1 K/UL (ref 0–1)
MONOCYTES NFR BLD: 7 % (ref 5–13)
NEUTS SEG # BLD: 11.3 K/UL (ref 1.8–8)
NEUTS SEG NFR BLD: 79 % (ref 32–75)
NRBC # BLD: 0 K/UL (ref 0–0.01)
NRBC BLD-RTO: 0 PER 100 WBC
PLATELET # BLD AUTO: 231 K/UL (ref 150–400)
PMV BLD AUTO: 10.1 FL (ref 8.9–12.9)
POTASSIUM SERPL-SCNC: 4 MMOL/L (ref 3.5–5.1)
RBC # BLD AUTO: 3.81 M/UL (ref 3.8–5.2)
RBC MORPH BLD: ABNORMAL
SERVICE CMNT-IMP: ABNORMAL
SERVICE CMNT-IMP: NORMAL
SERVICE CMNT-IMP: NORMAL
SODIUM SERPL-SCNC: 144 MMOL/L (ref 136–145)
WBC # BLD AUTO: 14.3 K/UL (ref 3.6–11)

## 2021-06-29 PROCEDURE — 74011250637 HC RX REV CODE- 250/637: Performed by: SURGERY

## 2021-06-29 PROCEDURE — 85025 COMPLETE CBC W/AUTO DIFF WBC: CPT

## 2021-06-29 PROCEDURE — 74011250636 HC RX REV CODE- 250/636: Performed by: SURGERY

## 2021-06-29 PROCEDURE — 74011000250 HC RX REV CODE- 250: Performed by: INTERNAL MEDICINE

## 2021-06-29 PROCEDURE — 74011636637 HC RX REV CODE- 636/637: Performed by: NURSE PRACTITIONER

## 2021-06-29 PROCEDURE — 65270000029 HC RM PRIVATE

## 2021-06-29 PROCEDURE — 74011000258 HC RX REV CODE- 258: Performed by: NURSE PRACTITIONER

## 2021-06-29 PROCEDURE — 77030038269 HC DRN EXT URIN PURWCK BARD -A

## 2021-06-29 PROCEDURE — 74011250637 HC RX REV CODE- 250/637: Performed by: INTERNAL MEDICINE

## 2021-06-29 PROCEDURE — 74011000250 HC RX REV CODE- 250: Performed by: SURGERY

## 2021-06-29 PROCEDURE — C9113 INJ PANTOPRAZOLE SODIUM, VIA: HCPCS | Performed by: SURGERY

## 2021-06-29 PROCEDURE — 74011250636 HC RX REV CODE- 250/636: Performed by: NURSE PRACTITIONER

## 2021-06-29 PROCEDURE — 80048 BASIC METABOLIC PNL TOTAL CA: CPT

## 2021-06-29 PROCEDURE — 36415 COLL VENOUS BLD VENIPUNCTURE: CPT

## 2021-06-29 PROCEDURE — 74011250636 HC RX REV CODE- 250/636: Performed by: HOSPITALIST

## 2021-06-29 PROCEDURE — 74011250637 HC RX REV CODE- 250/637: Performed by: NURSE PRACTITIONER

## 2021-06-29 PROCEDURE — 99232 SBSQ HOSP IP/OBS MODERATE 35: CPT | Performed by: INTERNAL MEDICINE

## 2021-06-29 PROCEDURE — 74011000250 HC RX REV CODE- 250: Performed by: NURSE PRACTITIONER

## 2021-06-29 PROCEDURE — 74011636637 HC RX REV CODE- 636/637: Performed by: INTERNAL MEDICINE

## 2021-06-29 PROCEDURE — 82962 GLUCOSE BLOOD TEST: CPT

## 2021-06-29 RX ORDER — CARVEDILOL 12.5 MG/1
12.5 TABLET ORAL 2 TIMES DAILY WITH MEALS
Status: DISCONTINUED | OUTPATIENT
Start: 2021-06-29 | End: 2021-07-01 | Stop reason: HOSPADM

## 2021-06-29 RX ORDER — PROCHLORPERAZINE MALEATE 10 MG
10 TABLET ORAL
Status: DISCONTINUED | OUTPATIENT
Start: 2021-06-29 | End: 2021-07-01 | Stop reason: HOSPADM

## 2021-06-29 RX ORDER — FUROSEMIDE 40 MG/1
40 TABLET ORAL DAILY
Status: DISCONTINUED | OUTPATIENT
Start: 2021-06-30 | End: 2021-06-29

## 2021-06-29 RX ORDER — LOSARTAN POTASSIUM 25 MG/1
25 TABLET ORAL DAILY
Status: DISCONTINUED | OUTPATIENT
Start: 2021-06-29 | End: 2021-07-01 | Stop reason: HOSPADM

## 2021-06-29 RX ADMIN — Medication 10 ML: at 09:45

## 2021-06-29 RX ADMIN — ENALAPRILAT 0.62 MG: 1.25 INJECTION INTRAVENOUS at 06:36

## 2021-06-29 RX ADMIN — CARVEDILOL 12.5 MG: 12.5 TABLET, FILM COATED ORAL at 17:00

## 2021-06-29 RX ADMIN — NITROGLYCERIN 1 INCH: 20 OINTMENT TOPICAL at 06:36

## 2021-06-29 RX ADMIN — NITROGLYCERIN 1 INCH: 20 OINTMENT TOPICAL at 18:40

## 2021-06-29 RX ADMIN — NITROGLYCERIN 1 INCH: 20 OINTMENT TOPICAL at 00:16

## 2021-06-29 RX ADMIN — OXYCODONE HYDROCHLORIDE 5 MG: 5 SOLUTION ORAL at 23:41

## 2021-06-29 RX ADMIN — SODIUM CHLORIDE 40 MG: 9 INJECTION INTRAMUSCULAR; INTRAVENOUS; SUBCUTANEOUS at 09:41

## 2021-06-29 RX ADMIN — FUROSEMIDE 40 MG: 10 INJECTION, SOLUTION INTRAMUSCULAR; INTRAVENOUS at 09:41

## 2021-06-29 RX ADMIN — SODIUM CHLORIDE 10 ML: 9 INJECTION, SOLUTION INTRAMUSCULAR; INTRAVENOUS; SUBCUTANEOUS at 06:36

## 2021-06-29 RX ADMIN — SODIUM CHLORIDE 10 ML: 9 INJECTION, SOLUTION INTRAMUSCULAR; INTRAVENOUS; SUBCUTANEOUS at 13:39

## 2021-06-29 RX ADMIN — ATORVASTATIN CALCIUM 40 MG: 40 TABLET, FILM COATED ORAL at 23:14

## 2021-06-29 RX ADMIN — METOPROLOL TARTRATE 5 MG: 5 INJECTION INTRAVENOUS at 06:36

## 2021-06-29 RX ADMIN — SODIUM CHLORIDE 10 ML: 9 INJECTION, SOLUTION INTRAMUSCULAR; INTRAVENOUS; SUBCUTANEOUS at 23:15

## 2021-06-29 RX ADMIN — LOSARTAN POTASSIUM 25 MG: 25 TABLET, FILM COATED ORAL at 13:29

## 2021-06-29 RX ADMIN — NITROGLYCERIN 1 INCH: 20 OINTMENT TOPICAL at 13:29

## 2021-06-29 RX ADMIN — INSULIN LISPRO 5 UNITS: 100 INJECTION, SOLUTION INTRAVENOUS; SUBCUTANEOUS at 18:39

## 2021-06-29 RX ADMIN — Medication 1 AMPULE: at 23:14

## 2021-06-29 RX ADMIN — MAGNESIUM SULFATE HEPTAHYDRATE: 500 INJECTION, SOLUTION INTRAMUSCULAR; INTRAVENOUS at 18:41

## 2021-06-29 NOTE — PROGRESS NOTES
Attending provider:  Amanda Yung MD   PCP:  Myra Cunningham MD    Subjective: Tolerating clear sips without n/v.    Objective:    Blood pressure (!) 156/73, pulse 73, temperature 98.5 °F (36.9 °C), resp. rate 16, height 5' 2\" (1.575 m), weight 95.4 kg (210 lb 5.1 oz), SpO2 100 %, not currently breastfeeding. Drains -serosanguineous  Exam - A&O, NAD.  CTAB, RRR. Abdomen soft, ND, NABS, appropriately TTP, CDI. No edema. Assessment:  Procedure(s):  LAPAROSCOPIC PARTIAL GASTRECTOMY WITH CORE LIVER BIOPSY 6/22/2021    Active Hospital Problems    Diagnosis Date Noted    Leukocytosis 06/20/2021    NSTEMI (non-ST elevated myocardial infarction) ([facility] 06/20/2021    GIST (gastrointestinal stroma tumor), malignant, colon ([facility] 06/20/2021    Acute kidney injury superimposed on chronic kidney disease ([facility] 06/20/2021    Acute on chronic anemia 06/20/2021    GI bleed 10/04/2020     Plan:  Clear liquid diet with Ensure Clear today. If tolerated full liquids tomorrow. Continue PRECIOUS. Ambulate. May need rehab.

## 2021-06-29 NOTE — PROGRESS NOTES
Problem: Falls - Risk of  Goal: *Absence of Falls  Description: Document Gilma Locket Fall Risk and appropriate interventions in the flowsheet. Outcome: Progressing Towards Goal  Note: Fall Risk Interventions:  Mobility Interventions: Assess mobility with egress test, Bed/chair exit alarm, Patient to call before getting OOB, Utilize walker, cane, or other assistive device         Medication Interventions: Patient to call before getting OOB, Teach patient to arise slowly    Elimination Interventions: Call light in reach, Toileting schedule/hourly rounds    History of Falls Interventions: Investigate reason for fall         Problem: Patient Education: Go to Patient Education Activity  Goal: Patient/Family Education  Outcome: Progressing Towards Goal     Problem: Pressure Injury - Risk of  Goal: *Prevention of pressure injury  Description: Document Rufus Scale and appropriate interventions in the flowsheet. Outcome: Progressing Towards Goal  Note: Pressure Injury Interventions:  Sensory Interventions: Assess changes in LOC, Assess need for specialty bed, Discuss PT/OT consult with provider    Moisture Interventions: Absorbent underpads, Check for incontinence Q2 hours and as needed, Internal/External urinary devices    Activity Interventions: Assess need for specialty bed, Increase time out of bed    Mobility Interventions: Assess need for specialty bed, HOB 30 degrees or less, Turn and reposition approx.  every two hours(pillow and wedges)    Nutrition Interventions: Document food/fluid/supplement intake    Friction and Shear Interventions: Apply protective barrier, creams and emollients, Lift sheet, HOB 30 degrees or less                Problem: Patient Education: Go to Patient Education Activity  Goal: Patient/Family Education  Outcome: Progressing Towards Goal     Problem: Diabetes Self-Management  Goal: *Disease process and treatment process  Description: Define diabetes and identify own type of diabetes; list 3 options for treating diabetes. Outcome: Progressing Towards Goal  Goal: *Incorporating nutritional management into lifestyle  Description: Describe effect of type, amount and timing of food on blood glucose; list 3 methods for planning meals. Outcome: Progressing Towards Goal  Goal: *Incorporating physical activity into lifestyle  Description: State effect of exercise on blood glucose levels. Outcome: Progressing Towards Goal  Goal: *Developing strategies to promote health/change behavior  Description: Define the ABC's of diabetes; identify appropriate screenings, schedule and personal plan for screenings. Outcome: Progressing Towards Goal  Goal: *Using medications safely  Description: State effect of diabetes medications on diabetes; name diabetes medication taking, action and side effects. Outcome: Progressing Towards Goal  Goal: *Monitoring blood glucose, interpreting and using results  Description: Identify recommended blood glucose targets  and personal targets. Outcome: Progressing Towards Goal  Goal: *Prevention, detection, treatment of acute complications  Description: List symptoms of hyper- and hypoglycemia; describe how to treat low blood sugar and actions for lowering  high blood glucose level. Outcome: Progressing Towards Goal  Goal: *Prevention, detection and treatment of chronic complications  Description: Define the natural course of diabetes and describe the relationship of blood glucose levels to long term complications of diabetes.   Outcome: Progressing Towards Goal  Goal: *Developing strategies to address psychosocial issues  Description: Describe feelings about living with diabetes; identify support needed and support network  Outcome: Progressing Towards Goal     Problem: Patient Education: Go to Patient Education Activity  Goal: Patient/Family Education  Outcome: Progressing Towards Goal     Problem: Patient Education: Go to Patient Education Activity  Goal: Patient/Family Education  Outcome: Progressing Towards Goal     Problem: Anemia Care Plan (Adult and Pediatric)  Goal: *Labs within defined limits  Outcome: Progressing Towards Goal  Goal: *Tolerates increased activity  Outcome: Progressing Towards Goal     Problem: Patient Education: Go to Patient Education Activity  Goal: Patient/Family Education  Outcome: Progressing Towards Goal     Problem: Patient Education: Go to Patient Education Activity  Goal: Patient/Family Education  Outcome: Progressing Towards Goal     Problem: Infection - Risk of, Central Venous Catheter-Associated Bloodstream Infection  Goal: *Absence of infection signs and symptoms  Outcome: Progressing Towards Goal     Problem: Patient Education: Go to Patient Education Activity  Goal: Patient/Family Education  Outcome: Progressing Towards Goal     Problem: Infection - Risk of, Urinary Catheter-Associated Urinary Tract Infection  Goal: *Absence of infection signs and symptoms  Outcome: Progressing Towards Goal     Problem: Patient Education: Go to Patient Education Activity  Goal: Patient/Family Education  Outcome: Progressing Towards Goal     Problem: Patient Education: Go to Patient Education Activity  Goal: Patient/Family Education  Outcome: Progressing Towards Goal

## 2021-06-29 NOTE — PROGRESS NOTES
[address], [address] [facility], [address] [place] [address]  [phone]         Oncology Note        Patient: Kim Spaulding MRN: [id number]  SSN: [id number]    YOB: 1945  Age: 78 y.o. Sex: female        Reason for Consultation:      1. 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 Iron deficiency anemia    Subjective:      Kim Spaulding is a 78 y.o. female who I am seeing for a diagnosis of GIST and iron deficiency anemia. She presented with severe anemia late last year. 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 Surgery was delayed due to cardiac clearance. Recent imaging shows metastasis to the liver and RP nodes. She underwent a partial gastrectomy with liver biopsy on 6/22/2021. She remains on a liquid diet. She feels well other than post op discomfort.      Review of Systems:    ROS is negative except what is listed in the HPI    Past Medical History:   Diagnosis Date    Anemia, unspecified 10/6/2020    Arrhythmia     Arthritis     CAD (coronary artery disease)     Cancer ([address]     Chronic bronchitis ([address]     per pt:  as of 1/9/15 pt denies any ARENAS or SOB    Diabetes ([address] Dx approx 2009    Dr Sarath Irvin (in [facility])    GERD (gastroesophageal reflux disease)     Hypercholesteremia     Hypertension      Past Surgical History:   Procedure Laterality Date    HX CHOLECYSTECTOMY  6/12    HX COLONOSCOPY      HX CORONARY STENT PLACEMENT  8/25/2015    HX DILATION AND CURETTAGE      DE CARDIAC SURG PROCEDURE UNLIST  2015    UPPER GI ENDOSCOPY,BIOPSY  10/6/2020           Family History   Problem Relation Age of Onset    Diabetes Mother     Heart Disease Mother     Hypertension Mother     Stroke Father     Heart Disease Brother     Heart Disease Brother     Stroke Brother     HIV/AIDS Brother      Social History     Tobacco Use    Smoking status: Former Smoker    Smokeless tobacco: Never Used   Substance Use Topics    Alcohol use: No      Prior to Admission medications    Medication Sig Start Date End Date Taking? Authorizing Provider   multivitamin, tx-iron-ca-min (THERA-M w/ IRON) 9 mg iron-400 mcg tab tablet Take 1 Tab by mouth two (2) times a day. Centrum silver   Yes Provider, Historical   glipiZIDE (GLUCOTROL) 5 mg tablet Take 1 Tab by mouth two (2) times a day. 1/21/21  Yes Yue Clark MD   lancets misc PUSH BUTTON LANCETS. Check blood sugar 4 times per day due to hypoglycemic events. Dx:E11.42, E16.2 12/17/20  Yes Yue Clark MD   amLODIPine (NORVASC) 10 mg tablet Take 1 Tab by mouth daily. 11/20/20  Yes Tamera Cannon MD   food supplemt, lactose-reduced (Ensure Enlive) 0.08 gram-1.5 kcal/mL liqd Take 6 x daily as recommended by Surgery to improve nutritional status for Possible surgery in future  Patient taking differently: Take 6 x daily as recommended by Surgery to improve nutritional status for Possible surgery in future. takes about 3 11/20/20  Yes Tamera Cannon MD   DULoxetine (CYMBALTA) 60 mg capsule Take 1 Cap by mouth daily. 10/14/20  Yes Temple, Romayne Dexter, NP   losartan (COZAAR) 100 mg tablet Take 1 Tab by mouth daily. 10/14/20  Yes Temple, Romayne Dexter, NP   atorvastatin (LIPITOR) 40 mg tablet Take 1 Tab by mouth nightly. 1/27/20  Yes Yue Clark MD   pantoprazole (PROTONIX) 40 mg tablet  1/13/21   Provider, Historical   carvediloL (COREG) 12.5 mg tablet Take 1 Tab by mouth two (2) times daily (with meals). Patient not taking: Reported on 6/22/2021 10/13/20   Solomon Solis NP   torsemide [facility]) 20 mg tablet Take 40 mg by mouth daily. Patient not taking: Reported on 6/22/2021    Provider, Historical              Allergies   Allergen Reactions    Metformin Other (comments)     GI side effects.  Not a true allergy           Objective:     Vitals:    06/29/21 0300 06/29/21 0637 06/29/21 1234 06/29/21 1528   BP: (!) 157/67 (!) 156/73 (!) 150/56 (!) 159/61   Pulse: 68 73 73 75   Resp: 18 16 16 16   Temp: 98.1 °F (36.7 °C) 98.5 °F (36.9 °C) 98.7 °F (37.1 °C) 98.9 °F (37.2 °C)   SpO2: 100% 100% 100% 99%   Weight:       Height:            Physical Exam:    GENERAL: alert, cooperative, no distress, appears stated age  EYE: conjunctivae/corneas clear  LYMPHATIC: Cervical, supraclavicular, and axillary nodes normal.   THROAT & NECK: normal and no erythema or exudates noted. LUNG: clear to auscultation bilaterally  HEART: regular rate and rhythm, no murmur  ABDOMEN: soft s/p partial gastrectomy with liver biopsy  EXTREMITIES: no cyanosis or edema  SKIN: Normal, no rash or ecchymosis  NEUROLOGIC: AOx3. Lab Results   Component Value Date/Time    WBC 14.3 (H) 06/29/2021 03:15 AM    HGB 9.2 (L) 06/29/2021 03:15 AM    HCT 32.3 (L) 06/29/2021 03:15 AM    PLATELET 914 30/76/3398 03:15 AM    MCV 84.8 06/29/2021 03:15 AM       Lab Results   Component Value Date/Time    Sodium 144 06/29/2021 03:15 AM    Potassium 4.0 06/29/2021 03:15 AM    Chloride 114 (H) 06/29/2021 03:15 AM    CO2 25 06/29/2021 03:15 AM    Anion gap 5 06/29/2021 03:15 AM    Glucose 83 06/29/2021 03:15 AM    BUN 31 (H) 06/29/2021 03:15 AM    Creatinine 0.81 06/29/2021 03:15 AM    BUN/Creatinine ratio 38 (H) 06/29/2021 03:15 AM    GFR est AA >60 06/29/2021 03:15 AM    GFR est non-AA >60 06/29/2021 03:15 AM    Calcium 7.9 (L) 06/29/2021 03:15 AM    Bilirubin, total 0.5 06/19/2021 04:54 PM    Alk.  phosphatase 153 (H) 06/19/2021 04:54 PM    Protein, total 7.0 06/19/2021 04:54 PM    Albumin 2.3 (L) 06/19/2021 04:54 PM    Globulin 4.7 (H) 06/19/2021 04:54 PM    A-G Ratio 0.5 (L) 06/19/2021 04:54 PM    ALT (SGPT) 104 (H) 06/19/2021 04:54 PM    AST (SGOT) 87 (H) 06/19/2021 04:54 PM     CT Results (most recent):  Results from [place] encounter on 06/19/21    CT ABD PELV WO CONT    Narrative  EXAM: CT ABD PELV WO CONT    INDICATION: abd mass with low hgb and wbc 21K    COMPARISON: CT 11/12/2020    CONTRAST:  None. TECHNIQUE:  Thin axial images were obtained through the abdomen and pelvis. Coronal and  sagittal reformats were generated. Oral contrast was not administered. CT dose  reduction was achieved through use of a standardized protocol tailored for this  examination and automatic exposure control for dose modulation. The absence of intravenous and oral contrast material reduces the sensitivity  for evaluation of the bowel, vasculature and solid organs. FINDINGS:  LOWER THORAX: No significant abnormality in the incidentally imaged lower chest.  LIVER: Interval demonstration of numerous hepatic masses in all hepatic segments  measuring up to 2 cm. No intrahepatic bile duct dilation is shown. BILIARY TREE: Status post cholecystectomy. CBD is not dilated. SPLEEN: within normal limits. PANCREAS: No focal abnormality. ADRENALS: Unremarkable. KIDNEYS/URETERS: 4 mm nonobstructing calculus of left lower pole again noted. STOMACH: Fundic gastric mass R 25 cm demonstrated. SMALL BOWEL: No dilatation or wall thickening. COLON: No dilatation or wall thickening. APPENDIX: Normal.  PERITONEUM: No ascites or pneumoperitoneum. RETROPERITONEUM: Atherosclerotic calcifications extensively shown. No aneurysm  or retroperitoneal mass-adenopathy. REPRODUCTIVE ORGANS: Unremarkable. URINARY BLADDER: No mass or calculus. BONES: No destructive bone lesion. ABDOMINAL WALL: Diffuse subcutaneous edema consistent with anasarca. ADDITIONAL COMMENTS: N/A    Impression  1. Gastric mass again demonstrated. 2. Interval demonstration of hepatic metastatic disease. Assessment:     1.  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 2. Iron deficiency anemia    IV iron given inpatient Venofer 300 mg x 3 doses    3. Leukocytosis    More than likely reactive from acute illness  Trending downward    Plan:     1. Plan to follow up outpatient on 7/15 at 2 pm to discuss imatinib  2.  Plan to recheck CBC, Ferritin and iron profile at that time          Signed By: Flex Carl NP     June 29, 2021

## 2021-06-29 NOTE — PROGRESS NOTES
Comprehensive Nutrition Assessment    Type and Reason for Visit: Reassess    Nutrition Recommendations/Plan:   · Continue clear liquids with diet advancement as tolerated per surgery team.  · Continue Ensure Clear for now; once diet progresses will switch ONS to Ensure High Protein which has 16 g protein & 19 g CHO/8oz. · Continue D15/5%AA @ 63 ml/hr until able to tolerate more po. · Please document % meals and supplements consumed in flowsheet I/O's under intake. 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 Pt continues to receive TPN D15/5%AA @ 63 ml/hr. Pt was started on clear liquids today and tolerating; the plan is to progress to full liquids tomorrow if tolerates. Pt reports that she did not have any difficulty tolerating the clear liquids; pt states she does not like the ensure clear. Explained we are limited on ONS options while on clear liquids. Once diet advances, able to transition to Ensure High Protein which is most ideal given increased protein and reduced CHO content compared to Glucerna Shake. BG levels remain elevated. Patient Vitals for the past 168 hrs:   % Diet Eaten   06/24/21 1848 0%   06/24/21 1230 0%   06/24/21 0930 0%   06/23/21 1800 0%   06/23/21 1230 0%   06/23/21 0930 0%   06/23/21 0445 0%   06/22/21 2330 0%   06/22/21 1930 0%     Last Weight Metric  Weight Loss Metrics 6/25/2021 3/30/2021 3/30/2021 3/22/2021 12/15/2020 12/11/2020 11/12/2020   Today's Wt 210 lb 5.1 oz 175 lb 0.7 oz 175 lb 0.7 oz 176 lb 11.2 oz 190 lb 190 lb 192 lb   BMI 38.47 kg/m2 32.02 kg/m2 32.02 kg/m2 32.32 kg/m2 34.75 kg/m2 34.75 kg/m2 35.12 kg/m2     Estimated Daily Nutrient Needs:  Energy (kcal): 1824 kcal (1403 x 1. 3AF);  Weight Used for Energy Requirements: Current  Protein (g): 95-113g (1.0-1.2 g/kg bw); Weight Used for Protein Requirements: Current  Fluid (ml/day): 1800 mL; Method Used for Fluid Requirements: 1 ml/kcal    Nutrition Related Findings:  BM: 6/29; Labs: reviewed; Meds: RICARDON [email or web address]      Wounds:    Surgical incision       Current Nutrition Therapies:  TPN ADULT - CENTRAL  ADULT DIET Clear Liquid  ADULT ORAL NUTRITION SUPPLEMENT Breakfast, Lunch, Dinner; Clear Liquid  TPN ADULT - CENTRAL    Anthropometric Measures:  · Height:  5' 2\" (157.5 cm)  · Current Body Wt:  94.5 kg (208 lb 5.4 oz)   · Admission Body Wt:  174 lb    · Ideal Body Wt:  110 lbs:  189.4 %   · BMI Category:  Obese class 2 (BMI 35.0-39. 9)       Nutrition Diagnosis:   · Altered GI function related to altered GI structure as evidenced by NPO or clear liquid status due to medical condition, nutrition support-parenteral nutrition    Nutrition Interventions:   Food and/or Nutrient Delivery: Modify parenteral nutrition  Nutrition Education and Counseling: No recommendations at this time  Coordination of Nutrition Care: Continue to monitor while inpatient, Interdisciplinary rounds    Goals:  Pt will be able to tolerate clear liquids and progress to FLD next 24 hours; continue to tolerate TPN with stable lytes until weaned       Nutrition Monitoring and Evaluation:   Behavioral-Environmental Outcomes: None identified  Food/Nutrient Intake Outcomes: Parenteral nutrition intake/tolerance  Physical Signs/Symptoms Outcomes: Biochemical data, GI status, Hemodynamic status, Skin, Weight    Discharge Planning:     Too soon to determine     Electronically signed by Madi Whatley RD on 6/29/2021 at 4:34 PM

## 2021-06-29 NOTE — INTERDISCIPLINARY ROUNDS
Interdisciplinary Rounds were completed on 06/29/21 for this patient. Rounds included nursing, clinical care leader, pharmacy, and case management. Plan of care discussed. See clinical pathway and/or care plan for interventions and desired outcomes.

## 2021-06-29 NOTE — PROGRESS NOTES
Occupational Therapy  Medical record reviewed. Nurse cleared pt for therapy. Pt reported 8/10 pain in upper R quadrant of abdomen (nurse informed), but pt declined the need for pain medication. She declined OT treatment, stating that she'd been up today and wanted to rest.  Informed nursing. Tx session aborted.

## 2021-06-29 NOTE — PROGRESS NOTES
Hospitalist Progress Note    NAME: Tania Head   :  1945   MRN:  [id number]       Assessment / Plan:  Acute on chronic blood loss anemia in the setting of GIST with mets to liver  Iron deficiency anemia, IV iron x3 doses  CT a/p showed gastric mass, interval demonstration of hepatic metastatic disease. Presenting with hgb 4.7, s/p 3 units PRBC. Hgb stable  Status post laparoscopic partial gastrectomy with core liver biopsy  Pathology: GIST     S/p PCA pump. Cont' IV morphine prn  Cont' PRECIOUS  TPN per surgery, appreciate recs : NG out , tolerating current diet. Oncology and surgery following  PT/OT, up in chair, may need rehab at discharge    Leukocytosis, likely reactive  procalcitonin wnl.  UA neg/no urinary symptoms. Bcx NTD. CXR neg, CT a/p as above  Zosyn discontinued on   Wbc trending down, follow daily, if worst, consider repeat CT a/p    HTN  Elevated troponin  Chronic systolic heart failure, EF 30-35% in 10/20, EF 35-40% on repeat Echo  Cont amlodipine, coreg, losartan, torsemide  Patient is not on ASA due to GI bleed  Cardiology following, signed off on      GUANACO on CKD3, improved  Mild hyperkalemia, K5.2, resolved   Likely due to prerenal from blood loss  Hold nephrotoxic agents  Follows with Dr Rafael Gooden    T2DM  BP low side    SSI  Hold NPH BID, restart as needed      Obesity / Body mass index is 38.47 kg/m². Estimated discharge date: To be determined  Barriers clinical condition    Code status: Full  Prophylaxis: Hep SQ     Baseline: independent, lives with  at home   Recommended Disposition: Home w/Family     + NG  + R CVP      Subjective:     Chief Complaint / Reason for Physician Visit: GI bleeding , gastric mass   comfortable in bed   No dyspnea, feeling good. Hungry and wants to eat regular food. Discussed with RN events overnight.      Review of Systems:  Symptom Y/N Comments  Symptom Y/N Comments   Fever/Chills    Chest Pain     Poor Appetite Edema y B/l arms/legs   Cough    Abdominal Pain     Sputum    Joint Pain     SOB/ARENAS    Pruritis/Rash     Nausea/vomit    Tolerating PT/OT     Diarrhea    Tolerating Diet     Constipation    Other       Could NOT obtain due to:      Objective:     VITALS:   Last 24hrs VS reviewed since prior progress note. Most recent are:  Patient Vitals for the past 24 hrs:   Temp Pulse Resp BP SpO2   06/29/21 1234 98.7 °F (37.1 °C) 73 16 (!) 150/56 100 %   06/29/21 0637 98.5 °F (36.9 °C) 73 16 (!) 156/73 100 %   06/29/21 0300 98.1 °F (36.7 °C) 68 18 (!) 157/67 100 %   06/29/21 0000 -- -- -- (!) 146/61 --   06/28/21 2323 98.6 °F (37 °C) 77 18 (!) 156/70 100 %   06/28/21 2021 98.7 °F (37.1 °C) 70 17 (!) 169/84 98 %   06/28/21 1502 98.3 °F (36.8 °C) 69 18 (!) 167/68 100 %       Intake/Output Summary (Last 24 hours) at 6/29/2021 1338  Last data filed at 6/29/2021 8540  Gross per 24 hour   Intake 722.4 ml   Output 1130 ml   Net -407.6 ml        I had a face to face encounter and independently examined this patient on 6/29/2021, as outlined below:  PHYSICAL EXAM:  General: WD, WN. Alert, cooperative, no acute distress    EENT:  EOMI. Anicteric sclerae. MMM  Resp:  Diminished BS bases bilaterally, no wheezing or rales. No accessory muscle use  CV:  Regular  rhythm,  LE edema BL + 2 , + mild UE edema   GI:  Soft, Non distended, + mild tender. Neurologic:  Alert and oriented X 3, normal speech  Psych:   Not anxious nor agitated  Skin:  No rashes.   No jaundice    Reviewed most current lab test results and cultures  YES  Reviewed most current radiology test results   YES  Review and summation of old records today    NO  Reviewed patient's current orders and MAR    YES  PMH/SH reviewed - no change compared to H&P  ________________________________________________________________________  Care Plan discussed with:    Comments   Patient x    Family      RN x    Care Manager     Consultant                        Multidiciplinary team rounds were held today with , nursing, pharmacist and clinical coordinator. Patient's plan of care was discussed; medications were reviewed and discharge planning was addressed. ________________________________________________________________________  Total NON critical care TIME:  35   Minutes    Total CRITICAL CARE TIME Spent:   Minutes non procedure based      Comments   >50% of visit spent in counseling and coordination of care y    ________________________________________________________________________  Cristian Curtis MD     Procedures: see electronic medical records for all procedures/Xrays and details which were not copied into this note but were reviewed prior to creation of Plan. LABS:  I reviewed today's most current labs and imaging studies.   Pertinent labs include:  Recent Labs     06/29/21  0315 06/28/21  0358 06/27/21  0354   WBC 14.3* 15.3* 15.7*   HGB 9.2* 9.1* 9.3*   HCT 32.3* 32.1* 32.6*    224 224     Recent Labs     06/29/21  0315 06/28/21  0358 06/27/21  0354    146* 146*   K 4.0 3.3* 3.6   * 114* 115*   CO2 25 26 26   GLU 83 102* 85   BUN 31* 33* 36*   CREA 0.81 0.84 0.93   CA 7.9* 8.3* 8.5   MG  --   --  2.1   PHOS  --   --  3.9       Signed: Cristian Curtis MD

## 2021-06-30 LAB
ALBUMIN SERPL-MCNC: 1.4 G/DL (ref 3.5–5)
ALBUMIN/GLOB SERPL: 0.4 {RATIO} (ref 1.1–2.2)
ALP SERPL-CCNC: 118 U/L (ref 45–117)
ALT SERPL-CCNC: 35 U/L (ref 12–78)
ANION GAP SERPL CALC-SCNC: 5 MMOL/L (ref 5–15)
AST SERPL-CCNC: 37 U/L (ref 15–37)
BASOPHILS # BLD: 0 K/UL (ref 0–0.1)
BASOPHILS NFR BLD: 0 % (ref 0–1)
BILIRUB SERPL-MCNC: 0.5 MG/DL (ref 0.2–1)
BUN SERPL-MCNC: 32 MG/DL (ref 6–20)
BUN/CREAT SERPL: 38 (ref 12–20)
CALCIUM SERPL-MCNC: 7.8 MG/DL (ref 8.5–10.1)
CHLORIDE SERPL-SCNC: 112 MMOL/L (ref 97–108)
CO2 SERPL-SCNC: 25 MMOL/L (ref 21–32)
CREAT SERPL-MCNC: 0.85 MG/DL (ref 0.55–1.02)
DIFFERENTIAL METHOD BLD: ABNORMAL
EOSINOPHIL # BLD: 0.3 K/UL (ref 0–0.4)
EOSINOPHIL NFR BLD: 2 % (ref 0–7)
ERYTHROCYTE [DISTWIDTH] IN BLOOD BY AUTOMATED COUNT: 28.9 % (ref 11.5–14.5)
GLOBULIN SER CALC-MCNC: 3.8 G/DL (ref 2–4)
GLUCOSE BLD STRIP.AUTO-MCNC: 126 MG/DL (ref 65–117)
GLUCOSE BLD STRIP.AUTO-MCNC: 129 MG/DL (ref 65–117)
GLUCOSE BLD STRIP.AUTO-MCNC: 148 MG/DL (ref 65–117)
GLUCOSE BLD STRIP.AUTO-MCNC: 309 MG/DL (ref 65–117)
GLUCOSE SERPL-MCNC: 102 MG/DL (ref 65–100)
HCT VFR BLD AUTO: 31.6 % (ref 35–47)
HGB BLD-MCNC: 9 G/DL (ref 11.5–16)
IMM GRANULOCYTES # BLD AUTO: 0.1 K/UL (ref 0–0.04)
IMM GRANULOCYTES NFR BLD AUTO: 1 % (ref 0–0.5)
LYMPHOCYTES # BLD: 1.4 K/UL (ref 0.8–3.5)
LYMPHOCYTES NFR BLD: 11 % (ref 12–49)
MAGNESIUM SERPL-MCNC: 1.8 MG/DL (ref 1.6–2.4)
MCH RBC QN AUTO: 24.1 PG (ref 26–34)
MCHC RBC AUTO-ENTMCNC: 28.5 G/DL (ref 30–36.5)
MCV RBC AUTO: 84.5 FL (ref 80–99)
MONOCYTES # BLD: 0.9 K/UL (ref 0–1)
MONOCYTES NFR BLD: 7 % (ref 5–13)
NEUTS SEG # BLD: 9.7 K/UL (ref 1.8–8)
NEUTS SEG NFR BLD: 79 % (ref 32–75)
NRBC # BLD: 0 K/UL (ref 0–0.01)
NRBC BLD-RTO: 0 PER 100 WBC
PHOSPHATE SERPL-MCNC: 3.7 MG/DL (ref 2.6–4.7)
PLATELET # BLD AUTO: 239 K/UL (ref 150–400)
PMV BLD AUTO: 11 FL (ref 8.9–12.9)
POTASSIUM SERPL-SCNC: 3.9 MMOL/L (ref 3.5–5.1)
PROT SERPL-MCNC: 5.2 G/DL (ref 6.4–8.2)
RBC # BLD AUTO: 3.74 M/UL (ref 3.8–5.2)
SERVICE CMNT-IMP: ABNORMAL
SODIUM SERPL-SCNC: 142 MMOL/L (ref 136–145)
WBC # BLD AUTO: 12.3 K/UL (ref 3.6–11)

## 2021-06-30 PROCEDURE — 74011250636 HC RX REV CODE- 250/636: Performed by: INTERNAL MEDICINE

## 2021-06-30 PROCEDURE — 74011250637 HC RX REV CODE- 250/637: Performed by: SURGERY

## 2021-06-30 PROCEDURE — 74011636637 HC RX REV CODE- 636/637: Performed by: INTERNAL MEDICINE

## 2021-06-30 PROCEDURE — 36415 COLL VENOUS BLD VENIPUNCTURE: CPT

## 2021-06-30 PROCEDURE — 65270000029 HC RM PRIVATE

## 2021-06-30 PROCEDURE — 97110 THERAPEUTIC EXERCISES: CPT

## 2021-06-30 PROCEDURE — 84100 ASSAY OF PHOSPHORUS: CPT

## 2021-06-30 PROCEDURE — 74011250637 HC RX REV CODE- 250/637: Performed by: NURSE PRACTITIONER

## 2021-06-30 PROCEDURE — 97530 THERAPEUTIC ACTIVITIES: CPT | Performed by: OCCUPATIONAL THERAPIST

## 2021-06-30 PROCEDURE — 82962 GLUCOSE BLOOD TEST: CPT

## 2021-06-30 PROCEDURE — 77010033678 HC OXYGEN DAILY

## 2021-06-30 PROCEDURE — 74011250637 HC RX REV CODE- 250/637: Performed by: INTERNAL MEDICINE

## 2021-06-30 PROCEDURE — 94760 N-INVAS EAR/PLS OXIMETRY 1: CPT

## 2021-06-30 PROCEDURE — 83735 ASSAY OF MAGNESIUM: CPT

## 2021-06-30 PROCEDURE — 85025 COMPLETE CBC W/AUTO DIFF WBC: CPT

## 2021-06-30 PROCEDURE — 97530 THERAPEUTIC ACTIVITIES: CPT

## 2021-06-30 PROCEDURE — 80053 COMPREHEN METABOLIC PANEL: CPT

## 2021-06-30 RX ORDER — FUROSEMIDE 10 MG/ML
40 INJECTION INTRAMUSCULAR; INTRAVENOUS ONCE
Status: COMPLETED | OUTPATIENT
Start: 2021-06-30 | End: 2021-06-30

## 2021-06-30 RX ORDER — PREDNISONE 20 MG/1
20 TABLET ORAL
Status: DISCONTINUED | OUTPATIENT
Start: 2021-06-30 | End: 2021-07-01 | Stop reason: HOSPADM

## 2021-06-30 RX ADMIN — OXYCODONE HYDROCHLORIDE 5 MG: 5 SOLUTION ORAL at 08:47

## 2021-06-30 RX ADMIN — PREDNISONE 20 MG: 20 TABLET ORAL at 10:39

## 2021-06-30 RX ADMIN — NITROGLYCERIN 1 INCH: 20 OINTMENT TOPICAL at 00:59

## 2021-06-30 RX ADMIN — SODIUM CHLORIDE 10 ML: 9 INJECTION, SOLUTION INTRAMUSCULAR; INTRAVENOUS; SUBCUTANEOUS at 22:23

## 2021-06-30 RX ADMIN — NITROGLYCERIN 1 INCH: 20 OINTMENT TOPICAL at 06:35

## 2021-06-30 RX ADMIN — CARVEDILOL 12.5 MG: 12.5 TABLET, FILM COATED ORAL at 17:52

## 2021-06-30 RX ADMIN — SODIUM CHLORIDE 10 ML: 9 INJECTION, SOLUTION INTRAMUSCULAR; INTRAVENOUS; SUBCUTANEOUS at 17:54

## 2021-06-30 RX ADMIN — AMLODIPINE BESYLATE 10 MG: 5 TABLET ORAL at 08:45

## 2021-06-30 RX ADMIN — FUROSEMIDE 40 MG: 10 INJECTION, SOLUTION INTRAMUSCULAR; INTRAVENOUS at 10:38

## 2021-06-30 RX ADMIN — TORSEMIDE 40 MG: 20 TABLET ORAL at 08:45

## 2021-06-30 RX ADMIN — Medication 1 AMPULE: at 08:48

## 2021-06-30 RX ADMIN — DULOXETINE 60 MG: 30 CAPSULE, DELAYED RELEASE ORAL at 08:45

## 2021-06-30 RX ADMIN — LANSOPRAZOLE 30 MG: KIT at 17:51

## 2021-06-30 RX ADMIN — SODIUM CHLORIDE 10 ML: 9 INJECTION, SOLUTION INTRAMUSCULAR; INTRAVENOUS; SUBCUTANEOUS at 06:35

## 2021-06-30 RX ADMIN — NITROGLYCERIN 1 INCH: 20 OINTMENT TOPICAL at 17:53

## 2021-06-30 RX ADMIN — NITROGLYCERIN 1 INCH: 20 OINTMENT TOPICAL at 12:16

## 2021-06-30 RX ADMIN — ATORVASTATIN CALCIUM 40 MG: 40 TABLET, FILM COATED ORAL at 22:23

## 2021-06-30 RX ADMIN — CARVEDILOL 12.5 MG: 12.5 TABLET, FILM COATED ORAL at 08:45

## 2021-06-30 RX ADMIN — Medication 1 AMPULE: at 22:23

## 2021-06-30 RX ADMIN — INSULIN LISPRO 7 UNITS: 100 INJECTION, SOLUTION INTRAVENOUS; SUBCUTANEOUS at 17:51

## 2021-06-30 RX ADMIN — LANSOPRAZOLE 30 MG: KIT at 06:38

## 2021-06-30 RX ADMIN — LOSARTAN POTASSIUM 25 MG: 25 TABLET, FILM COATED ORAL at 08:45

## 2021-06-30 NOTE — PROGRESS NOTES
Problem: Mobility Impaired (Adult and Pediatric)  Goal: *Acute Goals and Plan of Care (Insert Text)  Description: FUNCTIONAL STATUS PRIOR TO ADMISSION: Pt reports independence with ambulation and ADLs however reports owning RW and cane, stating she is \"supposed to use them\" but doesn't. Reports history of 4 recent falls and that she has been nonambulatory since most recent fall 3 days ago. States she is mostly sedentary at home. HOME SUPPORT PRIOR TO ADMISSION: The patient lived with , stating \"they help each other out. \"    Physical Therapy Goals  Initiated 6/21/2021  1. Patient will move from supine to sit and sit to supine , scoot up and down, and roll side to side in bed with supervision/set-up within 7 day(s). 2.  Patient will transfer from bed to chair and chair to bed with minimal assistance/contact guard assist using the least restrictive device within 7 day(s). 3.  Patient will perform sit to stand with minimal assistance/contact guard assist within 7 day(s). 4.  Patient will ambulate with minimal assistance/contact guard assist for 50 feet with the least restrictive device within 7 day(s). Re-Eval following partial gastrectomy 6/24/21. Goals remain appropriate. 1.  Patient will move from supine to sit and sit to supine , scoot up and down, and roll side to side in bed with supervision/set-up within 7 day(s). 2.  Patient will transfer from bed to chair and chair to bed with minimal assistance/contact guard assist using the least restrictive device within 7 day(s). 3.  Patient will perform sit to stand with minimal assistance/contact guard assist within 7 day(s). 4.  Patient will ambulate with minimal assistance/contact guard assist for 50 feet with the least restrictive device within 7 day(s).        Outcome: Progressing Towards Goal   PHYSICAL THERAPY TREATMENT  Patient: Medhat Garrison (11 y.o. female)  Date: 6/30/2021    Diagnosis: GI bleed [K92.2]  Acute on chronic anemia [D64.9]  GIST (gastrointestinal stroma tumor), malignant, colon ([facility] [C49. A4]  NSTEMI (non-ST elevated myocardial infarction) ([facility] [facility] [I21.4]  Leukocytosis [D72.829]  Acute kidney injury superimposed on chronic kidney disease (HCC) [N17.9, N18.9]     Procedure(s) (LRB): LAPAROSCOPIC PARTIAL GASTRECTOMY WITH CORE LIVER BIOPSY (N/A) 8 Days Post-Op    Precautions: falls    Chart, physical therapy assessment, plan of care and goals were reviewed. ASSESSMENT: Patient continues with skilled PT services and is progressing slowly towards goals, pt needed a lot of encouragement this am for OOB, did fair with bed mob and transfers, left knee is giving her a lot of pain and hindering her mobility, did well with ther-ex, vc's for safety and proper RW use. Current Level of Function Impacting Discharge (mobility/balance): min assist x1         PLAN : Patient continues to benefit from skilled intervention to address the above impairments. Continue treatment per established plan of care  to address goals. Recommendation for discharge: (in order for the patient to meet his/her long term goals) Therapy up to 5 days/week in SNF setting    This discharge recommendation: Has been made in collaboration with the attending provider and/or case management    IF patient discharges home will need the following DME: rolling walker     OBJECTIVE DATA SUMMARY:     Critical Behavior:  Neurologic State: Alert, Appropriate for age, Eyes open spontaneously  Orientation Level:  Other (Comment) (Some confusion after waking)  Cognition: Appropriate decision making, Appropriate for age attention/concentration, Appropriate safety awareness  Safety/Judgement: Awareness of environment, Fall prevention    Functional Mobility Training:  Bed Mobility:  Rolling: Assist x1;Additional time;Minimum assistance  Supine to Sit: Minimum assistance;Assist x1;Additional time  Scooting: Minimum assistance;Assist x1;Additional time  Level of Assistance: Minimum assistance  Interventions: Tactile cues; Verbal cues    Transfers:  Sit to Stand: Contact guard assistance;Assist x1;Additional time  Stand to Sit: Contact guard assistance  Bed to Chair: Contact guard assistance;Assist x1;Additional time  Interventions: Tactile cues; Verbal cues  Level of Assistance: Contact guard assistance;Assist x1;Additional time    Balance:  Sitting: Intact; Without support  Sitting - Static: Good (unsupported)  Sitting - Dynamic: Not tested  Standing: Intact; With support  Standing - Static: Good;Constant support  Standing - Dynamic : Good;Constant support    Ambulation/Gait Training:  Assistive Device: Gait belt;Walker, rolling  Right Side Weight Bearing: Full  Left Side Weight Bearing: As tolerated  Base of Support: Widened  Speed/Tiffany: Slow  Step Length: Left shortened;Right shortened    Therapeutic Exercises:   sitting  EXERCISE   Sets   Reps   Active Active Assist   Passive   Comments   Ankle pumps 1 10 [x] [] [] bilat   Heel raises 1 10 [x] [] [] \"   Toe tap 1 10 [x] [] [] \"   Knee ext 1 10 [x] [] [] \"   Hip flex 1 10 [x] [] [] \"     Pain Ratin    Activity Tolerance: Poor    After treatment patient left in no apparent distress: Sitting in chair and Call bell within reach    COMMUNICATION/COLLABORATION:   The patients plan of care was discussed with: Registered nurse.      Amadou Tam PTA   Time Calculation: 33 mins

## 2021-06-30 NOTE — INTERDISCIPLINARY ROUNDS
Interdisciplinary Rounds were completed on 06/30/21 for this patient. Rounds included nursing, clinical care leader, pharmacy, and case management. Plan of care discussed. See clinical pathway and/or care plan for interventions and desired outcomes.

## 2021-06-30 NOTE — PROGRESS NOTES
Transition of Care Plan:    RUR: 33%  Disposition: SNF Placement    Follow up appointments: Follow up with PCP   DME needed: TBD by therapist   Transportation at Discharge: BLS transport   Leala Friar or means to access home:      N/A   Medicare letter: 2nd  Medicare Letter to be given   Caregiver Contact: Mani Chamorro (spouse) [phone]  Discharge Caregiver contacted prior to discharge? Family to be contacted     CM is currently working with pt in the Gen Surg Unit. CM reviewed pt's clinicals and therapist are currently being recommended for snf placement at the time of d/c. CM will contact pt's spouse to inform him of the following. CM spoke with pt's spouse: Alectiesha Abarca, via telephone regarding pt's recommendations for snf placement. Alec Abarca reported that he is agreeable to pt going to snf at the time of d/c.  Alec Abarca reported that he would like a referral to be sent to [facility]. CM sent referral to facility and currently waiting for acceptance. Pt will require covid test at the time of d/c (rapid). Pt will require BLS medical transport. CM will continue to follow.     MADELEINE Norton, [address] [place] [address]

## 2021-06-30 NOTE — PROGRESS NOTES
End of Shift Note    Bedside shift change report given to Al Hunter RN (oncoming nurse) by Faye Segura RN (offgoing nurse). Report included the following information SBAR, Kardex, Intake/Output, MAR and Recent Results    Shift worked:  7p-7a     Shift summary and any significant changes:    Pt's PRECIOUS draining serous, 200ml output. Pt given prn oxycodone. Site CDI. IJ CHG wiped. Concerns for physician to address: None     Zone phone for oncoming shift:   [phone]       Activity:  Activity Level: Up with Assistance  Number times ambulated in hallways past shift: 0  Number of times OOB to chair past shift: 0    Cardiac:   Cardiac Monitoring: Yes      Cardiac Rhythm: Sinus Rhythm    Access:   Current line(s): central line     Genitourinary:   Urinary status: voiding and incontinent    Respiratory:   O2 Device: None (Room air)  Chronic home O2 use?: N/A  Incentive spirometer at bedside: YES     GI:  Last Bowel Movement Date: 06/29/21  Current diet:  ADULT DIET Clear Liquid  ADULT ORAL NUTRITION SUPPLEMENT Breakfast, Lunch, Dinner; Clear Liquid  TPN ADULT - CENTRAL  Passing flatus: YES  Tolerating current diet: YES       Pain Management:   Patient states pain is manageable on current regimen: YES    Skin:  Rufus Score: 19  Interventions: increase time out of bed    Patient Safety:  Fall Score:  Total Score: 4  Interventions: bed/chair alarm, assistive device (walker, cane, etc), gripper socks, pt to call before getting OOB and stay with me (per policy)  High Fall Risk: Yes    Length of Stay:  Expected LOS: 9d 16h  Actual LOS: 11      Faye Segura RN

## 2021-06-30 NOTE — PROGRESS NOTES
Problem: Self Care Deficits Care Plan (Adult)  Goal: *Acute Goals and Plan of Care (Insert Text)  Description:   FUNCTIONAL STATUS PRIOR TO ADMISSION: Patient was independent and active without use of DME. Patient was independent for basic and instrumental ADLs. Patient's  has assisted her in the past for LB dressing if needed. HOME SUPPORT: The patient lived with  but did not require assist.    Occupational Therapy Goals  Initiated 6/25/2021  1. Patient will perform grooming standing at sink with supervision/set-up within 7 day(s). 2.  Patient will perform upper body dressing with supervision/set-up within 7 day(s). 3.  Patient will perform lower body dressing using AE PRN with supervision/set-up within 7 day(s). 4.  Patient will perform toilet transfers with supervision/set-up within 7 day(s). 5.  Patient will perform all aspects of toileting with supervision/set-up within 7 day(s). Outcome: minimally Progressing Towards Goal due to L knee pain this session. OCCUPATIONAL THERAPY TREATMENT  Patient: Rollen Klinefelter (13 y.o. female)  Date: 6/30/2021  Diagnosis: GI bleed [K92.2]  Acute on chronic anemia [D64.9]  GIST (gastrointestinal stroma tumor), malignant, colon ([address] [C49. A4]  NSTEMI (non-ST elevated myocardial infarction) ([facility] [address] [I21.4]  Leukocytosis [D72.829]  Acute kidney injury superimposed on chronic kidney disease ([facility] [address] [N17.9, N18.9] <principal problem not specified>  Procedure(s) (LRB):  LAPAROSCOPIC PARTIAL GASTRECTOMY WITH CORE LIVER BIOPSY (N/A) 8 Days Post-Op  Precautions:    Chart, occupational therapy assessment, plan of care, and goals were reviewed. ASSESSMENT  Patient continues with skilled OT services and is minimally  progressing towards goals. Pt was agreeable to OT this date, but L knee pain (gout) limited her participation in adl mobility.    Pt reports that she has been participating in performing self care with staff's assist for set up both in seated positions and at bed level. Pt was able to stand with Grant using the RW for support in standing. She was not able to move her LLE and take a step forward for functional transfer. Pt reports that she had assistance at home PTA and is not sure of her discharge plan. Current Level of Function Impacting Discharge (ADLs): up to minimal assist for self care    Other factors to consider for discharge: lives with  who pt reports has a bad back. PLAN :  Patient continues to benefit from skilled intervention to address the above impairments. Continue treatment per established plan of care to address goals. Recommend with staff: encourage ambulation to bathroom as able      Recommendation for discharge: (in order for the patient to meet his/her long term goals)  Occupational therapy at least 2 days/week in the home AND ensure assist and/or supervision for safety with adls and mobility vs. SNF rehab    This discharge recommendation:  Has not yet been discussed the attending provider and/or case management    IF patient discharges home will need the following DME: tbd       SUBJECTIVE:   Patient stated I'd like potatoes and beef . .......     OBJECTIVE DATA SUMMARY:   Cognitive/Behavioral Status:  Neurologic State: Alert; Appropriate for age  Orientation Level: Appropriate for age  Cognition: Follows commands  Perception: Appears intact  Perseveration: No perseveration noted  Safety/Judgement: Awareness of environment; Fall prevention; Insight into deficits    Functional Mobility and Transfers for ADLs:  Bed Mobility:  Rolling: Assist x1;Additional time;Minimum assistance  Supine to Sit: Minimum assistance;Assist x1;Additional time  Scooting: Minimum assistance;Assist x1;Additional time    Transfers:  Sit to Stand: Contact guard assistance;Assist x1;Additional time  Functional Transfers  Bathroom Mobility:  (pt declined)  Toilet Transfer :  (declined transfer  to [facility] due to L kne pain.)  Bed to Chair: Contact guard assistance;Assist x1;Additional time    Balance:  Sitting: Intact  Sitting - Static: Good (unsupported)  Sitting - Dynamic: Not tested  Standing: Intact; With support  Standing - Static: Constant support;Good  Standing - Dynamic :  (pt declined)    ADL Intervention:  Feeding  Feeding Assistance: Independent (Pt is on clear liquid diet)  Drink to Mouth: Independent    Grooming  Washing Hands: Set-up (hand wipes in prep for liquid meal)    Upper Body Bathing  Bathing Assistance:  (pt reports performing at bed level and seated )                        Cognitive Retraining  Safety/Judgement: Awareness of environment; Fall prevention; Insight into deficits    Therapeutic Exercises:   Encouraged gentle UE stretching and deep breathing exercise while seated in chair    Pain:  L knee due to gout, but did not rate pain    Activity Tolerance:   Poor, requires rest breaks, and limited this session due to L knee pain    After treatment patient left in no apparent distress:   Sitting in chair, Call bell within reach, and nurse informed    COMMUNICATION/COLLABORATION:   The patients plan of care was discussed with: Registered nurse.      Luanne Vasques OTR/L  Time Calculation: 21 mins

## 2021-06-30 NOTE — PROGRESS NOTES
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 Presenting with hgb 4.7, s/p 3 units PRBC. Hgb stable  Status post laparoscopic partial gastrectomy with core liver biopsy  Pathology: GIST     S/p PCA pump. Cont' IV morphine prn, oxycodone prn  Cont' PRECIOUS  TPN per surgery, appreciate recs : NG out , tolerating current diet. Oncology and surgery following  PT/OT, up in chair, rehab at discharge    Leukocytosis, likely reactive  procalcitonin wnl.  UA neg/no urinary symptoms. Bcx NTD. CXR neg, CT a/p as above  Zosyn discontinued on   Wbc trending down, follow daily    HTN  Elevated troponin  Chronic systolic heart failure, EF 30-35% in 10/20, EF 35-40% on repeat Echo  Cont amlodipine, coreg, losartan, torsemide  Give additional dose of IV lasix x1  Patient is not on ASA due to GI bleed  Cardiology following, signed off on      GUANACO on CKD3, improved  Mild hyperkalemia, K5.2, resolved   Likely due to prerenal from blood loss  Hold nephrotoxic agents  Follows with Dr Juan Coffey    T2DM  BP low side    SSI  Hold NPH BID, restart as needed    L knee pain, likely gout flare  IV ketorolac x1, start prednisone 20mg x5 days  PT/OT     Obesity / Body mass index is 38.47 kg/m². Estimated discharge date: To be determined  Barriers clinical condition    Code status: Full  Prophylaxis: Hep SQ     Baseline: independent, lives with  at home   Recommended Disposition: Home w/Family     + NG  + R CVP      Subjective:     Chief Complaint / Reason for Physician Visit: GI bleeding , gastric mass   comfortable in bed   Has L knee pain today. No n/v. She is hungry! Tolerating CLD. Discussed with RN events overnight.      Review of Systems:  Symptom Y/N Comments  Symptom Y/N Comments   Fever/Chills    Chest Pain     Poor Appetite    Edema     Cough    Abdominal Pain     Sputum    Joint Pain y    SOB/ARENAS    Pruritis/Rash     Nausea/vomit    Tolerating PT/OT     Diarrhea    Tolerating Diet     Constipation    Other       Could NOT obtain due to:      Objective:     VITALS:   Last 24hrs VS reviewed since prior progress note. Most recent are:  Patient Vitals for the past 24 hrs:   Temp Pulse Resp BP SpO2   06/30/21 0736 98.6 °F (37 °C) 75 16 (!) 150/64 100 %   06/30/21 0350 97.3 °F (36.3 °C) 67 17 (!) 146/73 100 %   06/30/21 0028 98.6 °F (37 °C) 69 16 (!) 147/78 98 %   06/29/21 2038 99.1 °F (37.3 °C) 72 16 (!) 153/75 100 %   06/29/21 1528 98.9 °F (37.2 °C) 75 16 (!) 159/61 99 %   06/29/21 1234 98.7 °F (37.1 °C) 73 16 (!) 150/56 100 %       Intake/Output Summary (Last 24 hours) at 6/30/2021 0914  Last data filed at 6/30/2021 0710  Gross per 24 hour   Intake 300 ml   Output 410 ml   Net -110 ml        I had a face to face encounter and independently examined this patient on 6/30/2021, as outlined below:  PHYSICAL EXAM:  General: WD, WN. Alert, cooperative, no acute distress    EENT:  EOMI. Anicteric sclerae. MMM  Resp:  Diminished BS bases bilaterally, no wheezing or rales. No accessory muscle use  CV:  Regular  rhythm,  LE edema BL + 2   GI:  Soft, Non distended, + mild tender. Neurologic:  Alert and oriented X 3, normal speech  Psych:   Not anxious nor agitated  Skin:  No rashes.   No jaundice    Reviewed most current lab test results and cultures  YES  Reviewed most current radiology test results   YES  Review and summation of old records today    NO  Reviewed patient's current orders and MAR    YES  PMH/SH reviewed - no change compared to H&P  ________________________________________________________________________  Care Plan discussed with:    Comments   Patient x    Family      RN x    Care Manager     Consultant Multidiciplinary team rounds were held today with , nursing, pharmacist and clinical coordinator. Patient's plan of care was discussed; medications were reviewed and discharge planning was addressed. ________________________________________________________________________  Total NON critical care TIME:  35   Minutes    Total CRITICAL CARE TIME Spent:   Minutes non procedure based      Comments   >50% of visit spent in counseling and coordination of care y    ________________________________________________________________________  Promise Nguyen MD     Procedures: see electronic medical records for all procedures/Xrays and details which were not copied into this note but were reviewed prior to creation of Plan. LABS:  I reviewed today's most current labs and imaging studies.   Pertinent labs include:  Recent Labs     06/30/21  0454 06/29/21  0315 06/28/21  0358   WBC 12.3* 14.3* 15.3*   HGB 9.0* 9.2* 9.1*   HCT 31.6* 32.3* 32.1*    231 224     Recent Labs     06/30/21  0454 06/29/21  0315 06/28/21  0358    144 146*   K 3.9 4.0 3.3*   * 114* 114*   CO2 25 25 26   * 83 102*   BUN 32* 31* 33*   CREA 0.85 0.81 0.84   CA 7.8* 7.9* 8.3*   MG 1.8  --   --    PHOS 3.7  --   --    ALB 1.4*  --   --    TBILI 0.5  --   --    ALT 35  --   --        Signed: Promise Nguyen MD

## 2021-06-30 NOTE — PROGRESS NOTES
Attending provider:  Lee Rodriguez MD   PCP:  Yarely Cutler MD    Subjective: Tolerating FLD without n/v.    Objective:    Blood pressure (!) 128/56, pulse 66, temperature 98.7 °F (37.1 °C), resp. rate 16, height 5' 2\" (1.575 m), weight 95.4 kg (210 lb 5.1 oz), SpO2 99 %, not currently breastfeeding. Drain -serosanguineous  Exam - A&O, NAD.  CTAB, RRR. Abdomen soft, ND, NABS, appropriately TTP, CDI. No edema. Assessment:  Procedure(s):  LAPAROSCOPIC PARTIAL GASTRECTOMY WITH CORE LIVER BIOPSY 6/22/2021    Active Hospital Problems    Diagnosis Date Noted    Leukocytosis 06/20/2021    NSTEMI (non-ST elevated myocardial infarction) ([address] 06/20/2021    GIST (gastrointestinal stroma tumor), malignant, colon ([address] 06/20/2021    Acute kidney injury superimposed on chronic kidney disease ([address] 06/20/2021    Acute on chronic anemia 06/20/2021    GI bleed 10/04/2020     Plan: Will check UGI tomorrow AM to see how tight the GE jxn is in terms of advancing her diet. Continue PRECIOUS. Ambulate. May need rehab.

## 2021-06-30 NOTE — PROGRESS NOTES
Problem: Falls - Risk of  Goal: *Absence of Falls  Description: Document Marshall Segovia Fall Risk and appropriate interventions in the flowsheet. Outcome: Progressing Towards Goal  Note: Fall Risk Interventions:  Mobility Interventions: Communicate number of staff needed for ambulation/transfer         Medication Interventions: Patient to call before getting OOB    Elimination Interventions: Call light in reach, Patient to call for help with toileting needs    History of Falls Interventions: Bed/chair exit alarm, Room close to nurse's station         Problem: Patient Education: Go to Patient Education Activity  Goal: Patient/Family Education  Outcome: Progressing Towards Goal     Problem: Pressure Injury - Risk of  Goal: *Prevention of pressure injury  Description: Document Rufus Scale and appropriate interventions in the flowsheet.   Outcome: Progressing Towards Goal  Note: Pressure Injury Interventions:  Sensory Interventions: Assess changes in LOC    Moisture Interventions: Absorbent underpads    Activity Interventions: Assess need for specialty bed    Mobility Interventions: Assess need for specialty bed    Nutrition Interventions: Document food/fluid/supplement intake    Friction and Shear Interventions: Apply protective barrier, creams and emollients                Problem: Patient Education: Go to Patient Education Activity  Goal: Patient/Family Education  Outcome: Progressing Towards Goal     Problem: Infection - Risk of, Central Venous Catheter-Associated Bloodstream Infection  Goal: *Absence of infection signs and symptoms  Outcome: Progressing Towards Goal

## 2021-07-01 ENCOUNTER — HOSPITAL ENCOUNTER (INPATIENT)
Dept: GENERAL RADIOLOGY | Age: 76
Discharge: HOME | DRG: 326 | End: 2021-07-01
Attending: SURGERY
Payer: MEDICARE

## 2021-07-01 VITALS
WEIGHT: 194.8 LBS | TEMPERATURE: 97.4 F | HEIGHT: 62 IN | DIASTOLIC BLOOD PRESSURE: 48 MMHG | HEART RATE: 62 BPM | BODY MASS INDEX: 35.85 KG/M2 | OXYGEN SATURATION: 100 % | SYSTOLIC BLOOD PRESSURE: 126 MMHG | RESPIRATION RATE: 17 BRPM

## 2021-07-01 LAB
ANION GAP SERPL CALC-SCNC: 7 MMOL/L (ref 5–15)
BASOPHILS # BLD: 0 K/UL (ref 0–0.1)
BASOPHILS NFR BLD: 0 % (ref 0–1)
BUN SERPL-MCNC: 41 MG/DL (ref 6–20)
BUN/CREAT SERPL: 41 (ref 12–20)
CALCIUM SERPL-MCNC: 7.7 MG/DL (ref 8.5–10.1)
CHLORIDE SERPL-SCNC: 109 MMOL/L (ref 97–108)
CO2 SERPL-SCNC: 24 MMOL/L (ref 21–32)
CREAT SERPL-MCNC: 1.01 MG/DL (ref 0.55–1.02)
DIFFERENTIAL METHOD BLD: ABNORMAL
EOSINOPHIL # BLD: 0 K/UL (ref 0–0.4)
EOSINOPHIL NFR BLD: 0 % (ref 0–7)
ERYTHROCYTE [DISTWIDTH] IN BLOOD BY AUTOMATED COUNT: 28.1 % (ref 11.5–14.5)
GLUCOSE BLD STRIP.AUTO-MCNC: 118 MG/DL (ref 65–117)
GLUCOSE BLD STRIP.AUTO-MCNC: 152 MG/DL (ref 65–117)
GLUCOSE BLD STRIP.AUTO-MCNC: 156 MG/DL (ref 65–117)
GLUCOSE BLD STRIP.AUTO-MCNC: 224 MG/DL (ref 65–117)
GLUCOSE SERPL-MCNC: 150 MG/DL (ref 65–100)
HCT VFR BLD AUTO: 30.7 % (ref 35–47)
HGB BLD-MCNC: 8.9 G/DL (ref 11.5–16)
IMM GRANULOCYTES # BLD AUTO: 0 K/UL (ref 0–0.04)
IMM GRANULOCYTES NFR BLD AUTO: 0 % (ref 0–0.5)
LYMPHOCYTES # BLD: 1 K/UL (ref 0.8–3.5)
LYMPHOCYTES NFR BLD: 12 % (ref 12–49)
MCH RBC QN AUTO: 24.3 PG (ref 26–34)
MCHC RBC AUTO-ENTMCNC: 29 G/DL (ref 30–36.5)
MCV RBC AUTO: 83.9 FL (ref 80–99)
MONOCYTES # BLD: 0.5 K/UL (ref 0–1)
MONOCYTES NFR BLD: 6 % (ref 5–13)
NEUTS SEG # BLD: 6.6 K/UL (ref 1.8–8)
NEUTS SEG NFR BLD: 82 % (ref 32–75)
NRBC # BLD: 0 K/UL (ref 0–0.01)
NRBC BLD-RTO: 0 PER 100 WBC
PLATELET # BLD AUTO: 224 K/UL (ref 150–400)
PMV BLD AUTO: 10.1 FL (ref 8.9–12.9)
POTASSIUM SERPL-SCNC: 4.2 MMOL/L (ref 3.5–5.1)
RBC # BLD AUTO: 3.66 M/UL (ref 3.8–5.2)
RBC MORPH BLD: ABNORMAL
RBC MORPH BLD: ABNORMAL
SERVICE CMNT-IMP: ABNORMAL
SODIUM SERPL-SCNC: 140 MMOL/L (ref 136–145)
WBC # BLD AUTO: 8.1 K/UL (ref 3.6–11)

## 2021-07-01 PROCEDURE — 74240 X-RAY XM UPR GI TRC 1CNTRST: CPT

## 2021-07-01 PROCEDURE — 74011250637 HC RX REV CODE- 250/637: Performed by: SURGERY

## 2021-07-01 PROCEDURE — 85025 COMPLETE CBC W/AUTO DIFF WBC: CPT

## 2021-07-01 PROCEDURE — 74011250637 HC RX REV CODE- 250/637: Performed by: NURSE PRACTITIONER

## 2021-07-01 PROCEDURE — 74011636637 HC RX REV CODE- 636/637: Performed by: INTERNAL MEDICINE

## 2021-07-01 PROCEDURE — 36415 COLL VENOUS BLD VENIPUNCTURE: CPT

## 2021-07-01 PROCEDURE — 80048 BASIC METABOLIC PNL TOTAL CA: CPT

## 2021-07-01 PROCEDURE — 74011000636 HC RX REV CODE- 636: Performed by: SURGERY

## 2021-07-01 PROCEDURE — 82962 GLUCOSE BLOOD TEST: CPT

## 2021-07-01 RX ORDER — ACETAMINOPHEN 325 MG/1
650 TABLET ORAL
Status: DISCONTINUED | OUTPATIENT
Start: 2021-07-01 | End: 2021-07-01 | Stop reason: HOSPADM

## 2021-07-01 RX ORDER — PREDNISONE 20 MG/1
20 TABLET ORAL
Qty: 4 TABLET | Refills: 0 | Status: SHIPPED
Start: 2021-07-01 | End: 2021-07-05

## 2021-07-01 RX ADMIN — LOSARTAN POTASSIUM 25 MG: 25 TABLET, FILM COATED ORAL at 11:03

## 2021-07-01 RX ADMIN — CARVEDILOL 12.5 MG: 12.5 TABLET, FILM COATED ORAL at 11:03

## 2021-07-01 RX ADMIN — ACETAMINOPHEN 650 MG: 325 TABLET ORAL at 05:01

## 2021-07-01 RX ADMIN — TORSEMIDE 40 MG: 20 TABLET ORAL at 11:03

## 2021-07-01 RX ADMIN — Medication 1 AMPULE: at 11:04

## 2021-07-01 RX ADMIN — INSULIN LISPRO 3 UNITS: 100 INJECTION, SOLUTION INTRAVENOUS; SUBCUTANEOUS at 00:42

## 2021-07-01 RX ADMIN — SODIUM CHLORIDE 10 ML: 9 INJECTION, SOLUTION INTRAMUSCULAR; INTRAVENOUS; SUBCUTANEOUS at 06:35

## 2021-07-01 RX ADMIN — AMLODIPINE BESYLATE 10 MG: 5 TABLET ORAL at 11:03

## 2021-07-01 RX ADMIN — PREDNISONE 20 MG: 20 TABLET ORAL at 11:03

## 2021-07-01 RX ADMIN — IOHEXOL 200 ML: 240 INJECTION, SOLUTION INTRATHECAL; INTRAVASCULAR; INTRAVENOUS; ORAL at 09:00

## 2021-07-01 RX ADMIN — DULOXETINE 60 MG: 30 CAPSULE, DELAYED RELEASE ORAL at 11:03

## 2021-07-01 RX ADMIN — LANSOPRAZOLE 30 MG: KIT at 06:34

## 2021-07-01 NOTE — PROGRESS NOTES
Bedside shift change report given to [facility], RN (oncoming nurse) by Tara Martínez RN (offgoing nurse). Report included the following information SBAR, Kardex, Intake/Output, MAR and Recent Results.

## 2021-07-01 NOTE — PROGRESS NOTES
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 Presenting with hgb 4.7, s/p 3 units PRBC. Hgb stable  Status post laparoscopic partial gastrectomy with core liver biopsy  Pathology: GIST     S/p PCA pump. Cont' IV morphine prn, oxycodone prn  Cont' PRECIOUS  TPN per surgery, appreciate recs : NG out , tolerating current diet. UGI pending, diet per surgery  Oncology and surgery input is appreciated  PT/OT, up in chair, rehab at discharge    Leukocytosis, likely reactive  procalcitonin wnl.  UA neg/no urinary symptoms. Bcx NTD. CXR neg, CT a/p as above  Zosyn discontinued on   Wbc trending down, follow daily    HTN  Elevated troponin  Chronic systolic heart failure, EF 30-35% in 10/20, EF 35-40% on repeat Echo  Cont amlodipine, coreg, losartan, torsemide  Will discontinue nitrobid  Patient is not on ASA due to GI bleed  Cardiology following, signed off on      GUANACO on CKD3, improved  Mild hyperkalemia, K5.2, resolved   Likely due to prerenal from blood loss  Hold nephrotoxic agents  Follows with Dr Amber Alvarez    T2DM  BP low side    SSI  Hold NPH BID, restart as needed    L knee pain, likely gout flare  IV ketorolac x1, start prednisone 20mg x5 days  PT/OT     Obesity / Body mass index is 35.63 kg/m². Estimated discharge date: To be determined  Barriers clinical condition    Code status: Full  Prophylaxis: Hep SQ     Baseline: independent, lives with  at home   Recommended Disposition: Home w/Family     + NG  + R CVP      Subjective:     Chief Complaint / Reason for Physician Visit: GI bleeding , gastric mass   comfortable in bed   No new complaint. Discussed with RN events overnight.      Review of Systems:  Symptom Y/N Comments  Symptom Y/N Comments   Fever/Chills    Chest Pain     Poor Appetite    Edema     Cough    Abdominal Pain     Sputum    Joint Pain     SOB/ARENAS    Pruritis/Rash     Nausea/vomit    Tolerating PT/OT     Diarrhea    Tolerating Diet     Constipation    Other       Could NOT obtain due to:      Objective:     VITALS:   Last 24hrs VS reviewed since prior progress note. Most recent are:  Patient Vitals for the past 24 hrs:   Temp Pulse Resp BP SpO2   07/01/21 0951 97.4 °F (36.3 °C) 64 17 (!) 137/57 100 %   07/01/21 0600 -- (!) 55 -- (!) 129/54 --   07/01/21 0319 97.7 °F (36.5 °C) 60 17 125/66 99 %   06/30/21 2358 97.3 °F (36.3 °C) (!) 58 17 129/61 97 %   06/30/21 2220 97.5 °F (36.4 °C) -- -- -- --   06/30/21 2021 (!) 95.5 °F (35.3 °C) 60 16 111/68 97 %   06/30/21 1540 98.7 °F (37.1 °C) 66 16 (!) 128/56 99 %   06/30/21 1142 97.7 °F (36.5 °C) 72 17 (!) 138/59 100 %       Intake/Output Summary (Last 24 hours) at 7/1/2021 0959  Last data filed at 7/1/2021 0640  Gross per 24 hour   Intake 390 ml   Output 1265 ml   Net -875 ml        I had a face to face encounter and independently examined this patient on 7/1/2021, as outlined below:  PHYSICAL EXAM:  General: WD, WN. Alert, cooperative, no acute distress    EENT:  EOMI. Anicteric sclerae. MMM  Resp:  Diminished BS bases bilaterally, no wheezing or rales. No accessory muscle use  CV:  Regular  rhythm,  LE edema BL + 2   GI:  Soft, Non distended, + mild tender. Neurologic:  Alert and oriented X 3, normal speech  Psych:   Not anxious nor agitated  Skin:  No rashes.   No jaundice    Reviewed most current lab test results and cultures  YES  Reviewed most current radiology test results   YES  Review and summation of old records today    NO  Reviewed patient's current orders and MAR    YES  PMH/SH reviewed - no change compared to H&P  ________________________________________________________________________  Care Plan discussed with:    Comments   Patient x    Family RN x    Care Manager     Consultant                        Multidiciplinary team rounds were held today with , nursing, pharmacist and clinical coordinator. Patient's plan of care was discussed; medications were reviewed and discharge planning was addressed. ________________________________________________________________________  Total NON critical care TIME:  35   Minutes    Total CRITICAL CARE TIME Spent:   Minutes non procedure based      Comments   >50% of visit spent in counseling and coordination of care y    ________________________________________________________________________  Sahara Sparks MD     Procedures: see electronic medical records for all procedures/Xrays and details which were not copied into this note but were reviewed prior to creation of Plan. LABS:  I reviewed today's most current labs and imaging studies.   Pertinent labs include:  Recent Labs     07/01/21  0357 06/30/21  0454 06/29/21  0315   WBC 8.1 12.3* 14.3*   HGB 8.9* 9.0* 9.2*   HCT 30.7* 31.6* 32.3*    239 231     Recent Labs     07/01/21  0357 06/30/21  0454 06/29/21  0315    142 144   K 4.2 3.9 4.0   * 112* 114*   CO2 24 25 25   * 102* 83   BUN 41* 32* 31*   CREA 1.01 0.85 0.81   CA 7.7* 7.8* 7.9*   MG  --  1.8  --    PHOS  --  3.7  --    ALB  --  1.4*  --    TBILI  --  0.5  --    ALT  --  35  --        Signed: Sahara Sparks MD

## 2021-07-01 NOTE — PROGRESS NOTES
End of Shift Note    Bedside shift change report given to Zoila Cochran RN (oncoming nurse) by Vaibhav Og RN (offgoing nurse). Report included the following information SBAR, Kardex, Intake/Output, MAR and Recent Results    Shift worked:  7p-7a     Shift summary and any significant changes:    Pt stayed in chair until close to 2300. Minimal complaints of pain, tylenol given. Up to void using bedside commode. PRECIOUS draining serous and dressing changed. 0000, 0600 nitro-bid held due to bradycardia, informed NP. Concerns for physician to address:       Zone phone for oncoming shift:   [phone]       Activity:  Activity Level: Up with Assistance  Number times ambulated in hallways past shift: 0  Number of times OOB to chair past shift: 1    Cardiac:   Cardiac Monitoring: Yes      Cardiac Rhythm: Sinus Rhythm    Access:   Current line(s): PIV     Genitourinary:   Urinary status: voiding and incontinent    Respiratory:   O2 Device: None (Room air)  Chronic home O2 use?: N/A  Incentive spirometer at bedside: YES     GI:  Last Bowel Movement Date: 06/29/21  Current diet:  ADULT DIET Clear Liquid  ADULT ORAL NUTRITION SUPPLEMENT Breakfast, Lunch, Dinner; Clear Liquid  Passing flatus: YES  Tolerating current diet: YES       Pain Management:   Patient states pain is manageable on current regimen: YES    Skin:  Rufus Score: 19  Interventions: float heels and increase time out of bed    Patient Safety:  Fall Score:  Total Score: 5  Interventions: bed/chair alarm, assistive device (walker, cane, etc), gripper socks, pt to call before getting OOB and stay with me (per policy)  High Fall Risk: Yes    Length of Stay:  Expected LOS: 9d 16h  Actual LOS: 12      Vaibhav Og RN

## 2021-07-01 NOTE — PROGRESS NOTES
Feels well. Tolerating full liquid diet.    AVSS. Abdomen soft and nontender. Incisions clean dry and intact. PRECIOUS drain is serous. Upper GI looks good. No leak. The stomach distal to the GE junction is fairly tight but patent. PRECIOUS drain removed. She can be discharged on a puréed diet. Patient has received both of her Covid vaccines. We will see her in the office after she is discharged from rehab. Thanks.

## 2021-07-01 NOTE — PROGRESS NOTES
Occupational Therapy  Pt has declined PT this a.m. Will defer as well and continue to follow as appropriate.

## 2021-07-01 NOTE — PROGRESS NOTES
0700- Report given to Lance Tran RN by off going nurse. 0730- Pt taken to radiology via stretcher. 2734- Pt back in room. VSS.     1200- Report called to receiving RN @ hint. 1205- Central line removed. Hemostasis obtained. Dressing applied. 1540- Pt taken via stretcher for discharge.

## 2021-07-01 NOTE — PROGRESS NOTES
Problem: Falls - Risk of  Goal: *Absence of Falls  Description: Document Anuj Dial Fall Risk and appropriate interventions in the flowsheet. Outcome: Progressing Towards Goal  Note: Fall Risk Interventions:  Mobility Interventions: Communicate number of staff needed for ambulation/transfer         Medication Interventions: Teach patient to arise slowly, Patient to call before getting OOB    Elimination Interventions: Call light in reach, Patient to call for help with toileting needs    History of Falls Interventions: Bed/chair exit alarm         Problem: Patient Education: Go to Patient Education Activity  Goal: Patient/Family Education  Outcome: Progressing Towards Goal     Problem: Pressure Injury - Risk of  Goal: *Prevention of pressure injury  Description: Document Rufus Scale and appropriate interventions in the flowsheet.   Outcome: Progressing Towards Goal  Note: Pressure Injury Interventions:  Sensory Interventions: Assess changes in LOC    Moisture Interventions: Absorbent underpads    Activity Interventions: Assess need for specialty bed    Mobility Interventions: Assess need for specialty bed    Nutrition Interventions: Document food/fluid/supplement intake    Friction and Shear Interventions: Apply protective barrier, creams and emollients                Problem: Patient Education: Go to Patient Education Activity  Goal: Patient/Family Education  Outcome: Progressing Towards Goal     Problem: Patient Education: Go to Patient Education Activity  Goal: Patient/Family Education  Outcome: Progressing Towards Goal     Problem: Patient Education: Go to Patient Education Activity  Goal: Patient/Family Education  Outcome: Not Progressing Towards Goal

## 2021-07-01 NOTE — PROGRESS NOTES
Transition of Care Plan:    RUR: 33%  Disposition: SNF Placement    Follow up appointments: Follow up with PCP   DME needed: TBD by therapist   Transportation at Discharge: BLS transport   [address] or means to access home:      N/A  IM Medicare letter: 2nd IM Medicare Letter to be given   Caregiver Contact: Vijay Jones (spouse) [phone]  Discharge Caregiver contacted prior to discharge? Family to be contacted     CM made aware that pt was accepted to [facility]. CM will inform pt's spouse and physician of the following. Pt will require 2nd IM Medicare Letter and [facility]-medical transport. CM will verify with physician when pt will be ready for d/c. CM will enter delay. CM will continue to follow.     MADELEINE Gustafson, [address] [place] [address]

## 2021-07-01 NOTE — PROGRESS NOTES
Pharmacist Discharge Medication Reconciliation    Significant PMH:   Past Medical History:   Diagnosis Date    Anemia, unspecified 10/6/2020    Arrhythmia     Arthritis     CAD (coronary artery disease)     Cancer ([facility]     Chronic bronchitis ([facility]     per pt:  as of 1/9/15 pt denies any ARENAS or SOB    Diabetes ([facility] Dx approx 2009    Dr Margie Morfin (in [facility])    GERD (gastroesophageal reflux disease)     Hypercholesteremia     Hypertension      Encounter Diagnoses   Name Primary? GIST (gastrointestinal stroma tumor), malignant, colon ([facility] Yes    Gastrointestinal hemorrhage, unspecified gastrointestinal hemorrhage type     NSTEMI (non-ST elevated myocardial infarction) ([facility]     Type 2 diabetes mellitus with diabetic polyneuropathy, with long-term current use of insulin ([facility])      Allergies: Metformin    Discharge Medications:   Current Discharge Medication List        START taking these medications    Details   predniSONE (DELTASONE) 20 mg tablet Take 20 mg by mouth daily (with breakfast) for 4 days. Qty: 4 Tablet, Refills: 0  Start date: 7/1/2021, End date: 7/5/2021           CONTINUE these medications which have NOT CHANGED    Details   multivitamin, tx-iron-ca-min (THERA-M w/ IRON) 9 mg iron-400 mcg tab tablet Take 1 Tab by mouth two (2) times a day. Centrum silver      glipiZIDE (GLUCOTROL) 5 mg tablet Take 1 Tab by mouth two (2) times a day. Qty: 60 Tab, Refills: 5      lancets misc PUSH BUTTON LANCETS. Check blood sugar 4 times per day due to hypoglycemic events. Dx:E11.42, E16.2  Qty: 400 Each, Refills: 3      amLODIPine (NORVASC) 10 mg tablet Take 1 Tab by mouth daily. Qty: 30 Tab, Refills: 0      food supplemt, lactose-reduced (Ensure Enlive) 0.08 gram-1.5 kcal/mL liqd Take 6 x daily as recommended by Surgery to improve nutritional status for Possible surgery in future  Qty: 180 Bottle, Refills: 1      DULoxetine (CYMBALTA) 60 mg capsule Take 1 Cap by mouth daily.   Qty: 60 Cap, Refills: 0 losartan (COZAAR) 100 mg tablet Take 1 Tab by mouth daily. Qty: 60 Tab, Refills: 0      atorvastatin (LIPITOR) 40 mg tablet Take 1 Tab by mouth nightly. Qty: 90 Tab, Refills: 3      pantoprazole (PROTONIX) 40 mg tablet Take 40 mg by mouth daily. carvediloL (COREG) 12.5 mg tablet Take 1 Tab by mouth two (2) times daily (with meals). Qty: 60 Tab, Refills: 0      torsemide (DEMADEX) 20 mg tablet Take 40 mg by mouth daily. The patient's chart, MAR and AVS were reviewed by Dany Jernigan RPH.     Discharging Provider: Lady Liborio WATERS    Thank you,     NALINI Wilson [facility]

## 2021-07-01 NOTE — PROGRESS NOTES
Physical Therapy    Attempted PT treatment, however pt declined all activity due to c/o general fatigue, was off floor for xray earlier. Provided education on role of PT, benefit of mobilization. Pt continued to decline treatment at this time. Will con't to follow.     Sheridan Wheeler, PT, MPT

## 2021-07-01 NOTE — DISCHARGE SUMMARY
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 Melissa Thapa presenting with ground-level fall.  According to patient and EMS, patient had a ground-level fall around midnight yesterday. Ana Button been feeling very fatigued recently as well as tired because she did not get any sleep yesterday. Carlos Manuel Alfaro her  wanted to bring her to the hospital but patient had refused. Marley Ballesteros today she continued to feel weak and was convinced to come to the ER.  Patient states she is just feeling tired and feels like she might be dehydrated.  Has chronic abdominal pain secondary to this mass but no new pain.  Denies any pain in her extremities, head trauma or loss of consciousness.  Has a history of cardiac history but no pain, shortness of breath or headache.     There are no other complaints, changes, or physical findings at this time.      We were asked to admit for work up and evaluation of the above problems.      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 Presenting with hgb 4.7, s/p 3 units PRBC. Hgb stable  Status post laparoscopic partial gastrectomy with core liver biopsy  Pathology: GIST.   Pt was on TPN post surgery. She tolerated CLD as recommended by surgery. UGI series with no evidence of leak. Trygve Santiago has been removed. Diet advanced to pureed. Pt is medically stable for discharge. She will f/u with surgery in 1-2 weeks. Cont' pureed diet as recommended at discharge. F/u with oncology outpt. Leukocytosis, likely reactive  procalcitonin wnl. WBC wnl today. UA neg/no urinary symptoms. Bcx NTD. CXR neg, CT a/p as above  Zosyn discontinued on 6/22.       HTN  Elevated troponin  Chronic systolic heart failure, EF 30-35% in 10/20, EF 35-40% on repeat Echo  Cont amlodipine, coreg, losartan, torsemide  Patient is not on ASA due to GI bleed  Cardiology following, signed off on 6/23      GUANACO on CKD3, improved  Mild hyperkalemia, K5.2, resolved   Likely due to prerenal from blood loss, cr stable. Follows with Dr Valdez Martinez     T2DM  BP low side    SSI  Hold NPH BID, restart as needed     L knee pain, likely gout flare  Continue prednisone as prescribed.      Obesity / Body mass index is 35.63 kg/m².        Discharge Exam:  Patient seen and examined by me on discharge day. Pertinent Findings:  Visit Vitals  BP (!) 137/57 (BP 1 Location: Left upper arm, BP Patient Position: At rest)   Pulse 64   Temp 97.4 °F (36.3 °C)   Resp 17   Ht 5' 2\" (1.575 m)   Wt 88.4 kg (194 lb 12.8 oz)   SpO2 100%   Breastfeeding No   BMI 35.63 kg/m²     Gen:    Not in distress  Chest: Clear lungs  CVS:   Regular rhythm. No edema  Abd:  Soft, not distended, not tender    Discharge/Recent Laboratory Results:  Recent Labs     07/01/21 0357 06/30/21  0454 06/30/21  0454      < > 142   K 4.2   < > 3.9   *   < > 112*   CO2 24   < > 25   BUN 41*   < > 32*   *   < > 102*   CA 7.7*   < > 7.8*   PHOS  --   --  3.7   MG  --   --  1.8    < > = values in this interval not displayed.      Recent Labs     07/01/21 0357   HGB 8.9*   HCT 30.7*   WBC 8.1          Discharge Medications:  Current Discharge Medication List      START taking these medications    Details   predniSONE (DELTASONE) 20 mg tablet Take 20 mg by mouth daily (with breakfast) for 4 days. Qty: 4 Tablet, Refills: 0  Start date: 7/1/2021, End date: 7/5/2021         CONTINUE these medications which have NOT CHANGED    Details   multivitamin, tx-iron-ca-min (THERA-M w/ IRON) 9 mg iron-400 mcg tab tablet Take 1 Tab by mouth two (2) times a day. Centrum silver      glipiZIDE (GLUCOTROL) 5 mg tablet Take 1 Tab by mouth two (2) times a day. Qty: 60 Tab, Refills: 5      lancets misc PUSH BUTTON LANCETS. Check blood sugar 4 times per day due to hypoglycemic events. Dx:E11.42, E16.2  Qty: 400 Each, Refills: 3      amLODIPine (NORVASC) 10 mg tablet Take 1 Tab by mouth daily. Qty: 30 Tab, Refills: 0      food supplemt, lactose-reduced (Ensure Enlive) 0.08 gram-1.5 kcal/mL liqd Take 6 x daily as recommended by Surgery to improve nutritional status for Possible surgery in future  Qty: 180 Bottle, Refills: 1      DULoxetine (CYMBALTA) 60 mg capsule Take 1 Cap by mouth daily. Qty: 60 Cap, Refills: 0      losartan (COZAAR) 100 mg tablet Take 1 Tab by mouth daily. Qty: 60 Tab, Refills: 0      atorvastatin (LIPITOR) 40 mg tablet Take 1 Tab by mouth nightly. Qty: 90 Tab, Refills: 3      pantoprazole (PROTONIX) 40 mg tablet       carvediloL (COREG) 12.5 mg tablet Take 1 Tab by mouth two (2) times daily (with meals). Qty: 60 Tab, Refills: 0      torsemide (DEMADEX) 20 mg tablet Take 40 mg by mouth daily. Patient Follow Up Instructions:    Activity: Activity as tolerated  Diet: pureed  Wound Care: None needed  Code: Full     DISPOSITION:    Home with Family:    Home with HH/PT/OT/RN: x   SNF/LTC:    ROSE:    OTHER:          Follow up with:   PCP : Asim Simental MD  Follow-up Information     Follow up With Specialties Details Why Gregor TORRES [address] [place] [address]    Waldo Krishna MD General Surgery, Breast Surgery, Colon and Rectal Surgery, Endocrinology In 2 weeks  [address] [facility] [address]  [facility]  [phone]      Annemarie Arias MD Internal Medicine   [facility] [address]  [facility] [address]  [phone]      Jorge L Elkins MD Hematology and Oncology, Internal Medicine, Hematology, Oncology In 2 weeks  [address]  [facility] [address] [facility] [phone]              Total time in minutes spent coordinating this discharge (includes going over instructions, follow-up, prescriptions, and preparing report for sign off to her PCP) :   35 minutes

## 2021-07-01 NOTE — PROGRESS NOTES
Transition of Care Plan to SNF/Rehab    Communication to Patient/Family:  Met with patient and family and they are agreeable to the transition plan. The Plan for Transition of Care is related to the following treatment goals:     CM aware that pt will be d/c on today and will transition to snf: [facility]. CM spoke with pt's spouse: Dino Foley, via telephone to inform him of pt's d/c and transition to snf. Dino Foley is agreeable to the following and made aware that transport was arranged today at [facility].      The Patient and/or patient representative was provided with a choice of provider and agrees  with the discharge plan. Yes [x] No []    A Freedom of choice list was provided with basic dialogue that supports the patient's individualized plan of care/goals and shares the quality data associated with the providers. Yes [x] No []    SNF/Rehab Transition:  Patient has been accepted to [facility] SNF/Rehab and meets criteria for admission. Patient will transported by [facility] and expected to leave at [facility].    Communication to SNF/Rehab:  Bedside RN, Verónica Velasquez , has been notified to update the transition plan to the facility and call report ([phone]). Discharge information has been updated on the AVS. And communicated to facility via [employer], or CC link. Discharge instructions to be fax'd to facility at [facility] #). [] BCPI-A  Patient has been identified as part of the  BCPI-A Program.  For Care Coordination associated with that Bundle Program, please contact   Bundle information has been communication to     Nursing Please include all hard scripts for controlled substances, med rec and dc summary, and AVS in packet.      Reviewed and confirmed with facility, [facility], can manage the patient care needs for the following:     Kailyn Dia with (X) only those applicable:  Medication:  [x]Medications are available at the facility  []IV Antibiotics    []Controlled Substance - hard copies available sent. []Weekly Labs    Equipment:  []CPAP/BiPAP  []Wound Vacuum  []Ivey or Urinary Device  []PICC/Central Line  []Nebulizer  []Ventilator    Treatment:  []Isolation (for MRSA, VRE, etc.)  []Surgical Drain Management  []Tracheostomy Care  []Dressing Changes  []Dialysis with transportation  []PEG Care  []Oxygen  []Daily Weights for Heart Failure    Dietary:  []Any diet limitations  []Tube Feedings   []Total Parenteral Management (TPN)    Financial Resources:  []Medicaid Application Completed    []UAI Completed and copy given to pt/family  and copy given to pt/family  []A screening has previously been completed. []Level II Completed    [] Private pay individual who will not become   financially eligible for Medicaid within 6 months from admission to a [address] [place] [address]. [] Individual refused to have screening conducted. []Medicaid Application Completed    []The screening denied because it was determined individual did not need/did not qualify for nursing facility level of care. [] Out of state residents seeking direct admission to a [address] facility. [] Individuals who are inpatients of an out of state hospital, or in state or out of state veterans/ hospital and seek direct admission to a [address] facility  [] Individuals who are pateints or residents of a state owned/operated facility that is licensed by [employer] and seek direct admission to [address] [place] [address]  [] A screening not required for enrollment in [address] [facility] as set out in [address] [place] [address] 90-  [] [facility]) staff shall perform screenings of the [facility] [place] clients. Advanced Care Plan:  []Surrogate Decision Maker of Care  []POA  []Communicated Code Status and copy sent.     Other:

## 2021-07-02 ENCOUNTER — PATIENT OUTREACH (OUTPATIENT)
Dept: CASE MANAGEMENT | Age: 76
End: 2021-07-02

## 2021-07-06 NOTE — OP NOTES
[address]  OPERATIVE REPORT    Name:  Jason Villalobos  MR#:  [id number]  :  1945  ACCOUNT #:  [de-identified]  DATE OF SERVICE:  2021    PREOPERATIVE DIAGNOSES:  Gastric cardia gastrointestinal stromal tumor, upper gastrointestinal bleeding. 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 PROCEDURES PERFORMED:  Laparoscopic hand-assisted partial gastrectomy with core needle liver biopsy. SURGEON:  Marcelo Mccullough MD    ASSISTANT:  Staff. ANESTHESIA:  General.    COMPLICATIONS:  None immediate. SPECIMENS REMOVED:  1. Frozen section of core needle biopsy of the liver lesion. 2.  Gastric cardia and mass. IMPLANTS:  None. ESTIMATED BLOOD LOSS:  Less than 50 mL. DRAINS:  Glynn-Cheema drain. FINDINGS:  There were many apparent liver metastases. A frozen section of one of these demonstrated spindle cells. There was a large palpable gastric cardia mass. It was extremely close to the GE junction. INDICATIONS:  The patient is a 72-year-old female who has had a known gastric GIST for several months. She has been lost to follow up, but has returned to the hospital again with evidence of gastrointestinal bleeding. The above procedure was done during this hospitalization in order to not lose her to follow up again. DESCRIPTION OF PROCEDURE:  After obtaining informed consent, the patient was taken to the operating room, placed supine on the operating table. An operative time-out was performed and general endotracheal anesthesia was induced. Preoperative antibiotics were administered and the abdomen was prepped and draped in the usual sterile fashion. The abdomen was then entered above the umbilicus via a 5-cm vertical midline incision. A GelPort was placed. The abdomen was insufflated without incident, was visually explored. The above findings were noted.   Under direct vision, a 5-mm port was placed in the right upper quadrant and a Marie liver retractor was put in place and this was used to elevate the left lobe of the liver. Two additional 5-mm ports were placed and a 12-mm port was placed on the patient's left. These were all placed under direct vision. One of the liver lesions was biopsied with a core needle via a stab incision. This was sent off for frozen section and as the rest of the operation proceeded, we did get information back that did contain spindle cells likely consistent with metastatic GIST. The dissection on the stomach was then undertaken. An orogastric gastric sleeve tube was inserted because of the close proximity of the mass to the GE junction. The lesser sac was entered and dissection was carried up taking the short gastrics using the LigaSure device. I was then able to palpate the mass directly and feel the tube as well. There did appear to be a margin between the two, although it was closed. We then used the Ethicon stapler and brought this in along the greater curve. Multiple firings were used to then get up between the mass and the decompression tube. This was then carried across the superior aspect of the gastric cardia and the specimen was completely free. This was then removed through the GelPort. I brought it over to the back table and opened it up. There did appear to be adequate margin. I then re-scrubbed and inspected the abdominal cavity for hemostasis. Excellent hemostasis was noted. The staple line was visualized and appeared to be completely intact. The gastric sleeve orogastric tube was removed and replaced with an NG tube which was done under direct palpation. This was then secured at the naris at approximately 55 cm. A PRECIOUS drain was then brought in through the left-sided port and positioned adjacent to the staple line. The abdomen was then desufflated and GelPort was removed.   The fascia was closed using #1 looped PDS sutures. The deep dermal layer was closed with buried interrupted 3-0 Vicryl sutures. The incision was closed with Monocryl in the subcuticular layer. The port sites were also closed with Monocryl and all were dressed with Dermabond. The patient was recovered from general anesthesia and taken to the recovery area in satisfactory condition. All instrument, sponge, and needle counts were reported as correct.         Jenna Davison MD      [id number]  D:  07/06/2021 16:01  T:  07/06/2021 19:04  JOB #:  [id number]

## 2021-07-15 ENCOUNTER — TELEPHONE (OUTPATIENT)
Dept: ONCOLOGY | Age: 76
End: 2021-07-15

## 2021-07-15 NOTE — TELEPHONE ENCOUNTER
Call made to patient regarding missed appointment.  Patient  stated the patient was in [address] and rehab and not able to make appointment

## 2021-07-15 NOTE — PROGRESS NOTES
Physician Progress Note      Kj Tejeda  CSN #:                  [id number]  :                       1945  ADMIT DATE:       2021 4:14 PM  [address] DATE:        2021 3:44 PM  RESPONDING  PROVIDER #:        Giovanni Freed MD          QUERY TEXT:    Pt admitted with Fall and High BS and has Chronic systolic heart failure documented  by IM in progress notes dated  and DC Summary dated . On  patient became more edematous. Chest x-ray showed edema. Patient received IV Lasix. Lung exam had diminished sound bilaterally. After study, can the condition being treated be further specified as: The medical record reflects the following:  Risk Factors: Hx:  Anemia / Arthritis / CAD / DM / GERD / High Cholesterol / HTN /  Clinical Indicators: 97.8 66 16 137/58 100% on RA. ... [employer] Tnl: 4.50 ^ 3.81 ^ 4.00. ..... BiolineRx pBNP: 15,450. .. EverPower CXR: Mild-moderate cardiac contour enlargement. [employer]Power Repeat CXR: Cardiomegaly and mild pulmonary edema. .. EverPower Echo: LV: Estimated LVEF is 35 - 40%. Visually measured ejection fraction. Mildly dilated left ventricle. Mild concentric hypertrophy. Moderately reduced systolic function. Left ventricular diastolic dysfunction. EverPower EKG:Normal sinus rhythm Left ventricular hypertrophy with repolarization abnormality  Treatment: CXR; Repeat CXR; Echo; Serial Tnl; pBNP; Increase to IV metoprolol. Add nitrobid BID; IV lasix 40mg x1, stop IVF;  Cards consult    Thank you,    Magen So  CDI    Options provided:  -- Acute on Chronic Systolic CHF/HFrEF  -- Chronic Systolic CHF/HFrEF, Only  -- Other - I will add my own diagnosis  -- Disagree - Not applicable / Not valid  -- Disagree - Clinically unable to determine / Unknown  -- Refer to Clinical Documentation Reviewer    PROVIDER RESPONSE TEXT:    Chronic systolic hf    Query created by: Senait Narayan on 2021 1:56 PM      Electronically signed by:  Giovanni Freed MD 7/15/2021 7:29 AM

## 2021-07-21 ENCOUNTER — PATIENT OUTREACH (OUTPATIENT)
Dept: CASE MANAGEMENT | Age: 76
End: 2021-07-21

## 2021-07-29 ENCOUNTER — TELEPHONE (OUTPATIENT)
Dept: ONCOLOGY | Age: 76
End: 2021-07-29

## 2021-07-29 NOTE — TELEPHONE ENCOUNTER
Called patient to confirm appt -  answered and stated patient is in rehab and thought the appt was Tuesday. Will confirm today if the  Patient will be out and able to come.
no
prematurity  TTN

## 2021-07-30 NOTE — PROGRESS NOTES
Enrrique Yung is a 78 y.o. female here for new patient appt for hepatic met disease. Pt was in hospital from 6/19/21 - 7/1/21 for weakness and fall. 1. Have you been to the ER, urgent care clinic since your last visit? Hospitalized since your last visit? New Pt    2. Have you seen or consulted any other health care providers outside of the [address] [place] [address] since your last visit? Include any pap smears or colon screening. New Pt    Pt presents in wheelchair today.  is waiting in lobby. Pt states she has some soreness in abdomen but no pain. Pt going to [address] [place] [address] about every day.

## 2021-08-02 ENCOUNTER — OFFICE VISIT (OUTPATIENT)
Dept: ONCOLOGY | Age: 76
End: 2021-08-02
Payer: MEDICARE

## 2021-08-02 ENCOUNTER — TELEPHONE (OUTPATIENT)
Dept: ONCOLOGY | Age: 76
End: 2021-08-02

## 2021-08-02 VITALS
OXYGEN SATURATION: 97 % | BODY MASS INDEX: 32.76 KG/M2 | HEART RATE: 69 BPM | SYSTOLIC BLOOD PRESSURE: 147 MMHG | TEMPERATURE: 97.5 F | HEIGHT: 62 IN | WEIGHT: 178 LBS | DIASTOLIC BLOOD PRESSURE: 68 MMHG

## 2021-08-02 DIAGNOSIS — C78.7 METASTATIC CANCER TO LIVER (HCC): ICD-10-CM

## 2021-08-02 DIAGNOSIS — C49.A2 GASTROINTESTINAL STROMAL TUMOR (GIST) OF STOMACH (HCC): Primary | ICD-10-CM

## 2021-08-02 PROCEDURE — 1100F PTFALLS ASSESS-DOCD GE2>/YR: CPT | Performed by: INTERNAL MEDICINE

## 2021-08-02 PROCEDURE — 1090F PRES/ABSN URINE INCON ASSESS: CPT | Performed by: INTERNAL MEDICINE

## 2021-08-02 PROCEDURE — G8427 DOCREV CUR MEDS BY ELIG CLIN: HCPCS | Performed by: INTERNAL MEDICINE

## 2021-08-02 PROCEDURE — G8536 NO DOC ELDER MAL SCRN: HCPCS | Performed by: INTERNAL MEDICINE

## 2021-08-02 PROCEDURE — G8417 CALC BMI ABV UP PARAM F/U: HCPCS | Performed by: INTERNAL MEDICINE

## 2021-08-02 PROCEDURE — G8753 SYS BP > OR = 140: HCPCS | Performed by: INTERNAL MEDICINE

## 2021-08-02 PROCEDURE — G8400 PT W/DXA NO RESULTS DOC: HCPCS | Performed by: INTERNAL MEDICINE

## 2021-08-02 PROCEDURE — G8754 DIAS BP LESS 90: HCPCS | Performed by: INTERNAL MEDICINE

## 2021-08-02 PROCEDURE — G0463 HOSPITAL OUTPT CLINIC VISIT: HCPCS | Performed by: INTERNAL MEDICINE

## 2021-08-02 PROCEDURE — 3288F FALL RISK ASSESSMENT DOCD: CPT | Performed by: INTERNAL MEDICINE

## 2021-08-02 PROCEDURE — G8432 DEP SCR NOT DOC, RNG: HCPCS | Performed by: INTERNAL MEDICINE

## 2021-08-02 PROCEDURE — 99215 OFFICE O/P EST HI 40 MIN: CPT | Performed by: INTERNAL MEDICINE

## 2021-08-02 PROCEDURE — G9711 PT HX TOT COL OR COLON CA: HCPCS | Performed by: INTERNAL MEDICINE

## 2021-08-02 NOTE — PROGRESS NOTES
VORB FROM DR Aida Vargas CT CHEST ABD PELV W CONTRAST AND NM  BONE SCAN [address] BODY    Ordered per VORB from [facility]. Requested Prescriptions     Pending Prescriptions Disp Refills    imatinib (GLEEVEC) 400 mg tablet 30 Tablet 11     Sig: Take 1 Tablet by mouth daily for 30 days.

## 2021-08-02 NOTE — LETTER
8/15/2021    Patient: Marzena Prakash   YOB: 1945   Date of Visit: 8/2/2021     Hortencia Wilkinson MD  [facility] [address]  [address] [address]  Via Fax: [phone]    Dear Hortencia Wilkinson MD,      Thank you for referring Ms. Marzena Prakash to [address] [place] [address] for evaluation. My notes for this consultation are attached. If you have questions, please do not hesitate to call me. I look forward to following your patient along with you.       Sincerely,    Brandon Regalado MD

## 2021-08-02 NOTE — TELEPHONE ENCOUNTER
Patient's  was communicated with re.patient is currently in a rehab facility and may not have insurance cover her visit. Spoke with  and rehab and they were both supposed to confirm and call back to confirm appt. No one did. Called facility this morning to check on status and no one answered. Called  and he did not answer. Patient showed with  for appt. No one verified insurance - called rehab and they confirmed that they would not pay. Informed patient and  and they stated if insurance would not pay then they would pay. Patient is being seen today.

## 2021-08-03 ENCOUNTER — OFFICE VISIT (OUTPATIENT)
Dept: SURGERY | Age: 76
End: 2021-08-03
Payer: MEDICARE

## 2021-08-03 VITALS
DIASTOLIC BLOOD PRESSURE: 67 MMHG | SYSTOLIC BLOOD PRESSURE: 159 MMHG | RESPIRATION RATE: 20 BRPM | HEART RATE: 82 BPM | HEIGHT: 62 IN | BODY MASS INDEX: 32.87 KG/M2 | TEMPERATURE: 99 F | OXYGEN SATURATION: 100 % | WEIGHT: 178.6 LBS

## 2021-08-03 DIAGNOSIS — Z09 POSTOPERATIVE EXAMINATION: Primary | ICD-10-CM

## 2021-08-03 PROBLEM — K92.2 GI BLEED: Status: RESOLVED | Noted: 2020-10-04 | Resolved: 2021-08-03

## 2021-08-03 PROCEDURE — 99024 POSTOP FOLLOW-UP VISIT: CPT | Performed by: SURGERY

## 2021-08-03 RX ORDER — IMATINIB MESYLATE 400 MG/1
400 TABLET, FILM COATED ORAL DAILY
Qty: 30 TABLET | Refills: 11 | Status: SHIPPED | OUTPATIENT
Start: 2021-08-03 | End: 2021-09-02

## 2021-08-03 NOTE — PROGRESS NOTES
To: Michaela Garcia MD , Juana Cedeno MD, Debbie Shelby MD, Chelsea Cordoba MD    From: Rachele Woods MD    Thank you for referring Ekaterina Reynoso. Encounter Date: 8/3/2021    Subjective:      Ekaterina Reynoso is a 78 y.o. female presents for postop care following robotic resection of 5.5 cm high-grade GIST in the gastric cardia directly adjacent to the GE junction. Has no complaints today. Feels well. She has not been having any trouble swallowing. She is eating solid food. She is still at [address] for rehab but is going home this Thursday. No lightheadedness. No black stools. Objective:     General:  alert, cooperative, no distress, appears stated age   Abdomen: soft, bowel sounds active, non-tender   Incisions:   Well-healed. Abdomen is soft and nontender. Assessment:     Status post robotic resection of 5.5 high-grade GIST. Margins clear but metastases to the liver. She is seeing Dr. Geovanna Araujo for [address]. Doing well postoperatively. Plan:     Encouraged her to continue her physical therapy. Discussed a few dietary restrictions, in terms of avoiding foods that are typically swallowed in bolus. Told her to let us know if she has issues with dysphagia. Otherwise, patient understands no further follow-up required.       Rachele Woods MD

## 2021-08-03 NOTE — Clinical Note
8/3/2021    Patient: Dwaine Reyes   YOB: 1945   Date of Visit: 8/3/2021     Elinor Rai MD  [facility] [address]  [address] [address]  Via Fax: [phone]    Dear Elinor Rai MD,      Thank you [facility]. My notes for this consultation are attached. If you have questions, please do not hesitate to call me. I look forward to following your patient along with you.       Sincerely,    Harinder Hernandez MD

## 2021-08-06 ENCOUNTER — PATIENT OUTREACH (OUTPATIENT)
Dept: CASE MANAGEMENT | Age: 76
End: 2021-08-06

## 2021-08-06 NOTE — PROGRESS NOTES
Patient report discharge from [facility] and rehab on 8/5/21. She has all medications and HH SOC is 8/6/21. She feels fine and will schedule PCP appt for follow up.

## 2021-08-11 ENCOUNTER — DOCUMENTATION ONLY (OUTPATIENT)
Dept: ONCOLOGY | Age: 76
End: 2021-08-11

## 2021-08-11 NOTE — PROGRESS NOTES
Spoke with pt and provided her a number to call for the scans. She will let us know when she gets her medication in the mail also.

## 2021-08-12 ENCOUNTER — TELEPHONE (OUTPATIENT)
Dept: ONCOLOGY | Age: 76
End: 2021-08-12

## 2021-08-12 NOTE — TELEPHONE ENCOUNTER
Patient  called and stated the patient received her medication and would like a callback to discuss next steps.       [phone]

## 2021-08-13 NOTE — TELEPHONE ENCOUNTER
Called pt  back. No answer. Left detailed VM on personal line stating can start on Monday 8/16  and will need to call back to make an appointment for 2 weeks.

## 2021-08-15 NOTE — PROGRESS NOTES
[address] [facility] [address], [address] [facility], [address] [place] [address]  [phone]         Oncology Note        Patient: Kenia Bowens MRN: [id number]  SSN: [id number]    YOB: 1945  Age: 78 y.o. Sex: female        Diagnosis:      1. 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 female who I am seeing for a diagnosis of GIST and iron deficiency anemia. She presented with severe anemia late last year. 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 Surgery was delayed due to cardiac clearance. Recent imaging shows metastasis to the liver and RP nodes. She underwent a partial gastrectomy and wedge resection of the liver. The liver biopsy was consistent with a diagnosis of GIST. She comes in to discuss the next steps in treatment. She feels well. Denies abdominal pain. Review of Systems:    Constitutional: positive for fatigue  Eyes: negative  Ears, Nose, Mouth, Throat, and Face: negative  Respiratory: negative  Cardiovascular: negative  Gastrointestinal: negative  Genitourinary:negative  Integument/Breast: negative  Hematologic/Lymphatic: negative  Musculoskeletal:negative  Neurological: negative    Past Medical History:   Diagnosis Date    Anemia, unspecified 10/6/2020    Arrhythmia     Arthritis     CAD (coronary artery disease)     Cancer ([address]     Chronic bronchitis ([address]     per pt:  as of 1/9/15 pt denies any ARENAS or SOB    Diabetes ([address] Dx approx 2009    Dr Giselle Benson (in [facility])    GERD (gastroesophageal reflux disease)     Hypercholesteremia     Hypertension      Past Surgical History:   Procedure Laterality Date    HX CHOLECYSTECTOMY  6/12    HX COLONOSCOPY      HX CORONARY STENT PLACEMENT  8/25/2015    HX DILATION AND CURETTAGE      HX OTHER SURGICAL  06/22/2021    Laparoscopic hand-assisted partial gastrectomy with core needle liver biopsy.     [place] CARDIAC SURG PROCEDURE UNLIST  2015    UPPER GI ENDOSCOPY,BIOPSY  10/6/2020           Family History   Problem Relation Age of Onset    Diabetes Mother     Heart Disease Mother     Hypertension Mother     Stroke Father     Heart Disease Brother     Heart Disease Brother     Stroke Brother     HIV/AIDS Brother      Social History     Tobacco Use    Smoking status: Former Smoker    Smokeless tobacco: Never Used    Tobacco comment: quit 50 years ago   Substance Use Topics    Alcohol use: No      Prior to Admission medications    Medication Sig Start Date End Date Taking? Authorizing Provider   imatinib (GLEEVEC) 400 mg tablet Take 1 Tablet by mouth daily for 30 days. 8/3/21 9/2/21 Yes Irineo Clarke MD   multivitamin, tx-iron-ca-min (THERA-M w/ IRON) 9 mg iron-400 mcg tab tablet Take 1 Tab by mouth two (2) times a day. Centrum silver   Yes Provider, Historical   pantoprazole (PROTONIX) 40 mg tablet Take 40 mg by mouth daily. 1/13/21  Yes Provider, Historical   glipiZIDE (GLUCOTROL) 5 mg tablet Take 1 Tab by mouth two (2) times a day. 1/21/21  Yes William Rachel MD   lancets misc PUSH BUTTON LANCETS. Check blood sugar 4 times per day due to hypoglycemic events. Dx:E11.42, E16.2 12/17/20  Yes William Rachel MD   amLODIPine (NORVASC) 10 mg tablet Take 1 Tab by mouth daily. 11/20/20  Yes Natalee Flor MD   food supplemt, lactose-reduced (Ensure Enlive) 0.08 gram-1.5 kcal/mL liqd Take 6 x daily as recommended by Surgery to improve nutritional status for Possible surgery in future  Patient taking differently: Take 6 x daily as recommended by Surgery to improve nutritional status for Possible surgery in future. takes about 3 11/20/20  Yes Natalee Flor MD   DULoxetine (CYMBALTA) 60 mg capsule Take 1 Cap by mouth daily. 10/14/20  Yes Deborah Kim NP   losartan (COZAAR) 100 mg tablet Take 1 Tab by mouth daily.  10/14/20  Yes Deborah Kim NP   atorvastatin (LIPITOR) 40 mg tablet Take 1 Tab by mouth nightly. 1/27/20  Yes Karri Manriquez MD   carvediloL (COREG) 12.5 mg tablet Take 1 Tab by mouth two (2) times daily (with meals). 10/13/20   Keaton Jenkins NP   torsemide (DEMADEX) 20 mg tablet Take 40 mg by mouth daily. Patient not taking: Reported on 8/3/2021    Provider, Historical              Allergies   Allergen Reactions    Metformin Other (comments)     GI side effects. Not a true allergy           Objective:     Vitals:    08/02/21 1509   BP: (!) 147/68   Pulse: 69   Temp: 97.5 °F (36.4 °C)   SpO2: 97%   Weight: 178 lb (80.7 kg)   Height: 5' 2\" (1.575 m)            Physical Exam:    GENERAL: alert, cooperative, no distress, appears stated age  EYE: conjunctivae/corneas clear. PERRL, EOM's intact  LYMPHATIC: Cervical, supraclavicular, and axillary nodes normal.   THROAT & NECK: normal and no erythema or exudates noted. LUNG: clear to auscultation bilaterally  HEART: regular rate and rhythm, no murmur  ABDOMEN: soft, non-tender palpation  EXTREMITIES: no cyanosis or edema  SKIN: Normal, no rash or ecchymosis  NEUROLOGIC: AOx3.  Grossly sensory and motor function are normal.       Physical exam and ROS has been modified from a prior visit to make it relevant and current      Lab Results   Component Value Date/Time    WBC 8.1 07/01/2021 03:57 AM    HGB 8.9 (L) 07/01/2021 03:57 AM    HCT 30.7 (L) 07/01/2021 03:57 AM    PLATELET 797 47/90/6801 03:57 AM    MCV 83.9 07/01/2021 03:57 AM       Lab Results   Component Value Date/Time    Sodium 140 07/01/2021 03:57 AM    Potassium 4.2 07/01/2021 03:57 AM    Chloride 109 (H) 07/01/2021 03:57 AM    CO2 24 07/01/2021 03:57 AM    Anion gap 7 07/01/2021 03:57 AM    Glucose 150 (H) 07/01/2021 03:57 AM    BUN 41 (H) 07/01/2021 03:57 AM    Creatinine 1.01 07/01/2021 03:57 AM    BUN/Creatinine ratio 41 (H) 07/01/2021 03:57 AM    GFR est AA >60 07/01/2021 03:57 AM    GFR est non-AA 54 (L) 07/01/2021 03:57 AM    Calcium 7.7 (L) 07/01/2021 03:57 AM Bilirubin, total 0.5 06/30/2021 04:54 AM    Alk. phosphatase 118 (H) 06/30/2021 04:54 AM    Protein, total 5.2 (L) 06/30/2021 04:54 AM    Albumin 1.4 (L) 06/30/2021 04:54 AM    Globulin 3.8 06/30/2021 04:54 AM    A-G Ratio 0.4 (L) 06/30/2021 04:54 AM    ALT (SGPT) 35 06/30/2021 04:54 AM    AST (SGOT) 37 06/30/2021 04:54 AM     CT Results (most recent):  Results from [place] encounter on 06/19/21    CT ABD PELV WO CONT    Narrative  EXAM: CT ABD PELV WO CONT    INDICATION: abd mass with low hgb and wbc 21K    COMPARISON: CT 11/12/2020    CONTRAST:  None. TECHNIQUE:  Thin axial images were obtained through the abdomen and pelvis. Coronal and  sagittal reformats were generated. Oral contrast was not administered. CT dose  reduction was achieved through use of a standardized protocol tailored for this  examination and automatic exposure control for dose modulation. The absence of intravenous and oral contrast material reduces the sensitivity  for evaluation of the bowel, vasculature and solid organs. FINDINGS:  LOWER THORAX: No significant abnormality in the incidentally imaged lower chest.  LIVER: Interval demonstration of numerous hepatic masses in all hepatic segments  measuring up to 2 cm. No intrahepatic bile duct dilation is shown. BILIARY TREE: Status post cholecystectomy. CBD is not dilated. SPLEEN: within normal limits. PANCREAS: No focal abnormality. ADRENALS: Unremarkable. KIDNEYS/URETERS: 4 mm nonobstructing calculus of left lower pole again noted. STOMACH: Fundic gastric mass R 25 cm demonstrated. SMALL BOWEL: No dilatation or wall thickening. COLON: No dilatation or wall thickening. APPENDIX: Normal.  PERITONEUM: No ascites or pneumoperitoneum. RETROPERITONEUM: Atherosclerotic calcifications extensively shown. No aneurysm  or retroperitoneal mass-adenopathy. REPRODUCTIVE ORGANS: Unremarkable. URINARY BLADDER: No mass or calculus.   BONES: No destructive bone lesion. ABDOMINAL WALL: Diffuse subcutaneous edema consistent with anasarca. ADDITIONAL COMMENTS: N/A    Impression  1. Gastric mass again demonstrated. 2. Interval demonstration of hepatic metastatic disease. Assessment:     1. Gastrointestinal tumor of the stomach with metastasis to the liver and retroperitoneal nodes    ECOG PS 2  Intent of Treatment - palliative  Prognosis: guarded    S/P partial gastrectomy 06/22/2021  Standard of care for the treatment of metastatic GIST is imatinib. Start Imatinib 400 mg PO daily. I counseled her about the side effects of the medicine such as diarrhea, swelling, low blood counts etc. She vocalized understanding and is willing to take the medicine. 2. Iron deficiency anemia    Received IV iron       Plan:       1. Follow up after starting Imatinib      Signed By: Cait Santos MD     August 15, 2021         CC. Mi Perez MD  CC.  Nereyda Villanueva MD

## 2021-08-25 ENCOUNTER — HOSPITAL ENCOUNTER (OUTPATIENT)
Dept: CT IMAGING | Age: 76
Discharge: HOME | End: 2021-08-25
Attending: INTERNAL MEDICINE
Payer: MEDICARE

## 2021-08-25 DIAGNOSIS — C49.A2 GASTROINTESTINAL STROMAL TUMOR (GIST) OF STOMACH (HCC): ICD-10-CM

## 2021-08-25 LAB — CREAT BLD-MCNC: 1.4 MG/DL (ref 0.6–1.3)

## 2021-08-25 PROCEDURE — 82565 ASSAY OF CREATININE: CPT

## 2021-08-25 PROCEDURE — 71260 CT THORAX DX C+: CPT

## 2021-08-25 PROCEDURE — 74177 CT ABD & PELVIS W/CONTRAST: CPT

## 2021-08-25 PROCEDURE — 74011000636 HC RX REV CODE- 636: Performed by: INTERNAL MEDICINE

## 2021-08-25 RX ADMIN — IOPAMIDOL 100 ML: 755 INJECTION, SOLUTION INTRAVENOUS at 15:05

## 2021-08-26 NOTE — PROGRESS NOTES
Sathya Hewitt is a 78 y.o. female here for follow up for met hepatic disease. She is on Imatinib. 1. Have you been to the ER, urgent care clinic since your last visit? Hospitalized since your last visit? no    2. Have you seen or consulted any other health care providers outside of the [address] [place] [address] since your last visit? Include any pap smears or colon screening. PT    Pt states she is almost finished with PT. States it has helped her a lot. BP is elevated today. States she has taken taken her BP meds yet today.

## 2021-08-27 ENCOUNTER — HOSPITAL ENCOUNTER (OUTPATIENT)
Dept: NUCLEAR MEDICINE | Age: 76
Discharge: HOME | End: 2021-08-27
Attending: INTERNAL MEDICINE
Payer: MEDICARE

## 2021-08-27 DIAGNOSIS — C49.A2 GASTROINTESTINAL STROMAL TUMOR (GIST) OF STOMACH (HCC): ICD-10-CM

## 2021-08-27 PROCEDURE — 78306 BONE IMAGING WHOLE BODY: CPT

## 2021-08-30 ENCOUNTER — OFFICE VISIT (OUTPATIENT)
Dept: ONCOLOGY | Age: 76
End: 2021-08-30
Payer: MEDICARE

## 2021-08-30 VITALS
BODY MASS INDEX: 32.39 KG/M2 | HEIGHT: 62 IN | OXYGEN SATURATION: 97 % | TEMPERATURE: 98 F | DIASTOLIC BLOOD PRESSURE: 74 MMHG | SYSTOLIC BLOOD PRESSURE: 188 MMHG | HEART RATE: 87 BPM | WEIGHT: 176 LBS

## 2021-08-30 DIAGNOSIS — C78.7 METASTATIC CANCER TO LIVER (HCC): ICD-10-CM

## 2021-08-30 DIAGNOSIS — C49.A2 GASTROINTESTINAL STROMAL TUMOR (GIST) OF STOMACH (HCC): Primary | ICD-10-CM

## 2021-08-30 PROCEDURE — G0463 HOSPITAL OUTPT CLINIC VISIT: HCPCS | Performed by: INTERNAL MEDICINE

## 2021-08-30 PROCEDURE — 1090F PRES/ABSN URINE INCON ASSESS: CPT | Performed by: INTERNAL MEDICINE

## 2021-08-30 PROCEDURE — G8432 DEP SCR NOT DOC, RNG: HCPCS | Performed by: INTERNAL MEDICINE

## 2021-08-30 PROCEDURE — 3288F FALL RISK ASSESSMENT DOCD: CPT | Performed by: INTERNAL MEDICINE

## 2021-08-30 PROCEDURE — G9711 PT HX TOT COL OR COLON CA: HCPCS | Performed by: INTERNAL MEDICINE

## 2021-08-30 PROCEDURE — G8417 CALC BMI ABV UP PARAM F/U: HCPCS | Performed by: INTERNAL MEDICINE

## 2021-08-30 PROCEDURE — G8754 DIAS BP LESS 90: HCPCS | Performed by: INTERNAL MEDICINE

## 2021-08-30 PROCEDURE — 99214 OFFICE O/P EST MOD 30 MIN: CPT | Performed by: INTERNAL MEDICINE

## 2021-08-30 PROCEDURE — G8427 DOCREV CUR MEDS BY ELIG CLIN: HCPCS | Performed by: INTERNAL MEDICINE

## 2021-08-30 PROCEDURE — G8400 PT W/DXA NO RESULTS DOC: HCPCS | Performed by: INTERNAL MEDICINE

## 2021-08-30 PROCEDURE — G8753 SYS BP > OR = 140: HCPCS | Performed by: INTERNAL MEDICINE

## 2021-08-30 PROCEDURE — 1100F PTFALLS ASSESS-DOCD GE2>/YR: CPT | Performed by: INTERNAL MEDICINE

## 2021-08-30 PROCEDURE — G8536 NO DOC ELDER MAL SCRN: HCPCS | Performed by: INTERNAL MEDICINE

## 2021-08-30 NOTE — PROGRESS NOTES
[address] [facility] [address], [address] [facility], [address] [place] [address]  [phone]         Oncology Note        Patient: Delvin Heck MRN: [id number]  SSN: [id number]    YOB: 1945  Age: 78 y.o. Sex: female        Diagnosis:      1. 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 Imatinib 400 mg daily started 8/2021    Subjective:      Delvin Heck is a 78 y.o. female who I am seeing for a diagnosis of GIST and iron deficiency anemia. She presented with severe anemia late last year. 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 Surgery was delayed due to cardiac clearance. Recent imaging shows metastasis to the liver and RP nodes. She underwent a partial gastrectomy and wedge resection of the liver. The liver biopsy was consistent with a diagnosis of GIST. She comes in to discuss the next steps in treatment. She feels well. Denies abdominal pain. She recently started taking imatinib. She denies any side effects. No abdominal pain. Review of Systems:    Constitutional: positive for fatigue  Eyes: negative  Ears, Nose, Mouth, Throat, and Face: negative  Respiratory: negative  Cardiovascular: negative  Gastrointestinal: negative  Genitourinary:negative  Integument/Breast: negative  Hematologic/Lymphatic: negative  Musculoskeletal:negative  Neurological: negative      ROS was pulled in from a previous visit. No changes were made d/t symptoms remain the same.       Past Medical History:   Diagnosis Date    Anemia, unspecified 10/6/2020    Arrhythmia     Arthritis     CAD (coronary artery disease)     Cancer ([address]     Chronic bronchitis ([address]     per pt:  as of 1/9/15 pt denies any ARENAS or SOB    Diabetes ([address] Dx approx 2009    Dr Margie Morfin [facility]in [facility]    GERD (gastroesophageal reflux disease)     Hypercholesteremia     Hypertension      Past Surgical History:   Procedure Laterality Date    HX CHOLECYSTECTOMY  6/12    HX COLONOSCOPY      HX CORONARY STENT PLACEMENT  8/25/2015    HX DILATION AND CURETTAGE      HX OTHER SURGICAL  06/22/2021    Laparoscopic hand-assisted partial gastrectomy with core needle liver biopsy.  [place] CARDIAC SURG PROCEDURE UNLIST  2015    UPPER GI ENDOSCOPY,BIOPSY  10/6/2020           Family History   Problem Relation Age of Onset    Diabetes Mother     Heart Disease Mother     Hypertension Mother     Stroke Father     Heart Disease Brother     Heart Disease Brother     Stroke Brother     HIV/AIDS Brother      Social History     Tobacco Use    Smoking status: Former Smoker    Smokeless tobacco: Never Used    Tobacco comment: quit 50 years ago   Substance Use Topics    Alcohol use: No      Prior to Admission medications    Medication Sig Start Date End Date Taking? Authorizing Provider   imatinib (GLEEVEC) 400 mg tablet Take 1 Tablet by mouth daily for 30 days. 8/3/21 9/2/21 Yes Justin Robin MD   multivitamin, tx-iron-ca-min (THERA-M w/ IRON) 9 mg iron-400 mcg tab tablet Take 1 Tab by mouth two (2) times a day. Centrum silver   Yes Provider, Historical   pantoprazole (PROTONIX) 40 mg tablet Take 40 mg by mouth daily. 1/13/21  Yes Provider, Historical   glipiZIDE (GLUCOTROL) 5 mg tablet Take 1 Tab by mouth two (2) times a day. 1/21/21  Yes Ifeoma Mckay MD   lancets misc PUSH BUTTON LANCETS. Check blood sugar 4 times per day due to hypoglycemic events. Dx:E11.42, E16.2 12/17/20  Yes Ifeoma Mckay MD   amLODIPine (NORVASC) 10 mg tablet Take 1 Tab by mouth daily. 11/20/20  Yes Emile Acuna MD   food supplemt, lactose-reduced (Ensure Enlive) 0.08 gram-1.5 kcal/mL liqd Take 6 x daily as recommended by Surgery to improve nutritional status for Possible surgery in future  Patient taking differently: Take 6 x daily as recommended by Surgery to improve nutritional status for Possible surgery in future.  takes about 3 11/20/20  Yes Emile Acuna MD DULoxetine (CYMBALTA) 60 mg capsule Take 1 Cap by mouth daily. 10/14/20  Yes Carlos Kim NP   losartan (COZAAR) 100 mg tablet Take 1 Tab by mouth daily. 10/14/20  Yes Jae Van NP   carvediloL (COREG) 12.5 mg tablet Take 1 Tab by mouth two (2) times daily (with meals). 10/13/20  Yes Jae Van NP   torsemide (DEMADEX) 20 mg tablet Take 40 mg by mouth daily. Yes Provider, Historical   atorvastatin (LIPITOR) 40 mg tablet Take 1 Tab by mouth nightly. 1/27/20  Yes Caitlyn Clement MD              Allergies   Allergen Reactions    Metformin Other (comments)     GI side effects. Not a true allergy           Objective:     Vitals:    08/30/21 1052   BP: (!) 188/74   Pulse: 87   Temp: 98 °F (36.7 °C)   TempSrc: Temporal   SpO2: 97%   Weight: 176 lb (79.8 kg)   Height: 5' 2\" (1.575 m)          Physical Exam:    GENERAL: alert, cooperative, no distress, appears stated age  EYE: conjunctivae/corneas clear. LYMPHATIC: Cervical, supraclavicular, and axillary nodes normal.   THROAT & NECK: normal and no erythema or exudates noted. LUNG: clear to auscultation bilaterally  HEART: regular rate and rhythm, no murmur  ABDOMEN: soft, non-tender palpation  EXTREMITIES: no cyanosis or edema  SKIN: Normal, no rash or ecchymosis  NEUROLOGIC: AOx3.        Physical exam and ROS has been modified from a prior visit to make it relevant and current      Lab Results   Component Value Date/Time    WBC 8.1 07/01/2021 03:57 AM    HGB 8.9 (L) 07/01/2021 03:57 AM    HCT 30.7 (L) 07/01/2021 03:57 AM    PLATELET 299 75/76/7757 03:57 AM    MCV 83.9 07/01/2021 03:57 AM       Lab Results   Component Value Date/Time    Sodium 140 07/01/2021 03:57 AM    Potassium 4.2 07/01/2021 03:57 AM    Chloride 109 (H) 07/01/2021 03:57 AM    CO2 24 07/01/2021 03:57 AM    Anion gap 7 07/01/2021 03:57 AM    Glucose 150 (H) 07/01/2021 03:57 AM    BUN 41 (H) 07/01/2021 03:57 AM    Creatinine 1.01 07/01/2021 03:57 AM    BUN/Creatinine ratio 41 (H) 07/01/2021 03:57 AM    GFR est AA >60 07/01/2021 03:57 AM    GFR est non-AA 54 (L) 07/01/2021 03:57 AM    Calcium 7.7 (L) 07/01/2021 03:57 AM    Bilirubin, total 0.5 06/30/2021 04:54 AM    Alk. phosphatase 118 (H) 06/30/2021 04:54 AM    Protein, total 5.2 (L) 06/30/2021 04:54 AM    Albumin 1.4 (L) 06/30/2021 04:54 AM    Globulin 3.8 06/30/2021 04:54 AM    A-G Ratio 0.4 (L) 06/30/2021 04:54 AM    ALT (SGPT) 35 06/30/2021 04:54 AM    AST (SGOT) 37 06/30/2021 04:54 AM     CT Results (most recent):  Results from [place] encounter on 08/25/21    CT ABD PELV W CONT    Narrative  INDICATION: Gastrointestinal stromal tumor of stomach, staging. CT of the chest, abdomen and pelvis is performed with 5 mm collimation. Study is  performed with PO and 100 cc of nonionic IV Isovue-370. Sagittal and coronal  reformatted images were also performed. CT dose reduction was achieved with the use of the standardized protocol  tailored for this examination and automatic exposure control for dose  modulation. Direct comparison is made to prior CT of the abdomen and pelvis dated June 2021    Chest:    Lungs: Lungs are clear bilaterally. There is no pulmonary nodule or mass. Lymph nodes: Subcentimeter mediastinal and bilateral axillary lymph nodes are  noted. Pleura: There is no pleural fluid. Heart: The heart is normal in size and there is no pericardial fluid. Bones: No lytic or sclerotic osseous lesion is visualized. Abdomen/pelvis:    Stomach: Partial gastrectomy postoperative changes are noted. Liver: Comparison to prior CT dated June 2021 is somewhat difficult as the prior  is performed without IV contrast. There are innumerable hypodense hepatic  lesions within the liver. Hepatic lesions have increased in number compared to  prior CT dated June 2021. The majority of the hepatic masses have increased in  size, while several and decreased in size.  For example, within the medial right  hepatic lobe, there is a 3.2 cm x 2.7 cm lesion which measured 2.6 cm x 2 cm on  prior CT dated June 2021. As an additional example, there is a 2.3 cm x 2.2 cm  lesion within the upper right hepatic lobe, measuring 1.7 cm x 1.6 cm in prior  CT dated June 2021. As an additional example, there is a 2.8 cm x 2.4 cm  hypodense lesion within the inferior right hepatic lobe, measuring approximately  2.7 cm x 1.6 cm in prior CT dated June 2021. As an additional example, there is  a 1.3 cm x 1.3 cm hypodense lesion within the left hepatic lobe measuring 2.3 cm  x 2 cm in prior CT dated June 2020. There is a 1.1 cm x 5 mm lesion within the  caudate lobe, measuring 2.5 cm x 1.5 cm in prior CT dated June 2021. Spleen: The spleen is normal.    Pancreas: The pancreas is normal.    Adrenals: The adrenals are normal.    Gallbladder: Gallbladder is surgically absent. Kidneys: There is a 5 mm nonobstructing left renal calculus. Kidneys otherwise  enhance promptly and symmetrically. There is no hydronephrosis. Bowel: No dilated or thickened loop of large or small bowel is seen. Appendix: The appendix is normal.    Urinary bladder: Urinary bladder is partially filled and grossly normal.    Bones: No lytic or sclerotic osseous lesion is visualized. Miscellaneous: There is no free intraperitoneal gas or fluid. There is no focal  fluid collection to suggest abscess. Impression  1. Interval increase in number of hepatic lesions. Majority of the hepatic  lesions have increased in size, while several are decreased in size. Assessment:     1. Gastrointestinal tumor of the stomach with metastasis to the liver and retroperitoneal nodes    ECOG PS 2  Intent of Treatment - palliative  Prognosis: guarded    S/P partial gastrectomy 06/22/2021  Standard of care for the treatment of metastatic GIST is imatinib. Started Imatinib 400 mg PO daily. Tolerating well  Detailed assessment of side effects was performed.    Repeat labs    The diseae has a high risk of progression. 2. Iron deficiency anemia    Received IV iron         Plan:       1. Continue Imatinib   2. Labs: CBC and CMP  3. Repeat labs in [facility] in 4 weeks  4. Follow up 4 weeks    I performed a history and physical examination of the patient and discussed his management with the NPP. I reviewed the NPP note and agree with the documented findings and plan of care. The patient was seen in conjunction with Ms. Elza. Ms. Adriana Kennedy is a women with metastatic gastric GIST. She is taking Imatinib. She denies any side effects or symptoms of the disease. Her physical exam is normal       Signed by: Joe Salazar MD                     August 31, 2021      CC. Dianne Montenegro MD  CC.  Randee Millan MD

## 2021-08-30 NOTE — LETTER
8/31/2021    Patient: Enrrique Yung   YOB: 1945   Date of Visit: 8/30/2021     Angélica Johnson MD  [id number]  [address] [address]  Via Fax: [phone]    Dear Angélica Johnson MD,      Thank you for referring Ms. Enrrique Yung to [address] [place] [address] for evaluation. My notes for this consultation are attached. If you have questions, please do not hesitate to call me. I look forward to following your patient along with you.       Sincerely,    Melisa Gomez MD

## 2021-08-31 ENCOUNTER — TELEPHONE (OUTPATIENT)
Dept: ONCOLOGY | Age: 76
End: 2021-08-31

## 2021-09-07 RX ORDER — HEPARIN 100 UNIT/ML
500 SYRINGE INTRAVENOUS AS NEEDED
Status: CANCELLED | OUTPATIENT
Start: 2021-09-30

## 2021-09-07 RX ORDER — SODIUM CHLORIDE 0.9 % (FLUSH) 0.9 %
5-10 SYRINGE (ML) INJECTION AS NEEDED
Status: CANCELLED | OUTPATIENT
Start: 2021-09-30 | End: 2021-10-01

## 2021-09-07 RX ORDER — SODIUM CHLORIDE 9 MG/ML
10 INJECTION INTRAMUSCULAR; INTRAVENOUS; SUBCUTANEOUS AS NEEDED
Status: CANCELLED | OUTPATIENT
Start: 2021-09-30

## 2021-09-27 NOTE — PROGRESS NOTES
Cooper Carlton is a 78 y.o. female here for follow up for GIST and AZUL. She is on Imatinib. 1. Have you been to the ER, urgent care clinic since your last visit? Hospitalized since your last visit? no  2. Have you seen or consulted any other health care providers outside of the [address] [place] [address] since your last visit? Include any pap smears or colon screening. no    Pt states she has been doing well. No concerns brought up. BP is elevated today. Pt states she took her BP meds this morning. MD notified.

## 2021-09-30 ENCOUNTER — HOSPITAL ENCOUNTER (OUTPATIENT)
Dept: INFUSION THERAPY | Age: 76
Discharge: HOME | End: 2021-09-30
Payer: MEDICARE

## 2021-09-30 ENCOUNTER — OFFICE VISIT (OUTPATIENT)
Dept: ONCOLOGY | Age: 76
End: 2021-09-30
Payer: MEDICARE

## 2021-09-30 VITALS
HEIGHT: 62 IN | BODY MASS INDEX: 34.01 KG/M2 | OXYGEN SATURATION: 98 % | SYSTOLIC BLOOD PRESSURE: 195 MMHG | HEART RATE: 72 BPM | DIASTOLIC BLOOD PRESSURE: 94 MMHG | WEIGHT: 184.8 LBS | TEMPERATURE: 98.1 F

## 2021-09-30 DIAGNOSIS — C49.A2 GASTROINTESTINAL STROMAL TUMOR (GIST) OF STOMACH (HCC): Primary | ICD-10-CM

## 2021-09-30 DIAGNOSIS — C49.A4 GIST (GASTROINTESTINAL STROMA TUMOR), MALIGNANT, COLON (HCC): ICD-10-CM

## 2021-09-30 DIAGNOSIS — C78.7 METASTATIC CANCER TO LIVER (HCC): ICD-10-CM

## 2021-09-30 DIAGNOSIS — D72.828 OTHER ELEVATED WHITE BLOOD CELL (WBC) COUNT: Primary | ICD-10-CM

## 2021-09-30 LAB
ALBUMIN SERPL-MCNC: 2 G/DL (ref 3.5–5)
ALBUMIN/GLOB SERPL: 0.5 {RATIO} (ref 1.1–2.2)
ALP SERPL-CCNC: 96 U/L (ref 45–117)
ALT SERPL-CCNC: 17 U/L (ref 12–78)
ANION GAP SERPL CALC-SCNC: 2 MMOL/L (ref 5–15)
AST SERPL-CCNC: 23 U/L (ref 15–37)
BASOPHILS # BLD: 0 K/UL (ref 0–0.1)
BASOPHILS NFR BLD: 0 % (ref 0–1)
BILIRUB SERPL-MCNC: 0.3 MG/DL (ref 0.2–1)
BUN SERPL-MCNC: 18 MG/DL (ref 6–20)
BUN/CREAT SERPL: 12 (ref 12–20)
CALCIUM SERPL-MCNC: 8.1 MG/DL (ref 8.5–10.1)
CHLORIDE SERPL-SCNC: 110 MMOL/L (ref 97–108)
CO2 SERPL-SCNC: 32 MMOL/L (ref 21–32)
CREAT SERPL-MCNC: 1.47 MG/DL (ref 0.55–1.02)
DIFFERENTIAL METHOD BLD: ABNORMAL
EOSINOPHIL # BLD: 0.1 K/UL (ref 0–0.4)
EOSINOPHIL NFR BLD: 1 % (ref 0–7)
ERYTHROCYTE [DISTWIDTH] IN BLOOD BY AUTOMATED COUNT: 16.2 % (ref 11.5–14.5)
GLOBULIN SER CALC-MCNC: 3.9 G/DL (ref 2–4)
GLUCOSE SERPL-MCNC: 176 MG/DL (ref 65–100)
HCT VFR BLD AUTO: 31.4 % (ref 35–47)
HGB BLD-MCNC: 10.1 G/DL (ref 11.5–16)
IMM GRANULOCYTES # BLD AUTO: 0 K/UL (ref 0–0.04)
IMM GRANULOCYTES NFR BLD AUTO: 0 % (ref 0–0.5)
LYMPHOCYTES # BLD: 1.4 K/UL (ref 0.8–3.5)
LYMPHOCYTES NFR BLD: 20 % (ref 12–49)
MCH RBC QN AUTO: 28.8 PG (ref 26–34)
MCHC RBC AUTO-ENTMCNC: 32.2 G/DL (ref 30–36.5)
MCV RBC AUTO: 89.5 FL (ref 80–99)
MONOCYTES # BLD: 0.4 K/UL (ref 0–1)
MONOCYTES NFR BLD: 5 % (ref 5–13)
NEUTS SEG # BLD: 5.2 K/UL (ref 1.8–8)
NEUTS SEG NFR BLD: 74 % (ref 32–75)
NRBC # BLD: 0 K/UL (ref 0–0.01)
NRBC BLD-RTO: 0 PER 100 WBC
PLATELET # BLD AUTO: 211 K/UL (ref 150–400)
PMV BLD AUTO: 9.3 FL (ref 8.9–12.9)
POTASSIUM SERPL-SCNC: 3.4 MMOL/L (ref 3.5–5.1)
PROT SERPL-MCNC: 5.9 G/DL (ref 6.4–8.2)
RBC # BLD AUTO: 3.51 M/UL (ref 3.8–5.2)
SODIUM SERPL-SCNC: 144 MMOL/L (ref 136–145)
WBC # BLD AUTO: 7 K/UL (ref 3.6–11)

## 2021-09-30 PROCEDURE — G8753 SYS BP > OR = 140: HCPCS | Performed by: INTERNAL MEDICINE

## 2021-09-30 PROCEDURE — G8755 DIAS BP > OR = 90: HCPCS | Performed by: INTERNAL MEDICINE

## 2021-09-30 PROCEDURE — G8400 PT W/DXA NO RESULTS DOC: HCPCS | Performed by: INTERNAL MEDICINE

## 2021-09-30 PROCEDURE — 80053 COMPREHEN METABOLIC PANEL: CPT

## 2021-09-30 PROCEDURE — 3288F FALL RISK ASSESSMENT DOCD: CPT | Performed by: INTERNAL MEDICINE

## 2021-09-30 PROCEDURE — G9711 PT HX TOT COL OR COLON CA: HCPCS | Performed by: INTERNAL MEDICINE

## 2021-09-30 PROCEDURE — G8417 CALC BMI ABV UP PARAM F/U: HCPCS | Performed by: INTERNAL MEDICINE

## 2021-09-30 PROCEDURE — G0463 HOSPITAL OUTPT CLINIC VISIT: HCPCS | Performed by: INTERNAL MEDICINE

## 2021-09-30 PROCEDURE — 99214 OFFICE O/P EST MOD 30 MIN: CPT | Performed by: INTERNAL MEDICINE

## 2021-09-30 PROCEDURE — 85025 COMPLETE CBC W/AUTO DIFF WBC: CPT

## 2021-09-30 PROCEDURE — 1090F PRES/ABSN URINE INCON ASSESS: CPT | Performed by: INTERNAL MEDICINE

## 2021-09-30 PROCEDURE — G8432 DEP SCR NOT DOC, RNG: HCPCS | Performed by: INTERNAL MEDICINE

## 2021-09-30 PROCEDURE — G8427 DOCREV CUR MEDS BY ELIG CLIN: HCPCS | Performed by: INTERNAL MEDICINE

## 2021-09-30 PROCEDURE — G8536 NO DOC ELDER MAL SCRN: HCPCS | Performed by: INTERNAL MEDICINE

## 2021-09-30 PROCEDURE — 1100F PTFALLS ASSESS-DOCD GE2>/YR: CPT | Performed by: INTERNAL MEDICINE

## 2021-09-30 PROCEDURE — 36415 COLL VENOUS BLD VENIPUNCTURE: CPT

## 2021-09-30 RX ORDER — SODIUM CHLORIDE 9 MG/ML
10 INJECTION INTRAMUSCULAR; INTRAVENOUS; SUBCUTANEOUS AS NEEDED
Status: DISCONTINUED | OUTPATIENT
Start: 2021-09-30 | End: 2021-10-01 | Stop reason: HOSPADM

## 2021-09-30 RX ORDER — SODIUM CHLORIDE 0.9 % (FLUSH) 0.9 %
5-10 SYRINGE (ML) INJECTION AS NEEDED
Status: DISCONTINUED | OUTPATIENT
Start: 2021-09-30 | End: 2021-10-01 | Stop reason: HOSPADM

## 2021-09-30 RX ORDER — HEPARIN 100 UNIT/ML
500 SYRINGE INTRAVENOUS AS NEEDED
Status: DISCONTINUED | OUTPATIENT
Start: 2021-09-30 | End: 2021-10-01 | Stop reason: HOSPADM

## 2021-09-30 NOTE — PATIENT INSTRUCTIONS
2-3 days prior to next office appointment please get labs done at any [facility].    Please call [phone] to schedule your CT scan prior to next appointment also. (2-3 days prior).

## 2021-09-30 NOTE — LETTER
9/30/2021    Patient: Bhavani Hudson   YOB: 1945   Date of Visit: 9/30/2021     Akiko Trejo MD  [address]  [address] [address]  Via Fax: [phone]    Dear Akiko Trejo MD,      Thank you for referring Ms. Bhavani Hudson to [address] [place] [address] for evaluation. My notes for this consultation are attached. If you have questions, please do not hesitate to call me. I look forward to following your patient along with you.       Sincerely,    Pedro Solomon MD

## 2021-09-30 NOTE — PROGRESS NOTES
[facility] Lab Note    9202 Pt arrived at [facility] ambulatory from MD office and in no distress for labs. No new complaints voiced. Patient denied having any symptoms of COVID-19, i.e. SOB, coughing, fever, or generally not feeling well. Also denies having been exposed to COVID-19 recently or having had any recent contact with family/friend that has a pending COVID test.     Visit Vitals  Temp (P) 98.1 °F (36.7 °C)      Labs drawn from [address] [place] [address]. See [facility] for pended results. [address] well, no adverse reaction noted. D/Cd from [facility] ambulatory and in no distress accompanied by spouse. Pt to MD office to schedule next potential appointment.

## 2021-09-30 NOTE — PROGRESS NOTES
2001 [facility] [address], [address] [facility], [address] [place] [address]  [phone]         Oncology Note        Patient: Anuj Orta MRN: [id number]  SSN: [id number]    YOB: 1945  Age: 78 y.o. Sex: female        Diagnosis:      1. 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 Imatinib 400 mg daily started 8/2021    Subjective:      Anuj Orta is a 78 y.o. female who I am seeing for a diagnosis of GIST and iron deficiency anemia. She presented with severe anemia late last year. 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 Surgery was delayed due to cardiac clearance. Recent imaging shows metastasis to the liver and RP nodes. She underwent a partial gastrectomy and wedge resection of the liver. The liver biopsy was consistent with a diagnosis of GIST. She comes in to discuss the next steps in treatment. She feels well. Denies abdominal pain. She recently started taking imatinib. She denies any side effects. No abdominal pain. Review of Systems:    Constitutional: positive for fatigue  Eyes: negative  Ears, Nose, Mouth, Throat, and Face: negative  Respiratory: negative  Cardiovascular: negative  Gastrointestinal: negative  Genitourinary:negative  Integument/Breast: negative  Hematologic/Lymphatic: negative  Musculoskeletal:negative  Neurological: negative      ROS was pulled in from a previous visit. No changes were made d/t symptoms remain the same.       Past Medical History:   Diagnosis Date    Anemia, unspecified 10/6/2020    Arrhythmia     Arthritis     CAD (coronary artery disease)     Cancer ([address]     Chronic bronchitis ([address]     per pt:  as of 1/9/15 pt denies any ARENAS or SOB    Diabetes ([address] Dx approx 2009    Dr Tony Guerra (in connect care)    GERD (gastroesophageal reflux disease)     Hypercholesteremia     Hypertension      Past Surgical History:   Procedure Laterality Date    HX CHOLECYSTECTOMY  6/12    HX COLONOSCOPY      HX CORONARY STENT PLACEMENT  8/25/2015    HX DILATION AND CURETTAGE      HX OTHER SURGICAL  06/22/2021    Laparoscopic hand-assisted partial gastrectomy with core needle liver biopsy.  [place] CARDIAC SURG PROCEDURE UNLIST  2015    UPPER GI ENDOSCOPY,BIOPSY  10/6/2020           Family History   Problem Relation Age of Onset    Diabetes Mother     Heart Disease Mother     Hypertension Mother     Stroke Father     Heart Disease Brother     Heart Disease Brother     Stroke Brother     HIV/AIDS Brother      Social History     Tobacco Use    Smoking status: Former Smoker    Smokeless tobacco: Never Used    Tobacco comment: quit 50 years ago   Substance Use Topics    Alcohol use: No      Prior to Admission medications    Medication Sig Start Date End Date Taking? Authorizing Provider   imatinib mesylate (IMATINIB PO) Take  by mouth. Yes Provider, Historical   multivitamin, tx-iron-ca-min (THERA-M w/ IRON) 9 mg iron-400 mcg tab tablet Take 1 Tab by mouth two (2) times a day. Centrum silver   Yes Provider, Historical   pantoprazole (PROTONIX) 40 mg tablet Take 40 mg by mouth daily. 1/13/21  Yes Provider, Historical   glipiZIDE (GLUCOTROL) 5 mg tablet Take 1 Tab by mouth two (2) times a day. 1/21/21  Yes Puneet Mason MD   lancets misc PUSH BUTTON LANCETS. Check blood sugar 4 times per day due to hypoglycemic events. Dx:E11.42, E16.2 12/17/20  Yes Puneet Mason MD   amLODIPine (NORVASC) 10 mg tablet Take 1 Tab by mouth daily. 11/20/20  Yes Cristian Jacobs MD   food supplemt, lactose-reduced (Ensure Enlive) 0.08 gram-1.5 kcal/mL liqd Take 6 x daily as recommended by Surgery to improve nutritional status for Possible surgery in future  Patient taking differently: Take 6 x daily as recommended by Surgery to improve nutritional status for Possible surgery in future.  takes about 3 11/20/20  Yes Cristian Jacobs MD   DULoxetine (CYMBALTA) 60 mg capsule Take 1 Cap by mouth daily. 10/14/20  Yes Tianna Kim NP   losartan (COZAAR) 100 mg tablet Take 1 Tab by mouth daily. 10/14/20  Yes Christiane Cottrell NP   carvediloL (COREG) 12.5 mg tablet Take 1 Tab by mouth two (2) times daily (with meals). 10/13/20  Yes Christiane Cottrell NP   torsemide (DEMADEX) 20 mg tablet Take 40 mg by mouth daily. Yes Provider, Historical   atorvastatin (LIPITOR) 40 mg tablet Take 1 Tab by mouth nightly. 1/27/20  Yes Parviz Wallace MD              Allergies   Allergen Reactions    Metformin Other (comments)     GI side effects. Not a true allergy           Objective:     Vitals:    09/30/21 1307   BP: (!) 195/94   Pulse: 72   Temp: 98.1 °F (36.7 °C)   TempSrc: Temporal   SpO2: 98%   Weight: 184 lb 12.8 oz (83.8 kg)   Height: 5' 2\" (1.575 m)          Physical Exam:    GENERAL: alert, cooperative, no distress, appears stated age  EYE: conjunctivae/corneas clear. LYMPHATIC: Cervical, supraclavicular, and axillary nodes normal.   THROAT & NECK: normal and no erythema or exudates noted. LUNG: clear to auscultation bilaterally  HEART: regular rate and rhythm, no murmur  ABDOMEN: soft, non-tender palpation  EXTREMITIES: no cyanosis or edema  SKIN: Normal, no rash or ecchymosis  NEUROLOGIC: AOx3.        Physical exam and ROS has been modified from a prior visit to make it relevant and current      Lab Results   Component Value Date/Time    WBC 8.1 07/01/2021 03:57 AM    HGB 8.9 (L) 07/01/2021 03:57 AM    HCT 30.7 (L) 07/01/2021 03:57 AM    PLATELET 912 71/37/7359 03:57 AM    MCV 83.9 07/01/2021 03:57 AM       Lab Results   Component Value Date/Time    Sodium 140 07/01/2021 03:57 AM    Potassium 4.2 07/01/2021 03:57 AM    Chloride 109 (H) 07/01/2021 03:57 AM    CO2 24 07/01/2021 03:57 AM    Anion gap 7 07/01/2021 03:57 AM    Glucose 150 (H) 07/01/2021 03:57 AM    BUN 41 (H) 07/01/2021 03:57 AM    Creatinine 1.01 07/01/2021 03:57 AM    BUN/Creatinine ratio 41 (H) 07/01/2021 03:57 AM    GFR est AA >60 07/01/2021 03:57 AM    GFR est non-AA 54 (L) 07/01/2021 03:57 AM    Calcium 7.7 (L) 07/01/2021 03:57 AM    Bilirubin, total 0.5 06/30/2021 04:54 AM    Alk. phosphatase 118 (H) 06/30/2021 04:54 AM    Protein, total 5.2 (L) 06/30/2021 04:54 AM    Albumin 1.4 (L) 06/30/2021 04:54 AM    Globulin 3.8 06/30/2021 04:54 AM    A-G Ratio 0.4 (L) 06/30/2021 04:54 AM    ALT (SGPT) 35 06/30/2021 04:54 AM    AST (SGOT) 37 06/30/2021 04:54 AM     CT Results (most recent):  Results from [place] encounter on 08/25/21    CT ABD PELV W CONT    Narrative  INDICATION: Gastrointestinal stromal tumor of stomach, staging. CT of the chest, abdomen and pelvis is performed with 5 mm collimation. Study is  performed with PO and 100 cc of nonionic IV Isovue-370. Sagittal and coronal  reformatted images were also performed. CT dose reduction was achieved with the use of the standardized protocol  tailored for this examination and automatic exposure control for dose  modulation. Direct comparison is made to prior CT of the abdomen and pelvis dated June 2021    Chest:    Lungs: Lungs are clear bilaterally. There is no pulmonary nodule or mass. Lymph nodes: Subcentimeter mediastinal and bilateral axillary lymph nodes are  noted. Pleura: There is no pleural fluid. Heart: The heart is normal in size and there is no pericardial fluid. Bones: No lytic or sclerotic osseous lesion is visualized. Abdomen/pelvis:    Stomach: Partial gastrectomy postoperative changes are noted. Liver: Comparison to prior CT dated June 2021 is somewhat difficult as the prior  is performed without IV contrast. There are innumerable hypodense hepatic  lesions within the liver. Hepatic lesions have increased in number compared to  prior CT dated June 2021. The majority of the hepatic masses have increased in  size, while several and decreased in size.  For example, within the medial right  hepatic lobe, there is a 3.2 cm x 2.7 cm lesion which measured 2.6 cm x 2 cm on  prior CT dated June 2021. As an additional example, there is a 2.3 cm x 2.2 cm  lesion within the upper right hepatic lobe, measuring 1.7 cm x 1.6 cm in prior  CT dated June 2021. As an additional example, there is a 2.8 cm x 2.4 cm  hypodense lesion within the inferior right hepatic lobe, measuring approximately  2.7 cm x 1.6 cm in prior CT dated June 2021. As an additional example, there is  a 1.3 cm x 1.3 cm hypodense lesion within the left hepatic lobe measuring 2.3 cm  x 2 cm in prior CT dated June 2020. There is a 1.1 cm x 5 mm lesion within the  caudate lobe, measuring 2.5 cm x 1.5 cm in prior CT dated June 2021. Spleen: The spleen is normal.    Pancreas: The pancreas is normal.    Adrenals: The adrenals are normal.    Gallbladder: Gallbladder is surgically absent. Kidneys: There is a 5 mm nonobstructing left renal calculus. Kidneys otherwise  enhance promptly and symmetrically. There is no hydronephrosis. Bowel: No dilated or thickened loop of large or small bowel is seen. Appendix: The appendix is normal.    Urinary bladder: Urinary bladder is partially filled and grossly normal.    Bones: No lytic or sclerotic osseous lesion is visualized. Miscellaneous: There is no free intraperitoneal gas or fluid. There is no focal  fluid collection to suggest abscess. Impression  1. Interval increase in number of hepatic lesions. Majority of the hepatic  lesions have increased in size, while several are decreased in size. Assessment:     1. Gastrointestinal tumor of the stomach with metastasis to the liver and retroperitoneal nodes    ECOG PS 2  Intent of Treatment - palliative  Prognosis: guarded    S/P partial gastrectomy 06/22/2021  Standard of care for the treatment of metastatic GIST is imatinib. Started Imatinib 400 mg PO daily. Tolerating well  Detailed assessment of side effects was performed.    Repeat labs    The diseae has a high risk of progression. 2. Iron deficiency anemia    Received IV iron         Plan:       1. Continue Imatinib   2. Labs: CBC and CMP  3. Repeat labs  4. Follow up 4 weeks  5. CT abd/pelvis    I performed a history and physical examination of the patient and discussed his management with the NPP. I reviewed the NPP note and agree with the documented findings and plan of care. The patient was seen in conjunction with Ms. Torresny. Ms. Leon Smith is a women with metastatic gastric GIST. She is taking Imatinib. She denies any side effects or symptoms of the disease. Her physical exam is normal       Signed by: Mounika Holman MD                     September 30, 2021      CC. Everardo Lawrence MD  CC.  Rajan Drew MD

## 2021-10-01 ENCOUNTER — TELEPHONE (OUTPATIENT)
Dept: ONCOLOGY | Age: 76
End: 2021-10-01

## 2021-10-01 NOTE — TELEPHONE ENCOUNTER
Returned call to patient, HIPAA verified. Patient states she has already gotten in touch with the pharmacy, Medication is being shipped.

## 2021-10-01 NOTE — TELEPHONE ENCOUNTER
Patient called and said that she has tried for two days to reach the number she is supposed to call for her meds.  States all she is getting is a busy signal.

## 2021-10-26 ENCOUNTER — HOSPITAL ENCOUNTER (OUTPATIENT)
Dept: CT IMAGING | Age: 76
Discharge: HOME | End: 2021-10-26
Attending: INTERNAL MEDICINE
Payer: MEDICARE

## 2021-10-26 DIAGNOSIS — C49.A2 GASTROINTESTINAL STROMAL TUMOR (GIST) OF STOMACH (HCC): ICD-10-CM

## 2021-10-26 PROCEDURE — 74177 CT ABD & PELVIS W/CONTRAST: CPT

## 2021-10-26 PROCEDURE — 74011000250 HC RX REV CODE- 250: Performed by: INTERNAL MEDICINE

## 2021-10-26 PROCEDURE — 74011000636 HC RX REV CODE- 636: Performed by: INTERNAL MEDICINE

## 2021-10-26 RX ORDER — BARIUM SULFATE 20 MG/ML
900 SUSPENSION ORAL
Status: COMPLETED | OUTPATIENT
Start: 2021-10-26 | End: 2021-10-26

## 2021-10-26 RX ADMIN — IOPAMIDOL 100 ML: 755 INJECTION, SOLUTION INTRAVENOUS at 13:18

## 2021-10-26 RX ADMIN — BARIUM SULFATE 900 ML: 21 SUSPENSION ORAL at 13:18

## 2021-10-27 NOTE — PROGRESS NOTES
Shyann Jameson is a 78 y.o. female here for one month follow up for GIST and AZUL. She is on Imatinib. 1. Have you been to the ER, urgent care clinic since your last visit? Hospitalized since your last visit? no     2. Have you seen or consulted any other health care providers outside of the [address] [place] [address] since your last visit? Include any pap smears or colon screening. no    Pt states she is doing so so. She has not taken any medications yet today because she has felt nauseous this morning.

## 2021-10-28 ENCOUNTER — OFFICE VISIT (OUTPATIENT)
Dept: ONCOLOGY | Age: 76
End: 2021-10-28
Payer: MEDICARE

## 2021-10-28 VITALS
WEIGHT: 195.6 LBS | HEIGHT: 62 IN | SYSTOLIC BLOOD PRESSURE: 175 MMHG | OXYGEN SATURATION: 100 % | BODY MASS INDEX: 35.99 KG/M2 | DIASTOLIC BLOOD PRESSURE: 66 MMHG | HEART RATE: 66 BPM | TEMPERATURE: 97.7 F

## 2021-10-28 DIAGNOSIS — C78.7 METASTATIC CANCER TO LIVER (HCC): ICD-10-CM

## 2021-10-28 DIAGNOSIS — C49.A2 GASTROINTESTINAL STROMAL TUMOR (GIST) OF STOMACH (HCC): Primary | ICD-10-CM

## 2021-10-28 PROCEDURE — G8427 DOCREV CUR MEDS BY ELIG CLIN: HCPCS | Performed by: INTERNAL MEDICINE

## 2021-10-28 PROCEDURE — G9711 PT HX TOT COL OR COLON CA: HCPCS | Performed by: INTERNAL MEDICINE

## 2021-10-28 PROCEDURE — G8753 SYS BP > OR = 140: HCPCS | Performed by: INTERNAL MEDICINE

## 2021-10-28 PROCEDURE — G8536 NO DOC ELDER MAL SCRN: HCPCS | Performed by: INTERNAL MEDICINE

## 2021-10-28 PROCEDURE — G8432 DEP SCR NOT DOC, RNG: HCPCS | Performed by: INTERNAL MEDICINE

## 2021-10-28 PROCEDURE — 99214 OFFICE O/P EST MOD 30 MIN: CPT | Performed by: INTERNAL MEDICINE

## 2021-10-28 PROCEDURE — G8400 PT W/DXA NO RESULTS DOC: HCPCS | Performed by: INTERNAL MEDICINE

## 2021-10-28 PROCEDURE — G8417 CALC BMI ABV UP PARAM F/U: HCPCS | Performed by: INTERNAL MEDICINE

## 2021-10-28 PROCEDURE — 1090F PRES/ABSN URINE INCON ASSESS: CPT | Performed by: INTERNAL MEDICINE

## 2021-10-28 PROCEDURE — G0463 HOSPITAL OUTPT CLINIC VISIT: HCPCS | Performed by: INTERNAL MEDICINE

## 2021-10-28 PROCEDURE — 1101F PT FALLS ASSESS-DOCD LE1/YR: CPT | Performed by: INTERNAL MEDICINE

## 2021-10-28 PROCEDURE — G8754 DIAS BP LESS 90: HCPCS | Performed by: INTERNAL MEDICINE

## 2021-10-28 NOTE — LETTER
10/28/2021    Patient: Allison Lopez   YOB: 1945   Date of Visit: 10/28/2021     Leelee Goss MD  [facility] [address]  [address] [address]  Via Fax: [phone]    Dear Leelee Goss MD,      Thank you for referring Ms. Allison Lopez to [address] [place] [address] for evaluation. My notes for this consultation are attached. If you have questions, please do not hesitate to call me. I look forward to following your patient along with you.       Sincerely,    Amor Cerda MD

## 2021-10-28 NOTE — PROGRESS NOTES
2001 [facility] [address], [address] [facility], [address] [place] [address]  [phone]         Oncology Note        Patient: Jasper Suarez MRN: [id number]  SSN: [id number]    YOB: 1945  Age: 78 y.o. Sex: female        Diagnosis:      1. 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 Imatinib 400 mg daily started 8/2021    Subjective:      Jasper Suarez is a 78 y.o. female who I am seeing for a diagnosis of GIST and iron deficiency anemia. She presented with severe anemia late last year. 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 Surgery was delayed due to cardiac clearance. Recent imaging shows metastasis to the liver and RP nodes. She underwent a partial gastrectomy and wedge resection of the liver. The liver biopsy was consistent with a diagnosis of GIST. She comes in to discuss the next steps in treatment. She feels well. Denies abdominal pain. She recently started taking imatinib. She denies any side effects. Mild abdominal pain. Review of Systems:    Constitutional: positive for fatigue  Eyes: negative  Ears, Nose, Mouth, Throat, and Face: negative  Respiratory: negative  Cardiovascular: negative  Gastrointestinal: negative  Genitourinary:negative  Integument/Breast: negative  Hematologic/Lymphatic: negative  Musculoskeletal:negative  Neurological: negative      ROS was pulled in from a previous visit. No changes were made d/t symptoms remain the same.       Past Medical History:   Diagnosis Date    Anemia, unspecified 10/6/2020    Arrhythmia     Arthritis     CAD (coronary artery disease)     Cancer ([address]     Chronic bronchitis ([address]     per pt:  as of 1/9/15 pt denies any ARENAS or SOB    Diabetes ([address] Dx approx 2009    Dr Neal Pro (in connect care)    GERD (gastroesophageal reflux disease)     Hypercholesteremia     Hypertension      Past Surgical History:   Procedure Laterality Date    HX CHOLECYSTECTOMY  6/12    HX COLONOSCOPY      HX CORONARY STENT PLACEMENT  8/25/2015    HX DILATION AND CURETTAGE      HX OTHER SURGICAL  06/22/2021    Laparoscopic hand-assisted partial gastrectomy with core needle liver biopsy.  [place] CARDIAC SURG PROCEDURE UNLIST  2015    UPPER GI ENDOSCOPY,BIOPSY  10/6/2020           Family History   Problem Relation Age of Onset    Diabetes Mother     Heart Disease Mother     Hypertension Mother     Stroke Father     Heart Disease Brother     Heart Disease Brother     Stroke Brother     HIV/AIDS Brother      Social History     Tobacco Use    Smoking status: Former Smoker    Smokeless tobacco: Never Used    Tobacco comment: quit 50 years ago   Substance Use Topics    Alcohol use: No      Prior to Admission medications    Medication Sig Start Date End Date Taking? Authorizing Provider   imatinib mesylate (IMATINIB PO) Take  by mouth. Yes Provider, Historical   multivitamin, tx-iron-ca-min (THERA-M w/ IRON) 9 mg iron-400 mcg tab tablet Take 1 Tab by mouth two (2) times a day. Centrum silver   Yes Provider, Historical   pantoprazole (PROTONIX) 40 mg tablet Take 40 mg by mouth daily. 1/13/21  Yes Provider, Historical   glipiZIDE (GLUCOTROL) 5 mg tablet Take 1 Tab by mouth two (2) times a day. 1/21/21  Yes Char Parker MD   lancets misc PUSH BUTTON LANCETS. Check blood sugar 4 times per day due to hypoglycemic events. Dx:E11.42, E16.2 12/17/20  Yes Char Parker MD   amLODIPine (NORVASC) 10 mg tablet Take 1 Tab by mouth daily. 11/20/20  Yes Cecelia Lovett MD   food supplemt, lactose-reduced (Ensure Enlive) 0.08 gram-1.5 kcal/mL liqd Take 6 x daily as recommended by Surgery to improve nutritional status for Possible surgery in future  Patient taking differently: Take 6 x daily as recommended by Surgery to improve nutritional status for Possible surgery in future.  takes about 3 11/20/20  Yes Cecelia Lovett MD   DULoxetine (CYMBALTA) 60 mg capsule Take 1 Cap by mouth daily. 10/14/20  Yes Andres Kim NP   losartan (COZAAR) 100 mg tablet Take 1 Tab by mouth daily. 10/14/20  Yes Winthrop Cabot, NP   carvediloL (COREG) 12.5 mg tablet Take 1 Tab by mouth two (2) times daily (with meals). 10/13/20  Yes Winthrop Cabot, NP   torsemide (DEMADEX) 20 mg tablet Take 40 mg by mouth daily. Yes Provider, Historical   atorvastatin (LIPITOR) 40 mg tablet Take 1 Tab by mouth nightly. 1/27/20  Yes Yuki Hogan MD              Allergies   Allergen Reactions    Metformin Other (comments)     GI side effects. Not a true allergy           Objective:     Vitals:    10/28/21 1338   BP: (!) 175/66   Pulse: 66   Temp: 97.7 °F (36.5 °C)   TempSrc: Temporal   SpO2: 100%   Weight: 195 lb 9.6 oz (88.7 kg)   Height: 5' 2\" (1.575 m)          Physical Exam:    GENERAL: alert, cooperative, no distress, appears stated age  EYE: conjunctivae/corneas clear. LYMPHATIC: Cervical, supraclavicular, and axillary nodes normal.   THROAT & NECK: normal and no erythema or exudates noted. LUNG: clear to auscultation bilaterally  HEART: regular rate and rhythm, no murmur  ABDOMEN: soft, non-tender palpation  EXTREMITIES: no cyanosis or edema  SKIN: Normal, no rash or ecchymosis  NEUROLOGIC: AOx3.        Physical exam and ROS has been modified from a prior visit to make it relevant and current      Lab Results   Component Value Date/Time    WBC 7.0 09/30/2021 02:29 PM    HGB 10.1 (L) 09/30/2021 02:29 PM    HCT 31.4 (L) 09/30/2021 02:29 PM    PLATELET 850 50/02/2101 02:29 PM    MCV 89.5 09/30/2021 02:29 PM       Lab Results   Component Value Date/Time    Sodium 144 09/30/2021 02:29 PM    Potassium 3.4 (L) 09/30/2021 02:29 PM    Chloride 110 (H) 09/30/2021 02:29 PM    CO2 32 09/30/2021 02:29 PM    Anion gap 2 (L) 09/30/2021 02:29 PM    Glucose 176 (H) 09/30/2021 02:29 PM    BUN 18 09/30/2021 02:29 PM    Creatinine 1.47 (H) 09/30/2021 02:29 PM    BUN/Creatinine ratio 12 09/30/2021 02:29 PM GFR est AA 42 (L) 09/30/2021 02:29 PM    GFR est non-AA 35 (L) 09/30/2021 02:29 PM    Calcium 8.1 (L) 09/30/2021 02:29 PM    Bilirubin, total 0.3 09/30/2021 02:29 PM    Alk. phosphatase 96 09/30/2021 02:29 PM    Protein, total 5.9 (L) 09/30/2021 02:29 PM    Albumin 2.0 (L) 09/30/2021 02:29 PM    Globulin 3.9 09/30/2021 02:29 PM    A-G Ratio 0.5 (L) 09/30/2021 02:29 PM    ALT (SGPT) 17 09/30/2021 02:29 PM    AST (SGOT) 23 09/30/2021 02:29 PM     CT Results (most recent):  Results from Hospital Encounter encounter on 10/26/21    CT ABD PELV W CONT    Narrative  EXAM: CT ABD PELV W CONT    INDICATION: GIST tumor    COMPARISON: CT chest abdomen pelvis August 25, 2021    CONTRAST: 100 mL of Isovue-370. TECHNIQUE:  Following the uneventful intravenous administration of contrast, thin axial  images were obtained through the abdomen and pelvis. Coronal and sagittal  reconstructions were generated. Oral contrast was administered. CT dose  reduction was achieved through use of a standardized protocol tailored for this  examination and automatic exposure control for dose modulation. FINDINGS:  LOWER THORAX: No significant abnormality in the incidentally imaged lower chest.  LIVER: Numerous hepatic lesions, which are all unchanged or smaller compared to  prior examination. The largest single lesion is immediately posterior to the  IVC, measuring 2.3 x 1.6 cm, previously 3.3 x 2.4 cm. BILIARY TREE: Status post cholecystectomy. CBD is not dilated. SPLEEN: within normal limits. PANCREAS: No mass or ductal dilatation. ADRENALS: Unremarkable. KIDNEYS: No mass, calculus, or hydronephrosis. STOMACH: Status post partial gastric resection. SMALL BOWEL: No dilatation or wall thickening. COLON: No dilatation or wall thickening. Mild sigmoid diverticulosis without  evidence of diverticulitis. APPENDIX: Unremarkable. PERITONEUM: Trace free fluid in the deep pelvis.   RETROPERITONEUM: Portacaval lymph node (2-22) measures 1.2 x 1.0 cm, unchanged. REPRODUCTIVE ORGANS: Small calcified fibroids are noted in the uterus, which is  otherwise unremarkable. URINARY BLADDER: Mostly decompressed. BONES: No destructive bone lesion. ABDOMINAL WALL: No mass or hernia. ADDITIONAL COMMENTS: N/A    Impression  1. Numerous hypodense hepatic lesions are unchanged or decreased in size  compared to prior examination. The dominant lesion measures 2.3 x 1.6 cm,  previously 3.3 x 2.4 cm.  2. Stable portacaval lymphadenopathy. Assessment:     1. Gastrointestinal tumor of the stomach with metastasis to the liver and retroperitoneal nodes    ECOG PS 2  Intent of Treatment - palliative  Prognosis: guarded    S/P partial gastrectomy 06/22/2021  Standard of care for the treatment of metastatic GIST is imatinib. Taking Imatinib 400 mg PO daily. Tolerating well  Detailed assessment of side effects was performed. CT Abd/pelvis (10/26/2021) - good response to therapy    The disease has a high risk of progression. 2. Iron deficiency anemia    Received IV iron       Plan:     1. Continue Imatinib   2. Labs: CBC and CMP before the next visit  3. Follow up 2 months      Signed by: Joana Dandy, MD                     October 28, 2021      CC. Alli Juares MD  CC.  Emily Francois MD

## 2021-11-12 ENCOUNTER — TELEPHONE (OUTPATIENT)
Dept: ENDOCRINOLOGY | Age: 76
End: 2021-11-12

## 2021-11-12 NOTE — TELEPHONE ENCOUNTER
called stating that patient has 2 different colored medications in her pill bottle of Carvedilol and wonders which to take. Advised to call pharmacist to get clarification.  expressed understanding.

## 2021-11-15 ENCOUNTER — TELEPHONE (OUTPATIENT)
Dept: ENDOCRINOLOGY | Age: 76
End: 2021-11-15

## 2021-11-15 RX ORDER — LOSARTAN POTASSIUM 100 MG/1
100 TABLET ORAL DAILY
Qty: 90 TABLET | Refills: 3 | Status: SHIPPED | OUTPATIENT
Start: 2021-11-15 | End: 2021-11-18 | Stop reason: SDUPTHER

## 2021-11-15 NOTE — TELEPHONE ENCOUNTER
Spoke with . He states that his wife has run out of Glipizide 5 mh and Losartan 100 mg x 1 month. He has misplaced her glucometer and has not checked her blood sugars over a week. (his sister is to  a glucometer this evening or tomorrow). He will start checking her blood sugars 2 times per day. Advised him to call with readings end of this week. He agreed.  wonders if needs to get back on Glipizide and Losartan. If so, needs refills.

## 2021-11-15 NOTE — TELEPHONE ENCOUNTER
I will order the losartan now and when he provides the blood sugar readings in a couple of days, I will re-order the Glipizide, if needed.

## 2021-11-18 RX ORDER — LOSARTAN POTASSIUM 100 MG/1
100 TABLET ORAL DAILY
Qty: 90 TABLET | Refills: 3 | Status: ON HOLD | OUTPATIENT
Start: 2021-11-18 | End: 2022-07-17 | Stop reason: SDUPTHER

## 2021-11-19 ENCOUNTER — TELEPHONE (OUTPATIENT)
Dept: ENDOCRINOLOGY | Age: 76
End: 2021-11-19

## 2021-11-19 NOTE — TELEPHONE ENCOUNTER
HEIDEI:  called stating that patient's blood sugars are running int he 200s this week. The highest yesterday was 231 about 15-20 minutes after eating. Advised  to always check fasting and 2 hours after eating. Advised to call me on Monday with readings over the weekend.  expressed understanding.

## 2021-12-06 NOTE — ED NOTES
Pt here for testing. Pt had a positive exposure all last week at work. Pt has had  Sore throat, runny nose and congestion for a few days. Today pt has abdominal; pain and diarrhea. TRANSFER - OUT REPORT:    Bedside verbal report given to Soraya Peter RN (name) on Marge Lea  being transferred to [address] [place] [address] (unit) for routine progression of care       Report consisted of patients Situation, Background, Assessment and   Recommendations(SBAR). Information from the following report(s) SBAR was reviewed with the receiving nurse. Lines:   Peripheral IV 10/04/20 Right Antecubital (Active)   Site Assessment Clean, dry, & intact 10/04/20 1345   Phlebitis Assessment 0 10/04/20 1345   Infiltration Assessment 0 10/04/20 1345   Dressing Status Clean, dry, & intact 10/04/20 1345       Peripheral IV 10/04/20 Left Antecubital (Active)   Site Assessment Clean, dry, & intact 10/04/20 1345   Phlebitis Assessment 0 10/04/20 1345   Infiltration Assessment 0 10/04/20 1345   Dressing Status Clean, dry, & intact 10/04/20 1345        Opportunity for questions and clarification was provided. Patient transported with:   Monitor and RN. Receiving RN is aware that pt's blood transfusion has not been allocated yet.

## 2022-02-15 NOTE — PROGRESS NOTES
Kim Mistry is a 78 y.o. female here for 4 month follow up for GIST and AZUL. She is on Imatinib. She has lost 25 lbs in last few months. She is eating better. States she is doing ok. 1. Have you been to the ER, urgent care clinic since your last visit? Hospitalized since your last visit? no  2. Have you seen or consulted any other health care providers outside of the [address] [place] [address] since your last visit? Include any pap smears or colon screening.  Dr Nicolle Simons, Dr Lauren Tejeda, Dr Jennifer Adair

## 2022-02-16 ENCOUNTER — OFFICE VISIT (OUTPATIENT)
Dept: ONCOLOGY | Age: 77
End: 2022-02-16
Payer: MEDICARE

## 2022-02-16 VITALS
DIASTOLIC BLOOD PRESSURE: 51 MMHG | TEMPERATURE: 97.9 F | HEIGHT: 62 IN | HEART RATE: 99 BPM | WEIGHT: 170 LBS | SYSTOLIC BLOOD PRESSURE: 131 MMHG | BODY MASS INDEX: 31.28 KG/M2 | OXYGEN SATURATION: 99 %

## 2022-02-16 DIAGNOSIS — Z79.4 TYPE 2 DIABETES MELLITUS WITH DIABETIC POLYNEUROPATHY, WITH LONG-TERM CURRENT USE OF INSULIN (HCC): ICD-10-CM

## 2022-02-16 DIAGNOSIS — C49.A2 GASTROINTESTINAL STROMAL TUMOR (GIST) OF STOMACH (HCC): Primary | ICD-10-CM

## 2022-02-16 DIAGNOSIS — Z51.11 CHEMOTHERAPY MANAGEMENT, ENCOUNTER FOR: ICD-10-CM

## 2022-02-16 DIAGNOSIS — I10 ESSENTIAL HYPERTENSION: ICD-10-CM

## 2022-02-16 DIAGNOSIS — C78.7 METASTATIC CANCER TO LIVER (HCC): ICD-10-CM

## 2022-02-16 DIAGNOSIS — E11.42 TYPE 2 DIABETES MELLITUS WITH DIABETIC POLYNEUROPATHY, WITH LONG-TERM CURRENT USE OF INSULIN (HCC): ICD-10-CM

## 2022-02-16 PROCEDURE — G8536 NO DOC ELDER MAL SCRN: HCPCS | Performed by: INTERNAL MEDICINE

## 2022-02-16 PROCEDURE — 99215 OFFICE O/P EST HI 40 MIN: CPT | Performed by: INTERNAL MEDICINE

## 2022-02-16 PROCEDURE — G8427 DOCREV CUR MEDS BY ELIG CLIN: HCPCS | Performed by: INTERNAL MEDICINE

## 2022-02-16 PROCEDURE — G8417 CALC BMI ABV UP PARAM F/U: HCPCS | Performed by: INTERNAL MEDICINE

## 2022-02-16 PROCEDURE — G9711 PT HX TOT COL OR COLON CA: HCPCS | Performed by: INTERNAL MEDICINE

## 2022-02-16 PROCEDURE — G8432 DEP SCR NOT DOC, RNG: HCPCS | Performed by: INTERNAL MEDICINE

## 2022-02-16 PROCEDURE — 3046F HEMOGLOBIN A1C LEVEL >9.0%: CPT | Performed by: INTERNAL MEDICINE

## 2022-02-16 PROCEDURE — G8754 DIAS BP LESS 90: HCPCS | Performed by: INTERNAL MEDICINE

## 2022-02-16 PROCEDURE — 2022F DILAT RTA XM EVC RTNOPTHY: CPT | Performed by: INTERNAL MEDICINE

## 2022-02-16 PROCEDURE — G8752 SYS BP LESS 140: HCPCS | Performed by: INTERNAL MEDICINE

## 2022-02-16 PROCEDURE — 1101F PT FALLS ASSESS-DOCD LE1/YR: CPT | Performed by: INTERNAL MEDICINE

## 2022-02-16 PROCEDURE — 1090F PRES/ABSN URINE INCON ASSESS: CPT | Performed by: INTERNAL MEDICINE

## 2022-02-16 PROCEDURE — G0463 HOSPITAL OUTPT CLINIC VISIT: HCPCS | Performed by: INTERNAL MEDICINE

## 2022-02-16 PROCEDURE — G8400 PT W/DXA NO RESULTS DOC: HCPCS | Performed by: INTERNAL MEDICINE

## 2022-02-16 RX ORDER — POTASSIUM CHLORIDE 20 MEQ/1
TABLET, EXTENDED RELEASE ORAL
COMMUNITY
Start: 2022-01-12

## 2022-02-16 RX ORDER — ISOSORBIDE MONONITRATE 30 MG/1
TABLET, EXTENDED RELEASE ORAL
COMMUNITY
Start: 2022-01-17

## 2022-02-16 NOTE — LETTER
2/16/2022    Patient: Nuria Harvey   YOB: 1945   Date of Visit: 2/16/2022     Ana Lama MD  [facility] [address] [address]  Via Fax: [phone]    Dear Ana Lama MD,      Thank you for referring Ms. Nuria Harvey to [address] [place] [address] for evaluation. My notes for this consultation are attached. If you have questions, please do not hesitate to call me. I look forward to following your patient along with you.       Sincerely,    Checo Orozco MD

## 2022-02-16 NOTE — PROGRESS NOTES
[address] [facility] [address], [address] [facility], [address] [place] [address]  [phone]         Oncology Note        Patient: Kaitlynn Kelley MRN: [id number]  SSN: [id number]    YOB: 1945  Age: 78 y.o. Sex: female        Diagnosis:      1. 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 Imatinib 400 mg daily started 8/2021    Subjective:      Kaitlynn Kelley is a 78 y.o. female who I am seeing for a diagnosis of GIST and iron deficiency anemia. She presented with severe anemia late last year. 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 Surgery was delayed due to cardiac clearance. Recent imaging shows metastasis to the liver and RP nodes. She underwent a partial gastrectomy and wedge resection of the liver. The liver biopsy was consistent with a diagnosis of GIST. She comes in to discuss the next steps in treatment. She feels well. Denies abdominal pain. She recently started taking imatinib. She denies any side effects. She has lost weight d/t changing her diet to vegan. She is accompanied by her son. Review of Systems:    Constitutional: positive for fatigue  Eyes: negative  Ears, Nose, Mouth, Throat, and Face: negative  Respiratory: negative  Cardiovascular: negative  Gastrointestinal: negative  Genitourinary:negative  Integument/Breast: negative  Hematologic/Lymphatic: negative  Musculoskeletal:negative  Neurological: negative    Review of systems was reviewed and updated as needed on 02/16/22.       Past Medical History:   Diagnosis Date    Anemia, unspecified 10/6/2020    Arrhythmia     Arthritis     CAD (coronary artery disease)     Cancer ([address]     Chronic bronchitis ([address]     per pt:  as of 1/9/15 pt denies any ARENAS or SOB    Diabetes ([address] Dx approx 2009    Dr Valeria Sue (in connect care)    GERD (gastroesophageal reflux disease)     Hypercholesteremia     Hypertension      Past Surgical History: Procedure Laterality Date    HX CHOLECYSTECTOMY  6/12    HX COLONOSCOPY      HX CORONARY STENT PLACEMENT  8/25/2015    HX DILATION AND CURETTAGE      HX OTHER SURGICAL  06/22/2021    Laparoscopic hand-assisted partial gastrectomy with core needle liver biopsy.  [place] CARDIAC SURG PROCEDURE UNLIST  2015    UPPER GI ENDOSCOPY,BIOPSY  10/6/2020           Family History   Problem Relation Age of Onset    Diabetes Mother     Heart Disease Mother     Hypertension Mother     Stroke Father     Heart Disease Brother     Heart Disease Brother     Stroke Brother     HIV/AIDS Brother      Social History     Tobacco Use    Smoking status: Former Smoker    Smokeless tobacco: Never Used    Tobacco comment: quit 50 years ago   Substance Use Topics    Alcohol use: No      Prior to Admission medications    Medication Sig Start Date End Date Taking? Authorizing Provider   potassium chloride (K-DUR, KLOR-CON M20) 20 mEq tablet  1/12/22  Yes Provider, Historical   isosorbide mononitrate ER (IMDUR) 30 mg tablet  1/17/22  Yes Provider, Historical   losartan (COZAAR) 100 mg tablet Take 1 Tablet by mouth daily. 11/18/21  Yes Silvia Bosch MD   imatinib mesylate (IMATINIB PO) Take  by mouth. Yes Provider, Historical   multivitamin, tx-iron-ca-min (THERA-M w/ IRON) 9 mg iron-400 mcg tab tablet Take 1 Tab by mouth two (2) times a day. Centrum silver   Yes Provider, Historical   pantoprazole (PROTONIX) 40 mg tablet Take 40 mg by mouth daily. 1/13/21  Yes Provider, Historical   glipiZIDE (GLUCOTROL) 5 mg tablet Take 1 Tab by mouth two (2) times a day. 1/21/21  Yes Silvia Bosch MD   lancets misc PUSH BUTTON LANCETS. Check blood sugar 4 times per day due to hypoglycemic events. Dx:E11.42, E16.2 12/17/20  Yes Silvia Bosch MD   amLODIPine (NORVASC) 10 mg tablet Take 1 Tab by mouth daily.  11/20/20  Yes Tawnya Rain MD   food supplemt, lactose-reduced (Ensure Enlive) 0.08 gram-1.5 kcal/mL liqd Take 6 x daily as recommended by Surgery to improve nutritional status for Possible surgery in future  Patient taking differently: Take 6 x daily as recommended by Surgery to improve nutritional status for Possible surgery in future. takes about 3 11/20/20  Yes Benjamin Malhotra MD   DULoxetine (CYMBALTA) 60 mg capsule Take 1 Cap by mouth daily. 10/14/20  Yes Martin Alford NP   carvediloL (COREG) 12.5 mg tablet Take 1 Tab by mouth two (2) times daily (with meals). 10/13/20  Yes Jose Antonio Kim NP   atorvastatin (LIPITOR) 40 mg tablet Take 1 Tab by mouth nightly. 1/27/20  Yes Diego Ramey MD          Allergies   Allergen Reactions    Metformin Other (comments)     GI side effects. Not a true allergy       Objective:     Vitals:    02/16/22 1141   BP: (!) 131/51   Pulse: 99   Temp: 97.9 °F (36.6 °C)   TempSrc: Temporal   SpO2: 99%   Weight: 170 lb (77.1 kg)   Height: 5' 2\" (1.575 m)      Pain Scale: 0 - No pain/10      Physical Exam:    GENERAL: alert, cooperative, no distress, appears stated age  EYE: conjunctivae/corneas clear. LYMPHATIC: Cervical, supraclavicular, and axillary nodes normal.   THROAT & NECK: normal and no erythema or exudates noted. LUNG: clear to auscultation bilaterally  HEART: regular rate and rhythm, no murmur  ABDOMEN: soft, non-tender palpation  EXTREMITIES: no cyanosis or edema  SKIN: Normal, no rash or ecchymosis  NEUROLOGIC: AOx3. The above physical exam was reviewed and updated as needed on 02/16/22.       Lab Results   Component Value Date/Time    WBC 7.0 09/30/2021 02:29 PM    HGB 10.1 (L) 09/30/2021 02:29 PM    HCT 31.4 (L) 09/30/2021 02:29 PM    PLATELET 722 71/34/7725 02:29 PM    MCV 89.5 09/30/2021 02:29 PM       Lab Results   Component Value Date/Time    Sodium 144 09/30/2021 02:29 PM    Potassium 3.4 (L) 09/30/2021 02:29 PM    Chloride 110 (H) 09/30/2021 02:29 PM    CO2 32 09/30/2021 02:29 PM    Anion gap 2 (L) 09/30/2021 02:29 PM    Glucose 176 (H) 09/30/2021 02:29 PM BUN 18 09/30/2021 02:29 PM    Creatinine 1.47 (H) 09/30/2021 02:29 PM    BUN/Creatinine ratio 12 09/30/2021 02:29 PM    GFR est AA 42 (L) 09/30/2021 02:29 PM    GFR est non-AA 35 (L) 09/30/2021 02:29 PM    Calcium 8.1 (L) 09/30/2021 02:29 PM    Bilirubin, total 0.3 09/30/2021 02:29 PM    Alk. phosphatase 96 09/30/2021 02:29 PM    Protein, total 5.9 (L) 09/30/2021 02:29 PM    Albumin 2.0 (L) 09/30/2021 02:29 PM    Globulin 3.9 09/30/2021 02:29 PM    A-G Ratio 0.5 (L) 09/30/2021 02:29 PM    ALT (SGPT) 17 09/30/2021 02:29 PM    AST (SGOT) 23 09/30/2021 02:29 PM     CT Results (most recent):  Results from Hospital Encounter encounter on 10/26/21    CT ABD PELV W CONT    Narrative  EXAM: CT ABD PELV W CONT    INDICATION: GIST tumor    COMPARISON: CT chest abdomen pelvis August 25, 2021    CONTRAST: 100 mL of Isovue-370. TECHNIQUE:  Following the uneventful intravenous administration of contrast, thin axial  images were obtained through the abdomen and pelvis. Coronal and sagittal  reconstructions were generated. Oral contrast was administered. CT dose  reduction was achieved through use of a standardized protocol tailored for this  examination and automatic exposure control for dose modulation. FINDINGS:  LOWER THORAX: No significant abnormality in the incidentally imaged lower chest.  LIVER: Numerous hepatic lesions, which are all unchanged or smaller compared to  prior examination. The largest single lesion is immediately posterior to the  IVC, measuring 2.3 x 1.6 cm, previously 3.3 x 2.4 cm. BILIARY TREE: Status post cholecystectomy. CBD is not dilated. SPLEEN: within normal limits. PANCREAS: No mass or ductal dilatation. ADRENALS: Unremarkable. KIDNEYS: No mass, calculus, or hydronephrosis. STOMACH: Status post partial gastric resection. SMALL BOWEL: No dilatation or wall thickening. COLON: No dilatation or wall thickening. Mild sigmoid diverticulosis without  evidence of diverticulitis.   APPENDIX: Unremarkable. PERITONEUM: Trace free fluid in the deep pelvis. RETROPERITONEUM: Portacaval lymph node (2-22) measures 1.2 x 1.0 cm, unchanged. REPRODUCTIVE ORGANS: Small calcified fibroids are noted in the uterus, which is  otherwise unremarkable. URINARY BLADDER: Mostly decompressed. BONES: No destructive bone lesion. ABDOMINAL WALL: No mass or hernia. ADDITIONAL COMMENTS: N/A    Impression  1. Numerous hypodense hepatic lesions are unchanged or decreased in size  compared to prior examination. The dominant lesion measures 2.3 x 1.6 cm,  previously 3.3 x 2.4 cm.  2. Stable portacaval lymphadenopathy. Assessment:     1. Gastrointestinal tumor of the stomach with metastasis to the liver and retroperitoneal nodes    ECOG PS 2  Intent of Treatment - palliative  Prognosis: guarded    S/P partial gastrectomy 06/22/2021  Standard of care for the treatment of metastatic GIST is imatinib. Taking Imatinib 400 mg PO daily. Tolerating well  Detailed assessment of side effects was performed. CT Abd/pelvis (10/26/2021) - good response to therapy    The disease has a high risk of progression. 2. Iron deficiency anemia    Received IV iron     3. DM     Followed by endocrinology - Dr. Shireen Wan    4. Hypertension    Managed by PCP  Recent 2D echo  Sees Cardiology        Plan:     1. Continue Imatinib   2. Labs: CBC. CMP and Ferritin and iron profile  3. Repeat scans ordered  4. Follow up 3 months      Signed by: Latrice Treviño NP      I personally saw and evaluated the patient and performed the key components of medical decision making. The history, physical exam, and documentation were performed by Latrice Treviño NP. I reviewed and verified the above documentation and modified it as needed. Signed by: Fuentes Longoria MD                     February 16, 2022        CC. Olaf Barney MD  CC.  Leonardo Arias MD

## 2022-03-03 ENCOUNTER — HOSPITAL ENCOUNTER (OUTPATIENT)
Dept: NUCLEAR MEDICINE | Age: 77
Discharge: HOME | End: 2022-03-03
Attending: INTERNAL MEDICINE
Payer: MEDICARE

## 2022-03-03 ENCOUNTER — HOSPITAL ENCOUNTER (OUTPATIENT)
Dept: CT IMAGING | Age: 77
Discharge: HOME | End: 2022-03-03
Attending: INTERNAL MEDICINE
Payer: MEDICARE

## 2022-03-03 DIAGNOSIS — C78.7 METASTATIC CANCER TO LIVER (HCC): ICD-10-CM

## 2022-03-03 DIAGNOSIS — C49.A2 GASTROINTESTINAL STROMAL TUMOR (GIST) OF STOMACH (HCC): ICD-10-CM

## 2022-03-03 LAB — CREAT BLD-MCNC: 0.5 MG/DL (ref 0.6–1.3)

## 2022-03-03 PROCEDURE — 78306 BONE IMAGING WHOLE BODY: CPT

## 2022-03-03 PROCEDURE — 74011000636 HC RX REV CODE- 636: Performed by: INTERNAL MEDICINE

## 2022-03-03 PROCEDURE — 71260 CT THORAX DX C+: CPT

## 2022-03-03 PROCEDURE — 82565 ASSAY OF CREATININE: CPT

## 2022-03-03 PROCEDURE — 74177 CT ABD & PELVIS W/CONTRAST: CPT

## 2022-03-03 RX ADMIN — IOHEXOL 50 ML: 240 INJECTION, SOLUTION INTRATHECAL; INTRAVASCULAR; INTRAVENOUS; ORAL at 12:36

## 2022-03-03 RX ADMIN — IOPAMIDOL 100 ML: 755 INJECTION, SOLUTION INTRAVENOUS at 12:36

## 2022-03-18 PROBLEM — C49.A4 GIST (GASTROINTESTINAL STROMA TUMOR), MALIGNANT, COLON (HCC): Status: ACTIVE | Noted: 2021-06-20

## 2022-03-19 PROBLEM — E11.42 TYPE 2 DIABETES MELLITUS WITH DIABETIC POLYNEUROPATHY, WITH LONG-TERM CURRENT USE OF INSULIN (HCC): Status: ACTIVE | Noted: 2017-05-30

## 2022-03-19 PROBLEM — D64.9 ANEMIA, UNSPECIFIED: Status: ACTIVE | Noted: 2020-10-06

## 2022-03-19 PROBLEM — Z79.4 TYPE 2 DIABETES MELLITUS WITH DIABETIC POLYNEUROPATHY, WITH LONG-TERM CURRENT USE OF INSULIN (HCC): Status: ACTIVE | Noted: 2017-05-30

## 2022-03-19 PROBLEM — E11.21 TYPE 2 DIABETES WITH NEPHROPATHY (HCC): Status: ACTIVE | Noted: 2018-05-22

## 2022-03-19 PROBLEM — E78.2 MIXED HYPERLIPIDEMIA: Status: ACTIVE | Noted: 2018-05-22

## 2022-03-19 PROBLEM — K92.2 UPPER GI BLEED: Status: ACTIVE | Noted: 2020-11-12

## 2022-03-19 PROBLEM — I21.4 NSTEMI (NON-ST ELEVATED MYOCARDIAL INFARCTION) (HCC): Status: ACTIVE | Noted: 2021-06-20

## 2022-03-19 PROBLEM — D72.829 LEUKOCYTOSIS: Status: ACTIVE | Noted: 2021-06-20

## 2022-03-19 PROBLEM — N18.9 ACUTE KIDNEY INJURY SUPERIMPOSED ON CHRONIC KIDNEY DISEASE (HCC): Status: ACTIVE | Noted: 2021-06-20

## 2022-03-19 PROBLEM — C49.A0 GIST, MALIGNANT (HCC): Status: ACTIVE | Noted: 2020-11-12

## 2022-03-19 PROBLEM — I10 ESSENTIAL HYPERTENSION: Status: ACTIVE | Noted: 2018-05-22

## 2022-03-19 PROBLEM — N17.9 ACUTE KIDNEY INJURY SUPERIMPOSED ON CHRONIC KIDNEY DISEASE (HCC): Status: ACTIVE | Noted: 2021-06-20

## 2022-03-20 PROBLEM — D64.9 ACUTE ON CHRONIC ANEMIA: Status: ACTIVE | Noted: 2021-06-20

## 2022-03-20 PROBLEM — D50.0 ANEMIA, BLOOD LOSS: Status: ACTIVE | Noted: 2020-11-12

## 2022-04-17 NOTE — ED NOTES
Call completed to Blood Bank, Spoke with Michael Calvo. [facility] has NOT contacted the blood bank yet. Hourly Rounding

## 2022-05-19 NOTE — PROGRESS NOTES
Dwaine Reyes is a 78 y.o. female here for 3 month follow up for GIST and AZUL. She is on Imatinib. BP has been running high lately per friend with her. BP is very elevated today. MD notified. BP meds taken this AM.  No other concerns brought up. Pt's son takes care of her meds. She knows she needs some refills on some but not sure what.    1. Have you been to the ER, urgent care clinic since your last visit? Hospitalized since your last visit? no    2. Have you seen or consulted any other health care providers outside of the [address] [place] [address] since your last visit? Include any pap smears or colon screening.   no

## 2022-05-20 ENCOUNTER — OFFICE VISIT (OUTPATIENT)
Dept: ONCOLOGY | Age: 77
End: 2022-05-20
Payer: MEDICARE

## 2022-05-20 VITALS
DIASTOLIC BLOOD PRESSURE: 61 MMHG | BODY MASS INDEX: 28.56 KG/M2 | HEIGHT: 62 IN | TEMPERATURE: 98 F | WEIGHT: 155.2 LBS | SYSTOLIC BLOOD PRESSURE: 178 MMHG | HEART RATE: 53 BPM | OXYGEN SATURATION: 98 %

## 2022-05-20 DIAGNOSIS — C78.7 METASTATIC CANCER TO LIVER (HCC): ICD-10-CM

## 2022-05-20 DIAGNOSIS — I10 ESSENTIAL HYPERTENSION: ICD-10-CM

## 2022-05-20 DIAGNOSIS — C49.A2 GASTROINTESTINAL STROMAL TUMOR (GIST) OF STOMACH (HCC): Primary | ICD-10-CM

## 2022-05-20 DIAGNOSIS — Z51.11 ENCOUNTER FOR CHEMOTHERAPY MANAGEMENT: ICD-10-CM

## 2022-05-20 PROCEDURE — G0463 HOSPITAL OUTPT CLINIC VISIT: HCPCS | Performed by: INTERNAL MEDICINE

## 2022-05-20 PROCEDURE — 1090F PRES/ABSN URINE INCON ASSESS: CPT | Performed by: INTERNAL MEDICINE

## 2022-05-20 PROCEDURE — G8417 CALC BMI ABV UP PARAM F/U: HCPCS | Performed by: INTERNAL MEDICINE

## 2022-05-20 PROCEDURE — G8427 DOCREV CUR MEDS BY ELIG CLIN: HCPCS | Performed by: INTERNAL MEDICINE

## 2022-05-20 PROCEDURE — G9711 PT HX TOT COL OR COLON CA: HCPCS | Performed by: INTERNAL MEDICINE

## 2022-05-20 PROCEDURE — G8536 NO DOC ELDER MAL SCRN: HCPCS | Performed by: INTERNAL MEDICINE

## 2022-05-20 PROCEDURE — G8753 SYS BP > OR = 140: HCPCS | Performed by: INTERNAL MEDICINE

## 2022-05-20 PROCEDURE — G8432 DEP SCR NOT DOC, RNG: HCPCS | Performed by: INTERNAL MEDICINE

## 2022-05-20 PROCEDURE — G8754 DIAS BP LESS 90: HCPCS | Performed by: INTERNAL MEDICINE

## 2022-05-20 PROCEDURE — 99215 OFFICE O/P EST HI 40 MIN: CPT | Performed by: INTERNAL MEDICINE

## 2022-05-20 PROCEDURE — G8400 PT W/DXA NO RESULTS DOC: HCPCS | Performed by: INTERNAL MEDICINE

## 2022-05-20 PROCEDURE — 1101F PT FALLS ASSESS-DOCD LE1/YR: CPT | Performed by: INTERNAL MEDICINE

## 2022-05-20 NOTE — PROGRESS NOTES
[address] [facility] [address], [address] [facility], [address] [place] [address]  [phone]       Oncology Note    Patient: Versie Apley MRN: [id number]  SSN: [id number]    YOB: 1945  Age: 78 y.o. Sex: female        Diagnosis:      1. 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 Imatinib 400 mg daily started 8/2021    Subjective:      Versie Apley is a 78 y.o. female who I am seeing for a diagnosis of GIST and iron deficiency anemia. She presented with severe anemia late last year. 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 Surgery was delayed due to cardiac clearance. Recent imaging shows metastasis to the liver and RP nodes. She underwent a partial gastrectomy and wedge resection of the liver. The liver biopsy was consistent with a diagnosis of GIST. She comes in to discuss the next steps in treatment. She feels well. Denies abdominal pain. She recently started taking imatinib. She denies any side effects. She is accompanied by a family member. She has not followed with her PCP for a while. Review of Systems:    Constitutional: negative  Eyes: negative  Ears, Nose, Mouth, Throat, and Face: negative  Respiratory: negative  Cardiovascular: negative  Gastrointestinal: negative  Genitourinary:negative  Integument/Breast: negative  Hematologic/Lymphatic: negative  Musculoskeletal:negative  Neurological: negative    Review of systems was reviewed and updated as needed on 05/20/22.       Past Medical History:   Diagnosis Date    Anemia, unspecified 10/6/2020    Arrhythmia     Arthritis     CAD (coronary artery disease)     Cancer ([address]     Chronic bronchitis ([address]     per pt:  as of 1/9/15 pt denies any ARENAS or SOB    Diabetes ([address] Dx approx 2009    Dr Pee Haq (in connect care)    GERD (gastroesophageal reflux disease)     Hypercholesteremia     Hypertension      Past Surgical History:   Procedure Laterality Date    HX CHOLECYSTECTOMY  6/12    HX COLONOSCOPY      HX CORONARY STENT PLACEMENT  8/25/2015    HX DILATION AND CURETTAGE      HX OTHER SURGICAL  06/22/2021    Laparoscopic hand-assisted partial gastrectomy with core needle liver biopsy.  [place] CARDIAC SURG PROCEDURE UNLIST  2015    UPPER GI ENDOSCOPY,BIOPSY  10/6/2020           Family History   Problem Relation Age of Onset    Diabetes Mother     Heart Disease Mother     Hypertension Mother     Stroke Father     Heart Disease Brother     Heart Disease Brother     Stroke Brother     HIV/AIDS Brother      Social History     Tobacco Use    Smoking status: Former Smoker    Smokeless tobacco: Never Used    Tobacco comment: quit 50 years ago   Substance Use Topics    Alcohol use: No      Prior to Admission medications    Medication Sig Start Date End Date Taking? Authorizing Provider   potassium chloride (K-DUR, KLOR-CON M20) 20 mEq tablet  1/12/22  Yes Provider, Historical   isosorbide mononitrate ER (IMDUR) 30 mg tablet  1/17/22  Yes Provider, Historical   losartan (COZAAR) 100 mg tablet Take 1 Tablet by mouth daily. 11/18/21  Yes Debby Gutierrez MD   imatinib mesylate (IMATINIB PO) Take  by mouth. Yes Provider, Historical   multivitamin, tx-iron-ca-min (THERA-M w/ IRON) 9 mg iron-400 mcg tab tablet Take 1 Tab by mouth two (2) times a day. Centrum silver   Yes Provider, Historical   pantoprazole (PROTONIX) 40 mg tablet Take 40 mg by mouth daily. 1/13/21  Yes Provider, Historical   glipiZIDE (GLUCOTROL) 5 mg tablet Take 1 Tab by mouth two (2) times a day. 1/21/21  Yes Debby Gutierrez MD   lancets misc PUSH BUTTON LANCETS. Check blood sugar 4 times per day due to hypoglycemic events. Dx:E11.42, E16.2 12/17/20  Yes Debby Gutierrez MD   amLODIPine (NORVASC) 10 mg tablet Take 1 Tab by mouth daily.  11/20/20  Yes Abiel Doe MD   food supplemt, lactose-reduced (Ensure Enlive) 0.08 gram-1.5 kcal/mL liqd Take 6 x daily as recommended by Surgery to improve nutritional status for Possible surgery in future  Patient taking differently: Take 6 x daily as recommended by Surgery to improve nutritional status for Possible surgery in future. takes about 3 11/20/20  Yes Rock Osgood, MD   DULoxetine (CYMBALTA) 60 mg capsule Take 1 Cap by mouth daily. 10/14/20  Yes Cristian Edmonds NP   carvediloL (COREG) 12.5 mg tablet Take 1 Tab by mouth two (2) times daily (with meals). 10/13/20  Yes Joie Kim NP   atorvastatin (LIPITOR) 40 mg tablet Take 1 Tab by mouth nightly. 1/27/20  Yes Shauna Solano MD          Allergies   Allergen Reactions    Metformin Other (comments)     GI side effects. Not a true allergy       Objective:     Vitals:    05/20/22 1304 05/20/22 1339   BP: (!) 200/64 (!) 178/61   Pulse: (!) 53    Temp: 98 °F (36.7 °C)    TempSrc: Temporal    SpO2: 98%    Weight: 155 lb 3.2 oz (70.4 kg)    Height: 5' 2\" (1.575 m)       Pain Scale: /10      Physical Exam:    GENERAL: alert, cooperative, no distress, appears stated age  EYE: conjunctivae/corneas clear. LYMPHATIC: Cervical, supraclavicular, and axillary nodes normal.   THROAT & NECK: normal and no erythema or exudates noted. LUNG: clear to auscultation bilaterally  HEART: regular rate and rhythm, no murmur  ABDOMEN: soft, non-tender palpation  EXTREMITIES: no cyanosis or edema  SKIN: Normal, no rash or ecchymosis  NEUROLOGIC: AOx3. The above physical exam was reviewed and updated as needed on 05/20/22.       Lab Results   Component Value Date/Time    WBC 7.0 09/30/2021 02:29 PM    HGB 10.1 (L) 09/30/2021 02:29 PM    HCT 31.4 (L) 09/30/2021 02:29 PM    PLATELET 541 92/20/6653 02:29 PM    MCV 89.5 09/30/2021 02:29 PM       Lab Results   Component Value Date/Time    Sodium 144 09/30/2021 02:29 PM    Potassium 3.4 (L) 09/30/2021 02:29 PM    Chloride 110 (H) 09/30/2021 02:29 PM    CO2 32 09/30/2021 02:29 PM    Anion gap 2 (L) 09/30/2021 02:29 PM    Glucose 176 (H) 09/30/2021 02:29 PM    BUN 18 09/30/2021 02:29 PM    Creatinine 1.47 (H) 09/30/2021 02:29 PM    BUN/Creatinine ratio 12 09/30/2021 02:29 PM    GFR est AA 42 (L) 09/30/2021 02:29 PM    GFR est non-AA 35 (L) 09/30/2021 02:29 PM    Calcium 8.1 (L) 09/30/2021 02:29 PM    Bilirubin, total 0.3 09/30/2021 02:29 PM    Alk. phosphatase 96 09/30/2021 02:29 PM    Protein, total 5.9 (L) 09/30/2021 02:29 PM    Albumin 2.0 (L) 09/30/2021 02:29 PM    Globulin 3.9 09/30/2021 02:29 PM    A-G Ratio 0.5 (L) 09/30/2021 02:29 PM    ALT (SGPT) 17 09/30/2021 02:29 PM    AST (SGOT) 23 09/30/2021 02:29 PM     CT Results (most recent):  Results from Hospital Encounter encounter on 03/03/22    CT ABD PELV W CONT    Narrative  EXAM: CT CHEST W CONT, CT ABD PELV W CONT    INDICATION: Gastrointestinal stromal tumor    COMPARISON: 10/26/2021 and 8/25/2021    CONTRAST: 100 mL of Isovue-370. TECHNIQUE:  Following the uneventful intravenous administration of contrast, thin axial  images were obtained through the chest, abdomen and pelvis. Coronal and sagittal  reconstructions were generated. Oral contrast was administered for better bowel  evaluation. . CT dose reduction was achieved through use of a standardized  protocol tailored for this examination and automatic exposure control for dose  modulation. FINDINGS:    CHEST Verdia Sales left breast tissue. Increased skin thickening over the  bilateral breasts. THYROID: No nodule. MEDIASTINUM: No mass or lymphadenopathy. MARCELINO: No mass or lymphadenopathy. THORACIC AORTA: No dissection or aneurysm. MAIN PULMONARY ARTERY: Normal in caliber. TRACHEA/BRONCHI: Patent. ESOPHAGUS: No wall thickening or dilatation. HEART: Atherosclerotic disease  PLEURA: No effusion or pneumothorax. LUNGS: Mild pleural-based nodularity right middle lobe unchanged. LIVER: Multiple liver hypodensities noted. Many are decreased in size.  For  instance there is a 15 x 10 mm hypodensity adjacent to the IVC on series 301  image 54 which previously measured 23 x 16 mm. There is a 12 mm hypodensity in  segment 4A series 301 image 52 which previously measured 17 mm. GALLBLADDER: Cholecystectomy  BILIARY TREE: No biliary dilatation  SPLEEN: No mass. PANCREAS: No mass or ductal dilatation. ADRENALS: Unremarkable. KIDNEYS: No mass, calculus, or hydronephrosis. STOMACH: Unremarkable. SMALL BOWEL: No dilatation or wall thickening. COLON: No dilatation or wall thickening. APPENDIX: Not visualized  PERITONEUM: No ascites or pneumoperitoneum. RETROPERITONEUM: Atherosclerotic disease. No evidence of aneurysm. Mildly  prominent lymph nodes are again noted around the mesenteric root and  retroperitoneum. The largest in the left retroperitoneum measures 13 mm. REPRODUCTIVE ORGANS: Calcified leiomyoma  URINARY BLADDER: No mass or calculus. BONES: Lucent lesion in the right iliac bone unchanged. Scoliosis with  degenerative changes in lumbar spine. ADDITIONAL COMMENTS: N/A    Impression  1. Multiple liver lesions are again noted. Many of these are decreased in size  with representative examples as above. 2.  Prominent peripancreatic lymph nodes around the root of mesentery as well as  in the retroperitoneum which is not changed. 3.  Other incidental findings as above unchanged. Assessment:     1. Gastrointestinal tumor of the stomach with metastasis to the liver and retroperitoneal nodes    ECOG PS 2  Intent of Treatment - palliative  Prognosis: guarded    S/P partial gastrectomy 06/22/2021  Standard of care for the treatment of metastatic GIST is imatinib. Taking Imatinib 400 mg PO daily. Tolerating well  Detailed assessment of side effects was performed. CT Abd/pelvis (10/26/2021) - good response to therapy    The disease has a high risk of progression and the treatment also carries a substantial risk of side effects. All of this was assessed during this visit.        2. DM     Followed by endocrinology - Dr. Ellis Massey      3. Hypertension    See PCP        Plan:       1. Continue Imatinib   2. Labs: CBC. CMP   3. Repeat scans  4. Follow up 3 months      Signed by: Dell Durbin NP                     May 20, 2022       I personally saw and evaluated the patient and performed the key components of medical decision making. The history, physical exam, and documentation were performed by Re Zheng NP. I reviewed and verified the above documentation and modified it as needed. Signed by: Cherry Hammans, MD                     May 20, 2022      CC. Ny Devine MD  CC.  Emerita Crespo MD

## 2022-05-20 NOTE — LETTER
5/20/2022    Patient: Jj Huertas   YOB: 1945   Date of Visit: 5/20/2022     Magan Britt MD  [facility] [address]  [address] [address]  Via Fax: [phone]    Dear Magan Britt MD,      Thank you for referring Ms. Jj Huertas to [address] [place] [address] for evaluation. My notes for this consultation are attached. If you have questions, please do not hesitate to call me. I look forward to following your patient along with you.       Sincerely,    Sirena Lee MD

## 2022-06-22 RX ORDER — CARVEDILOL 12.5 MG/1
12.5 TABLET ORAL 2 TIMES DAILY WITH MEALS
Qty: 60 TABLET | Refills: 0 | Status: CANCELLED | OUTPATIENT
Start: 2022-06-22

## 2022-06-22 NOTE — TELEPHONE ENCOUNTER
[facility] faxed request for refill. Spoke with patient. She states that she does not know the strength that she takes of Carvedilol, but she takes it once a day. Asked who originally prescribed it. She she \"I have no idea\". Gave patient my direct phone number. She states that she will call me tomorrow with the exact strength.

## 2022-06-27 ENCOUNTER — TELEPHONE (OUTPATIENT)
Dept: ENDOCRINOLOGY | Age: 77
End: 2022-06-27

## 2022-06-27 RX ORDER — CARVEDILOL 12.5 MG/1
12.5 TABLET ORAL 2 TIMES DAILY WITH MEALS
Qty: 180 TABLET | Refills: 3 | Status: ON HOLD | OUTPATIENT
Start: 2022-06-27 | End: 2022-07-17 | Stop reason: SDUPTHER

## 2022-06-27 NOTE — TELEPHONE ENCOUNTER
Son states takes 1/2 of a 25 mg tab BID. He said oK to order 12.5 mg tabs BID through [facility].

## 2022-07-13 ENCOUNTER — HOSPITAL ENCOUNTER (INPATIENT)
Age: 77
LOS: 4 days | Discharge: HOME | DRG: 070 | End: 2022-07-17
Attending: EMERGENCY MEDICINE | Admitting: HOSPITALIST
Payer: MEDICARE

## 2022-07-13 ENCOUNTER — APPOINTMENT (OUTPATIENT)
Dept: GENERAL RADIOLOGY | Age: 77
DRG: 070 | End: 2022-07-13
Attending: EMERGENCY MEDICINE
Payer: MEDICARE

## 2022-07-13 ENCOUNTER — APPOINTMENT (OUTPATIENT)
Dept: CT IMAGING | Age: 77
DRG: 070 | End: 2022-07-13
Payer: MEDICARE

## 2022-07-13 DIAGNOSIS — R40.4 ALTERED SENSORIUM: ICD-10-CM

## 2022-07-13 DIAGNOSIS — G93.40 ACUTE ENCEPHALOPATHY: ICD-10-CM

## 2022-07-13 DIAGNOSIS — R77.8 ELEVATED TROPONIN: Primary | ICD-10-CM

## 2022-07-13 DIAGNOSIS — I10 ACCELERATED HYPERTENSION: ICD-10-CM

## 2022-07-13 DIAGNOSIS — I21.4 NSTEMI (NON-ST ELEVATED MYOCARDIAL INFARCTION) (HCC): ICD-10-CM

## 2022-07-13 DIAGNOSIS — I16.0 HYPERTENSIVE URGENCY: ICD-10-CM

## 2022-07-13 PROBLEM — I63.9 STROKE (HCC): Status: ACTIVE | Noted: 2022-07-13

## 2022-07-13 LAB
ALBUMIN SERPL-MCNC: 1.8 G/DL (ref 3.5–5)
ALBUMIN/GLOB SERPL: 0.4 {RATIO} (ref 1.1–2.2)
ALP SERPL-CCNC: 88 U/L (ref 45–117)
ALT SERPL-CCNC: 25 U/L (ref 12–78)
ANION GAP SERPL CALC-SCNC: 3 MMOL/L (ref 5–15)
APPEARANCE UR: CLEAR
AST SERPL-CCNC: 31 U/L (ref 15–37)
ATRIAL RATE: 60 BPM
BACTERIA URNS QL MICRO: NEGATIVE /HPF
BASOPHILS # BLD: 0 K/UL (ref 0–0.1)
BASOPHILS NFR BLD: 0 % (ref 0–1)
BILIRUB SERPL-MCNC: 0.2 MG/DL (ref 0.2–1)
BILIRUB UR QL: NEGATIVE
BUN SERPL-MCNC: 26 MG/DL (ref 6–20)
BUN/CREAT SERPL: 17 (ref 12–20)
CALCIUM SERPL-MCNC: 8.1 MG/DL (ref 8.5–10.1)
CALCULATED P AXIS, ECG09: 16 DEGREES
CALCULATED R AXIS, ECG10: -19 DEGREES
CALCULATED T AXIS, ECG11: -144 DEGREES
CHLORIDE SERPL-SCNC: 113 MMOL/L (ref 97–108)
CO2 SERPL-SCNC: 27 MMOL/L (ref 21–32)
COLOR UR: ABNORMAL
COMMENT, HOLDF: NORMAL
COVID-19 RAPID TEST, COVR: NOT DETECTED
CREAT SERPL-MCNC: 1.57 MG/DL (ref 0.55–1.02)
DIAGNOSIS, 93000: NORMAL
DIFFERENTIAL METHOD BLD: ABNORMAL
EOSINOPHIL # BLD: 0.1 K/UL (ref 0–0.4)
EOSINOPHIL NFR BLD: 1 % (ref 0–7)
EPITH CASTS URNS QL MICRO: ABNORMAL /LPF
ERYTHROCYTE [DISTWIDTH] IN BLOOD BY AUTOMATED COUNT: 12.4 % (ref 11.5–14.5)
GLOBULIN SER CALC-MCNC: 4.2 G/DL (ref 2–4)
GLUCOSE BLD STRIP.AUTO-MCNC: 173 MG/DL (ref 65–117)
GLUCOSE SERPL-MCNC: 114 MG/DL (ref 65–100)
GLUCOSE UR STRIP.AUTO-MCNC: 100 MG/DL
HCT VFR BLD AUTO: 29.3 % (ref 35–47)
HGB BLD-MCNC: 9.5 G/DL (ref 11.5–16)
HGB UR QL STRIP: NEGATIVE
IMM GRANULOCYTES # BLD AUTO: 0 K/UL (ref 0–0.04)
IMM GRANULOCYTES NFR BLD AUTO: 0 % (ref 0–0.5)
KETONES UR QL STRIP.AUTO: NEGATIVE MG/DL
LEUKOCYTE ESTERASE UR QL STRIP.AUTO: ABNORMAL
LYMPHOCYTES # BLD: 1.9 K/UL (ref 0.8–3.5)
LYMPHOCYTES NFR BLD: 23 % (ref 12–49)
MCH RBC QN AUTO: 28.8 PG (ref 26–34)
MCHC RBC AUTO-ENTMCNC: 32.4 G/DL (ref 30–36.5)
MCV RBC AUTO: 88.8 FL (ref 80–99)
MONOCYTES # BLD: 0.3 K/UL (ref 0–1)
MONOCYTES NFR BLD: 3 % (ref 5–13)
NEUTS SEG # BLD: 6 K/UL (ref 1.8–8)
NEUTS SEG NFR BLD: 73 % (ref 32–75)
NITRITE UR QL STRIP.AUTO: NEGATIVE
NRBC # BLD: 0 K/UL (ref 0–0.01)
NRBC BLD-RTO: 0 PER 100 WBC
P-R INTERVAL, ECG05: 114 MS
PH UR STRIP: 7 [PH] (ref 5–8)
PLATELET # BLD AUTO: 285 K/UL (ref 150–400)
PMV BLD AUTO: 8.5 FL (ref 8.9–12.9)
POTASSIUM SERPL-SCNC: 4.7 MMOL/L (ref 3.5–5.1)
PROT SERPL-MCNC: 6 G/DL (ref 6.4–8.2)
PROT UR STRIP-MCNC: >300 MG/DL
Q-T INTERVAL, ECG07: 458 MS
QRS DURATION, ECG06: 86 MS
QTC CALCULATION (BEZET), ECG08: 458 MS
RBC # BLD AUTO: 3.3 M/UL (ref 3.8–5.2)
RBC #/AREA URNS HPF: ABNORMAL /HPF (ref 0–5)
RBC MORPH BLD: ABNORMAL
SAMPLES BEING HELD,HOLD: NORMAL
SERVICE CMNT-IMP: ABNORMAL
SODIUM SERPL-SCNC: 143 MMOL/L (ref 136–145)
SOURCE, COVRS: NORMAL
SP GR UR REFRACTOMETRY: 1.02
TROPONIN-HIGH SENSITIVITY: 163 NG/L (ref 0–51)
TROPONIN-HIGH SENSITIVITY: 177 NG/L (ref 0–51)
UA: UC IF INDICATED,UAUC: ABNORMAL
UROBILINOGEN UR QL STRIP.AUTO: 1 EU/DL (ref 0.2–1)
VENTRICULAR RATE, ECG03: 60 BPM
WBC # BLD AUTO: 8.3 K/UL (ref 3.6–11)
WBC MORPH BLD: ABNORMAL
WBC URNS QL MICRO: ABNORMAL /HPF (ref 0–4)

## 2022-07-13 PROCEDURE — 74011250637 HC RX REV CODE- 250/637: Performed by: HOSPITALIST

## 2022-07-13 PROCEDURE — 71045 X-RAY EXAM CHEST 1 VIEW: CPT

## 2022-07-13 PROCEDURE — 99285 EMERGENCY DEPT VISIT HI MDM: CPT

## 2022-07-13 PROCEDURE — 70450 CT HEAD/BRAIN W/O DYE: CPT

## 2022-07-13 PROCEDURE — 65660000001 HC RM ICU INTERMED STEPDOWN

## 2022-07-13 PROCEDURE — 93005 ELECTROCARDIOGRAM TRACING: CPT

## 2022-07-13 PROCEDURE — 36415 COLL VENOUS BLD VENIPUNCTURE: CPT

## 2022-07-13 PROCEDURE — 80053 COMPREHEN METABOLIC PANEL: CPT

## 2022-07-13 PROCEDURE — 82962 GLUCOSE BLOOD TEST: CPT

## 2022-07-13 PROCEDURE — 87086 URINE CULTURE/COLONY COUNT: CPT

## 2022-07-13 PROCEDURE — 81001 URINALYSIS AUTO W/SCOPE: CPT

## 2022-07-13 PROCEDURE — 85025 COMPLETE CBC W/AUTO DIFF WBC: CPT

## 2022-07-13 PROCEDURE — 87635 SARS-COV-2 COVID-19 AMP PRB: CPT

## 2022-07-13 PROCEDURE — 84484 ASSAY OF TROPONIN QUANT: CPT

## 2022-07-13 RX ORDER — TORSEMIDE 20 MG/1
10 TABLET ORAL DAILY
Status: DISCONTINUED | OUTPATIENT
Start: 2022-07-14 | End: 2022-07-15

## 2022-07-13 RX ORDER — ACETAMINOPHEN 325 MG/1
650 TABLET ORAL
Status: DISCONTINUED | OUTPATIENT
Start: 2022-07-13 | End: 2022-07-17 | Stop reason: HOSPADM

## 2022-07-13 RX ORDER — MAGNESIUM SULFATE 100 %
4 CRYSTALS MISCELLANEOUS AS NEEDED
Status: DISCONTINUED | OUTPATIENT
Start: 2022-07-13 | End: 2022-07-17 | Stop reason: HOSPADM

## 2022-07-13 RX ORDER — TORSEMIDE 10 MG/1
TABLET ORAL DAILY
COMMUNITY
End: 2022-07-17

## 2022-07-13 RX ORDER — ACETAMINOPHEN 650 MG/1
650 SUPPOSITORY RECTAL
Status: DISCONTINUED | OUTPATIENT
Start: 2022-07-13 | End: 2022-07-17 | Stop reason: HOSPADM

## 2022-07-13 RX ORDER — ATORVASTATIN CALCIUM 40 MG/1
40 TABLET, FILM COATED ORAL
Status: DISCONTINUED | OUTPATIENT
Start: 2022-07-13 | End: 2022-07-17 | Stop reason: HOSPADM

## 2022-07-13 RX ORDER — ISOSORBIDE MONONITRATE 30 MG/1
30 TABLET, EXTENDED RELEASE ORAL DAILY
Status: DISCONTINUED | OUTPATIENT
Start: 2022-07-14 | End: 2022-07-17 | Stop reason: HOSPADM

## 2022-07-13 RX ORDER — PANTOPRAZOLE SODIUM 40 MG/1
40 TABLET, DELAYED RELEASE ORAL DAILY
Status: DISCONTINUED | OUTPATIENT
Start: 2022-07-14 | End: 2022-07-17 | Stop reason: HOSPADM

## 2022-07-13 RX ORDER — GUAIFENESIN 100 MG/5ML
81 LIQUID (ML) ORAL DAILY
Status: DISCONTINUED | OUTPATIENT
Start: 2022-07-14 | End: 2022-07-17 | Stop reason: HOSPADM

## 2022-07-13 RX ORDER — ENOXAPARIN SODIUM 100 MG/ML
30 INJECTION SUBCUTANEOUS DAILY
Status: DISCONTINUED | OUTPATIENT
Start: 2022-07-14 | End: 2022-07-17 | Stop reason: HOSPADM

## 2022-07-13 RX ORDER — LOSARTAN POTASSIUM 25 MG/1
100 TABLET ORAL DAILY
Status: DISCONTINUED | OUTPATIENT
Start: 2022-07-14 | End: 2022-07-17 | Stop reason: HOSPADM

## 2022-07-13 RX ORDER — AMLODIPINE BESYLATE 5 MG/1
10 TABLET ORAL DAILY
Status: DISCONTINUED | OUTPATIENT
Start: 2022-07-14 | End: 2022-07-17 | Stop reason: HOSPADM

## 2022-07-13 RX ORDER — INSULIN LISPRO 100 [IU]/ML
INJECTION, SOLUTION INTRAVENOUS; SUBCUTANEOUS
Status: DISCONTINUED | OUTPATIENT
Start: 2022-07-13 | End: 2022-07-17 | Stop reason: HOSPADM

## 2022-07-13 RX ORDER — ONDANSETRON 2 MG/ML
4 INJECTION INTRAMUSCULAR; INTRAVENOUS
Status: DISCONTINUED | OUTPATIENT
Start: 2022-07-13 | End: 2022-07-17 | Stop reason: HOSPADM

## 2022-07-13 RX ORDER — POTASSIUM CHLORIDE 20 MEQ/1
20 TABLET, EXTENDED RELEASE ORAL DAILY
Status: CANCELLED | OUTPATIENT
Start: 2022-07-14

## 2022-07-13 RX ORDER — CARVEDILOL 12.5 MG/1
25 TABLET ORAL 2 TIMES DAILY WITH MEALS
Status: DISCONTINUED | OUTPATIENT
Start: 2022-07-14 | End: 2022-07-17 | Stop reason: HOSPADM

## 2022-07-13 RX ADMIN — ATORVASTATIN CALCIUM 40 MG: 40 TABLET, FILM COATED ORAL at 22:31

## 2022-07-13 NOTE — ED NOTES
Pt. Presents to ED today for complaints of a cough and an episode of confusion per family. Pt. Also reports some chest pain. Upon arrival to room patient alert and oriented x4. Pt. Placed in position of comfort with call bell in reach.

## 2022-07-13 NOTE — ED PROVIDER NOTES
EMERGENCY DEPARTMENT HISTORY AND PHYSICAL EXAM      Date: 7/13/2022  Patient Name: Jonathan Mortensen    History of Presenting Illness     Chief Complaint   Patient presents with    Cough     Cough for 4 days with some SOB reported. Altered mental status     Family reports some confusion last night at 2100 and again this am.  Reports unsteady, slurred speech, confusion. No sx at present. History Provided By: Patient    HPI: Jonathan Mortensen, 78 y.o. female with a past medical history significant for hypertension, GERD, HLD, chronic bronchitis, diabetes, anemia, CAD, gastric cancer presents to the ED with cc of transient altered mental status. Per patient and son, episode of disorientation last night and this morning that has since resolved. Son describes patient seeming to be confused and slightly off balance when walking. Son also notes patient seemed to have slightly slurred speech. Son denies any focal weakness, facial asymmetry, drooling. Son reports that patient's symptoms have now resolved entirely. Patient has had cough since Sunday. Intensity of cough has been consistent since onset. Endorses fever of 101 yesterday. Patient took Mucinex last night without symptom relief. Patient states she feels slightly better from cough perspective today. Patient states she had a brief episode of chest pain this morning. He states it lasted 5 or 10 minutes and felt like pressure/soreness. It also resolved spontaneously. Patient states chest pain occurred while at rest, was not accompanied by diaphoresis, nausea, vomiting, arm pain, jaw pain, lightheadedness, shortness of breath. Patient is unsure if she has had chest pain like this in the past, though caregiver states patient has had similar chest pain before and takes Imdur. Patient denies headache, nausea, vomiting, coughing up blood, diarrhea, melena, abdominal pain, urinary symptoms, syncope, diaphoresis.       There are no other complaints, changes, or physical findings at this time. PCP: Nancy Dunne MD    No current facility-administered medications on file prior to encounter. Current Outpatient Medications on File Prior to Encounter   Medication Sig Dispense Refill    torsemide (DEMADEX) 10 mg tablet Take 20 mg by mouth daily. carvediloL (COREG) 12.5 mg tablet Take 1 Tablet by mouth two (2) times daily (with meals). 180 Tablet 3    potassium chloride (K-DUR, KLOR-CON M20) 20 mEq tablet       isosorbide mononitrate ER (IMDUR) 30 mg tablet       losartan (COZAAR) 100 mg tablet Take 1 Tablet by mouth daily. 90 Tablet 3    imatinib mesylate (IMATINIB PO) Take  by mouth.      multivitamin, tx-iron-ca-min (THERA-M w/ IRON) 9 mg iron-400 mcg tab tablet Take 1 Tab by mouth two (2) times a day. Centrum silver      pantoprazole (PROTONIX) 40 mg tablet Take 40 mg by mouth daily. [DISCONTINUED] glipiZIDE (GLUCOTROL) 5 mg tablet Take 1 Tab by mouth two (2) times a day. 60 Tab 5    lancets misc PUSH BUTTON LANCETS. Check blood sugar 4 times per day due to hypoglycemic events. Dx:E11.42, E16.2 400 Each 3    amLODIPine (NORVASC) 10 mg tablet Take 1 Tab by mouth daily. 30 Tab 0    food supplemt, lactose-reduced (Ensure Enlive) 0.08 gram-1.5 kcal/mL liqd Take 6 x daily as recommended by Surgery to improve nutritional status for Possible surgery in future (Patient taking differently: Take 6 x daily as recommended by Surgery to improve nutritional status for Possible surgery in future. takes about 3) 180 Bottle 1    [DISCONTINUED] DULoxetine (CYMBALTA) 60 mg capsule Take 1 Cap by mouth daily. 60 Cap 0    atorvastatin (LIPITOR) 40 mg tablet Take 1 Tab by mouth nightly.  80 Tab 3       Past History     Past Medical History:  Past Medical History:   Diagnosis Date    Anemia, unspecified 10/6/2020    Arrhythmia     Arthritis     CAD (coronary artery disease)     Cancer (HCC)     Chronic bronchitis ([facility] [address]     per pt:  as of 1/9/15 pt denies any ARENAS or SOB Diabetes ([address] Dx approx 2009    Dr Clarisse Valderrama (in [facility])    GERD (gastroesophageal reflux disease)     Hypercholesteremia     Hypertension        Past Surgical History:  Past Surgical History:   Procedure Laterality Date    HX CHOLECYSTECTOMY  6/12    HX COLONOSCOPY      HX CORONARY STENT PLACEMENT  8/25/2015    HX DILATION AND CURETTAGE      HX OTHER SURGICAL  06/22/2021    Laparoscopic hand-assisted partial gastrectomy with core needle liver biopsy. [place] CARDIAC SURG PROCEDURE UNLIST  2015    UPPER GI ENDOSCOPY,BIOPSY  10/6/2020            Family History:  Family History   Problem Relation Age of Onset    Diabetes Mother     Heart Disease Mother     Hypertension Mother     Stroke Father     Heart Disease Brother     Heart Disease Brother     Stroke Brother     HIV/AIDS Brother        Social History:  Social History     Tobacco Use    Smoking status: Former Smoker    Smokeless tobacco: Never Used    Tobacco comment: quit 50 years ago   Vaping Use    Vaping Use: Never used   Substance Use Topics    Alcohol use: No    Drug use: No       Allergies: Allergies   Allergen Reactions    Metformin Other (comments)     GI side effects. Not a true allergy         Review of Systems   Review of Systems   Constitutional:  Positive for fever. Negative for diaphoresis. HENT:  Negative for congestion and sore throat. Eyes:  Negative for discharge and visual disturbance. Respiratory:  Positive for cough. Negative for shortness of breath. Cardiovascular:  Positive for chest pain. Negative for leg swelling. Gastrointestinal:  Negative for blood in stool, diarrhea, nausea and vomiting. Genitourinary:  Negative for difficulty urinating, dysuria and hematuria. Musculoskeletal:  Positive for gait problem. Negative for neck pain and neck stiffness. Skin:  Negative for pallor and wound. Neurological:  Negative for syncope and light-headedness. Psychiatric/Behavioral:  Positive for confusion.       Physical Exam Physical Exam  Constitutional:       General: She is in acute distress. Appearance: Normal appearance. She is well-developed. She is ill-appearing and toxic-appearing. Comments: Patient is cheerful and making jokes during examination   HENT:      Head: Normocephalic and atraumatic. Nose: Nose normal. No congestion. Mouth/Throat:      Mouth: Mucous membranes are moist.      Pharynx: Oropharynx is clear. Eyes:      General: No scleral icterus. Extraocular Movements: Extraocular movements intact. Right eye: Normal extraocular motion and no nystagmus. Left eye: Normal extraocular motion and no nystagmus. Conjunctiva/sclera: Conjunctivae normal.      Pupils: Pupils are equal, round, and reactive to light. Cardiovascular:      Rate and Rhythm: Normal rate and regular rhythm. Pulses: Normal pulses. Heart sounds: Normal heart sounds. No murmur heard. Pulmonary:      Effort: Pulmonary effort is normal. No respiratory distress. Breath sounds: Normal breath sounds. Abdominal:      Palpations: Abdomen is soft. Tenderness: There is no abdominal tenderness. Musculoskeletal:         General: Normal range of motion. Cervical back: Normal range of motion and neck supple. No rigidity or tenderness. Right lower leg: No edema. Left lower leg: No edema. Skin:     General: Skin is warm and dry. Coloration: Skin is not pale. Neurological:      General: No focal deficit present. Mental Status: She is alert. GCS: GCS eye subscore is 4. GCS verbal subscore is 5. GCS motor subscore is 6. Cranial Nerves: No cranial nerve deficit, dysarthria or facial asymmetry. Sensory: No sensory deficit. Motor: No weakness. Coordination: Romberg sign negative.  Coordination normal.      Gait: Gait normal.   Psychiatric:         Mood and Affect: Mood normal.         Speech: Speech normal.         Behavior: Behavior normal. Diagnostic Study Results     Labs -     Recent Results (from the past 24 hour(s))   EKG, 12 LEAD, INITIAL    Collection Time: 07/13/22  2:18 PM   Result Value Ref Range    Ventricular Rate 59 BPM    Atrial Rate 59 BPM    P-R Interval 108 ms    QRS Duration 84 ms    Q-T Interval 482 ms    QTC Calculation (Bezet) 477 ms    Calculated P Axis 21 degrees    Calculated R Axis -19 degrees    Calculated T Axis 118 degrees    Diagnosis       Sinus bradycardia with short MD  Left ventricular hypertrophy with repolarization abnormality  When compared with ECG of 19-JUN-2021 16:54,  T wave inversion more evident in Inferior leads     CBC WITH AUTOMATED DIFF    Collection Time: 07/13/22  2:37 PM   Result Value Ref Range    WBC 8.3 3.6 - 11.0 K/uL    RBC 3.30 (L) 3.80 - 5.20 M/uL    HGB 9.5 (L) 11.5 - 16.0 g/dL    HCT 29.3 (L) 35.0 - 47.0 %    MCV 88.8 80.0 - 99.0 FL    MCH 28.8 26.0 - 34.0 PG    MCHC 32.4 30.0 - 36.5 g/dL    RDW 12.4 11.5 - 14.5 %    PLATELET 880 033 - 286 K/uL    MPV 8.5 (L) 8.9 - 12.9 FL    NRBC 0.0 0  WBC    ABSOLUTE NRBC 0.00 0.00 - 0.01 K/uL    NEUTROPHILS 73 32 - 75 %    LYMPHOCYTES 23 12 - 49 %    MONOCYTES 3 (L) 5 - 13 %    EOSINOPHILS 1 0 - 7 %    BASOPHILS 0 0 - 1 %    IMMATURE GRANULOCYTES 0 0.0 - 0.5 %    ABS. NEUTROPHILS 6.0 1.8 - 8.0 K/UL    ABS. LYMPHOCYTES 1.9 0.8 - 3.5 K/UL    ABS. MONOCYTES 0.3 0.0 - 1.0 K/UL    ABS. EOSINOPHILS 0.1 0.0 - 0.4 K/UL    ABS. BASOPHILS 0.0 0.0 - 0.1 K/UL    ABS. IMM.  GRANS. 0.0 0.00 - 0.04 K/UL    DF MANUAL      RBC COMMENTS NORMOCYTIC, NORMOCHROMIC      WBC COMMENTS VACUOLATED POLYS     METABOLIC PANEL, COMPREHENSIVE    Collection Time: 07/13/22  2:37 PM   Result Value Ref Range    Sodium 143 136 - 145 mmol/L    Potassium 4.7 3.5 - 5.1 mmol/L    Chloride 113 (H) 97 - 108 mmol/L    CO2 27 21 - 32 mmol/L    Anion gap 3 (L) 5 - 15 mmol/L    Glucose 114 (H) 65 - 100 mg/dL    BUN 26 (H) 6 - 20 MG/DL    Creatinine 1.57 (H) 0.55 - 1.02 MG/DL BUN/Creatinine ratio 17 12 - 20      GFR est AA 39 (L) >60 ml/min/1.73m2    GFR est non-AA 32 (L) >60 ml/min/1.73m2    Calcium 8.1 (L) 8.5 - 10.1 MG/DL    Bilirubin, total 0.2 0.2 - 1.0 MG/DL    ALT (SGPT) 25 12 - 78 U/L    AST (SGOT) 31 15 - 37 U/L    Alk. phosphatase 88 45 - 117 U/L    Protein, total 6.0 (L) 6.4 - 8.2 g/dL    Albumin 1.8 (L) 3.5 - 5.0 g/dL    Globulin 4.2 (H) 2.0 - 4.0 g/dL    A-G Ratio 0.4 (L) 1.1 - 2.2     TROPONIN-HIGH SENSITIVITY    Collection Time: 07/13/22  2:37 PM   Result Value Ref Range    Troponin-High Sensitivity 177 (HH) 0 - 51 ng/L   SAMPLES BEING HELD    Collection Time: 07/13/22  2:37 PM   Result Value Ref Range    SAMPLES BEING HELD LAV, PST, PST     COMMENT        Add-on orders for these samples will be processed based on acceptable specimen integrity and analyte stability, which may vary by analyte. TROPONIN-HIGH SENSITIVITY    Collection Time: 07/13/22  4:26 PM   Result Value Ref Range    Troponin-High Sensitivity 163 (HH) 0 - 51 ng/L   COVID-19 RAPID TEST    Collection Time: 07/13/22  4:26 PM   Result Value Ref Range    Specimen source Nasopharyngeal      COVID-19 rapid test Not detected NOTD     URINALYSIS W/ REFLEX CULTURE    Collection Time: 07/13/22  6:02 PM    Specimen: Urine   Result Value Ref Range    Color YELLOW/STRAW      Appearance CLEAR CLEAR      Specific gravity 1.019      pH (UA) 7.0 5.0 - 8.0      Protein >300 (A) NEG mg/dL    Glucose 100 (A) NEG mg/dL    Ketone Negative NEG mg/dL    Bilirubin Negative NEG      Blood Negative NEG      Urobilinogen 1.0 0.2 - 1.0 EU/dL    Nitrites Negative NEG      Leukocyte Esterase SMALL (A) NEG      WBC 10-20 0 - 4 /hpf    RBC 0-5 0 - 5 /hpf    Epithelial cells FEW FEW /lpf    Bacteria Negative NEG /hpf    UA:UC IF INDICATED URINE CULTURE ORDERED (A) CNI         Radiologic Studies -   CT HEAD WO CONT   Final Result   1. No acute intracranial finding. 2. Stable chronic ischemic disease. 3. Paranasal sinusitis.           XR CHEST PORT   Final Result   No Acute Disease. CT Results  (Last 48 hours)                 07/13/22 1651  CT HEAD WO CONT Final result    Impression:  1. No acute intracranial finding. 2. Stable chronic ischemic disease. 3. Paranasal sinusitis. Narrative:  INDICATION: transient AMS       COMPARISON: 12/15/2020. EXAM: CT HEAD without contrast.       TECHNIQUE: Unenhanced CT Head is performed. CT dose reduction was achieved   through use of a standardized protocol tailored for this examination and   automatic exposure control for dose modulation. FINDINGS:   No acute infarct is seen. 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 There is   a right basal ganglia lacune. There are left cerebellar lacunae. There is no apparent mass on unenhanced imaging. There is no bleed, shift,   obstructive hydrocephalus or significant extra-axial fluid collection. Bone windows show new paranasal sinus mucosal thickening and fluid levels. CXR Results  (Last 48 hours)                 07/13/22 1450  XR CHEST PORT Final result    Impression:  No Acute Disease. Narrative:  EXAM: Portable CXR. 1442 hours. INDICATION: Shortness of breath       FINDINGS:   The lungs appear clear. Heart is normal in size. There is no pulmonary edema. There is no evident pneumothorax or pleural effusion. Medical Decision Making   I am the first provider for this patient. I reviewed the vital signs, available nursing notes, past medical history, past surgical history, family history and social history. Vital Signs-Reviewed the patient's vital signs.   Patient Vitals for the past 12 hrs:   Temp Pulse Resp BP SpO2   07/13/22 1654 -- 66 18 (!) 201/69 100 %   07/13/22 1554 -- -- -- (!) 207/71 --   07/13/22 1409 99.3 °F (37.4 °C) 65 16 (!) 177/67 100 %       Records Reviewed: Nursing records and medical records reviewed    MDM:  Patient with history of hypertension, GERD, HLD, chronic bronchitis, diabetes, anemia, CAD, gastric cancer presenting with transient and now resolved altered mental status, cough since Sunday, brief episode of chest pain. Patient is hypertensive, though family and caregiver report frequently elevated blood pressure. Chart review reveals similar high blood pressures charted in past.  Otherwise vitals WNL. Patient stable appearing and stable on room air. No focal neurologic deficits elicited upon thorough neurologic examination. Differential includes URI, viral illness, COVID-19, TIA, CVA, ACS/MI, pneumonia, GERD, sepsis. Plan for broad laboratory work-up and CT head. Patient has elevated troponin. EKG showing T wave inversions that I previously did not present. will obtain repeat troponin. Patient will require admission. Provider Notes (Medical Decision Making):     ED Course:   Initial assessment performed. The patients presenting problems have been discussed, and they are in agreement with the care plan formulated and outlined with them. I have encouraged them to ask questions as they arise throughout their visit. ED Course as of 07/21/22 1932 Wed Jul 13, 2022   1609 Troponin-High Sensitivity(!!): 177 [DM]   1609 Creatinine(!): 1.57 [DM]   1609 HGB(!): 9.5  Hx of anemia, per family [DM]   1610 Glucose(!): 114 [DM]   1707 Troponin-High Sensitivity(!!): 163  Decreased from previous [DM]   56 COVID-19 rapid test: Not detected [DM]   5115 Wet read of Head CT showing area of left sided infarct consistent with previous imaging approx 2 years ago. Awaiting radiologist read for for determination of any new or evolving pathology on imaging. [DM]   8629 IMPRESSION  1. No acute intracranial finding. 2. Stable chronic ischemic disease. 3. Paranasal sinusitis. [DM]   6519 Discussed patient with hospitalist.  Patient to be admitted to hospitalist service.  [DM]      ED Course User Index  [DM] Chino Elam MD     Orders Placed This Encounter    COVID-19 RAPID TEST    CULTURE, URINE    RESPIRATORY VIRUS PANEL W/COVID-19, PCR    XR CHEST  Port (Per MD Order)    CT HEAD WO CONT    MRI BRAIN WO CONT    MRA BRAIN WO CONT    CBC WITH AUTOMATED DIFF    METABOLIC PANEL, COMPREHENSIVE    TROPONIN-HIGH SENSITIVITY    SAMPLES BEING HELD    TROPONIN-HIGH SENSITIVITY    URINALYSIS W/ REFLEX CULTURE    HEMOGLOBIN U0Y    METABOLIC PANEL, BASIC    TROPONIN-HIGH SENSITIVITY    LIPID PANEL    TSH 3RD GENERATION    CBC W/O DIFF    METABOLIC PANEL, BASIC    PROCALCITONIN    MEASURE HEIGHT    WEIGH PATIENT    COMPLETE DYSPHAGIA SCREEN - PRIOR TO PROVIDING PATIENT ANY ORAL INTAKE    IP CONSULT TO NEUROLOGY    OT EVAL AND TREAT    PT EVAL AND TREAT    SLP EVAL AND TREAT    GLUCOSE, POC    GLUCOSE, POC    GLUCOSE, POC    GLUCOSE, POC    GLUCOSE, POC    GLUCOSE, POC    GLUCOSE, POC    GLUCOSE, POC    GLUCOSE, POC    GLUCOSE, POC    GLUCOSE, POC    GLUCOSE, POC    GLUCOSE, POC    GLUCOSE, POC    GLUCOSE, POC    GLUCOSE, POC    EKG, 12 LEAD, INITIAL    EEG    SALINE LOCK IV ONE TIME STAT    DISCHARGE PATIENT    DISCONTD: torsemide (DEMADEX) 10 mg tablet    DISCONTD: amLODIPine (NORVASC) tablet 10 mg    DISCONTD: atorvastatin (LIPITOR) tablet 40 mg    DISCONTD: carvediloL (COREG) tablet 25 mg    DISCONTD: isosorbide mononitrate ER (IMDUR) tablet 30 mg    DISCONTD: losartan (COZAAR) tablet 100 mg    DISCONTD: pantoprazole (PROTONIX) tablet 40 mg    DISCONTD: torsemide (DEMADEX) tablet 10 mg    DISCONTD: acetaminophen (TYLENOL) tablet 650 mg    DISCONTD: acetaminophen (TYLENOL) solution 650 mg    DISCONTD: acetaminophen (TYLENOL) suppository 650 mg    DISCONTD: aspirin chewable tablet 81 mg    DISCONTD: enoxaparin (LOVENOX) injection 30 mg    DISCONTD: ondansetron (ZOFRAN) injection 4 mg    DISCONTD: insulin lispro (HUMALOG) injection    DISCONTD: glucose chewable tablet 16 g    DISCONTD: glucagon (GLUCAGEN) injection 1 mg    DISCONTD: dextrose 10 % infusion 0-250 mL    diazePAM (VALIUM) injection 2.5 mg    DISCONTD: labetaloL (NORMODYNE;TRANDATE) injection 10 mg    DISCONTD: hydrALAZINE (APRESOLINE) 20 mg/mL injection 10 mg    DISCONTD: guaiFENesin ER (MUCINEX) tablet 600 mg    oxyCODONE-acetaminophen (PERCOCET) 5-325 mg per tablet 1 Tablet    DISCONTD: torsemide (DEMADEX) tablet 10 mg    DISCONTD: 0.9% sodium chloride infusion    DISCONTD: losartan (COZAAR) 100 mg tablet    DISCONTD: carvediloL (COREG) 12.5 mg tablet    DISCONTD: aspirin 81 mg chewable tablet    aspirin 81 mg chewable tablet    losartan (COZAAR) 100 mg tablet    carvediloL (COREG) 12.5 mg tablet    IP CONSULT TO CARDIOLOGY    INITIAL PHYSICIAN ORDER: INPATIENT Telemetry; Yes; 3. Patient receiving treatment that can only be provided in an inpatient setting (further clarification in H&P documentation)    IP CONSULT TO CASE MANAGEMENT    IP CONSULT TO CASE MANAGEMENT       Medications   oxyCODONE-acetaminophen (PERCOCET) 5-325 mg per tablet 1 Tablet (has no administration in time range)   diazePAM (VALIUM) injection 2.5 mg (2.5 mg IntraVENous Given 7/14/22 1248)       Consult Note:  Vj Georges MD spoke with Hospitalist  Discussed pt's hx, disposition, and available diagnostic and imaging results. Reviewed care plans. Agree with management and plan thus far. Consultant will evaluate pt.     CRITICAL CARE NOTE :  IMPENDING DETERIORATION -Cardiovascular, CNS, Metabolic, and Renal  ASSOCIATED RISK FACTORS - Hypotension, Shock, Hypoxia, Dysrhythmia, Metabolic changes, Dehydration, Vascular Compromise, and CNS Decompensation  MANAGEMENT- Bedside Assessment and Supervision of Care  INTERPRETATION -  Xrays, CT Scan, ECG, Blood Pressure, Cardiac Output Measures , and Screening Ultrasound  INTERVENTIONS - hemodynamic mngmt, Neurologic interventions , and Metobolic interventions  CASE REVIEW - Hospitalist/Intensivist, Nursing, and Family  TREATMENT RESPONSE -Improved  PERFORMED BY - Self    NOTES   :  I personally spent 55 minutes of critical care time with this patient. This is time spent at this critically ill patient's bedside actively involved in patient care as well as the coordination of care and discussions with the patient's family. This includes time involved in ordering and reviewing of laboratory studies, pulse oximetry, re-evaluation of patient's condition, examination of patient, evaluation of patient's response to treatment, ordering and performing treatments and interventions, review of old charts, consultations with specialist, discussions with family regarding pertinent collateral history and plan of care, bedside attention and documentation. During this entire length of time I was immediately available to the patient. This does not include time spent on separately reported billable procedures. Critical Care: The reason for providing this level of medical care for this critically-ill patient was due to a critical illness that impaired one or more vital organ systems, such that there was a high probability of imminent or life-threatening deterioration in the patient's condition. This care involved the highest level of preparedness to intervene urgently. This care involved high complexity decision making to assess, manipulate, and support vital system functions, to treat this degree of vital organ system failure, and to prevent further life threatening deterioration of the patients condition requiring frequent assessments and interventions. Silvia Hopper MD    ADMIT  Given the patient's current clinical presentation, I have a high level of concern for decompensation if discharged from the emergency department. Patient is being admitted to the hospital.  Complex decision making was performed, which includes reviewing the patient's available past medical records, laboratory results, and radiographic imaging.  The results of their tests and reasons for their admission have been discussed with them and/or available family. They convey agreement and understanding for the need to be admitted and for their admission diagnosis. Consultation has been made with the inpatient physician specialist for hospitalization. Diagnosis     Clinical Impression:   1. Elevated troponin    2. Altered sensorium    3. Hypertensive urgency    4. NSTEMI (non-ST elevated myocardial infarction) ([address]    5. Acute encephalopathy    6. Accelerated hypertension        Attestations:    Vj Georges MD    Please note that this dictation was completed with [employer], the computer voice recognition software. Quite often unanticipated grammatical, syntax, homophones, and other interpretive errors are inadvertently transcribed by the computer software. Please disregard these errors. Please excuse any errors that have escaped final proofreading. Thank you.

## 2022-07-13 NOTE — ED NOTES
Assumed care of patient who is sitting in [facility] with family members at bedside. Patient ate 100% of dinner tray and denies any pain at this time. Alert and oriented x4, vital signs stable.

## 2022-07-14 ENCOUNTER — APPOINTMENT (OUTPATIENT)
Dept: MRI IMAGING | Age: 77
DRG: 070 | End: 2022-07-14
Attending: HOSPITALIST
Payer: MEDICARE

## 2022-07-14 ENCOUNTER — APPOINTMENT (OUTPATIENT)
Dept: NON INVASIVE DIAGNOSTICS | Age: 77
DRG: 070 | End: 2022-07-14
Attending: HOSPITALIST
Payer: MEDICARE

## 2022-07-14 LAB
ANION GAP SERPL CALC-SCNC: 6 MMOL/L (ref 5–15)
BACTERIA SPEC CULT: NORMAL
BUN SERPL-MCNC: 27 MG/DL (ref 6–20)
BUN/CREAT SERPL: 19 (ref 12–20)
CALCIUM SERPL-MCNC: 8.5 MG/DL (ref 8.5–10.1)
CHLORIDE SERPL-SCNC: 112 MMOL/L (ref 97–108)
CHOLEST SERPL-MCNC: 214 MG/DL
CO2 SERPL-SCNC: 24 MMOL/L (ref 21–32)
CREAT SERPL-MCNC: 1.45 MG/DL (ref 0.55–1.02)
ECHO AO ANNULUS INDEX: 1.43 CM/M2
ECHO AO ASC DIAM: 3.1 CM
ECHO AO ASCENDING AORTA INDEX: 1.85 CM/M2
ECHO AO ROOT DIAM: 2.4 CM
ECHO AO ROOT INDEX: 1.43 CM/M2
ECHO AV ANNULUS DIAM: 2.4 CM
ECHO AV AREA PEAK VELOCITY: 2.2 CM2
ECHO AV AREA VTI: 2.5 CM2
ECHO AV AREA/BSA PEAK VELOCITY: 1.3 CM2/M2
ECHO AV AREA/BSA VTI: 1.5 CM2/M2
ECHO AV MEAN GRADIENT: 4 MMHG
ECHO AV MEAN VELOCITY: 2.5 M/S
ECHO AV PEAK GRADIENT: 9 MMHG
ECHO AV PEAK VELOCITY: 1.5 M/S
ECHO AV VTI: 33.2 CM
ECHO LA DIAMETER INDEX: 2.14 CM/M2
ECHO LA DIAMETER: 3.6 CM
ECHO LA TO AORTIC ROOT RATIO: 1.5
ECHO LV E' LATERAL VELOCITY: 6 CM/S
ECHO LV E' SEPTAL VELOCITY: 5 CM/S
ECHO LV FRACTIONAL SHORTENING: 27 % (ref 28–44)
ECHO LV INTERNAL DIMENSION DIASTOLE INDEX: 2.62 CM/M2
ECHO LV INTERNAL DIMENSION DIASTOLIC: 4.4 CM (ref 3.9–5.3)
ECHO LV INTERNAL DIMENSION SYSTOLIC INDEX: 1.9 CM/M2
ECHO LV INTERNAL DIMENSION SYSTOLIC: 3.2 CM
ECHO LV IVSD: 1 CM (ref 0.6–0.9)
ECHO LV MASS 2D: 180 G (ref 67–162)
ECHO LV MASS INDEX 2D: 107.1 G/M2 (ref 43–95)
ECHO LV POSTERIOR WALL DIASTOLIC: 1.3 CM (ref 0.6–0.9)
ECHO LV RELATIVE WALL THICKNESS RATIO: 0.59
ECHO LVOT AREA: 3.1 CM2
ECHO LVOT AV VTI INDEX: 0.76
ECHO LVOT DIAM: 2 CM
ECHO LVOT STROKE VOLUME INDEX: 46.9 ML/M2
ECHO LVOT SV: 78.8 ML
ECHO LVOT VTI: 25.1 CM
ECHO MV A VELOCITY: 1.04 M/S
ECHO MV E VELOCITY: 0.54 M/S
ECHO MV E/A RATIO: 0.52
ECHO MV E/E' LATERAL: 9
ECHO MV E/E' RATIO (AVERAGED): 9.9
ECHO MV E/E' SEPTAL: 10.8
ECHO MV MEAN GRADIENT: 2 MMHG
ECHO PV PEAK GRADIENT: 5 MMHG
ECHO PV REGURGITANT END DIASTOLIC MAX VELOCITY: 1.1 M/S
EST. AVERAGE GLUCOSE BLD GHB EST-MCNC: 117 MG/DL
GLUCOSE BLD STRIP.AUTO-MCNC: 103 MG/DL (ref 65–117)
GLUCOSE BLD STRIP.AUTO-MCNC: 129 MG/DL (ref 65–117)
GLUCOSE BLD STRIP.AUTO-MCNC: 132 MG/DL (ref 65–117)
GLUCOSE BLD STRIP.AUTO-MCNC: 157 MG/DL (ref 65–117)
GLUCOSE BLD STRIP.AUTO-MCNC: 193 MG/DL (ref 65–117)
GLUCOSE SERPL-MCNC: 114 MG/DL (ref 65–100)
HBA1C MFR BLD: 5.7 % (ref 4–5.6)
HDLC SERPL-MCNC: 32 MG/DL
HDLC SERPL: 6.7 {RATIO} (ref 0–5)
LDLC SERPL CALC-MCNC: 141 MG/DL (ref 0–100)
POTASSIUM SERPL-SCNC: 4.6 MMOL/L (ref 3.5–5.1)
SERVICE CMNT-IMP: ABNORMAL
SERVICE CMNT-IMP: NORMAL
SERVICE CMNT-IMP: NORMAL
SODIUM SERPL-SCNC: 142 MMOL/L (ref 136–145)
TRIGL SERPL-MCNC: 205 MG/DL (ref ?–150)
TROPONIN-HIGH SENSITIVITY: 194 NG/L (ref 0–51)
TSH SERPL DL<=0.05 MIU/L-ACNC: 0.03 UIU/ML (ref 0.36–3.74)
VLDLC SERPL CALC-MCNC: 41 MG/DL

## 2022-07-14 PROCEDURE — 74011250637 HC RX REV CODE- 250/637: Performed by: HOSPITALIST

## 2022-07-14 PROCEDURE — 70551 MRI BRAIN STEM W/O DYE: CPT

## 2022-07-14 PROCEDURE — 93306 TTE W/DOPPLER COMPLETE: CPT

## 2022-07-14 PROCEDURE — 84443 ASSAY THYROID STIM HORMONE: CPT

## 2022-07-14 PROCEDURE — 97162 PT EVAL MOD COMPLEX 30 MIN: CPT

## 2022-07-14 PROCEDURE — 74011000250 HC RX REV CODE- 250: Performed by: INTERNAL MEDICINE

## 2022-07-14 PROCEDURE — 82962 GLUCOSE BLOOD TEST: CPT

## 2022-07-14 PROCEDURE — 70544 MR ANGIOGRAPHY HEAD W/O DYE: CPT

## 2022-07-14 PROCEDURE — 74011250636 HC RX REV CODE- 250/636: Performed by: HOSPITALIST

## 2022-07-14 PROCEDURE — 83036 HEMOGLOBIN GLYCOSYLATED A1C: CPT

## 2022-07-14 PROCEDURE — 80048 BASIC METABOLIC PNL TOTAL CA: CPT

## 2022-07-14 PROCEDURE — 92610 EVALUATE SWALLOWING FUNCTION: CPT

## 2022-07-14 PROCEDURE — 84484 ASSAY OF TROPONIN QUANT: CPT

## 2022-07-14 PROCEDURE — 97530 THERAPEUTIC ACTIVITIES: CPT

## 2022-07-14 PROCEDURE — 97535 SELF CARE MNGMENT TRAINING: CPT | Performed by: OCCUPATIONAL THERAPIST

## 2022-07-14 PROCEDURE — 95816 EEG AWAKE AND DROWSY: CPT | Performed by: PSYCHIATRY & NEUROLOGY

## 2022-07-14 PROCEDURE — 80061 LIPID PANEL: CPT

## 2022-07-14 PROCEDURE — 36415 COLL VENOUS BLD VENIPUNCTURE: CPT

## 2022-07-14 PROCEDURE — 65660000001 HC RM ICU INTERMED STEPDOWN

## 2022-07-14 PROCEDURE — 74011250636 HC RX REV CODE- 250/636: Performed by: INTERNAL MEDICINE

## 2022-07-14 PROCEDURE — 97165 OT EVAL LOW COMPLEX 30 MIN: CPT | Performed by: OCCUPATIONAL THERAPIST

## 2022-07-14 RX ORDER — GUAIFENESIN 600 MG/1
600 TABLET, EXTENDED RELEASE ORAL EVERY 12 HOURS
Status: DISCONTINUED | OUTPATIENT
Start: 2022-07-14 | End: 2022-07-17 | Stop reason: HOSPADM

## 2022-07-14 RX ORDER — HYDRALAZINE HYDROCHLORIDE 20 MG/ML
10 INJECTION INTRAMUSCULAR; INTRAVENOUS
Status: DISCONTINUED | OUTPATIENT
Start: 2022-07-14 | End: 2022-07-17 | Stop reason: HOSPADM

## 2022-07-14 RX ORDER — OXYCODONE AND ACETAMINOPHEN 5; 325 MG/1; MG/1
1 TABLET ORAL ONCE
Status: DISPENSED | OUTPATIENT
Start: 2022-07-14 | End: 2022-07-15

## 2022-07-14 RX ORDER — DIAZEPAM 10 MG/2ML
2.5 INJECTION INTRAMUSCULAR ONCE
Status: COMPLETED | OUTPATIENT
Start: 2022-07-14 | End: 2022-07-14

## 2022-07-14 RX ORDER — LABETALOL HYDROCHLORIDE 5 MG/ML
10 INJECTION, SOLUTION INTRAVENOUS
Status: DISCONTINUED | OUTPATIENT
Start: 2022-07-14 | End: 2022-07-17 | Stop reason: HOSPADM

## 2022-07-14 RX ADMIN — AMLODIPINE BESYLATE 10 MG: 5 TABLET ORAL at 08:03

## 2022-07-14 RX ADMIN — CARVEDILOL 25 MG: 12.5 TABLET, FILM COATED ORAL at 18:13

## 2022-07-14 RX ADMIN — ASPIRIN 81 MG: 81 TABLET, CHEWABLE ORAL at 08:03

## 2022-07-14 RX ADMIN — LABETALOL HYDROCHLORIDE 10 MG: 5 INJECTION INTRAVENOUS at 10:04

## 2022-07-14 RX ADMIN — ISOSORBIDE MONONITRATE 30 MG: 30 TABLET, EXTENDED RELEASE ORAL at 08:03

## 2022-07-14 RX ADMIN — LOSARTAN POTASSIUM 100 MG: 25 TABLET, FILM COATED ORAL at 08:04

## 2022-07-14 RX ADMIN — ACETAMINOPHEN 650 MG: 325 TABLET ORAL at 21:43

## 2022-07-14 RX ADMIN — HYDRALAZINE HYDROCHLORIDE 10 MG: 20 INJECTION INTRAMUSCULAR; INTRAVENOUS at 12:25

## 2022-07-14 RX ADMIN — DIAZEPAM 2.5 MG: 5 INJECTION, SOLUTION INTRAMUSCULAR; INTRAVENOUS at 12:48

## 2022-07-14 RX ADMIN — ATORVASTATIN CALCIUM 40 MG: 40 TABLET, FILM COATED ORAL at 20:17

## 2022-07-14 RX ADMIN — HYDRALAZINE HYDROCHLORIDE 10 MG: 20 INJECTION INTRAMUSCULAR; INTRAVENOUS at 20:17

## 2022-07-14 RX ADMIN — PANTOPRAZOLE SODIUM 40 MG: 40 TABLET, DELAYED RELEASE ORAL at 08:03

## 2022-07-14 RX ADMIN — CARVEDILOL 25 MG: 12.5 TABLET, FILM COATED ORAL at 08:03

## 2022-07-14 RX ADMIN — GUAIFENESIN 600 MG: 600 TABLET, EXTENDED RELEASE ORAL at 21:43

## 2022-07-14 RX ADMIN — TORSEMIDE 10 MG: 20 TABLET ORAL at 08:04

## 2022-07-14 NOTE — PROGRESS NOTES
Problem: Self Care Deficits Care Plan (Adult)  Goal: *Acute Goals and Plan of Care (Insert Text)  Description: FUNCTIONAL STATUS PRIOR TO ADMISSION: Pt lives alone ( passed 1/2022). Pt has cameras throughout her home to allow son to monitor from a distance. Pt has an aide come in to assist with meals and a  for IADLs. Pt reports independence in self care and mobility  (reports she has a walker at home). No recent falls, but reports a history of falls in the past.     HOME SUPPORT: family, aides, whole house monitoring cameras    Occupational Therapy Goals  Initiated 7/14/2022  1. Patient will perform standing grooming with independence within 7 day(s). 2.  Patient will perform bathing with independence within 7 day(s). 3.  Patient will perform upper body dressing and lower body dressing with independence within 7 day(s). 4.  Patient will perform toilet transfers with supervision/set-up within 7 day(s). 5.  Patient will perform all aspects of toileting with independence within 7 day(s). 6.  Patient will participate in upper extremity therapeutic exercise/activities with supervision/set-up for 8 minutes within 7 day(s). 7.  Patient will utilize energy conservation techniques during functional activities with minimal verbal cues within 7 day(s). 8,  Patient will perform standing adls for at least 8 minutes. Outcome: Not Met   OCCUPATIONAL THERAPY EVALUATION  Patient: Mirza Ridley (63 y.o. female)  Date: 7/14/2022  Primary Diagnosis: Elevated troponin [R77.8]  Stroke [facility]) [I63.9]        Precautions:   Fall    ASSESSMENT  Based on the objective data described below, the patient presents with hx of cva and falls, complex medical history, mildly decreased balance, mild generalized weakness impairing adls and functional mobility. Pt is functioning at up to min A level in general for adls and needs at least CGA for adl mobility.   Pt is functioning below her independent level and will benefit from acute OT services. At baseline, pt has assistance for IADLs. At the end of evaluation, pt was readying for MRI which required a sedative due to claustrophobia and tx session ended as transporter arrived. CT was negative for acute process . Since pt lives alone and has history of falls as well as hx of cva and is functioning below her generally independent in adls baseline, recommend considering Inpatient Rehab at discharge to maximize independence and safety. Current Level of Function Impacting Discharge (ADLs/self-care): up to min A for adls. Needs assist and has assist for IADLs at baseline. Functional Outcome Measure: The patient scored Total: 55/100 on the Barthel Index outcome measure which is indicative of being partially dependent in basic self-care. Other factors to consider for discharge: lives alone, has supportive family     Patient will benefit from skilled therapy intervention to address the above noted impairments. PLAN :  Recommendations and Planned Interventions: self care training, functional mobility training, therapeutic exercise, balance training, visual/perceptual training, therapeutic activities, endurance activities, patient education, home safety training, and family training/education    Frequency/Duration: Patient will be followed by occupational therapy 4 times a week to address goals.     Recommendation for discharge: (in order for the patient to meet his/her long term goals)  Therapy 3 hours per day 5-7 days per week    This discharge recommendation:  Has not yet been discussed the attending provider and/or case management    IF patient discharges home will need the following DME: tbd        SUBJECTIVE:   Patient stated I have had falls in the past.    OBJECTIVE DATA SUMMARY:   HISTORY:   Past Medical History:   Diagnosis Date    Anemia, unspecified 10/6/2020    Arrhythmia     Arthritis     CAD (coronary artery disease)     Cancer ([facility] [address] Chronic bronchitis ([facility] [address]     per pt:  as of 1/9/15 pt denies any ARENAS or SOB    Diabetes ([facility] [address] Dx approx 2009    Dr Crista Washburn (in [facility])    GERD (gastroesophageal reflux disease)     Hypercholesteremia     Hypertension      Past Surgical History:   Procedure Laterality Date    HX CHOLECYSTECTOMY  6/12    HX COLONOSCOPY      HX CORONARY STENT PLACEMENT  8/25/2015    HX DILATION AND CURETTAGE      HX OTHER SURGICAL  06/22/2021    Laparoscopic hand-assisted partial gastrectomy with core needle liver biopsy. DE CARDIAC SURG PROCEDURE UNLIST  2015    UPPER GI ENDOSCOPY,BIOPSY  10/6/2020            Expanded or extensive additional review of patient history:     Home Situation  Home Environment: Private residence (\"I used to fall a lot\")  # Steps to Enter: 3  Rails to Enter: Yes  [employer] : Bilateral  Wheelchair Ramp: No  One/Two Story Residence: One story  Living Alone: Yes  Support Systems: Child(elle),Friend/Neighbor  Patient Expects to be Discharged to[de-identified] Home with family assistance  Current DME Used/Available at Home: Cane, straight,Grab bars,Walker, rolling,Shower chair  Tub or Shower Type: Tub/Shower combination    Hand dominance: Right    EXAMINATION OF PERFORMANCE DEFICITS:  Cognitive/Behavioral Status:  Neurologic State: Alert  Orientation Level: Oriented X4  Cognition: Appropriate decision making; Appropriate for age attention/concentration; Appropriate safety awareness  Perception: Appears intact  Perseveration: Perseverates during conversation  Safety/Judgement: Awareness of environment;Decreased insight into deficits;Good awareness of safety precautions; Fall prevention (decreased standing balance reactions)    Skin: generally intact    Edema: none observed    Hearing:   Auditory  Auditory Impairment: None    Vision/Perceptual:                   Visual Fields: Difficulty detecting stimulus in left lower quadrant            Corrective Lenses: Glasses    Range of Motion:  BUEs:    AROM: Within functional limits  PROM: Within functional limits                      Strength:  Overall mild generalized weakness  Strength: Generally decreased, functional                Coordination:  Coordination: Generally decreased, functional  Fine Motor Skills-Upper: Left Intact; Right Intact    Gross Motor Skills-Upper: Left Intact; Right Intact    Tone & Sensation:    Tone: Normal  Sensation: Intact                      Balance:  Sitting: Intact  Standing: Impaired  Standing - Static: Fair;Occasional  Standing - Dynamic : Poor;Constant support    Functional Mobility and Transfers for ADLs:  Bed Mobility:  Rolling:  (sitting on side of bed with son in room)    Transfers:  Sit to Stand: Minimum assistance  Stand to Sit: Contact guard assistance  Bed to Chair: Minimum assistance (toilet)  Bathroom Mobility: Minimum assistance  Toilet Transfer : Minimum assistance  Assistive Device :  (reacher)    ADL Assessment:  Feeding: Independent    Oral Facial Hygiene/Grooming: Stand-by assistance;Setup (seated)    Bathing: Minimum assistance; Moderate assistance    Type of Bath: Chlorhexidine (CHG)    Upper Body Dressing: Minimum assistance    Lower Body Dressing: Minimum assistance    Toileting: Minimum assistance                ADL Intervention and task modifications:   Pt was seated EOB for her meal upon arrival, her supportive family present who was conversing with family member over phone re; pt's condition. Pt was able to stand and ambulate to bathroom with min A / HHA due to unsteadiness. Cues for safe toilet transfer provided and cues for standing  grooming. Pt seems to be a bit slowed in cognitive processing and visual searching behaviors. She has a history of old cva. Pt was very cooperative, family asking many questions. Pt was able to walk to stretcher - going off the floor to MRI-with min A. Trial of RW in room with cues for safe technique.                                           Cognitive Retraining  Safety/Judgement: Awareness of environment;Decreased insight into deficits;Good awareness of safety precautions; Fall prevention (decreased standing balance reactions)    Functional Measure:    Barthel Index:  Bathin  Bladder: 10 (urgency)  Bowels: 10  Groomin  Dressin  Feeding: 10  Mobility: 0  Stairs: 0  Toilet Use: 5  Transfer (Bed to Chair and Back): 10  Total: 55/100      The Barthel ADL Index: Guidelines  1. The index should be used as a record of what a patient does, not as a record of what a patient could do. 2. The main aim is to establish degree of independence from any help, physical or verbal, however minor and for whatever reason. 3. The need for supervision renders the patient not independent. 4. A patient's performance should be established using the best available evidence. Asking the patient, friends/relatives and nurses are the usual sources, but direct observation and common sense are also important. However direct testing is not needed. 5. Usually the patient's performance over the preceding 24-48 hours is important, but occasionally longer periods will be relevant. 6. Middle categories imply that the patient supplies over 50 per cent of the effort. 7. Use of aids to be independent is allowed. Score Interpretation (from [address] [address])    Independent   60-79 Minimally independent   40-59 Partially dependent   20-39 Very dependent   <20 Totally dependent     -Jemima Santos., Barthel, D.W. (1965). Functional evaluation: the Barthel Index. [address] [facility] ([address] (1997). The Barthel activities of daily living index: self-reporting versus actual performance in the old (> or = 75 years). Journal of [address] [place] [address] 45(7), [address] [facility], JSTONEY, Sb Painter., Colton Caro. (1999). Measuring the change in disability after inpatient rehabilitation; comparison of the responsiveness of the Barthel Index and Functional [place] Measure. Journal of Neurology, Neurosurgery, and Psychiatry, 66(4), 784-011. KRYSTINA Eldridge, DIAMOND Davis, & Lou Barnes M.A. (2004) Assessment of post-stroke quality of life in cost-effectiveness studies: The usefulness of the Barthel Index and the EuroQoL-5D. Quality of Life Research, 15, 642-45     Occupational Therapy Evaluation Charge Determination   History Examination Decision-Making   MEDIUM Complexity : Expanded review of history including physical, cognitive and psychosocial  history  MEDIUM Complexity : 3-5 performance deficits relating to physical, cognitive , or psychosocial skils that result in activity limitations and / or participation restrictions MEDIUM Complexity : Patient may present with comorbidities that affect occupational performnce. Miniml to moderate modification of tasks or assistance (eg, physical or verbal ) with assesment(s) is necessary to enable patient to complete evaluation       Based on the above components, the patient evaluation is determined to be of the following complexity level: MEDIUM  Pain Rating:  No complaints of pain    Activity Tolerance:   Good, SpO2 stable on RA, and VSS    After treatment patient left in no apparent distress:    Caregiver / family present and on stretcher in prep for going off the floor for MRI, nurse and transporter present    COMMUNICATION/EDUCATION:   The patients plan of care was discussed with: Physical therapist, Registered nurse, and transporter . Patient/family have participated as able in goal setting and plan of care. This patients plan of care is appropriate for delegation to [facility].     Thank you for this referral.  RODNEY Roth/L  Time Calculation: 22 mins

## 2022-07-14 NOTE — PROGRESS NOTES
Received consult for pt with elevated troponins. Pt is established with Dr. Vignesh Rosas at [address] [facility]. Will turn care over to them. Discussed with RN.     Shala Rosado NP  7/14/2022  9:30 AM

## 2022-07-14 NOTE — PROGRESS NOTES
Problem: Mobility Impaired (Adult and Pediatric)  Goal: *Acute Goals and Plan of Care (Insert Text)  Description:   FUNCTIONAL STATUS PRIOR TO ADMISSION: Patient was independent without use of DME. Patient was modified independent for basic and instrumental ADLs. Has help with meals and house cleaning. HOME SUPPORT PRIOR TO ADMISSION: The patient lived alone with family and friends/neighbors to provide assistance. Physical Therapy Goals  Initiated 7/14/2022  1. Patient will move from supine to sit and sit to supine , scoot up and down, and roll side to side in bed with modified independence within 7 day(s). 2.  Patient will transfer from bed to chair and chair to bed with supervision/set-up using the least restrictive device within 7 day(s). 3.  Patient will perform sit to stand with modified independence within 7 day(s). 4.  Patient will ambulate with supervision/set-up for 250 feet with the least restrictive device within 7 day(s). 5.  Patient will ascend/descend 3 stairs with 1 handrail(s) with minimal assistance/contact guard assist within 7 day(s). 6.  Patient will improve Ornelas Balance score by 7 points within 7 days. Outcome: Not Met   PHYSICAL THERAPY EVALUATION- NEURO POPULATION  Patient: Marc Donnelly (20 y.o. female)  Date: 7/14/2022  Primary Diagnosis: Elevated troponin [R77.8]  Stroke [facility]) [I63.9]        Precautions: Fall      ASSESSMENT  Based on the objective data described below, the patient presents with generalized weakness, possible L visual field cut, intact sitting balance but impaired standing balance with decreased mobility skills below her independent baseline without devices. Sitting on side of bed when PT arrived. Agreed to evaluation. Needed min a to stand due to unsteadiness. Needed min a to ambulate to bathroom toilet with arm hold assistance due to narrow step widths and frequent path deviations.  After using toilet with OT, provided a RW which improved her overall gait stability, but noted she had difficulty finding the L hand rest indicating a possible L field cut - unsure if this is chronic or new as she had a prior occipital infarct noted on CT. Patient then assisted to [facility] as she was going for MRI and echo. Current Level of Function Impacting Discharge (mobility/balance): min a sit to stand, min a to ambulate without devices with frequent lob vs cg with RW. Functional Outcome Measure: The patient scored Total: 16/56 on the Ornelas Balance Assessment which is indicative of high fall risk. Other factors to consider for discharge: independent PLOF; family available to assist, but lives alone     Patient will benefit from skilled therapy intervention to address the above noted impairments. PLAN :  Recommendations and Planned Interventions: bed mobility training, transfer training, gait training, therapeutic exercises, neuromuscular re-education, patient and family training/education and therapeutic activities      Frequency/Duration: Patient will be followed by physical therapy:  5 times a week to address goals. Recommendation for discharge: (in order for the patient to meet his/her long term goals)  Therapy 3 hours per day 5-7 days per week    This discharge recommendation:  Has not yet been discussed the attending provider and/or case management    IF patient discharges home will need the following DME: patient owns DME required for discharge       SUBJECTIVE:   Patient stated  I'm definitely more unsteady on my feet.      OBJECTIVE DATA SUMMARY:   HISTORY:    Past Medical History:   Diagnosis Date    Anemia, unspecified 10/6/2020    Arrhythmia     Arthritis     CAD (coronary artery disease)     Cancer ([facility] [address]     Chronic bronchitis ([facility] [address]     per pt:  as of 1/9/15 pt denies any ARENAS or SOB    Diabetes ([address] Dx approx 2009    Dr Alan Mohamud [facility]in [facility])    GERD (gastroesophageal reflux disease)     Hypercholesteremia     Hypertension      Past Surgical History:   Procedure Laterality Date    HX CHOLECYSTECTOMY  6/12    HX COLONOSCOPY      HX CORONARY STENT PLACEMENT  8/25/2015    HX DILATION AND CURETTAGE      HX OTHER SURGICAL  06/22/2021    Laparoscopic hand-assisted partial gastrectomy with core needle liver biopsy.  [place] CARDIAC SURG PROCEDURE UNLIST  2015    UPPER GI ENDOSCOPY,BIOPSY  10/6/2020            Personal factors and/or comorbidities impacting plan of care: cad, h/o old cva, possible new cva vs tia, dm    Home Situation  Home Environment: Private residence (\"I used to fall a lot\")  # Steps to Enter: 3  Rails to Enter: Yes  [employer] : Bilateral  Wheelchair Ramp: No  One/Two Story Residence: One story  Living Alone: Yes  Support Systems: Child(elle),Friend/Neighbor  Patient Expects to be Discharged to[de-identified] Home with family assistance  Current DME Used/Available at Home: Cane, straight,Grab bars,Walker, rolling,Shower chair  Tub or Shower Type: Tub/Shower combination    EXAMINATION/PRESENTATION/DECISION MAKING:   Critical Behavior:  Neurologic State: Alert  Orientation Level: Oriented X4  Cognition: Appropriate decision making,Appropriate for age attention/concentration,Appropriate safety awareness  Safety/Judgement: Awareness of environment,Decreased insight into deficits,Good awareness of safety precautions,Fall prevention (decreased standing balance reactions)  Hearing:   Auditory  Auditory Impairment: None  Skin:  No findings  Edema: none  Range Of Motion:  AROM: Within functional limits           PROM: Within functional limits           Strength:    Strength: Generally decreased, functional                    Tone & Sensation:   Tone: Normal              Sensation: Intact               Coordination:  Coordination: Generally decreased, functional  Vision:   Visual Fields: Difficulty detecting stimulus in left lower quadrant  Corrective Lenses: Glasses  Functional Mobility:  Bed Mobility:  Rolling:  (sitting on side of bed with son in room) Transfers:  Sit to Stand: Minimum assistance  Stand to Sit: Contact guard assistance        Bed to Chair: Minimum assistance (toilet)              Balance:   Sitting: Intact  Standing: Impaired  Standing - Static: Fair;Occasional  Standing - Dynamic : Poor;Constant support  Ambulation/Gait Training:  Distance (ft): 40 Feet (ft)  Assistive Device: Walker, rolling  Ambulation - Level of Assistance: Minimal assistance;Contact guard assistance (min a w/o RW; cg w/ RW)     Gait Description (WDL): Exceptions to WDL  Gait Abnormalities: Decreased step clearance; Path deviations;Scissoring;Trunk sway increased (without walker/more steady with RW)        Base of Support: Narrowed     Speed/Tiffany: Pace decreased (<100 feet/min)  Step Length: Right shortened;Left shortened        Interventions: Safety awareness training; Other (comment) (use walker at all times)    Functional Measure  Ornelas Balance Test:    Sitting to Standin  Standing Unsupported: 2  Sitting with Back Unsupported: 4  Standing to Sitting: 3  Transfers: 1  Standing Unsupported with Eyes Closed: 4  Standing Unsupported with Feet Together: 0  Reach Forward with Outstretched Arm: 1   Object: 0  Turn to Look Over Shoulders: 0  Turn 360 Degrees: 0  Alternate Foot on Step/Stool: 0  Standing Unsupported One Foot in Front: 0  Stand on One Le  Total: 16/56         56=Maximum possible score;   0-20=High fall risk  21-40=Moderate fall risk   41-56=Low fall risk        Physical Therapy Evaluation Charge Determination   History Examination Presentation Decision-Making   HIGH Complexity :3+ comorbidities / personal factors will impact the outcome/ POC  HIGH Complexity : 4+ Standardized tests and measures addressing body structure, function, activity limitation and / or participation in recreation  MEDIUM Complexity : Evolving with changing characteristics  Other outcome measures Ornelas  HIGH       Based on the above components, the patient evaluation is determined to be of the following complexity level: MEDIUM    Pain Rating:  None reported during the session. Activity Tolerance:   Fair, SpO2 stable on RA and requires rest breaks      After treatment patient left in no apparent distress: On gurney going to tests. COMMUNICATION/EDUCATION:   The patients plan of care was discussed with: Occupational therapist and Registered nurse. Patient was educated regarding her deficit(s) of weakness as this relates to her diagnosis of TIA vs CVA. She demonstrated Fair understanding as evidenced by conversation. Patient and/or family was verbally educated on the BE FAST acronym for signs/symptoms of CVA and TIA. BE FAST was written on patient's communication board  for visual education and reinforcement. All questions answered with patient indicating limited understanding. Fall prevention education was provided and the patient/caregiver indicated understanding., Patient/family have participated as able in goal setting and plan of care. and Patient/family agree to work toward stated goals and plan of care.     Thank you for this referral.  Allyn Caruso, PT   Time Calculation: 21 mins

## 2022-07-14 NOTE — H&P
Hospitalist Admission Note    NAME: Fauzia Fisher   :  1945   MRN:  [id number]     Date/Time:  2022 8:47 PM    Patient PCP: Yamini Bryant MD  ______________________________________________________________________  Given the patient's current clinical presentation, I have a high level of concern for decompensation if discharged from the emergency department. Complex decision making was performed, which includes reviewing the patient's available past medical records, laboratory results, and x-ray films. My assessment of this patient's clinical condition and my plan of care is as follows. Assessment / Plan:  TIA vs stroke   With episodes of confusion  Head CT: negative. Risk factors: dm, htn   Admit to telemetry to r/o Afib. Neurology In house consult. Start ASA. Risk factor evaluation with ECHO, lipids pabel, A1C, TSH  MRI/ MRA   Monitor stroke vitals with glucose, sats, RR, HR, temp. Control BP. PT/OT/Speech. Passed bedside swallowing study. Non-ST elevation MI  CAD  HTN  Monitor on telemetry  Troponin 177-163, will repeat  BP elevated initially   Aspirin  Cont home coreg , cozaar, imdur , norvasc , demadex   Cardiology consult  Echo    Fever   At home yesterday x 1  ua with min changes, no dysuira  cxray clear  No leukocytosis   Monitor     DM type II    Not on meds   SS as needed     CKD stage III  Cr at baseline 1.5-1.7, stable  monitor closely. Avoid nephrotoxic drugs, adjust all meds to GFR. Anemia of ckd   Hemoglobin stable  Monitor    Hyperlipidemia  GERD  ----Continue home medications        Code Status: Full code  Surrogate Decision Maker: Son    DVT Prophylaxis: Lovenox  GI Prophylaxis: not indicated    Baseline: Lives alone, has 2 sons, 1 son , ambulates independently      Subjective:   CHIEF COMPLAINT: Episodes of confusion    HISTORY OF PRESENT ILLNESS:     Fauzia Fisher is a 78 y.o.    female who presents with above complaints. Patient lives alone at home. Her son [place], so he has cameras around patient's house to monitor her wellbeing. He frequently checks on her throughout the day. Last night and this morning she was noticed to be disoriented and slightly off balance when walking. Son also notes patient seemed to have slightly slurred speech. Patient reports that she was aware that she was not herself. She is back to her baseline. Patient also reports some mild cough since 3 days ago and had fever 101 yesterday. No fever today. In ED, she was found with elevated troponin. On direct questioning she admits episodes of chest pain this morning which lasted 5 to 10 minutes and felt like pressure or soreness. Chest pain resolved on its own. Patient denies shortness of breath, nausea, vomiting, diarrhea, dizziness. Patient denies history of previous stroke or TIA. She is not on aspirin or Plavix at home. She stated that she had heart problems in the past but cannot give any details. She is to follow-up with cardiology but last time was seen many years ago. She cannot recall the name of her cardiologist.    We were asked to admit for work up and evaluation of the above problems. Past Medical History:   Diagnosis Date    Anemia, unspecified 10/6/2020    Arrhythmia     Arthritis     CAD (coronary artery disease)     Cancer ([address]     Chronic bronchitis ([address]     per pt:  as of 1/9/15 pt denies any ARENAS or SOB    Diabetes ([address] Dx approx 2009    Dr Nadja Parikh (in [facility])    GERD (gastroesophageal reflux disease)     Hypercholesteremia     Hypertension         Past Surgical History:   Procedure Laterality Date    HX CHOLECYSTECTOMY  6/12    HX COLONOSCOPY      HX CORONARY STENT PLACEMENT  8/25/2015    HX DILATION AND CURETTAGE      HX OTHER SURGICAL  06/22/2021    Laparoscopic hand-assisted partial gastrectomy with core needle liver biopsy.     RI CARDIAC SURG PROCEDURE UNLIST  2015    UPPER GI ENDOSCOPY,BIOPSY  10/6/2020            Social History     Tobacco Use    Smoking status: Former Smoker    Smokeless tobacco: Never Used    Tobacco comment: quit 50 years ago   Substance Use Topics    Alcohol use: No        Family History   Problem Relation Age of Onset    Diabetes Mother     Heart Disease Mother     Hypertension Mother     Stroke Father     Heart Disease Brother     Heart Disease Brother     Stroke Brother     HIV/AIDS Brother      Allergies   Allergen Reactions    Metformin Other (comments)     GI side effects. Not a true allergy        Prior to Admission medications    Medication Sig Start Date End Date Taking? Authorizing Provider   torsemide (DEMADEX) 10 mg tablet Take 20 mg by mouth daily. Yes Other, MD Regino   carvediloL (COREG) 12.5 mg tablet Take 1 Tablet by mouth two (2) times daily (with meals). 6/27/22   Olga Kimball MD   potassium chloride (K-DUR, KLOR-CON M20) 20 mEq tablet  1/12/22   Provider, Historical   isosorbide mononitrate ER (IMDUR) 30 mg tablet  1/17/22   Provider, Historical   losartan (COZAAR) 100 mg tablet Take 1 Tablet by mouth daily. 11/18/21   Olga Kimball MD   imatinib mesylate (IMATINIB PO) Take  by mouth. Provider, Historical   multivitamin, tx-iron-ca-min (THERA-M w/ IRON) 9 mg iron-400 mcg tab tablet Take 1 Tab by mouth two (2) times a day. Centrum silver    Provider, Historical   pantoprazole (PROTONIX) 40 mg tablet Take 40 mg by mouth daily. 1/13/21   Provider, Historical   lancets misc PUSH BUTTON LANCETS. Check blood sugar 4 times per day due to hypoglycemic events. Dx:E11.42, E16.2 12/17/20   Olga Kimball MD   amLODIPine (NORVASC) 10 mg tablet Take 1 Tab by mouth daily.  11/20/20   Madi Gotti MD   food supplemt, lactose-reduced (Ensure Enlive) 0.08 gram-1.5 kcal/mL liqd Take 6 x daily as recommended by Surgery to improve nutritional status for Possible surgery in future  Patient taking differently: Take 6 x daily as recommended by Surgery to improve nutritional status for Possible surgery in future. takes about 3 11/20/20   Edna WALKER MD   atorvastatin (LIPITOR) 40 mg tablet Take 1 Tab by mouth nightly. 1/27/20   Eduin Martinez MD       REVIEW OF SYSTEMS:     I am not able to complete the review of systems because: The patient is intubated and sedated    The patient has altered mental status due to his acute medical problems    The patient has baseline aphasia from prior stroke(s)    The patient has baseline dementia and is not reliable historian    The patient is in acute medical distress and unable to provide information           Total of 12 systems reviewed as follows:       POSITIVE= underlined text  Negative = text not underlined  General:  fever, chills, sweats, generalized weakness, weight loss/gain,      loss of appetite   Eyes:    blurred vision, eye pain, loss of vision, double vision  ENT:    rhinorrhea, pharyngitis   Respiratory:   cough, sputum production, SOB, ARENAS, wheezing, pleuritic pain   Cardiology:   chest pain, palpitations, orthopnea, PND, edema, syncope   Gastrointestinal:  abdominal pain , N/V, diarrhea, dysphagia, constipation, bleeding   Genitourinary:  frequency, urgency, dysuria, hematuria, incontinence   Muskuloskeletal :  arthralgia, myalgia, back pain  Hematology:  easy bruising, nose or gum bleeding, lymphadenopathy   Dermatological: rash, ulceration, pruritis, color change / jaundice  Endocrine:   hot flashes or polydipsia   Neurological:  headache, dizziness, confusion, focal weakness, paresthesia,     Speech difficulties, memory loss, gait difficulty  Psychological: Feelings of anxiety, depression, agitation    Objective:   VITALS:    Visit Vitals  BP (!) 159/70   Pulse 70   Temp 98.9 °F (37.2 °C)   Resp 20   Wt 66.7 kg (147 lb)   SpO2 96%   BMI 26.89 kg/m²       PHYSICAL EXAM:    General:    Alert, cooperative, no distress, appears stated age.      HEENT: Atraumatic, anicteric sclerae, pink conjunctivae     No oral ulcers, mucosa moist, throat clear, dentition fair  Neck:  Supple, symmetrical,  thyroid: non tender  Lungs:   Clear to auscultation bilaterally. No Wheezing or Rhonchi. No rales. Chest wall:  No tenderness  No Accessory muscle use. Heart:   Regular  rhythm,  No  murmur   No edema  Abdomen:   Soft, non-tender. Not distended. Bowel sounds normal  Extremities: No cyanosis. No clubbing,      Skin turgor normal, Capillary refill normal, Radial dial pulse 2+  Skin:     Not pale. Not Jaundiced  No rashes   Psych:  Good insight. Not depressed. Not anxious or agitated. Neurologic: EOMs intact. No facial asymmetry. No aphasia or slurred speech. Symmetrical strength, Sensation grossly intact. Alert and oriented X 4.     _______________________________________________________________________  Care Plan discussed with:    Comments   Patient y    Family  y Friends bedside    RN y    Care Manager                    Consultant:  [facility] provider    _______________________________________________________________________  Expected  Disposition:   Home with Family y   HH/PT/OT/RN    SNF/LTC    ROSE    ________________________________________________________________________  TOTAL TIME:  72 Minutes    Critical Care Provided     Minutes non procedure based      Comments    y Reviewed previous records   >50% of visit spent in counseling and coordination of care y Discussion with patient and/or family and questions answered       ________________________________________________________________________  Signed: Gerilyn Boast, MD    Procedures: see electronic medical records for all procedures/Xrays and details which were not copied into this note but were reviewed prior to creation of Plan.     LAB DATA REVIEWED:    Recent Results (from the past 24 hour(s))   EKG, 12 LEAD, INITIAL    Collection Time: 07/13/22  2:18 PM   Result Value Ref Range    Ventricular Rate 60 BPM    Atrial Rate 60 BPM    P-R Interval 114 ms    QRS Duration 86 ms    Q-T Interval 458 ms    QTC Calculation (Bezet) 458 ms    Calculated P Axis 16 degrees    Calculated R Axis -19 degrees    Calculated T Axis -144 degrees    Diagnosis       Normal sinus rhythm with sinus arrhythmia  Left ventricular hypertrophy with repolarization abnormality    Confirmed by Cuong Melton ([id number]) on 7/13/2022 8:17:42 PM     CBC WITH AUTOMATED DIFF    Collection Time: 07/13/22  2:37 PM   Result Value Ref Range    WBC 8.3 3.6 - 11.0 K/uL    RBC 3.30 (L) 3.80 - 5.20 M/uL    HGB 9.5 (L) 11.5 - 16.0 g/dL    HCT 29.3 (L) 35.0 - 47.0 %    MCV 88.8 80.0 - 99.0 FL    MCH 28.8 26.0 - 34.0 PG    MCHC 32.4 30.0 - 36.5 g/dL    RDW 12.4 11.5 - 14.5 %    PLATELET 890 161 - 026 K/uL    MPV 8.5 (L) 8.9 - 12.9 FL    NRBC 0.0 0  WBC    ABSOLUTE NRBC 0.00 0.00 - 0.01 K/uL    NEUTROPHILS 73 32 - 75 %    LYMPHOCYTES 23 12 - 49 %    MONOCYTES 3 (L) 5 - 13 %    EOSINOPHILS 1 0 - 7 %    BASOPHILS 0 0 - 1 %    IMMATURE GRANULOCYTES 0 0.0 - 0.5 %    ABS. NEUTROPHILS 6.0 1.8 - 8.0 K/UL    ABS. LYMPHOCYTES 1.9 0.8 - 3.5 K/UL    ABS. MONOCYTES 0.3 0.0 - 1.0 K/UL    ABS. EOSINOPHILS 0.1 0.0 - 0.4 K/UL    ABS. BASOPHILS 0.0 0.0 - 0.1 K/UL    ABS. IMM.  GRANS. 0.0 0.00 - 0.04 K/UL    DF MANUAL      RBC COMMENTS NORMOCYTIC, NORMOCHROMIC      WBC COMMENTS VACUOLATED POLYS     METABOLIC PANEL, COMPREHENSIVE    Collection Time: 07/13/22  2:37 PM   Result Value Ref Range    Sodium 143 136 - 145 mmol/L    Potassium 4.7 3.5 - 5.1 mmol/L    Chloride 113 (H) 97 - 108 mmol/L    CO2 27 21 - 32 mmol/L    Anion gap 3 (L) 5 - 15 mmol/L    Glucose 114 (H) 65 - 100 mg/dL    BUN 26 (H) 6 - 20 MG/DL    Creatinine 1.57 (H) 0.55 - 1.02 MG/DL    BUN/Creatinine ratio 17 12 - 20      GFR est AA 39 (L) >60 ml/min/1.73m2    GFR est non-AA 32 (L) >60 ml/min/1.73m2    Calcium 8.1 (L) 8.5 - 10.1 MG/DL    Bilirubin, total 0.2 0.2 - 1.0 MG/DL    ALT (SGPT) 25 12 - 78 U/L    AST (SGOT) 31 15 - 37 U/L    Alk. phosphatase 88 45 - 117 U/L    Protein, total 6.0 (L) 6.4 - 8.2 g/dL    Albumin 1.8 (L) 3.5 - 5.0 g/dL    Globulin 4.2 (H) 2.0 - 4.0 g/dL    A-G Ratio 0.4 (L) 1.1 - 2.2     TROPONIN-HIGH SENSITIVITY    Collection Time: 07/13/22  2:37 PM   Result Value Ref Range    Troponin-High Sensitivity 177 (HH) 0 - 51 ng/L   SAMPLES BEING HELD    Collection Time: 07/13/22  2:37 PM   Result Value Ref Range    SAMPLES BEING HELD LAV, PST, PST     COMMENT        Add-on orders for these samples will be processed based on acceptable specimen integrity and analyte stability, which may vary by analyte.    TROPONIN-HIGH SENSITIVITY    Collection Time: 07/13/22  4:26 PM   Result Value Ref Range    Troponin-High Sensitivity 163 (HH) 0 - 51 ng/L   COVID-19 RAPID TEST    Collection Time: 07/13/22  4:26 PM   Result Value Ref Range    Specimen source Nasopharyngeal      COVID-19 rapid test Not detected NOTD     URINALYSIS W/ REFLEX CULTURE    Collection Time: 07/13/22  6:02 PM    Specimen: Urine   Result Value Ref Range    Color YELLOW/STRAW      Appearance CLEAR CLEAR      Specific gravity 1.019      pH (UA) 7.0 5.0 - 8.0      Protein >300 (A) NEG mg/dL    Glucose 100 (A) NEG mg/dL    Ketone Negative NEG mg/dL    Bilirubin Negative NEG      Blood Negative NEG      Urobilinogen 1.0 0.2 - 1.0 EU/dL    Nitrites Negative NEG      Leukocyte Esterase SMALL (A) NEG      WBC 10-20 0 - 4 /hpf    RBC 0-5 0 - 5 /hpf    Epithelial cells FEW FEW /lpf    Bacteria Negative NEG /hpf    UA:UC IF INDICATED URINE CULTURE ORDERED (A) CNI

## 2022-07-14 NOTE — PROGRESS NOTES
Problem: Patient Education: Go to Patient Education Activity  Goal: Patient/Family Education  Outcome: Progressing Towards Goal     Problem: TIA/CVA Stroke: 0-24 hours  Goal: Off Pathway (Use only if patient is Off Pathway)  Outcome: Progressing Towards Goal  Goal: Activity/Safety  Outcome: Progressing Towards Goal  Goal: Consults, if ordered  Outcome: Progressing Towards Goal  Goal: Diagnostic Test/Procedures  Outcome: Progressing Towards Goal  Goal: Nutrition/Diet  Outcome: Progressing Towards Goal  Goal: Discharge Planning  Outcome: Progressing Towards Goal  Goal: Medications  Outcome: Progressing Towards Goal  Goal: Respiratory  Outcome: Progressing Towards Goal  Goal: Treatments/Interventions/Procedures  Outcome: Progressing Towards Goal  Goal: Minimize risk of bleeding post-thrombolytic infusion  Outcome: Progressing Towards Goal  Goal: Monitor for complications post-thrombolytic infusion  Outcome: Progressing Towards Goal  Goal: Psychosocial  Outcome: Progressing Towards Goal  Goal: *Hemodynamically stable  Outcome: Progressing Towards Goal  Goal: *Neurologically stable  Description: Absence of additional neurological deficits    Outcome: Progressing Towards Goal  Goal: *Verbalizes anxiety and depression are reduced or absent  Outcome: Progressing Towards Goal  Goal: *Absence of Signs of Aspiration on Current Diet  Outcome: Progressing Towards Goal  Goal: *Absence of deep venous thrombosis signs and symptoms(Stroke Metric)  Outcome: Progressing Towards Goal  Goal: *Ability to perform ADLs and demonstrates progressive mobility and function  Outcome: Progressing Towards Goal  Goal: *Stroke education started(Stroke Metric)  Outcome: Progressing Towards Goal  Goal: *Dysphagia screen performed(Stroke Metric)  Outcome: Progressing Towards Goal  Goal: *Rehab consulted(Stroke Metric)  Outcome: Progressing Towards Goal     Problem: TIA/CVA Stroke: Day 2 Until Discharge  Goal: Off Pathway (Use only if patient is Off Pathway)  Outcome: Progressing Towards Goal  Goal: Activity/Safety  Outcome: Progressing Towards Goal  Goal: Diagnostic Test/Procedures  Outcome: Progressing Towards Goal  Goal: Nutrition/Diet  Outcome: Progressing Towards Goal  Goal: Discharge Planning  Outcome: Progressing Towards Goal  Goal: Medications  Outcome: Progressing Towards Goal  Goal: Respiratory  Outcome: Progressing Towards Goal  Goal: Treatments/Interventions/Procedures  Outcome: Progressing Towards Goal  Goal: Psychosocial  Outcome: Progressing Towards Goal  Goal: *Verbalizes anxiety and depression are reduced or absent  Outcome: Progressing Towards Goal  Goal: *Absence of aspiration  Outcome: Progressing Towards Goal  Goal: *Absence of deep venous thrombosis signs and symptoms(Stroke Metric)  Outcome: Progressing Towards Goal  Goal: *Optimal pain control at patient's stated goal  Outcome: Progressing Towards Goal  Goal: *Tolerating diet  Outcome: Progressing Towards Goal  Goal: *Ability to perform ADLs and demonstrates progressive mobility and function  Outcome: Progressing Towards Goal  Goal: *Stroke education continued(Stroke Metric)  Outcome: Progressing Towards Goal     Problem: Ischemic Stroke: Discharge Outcomes  Goal: *Verbalizes anxiety and depression are reduced or absent  Outcome: Progressing Towards Goal  Goal: *Verbalize understanding of risk factor modification(Stroke Metric)  Outcome: Progressing Towards Goal  Goal: *Hemodynamically stable  Outcome: Progressing Towards Goal  Goal: *Absence of aspiration pneumonia  Outcome: Progressing Towards Goal  Goal: *Aware of needed dietary changes  Outcome: Progressing Towards Goal  Goal: *Verbalize understanding of prescribed medications including anti-coagulants, anti-lipid, and/or anti-platelets(Stroke Metric)  Outcome: Progressing Towards Goal  Goal: *Tolerating diet  Outcome: Progressing Towards Goal  Goal: *Aware of follow-up diagnostics related to anticoagulants  Outcome: Progressing Towards Goal  Goal: *Ability to perform ADLs and demonstrates progressive mobility and function  Outcome: Progressing Towards Goal  Goal: *Absence of DVT(Stroke Metric)  Outcome: Progressing Towards Goal  Goal: *Absence of aspiration  Outcome: Progressing Towards Goal  Goal: *Optimal pain control at patient's stated goal  Outcome: Progressing Towards Goal  Goal: *Home safety concerns addressed  Outcome: Progressing Towards Goal  Goal: *Describes available resources and support systems  Outcome: Progressing Towards Goal  Goal: *Verbalizes understanding of activation of EMS(911) for stroke symptoms(Stroke Metric)  Outcome: Progressing Towards Goal  Goal: *Understands and describes signs and symptoms to report to providers(Stroke Metric)  Outcome: Progressing Towards Goal  Goal: *Neurolgocially stable (absence of additional neurological deficits)  Outcome: Progressing Towards Goal  Goal: *Verbalizes importance of follow-up with primary care physician(Stroke Metric)  Outcome: Progressing Towards Goal  Goal: *Smoking cessation discussed,if applicable(Stroke Metric)  Outcome: Progressing Towards Goal  Goal: *Depression screening completed(Stroke Metric)  Outcome: Progressing Towards Goal     Problem: Falls - Risk of  Goal: *Absence of Falls  Description: Document Flor Fall Risk and appropriate interventions in the flowsheet.   Outcome: Progressing Towards Goal  Note: Fall Risk Interventions:            Medication Interventions: Assess postural VS orthostatic hypotension,Bed/chair exit alarm,Evaluate medications/consider consulting pharmacy,Patient to call before getting OOB,Teach patient to arise slowly                   Problem: Patient Education: Go to Patient Education Activity  Goal: Patient/Family Education  Outcome: Progressing Towards Goal

## 2022-07-14 NOTE — CONSULTS
Consult    NAME: Marlyn Turner   :  1945   MRN:  [id number]     Date/Time:  2022 10:20 AM    Patient PCP: Juanpablo Bynum MD  ________________________________________________________________________     Assessment:     Recent URI with fever, cough  Episode of confusion  Increased blood pressure  Increased troponin  Low TSH  Anemia    Ms Aris Shin has PMH of   - CAD: Distal RCA NEENA 2015 for NQWMI; Med Rx mid circ and diffusely dz  diagonals. NQWMI 10/2020 (trop 9), c/w type II from profound anemia. EF 30-35% .  - Systolic HF with EF 61-02%  -  HTN  - DM  - Dyslipidemia  - CKD III: Cr 1.43/gfr 42 2020 (Dr GARAY [facility])  - Obesity: ?NANY. - Non-compliant with some meds per pt 10/2020. Poor compliance with f/u OV.  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 - GI stromal tumor   s/p gastrectomy and wedge resection of liver in 2021, has mets to liver and retroperitoneal nodes, on palliative chemo Imatinib  - follows with Dr Basilia Gutierrez.   in 2022, son is in [facility]    Cardiologist:  [address] [place] [address]:     - No clear anginal pain, increased troponin, ECG with LVH, likely type II from BP, manage medically for now  - Euvolemic on exam, update echo  - sinus    - Increased BP, unclear if compliant with meds, but says she is, on multiple meds    - Agree with addition of aspirin if ok with oncology  - Consider clonidine if bp remains elevated  - Cont increased coreg to 25mg bid  - Cont losrtan  - Cont amlodipine  - Cont imdur  - Cont demadex  - Cont atorvastatin          [x]        High complexity decision making was performed        Subjective:   CHIEF COMPLAINT: Episode of confusion    HISTORY OF PRESENT ILLNESS:       Marlyn Turner is a 78 y.o.  female who presents with above complaints. Patient lives alone at home. Her son lives out of state, so he has cameras around patient's house to monitor her wellbeing.   He frequently checks on her throughout the day. Last night and this morning she was noticed to be disoriented and slightly off balance when walking. Son also notes patient seemed to have slightly slurred speech. Patient reports that she was aware that she was not herself. She is back to her baseline. Patient also reports some mild cough since 3 days ago and had fever 101 yesterday. No fever today. In ED, she was found with elevated troponin. On direct questioning she admits episodes of chest pain this morning which lasted 5 to 10 minutes and felt like pressure or soreness. Chest pain resolved on its own. Patient denies shortness of breath, nausea, vomiting, diarrhea, dizziness. Patient denies history of previous stroke or TIA. She is not on aspirin or Plavix at home. She stated that she had heart problems in the past but cannot give any details. She is to follow-up with cardiology but last time was seen many years ago. She cannot recall the name of her cardiologist.    In room. Says she feels great. No chest pain  No edema  No syncope  No further fevers, cough is improving, some spit  Denies confusion to me. We were asked to consult for work up and evaluation of the above problems. Past Medical History:   Diagnosis Date    Anemia, unspecified 10/6/2020    Arrhythmia     Arthritis     CAD (coronary artery disease)     Cancer ([address]     Chronic bronchitis ([address]     per pt:  as of 1/9/15 pt denies any ARENAS or SOB    Diabetes ([address] Dx approx 2009    Dr Sneha Hartman (in [facility])    GERD (gastroesophageal reflux disease)     Hypercholesteremia     Hypertension       Past Surgical History:   Procedure Laterality Date    HX CHOLECYSTECTOMY  6/12    HX COLONOSCOPY      HX CORONARY STENT PLACEMENT  8/25/2015    HX DILATION AND CURETTAGE      HX OTHER SURGICAL  06/22/2021    Laparoscopic hand-assisted partial gastrectomy with core needle liver biopsy.     MD CARDIAC SURG PROCEDURE UNLIST  2015    UPPER GI ENDOSCOPY,BIOPSY  10/6/2020          Allergies   Allergen Reactions    Metformin Other (comments)     GI side effects. Not a true allergy      Meds:  See below  Social History     Tobacco Use    Smoking status: Former Smoker    Smokeless tobacco: Never Used    Tobacco comment: quit 50 years ago   Substance Use Topics    Alcohol use: No      Family History   Problem Relation Age of Onset    Diabetes Mother     Heart Disease Mother     Hypertension Mother     Stroke Father     Heart Disease Brother     Heart Disease Brother     Stroke Brother     HIV/AIDS Brother        REVIEW OF SYSTEMS:     []         Unable to obtain  ROS due to ---   [x]         Total of 12 systems reviewed as follows: Total of 12 systems reviewed as follows:       POSITIVE= Bold text  Negative = normal text  General:  fever, chills, sweats, generalized weakness, weight loss/gain,      loss of appetite   Eyes:    blurred vision, eye pain, loss of vision, double vision  ENT:    rhinorrhea, pharyngitis   Respiratory:   cough, sputum production, SOB, ARENAS, wheezing, pleuritic pain   Cardiology:   chest pain, palpitations, orthopnea, PND, edema, syncope   Gastrointestinal:  abdominal pain , N/V, diarrhea, dysphagia, constipation, bleeding   Genitourinary:  frequency, urgency, dysuria, hematuria, incontinence   Muskuloskeletal :  arthralgia, myalgia, back pain  Hematology:  easy bruising, nose or gum bleeding, lymphadenopathy   Dermatological: rash, ulceration, pruritis, color change / jaundice  Endocrine:   hot flashes or polydipsia   Neurological:  headache, dizziness, confusion, focal weakness, paresthesia,     Speech difficulties, memory loss, gait difficulty  Psychological: Feelings of anxiety, depression, agitation    Objective:      Physical Exam:    Last 24hrs VS reviewed since prior progress note.  Most recent are:    Visit Vitals  BP (!) 185/73   Pulse 61   Temp 98.8 °F (37.1 °C)   Resp 18   Wt 66.7 kg (147 lb)   SpO2 97%   Breastfeeding No   BMI 26.89 kg/m²       Intake/Output Summary (Last 24 hours) at 7/14/2022 1020  Last data filed at 7/13/2022 2102  Gross per 24 hour   Intake 240 ml   Output --   Net 240 ml        General Appearance: Well developed, well nourished, alert & oriented x 3,    no acute distress. Ears/Nose/Mouth/Throat: Pupils equal and round, Hearing grossly normal.  Neck: Supple. JVP within normal limits. Carotids good upstrokes, with no bruit. Chest: Lungs clear to auscultation bilaterally. Cardiovascular: Regular rate and rhythm, S1S2 normal, no murmur, rubs, gallops. Abdomen: Soft, non-tender, bowel sounds are active. No organomegaly. Extremities: No edema bilaterally. Femoral pulses +2, Distal Pulses +1. Skin: Warm and dry. Neuro: CN II-XII grossly intact, Strength and sensation grossly intact. Data:      Prior to Admission medications    Medication Sig Start Date End Date Taking? Authorizing Provider   torsemide (DEMADEX) 10 mg tablet Take  by mouth daily. Yes Other, MD Regino   carvediloL (COREG) 12.5 mg tablet Take 1 Tablet by mouth two (2) times daily (with meals). Patient taking differently: Take 25 mg by mouth two (2) times daily (with meals). 6/27/22  Yes Herve Cerrato MD   potassium chloride (K-DUR, KLOR-CON M20) 20 mEq tablet  1/12/22  Yes Provider, Historical   losartan (COZAAR) 100 mg tablet Take 1 Tablet by mouth daily. 11/18/21  Yes Herve Cerrato MD   imatinib mesylate (IMATINIB PO) Take  by mouth. Yes Provider, Historical   multivitamin, tx-iron-ca-min (THERA-M w/ IRON) 9 mg iron-400 mcg tab tablet Take 1 Tab by mouth two (2) times a day. Centrum silver   Yes Provider, Historical   lancets misc PUSH BUTTON LANCETS. Check blood sugar 4 times per day due to hypoglycemic events. Dx:E11.42, E16.2 12/17/20  Yes Herve Cerrato MD   amLODIPine (NORVASC) 10 mg tablet Take 1 Tab by mouth daily.  11/20/20  Yes Scar Kinsey MD   food supplemt, lactose-reduced (Ensure Enlive) 0.08 gram-1.5 kcal/mL liqd Take 6 x daily as recommended by Surgery to improve nutritional status for Possible surgery in future  Patient taking differently: Take 6 x daily as recommended by Surgery to improve nutritional status for Possible surgery in future. takes about 3 11/20/20  Yes Jocelyne Gongora MD   atorvastatin (LIPITOR) 40 mg tablet Take 1 Tab by mouth nightly. 1/27/20  Yes Neo Morocho MD   isosorbide mononitrate ER (IMDUR) 30 mg tablet  1/17/22   Provider, Historical   pantoprazole (PROTONIX) 40 mg tablet Take 40 mg by mouth daily. 1/13/21   Provider, Historical       Recent Results (from the past 24 hour(s))   EKG, 12 LEAD, INITIAL    Collection Time: 07/13/22  2:18 PM   Result Value Ref Range    Ventricular Rate 60 BPM    Atrial Rate 60 BPM    P-R Interval 114 ms    QRS Duration 86 ms    Q-T Interval 458 ms    QTC Calculation (Bezet) 458 ms    Calculated P Axis 16 degrees    Calculated R Axis -19 degrees    Calculated T Axis -144 degrees    Diagnosis       Normal sinus rhythm with sinus arrhythmia  Left ventricular hypertrophy with repolarization abnormality    Confirmed by Cuong Melton ([id number]) on 7/13/2022 8:17:42 PM     CBC WITH AUTOMATED DIFF    Collection Time: 07/13/22  2:37 PM   Result Value Ref Range    WBC 8.3 3.6 - 11.0 K/uL    RBC 3.30 (L) 3.80 - 5.20 M/uL    HGB 9.5 (L) 11.5 - 16.0 g/dL    HCT 29.3 (L) 35.0 - 47.0 %    MCV 88.8 80.0 - 99.0 FL    MCH 28.8 26.0 - 34.0 PG    MCHC 32.4 30.0 - 36.5 g/dL    RDW 12.4 11.5 - 14.5 %    PLATELET 056 032 - 095 K/uL    MPV 8.5 (L) 8.9 - 12.9 FL    NRBC 0.0 0  WBC    ABSOLUTE NRBC 0.00 0.00 - 0.01 K/uL    NEUTROPHILS 73 32 - 75 %    LYMPHOCYTES 23 12 - 49 %    MONOCYTES 3 (L) 5 - 13 %    EOSINOPHILS 1 0 - 7 %    BASOPHILS 0 0 - 1 %    IMMATURE GRANULOCYTES 0 0.0 - 0.5 %    ABS. NEUTROPHILS 6.0 1.8 - 8.0 K/UL    ABS. LYMPHOCYTES 1.9 0.8 - 3.5 K/UL    ABS. MONOCYTES 0.3 0.0 - 1.0 K/UL    ABS.  EOSINOPHILS 0.1 0.0 - 0.4 K/UL ABS. BASOPHILS 0.0 0.0 - 0.1 K/UL    ABS. IMM. GRANS. 0.0 0.00 - 0.04 K/UL    DF MANUAL      RBC COMMENTS NORMOCYTIC, NORMOCHROMIC      WBC COMMENTS VACUOLATED POLYS     METABOLIC PANEL, COMPREHENSIVE    Collection Time: 07/13/22  2:37 PM   Result Value Ref Range    Sodium 143 136 - 145 mmol/L    Potassium 4.7 3.5 - 5.1 mmol/L    Chloride 113 (H) 97 - 108 mmol/L    CO2 27 21 - 32 mmol/L    Anion gap 3 (L) 5 - 15 mmol/L    Glucose 114 (H) 65 - 100 mg/dL    BUN 26 (H) 6 - 20 MG/DL    Creatinine 1.57 (H) 0.55 - 1.02 MG/DL    BUN/Creatinine ratio 17 12 - 20      GFR est AA 39 (L) >60 ml/min/1.73m2    GFR est non-AA 32 (L) >60 ml/min/1.73m2    Calcium 8.1 (L) 8.5 - 10.1 MG/DL    Bilirubin, total 0.2 0.2 - 1.0 MG/DL    ALT (SGPT) 25 12 - 78 U/L    AST (SGOT) 31 15 - 37 U/L    Alk. phosphatase 88 45 - 117 U/L    Protein, total 6.0 (L) 6.4 - 8.2 g/dL    Albumin 1.8 (L) 3.5 - 5.0 g/dL    Globulin 4.2 (H) 2.0 - 4.0 g/dL    A-G Ratio 0.4 (L) 1.1 - 2.2     TROPONIN-HIGH SENSITIVITY    Collection Time: 07/13/22  2:37 PM   Result Value Ref Range    Troponin-High Sensitivity 177 (HH) 0 - 51 ng/L   SAMPLES BEING HELD    Collection Time: 07/13/22  2:37 PM   Result Value Ref Range    SAMPLES BEING HELD LAV, PST, PST     COMMENT        Add-on orders for these samples will be processed based on acceptable specimen integrity and analyte stability, which may vary by analyte.    TROPONIN-HIGH SENSITIVITY    Collection Time: 07/13/22  4:26 PM   Result Value Ref Range    Troponin-High Sensitivity 163 (HH) 0 - 51 ng/L   COVID-19 RAPID TEST    Collection Time: 07/13/22  4:26 PM   Result Value Ref Range    Specimen source Nasopharyngeal      COVID-19 rapid test Not detected NOTD     URINALYSIS W/ REFLEX CULTURE    Collection Time: 07/13/22  6:02 PM    Specimen: Urine   Result Value Ref Range    Color YELLOW/STRAW      Appearance CLEAR CLEAR      Specific gravity 1.019      pH (UA) 7.0 5.0 - 8.0      Protein >300 (A) NEG mg/dL    Glucose 100 (A) NEG mg/dL    Ketone Negative NEG mg/dL    Bilirubin Negative NEG      Blood Negative NEG      Urobilinogen 1.0 0.2 - 1.0 EU/dL    Nitrites Negative NEG      Leukocyte Esterase SMALL (A) NEG      WBC 10-20 0 - 4 /hpf    RBC 0-5 0 - 5 /hpf    Epithelial cells FEW FEW /lpf    Bacteria Negative NEG /hpf    UA:UC IF INDICATED URINE CULTURE ORDERED (A) CNI     GLUCOSE, POC    Collection Time: 07/13/22  9:10 PM   Result Value Ref Range    Glucose (POC) 173 (H) 65 - 117 mg/dL    Performed by Sabi Wilburn PCT    METABOLIC PANEL, BASIC    Collection Time: 07/14/22  5:17 AM   Result Value Ref Range    Sodium 142 136 - 145 mmol/L    Potassium 4.6 3.5 - 5.1 mmol/L    Chloride 112 (H) 97 - 108 mmol/L    CO2 24 21 - 32 mmol/L    Anion gap 6 5 - 15 mmol/L    Glucose 114 (H) 65 - 100 mg/dL    BUN 27 (H) 6 - 20 MG/DL    Creatinine 1.45 (H) 0.55 - 1.02 MG/DL    BUN/Creatinine ratio 19 12 - 20      GFR est AA 43 (L) >60 ml/min/1.73m2    GFR est non-AA 35 (L) >60 ml/min/1.73m2    Calcium 8.5 8.5 - 10.1 MG/DL   TROPONIN-HIGH SENSITIVITY    Collection Time: 07/14/22  5:17 AM   Result Value Ref Range    Troponin-High Sensitivity 194 (HH) 0 - 51 ng/L   TSH 3RD GENERATION    Collection Time: 07/14/22  5:17 AM   Result Value Ref Range    TSH 0.03 (L) 0.36 - 3.74 uIU/mL   GLUCOSE, POC    Collection Time: 07/14/22  6:59 AM   Result Value Ref Range    Glucose (POC) 132 (H) 65 - 117 mg/dL    Performed by Sabi Wilburn PCT    GLUCOSE, POC    Collection Time: 07/14/22  8:06 AM   Result Value Ref Range    Glucose (POC) 103 65 - 117 mg/dL    Performed by Timmy Weiss PCT        Return Office Visit    January 31, 2022      Yessy Tejeda  78year old F (1945)  Account #: [id number]  PRIMARY CARE PHYSICIAN:  Kristine Connolly MD  CARDIOLOGIST:  Adan Reynoso MD    REASON FOR VISIT:  This 78year old female presents for CHF and sob. HISTORY OF PRESENT ILLNESS:   Ms [facility] has PMH of   - CAD: Distal RCA NEENA 8/2015 for NQWMI;  Med Rx mid circ and diffusely dz  diagonals. NQWMI 10/2020 (trop 9), c/w type II from profound anemia. EF 30-35% 58//36.  - Systolic HF with EF 50-47%  -  HTN  - DM  - Dyslipidemia  - CKD III: Cr 1.43/gfr 42 2020 (Dr Booker Haas)  - Obesity: ?NANY. - Non-compliant with some meds per pt 10/2020. Poor compliance with f/u OV.  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 - GI stromal tumor   s/p gastrectomy and wedge resection of liver in 2021, has mets to liver and retroperitoneal nodes, on palliative chemo Imatinib  - follows with Dr Swapnil Varma.   in 2022, son is in [address] with h/o  Distal RCA NEENA 2015 for NQWMI; Med Rx mid circ and diffusely dz diagonals. She then NSTEMI in the setting of severe anemia with Hgb in 4s, due to bleeding from stromal tumor in 10/2020 and 2021. Her EF in 10/2020 was 30-35% Eventually has gastrectomy and wedge resection of liver in 2021, has mets to liver and retroperitoneal nodes, on palliative chemo Imatinib  - follows with Dr Swapnil Varma. Her echo in 2022 showed EF 50-55%. mild MR. She had a history of noncompliance with medications and followups. During here previous appointment she was on higher dose of torsemide 3 tab- 60 mg, for weight gain, pedal edema, and SOB. She is now on 40 mg torsemide, 2 tablet daily. Since her last visit she has lost weight with diuresis, 205 lBS > 175 Lbs. She feels her dyspnea and pedal edema is better. she was told to have BMP and BNP done which is pending. She is accompanied her son. No CP or chest pressure. She was also started on imdur and has received medications but she has not started taking it. Has not seen renal for few months. Her blood pressure is elevated and even at home usually on higher side. CARDIAC HISTORY  CAD:  1 NSTEMI (PCI (Successful PTCA and stenting of totally occluded distal RCA. Resolute Integrity used. ) - 2015) - 2015     RISK FACTORS:  1 Hypertension 2 Dyslipidemia   3 Type 2 Diabetes       CARDIOVASCULAR PROCEDURES  Procedure Date Results   Cath 2015 EF 0.65 (65%), 30% Proximal LAD, 30% Mid LAD, 75% Mid CX, 50% Proximal RCA, 50% Mid RCA, 100% Distal RCA, small diffuse diseased Diagonals: Resolute NEENA to distal RCA. Echo 2022 EF range 50%-55%, Mild LVH with borderline reduced LV systolic function. LVEF is 50%. Grade 1 diastolic dysfunction. Normal RV size and function. Mild mitral regurgitation. Echo 10/06/2020  EF 30-35%, Moderate TR   EKG 2017 Sinus Rhythm, borderline LVH, inf-lat TWI. ACTIVE ALLERGIES:  Ingredient Reaction Comment   NO KNOWN ALLERGIES       None    PROBLEM LIST:  Problem Description Chronic   Benign essential HTN Y   High cholesterol Y   Diabetes mellitus Y   Post PTCA Y       PAST MEDICAL/SURGICAL HISTORY  (Detailed)    Disease/disorder Onset Date Surgical History Date Comments   Hyperlipidemia       Hypertension       STEMI 2015 coronary artery stent       Family History  (Detailed)  Relationship Family Member Name  Age at Death Condition Onset Age Cause of Death   Mother    Congestive heart failure  N   Mother    Diabetes mellitus  N     SOCIAL HISTORY  (Detailed)  Tobacco use reviewed. Preferred language is [place]. EDUCATION/EMPLOYMENT/OCCUPATION  Employment History Status Retired Restrictions                                                      MARITAL STATUS/FAMILY/SOCIAL SUPPORT  Currently . Smoking status: Never smoker. ALCOHOL  There is no history of alcohol use. CAFFEINE  The patient does not use caffeine. Susana Santiago REVIEW OF SYMPTOMS:    CONST - Negative for weight gain, weight loss, fever. EYES - Negative for visual changes. ENT - Negative for hearing loss. RESP - Negative for snoring, hemoptysis, dyspnea. CARD - Negative for chest pain, diaphoresis, orthopnea, palpitation, syncope, PND.  VASC - Negative for claudication, edema.   GI - Negative for nausea, reflux, bleeding.  - Negative for hematuria, nocturia. REPROD - Negative for Hx of OCP.  ENDO - Negative for goiter, tremors. NEURO - Negative for dizziness, memory loss, seizures. PSYCH - Negative for depression, hallucinations. DERM - Negative for rash, skin sores. M/S - Negative for joint pain, myalgia. HEMAT - Negative for acute anemia, thrombocytopenia. VITAL SIGNS  Time BP mm/Hg Pulse /min Resp /min Temp F Ht ft Ht in Ht cm Wt lb Wt kg BMI kg/m2 BSA m2 O2 Sat%   12:30 /64 63   5.0 2.00 157.48 175.00 79.379 32.01         PHYSICAL EXAM:  Exam Findings Details   Const Neg Level of Distress - Awake / Alert, No Acute Distress. Nourishment - Well Nourished. Eyes Neg Lids/External - Bilateral Normal.  Conjunctiva - Bilateral Normal.   NMT Neg Oral Mucosa - Moist, No Cyanosis, No Pallor. Neck Neg JVP - Less Than 8. Resp Neg Respirations - Nonlabored. Breath Sounds - Clear Throughout. Rales - Absent. Wheezes - Absent. Rhonchi - Absent. Cardiac Neg Rhythm - Regular. Palpation - PMI Normal.  Heart Sounds - S1 Normal, S2 Normal, No S3, No S4. Extra Sounds - None. Murmurs - None. Vasc Neg Carotid - Bilateral Normal Pulse. Abd Neg Tenderness - None. Hepatomegaly - Absent. Splenomegaly - Absent. Skin Neg Skin - Warm. Venous Stasis Ulcers - Absent. M/S Neg Gait - Normal.  Able to Exercise - Yes. EXT Neg Clubbing - Absent. Ischemic Ulcers - Absent. Lower Extremity Edema - Absent. Psych Neg Orientation - Oriented to Time, Person, Place. IMPRESSION AND PLAN  01. Systolic heart failure, ACC/AHA stage C (I50.20):  Ejection fraction was 30-35% in 10/2020 > improved to 50-55% on echo in 1/2021   Continue Coreg and losartan  Cont torsemide 40 mg daily - additional pill prn as needed for weigh gain   Check BMP - she wants to follow up with PCP clinic  ECG done   02.  Atherosclerotic heart disease of native coronary artery with other forms of angina pectoris (I25.118):  No angina Not on aspirin or Plavix due to history of GI bleed, will consider restarting aspirin as has surgery for gastric stromal tumor- if HgB remains stable   Continue statin and beta-blocker   03. Old myocardial infarction (I25.2): This condition is stable. 04. Hypertensive heart disease without heart failure (I11.9):  BP elevated   cont coreg, losartan, amlodipine   Advised to start imdur (She had medication - but not started taking it yet)   05. Mixed hyperlipidemia (E78.2):  Continue Lipitor   06. Type 2 diabetes mellitus without complications (M45.2): Follow-up with PCP   07. Long term (current) use of aspirin (Z79.82): We will resume aspirin if HgB is stable   08. Body mass index [BMI] 32.0-32.9, adult (W39.15): The patient was instructed on AHA diet and regular exercise. ORDERS:  1 Dietary management education, guidance, and counseling    2 ECG done    3 Basic Metabolic Panel (8)    4 Return office visit with Carmelo Howard MD in 3 Months. 5 The patient was instructed on AHA diet and regular exercise.         FINAL MEDICATION LIST  Medication Sig Desc Non-VCS   amlodipine 10 mg tablet take 1 tablet by oral route  every day Y   atorvastatin 40 mg tablet take 1 tablet by oral route  every bedtime N   Coreg 12.5 mg tablet take 1 tablet by oral route 2 times every day with food N   imatinib 100 mg tablet take 1 tablet by oral route  every day with a meal and a large glass of water Y   losartan 100 mg tablet take 1 tablet by oral route  every day Y   pantoprazole 40 mg tablet,delayed release take 1 tablet by oral route  every day N

## 2022-07-14 NOTE — ROUTINE PROCESS

## 2022-07-14 NOTE — CONSULTS
Date of Consultation:  July 14, 2022    Referring Physician: ER     Reason for Consultation:  Altered mental status    Chief Complaint   Patient presents with    Cough     Cough for 4 days with some SOB reported.  Altered mental status     Family reports some confusion last night at 2100 and again this am.  Reports unsteady, slurred speech, confusion. No sx at present. History of Present Illness:   Mandy Little is a 78 y.o. female with a history of hypertension, hyperlipidemia and CAD who is currently admitted to the hospital after her son noted that she had slurred speech and confusion. Unfortunately patient is unable provide any significant history regarding the current admission. Per chart review it appears that her son lives in [place] and has cameras in her home and he had been watching her. He noted that she had been more confused and had slurred speech. He also did note some difficulty with balance. It appears that the symptoms have resolved upon arrival in the emergency room for this reason she was brought to the hospital for further evaluation. Patient herself reports that she is doing fine and has no complaints. She only complains of chest pain prior to this arrival.  I also did ask her about changes in her vision and she states that given her age she has had issues with her vision and needs new glasses. She cannot tell me when she may have had loss of peripheral vision on the left. Review of imaging does reveal that she did have this right occipital lobe stroke as far back as 2020. It had not been present on imaging in 2015.     Past Medical History:   Diagnosis Date    Anemia, unspecified 10/6/2020    Arrhythmia     Arthritis     CAD (coronary artery disease)     Cancer ([address]     Chronic bronchitis ([address]     per pt:  as of 1/9/15 pt denies any ARENAS or SOB    Diabetes ([address] Dx approx 2009    Dr Claritza Dubose (in [facility])    GERD (gastroesophageal reflux disease)     Hypercholesteremia     Hypertension         Past Surgical History:   Procedure Laterality Date    HX CHOLECYSTECTOMY  6/12    HX COLONOSCOPY      HX CORONARY STENT PLACEMENT  8/25/2015    HX DILATION AND CURETTAGE      HX OTHER SURGICAL  06/22/2021    Laparoscopic hand-assisted partial gastrectomy with core needle liver biopsy.  [place] CARDIAC SURG PROCEDURE UNLIST  2015    UPPER GI ENDOSCOPY,BIOPSY  10/6/2020             Family History   Problem Relation Age of Onset    Diabetes Mother     Heart Disease Mother     Hypertension Mother     Stroke Father     Heart Disease Brother     Heart Disease Brother     Stroke Brother     HIV/AIDS Brother         Social History     Tobacco Use    Smoking status: Former Smoker    Smokeless tobacco: Never Used    Tobacco comment: quit 50 years ago   Substance Use Topics    Alcohol use: No        Allergies   Allergen Reactions    Metformin Other (comments)     GI side effects. Not a true allergy        Prior to Admission medications    Medication Sig Start Date End Date Taking? Authorizing Provider   torsemide (DEMADEX) 10 mg tablet Take  by mouth daily. Yes Other, MD Regino   carvediloL (COREG) 12.5 mg tablet Take 1 Tablet by mouth two (2) times daily (with meals). Patient taking differently: Take 25 mg by mouth two (2) times daily (with meals). 6/27/22  Yes Josi Velarde MD   potassium chloride (K-DUR, KLOR-CON M20) 20 mEq tablet  1/12/22  Yes Provider, Historical   losartan (COZAAR) 100 mg tablet Take 1 Tablet by mouth daily. 11/18/21  Yes Josi Velarde MD   imatinib mesylate (IMATINIB PO) Take  by mouth. Yes Provider, Historical   multivitamin, tx-iron-ca-min (THERA-M w/ IRON) 9 mg iron-400 mcg tab tablet Take 1 Tab by mouth two (2) times a day. Centrum silver   Yes Provider, Historical   lancets misc PUSH BUTTON LANCETS. Check blood sugar 4 times per day due to hypoglycemic events.   Dx:E11.42, E16.2 12/17/20  Yes Josi Velarde MD amLODIPine (NORVASC) 10 mg tablet Take 1 Tab by mouth daily. 11/20/20  Yes Cecelia Lovett MD   food supplemt, lactose-reduced (Ensure Enlive) 0.08 gram-1.5 kcal/mL liqd Take 6 x daily as recommended by Surgery to improve nutritional status for Possible surgery in future  Patient taking differently: Take 6 x daily as recommended by Surgery to improve nutritional status for Possible surgery in future. takes about 3 11/20/20  Yes Cecelia Lovett MD   atorvastatin (LIPITOR) 40 mg tablet Take 1 Tab by mouth nightly. 1/27/20  Yes Char Parker MD   isosorbide mononitrate ER (IMDUR) 30 mg tablet  1/17/22   Provider, Historical   pantoprazole (PROTONIX) 40 mg tablet Take 40 mg by mouth daily. 1/13/21   Provider, Historical       Review of Systems:    General, constitutional: negative  Eyes, vision: negative  Ears, nose, throat: negative  Cardiovascular, heart: negative  Respiratory: negative  Gastrointestinal: negative  Genitourinary: negative  Musculoskeletal: negative  Skin and integumentary: negative  Psychiatric: negative  Endocrine: negative  Neurological: negative, except for HPI  Hematologic/lymphatic: negative  Allergy/immunology: negative    PHYSICAL EXAMINATION:   Visit Vitals  /64   Pulse 78   Temp 98.4 °F (36.9 °C)   Resp 20   Ht 5' 2\" (1.575 m)   Wt 147 lb (66.7 kg)   SpO2 92%   Breastfeeding No   BMI 26.89 kg/m²       Physical Exam:  General:  no acute distress  Neck: no carotid bruits  Lungs: clear to auscultation  Heart:  no murmurs, regular rate and rhythm   Lower extremity: no edema    Neurological exam:  Mental Status: Awake, alert, oriented to person, place and time  Attention and Concentration: able to state the days of the week backwards   Speech and Language: No dysarthria.  Able to name, repeat and follow commands   Fund of knowledge was preserved    Cranial nerves: II-XII:  Pupils equal and reactive, left homonymous hemianopia  Extraocular movements intact, no evidence of nystagmus or ptosis   Facial sensation intact   Facial movements symmetric   Hearing intact to soft rub bilaterally   Shoulder shrug symmetric and strong   Tongue protrusion full and midline without fasciculation or atrophy    Motor:   Normal tone and Bulk   Drift: No evidence of pronator drift     Strength testing:   deltoid triceps biceps Wrist ext. Wrist flex. intrinsics   Right 5 5 5 5 5 5   Left 5 5 5 5 5 5      Hip flex. Hip ext. Knee ext. Knee flex Dorsi flex Plantar flex   Right  5 5 5 5 5 5   Left  5 5 5 5 5 5       Sensory:  Sensations intact to light touch and pinprick. Patient    Reflexes:   Biceps Triceps  Brachiorad Patellar Achilles Plantar Hoffmans   Right  2 2 2 2 1 Down Neg   Left  2 2 2 2 1 Down Neg      Cerebellar testing: Finger-nose-finger was intact    Data:     Lab Results   Component Value Date/Time    Hemoglobin A1c 5.7 (H) 07/14/2022 05:17 AM    Sodium 142 07/14/2022 05:17 AM    Potassium 4.6 07/14/2022 05:17 AM    Chloride 112 (H) 07/14/2022 05:17 AM    Glucose 114 (H) 07/14/2022 05:17 AM    BUN 27 (H) 07/14/2022 05:17 AM    Creatinine 1.45 (H) 07/14/2022 05:17 AM    Calcium 8.5 07/14/2022 05:17 AM    WBC 8.3 07/13/2022 02:37 PM    HCT 29.3 (L) 07/13/2022 02:37 PM    HGB 9.5 (L) 07/13/2022 02:37 PM    PLATELET 706 91/91/4557 02:37 PM    Hemoglobin A1c, External 6.1 02/07/2018 12:00 AM       Imaging:    No results found for this or any previous visit. Results from [place] encounter on 07/13/22    CT HEAD WO CONT    Narrative  INDICATION: transient AMS    COMPARISON: 12/15/2020. EXAM: CT HEAD without contrast.    TECHNIQUE: Unenhanced CT Head is performed. CT dose reduction was achieved  through use of a standardized protocol tailored for this examination and  automatic exposure control for dose modulation. FINDINGS:  No acute infarct is seen. 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 There is  a right basal ganglia lacune.  There are left cerebellar lacunae. There is no apparent mass on unenhanced imaging. There is no bleed, shift,  obstructive hydrocephalus or significant extra-axial fluid collection. Bone windows show new paranasal sinus mucosal thickening and fluid levels. Impression  1. No acute intracranial finding. 2. Stable chronic ischemic disease. 3. Paranasal sinusitis. IMPRESSION/RECOMMENDATIONS:  Chanell Sen is a 78 y.o. female with a history of hypertension, hyperlipidemia and CAD who is currently admitted to the hospital after her son noted that she had slurred speech, confusion and gait instability that was transient in nature. Her neurological exam does reveal a left homonymous hemianopia which is consistent with her prior right PCA stroke. Encephalopathy: Transient nature and now has resolved. She does appear to be at her current neurological baseline. Differential does include seizure given her history of a prior stroke versus a new stroke or TIA. Additionally this could be due to a metabolic encephalopathy  -Would recommend obtaining MRI of the brain without contrast to determine etiology.  -Additionally would obtain a routine EEG to rule out seizure as a possible cause of symptoms.  -We will hold off on any antiepileptics for now  -Patient should be placed on aspirin 81 mg daily for secondary stroke prevention  -Would additionally maintain blood pressure goal of 140/90. This long-term outpatient goal  -Would also obtain PT and OT consultations. Thank you very much for this consultation, will continue to follow.  Please call with questions     Aris Robertson MD

## 2022-07-14 NOTE — PROGRESS NOTES
-Please complete MRI History and Safety Screening Form   - Patient cannot be scanned until this form is completed, including signatures, and reviewed in MRI to ensure patient is SAFE and eligible for MRI. - CALL MRI when this has been successfully completed at [phone].

## 2022-07-14 NOTE — PROGRESS NOTES
0730  Bedside shift change report given to [address] (oncoming nurse) by Odilia Carrera (offgoing nurse). Report included the following information SBAR, Kardex, ED Summary, MAR, Recent Results and Cardiac Rhythm NSR.     1400  Patient left unit to complete MRI and ECHO. Valium given prior for anxiety/claustrophobia     1428 Patient return to unit VSS      End of Shift Note    Bedside shift change report given to [address] Tyrese (oncoming nurse) by José Miguel Ch (offgoing nurse). Report included the following information SBAR, Kardex, ED Summary, MAR, Recent Results and Cardiac Rhythm NSR    Shift worked:  [id number]     Shift summary and any significant changes:    Labetalol added for SBP >160  Hydralazine added for SBP>160  Cardiology consulted  Neuro consulted. Valium given for anxiety prior to MRI. MRI completed. Family member was able to walk down with patient for MRI. ECHO completed. EEG completed. PT/OT worked with patient. Speech consulted. Concerns for physician to address:  None     Zone phone for oncoming shift:   [phone]       Activity:  Activity Level: Up with Assistance  Number times ambulated in hallways past shift: 1  Number of times OOB to chair past shift: 0    Cardiac:   Cardiac Monitoring: Yes      Cardiac Rhythm: Sinus Rhythm    Access:   Current line(s): PIV     Genitourinary:   Urinary status: voiding    Respiratory:   O2 Device: None (Room air)  Chronic home O2 use?: NO  Incentive spirometer at bedside: NO       GI:  Last Bowel Movement Date:  (PTA)  Current diet:  ADULT DIET Regular  Passing flatus: YES  Tolerating current diet: YES       Pain Management:   Patient states pain is manageable on current regimen: N/A    Skin:  Rufus Score: 21  Interventions: float heels, increase time out of bed and PT/OT consult    Patient Safety:  Fall Score:  Total Score: 1  Interventions: bed/chair alarm, gripper socks, pt to call before getting OOB, stay with me (per policy) and sitter at bedside Length of Stay:  Expected LOS: 4d 9h  Actual LOS: [address]

## 2022-07-14 NOTE — PROGRESS NOTES
2020: TRANSFER - IN REPORT:    Verbal report received from Yolanda Villarreal, [facility] (name) on Emmett Dumont  being received from ED (unit) for routine progression of care      Report consisted of patients Situation, Background, Assessment and   Recommendations(SBAR). Information from the following report(s) SBAR, Kardex, ED Summary, Recent Results and Cardiac Rhythm NSR was reviewed with the receiving nurse. Opportunity for questions and clarification was provided. Assessment completed upon patients arrival to unit and care assumed. 2037: Pt arrived on [facility] via stretcher. BP elevated. Several attempts at collected BP were high. Pt stated that sbp is usually in the   200's.  /69

## 2022-07-14 NOTE — PROGRESS NOTES
2035: Pt arrived on unit. 2045: NIHSS Stroke Scale completed. Total score of 2. LOC: Pt not sure of current month of the year. Answered all over orientation questions correctly. Visual: Pt failed left sided visual test.     2045: Pt passed darien swallow screen    2330: MRI checklist completed. MRI called with no answer. Will fax form to MRI    0700: End of Shift Note    Bedside shift change report given to LILLIAN CUEVAS RN (oncoming nurse) by Marti Hussein RN (offgoing nurse). Report included the following information SBAR, Kardex, ED Summary, MAR, Recent Results and Cardiac Rhythm NSR    Shift worked:  [id number]     Shift summary and any significant changes:    -Pt arrived on unit. BP elevated at 213/75. Pt stated that her BP runs high has been in the 200's for \"a long time\"    -MRI checklist completed and faxed    -Provider notified of elevated BP. No orders entered    -Critical Troponin resulted at 194. Provider notified. Concerns for physician to address: -BP continues to remain high   Zone phone for oncoming shift:       Activity:  Activity Level: Up with Assistance  Number times ambulated in hallways past shift: 0  Number of times OOB to chair past shift: 1    Cardiac:   Cardiac Monitoring: Yes      Cardiac Rhythm: Sinus Rhythm    Access:   Current line(s): PIV     Genitourinary:   Urinary status: voiding    Respiratory:   O2 Device: None (Room air)  Chronic home O2 use?: NO  Incentive spirometer at bedside: NO       GI:  Last Bowel Movement Date:  (PTA)  Current diet:  ADULT DIET Regular  Passing flatus: YES  Tolerating current diet: YES       Pain Management:   Patient states pain is manageable on current regimen: YES    Skin:  Rufus Score: 21  Interventions: increase time out of bed and nutritional support     Patient Safety:  Fall Score:  Total Score: 1  Interventions: bed/chair alarm, gripper socks and pt to call before getting OOB       Length of Stay:  Expected LOS: - - -  Actual LOS: 1      Lily Louder Nam Antoinette

## 2022-07-14 NOTE — PROGRESS NOTES
Hospitalist Progress Note    NAME: Esperanza Beaulieu   :  1945   MRN:  [id number]       Assessment / Plan:  Acute encephalopathy  Rule out TIA  Head CT: negative. Risk factors: dm, htn   MRI of the brain shows no acute process except for sinusitis  MRA shows no large vessel occlusion  Continue added aspirin 81 mg daily  -A1c is 5.7, LDL is 141  -Continue added Lipitor 40 mg daily  -Consult PT OT and speech therapy. Neurology recommendations appreciated         Non-ST elevation MI  CAD  HTN uncontrolled/hypertensive urgency  -Troponins peaked at 194. Continue added aspirin. Cardiology following.  -Continue Norvasc, Coreg, Imdur, losartan. On as needed labetalol and hydralazine  -We will target for blood pressure of 160/94 today due to suspected TIA       Fever   At home yesterday x 1  ua with min changes, no dysuira  cxray clear  No leukocytosis   Check procalcitonin level     DM type II  -A1c is 5.7. Continue sliding scale insulin.     CKD stage III  Cr at baseline 1.5-1.7, stable  monitor closely. Avoid nephrotoxic drugs, adjust all meds to GFR.      Anemia of ckd   Hemoglobin stable  Monitor     Hyperlipidemia  GERD  ----Continue home medications           Code Status: Full code  Surrogate Decision Maker: Son     DVT Prophylaxis: Lovenox  GI Prophylaxis: not indicated     Baseline: Lives alone, has 2 sons, 1 son , ambulates independently      25.0 - 29.9 Overweight / Body mass index is 26.89 kg/m². Estimated discharge date:   Barriers:         Subjective:     Chief Complaint / Reason for Physician Visit  No fever in the hospital  Confusion is improved.   No focal weakness, tingling or numbness  Overnight events noted and discussed with nursing  Blood pressure is high and is improving      Review of Systems:  Symptom Y/N Comments  Symptom Y/N Comments   Fever/Chills    Chest Pain     Poor Appetite    Edema     Cough    Abdominal Pain     Sputum    Joint Pain     SOB/ARENAS    Pruritis/Rash Nausea/vomit    Tolerating PT/OT     Diarrhea    Tolerating Diet     Constipation    Other       Could NOT obtain due to:      Objective:     VITALS:   Last 24hrs VS reviewed since prior progress note. Most recent are:  Patient Vitals for the past 24 hrs:   Temp Pulse Resp BP SpO2   07/14/22 1522 99.3 °F (37.4 °C) 65 18 (!) 152/96 97 %   07/14/22 1428 99.3 °F (37.4 °C) 68 18 (!) 168/74 100 %   07/14/22 1353 -- -- -- 136/64 --   07/14/22 1243 -- 78 -- 136/64 92 %   07/14/22 1230 -- 70 -- (!) 177/57 --   07/14/22 1215 -- 69 -- (!) 188/82 98 %   07/14/22 1200 -- 70 -- (!) 181/67 95 %   07/14/22 1115 98.4 °F (36.9 °C) 60 20 (!) 178/105 94 %   07/14/22 1100 -- (!) 58 -- (!) 192/61 96 %   07/14/22 1057 -- (!) 58 -- (!) 195/62 --   07/14/22 1000 -- 61 -- (!) 185/73 --   07/14/22 0959 -- 77 -- -- --   07/14/22 0930 98.8 °F (37.1 °C) 71 18 (!) 185/77 97 %   07/14/22 0928 -- 74 -- (!) 193/70 --   07/14/22 0830 -- 72 -- (!) 216/70 97 %   07/14/22 0815 -- 69 -- (!) 183/107 95 %   07/14/22 0730 -- (!) 57 -- (!) 228/65 96 %   07/14/22 0718 -- 65 -- (!) 229/69 95 %   07/14/22 0717 -- 65 -- (!) 225/70 95 %   07/14/22 0320 98.6 °F (37 °C) (!) 59 18 (!) 191/63 98 %   07/14/22 0124 98.8 °F (37.1 °C) 73 18 (!) 181/75 97 %   07/13/22 2309 98.7 °F (37.1 °C) 67 18 (!) 213/68 96 %   07/13/22 2102 98.8 °F (37.1 °C) 65 18 (!) 212/69 93 %   07/13/22 1920 98.9 °F (37.2 °C) 70 20 (!) 159/70 96 %       Intake/Output Summary (Last 24 hours) at 7/14/2022 1735  Last data filed at 7/13/2022 2102  Gross per 24 hour   Intake 240 ml   Output --   Net 240 ml        I had a face to face encounter and independently examined this patient on 7/14/2022, as outlined below:  PHYSICAL EXAM:  General: cooperative, no acute distress    EENT:  EOMI. Anicteric sclerae. MMM  Resp:  CTA bilaterally, no wheezing or rales. No accessory muscle use  CV:  Regular  rhythm,  No edema  GI:  Soft, Non distended, Non tender.   +Bowel sounds  Neurologic:  Alert and oriented X 3, normal speech,   Psych:   Not anxious nor agitated  Skin:  No rashes. No jaundice    Reviewed most current lab test results and cultures  YES  Reviewed most current radiology test results   YES  Review and summation of old records today    NO  Reviewed patient's current orders and MAR    YES  PMH/SH reviewed - no change compared to H&P  ________________________________________________________________________  Care Plan discussed with:    Comments   Patient y    Family      RN y    Care Manager     Consultant                        Multidiciplinary team rounds were held today with , nursing, pharmacist and clinical coordinator. Patient's plan of care was discussed; medications were reviewed and discharge planning was addressed. ________________________________________________________________________  Total NON critical care TIME:  35   Minutes    Total CRITICAL CARE TIME Spent:   Minutes non procedure based      Comments   >50% of visit spent in counseling and coordination of care     ________________________________________________________________________  Wild Churchill MD     Procedures: see electronic medical records for all procedures/Xrays and details which were not copied into this note but were reviewed prior to creation of Plan. LABS:  I reviewed today's most current labs and imaging studies.   Pertinent labs include:  Recent Labs     07/13/22  1437   WBC 8.3   HGB 9.5*   HCT 29.3*        Recent Labs     07/14/22  0517 07/13/22  1437    143   K 4.6 4.7   * 113*   CO2 24 27   * 114*   BUN 27* 26*   CREA 1.45* 1.57*   CA 8.5 8.1*   ALB  --  1.8*   TBILI  --  0.2   ALT  --  25       Signed: Wild Churchill MD

## 2022-07-14 NOTE — ED NOTES
TRANSITION OF CARE - SBAR OUT    Patient is being transferred to [address], Room# 2284. Report GIVEN TO Sola Rg RN on [facility] for routine progression of care. Report is consisted of the following information SBAR, ED Summary and MAR. Patient transferred to receiving unit by: transporter (RN or Tech Name)     Called outstanding consults: Yes   Collected routine labs: Yes     All current orders reviewed with accepting nurse: Yes    The following personal items will be sent with the patient during transfer to the floor: All valuables:      Visual Aid: Glasses                   CARDIAC MONITORING ORDERED: Yes     The following CURRENT information were reported to the receiving RN:    CODE STATUS: Full Code    NIH SCORE:    LIZBET SCREENING:      NEURO ASSESSMENT:        RESTRAINTS IN USE: No      IS DOCUMENTATION COMPLETE: Yes      Vital Signs  Level of Consciousness: Alert (0) (07/13/22 1920)  Temp: 98.9 °F (37.2 °C) (07/13/22 1920)  Temp Source: Oral (07/13/22 1920)  Pulse (Heart Rate): 70 (07/13/22 1920)  Heart Rate Source: Monitor (07/13/22 1920)  Cardiac Rhythm: Sinus Rhythm (07/13/22 1922)  Resp Rate: 20 (07/13/22 1920)  BP: (!) 159/70 (07/13/22 1920)  MAP (Monitor): 97 (07/13/22 1920)  MAP (Calculated): 100 (07/13/22 1920)  MEWS Score: 1 (07/13/22 1920)  Pain 1  Pain Scale 1: Numeric (0 - 10) (07/13/22 1920)  Pain Intensity 1: 0 (07/13/22 1920)      OXYGEN: Oxygen Therapy  O2 Device: None (Room air) (07/13/22 1920)    KEYSHA FALL RISK:        WOUNDS: No      URINARY CATHETER: voiding    LINE ACCESS:   Peripheral IV 07/13/22 Left Hand (Active)        Opportunity for questions and clarification were provided.   Nanci Toure RN

## 2022-07-14 NOTE — PROGRESS NOTES
Transition of Care Plan   RUR: 11%   Disposition: The disposition plan is to transition to SNF   F/U with PCP/Specialist     Transport: [facility]      Reason for Admission:  Elevated troponin, stroke                      RUR Score:   11%                  Plan for utilizing home health:   Not recommended        PCP: First and Last name:  Carin Gill MD     Name of Practice:    Are you a current patient: Yes/No:    Approximate date of last visit:    Can you participate in a virtual visit with your PCP:                     Current Advanced Directive/Advance Care Plan: Full Code      Healthcare Decision Maker:   Click here to complete [address] including selection of the Healthcare Decision Maker Relationship (ie \"Primary\")                             Transition of Care Plan:                      CRM spoke with patient's son, Lisette Carter, introduced self, explained role, verified demographics, and offered assistance. The patient lives alone in a home with 2 steps to enter. The patient requires assistance with her ADL's/IADL's and has care givers that come daily. The patient has a blood pressure cuff at home and no other DME. The patient is able to obtain her medications, son in uncertain which pharmacy she uses. Patient's son Lisette Carter requested that CM speak with patient's other son, Ana Carcamo. CM contacted Donte and left a voice message to speak with him. Care Management Interventions  PCP Verified by CM:  Yes  Palliative Care Criteria Met (RRAT>21 & CHF Dx)?: No  Mode of Transport at Discharge: BLS  Transition of Care Consult (CM Consult): Discharge Planning  MyChart Signup: No  Discharge Durable Medical Equipment: No  Health Maintenance Reviewed: Yes  Physical Therapy Consult: Yes  Occupational Therapy Consult: Yes  Speech Therapy Consult: Yes  Support Systems: Child(elle)  Confirm Follow Up Transport: Family  The Patient and/or Patient Representative was Provided with a Choice of Provider and Agrees with the Discharge Plan?: Yes  Freedom of Choice List was Provided with Basic Dialogue that Supports the Patient's Individualized Plan of Care/Goals, Treatment Preferences and Shares the Quality Data Associated with the Providers?: Yes   Resource Information Provided?: No  Discharge Location  Patient Expects to be Discharged to[de-identified] Skilled nursing facility      6:31 PM  Shashank Jang, [address]

## 2022-07-15 LAB
ANION GAP SERPL CALC-SCNC: 7 MMOL/L (ref 5–15)
B PERT DNA SPEC QL NAA+PROBE: NOT DETECTED
BORDETELLA PARAPERTUSSIS PCR, BORPAR: NOT DETECTED
BUN SERPL-MCNC: 35 MG/DL (ref 6–20)
BUN/CREAT SERPL: 20 (ref 12–20)
C PNEUM DNA SPEC QL NAA+PROBE: NOT DETECTED
CALCIUM SERPL-MCNC: 7.8 MG/DL (ref 8.5–10.1)
CHLORIDE SERPL-SCNC: 111 MMOL/L (ref 97–108)
CO2 SERPL-SCNC: 23 MMOL/L (ref 21–32)
CREAT SERPL-MCNC: 1.71 MG/DL (ref 0.55–1.02)
ERYTHROCYTE [DISTWIDTH] IN BLOOD BY AUTOMATED COUNT: 12.6 % (ref 11.5–14.5)
FLUAV H1 2009 PAND RNA SPEC QL NAA+PROBE: NOT DETECTED
FLUAV H1 RNA SPEC QL NAA+PROBE: NOT DETECTED
FLUAV H3 RNA SPEC QL NAA+PROBE: NOT DETECTED
FLUAV SUBTYP SPEC NAA+PROBE: NOT DETECTED
FLUBV RNA SPEC QL NAA+PROBE: NOT DETECTED
GLUCOSE BLD STRIP.AUTO-MCNC: 104 MG/DL (ref 65–117)
GLUCOSE BLD STRIP.AUTO-MCNC: 125 MG/DL (ref 65–117)
GLUCOSE BLD STRIP.AUTO-MCNC: 151 MG/DL (ref 65–117)
GLUCOSE BLD STRIP.AUTO-MCNC: 179 MG/DL (ref 65–117)
GLUCOSE SERPL-MCNC: 137 MG/DL (ref 65–100)
HADV DNA SPEC QL NAA+PROBE: NOT DETECTED
HCOV 229E RNA SPEC QL NAA+PROBE: NOT DETECTED
HCOV HKU1 RNA SPEC QL NAA+PROBE: NOT DETECTED
HCOV NL63 RNA SPEC QL NAA+PROBE: NOT DETECTED
HCOV OC43 RNA SPEC QL NAA+PROBE: NOT DETECTED
HCT VFR BLD AUTO: 28 % (ref 35–47)
HGB BLD-MCNC: 8.9 G/DL (ref 11.5–16)
HMPV RNA SPEC QL NAA+PROBE: NOT DETECTED
HPIV1 RNA SPEC QL NAA+PROBE: NOT DETECTED
HPIV2 RNA SPEC QL NAA+PROBE: NOT DETECTED
HPIV3 RNA SPEC QL NAA+PROBE: NOT DETECTED
HPIV4 RNA SPEC QL NAA+PROBE: NOT DETECTED
M PNEUMO DNA SPEC QL NAA+PROBE: NOT DETECTED
MCH RBC QN AUTO: 28.1 PG (ref 26–34)
MCHC RBC AUTO-ENTMCNC: 31.8 G/DL (ref 30–36.5)
MCV RBC AUTO: 88.3 FL (ref 80–99)
NRBC # BLD: 0 K/UL (ref 0–0.01)
NRBC BLD-RTO: 0 PER 100 WBC
PLATELET # BLD AUTO: 289 K/UL (ref 150–400)
PMV BLD AUTO: 9 FL (ref 8.9–12.9)
POTASSIUM SERPL-SCNC: 5.1 MMOL/L (ref 3.5–5.1)
RBC # BLD AUTO: 3.17 M/UL (ref 3.8–5.2)
RSV RNA SPEC QL NAA+PROBE: NOT DETECTED
RV+EV RNA SPEC QL NAA+PROBE: NOT DETECTED
SARS-COV-2 PCR, COVPCR: NOT DETECTED
SERVICE CMNT-IMP: ABNORMAL
SERVICE CMNT-IMP: NORMAL
SODIUM SERPL-SCNC: 141 MMOL/L (ref 136–145)
WBC # BLD AUTO: 9 K/UL (ref 3.6–11)

## 2022-07-15 PROCEDURE — 80048 BASIC METABOLIC PNL TOTAL CA: CPT

## 2022-07-15 PROCEDURE — 74011250636 HC RX REV CODE- 250/636: Performed by: HOSPITALIST

## 2022-07-15 PROCEDURE — 0202U NFCT DS 22 TRGT SARS-COV-2: CPT

## 2022-07-15 PROCEDURE — 74011250636 HC RX REV CODE- 250/636: Performed by: INTERNAL MEDICINE

## 2022-07-15 PROCEDURE — 82962 GLUCOSE BLOOD TEST: CPT

## 2022-07-15 PROCEDURE — 74011250637 HC RX REV CODE- 250/637: Performed by: HOSPITALIST

## 2022-07-15 PROCEDURE — 97530 THERAPEUTIC ACTIVITIES: CPT

## 2022-07-15 PROCEDURE — 65660000001 HC RM ICU INTERMED STEPDOWN

## 2022-07-15 PROCEDURE — 36415 COLL VENOUS BLD VENIPUNCTURE: CPT

## 2022-07-15 PROCEDURE — 95816 EEG AWAKE AND DROWSY: CPT | Performed by: PSYCHIATRY & NEUROLOGY

## 2022-07-15 PROCEDURE — 97116 GAIT TRAINING THERAPY: CPT

## 2022-07-15 PROCEDURE — 85027 COMPLETE CBC AUTOMATED: CPT

## 2022-07-15 RX ORDER — TORSEMIDE 20 MG/1
10 TABLET ORAL DAILY
Status: DISCONTINUED | OUTPATIENT
Start: 2022-07-16 | End: 2022-07-17 | Stop reason: HOSPADM

## 2022-07-15 RX ADMIN — CARVEDILOL 25 MG: 12.5 TABLET, FILM COATED ORAL at 17:28

## 2022-07-15 RX ADMIN — ASPIRIN 81 MG: 81 TABLET, CHEWABLE ORAL at 09:44

## 2022-07-15 RX ADMIN — HYDRALAZINE HYDROCHLORIDE 10 MG: 20 INJECTION INTRAMUSCULAR; INTRAVENOUS at 06:10

## 2022-07-15 RX ADMIN — GUAIFENESIN 600 MG: 600 TABLET, EXTENDED RELEASE ORAL at 09:44

## 2022-07-15 RX ADMIN — ENOXAPARIN SODIUM 30 MG: 100 INJECTION SUBCUTANEOUS at 09:44

## 2022-07-15 RX ADMIN — AMLODIPINE BESYLATE 10 MG: 5 TABLET ORAL at 09:44

## 2022-07-15 RX ADMIN — PANTOPRAZOLE SODIUM 40 MG: 40 TABLET, DELAYED RELEASE ORAL at 09:44

## 2022-07-15 RX ADMIN — ACETAMINOPHEN 650 MG: 325 TABLET ORAL at 17:28

## 2022-07-15 RX ADMIN — LOSARTAN POTASSIUM 100 MG: 25 TABLET, FILM COATED ORAL at 09:44

## 2022-07-15 RX ADMIN — CARVEDILOL 25 MG: 12.5 TABLET, FILM COATED ORAL at 09:44

## 2022-07-15 RX ADMIN — ACETAMINOPHEN 650 MG: 325 TABLET ORAL at 09:51

## 2022-07-15 RX ADMIN — ISOSORBIDE MONONITRATE 30 MG: 30 TABLET, EXTENDED RELEASE ORAL at 09:44

## 2022-07-15 RX ADMIN — ATORVASTATIN CALCIUM 40 MG: 40 TABLET, FILM COATED ORAL at 21:45

## 2022-07-15 RX ADMIN — GUAIFENESIN 600 MG: 600 TABLET, EXTENDED RELEASE ORAL at 21:45

## 2022-07-15 NOTE — PROGRESS NOTES
Hospitalist Progress Note    NAME: Kianna Díaz   :  1945   MRN:  [id number]     Kianna Díaz is a 78 y.o.  female who presents with above complaints. Patient lives alone at home. Her son [place] of state, so he has cameras around patient's house to monitor her wellbeing. He frequently checks on her throughout the day. Last night and this morning she was noticed to be disoriented and slightly off balance when walking. Son also notes patient seemed to have slightly slurred speech. Assessment / Plan:  Acute encephalopathy transient resolved  Suspect TIA versus metabolic encephalopathy  Head CT: negative. MRI of the brain shows no acute process except for sinusitis  MRA shows no large vessel occlusion  EEG shows no epileptiform discharges  -Evaluated by neurology and felt that patient does not need any seizure medications for now and recommended to continue aspirin for stroke prophylaxis  Continue added aspirin 81 mg daily  -A1c is 5.7, LDL is 141  -Continue added Lipitor 40 mg daily  -Continue PT OT         Non-ST elevation MI  CAD  HTN uncontrolled/hypertensive urgency  -Troponins peaked at 194. Continue added aspirin. Cardiology following.  -Continue Norvasc, Coreg, Imdur, losartan. On as needed labetalol and hydralazine  -Evaluated by cardiology and recommended to continue aspirin, Lipitor and also Coreg.  -Continue Norvasc, Coreg. On as needed hydralazine for hypertensive urgency.     Fever at home  At home but none here  ua with min changes, no dysuira  cxray clear  No leukocytosis   We will check procalcitonin level for completion       DM type II  -A1c is 5.7.   Continue sliding scale insulin.     CKD stage III  -Baseline creatinine is around 1.4 and creatinine is gradually trending up and is currently 1.7  -Hold Demadex and losartan for today  Check BMP in a.m. tomorrow and resume as tolerated     Anemia of ckd   Hemoglobin stable  Monitor     Hyperlipidemia  GERD  ----Continue home medications           Code Status: Full code  Surrogate Decision Maker: Jayson     DVT Prophylaxis: Lovenox  GI Prophylaxis: not indicated     Baseline: Lives alone, has 2 sons, 1 son , ambulates independently      25.0 - 29.9 Overweight / Body mass index is 26.89 kg/m². Estimated discharge date:  to   Barriers: Placement, elevated creatinine         Subjective:     Chief Complaint / Reason for Physician Visit  Mental status is back to baseline. No confusion. No focal weakness, tingling or numbness  Does not report any chest pain no  Overnight events noted and discussed with nursing  Creatinine slightly trending up        Review of Systems:  Symptom Y/N Comments  Symptom Y/N Comments   Fever/Chills    Chest Pain     Poor Appetite    Edema     Cough    Abdominal Pain     Sputum    Joint Pain     SOB/ARENAS    Pruritis/Rash     Nausea/vomit    Tolerating PT/OT     Diarrhea    Tolerating Diet     Constipation    Other       Could NOT obtain due to:      Objective:     VITALS:   Last 24hrs VS reviewed since prior progress note.  Most recent are:  Patient Vitals for the past 24 hrs:   Temp Pulse Resp BP SpO2   07/15/22 1438 98.2 °F (36.8 °C) 62 18 (!) 120/48 100 %   07/15/22 1200 -- 66 -- (!) 118/52 96 %   07/15/22 1100 98.8 °F (37.1 °C) 70 18 (!) 106/48 98 %   07/15/22 1000 -- 68 -- (!) 163/65 97 %   07/15/22 0930 -- 72 -- (!) 169/58 97 %   07/15/22 0900 -- 70 -- (!) 164/54 96 %   07/15/22 0830 -- 77 -- (!) 157/65 100 %   07/15/22 0800 -- 76 -- (!) 169/60 98 %   07/15/22 0700 99.4 °F (37.4 °C) 70 20 (!) 132/51 97 %   07/15/22 0630 -- 66 -- (!) 124/53 97 %   07/15/22 0600 -- 62 -- (!) 189/60 98 %   07/15/22 0300 97.9 °F (36.6 °C) 69 20 (!) 139/58 98 %   07/15/22 0000 -- 72 -- (!) 139/55 98 %   22 2300 99 °F (37.2 °C) 71 18 (!) 155/59 98 %   22 -- 69 -- (!) 170/72 99 %   22 1937 98.6 °F (37 °C) 71 18 (!) 190/63 100 % 07/14/22 1818 -- 68 -- (!) 174/64 100 %   07/14/22 1815 -- 69 -- (!) 185/69 100 %     No intake or output data in the 24 hours ending 07/15/22 1718     I had a face to face encounter and independently examined this patient on 7/15/2022, as outlined below:  PHYSICAL EXAM:  General: cooperative, no acute distress    EENT:  EOMI. Anicteric sclerae. MMM  Resp:  CTA bilaterally, no wheezing or rales. No accessory muscle use  CV:  Regular  rhythm,  No edema  GI:  Soft, Non distended, Non tender. +Bowel sounds  Neurologic:  Alert and oriented X 3, normal speech,   Psych:   Not anxious nor agitated  Skin:  No rashes. No jaundice    Reviewed most current lab test results and cultures  YES  Reviewed most current radiology test results   YES  Review and summation of old records today    NO  Reviewed patient's current orders and MAR    YES  PMH/SH reviewed - no change compared to H&P  ________________________________________________________________________  Care Plan discussed with:    Comments   Patient y    Family      RN y    Care Manager     Consultant                        Multidiciplinary team rounds were held today with , nursing, pharmacist and clinical coordinator. Patient's plan of care was discussed; medications were reviewed and discharge planning was addressed. ________________________________________________________________________  Total NON critical care TIME:  35   Minutes    Total CRITICAL CARE TIME Spent:   Minutes non procedure based      Comments   >50% of visit spent in counseling and coordination of care     ________________________________________________________________________  Marybeth Tran MD     Procedures: see electronic medical records for all procedures/Xrays and details which were not copied into this note but were reviewed prior to creation of Plan. LABS:  I reviewed today's most current labs and imaging studies.   Pertinent labs include:  Recent Labs 07/15/22  0151 07/13/22  1437   WBC 9.0 8.3   HGB 8.9* 9.5*   HCT 28.0* 29.3*    285     Recent Labs     07/15/22  0151 07/14/22  0517 07/13/22  1437    142 143   K 5.1 4.6 4.7   * 112* 113*   CO2 23 24 27   * 114* 114*   BUN 35* 27* 26*   CREA 1.71* 1.45* 1.57*   CA 7.8* 8.5 8.1*   ALB  --   --  1.8*   TBILI  --   --  0.2   ALT  --   --  25       Signed: Jose F Williamson MD

## 2022-07-15 NOTE — PROGRESS NOTES
Problem: Mobility Impaired (Adult and Pediatric)  Goal: *Acute Goals and Plan of Care (Insert Text)  Description:   FUNCTIONAL STATUS PRIOR TO ADMISSION: Patient was independent without use of DME. Patient was modified independent for basic and instrumental ADLs. Has help with meals and house cleaning. HOME SUPPORT PRIOR TO ADMISSION: The patient lived alone with family and friends/neighbors to provide assistance. Physical Therapy Goals  Initiated 7/14/2022  1. Patient will move from supine to sit and sit to supine , scoot up and down, and roll side to side in bed with modified independence within 7 day(s). 2.  Patient will transfer from bed to chair and chair to bed with supervision/set-up using the least restrictive device within 7 day(s). 3.  Patient will perform sit to stand with modified independence within 7 day(s). 4.  Patient will ambulate with supervision/set-up for 250 feet with the least restrictive device within 7 day(s). 5.  Patient will ascend/descend 3 stairs with 1 handrail(s) with minimal assistance/contact guard assist within 7 day(s). 6.  Patient will improve Ornelas Balance score by 7 points within 7 days. Outcome: Progressing Towards Goal   PHYSICAL THERAPY TREATMENT  Patient: Andreea Britt (89 y.o. female)  Date: 7/15/2022  Diagnosis: Elevated troponin [R77.8]  [facility] [I63.9] <principal problem not specified>       Precautions: Fall  Chart, physical therapy assessment, plan of care and goals were reviewed. ASSESSMENT  Patient continues with skilled PT services and is progressing towards goals. Pt received in bed, apprehensive to participating in PT session because she wanted to eat. She did eventually devang with some education and coaxing. She is progressing well, but requires constant cues to scan her left side by turning her head, due to her visual field cut.   Pt able to get OOB, stand, ambulate to room sink, stand and wash her hands, and then ambulate to the bedside chair with cga and verbal cues. She demonstrates impaired safety judgement and would benefit from rehab at discharge to further address her functional mobility and independence, as she lives alone. Pt left sitting in recliner eating her lunch. Current Level of Function Impacting Discharge (mobility/balance):   Bed Mobility:  Rolling: Modified independent  Supine to Sit: Modified independent  Scooting: Stand-by assistance   Transfers:  Sit to Stand: Contact guard assistance (vc for hand placement)  Stand to Sit: Stand-by assistance (vc for hand placement)  Bed to Chair: Contact guard assistance  Ambulation/Gait Training:  Distance (ft): 50 Feet (ft)  Assistive Device: Walker, rolling  Ambulation - Level of Assistance: Contact guard assistance     Other factors to consider for discharge: lives alone, functioning below her baseline         PLAN :  Patient continues to benefit from skilled intervention to address the above impairments. Continue treatment per established plan of care. to address goals. Recommendation for discharge: (in order for the patient to meet his/her long term goals)  Therapy 3 hours per day 5-7 days per week    This discharge recommendation:  Has been made in collaboration with the attending provider and/or case management    IF patient discharges home will need the following DME: pt owns DME required for discharge       SUBJECTIVE:   Patient stated I love to eat.     OBJECTIVE DATA SUMMARY:   Critical Behavior:  Neurologic State: Alert,Confused (periodic confusion)  Orientation Level: Oriented X4  Cognition: Appropriate decision making,Appropriate safety awareness,Follows commands  Safety/Judgement: Awareness of environment,Decreased insight into deficits,Good awareness of safety precautions,Fall prevention (decreased standing balance reactions)  Functional Mobility Training:  Bed Mobility:  Rolling: Modified independent  Supine to Sit: Modified independent     Scooting: Stand-by assistance        Transfers:  Sit to Stand: Contact guard assistance (vc for hand placement)  Stand to Sit: Stand-by assistance (vc for hand placement)        Bed to Chair: Contact guard assistance                    Balance:  Sitting: Intact  Standing: Impaired  Standing - Static: Good;Fair;Occasional;Unsupported  Standing - Dynamic : Constant support;Good;Fair  Ambulation/Gait Training:  Distance (ft): 50 Feet (ft)  Assistive Device: Walker, rolling  Ambulation - Level of Assistance: Contact guard assistance        Gait Abnormalities: Decreased step clearance        Base of Support: Narrowed     Speed/Tiffany: Pace decreased (<100 feet/min)  Step Length: Left shortened;Right shortened        Interventions: Safety awareness training;Verbal cues        Activity Tolerance:   Good and SpO2 stable on RA    After treatment patient left in no apparent distress:   Sitting in chair, Call bell within reach, and Caregiver / family present    COMMUNICATION/COLLABORATION:   The patients plan of care was discussed with: Registered nurse and Case management.      Steve Ahn, PT   Time Calculation: 25 mins

## 2022-07-15 NOTE — CONSULTS
Date of Consultation:  July 15, 2022    Reason for Consultation:  Altered mental status    Chief Complaint   Patient presents with    Cough     Cough for 4 days with some SOB reported.  Altered mental status     Family reports some confusion last night at 2100 and again this am.  Reports unsteady, slurred speech, confusion. No sx at present. History of Present Illness:   Toni Ortega is a 78 y.o. female with a history of hypertension, hyperlipidemia and CAD who is currently admitted to the hospital after her son noted that she had slurred speech and confusion. Unfortunately patient is unable provide any significant history regarding the current admission. Per chart review it appears that her son lives in [place] and has cameras in her home and he had been watching her. He noted that she had been more confused and had slurred speech. He also did note some difficulty with balance. It appears that the symptoms have resolved upon arrival in the emergency room for this reason she was brought to the hospital for further evaluation. I also did ask her about changes in her vision and she states that given her age she has had issues with her vision and needs new glasses. She cannot tell me when she may have had loss of peripheral vision on the left. Review of imaging does reveal that she did have this right occipital lobe stroke as far back as 2020. It had not been present on imaging in 2015. Interval history  No acute events reported overnight. This morning patient reports that she is doing well. She has no complaints today. She is looking forward to going home. We did discuss results of the MRI which showed no evidence of a new stroke however evidence of an old stroke EEG was also done which did not show any evidence of seizures.     Past Medical History:   Diagnosis Date    Anemia, unspecified 10/6/2020    Arrhythmia     Arthritis     CAD (coronary artery disease)     [facility]     Chronic bronchitis ([facility]     per pt:  as of 1/9/15 pt denies any ARENAS or SOB    Diabetes ([facility] Dx approx 2009    Dr Alvino Jones (in connect care)    GERD (gastroesophageal reflux disease)     Hypercholesteremia     Hypertension         Past Surgical History:   Procedure Laterality Date    HX CHOLECYSTECTOMY  6/12    HX COLONOSCOPY      HX CORONARY STENT PLACEMENT  8/25/2015    HX DILATION AND CURETTAGE      HX OTHER SURGICAL  06/22/2021    Laparoscopic hand-assisted partial gastrectomy with core needle liver biopsy.  [place] CARDIAC SURG PROCEDURE UNLIST  2015    UPPER GI ENDOSCOPY,BIOPSY  10/6/2020             Family History   Problem Relation Age of Onset    Diabetes Mother     Heart Disease Mother     Hypertension Mother     Stroke Father     Heart Disease Brother     Heart Disease Brother     Stroke Brother     HIV/AIDS Brother         Social History     Tobacco Use    Smoking status: Former Smoker    Smokeless tobacco: Never Used    Tobacco comment: quit 50 years ago   Substance Use Topics    Alcohol use: No        Allergies   Allergen Reactions    Metformin Other (comments)     GI side effects. Not a true allergy        Prior to Admission medications    Medication Sig Start Date End Date Taking? Authorizing Provider   torsemide (DEMADEX) 10 mg tablet Take  by mouth daily. Yes Other, MD Regino   carvediloL (COREG) 12.5 mg tablet Take 1 Tablet by mouth two (2) times daily (with meals). Patient taking differently: Take 25 mg by mouth two (2) times daily (with meals). 6/27/22  Yes Yuki Hogan MD   potassium chloride (K-DUR, KLOR-CON M20) 20 mEq tablet  1/12/22  Yes Provider, Historical   losartan (COZAAR) 100 mg tablet Take 1 Tablet by mouth daily. 11/18/21  Yes Yuki Hogan MD   imatinib mesylate (IMATINIB PO) Take  by mouth. Yes Provider, Historical   multivitamin, tx-iron-ca-min (THERA-M w/ IRON) 9 mg iron-400 mcg tab tablet Take 1 Tab by mouth two (2) times a day.  Centrum silver   Yes Provider, Historical   lancets misc PUSH BUTTON LANCETS. Check blood sugar 4 times per day due to hypoglycemic events. Dx:E11.42, E16.2 12/17/20  Yes Cristobal Poon MD   amLODIPine (NORVASC) 10 mg tablet Take 1 Tab by mouth daily. 11/20/20  Yes Skylar Head MD   food supplemt, lactose-reduced (Ensure Enlive) 0.08 gram-1.5 kcal/mL liqd Take 6 x daily as recommended by Surgery to improve nutritional status for Possible surgery in future  Patient taking differently: Take 6 x daily as recommended by Surgery to improve nutritional status for Possible surgery in future. takes about 3 11/20/20  Yes Skylar Head MD   atorvastatin (LIPITOR) 40 mg tablet Take 1 Tab by mouth nightly. 1/27/20  Yes Cristobal Poon MD   isosorbide mononitrate ER (IMDUR) 30 mg tablet  1/17/22   Provider, Historical   pantoprazole (PROTONIX) 40 mg tablet Take 40 mg by mouth daily. 1/13/21   Provider, Historical       Review of Systems:    General, constitutional: negative  Eyes, vision: negative  Ears, nose, throat: negative  Cardiovascular, heart: negative  Respiratory: negative  Gastrointestinal: negative  Genitourinary: negative  Musculoskeletal: negative  Skin and integumentary: negative  Psychiatric: negative  Endocrine: negative  Neurological: negative, except for HPI  Hematologic/lymphatic: negative  Allergy/immunology: negative    PHYSICAL EXAMINATION:   Visit Vitals  BP (!) 118/52   Pulse 66   Temp 98.8 °F (37.1 °C)   Resp 18   Ht 5' 2\" (1.575 m)   Wt 147 lb (66.7 kg)   SpO2 96%   Breastfeeding No   BMI 26.89 kg/m²       Physical Exam:  General:  no acute distress  Neck: no carotid bruits  Lungs: clear to auscultation  Heart:  no murmurs, regular rate and rhythm   Lower extremity: no edema    Neurological exam:  Mental Status: Awake, alert, oriented to person, place and time  Attention and Concentration: able to state the days of the week backwards   Speech and Language: No dysarthria.  Able to name, repeat and follow commands   Fund of knowledge was preserved    Cranial nerves: II-XII:  Pupils equal and reactive, left homonymous hemianopia  Extraocular movements intact, no evidence of nystagmus or ptosis   Facial sensation intact   Facial movements symmetric   Hearing intact to soft rub bilaterally   Shoulder shrug symmetric and strong   Tongue protrusion full and midline without fasciculation or atrophy    Motor:   Normal tone and Bulk   Drift: No evidence of pronator drift     Strength testing:   deltoid triceps biceps Wrist ext. Wrist flex. intrinsics   Right 5 5 5 5 5 5   Left 5 5 5 5 5 5      Hip flex. Hip ext. Knee ext. Knee flex Dorsi flex Plantar flex   Right  5 5 5 5 5 5   Left  5 5 5 5 5 5       Sensory:  Sensations intact to light touch and pinprick. Reflexes:   Biceps Triceps  Brachiorad Patellar Achilles Plantar Hoffmans   Right  2 2 2 2 1 Down Neg   Left  2 2 2 2 1 Down Neg      Cerebellar testing: Finger-nose-finger was intact    Data:     Lab Results   Component Value Date/Time    Hemoglobin A1c 5.7 (H) 07/14/2022 05:17 AM    Sodium 141 07/15/2022 01:51 AM    Potassium 5.1 07/15/2022 01:51 AM    Chloride 111 (H) 07/15/2022 01:51 AM    Glucose 137 (H) 07/15/2022 01:51 AM    BUN 35 (H) 07/15/2022 01:51 AM    Creatinine 1.71 (H) 07/15/2022 01:51 AM    Calcium 7.8 (L) 07/15/2022 01:51 AM    WBC 9.0 07/15/2022 01:51 AM    HCT 28.0 (L) 07/15/2022 01:51 AM    HGB 8.9 (L) 07/15/2022 01:51 AM    PLATELET 652 02/95/3257 01:51 AM    Hemoglobin A1c, External 6.1 02/07/2018 12:00 AM       Imaging:    Results from Hospital Encounter encounter on 07/13/22    MRA BRAIN WO CONT    FINAL REPORT:    INDICATION: stroke    COMPARISON:  CT head July 13, 2022, MRI July 14, 2022    TECHNIQUE:  3-D time-of-flight MRA of the brain was performed. Multiplanar  reconstructions were obtained. FINDINGS:    Motion artifact.   Posterior Circulation:  Absent flow in the right posterior cerebral artery  beyond the P2 origin. Left posterior cerebral artery is patent. Anterior Circulation:  No flow limiting stenosis or occlusion. Additional Comments:  No evidence of aneurysm or vascular malformation. Impression  Motion artifact. Right posterior cerebral artery occlusion corresponding to  chronic right occipital infarction on prior MRI. Results from [place] encounter on 07/13/22    CT HEAD WO CONT    Narrative  INDICATION: transient AMS    COMPARISON: 12/15/2020. EXAM: CT HEAD without contrast.    TECHNIQUE: Unenhanced CT Head is performed. CT dose reduction was achieved  through use of a standardized protocol tailored for this examination and  automatic exposure control for dose modulation. FINDINGS:  No acute infarct is seen. 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 There is  a right basal ganglia lacune. There are left cerebellar lacunae. There is no apparent mass on unenhanced imaging. There is no bleed, shift,  obstructive hydrocephalus or significant extra-axial fluid collection. Bone windows show new paranasal sinus mucosal thickening and fluid levels. Impression  1. No acute intracranial finding. 2. Stable chronic ischemic disease. 3. Paranasal sinusitis. IMPRESSION/RECOMMENDATIONS:  Urban Davis is a 78 y.o. female with a history of hypertension, hyperlipidemia and CAD who is currently admitted to the hospital after her son noted that she had slurred speech, confusion and gait instability that was transient in nature. Her neurological exam does reveal a left homonymous hemianopia which is consistent with her prior right PCA stroke. Encephalopathy: Transient nature and now has resolved. She does appear to be at her current neurological baseline. Differential does include seizure given her history of a prior stroke versus a new stroke or TIA. Additionally this could be due to a metabolic encephalopathy. MRI of the brain did not reveal evidence of a new stroke.   Her EEG did not show any evidence of epileptiform discharges it is possible that her symptoms were due to hypertensive encephalopathy. She has been at her baseline neurological status for now. -Patient should be placed on aspirin 81 mg daily for secondary stroke prevention  -Would additionally maintain blood pressure goal of 140/90. This long-term outpatient goal  -Risk factor modification: LDL less than 70 and hemoglobin A1c less than 7  -Would also obtain PT and OT consultations.  -Is not clearly determined if this event was based in seizure however her EEG did not show any evidence of epileptiform discharges or slowing in the right PCA territory. For this reason we can hold off on any antiepileptics. If patient were to have another event similar to this would consider seizures as a likely cause of her symptoms. Thank you very much for this consultation, will sign off.  Please call with questions     Kt Jones MD

## 2022-07-15 NOTE — PROGRESS NOTES
Progress Note      7/15/2022 9:05 AM  NAME: Fauzia Fisher   MRN:  [id number]   Admit Diagnosis: Elevated troponin [R77.8]  Stroke [facility]) [I63.9]            Patient PCP: Yamini Bryant MD  Regular cardiologist: Sandeep Ko   ________________________________________________________________________                 Assessment:       Recent URI with fever, cough  Episode of confusion  Increased blood pressure  Increased troponin  Low TSH  Anemia     Ms Becky Wilcox has PMH of   - CAD: Distal RCA NEENA 2015 for NQWMI; Med Rx mid circ and diffusely dz  diagonals. NQWMI 10/2020 (trop 9), c/w type II from profound anemia. EF 30-35% 59/0/48.  - Systolic HF with EF 19-81%  -  HTN  - DM  - Dyslipidemia  - CKD III: Cr 1.43/gfr 42 2020 (Dr Mir Ndiaye)  - Obesity: ?NANY. - Non-compliant with some meds per pt 10/2020. Poor compliance with f/u OV.  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        in 2022, son is in [address] [place] [address] 195:        - No clear anginal pain, increased troponin, ECG with LVH, likely type II from BP, manage medically for now  - Euvolemic on exam, LVEF 50-55%     - BP improved with resuming medications     - sinus   - Cont aspirin   - Cont increased coreg to 25mg bid  - Cont losrtan  - Cont amlodipine  - Cont imdur  - Cont demadex  - Cont atorvastatin  - if needed for BP then add hydralazine or clonidine     No further cardiac work up, if needed call on call physician over weekend.            [x]? High complexity decision making was performed         Subjective:     HPI:     Reports headache and bilateral leg pain   No CP or SOB       Objective:      Physical Exam:    Last 24hrs VS reviewed since prior progress note.  Most recent are:    Visit Vitals  BP (!) 132/51   Pulse 70   Temp 97.9 °F (36.6 °C)   Resp 20   Ht 5' 2\" (1.575 m)   Wt 66.7 kg (147 lb)   SpO2 97%   Breastfeeding No   BMI 26.89 kg/m²     No intake or output data in the 24 hours ending 07/15/22 0905        General: Alert and oriented x3, no acute distress   Neck: Supple   Respiratory: No respiratory distress, clear lung sound   Cardiovascular: Regular rate rhythm, S1S2, no murmur   Abdomen: soft, non tender, non distended   Neuro: moves all extremities, oriented x3   Skin: warm and dry   Extremity: no edema, warm to touch        Data Review    Telemetry: normal sinus rhythm          Lab Data Personally Reviewed:    Recent Labs     07/15/22  0151 07/13/22  1437   WBC 9.0 8.3   HGB 8.9* 9.5*   HCT 28.0* 29.3*    285     No results for input(s): INR, PTP, APTT, INREXT in the last 72 hours. Recent Labs     07/15/22  0151 07/14/22  0517 07/13/22  1437    142 143   K 5.1 4.6 4.7   * 112* 113*   CO2 23 24 27   BUN 35* 27* 26*   CREA 1.71* 1.45* 1.57*   * 114* 114*   CA 7.8* 8.5 8.1*     No results for input(s): CPK, CKNDX, TROIQ in the last 72 hours. No lab exists for component: CPKMB  Lab Results   Component Value Date/Time    Cholesterol, total 214 (H) 07/14/2022 05:17 AM    HDL Cholesterol 32 07/14/2022 05:17 AM    LDL, calculated 141 (H) 07/14/2022 05:17 AM    Triglyceride 205 (H) 07/14/2022 05:17 AM    CHOL/HDL Ratio 6.7 (H) 07/14/2022 05:17 AM       Recent Labs     07/13/22  1437   AP 88   TP 6.0*   ALB 1.8*   GLOB 4.2*     No results for input(s): PH, PCO2, PO2 in the last 72 hours.     Medications Personally Reviewed:    Current Facility-Administered Medications   Medication Dose Route Frequency    [START ON 7/16/2022] torsemide (DEMADEX) tablet 10 mg  10 mg Oral DAILY    labetaloL (NORMODYNE;TRANDATE) injection 10 mg  10 mg IntraVENous Q4H PRN    hydrALAZINE (APRESOLINE) 20 mg/mL injection 10 mg  10 mg IntraVENous Q6H PRN    guaiFENesin ER (MUCINEX) tablet 600 mg  600 mg Oral Q12H    oxyCODONE-acetaminophen (PERCOCET) 5-325 mg per tablet 1 Tablet  1 Tablet Oral ONCE    amLODIPine (NORVASC) tablet 10 mg  10 mg Oral DAILY    atorvastatin (LIPITOR) tablet 40 mg  40 mg Oral QHS    carvediloL (COREG) tablet 25 mg  25 mg Oral BID WITH MEALS    isosorbide mononitrate ER (IMDUR) tablet 30 mg  30 mg Oral DAILY    losartan (COZAAR) tablet 100 mg  100 mg Oral DAILY    pantoprazole (PROTONIX) tablet 40 mg  40 mg Oral DAILY    acetaminophen (TYLENOL) tablet 650 mg  650 mg Oral Q4H PRN    Or    acetaminophen (TYLENOL) solution 650 mg  650 mg Per NG tube Q4H PRN    Or    acetaminophen (TYLENOL) suppository 650 mg  650 mg Rectal Q4H PRN    aspirin chewable tablet 81 mg  81 mg Oral DAILY    enoxaparin (LOVENOX) injection 30 mg  30 mg SubCUTAneous DAILY    ondansetron (ZOFRAN) injection 4 mg  4 mg IntraVENous Q6H PRN    insulin lispro (HUMALOG) injection   SubCUTAneous AC&HS    glucose chewable tablet 16 g  4 Tablet Oral PRN    glucagon (GLUCAGEN) injection 1 mg  1 mg IntraMUSCular PRN    dextrose 10% infusion 0-250 mL  0-250 mL IntraVENous PRN              Alfonso Carreon MD

## 2022-07-15 NOTE — PROGRESS NOTES
0730 Bedside shift change report given to  A[address] Brendan Vences (oncoming nurse) by Sarah Davis (offgoing nurse). Report included the following information SBAR and Kardex. End of Shift Note    Bedside shift change report given to [facility] RN (oncoming nurse) by Marline Lovett (offgoing nurse). Report included the following information SBAR, Kardex, ED Summary, MAR, Recent Results and Cardiac Rhythm NSR    Shift worked:  [id number]     Shift summary and any significant changes:    Demadex and losartan held by MD d/t creatinine  VSS Hydralazine did not have to be given today. Concerns for physician to address: Patient's son Patt Nuñez would like to be called with updates from MD.      Zone phone for oncoming shift:   [phone]       Activity:  Activity Level: Up with Assistance  Number times ambulated in hallways past shift: 0  Number of times OOB to chair past shift: 1    Cardiac:   Cardiac Monitoring: Yes      Cardiac Rhythm: Sinus Rhythm    Access:   Current line(s): PIV     Genitourinary:   Urinary status: voiding    Respiratory:   O2 Device: None (Room air)  Chronic home O2 use?: NO  Incentive spirometer at bedside: NO       GI:  Last Bowel Movement Date:  (PTA)  Current diet:  ADULT DIET Regular  Passing flatus: YES  Tolerating current diet: YES       Pain Management:   Patient states pain is manageable on current regimen: YES    Skin:  Rufus Score: 20  Interventions: speciality bed, float heels, increase time out of bed and PT/OT consult    Patient Safety:  Fall Score:  Total Score: 4  Interventions: bed/chair alarm, assistive device (walker, cane, etc), gripper socks and pt to call before getting OOB  High Fall Risk: Yes    Length of Stay:  Expected LOS: 4d 9h  Actual LOS: 2      Marline Lovett

## 2022-07-15 NOTE — ROUTINE PROCESS
Bedside shift change report given to  Jakub (oncoming nurse) by Gibran Marley (offgoing nurse). Report included the following information SBAR, Kardex, Intake/Output, MAR, Med Rec Status, Cardiac Rhythm NSR and Alarm Parameters . 4:48 PM  The systolic BP in 903'M. 10mg of hydralazine administered. The BP is now 149/60.     9:09 PM  The patient reported visual changes (bluring of vision). NIH scale done and she scored one. The family does not want an MRI or CT at this stage. DR Ignacio Green made aware, he said to continue monitoring and if symptoms worsens to call a code stroke. When reviewing baseline NIH in the ED, blurring of vision was reported by the patient indicating this is not a new sign. 11:08 PM  Resting comfortably, reported no visual blurring at this point. Family member in the room with the patient. Medicated with tylenol for leg pain. 6:17 AM  BP trending up again, second dose of PRN hydralazine IVP administered.

## 2022-07-15 NOTE — PROCEDURES
Patient Name: Saran Hung  : 1945  Age: 78 y.o. Ordering Saira Hernandez MD  Date of EE2022  EEG procedure number: [id number]  Diagnosis: Episodic confusion  Interpreting physician: Flo Peoples MD       ELECTROENCEPHALOGRAM REPORT     PROCEDURE: EEG. CLINICAL INDICATION: The patient is a 78 y.o. female who is being evaluated for baseline electro cerebral activities and to rule out seizure focus. EEG CLASSIFICATION: Normal    DESCRIPTION OF THE RECORD:   The background of this recording contains a posteriorly-located occipital alpha rhythm of 9-10 Hz that attenuates with eye opening. Throughout the recording, there were no clear areas of focal slowing nor spike or spike-and-wave discharges seen. Hyperventilation was not performed. Photic stimulation produced no response in the posterior head regions. During the recording the patient did not achieve stage II sleep    INTERPRETATION: This is a normal electroencephalogram with the patient awake and asleep, showing no clear focal abnormalities or epileptiform activity. A normal EEG does not rule out a diagnosis of epilepsy or seizures. One may consider pursuing a prolonged study. Clinical correlation recommended.     Flo Peoples MD

## 2022-07-15 NOTE — PROGRESS NOTES
Transition of Care Plan:    RUR: 13%  Disposition: Home with Follow-up appointment  Follow up appointments: PCP; Cardiology  DME needed: None  Transportation at Discharge: Pt's family member will transport at d/c.   Huan Ramirez or means to access home:  Pt has access      IM Medicare Letter: Given 7/15/2022  Is patient a BCPI-A Bundle:  N/A         If yes, was Bundle Letter given?:  N/A  Is patient a [place] and connected with the [place]? No             If yes, was [facility] transfer form completed and [facility] notified? N/A  Caregiver Contact:  Janet Rosales- Son- [phone]  Discharge Caregiver contacted prior to discharge? Will notify caregiver at d/c. Care Conference needed?: No    2:30pm- CM met with pt and Hamida Miramontes at bedside to discuss d/c plan. Pt received and signed 2nd  Letter. 2:10pm- Pt's son Arman Saxon called CM via phone to discuss d/c plan. CM informed pt's son of PT/OT recommendations. Pt's son stated that the plan is to take pt home. CM inquired with pt's son about home health. Pt's son declined home health. Pt's son stated that a family  Member will transport pt home. CM will continue to follow patient for discharge planning needs and arrange for services as deemed necessary.     Tori Laura [address]  [phone]

## 2022-07-16 LAB
ANION GAP SERPL CALC-SCNC: 7 MMOL/L (ref 5–15)
BUN SERPL-MCNC: 41 MG/DL (ref 6–20)
BUN/CREAT SERPL: 19 (ref 12–20)
CALCIUM SERPL-MCNC: 8 MG/DL (ref 8.5–10.1)
CHLORIDE SERPL-SCNC: 110 MMOL/L (ref 97–108)
CO2 SERPL-SCNC: 23 MMOL/L (ref 21–32)
CREAT SERPL-MCNC: 2.12 MG/DL (ref 0.55–1.02)
GLUCOSE BLD STRIP.AUTO-MCNC: 115 MG/DL (ref 65–117)
GLUCOSE BLD STRIP.AUTO-MCNC: 155 MG/DL (ref 65–117)
GLUCOSE BLD STRIP.AUTO-MCNC: 201 MG/DL (ref 65–117)
GLUCOSE BLD STRIP.AUTO-MCNC: 210 MG/DL (ref 65–117)
GLUCOSE SERPL-MCNC: 124 MG/DL (ref 65–100)
POTASSIUM SERPL-SCNC: 4.8 MMOL/L (ref 3.5–5.1)
PROCALCITONIN SERPL-MCNC: <0.05 NG/ML
SERVICE CMNT-IMP: ABNORMAL
SERVICE CMNT-IMP: NORMAL
SODIUM SERPL-SCNC: 140 MMOL/L (ref 136–145)

## 2022-07-16 PROCEDURE — 84145 PROCALCITONIN (PCT): CPT

## 2022-07-16 PROCEDURE — 82962 GLUCOSE BLOOD TEST: CPT

## 2022-07-16 PROCEDURE — 36415 COLL VENOUS BLD VENIPUNCTURE: CPT

## 2022-07-16 PROCEDURE — 65660000001 HC RM ICU INTERMED STEPDOWN

## 2022-07-16 PROCEDURE — 74011250636 HC RX REV CODE- 250/636: Performed by: INTERNAL MEDICINE

## 2022-07-16 PROCEDURE — 74011250637 HC RX REV CODE- 250/637: Performed by: HOSPITALIST

## 2022-07-16 PROCEDURE — 80048 BASIC METABOLIC PNL TOTAL CA: CPT

## 2022-07-16 PROCEDURE — 74011250636 HC RX REV CODE- 250/636: Performed by: HOSPITALIST

## 2022-07-16 PROCEDURE — 74011636637 HC RX REV CODE- 636/637: Performed by: HOSPITALIST

## 2022-07-16 RX ORDER — SODIUM CHLORIDE 9 MG/ML
75 INJECTION, SOLUTION INTRAVENOUS CONTINUOUS
Status: DISCONTINUED | OUTPATIENT
Start: 2022-07-16 | End: 2022-07-17 | Stop reason: HOSPADM

## 2022-07-16 RX ADMIN — ASPIRIN 81 MG: 81 TABLET, CHEWABLE ORAL at 09:24

## 2022-07-16 RX ADMIN — HYDRALAZINE HYDROCHLORIDE 10 MG: 20 INJECTION INTRAMUSCULAR; INTRAVENOUS at 05:51

## 2022-07-16 RX ADMIN — GUAIFENESIN 600 MG: 600 TABLET, EXTENDED RELEASE ORAL at 21:15

## 2022-07-16 RX ADMIN — Medication 2 UNITS: at 21:16

## 2022-07-16 RX ADMIN — ATORVASTATIN CALCIUM 40 MG: 40 TABLET, FILM COATED ORAL at 21:15

## 2022-07-16 RX ADMIN — SODIUM CHLORIDE 75 ML/HR: 9 INJECTION, SOLUTION INTRAVENOUS at 13:04

## 2022-07-16 RX ADMIN — ENOXAPARIN SODIUM 30 MG: 100 INJECTION SUBCUTANEOUS at 09:24

## 2022-07-16 RX ADMIN — Medication 2 UNITS: at 17:08

## 2022-07-16 RX ADMIN — CARVEDILOL 25 MG: 12.5 TABLET, FILM COATED ORAL at 17:08

## 2022-07-16 RX ADMIN — ISOSORBIDE MONONITRATE 30 MG: 30 TABLET, EXTENDED RELEASE ORAL at 09:24

## 2022-07-16 RX ADMIN — CARVEDILOL 25 MG: 12.5 TABLET, FILM COATED ORAL at 09:24

## 2022-07-16 RX ADMIN — GUAIFENESIN 600 MG: 600 TABLET, EXTENDED RELEASE ORAL at 09:25

## 2022-07-16 RX ADMIN — ACETAMINOPHEN 650 MG: 325 TABLET ORAL at 13:01

## 2022-07-16 RX ADMIN — PANTOPRAZOLE SODIUM 40 MG: 40 TABLET, DELAYED RELEASE ORAL at 09:24

## 2022-07-16 RX ADMIN — AMLODIPINE BESYLATE 10 MG: 5 TABLET ORAL at 09:24

## 2022-07-16 NOTE — PROGRESS NOTES
End of Shift Note    Bedside shift change report given to Ana Cristina Ferraro RN (oncoming nurse) by Jono Bhat RN (offgoing nurse). Report included the following information SBAR, Kardex, MAR, Recent Results     Shift worked:  7p-7a     Shift summary and any significant changes:     Plan of care reviewed with patient.  POC included;  Blood glucose monitoring/SSI  BP monitoring - Hydralazine given once  Tele monitoring  Safety measures in place     Concerns for physician to address:       Zone phone for oncoming shift:

## 2022-07-16 NOTE — PROGRESS NOTES
Hospitalist Progress Note    NAME: Urban Davis   :  1945   MRN:  [id number]     Urban Davis is a 78 y.o.  female who was brought to ED as she was noted to be disoriented and slightly off balance. Patient lives alone at home. Her son lives out of state, so he has cameras around patient's house to monitor her wellbeing. He frequently checks on her throughout the day. Last night and this morning she was noticed to be disoriented and slightly off balance when walking. Son also noted patient seemed to have slightly slurred speech. Assessment / Plan:  Acute encephalopathy transient resolved  Suspect TIA versus metabolic encephalopathy  Head CT: negative. MRI of the brain shows no acute process except for sinusitis  MRA shows no large vessel occlusion  EEG shows no epileptiform discharges  -Evaluated by neurology and felt that patient does not need any seizure medications for now and recommended to continue aspirin for stroke prophylaxis  Continue added aspirin 81 mg daily  -A1c is 5.7, LDL is 141  -Continue added Lipitor 40 mg daily  -Continue PT OT    CKD stage III  -Baseline creatinine is around 1.4 and creatinine gradually trending up and is today 2.12  -Demadex and losartan were held on 7/15. However creatinine continued to go up.  -Patient's son has indicated that patient is not supposed to be on diuretic since a more than a month because it was making her creatinine go up.  -Will order 1 liter of iv fluid today.  -Check BMP in a.m. tomorrow and if creatinine is trending down,consider discharge patient because the family does not want her to stay longer in the hospital.  -If creatinine continues to go up,order US of kidney and consult nephrology.     Non-ST elevation MI  CAD  HTN uncontrolled/hypertensive urgency  -Troponins peaked at 194. Continue added aspirin. Cardiology following.  -Continue Norvasc, Coreg, Imdur, losartan.   On as needed labetalol and hydralazine  -Evaluated by cardiology and recommended to continue aspirin, Lipitor and also Coreg.  -Continue Norvasc, Coreg. On as needed hydralazine for hypertensive urgency.     Fever at home  At home but none here  ua with min changes, no dysuira  cxray clear  No leukocytosis   No evidence of infections    DM type II  -A1c is 5.7. Continue sliding scale insulin.     Anemia of ckd   Hemoglobin stable  Monitor     Hyperlipidemia  GERD  ----Continue home medications           Code Status: Full code  Surrogate Decision Maker: Son     DVT Prophylaxis: Lovenox  GI Prophylaxis: not indicated     Baseline: Lives alone, has 2 sons, 1 son , ambulates independently      25.0 - 29.9 Overweight / Body mass index is 26.89 kg/m². Estimated discharge date:  to   Barriers: Elevated creatinine  Disposition:Patient and family(spoke on the phone with her son) want discharge home,no placement. Subjective:     Chief Complaint / Reason for Physician Visit  Mental status is back to baseline. No confusion. No focal weakness, tingling or numbness  Does not report any chest pain no  Overnight events noted and discussed with nursing  Creatinine trending up      Review of Systems:  Symptom Y/N Comments  Symptom Y/N Comments   Fever/Chills    Chest Pain     Poor Appetite    Edema     Cough    Abdominal Pain     Sputum    Joint Pain     SOB/ARENAS    Pruritis/Rash     Nausea/vomit    Tolerating PT/OT     Diarrhea    Tolerating Diet     Constipation    Other       Could NOT obtain due to:      Objective:     VITALS:   Last 24hrs VS reviewed since prior progress note.  Most recent are:  Patient Vitals for the past 24 hrs:   Temp Pulse Resp BP SpO2   22 0924 -- 74 -- (!) 150/51 --   22 0733 (!) 100.5 °F (38.1 °C) 71 16 (!) 167/62 99 %   22 0701 -- -- -- (!) 164/52 --   22 0541 -- -- -- (!) 179/55 --   22 0400 -- 64 -- -- --   22 0326 98.9 °F (37.2 °C) 65 16 (!) 174/55 96 %   22 0000 -- 64 -- -- -- 07/15/22 2327 98.5 °F (36.9 °C) 66 16 (!) 156/52 98 %   07/15/22 2000 -- 61 -- -- --   07/15/22 1943 98.1 °F (36.7 °C) 65 16 (!) 113/50 100 %   07/15/22 1700 -- 65 -- (!) 149/51 98 %   07/15/22 1600 -- 66 -- (!) 140/62 100 %   07/15/22 1500 -- 65 -- (!) 132/47 100 %   07/15/22 1438 98.2 °F (36.8 °C) 62 18 (!) 120/48 100 %     No intake or output data in the 24 hours ending 07/16/22 1211     I had a face to face encounter and independently examined this patient on 7/16/2022, as outlined below:  PHYSICAL EXAM:  General: cooperative, no acute distress    EENT:  EOMI. Anicteric sclerae. MMM  Resp:  CTA bilaterally, no wheezing or rales. No accessory muscle use  CV:  Regular  rhythm,  No edema  GI:  Soft, Non distended, Non tender. +Bowel sounds  Neurologic:  Alert and oriented X 3, normal speech,   Psych:   Not anxious nor agitated  Skin:  No rashes. No jaundice    Reviewed most current lab test results and cultures  YES  Reviewed most current radiology test results   YES  Review and summation of old records today    NO  Reviewed patient's current orders and MAR    YES  PMH/ reviewed - no change compared to H&P  ________________________________________________________________________  Care Plan discussed with:    Comments   Patient y    Family  y Son on the phone and daughter in law   RN y    Care Manager     Consultant                        Multidiciplinary team rounds were held today with , nursing, pharmacist and clinical coordinator. Patient's plan of care was discussed; medications were reviewed and discharge planning was addressed.      ________________________________________________________________________  Total NON critical care TIME:  35   Minutes    Total CRITICAL CARE TIME Spent:   Minutes non procedure based      Comments   >50% of visit spent in counseling and coordination of care     ________________________________________________________________________  Saige Dumont MD Procedures: see electronic medical records for all procedures/Xrays and details which were not copied into this note but were reviewed prior to creation of Plan. LABS:  I reviewed today's most current labs and imaging studies. Pertinent labs include:  Recent Labs     07/15/22  0151 07/13/22  1437   WBC 9.0 8.3   HGB 8.9* 9.5*   HCT 28.0* 29.3*    285     Recent Labs     07/16/22  0435 07/15/22  0151 07/14/22  0517 07/13/22  1437 07/13/22  1437    141 142   < > 143   K 4.8 5.1 4.6   < > 4.7   * 111* 112*   < > 113*   CO2 23 23 24   < > 27   * 137* 114*   < > 114*   BUN 41* 35* 27*   < > 26*   CREA 2.12* 1.71* 1.45*   < > 1.57*   CA 8.0* 7.8* 8.5   < > 8.1*   ALB  --   --   --   --  1.8*   TBILI  --   --   --   --  0.2   ALT  --   --   --   --  25    < > = values in this interval not displayed.        Signed: Yohan Monroy MD

## 2022-07-16 NOTE — PROGRESS NOTES
End of Shift Note    Bedside shift change report given to Fernando Khan RN (oncoming nurse) by Shorty Cabrera RN (offgoing nurse). Report included the following information SBAR, Kardex and Recent Results    Shift worked:  Days     Shift summary and any significant changes:     Uneventful shift. Patient is likely to discharge tomorrow. Patient up to bathroom with assistance. Concerns for physician to address:       Zone phone for oncoming shift:          Activity:  Activity Level: Up with Assistance  Number times ambulated in hallways past shift: 0  Number of times OOB to chair past shift: 0    Cardiac:   Cardiac Monitoring: Yes      Cardiac Rhythm: Sinus Rhythm    Access:   Current line(s): PIV     Genitourinary:   Urinary status: voiding    Respiratory:   O2 Device: None (Room air)  Chronic home O2 use?: NO  Incentive spirometer at bedside: NO       GI:  Last Bowel Movement Date: 07/15/22  Current diet:  ADULT DIET Regular; No milk with meals  Passing flatus: YES  Tolerating current diet: YES       Pain Management:   Patient states pain is manageable on current regimen: YES    Skin:  Rufus Score: 20  Interventions: float heels, increase time out of bed, PT/OT consult and nutritional support     Patient Safety:  Fall Score:  Total Score: 4  Interventions: bed/chair alarm, assistive device (walker, cane, etc), gripper socks, pt to call before getting OOB and stay with me (per policy)  High Fall Risk: Yes    Length of Stay:  Expected LOS: 2d 2h  Actual LOS: 503 Frankie Frederick RN

## 2022-07-17 VITALS
TEMPERATURE: 98.7 F | DIASTOLIC BLOOD PRESSURE: 53 MMHG | RESPIRATION RATE: 20 BRPM | HEIGHT: 62 IN | HEART RATE: 65 BPM | BODY MASS INDEX: 27.05 KG/M2 | SYSTOLIC BLOOD PRESSURE: 147 MMHG | OXYGEN SATURATION: 100 % | WEIGHT: 147 LBS

## 2022-07-17 LAB
ANION GAP SERPL CALC-SCNC: 7 MMOL/L (ref 5–15)
BUN SERPL-MCNC: 43 MG/DL (ref 6–20)
BUN/CREAT SERPL: 22 (ref 12–20)
CALCIUM SERPL-MCNC: 7.6 MG/DL (ref 8.5–10.1)
CHLORIDE SERPL-SCNC: 113 MMOL/L (ref 97–108)
CO2 SERPL-SCNC: 21 MMOL/L (ref 21–32)
CREAT SERPL-MCNC: 1.96 MG/DL (ref 0.55–1.02)
GLUCOSE BLD STRIP.AUTO-MCNC: 111 MG/DL (ref 65–117)
GLUCOSE BLD STRIP.AUTO-MCNC: 167 MG/DL (ref 65–117)
GLUCOSE SERPL-MCNC: 110 MG/DL (ref 65–100)
POTASSIUM SERPL-SCNC: 4.5 MMOL/L (ref 3.5–5.1)
SERVICE CMNT-IMP: ABNORMAL
SERVICE CMNT-IMP: NORMAL
SODIUM SERPL-SCNC: 141 MMOL/L (ref 136–145)

## 2022-07-17 PROCEDURE — 74011250636 HC RX REV CODE- 250/636: Performed by: HOSPITALIST

## 2022-07-17 PROCEDURE — 80048 BASIC METABOLIC PNL TOTAL CA: CPT

## 2022-07-17 PROCEDURE — 82962 GLUCOSE BLOOD TEST: CPT

## 2022-07-17 PROCEDURE — 36415 COLL VENOUS BLD VENIPUNCTURE: CPT

## 2022-07-17 PROCEDURE — 74011250637 HC RX REV CODE- 250/637: Performed by: HOSPITALIST

## 2022-07-17 RX ORDER — CARVEDILOL 12.5 MG/1
25 TABLET ORAL 2 TIMES DAILY WITH MEALS
Qty: 360 TABLET | Refills: 1 | Status: SHIPPED | OUTPATIENT
Start: 2022-07-17 | End: 2022-07-17 | Stop reason: SDUPTHER

## 2022-07-17 RX ORDER — GUAIFENESIN 100 MG/5ML
81 LIQUID (ML) ORAL DAILY
Qty: 30 TABLET | Refills: 2 | Status: SHIPPED | OUTPATIENT
Start: 2022-07-18 | End: 2022-07-17 | Stop reason: SDUPTHER

## 2022-07-17 RX ORDER — CARVEDILOL 12.5 MG/1
25 TABLET ORAL 2 TIMES DAILY WITH MEALS
Qty: 360 TABLET | Refills: 0 | Status: SHIPPED | OUTPATIENT
Start: 2022-07-17 | End: 2022-10-15

## 2022-07-17 RX ORDER — LOSARTAN POTASSIUM 100 MG/1
50 TABLET ORAL DAILY
Qty: 90 TABLET | Refills: 3 | Status: SHIPPED | OUTPATIENT
Start: 2022-07-17

## 2022-07-17 RX ORDER — LOSARTAN POTASSIUM 100 MG/1
50 TABLET ORAL DAILY
Qty: 90 TABLET | Refills: 3 | Status: SHIPPED | OUTPATIENT
Start: 2022-07-17 | End: 2022-07-17 | Stop reason: SDUPTHER

## 2022-07-17 RX ORDER — GUAIFENESIN 100 MG/5ML
81 LIQUID (ML) ORAL DAILY
Qty: 90 TABLET | Refills: 2 | Status: SHIPPED | OUTPATIENT
Start: 2022-07-18 | End: 2022-10-16

## 2022-07-17 RX ADMIN — AMLODIPINE BESYLATE 10 MG: 5 TABLET ORAL at 08:42

## 2022-07-17 RX ADMIN — ENOXAPARIN SODIUM 30 MG: 100 INJECTION SUBCUTANEOUS at 08:42

## 2022-07-17 RX ADMIN — GUAIFENESIN 600 MG: 600 TABLET, EXTENDED RELEASE ORAL at 08:42

## 2022-07-17 RX ADMIN — PANTOPRAZOLE SODIUM 40 MG: 40 TABLET, DELAYED RELEASE ORAL at 08:42

## 2022-07-17 RX ADMIN — CARVEDILOL 25 MG: 12.5 TABLET, FILM COATED ORAL at 08:42

## 2022-07-17 RX ADMIN — ISOSORBIDE MONONITRATE 30 MG: 30 TABLET, EXTENDED RELEASE ORAL at 08:43

## 2022-07-17 RX ADMIN — ASPIRIN 81 MG: 81 TABLET, CHEWABLE ORAL at 08:43

## 2022-07-17 NOTE — DISCHARGE INSTRUCTIONS
DISCHARGE DIAGNOSIS:  Acute encephalopathy transient resolved  Suspect TIA versus metabolic encephalopathy  CKD stage III  Non-ST elevation MI  CAD  HTN uncontrolled/hypertensive urgency  -Fever at home  DM type II  Anemia of ckd   Hyperlipidemia  GERD      MEDICATIONS:  · It is important that you take the medication exactly as they are prescribed. · Keep your medication in the bottles provided by the pharmacist and keep a list of the medication names, dosages, and times to be taken in your wallet. · Do not take other medications without consulting your doctor. Pain Management: per above medications    What to do at [address]:  Cardiac Diet    Recommended activity: Activity as tolerated    If you have questions regarding the hospital related prescriptions or hospital related issues please call [facility] at . You can always direct your questions to your primary care doctor if you are unable to reach your hospital physician; your PCP works as an extension of your hospital doctor just like your hospital doctor is an extension of your PCP for your time at the hospital [facility], [facility]).     If you experience any of the following symptoms then please call your primary care physician or return to the emergency room if you cannot get hold of your doctor:  Fever, chills, nausea, vomiting, diarrhea, change in mentation, falling, bleeding, shortness of breath

## 2022-07-17 NOTE — PROGRESS NOTES
DISCHARGE SUMMARY FROM [facility] NURSE    The patient is stable for discharge. I have reviewed the discharge instructions with the patient. The patient verbalized understanding. All questions were fully answered. The patient verbalized no complaints. Scripts were set to pharmacy and medication handouts were given and reviewed with the patient. Appropriate educational materials and medication side effects teaching were also provided. Cardiac monitor and IV line(s) were removed. The following personal items collected during admission were returned to the patient/family  Home medications:   Dental Appliance: Dental Appliances: None  Vision: Visual Aid: With patient,Glasses  Hearing Aid:    Jewelry: Jewelry: Ring (3 rings)  Clothing: Clothing: Pants,Shirt,Socks,Undergarments,Footwear  Other Valuables: Other Valuables: Eyeglasses,Cell Phone  Valuables sent to safe: There were no personal belongings, valuables or home medications left at patient's bedside,  or safe.

## 2022-07-17 NOTE — PROGRESS NOTES
Bedside and Verbal shift change report received from [facility]. Report included the following information SBAR, Kardex and Recent Results.

## 2022-07-17 NOTE — DISCHARGE SUMMARY
Hospitalist Discharge Summary     Patient ID:  Kianna Díaz  [id number]  78 y.o.  1945 7/13/2022    PCP on record: Mukul Gee MD    Admit date: 7/13/2022  Discharge date and time: 7/17/2022    DISCHARGE DIAGNOSIS:  Acute encephalopathy transient resolved  Suspect TIA versus metabolic encephalopathy  CKD stage III  Non-ST elevation MI  CAD  HTN uncontrolled/hypertensive urgency  -Fever at home  DM type II  Anemia of ckd   Hyperlipidemia  GERD      CONSULTATIONS:  IP CONSULT TO HOSPITALIST  IP CONSULT TO NEUROLOGY  IP CONSULT TO CARDIOLOGY    Excerpted HPI from H&P of Jim Casey MD:  CHIEF COMPLAINT: Episodes of confusion     HISTORY OF PRESENT ILLNESS:     Kianna Díaz is a 78 y.o.  female who presents with above complaints. Patient lives alone at home. Her son lives out of state, so he has cameras around patient's house to monitor her wellbeing. He frequently checks on her throughout the day. Last night and this morning she was noticed to be disoriented and slightly off balance when walking. Son also notes patient seemed to have slightly slurred speech. Patient reports that she was aware that she was not herself. She is back to her baseline. Patient also reports some mild cough since 3 days ago and had fever 101 yesterday. No fever today. In ED, she was found with elevated troponin. On direct questioning she admits episodes of chest pain this morning which lasted 5 to 10 minutes and felt like pressure or soreness. Chest pain resolved on its own. Patient denies shortness of breath, nausea, vomiting, diarrhea, dizziness. Patient denies history of previous stroke or TIA. She is not on aspirin or Plavix at home. She stated that she had heart problems in the past but cannot give any details. She is to follow-up with cardiology but last time was seen many years ago.   She cannot recall the name of her cardiologist.     We were asked to admit for work up and evaluation of the above problems. ______________________________________________________________________  DISCHARGE SUMMARY/HOSPITAL COURSE:  for full details see H&P, daily progress notes, labs, consult notes. Acute encephalopathy transient resolved  Suspect TIA versus metabolic encephalopathy  Head CT: negative. MRI of the brain shows no acute process except for sinusitis  MRA shows no large vessel occlusion  EEG shows no epileptiform discharges  -Evaluated by neurology and felt that patient does not need any seizure medications for now and recommended to continue aspirin for stroke prophylaxis  Continue added aspirin 81 mg daily  -A1c is 5.7, LDL is 141  -Continue added Lipitor 40 mg daily  - PT/OT recommended inpatient rehab but patient refused and wanted to go home     CKD stage III  -Baseline creatinine is around 1.4 and creatinine gradually trending up and is today 2.12  -Demadex and losartan were held on 7/15. However creatinine continued to go up.  -Patient's son has indicated that patient is not supposed to be on diuretic since a more than a month because it was making her creatinine go up. S/p IVF with improvement of creatinine   -   Non-ST elevation MI  CAD  HTN uncontrolled/hypertensive urgency  -Troponins peaked at 194. Continue added aspirin. Cardiology following.  -Continue Norvasc, Coreg, Imdur, losartan. On as needed labetalol and hydralazine  -Evaluated by cardiology and recommended to continue aspirin, Lipitor and also Coreg.  -Continue Norvasc, Coreg. BP controlled      Fever at home  At home but none here  ua with min changes, no dysuira  cxray clear  No leukocytosis   No evidence of infections     DM type II  -A1c is 5.7.   Continue sliding scale insulin.     Anemia of ckd   Hemoglobin stable  Monitor     Hyperlipidemia  GERD  ----Continue home medications             _______________________________________________________________________  Patient seen and examined by me on discharge day. Pertinent Findings:  Gen:    Not in distress  Chest: Clear lungs  CVS:   Regular rhythm. No edema  Abd:  Soft, not distended, not tender  Neuro:  Alert, ox3  _______________________________________________________________________  DISCHARGE MEDICATIONS:   Current Discharge Medication List      START taking these medications    Details   aspirin 81 mg chewable tablet Take 1 Tablet by mouth daily for 90 days. Qty: 30 Tablet, Refills: 2  Start date: 7/18/2022, End date: 10/16/2022         CONTINUE these medications which have CHANGED    Details   losartan (COZAAR) 100 mg tablet Take 0.5 Tablets by mouth daily. Qty: 90 Tablet, Refills: 3  Start date: 7/17/2022      carvediloL (COREG) 12.5 mg tablet Take 2 Tablets by mouth two (2) times daily (with meals) for 180 days. Qty: 360 Tablet, Refills: 1  Start date: 7/17/2022, End date: 1/13/2023         CONTINUE these medications which have NOT CHANGED    Details   potassium chloride (K-DUR, KLOR-CON M20) 20 mEq tablet       imatinib mesylate (IMATINIB PO) Take  by mouth.      multivitamin, tx-iron-ca-min (THERA-M w/ IRON) 9 mg iron-400 mcg tab tablet Take 1 Tab by mouth two (2) times a day. Centrum silver      lancets misc PUSH BUTTON LANCETS. Check blood sugar 4 times per day due to hypoglycemic events. Dx:E11.42, E16.2  Qty: 400 Each, Refills: 3      amLODIPine (NORVASC) 10 mg tablet Take 1 Tab by mouth daily. Qty: 30 Tab, Refills: 0      food supplemt, lactose-reduced (Ensure Enlive) 0.08 gram-1.5 kcal/mL liqd Take 6 x daily as recommended by Surgery to improve nutritional status for Possible surgery in future  Qty: 180 Bottle, Refills: 1      atorvastatin (LIPITOR) 40 mg tablet Take 1 Tab by mouth nightly. Qty: 90 Tab, Refills: 3      isosorbide mononitrate ER (IMDUR) 30 mg tablet       pantoprazole (PROTONIX) 40 mg tablet Take 40 mg by mouth daily.          STOP taking these medications       torsemide (DEMADEX) 10 mg tablet Comments:   Reason for Stopping:                 Patient Follow Up Instructions:    Activity: Activity as tolerated  Diet: Cardiac Diet  Wound Care: None needed        Follow-up Information     Follow up With Specialties Details Why Contact Twyla Cifuentes MD Internal Medicine Physician   [facility]  [place]  [phone]          ________________________________________________________________    Risk of deterioration: Moderate    Condition at Discharge:  Stable  __________________________________________________________________    Disposition  Home with family and home health services    ____________________________________________________________________    Code Status: Full Code  ___________________________________________________________________      Total time in minutes spent coordinating this discharge (includes going over instructions, follow-up, prescriptions, and preparing report for sign off to her PCP) :  >30 minutes    Signed:  Lobo Navarro MD

## 2022-07-17 NOTE — PROGRESS NOTES
Transition of Care Plan:     RUR: 13%  Disposition: Home with Follow-up appointment  Follow up appointments: PCP; Cardiology  DME needed: None  Transportation at Discharge: Pt's family member will transport at d/c.   [address] or means to access home:  Pt has access      IM Medicare Letter: Given 7/15/2022  Is patient a BCPI-A Bundle:  N/A                    If yes, was Bundle Letter given?:  N/A  Is patient a Tiger and connected with the [place]? No             If yes, was [place] transfer form completed and [facility] notified? N/A  Caregiver Contact:  Charbel Tyler- [phone]  Discharge Caregiver contacted prior to discharge? Will notify caregiver at d/c. Care Conference needed?: No    CM was alerted that the patient is being discharged home today, 7/17/22. The patient and her sons have decided to take the patient home and have refused [place] services and/or placement at an IPR facility or a SNF. The patient's son will be transporting her home. 2nd IM letter was given & signed on 7/15/22. From CM perspective, the patient can be discharged.      Royer Alvarez [address], [address] [facility]

## 2022-07-17 NOTE — PROGRESS NOTES
Problem: Patient Education: Go to Patient Education Activity  Goal: Patient/Family Education  Outcome: Resolved/Met     Problem: TIA/CVA Stroke: 0-24 hours  Goal: Off Pathway (Use only if patient is Off Pathway)  Outcome: Resolved/Met  Goal: Activity/Safety  Outcome: Resolved/Met  Goal: Consults, if ordered  Outcome: Resolved/Met  Goal: Diagnostic Test/Procedures  Outcome: Resolved/Met  Goal: Nutrition/Diet  Outcome: Resolved/Met  Goal: Discharge Planning  Outcome: Resolved/Met  Goal: Medications  Outcome: Resolved/Met  Goal: Respiratory  Outcome: Resolved/Met  Goal: Treatments/Interventions/Procedures  Outcome: Resolved/Met  Goal: Minimize risk of bleeding post-thrombolytic infusion  Outcome: Resolved/Met  Goal: Monitor for complications post-thrombolytic infusion  Outcome: Resolved/Met  Goal: Psychosocial  Outcome: Resolved/Met  Goal: *Hemodynamically stable  Outcome: Resolved/Met  Goal: *Neurologically stable  Description: Absence of additional neurological deficits    Outcome: Resolved/Met  Goal: *Verbalizes anxiety and depression are reduced or absent  Outcome: Resolved/Met  Goal: *Absence of Signs of Aspiration on Current Diet  Outcome: Resolved/Met  Goal: *Absence of deep venous thrombosis signs and symptoms(Stroke Metric)  Outcome: Resolved/Met  Goal: *Ability to perform ADLs and demonstrates progressive mobility and function  Outcome: Resolved/Met  Goal: *Stroke education started(Stroke Metric)  Outcome: Resolved/Met  Goal: *Dysphagia screen performed(Stroke Metric)  Outcome: Resolved/Met  Goal: *Rehab consulted(Stroke Metric)  Outcome: Resolved/Met     Problem: TIA/CVA Stroke: Day 2 Until Discharge  Goal: Off Pathway (Use only if patient is Off Pathway)  Outcome: Resolved/Met  Goal: Activity/Safety  Outcome: Resolved/Met  Goal: Diagnostic Test/Procedures  Outcome: Resolved/Met  Goal: Nutrition/Diet  Outcome: Resolved/Met  Goal: Discharge Planning  Outcome: Resolved/Met  Goal: Medications  Outcome: Resolved/Met  Goal: Respiratory  Outcome: Resolved/Met  Goal: Treatments/Interventions/Procedures  Outcome: Resolved/Met  Goal: Psychosocial  Outcome: Resolved/Met  Goal: *Verbalizes anxiety and depression are reduced or absent  Outcome: Resolved/Met  Goal: *Absence of aspiration  Outcome: Resolved/Met  Goal: *Absence of deep venous thrombosis signs and symptoms(Stroke Metric)  Outcome: Resolved/Met  Goal: *Optimal pain control at patient's stated goal  Outcome: Resolved/Met  Goal: *Tolerating diet  Outcome: Resolved/Met  Goal: *Ability to perform ADLs and demonstrates progressive mobility and function  Outcome: Resolved/Met  Goal: *Stroke education continued(Stroke Metric)  Outcome: Resolved/Met     Problem: Ischemic Stroke: Discharge Outcomes  Goal: *Verbalizes anxiety and depression are reduced or absent  Outcome: Resolved/Met  Goal: *Verbalize understanding of risk factor modification(Stroke Metric)  Outcome: Resolved/Met  Goal: *Hemodynamically stable  Outcome: Resolved/Met  Goal: *Absence of aspiration pneumonia  Outcome: Resolved/Met  Goal: *Aware of needed dietary changes  Outcome: Resolved/Met  Goal: *Verbalize understanding of prescribed medications including anti-coagulants, anti-lipid, and/or anti-platelets(Stroke Metric)  Outcome: Resolved/Met  Goal: *Tolerating diet  Outcome: Resolved/Met  Goal: *Aware of follow-up diagnostics related to anticoagulants  Outcome: Resolved/Met  Goal: *Ability to perform ADLs and demonstrates progressive mobility and function  Outcome: Resolved/Met  Goal: *Absence of DVT(Stroke Metric)  Outcome: Resolved/Met  Goal: *Absence of aspiration  Outcome: Resolved/Met  Goal: *Optimal pain control at patient's stated goal  Outcome: Resolved/Met  Goal: *Home safety concerns addressed  Outcome: Resolved/Met  Goal: *Describes available resources and support systems  Outcome: Resolved/Met  Goal: *Verbalizes understanding of activation of EMS(911) for stroke symptoms(Stroke Metric)  Outcome: Resolved/Met  Goal: *Understands and describes signs and symptoms to report to providers(Stroke Metric)  Outcome: Resolved/Met  Goal: *Neurolgocially stable (absence of additional neurological deficits)  Outcome: Resolved/Met  Goal: *Verbalizes importance of follow-up with primary care physician(Stroke Metric)  Outcome: Resolved/Met  Goal: *Smoking cessation discussed,if applicable(Stroke Metric)  Outcome: Resolved/Met  Goal: *Depression screening completed(Stroke Metric)  Outcome: Resolved/Met     Problem: Falls - Risk of  Goal: *Absence of Falls  Description: Document Flor Fall Risk and appropriate interventions in the flowsheet.   Outcome: Resolved/Met     Problem: Patient Education: Go to Patient Education Activity  Goal: Patient/Family Education  Outcome: Resolved/Met     Problem: Patient Education: Go to Patient Education Activity  Goal: Patient/Family Education  Outcome: Resolved/Met     Problem: Patient Education: Go to Patient Education Activity  Goal: Patient/Family Education  Outcome: Resolved/Met     Problem: Patient Education: Go to Patient Education Activity  Goal: Patient/Family Education  Outcome: Resolved/Met     Problem: Discharge Planning  Goal: *Discharge to safe environment  Outcome: Resolved/Met

## 2022-07-18 ENCOUNTER — PATIENT OUTREACH (OUTPATIENT)
Dept: CASE MANAGEMENT | Age: 77
End: 2022-07-18

## 2022-07-18 NOTE — PROGRESS NOTES
Care Transitions Initial Call    Call within 2 business days of discharge: Yes     Patient: Shyann Jameson Patient : 1945 MRN: [id number]    Last Discharge [address]       Complaint Diagnosis Description Type Department Provider    22 Cough; Altered mental status Elevated troponin . .. ED to Hosp-Admission (Discharged) (ADMIT) [id number] Deniz Rueda MD; Sarah Rodriguez,... Was this an external facility discharge? No Discharge Facility: n/a    Challenges to be reviewed by the provider   Additional needs identified to be addressed with provider: yes     Patient/family declined [facility] at discharge. They will set up home therapy as needed. Patient needs hospital follow up appointment with Dr. Janny Hanson. CTN contacted office who has not been able to reach patient by phone. They will reach out to patient's son and try to schedule. Method of communication with provider :     Discussed COVID-19 related testing which was not done at this time. Test results were not done. Patient informed of results, if available? n/a     Advance Care Planning:   Does patient have an Advance Directive: not on file. Inpatient Readmission Risk score: Unplanned Readmit Risk Score: 11.7 ( )    Was this a readmission? no   reason for the admission: slurred speech, off balance    Patients top risk factors for readmission: medical condition-recent metabolic encephalopathy vs TIA, CKD, CAD, HTN, anemia   Interventions to address risk factors: Scheduled appointment with PCP-son to schedule and Obtained and reviewed discharge summary and/or continuity of care documents. Family will arrange therapy at home. Patient has CG assistance at home. Patient lives alone, son monitors mother for safety via in home cameras. Son manages medications supplied thru [facility] and [employer] [facility]. Patient utilizes am/pm bill box.     Care Transition Nurse (CTN) contacted the son, Zain Barker (HIPAA) by telephone to perform post hospital discharge assessment. Verified name and  with family as identifiers. Provided introduction to self, and explanation of the CTN role. CTN reviewed discharge instructions, medical action plan and red flags with family who verbalized understanding. Were discharge instructions available to patient? yes. Reviewed appropriate site of care based on symptoms and resources available to patient including: PCP and [facility] info available. Family given an opportunity to ask questions and does not have any further questions or concerns at this time. The family agrees to contact the PCP office for questions related to their healthcare. Medication reconciliation was performed with family, who verbalizes understanding of administration of home medications. Referral to Pharm D needed: no     Home Health/Outpatient orders at discharge: declined     Durable Medical Equipment ordered at discharge: None     Was patient discharged with a pulse oximeter? no    Discussed follow-up appointments. If no appointment was previously scheduled, appointment scheduling offered: son will schedule follow up. Is follow up appointment scheduled within 7 days of discharge? no. Need follow up appt. [address] follow up appointment(s):   Future Appointments   Date Time Provider Clifford Taylor   2022 11:45 AM Lesvia Jones MD ONC [facility]     Non-BS follow up appointment(s):  PCP- Dr. Manolo Silver for follow-up call in 7-10 days based on severity of symptoms and risk factors. Plan for next call: symptom management-monitor BP at home and follow up appointment-follow up with PCP  CTN provided contact information for future needs. Goals Addressed                 This Visit's Progress     Prevent complications post hospitalization. 22   Managing risk factors- hypertension,and heart dx, maintain low fat diet   Medication compliance.  Patient will improve functional ability/return to baseline activity.  Son will arrange home therapy as needed. Patient has PCG assistance. Patient lives alone and has cameras in home for safety monitoring.

## 2022-08-09 ENCOUNTER — PATIENT OUTREACH (OUTPATIENT)
Dept: CASE MANAGEMENT | Age: 77
End: 2022-08-09

## 2022-08-09 NOTE — PROGRESS NOTES
Care Transitions Follow Up Call    CTN attempted patient outreach today for follow up. Patient answered call and states she is resting and would prefer call back another day.

## 2022-08-12 PROBLEM — R77.8 ELEVATED TROPONIN: Status: RESOLVED | Noted: 2022-07-13 | Resolved: 2022-08-12

## 2022-08-17 ENCOUNTER — PATIENT OUTREACH (OUTPATIENT)
Dept: CASE MANAGEMENT | Age: 77
End: 2022-08-17

## 2022-08-17 NOTE — PROGRESS NOTES
Patient has graduated from the Transitions of Care Coordination  program on 8/17/2022. Patient was not referred to the [facility] team for further management. Goals Addressed                   This Visit's Progress     COMPLETED: Prevent complications post hospitalization. 07/18/22  Managing risk factors- hypertension,and heart dx, maintain low fat diet  Medication compliance. Patient will improve functional ability/return to baseline activity. Son will arrange home therapy as needed. Patient has PCG assistance. Patient lives alone and has cameras in home for safety monitoring. Patient has Care Transition Nurse's contact information for any further questions, concerns, or needs.   Patients upcoming visits:    Future Appointments   Date Time Provider Clifford Taylor   9/2/2022 11:45 AM Macie Mohs, MD [facility]

## 2022-08-31 NOTE — PROGRESS NOTES
Julianna Ryder is a 78 y.o. female here for 3 month follow up for GIST and AZUL. She is taking Imatinib. Pt states she has been doing ok since hospital visit. No concerns brought up. BP elevated. Pt states she took her BP meds about one hour ago. MD notified. 1. Have you been to the ER, urgent care clinic since your last visit? Hospitalized since your last visit? 7/13/22 - 7/17/22 Acute encephalopathy transient     2. Have you seen or consulted any other health care providers outside of the [address] [place] [address] since your last visit? Include any pap smears or colon screening.   no

## 2022-09-02 ENCOUNTER — OFFICE VISIT (OUTPATIENT)
Dept: ONCOLOGY | Age: 77
End: 2022-09-02
Payer: MEDICARE

## 2022-09-02 VITALS
OXYGEN SATURATION: 98 % | WEIGHT: 147.4 LBS | DIASTOLIC BLOOD PRESSURE: 69 MMHG | HEIGHT: 62 IN | SYSTOLIC BLOOD PRESSURE: 176 MMHG | TEMPERATURE: 98 F | HEART RATE: 69 BPM | BODY MASS INDEX: 27.12 KG/M2

## 2022-09-02 DIAGNOSIS — Z51.11 CHEMOTHERAPY MANAGEMENT, ENCOUNTER FOR: ICD-10-CM

## 2022-09-02 DIAGNOSIS — C49.A2 GASTROINTESTINAL STROMAL TUMOR (GIST) OF STOMACH (HCC): Primary | ICD-10-CM

## 2022-09-02 DIAGNOSIS — C78.7 METASTATIC CANCER TO LIVER (HCC): ICD-10-CM

## 2022-09-02 PROCEDURE — G8427 DOCREV CUR MEDS BY ELIG CLIN: HCPCS | Performed by: INTERNAL MEDICINE

## 2022-09-02 PROCEDURE — G8432 DEP SCR NOT DOC, RNG: HCPCS | Performed by: INTERNAL MEDICINE

## 2022-09-02 PROCEDURE — G9711 PT HX TOT COL OR COLON CA: HCPCS | Performed by: INTERNAL MEDICINE

## 2022-09-02 PROCEDURE — 1090F PRES/ABSN URINE INCON ASSESS: CPT | Performed by: INTERNAL MEDICINE

## 2022-09-02 PROCEDURE — G0463 HOSPITAL OUTPT CLINIC VISIT: HCPCS | Performed by: INTERNAL MEDICINE

## 2022-09-02 PROCEDURE — 1101F PT FALLS ASSESS-DOCD LE1/YR: CPT | Performed by: INTERNAL MEDICINE

## 2022-09-02 PROCEDURE — G8754 DIAS BP LESS 90: HCPCS | Performed by: INTERNAL MEDICINE

## 2022-09-02 PROCEDURE — G8417 CALC BMI ABV UP PARAM F/U: HCPCS | Performed by: INTERNAL MEDICINE

## 2022-09-02 PROCEDURE — G8400 PT W/DXA NO RESULTS DOC: HCPCS | Performed by: INTERNAL MEDICINE

## 2022-09-02 PROCEDURE — 1123F ACP DISCUSS/DSCN MKR DOCD: CPT | Performed by: INTERNAL MEDICINE

## 2022-09-02 PROCEDURE — G8536 NO DOC ELDER MAL SCRN: HCPCS | Performed by: INTERNAL MEDICINE

## 2022-09-02 PROCEDURE — 99215 OFFICE O/P EST HI 40 MIN: CPT | Performed by: INTERNAL MEDICINE

## 2022-09-02 PROCEDURE — G8753 SYS BP > OR = 140: HCPCS | Performed by: INTERNAL MEDICINE

## 2022-09-02 NOTE — LETTER
9/4/2022    Patient: Toni Ortega   YOB: 1945   Date of Visit: 9/2/2022     Cher Goyal MD  [address] [facility]  [address] [address]  Via Fax: [phone]    Dear Cher Goyal MD,      Thank you for referring Ms. Toni Ortega to [address] [place] [address] for evaluation. My notes for this consultation are attached. If you have questions, please do not hesitate to call me. I look forward to following your patient along with you.       Sincerely,    Allison Bird MD

## 2022-09-02 NOTE — PATIENT INSTRUCTIONS
We are ordering an MRI of your abdomen to check on your disease in the liver. You should follow-up with your primary care doctor, Dr. Julia Cummings, urgently to discuss your blood pressure and current medications. He should also check your blood work. We will see you back in 2 months.

## 2022-09-02 NOTE — PROGRESS NOTES
[address] [facility] [address], [address] [facility], [address] [place] [address], [address] [place] [address]  [phone]       Oncology Note    Patient: Toni Ortega MRN: [id number]  SSN: [id number]    YOB: 1945  Age: 78 y.o. Sex: female        Diagnosis:      1. 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 Imatinib 400 mg daily started 8/2021    Subjective:      Toni Ortega is a 78 y.o. female who I am seeing for a diagnosis of GIST and iron deficiency anemia. She presented with severe anemia late last year. 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 Surgery was delayed due to cardiac clearance. Recent imaging shows metastasis to the liver and RP nodes. She underwent a partial gastrectomy and wedge resection of the liver. The liver biopsy was consistent with a diagnosis of GIST. She comes in to discuss the next steps in treatment. She feels well. Denies abdominal pain. She has been taking imatinib. She denies any side effects. She is accompanied by a family member. She has not followed with her PCP for a while. Review of Systems:    Constitutional: negative  Eyes: negative  Ears, Nose, Mouth, Throat, and Face: negative  Respiratory: negative  Cardiovascular: negative  Gastrointestinal: negative  Genitourinary:negative  Integument/Breast: negative  Hematologic/Lymphatic: negative  Musculoskeletal:negative  Neurological: negative    Review of systems was reviewed and updated as needed on 09/02/22.       Past Medical History:   Diagnosis Date    Anemia, unspecified 10/6/2020    Arrhythmia     Arthritis     CAD (coronary artery disease)     Cancer ([address]     Chronic bronchitis ([address]     per pt:  as of 1/9/15 pt denies any ARENAS or SOB    Diabetes ([address] Dx approx 2009    Dr Jennifer Washburn (in [facility])    GERD (gastroesophageal reflux disease)     Hypercholesteremia     Hypertension      Past Surgical History:   Procedure Laterality Date    HX CHOLECYSTECTOMY  6/12    HX COLONOSCOPY      HX CORONARY STENT PLACEMENT  8/25/2015    HX DILATION AND CURETTAGE      HX OTHER SURGICAL  06/22/2021    Laparoscopic hand-assisted partial gastrectomy with core needle liver biopsy. [place] CARDIAC SURG PROCEDURE UNLIST  2015    UPPER GI ENDOSCOPY,BIOPSY  10/6/2020           Family History   Problem Relation Age of Onset    Diabetes Mother     Heart Disease Mother     Hypertension Mother     Stroke Father     Heart Disease Brother     Heart Disease Brother     Stroke Brother     HIV/AIDS Brother      Social History     Tobacco Use    Smoking status: Former    Smokeless tobacco: Never    Tobacco comments:     quit 50 years ago   Substance Use Topics    Alcohol use: No      Prior to Admission medications    Medication Sig Start Date End Date Taking? Authorizing Provider   aspirin 81 mg chewable tablet Take 1 Tablet by mouth daily for 90 days. 7/18/22 10/16/22 Yes Trinity Doll MD   losartan (COZAAR) 100 mg tablet Take 0.5 Tablets by mouth daily. 7/17/22  Yes Trinity Doll MD   carvediloL (COREG) 12.5 mg tablet Take 2 Tablets by mouth two (2) times daily (with meals) for 90 days. 7/17/22 10/15/22 Yes Trinity Doll MD   isosorbide mononitrate ER (IMDUR) 30 mg tablet  1/17/22  Yes Provider, Historical   imatinib mesylate (IMATINIB PO) Take  by mouth. Yes Provider, Historical   multivitamin, tx-iron-ca-min (THERA-M w/ IRON) 9 mg iron-400 mcg tab tablet Take 1 Tab by mouth two (2) times a day. Centrum silver   Yes Provider, Historical   lancets misc PUSH BUTTON LANCETS. Check blood sugar 4 times per day due to hypoglycemic events. Dx:E11.42, E16.2 12/17/20  Yes Bao Hyatt MD   amLODIPine (NORVASC) 10 mg tablet Take 1 Tab by mouth daily.  11/20/20  Yes Brooke Johnson MD   food supplemt, lactose-reduced (Ensure Enlive) 0.08 gram-1.5 kcal/mL liqd Take 6 x daily as recommended by Surgery to improve nutritional status for Possible surgery in future  Patient taking differently: Take 6 x daily as recommended by Surgery to improve nutritional status for Possible surgery in future. takes about 3 11/20/20  Yes Katherin Peters MD   atorvastatin (LIPITOR) 40 mg tablet Take 1 Tab by mouth nightly. 1/27/20  Yes Winnie Ramirez MD   potassium chloride (K-DUR, KLOR-CON M20) 20 mEq tablet  1/12/22   Provider, Historical   pantoprazole (PROTONIX) 40 mg tablet Take 40 mg by mouth daily. Patient not taking: No sig reported 1/13/21   Provider, Historical          Allergies   Allergen Reactions    Metformin Other (comments)     GI side effects. Not a true allergy       Objective:     Vitals:    09/02/22 1202   BP: (!) 204/66   Pulse: 69   Temp: 98 °F (36.7 °C)   SpO2: 98%   Weight: 147 lb 6.4 oz (66.9 kg)   Height: 5' 2\" (1.575 m)      Pain Scale: /10      Physical Exam:    GENERAL: alert, cooperative, no distress, appears stated age  EYE: conjunctivae/corneas clear. LYMPHATIC: Cervical, supraclavicular, and axillary nodes normal.   THROAT & NECK: normal and no erythema or exudates noted. LUNG: clear to auscultation bilaterally  HEART: regular rate and rhythm, no murmur  ABDOMEN: soft, non-tender palpation  EXTREMITIES: no cyanosis or edema  SKIN: Normal, no rash or ecchymosis  NEUROLOGIC: AOx3. The above physical exam was reviewed and updated as needed on 09/02/22.       Lab Results   Component Value Date/Time    WBC 9.0 07/15/2022 01:51 AM    HGB 8.9 (L) 07/15/2022 01:51 AM    HCT 28.0 (L) 07/15/2022 01:51 AM    PLATELET 703 84/31/4847 01:51 AM    MCV 88.3 07/15/2022 01:51 AM       Lab Results   Component Value Date/Time    Sodium 141 07/17/2022 03:04 AM    Potassium 4.5 07/17/2022 03:04 AM    Chloride 113 (H) 07/17/2022 03:04 AM    CO2 21 07/17/2022 03:04 AM    Anion gap 7 07/17/2022 03:04 AM    Glucose 110 (H) 07/17/2022 03:04 AM    BUN 43 (H) 07/17/2022 03:04 AM    Creatinine 1.96 (H) 07/17/2022 03:04 AM    BUN/Creatinine ratio 22 (H) 07/17/2022 03:04 AM    GFR est AA 30 (L) 07/17/2022 03:04 AM    GFR est non-AA 25 (L) 07/17/2022 03:04 AM    Calcium 7.6 (L) 07/17/2022 03:04 AM    Bilirubin, total 0.2 07/13/2022 02:37 PM    Alk. phosphatase 88 07/13/2022 02:37 PM    Protein, total 6.0 (L) 07/13/2022 02:37 PM    Albumin 1.8 (L) 07/13/2022 02:37 PM    Globulin 4.2 (H) 07/13/2022 02:37 PM    A-G Ratio 0.4 (L) 07/13/2022 02:37 PM    ALT (SGPT) 25 07/13/2022 02:37 PM    AST (SGOT) 31 07/13/2022 02:37 PM     CT Results (most recent):  Results from Hospital Encounter encounter on 07/13/22    CT HEAD WO CONT    Narrative  INDICATION: transient AMS    COMPARISON: 12/15/2020. EXAM: CT HEAD without contrast.    TECHNIQUE: Unenhanced CT Head is performed. CT dose reduction was achieved  through use of a standardized protocol tailored for this examination and  automatic exposure control for dose modulation. FINDINGS:  No acute infarct is seen. 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 There is  a right basal ganglia lacune. There are left cerebellar lacunae. There is no apparent mass on unenhanced imaging. There is no bleed, shift,  obstructive hydrocephalus or significant extra-axial fluid collection. Bone windows show new paranasal sinus mucosal thickening and fluid levels. Impression  1. No acute intracranial finding. 2. Stable chronic ischemic disease. 3. Paranasal sinusitis. Assessment:     1. Gastrointestinal tumor of the stomach with metastasis to the liver and retroperitoneal nodes    ECOG PS 2  Intent of Treatment - palliative  Prognosis: guarded    S/P partial gastrectomy 06/22/2021  Standard of care for the treatment of metastatic GIST is imatinib. Taking Imatinib 400 mg PO daily. Tolerating well  Detailed assessment of side effects was performed. CT Abd/pelvis (10/26/2021) - good response to therapy    The disease has a high risk of progression and the treatment also carries a substantial risk of side effects.  All of this was assessed during this visit. 2. DM     Followed by endocrinology - Dr. Ángel Boucher      3. Hypertension    See PCP        Plan:       1. Continue Imatinib   2. Labs: CBC. CMP   3. Repeat scans  4. Follow up 3 months  5. Follow up with PCP for BP management. 6. MRI abd      Signed by: Sukhdev Hinojosa MD                     September 2, 2022      CC. Crescencio Edwards MD  CC.  Ran Bach MD

## 2022-11-15 NOTE — PROGRESS NOTES
Jerald Boyd is a 78 y.o. female here for 2 month follow up for GIST and AZUL. She is taking Imatinib. 1. Have you been to the ER, urgent care clinic since your last visit? Hospitalized since your last visit? 2. Have you seen or consulted any other health care providers outside of the [address] [place] [address] since your last visit? Include any pap smears or colon screening.

## 2022-11-18 ENCOUNTER — OFFICE VISIT (OUTPATIENT)
Dept: ONCOLOGY | Age: 77
End: 2022-11-18
Payer: MEDICARE

## 2022-11-18 VITALS
BODY MASS INDEX: 25.83 KG/M2 | WEIGHT: 140.4 LBS | DIASTOLIC BLOOD PRESSURE: 76 MMHG | SYSTOLIC BLOOD PRESSURE: 231 MMHG | HEART RATE: 50 BPM | HEIGHT: 62 IN | OXYGEN SATURATION: 97 % | TEMPERATURE: 98.7 F | RESPIRATION RATE: 16 BRPM

## 2022-11-18 DIAGNOSIS — C49.A2 GASTROINTESTINAL STROMAL TUMOR (GIST) OF STOMACH (HCC): Primary | ICD-10-CM

## 2022-11-18 PROCEDURE — G8417 CALC BMI ABV UP PARAM F/U: HCPCS | Performed by: INTERNAL MEDICINE

## 2022-11-18 PROCEDURE — 1101F PT FALLS ASSESS-DOCD LE1/YR: CPT | Performed by: INTERNAL MEDICINE

## 2022-11-18 PROCEDURE — 1123F ACP DISCUSS/DSCN MKR DOCD: CPT | Performed by: INTERNAL MEDICINE

## 2022-11-18 PROCEDURE — G8536 NO DOC ELDER MAL SCRN: HCPCS | Performed by: INTERNAL MEDICINE

## 2022-11-18 PROCEDURE — G8753 SYS BP > OR = 140: HCPCS | Performed by: INTERNAL MEDICINE

## 2022-11-18 PROCEDURE — G8754 DIAS BP LESS 90: HCPCS | Performed by: INTERNAL MEDICINE

## 2022-11-18 PROCEDURE — G8400 PT W/DXA NO RESULTS DOC: HCPCS | Performed by: INTERNAL MEDICINE

## 2022-11-18 PROCEDURE — 3074F SYST BP LT 130 MM HG: CPT | Performed by: INTERNAL MEDICINE

## 2022-11-18 PROCEDURE — G8432 DEP SCR NOT DOC, RNG: HCPCS | Performed by: INTERNAL MEDICINE

## 2022-11-18 PROCEDURE — G8427 DOCREV CUR MEDS BY ELIG CLIN: HCPCS | Performed by: INTERNAL MEDICINE

## 2022-11-18 PROCEDURE — 99214 OFFICE O/P EST MOD 30 MIN: CPT | Performed by: INTERNAL MEDICINE

## 2022-11-18 PROCEDURE — G9711 PT HX TOT COL OR COLON CA: HCPCS | Performed by: INTERNAL MEDICINE

## 2022-11-18 PROCEDURE — 1090F PRES/ABSN URINE INCON ASSESS: CPT | Performed by: INTERNAL MEDICINE

## 2022-11-18 PROCEDURE — G0463 HOSPITAL OUTPT CLINIC VISIT: HCPCS | Performed by: INTERNAL MEDICINE

## 2022-11-18 PROCEDURE — 3078F DIAST BP <80 MM HG: CPT | Performed by: INTERNAL MEDICINE

## 2022-11-18 RX ORDER — TORSEMIDE 20 MG/1
20 TABLET ORAL AS NEEDED
COMMUNITY

## 2022-11-18 RX ORDER — CARVEDILOL 25 MG/1
25 TABLET ORAL 2 TIMES DAILY WITH MEALS
COMMUNITY

## 2022-11-18 NOTE — PROGRESS NOTES
[address] [facility] [address], [address] [facility], [address] [place] [address]  [phone]       Oncology Note    Patient: Celestine Tapia MRN: [id number]  SSN: [id number]    YOB: 1945  Age: 78 y.o. Sex: female        Diagnosis:      1. 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 Imatinib 400 mg daily started 8/2021    Subjective:      Celestine Tapia is a 78 y.o. female who I am seeing for a diagnosis of GIST and iron deficiency anemia. She presented with severe anemia late last year. 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 Surgery was delayed due to cardiac clearance. Recent imaging shows metastasis to the liver and RP nodes. She underwent a partial gastrectomy and wedge resection of the liver. The liver biopsy was consistent with a diagnosis of GIST. She comes in to discuss the next steps in treatment. She feels well. Denies abdominal pain. She has been taking imatinib. She denies any side effects. She is accompanied by a family member. She has not followed with her PCP for a while. She does not take her medication regularly because she can't remember. MRI has not been scheduled. She denies any abdominal pain, shortness of breath or other new symptoms. Review of Systems:    Constitutional: negative  Eyes: negative  Ears, Nose, Mouth, Throat, and Face: negative  Respiratory: negative  Cardiovascular: negative  Gastrointestinal: negative  Genitourinary:negative  Integument/Breast: negative  Hematologic/Lymphatic: negative  Musculoskeletal:negative  Neurological: negative    Review of systems was reviewed and updated as needed on 11/18/22.       Past Medical History:   Diagnosis Date    Anemia, unspecified 10/6/2020    Arrhythmia     Arthritis     CAD (coronary artery disease)     Cancer ([facility] [address]     Chronic bronchitis ([facility] [address]     per pt:  as of 1/9/15 pt denies any ARENAS or SOB    Diabetes ([facility] [address] Dx approx 2009    Dr Meera Gannon (in [facility])    GERD (gastroesophageal reflux disease)     Hypercholesteremia     Hypertension      Past Surgical History:   Procedure Laterality Date    HX CHOLECYSTECTOMY      HX COLONOSCOPY      HX CORONARY STENT PLACEMENT  2015    HX DILATION AND CURETTAGE      HX OTHER SURGICAL  2021    Laparoscopic hand-assisted partial gastrectomy with core needle liver biopsy. AL CARDIAC SURG PROCEDURE UNLIST      UPPER GI ENDOSCOPY,BIOPSY  10/6/2020           Family History   Problem Relation Age of Onset    Diabetes Mother     Heart Disease Mother     Hypertension Mother     Stroke Father     Heart Disease Brother     Heart Disease Brother     Stroke Brother     HIV/AIDS Brother      Social History     Tobacco Use    Smoking status: Former     Packs/day: 0.50     Years: 10.00     Pack years: 5.00     Types: Cigarettes     Quit date: 1972     Years since quittin.0    Smokeless tobacco: Never    Tobacco comments:     quit 50 years ago   Substance Use Topics    Alcohol use: No      Prior to Admission medications    Medication Sig Start Date End Date Taking? Authorizing Provider   torsemide (DEMADEX) 20 mg tablet Take 20 mg by mouth as needed. Yes Provider, Historical   carvediloL (COREG) 25 mg tablet Take 25 mg by mouth two (2) times daily (with meals). Yes Provider, Historical   losartan (COZAAR) 100 mg tablet Take 0.5 Tablets by mouth daily. 22  Yes Leda Alonzo MD   isosorbide mononitrate ER (IMDUR) 30 mg tablet  22  Yes Provider, Historical   imatinib mesylate (IMATINIB PO) Take 400 mg by mouth daily. Yes Provider, Historical   multivitamin, tx-iron-ca-min (THERA-M w/ IRON) 9 mg iron-400 mcg tab tablet Take 1 Tab by mouth two (2) times a day. Centrum silver   Yes Provider, Historical   lancets misc PUSH BUTTON LANCETS. Check blood sugar 4 times per day due to hypoglycemic events.   Dx:E11.42, E16.2 20  Yes Magalis Phillips MD amLODIPine (NORVASC) 10 mg tablet Take 1 Tab by mouth daily. 11/20/20  Yes Barrera Montiel MD   atorvastatin (LIPITOR) 40 mg tablet Take 1 Tab by mouth nightly. 1/27/20  Yes Stephanie Lora MD          Allergies   Allergen Reactions    Metformin Other (comments)     GI side effects. Not a true allergy       Objective:     Vitals:    11/18/22 1149 11/18/22 1155 11/18/22 1156   BP: (!) 232/74 (!) 233/78 (!) 231/76   Pulse: (!) 52  (!) 50   Resp: 16     Temp: 98.7 °F (37.1 °C)  98.7 °F (37.1 °C)   TempSrc:   Oral   SpO2: 97%  97%   Weight: 140 lb 6.4 oz (63.7 kg)     Height: 5' 2\" (1.575 m)          Pain Scale: 0 - No pain/10      Physical Exam:    GENERAL: alert, cooperative, no distress, appears stated age  EYE: conjunctivae/corneas clear. LYMPHATIC: Cervical, supraclavicular, and axillary nodes normal.   THROAT & NECK: normal and no erythema or exudates noted. LUNG: clear to auscultation bilaterally  HEART: regular rate and rhythm, no murmur  ABDOMEN: soft, non-tender palpation  EXTREMITIES: no cyanosis or edema  SKIN: Normal, no rash or ecchymosis  NEUROLOGIC: AOx3. The above physical exam was reviewed and updated as needed on 11/18/22.       Lab Results   Component Value Date/Time    WBC 9.0 07/15/2022 01:51 AM    HGB 8.9 (L) 07/15/2022 01:51 AM    HCT 28.0 (L) 07/15/2022 01:51 AM    PLATELET 594 83/52/1967 01:51 AM    MCV 88.3 07/15/2022 01:51 AM       Lab Results   Component Value Date/Time    Sodium 141 07/17/2022 03:04 AM    Potassium 4.5 07/17/2022 03:04 AM    Chloride 113 (H) 07/17/2022 03:04 AM    CO2 21 07/17/2022 03:04 AM    Anion gap 7 07/17/2022 03:04 AM    Glucose 110 (H) 07/17/2022 03:04 AM    BUN 43 (H) 07/17/2022 03:04 AM    Creatinine 1.96 (H) 07/17/2022 03:04 AM    BUN/Creatinine ratio 22 (H) 07/17/2022 03:04 AM    GFR est AA 30 (L) 07/17/2022 03:04 AM    GFR est non-AA 25 (L) 07/17/2022 03:04 AM    Calcium 7.6 (L) 07/17/2022 03:04 AM    Bilirubin, total 0.2 07/13/2022 02:37 PM Alk. phosphatase 88 07/13/2022 02:37 PM    Protein, total 6.0 (L) 07/13/2022 02:37 PM    Albumin 1.8 (L) 07/13/2022 02:37 PM    Globulin 4.2 (H) 07/13/2022 02:37 PM    A-G Ratio 0.4 (L) 07/13/2022 02:37 PM    ALT (SGPT) 25 07/13/2022 02:37 PM    AST (SGOT) 31 07/13/2022 02:37 PM     CT Results (most recent):  Results from Hospital Encounter encounter on 07/13/22    CT HEAD WO CONT    Narrative  INDICATION: transient AMS    COMPARISON: 12/15/2020. EXAM: CT HEAD without contrast.    TECHNIQUE: Unenhanced CT Head is performed. CT dose reduction was achieved  through use of a standardized protocol tailored for this examination and  automatic exposure control for dose modulation. FINDINGS:  No acute infarct is seen. 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 There is  a right basal ganglia lacune. There are left cerebellar lacunae. There is no apparent mass on unenhanced imaging. There is no bleed, shift,  obstructive hydrocephalus or significant extra-axial fluid collection. Bone windows show new paranasal sinus mucosal thickening and fluid levels. Impression  1. No acute intracranial finding. 2. Stable chronic ischemic disease. 3. Paranasal sinusitis. Assessment:     1. Gastrointestinal tumor of the stomach with metastasis to the liver and retroperitoneal nodes    ECOG PS 2  Intent of Treatment - palliative  Prognosis: guarded    This is an incurable and life threatening illness    S/P partial gastrectomy 06/22/2021  Standard of care for the treatment of metastatic GIST is imatinib. Taking Imatinib 400 mg PO daily. Tolerating well  Detailed assessment of side effects was performed. CT Abd/pelvis (10/26/2021) - good response to therapy    The disease has a high risk of progression and the treatment also carries a substantial risk of side effects. All of this was assessed during this visit. 2. DM     Followed by endocrinology - Dr. Mary Herman      3.  Hypertension    See PCP -Did not take meds today      Plan:       1. Continue Imatinib   2. Labs: CBC. CMP   3. Repeat scans  4. Follow up 3 months  5. Follow up with PCP for BP management. 6. MRI abd ordered but not scheduled    Attestation of attending physician. I personally saw and evaluated the patient and performed the key components of medical decision making with Kd Pike NP. I reviewed and verified the above documentation and modified it as needed. Ms Angela Banks is a women with a diagnosis of advanced GIST. She is receiving palliative therapy. Vitals is normal  Exam reveals thin, underweight women  Chest is clear, heart sounds regular  Abdomen is soft  Alert and oriented     She will continue systemic therapy. I obtained history, performed physical exam, reviewed labs and imaging and communicated the treatment plan to the patient. MRI abd has been ordered. Labs slips were given. Signed by: Finn Hung MD                     November 18, 2022        CC. Austyn Mccloud MD  CC.  Sangita Crabtree MD

## 2022-11-18 NOTE — PROGRESS NOTES
77 y/o AAF here for follow up appointment for GIST. Pt is on imatinib. Pt says she is still getting it in the mail fine. Her medication list says imatnib once a week but this RN asked the friend to confirm she takes it once daily. Pt BP is high. Rechecked it several times on different arms. She did not take her BP med this morning yet. She denies headache but says she is sleepy. I educated her on the importance of taking her BP medication. She will go home and take her medication and rest, prop her feet up and drink water. 1. Have you been to the ER, urgent care clinic since your last visit? Hospitalized since your last visit? No    2. Have you seen or consulted any other health care providers outside of the [address] [place] [address] since your last visit? Include any pap smears or colon screening. 10/1/2022 for shingles and was given valtrex for it which now her shingles are gone.

## 2022-11-18 NOTE — LETTER
11/18/2022    Patient: Simi Guerrero   YOB: 1945   Date of Visit: 11/18/2022     Jameel Medrano MD  [address]  [phone]  Via Fax: [phone]    Dear Jameel Medrano MD,      Thank you for referring Ms. Simi Guerrero to [address] [place] [address] for evaluation. My notes for this consultation are attached. If you have questions, please do not hesitate to call me. I look forward to following your patient along with you.       Sincerely,    Jose Mcleod MD

## 2023-01-24 ENCOUNTER — APPOINTMENT (OUTPATIENT)
Dept: CT IMAGING | Age: 78
End: 2023-01-24
Attending: EMERGENCY MEDICINE
Payer: MEDICARE

## 2023-01-24 ENCOUNTER — HOSPITAL ENCOUNTER (EMERGENCY)
Age: 78
Discharge: HOME | End: 2023-01-24
Attending: EMERGENCY MEDICINE
Payer: MEDICARE

## 2023-01-24 VITALS
RESPIRATION RATE: 14 BRPM | HEIGHT: 62 IN | HEART RATE: 54 BPM | WEIGHT: 140 LBS | TEMPERATURE: 98.1 F | OXYGEN SATURATION: 100 % | SYSTOLIC BLOOD PRESSURE: 183 MMHG | DIASTOLIC BLOOD PRESSURE: 73 MMHG | BODY MASS INDEX: 25.76 KG/M2

## 2023-01-24 DIAGNOSIS — I10 PRIMARY HYPERTENSION: Primary | ICD-10-CM

## 2023-01-24 DIAGNOSIS — R53.81 MALAISE AND FATIGUE: ICD-10-CM

## 2023-01-24 DIAGNOSIS — R53.83 MALAISE AND FATIGUE: ICD-10-CM

## 2023-01-24 LAB
ALBUMIN SERPL-MCNC: 1.8 G/DL (ref 3.5–5)
ALBUMIN/GLOB SERPL: 0.4 (ref 1.1–2.2)
ALP SERPL-CCNC: 424 U/L (ref 45–117)
ALT SERPL-CCNC: 77 U/L (ref 12–78)
AMMONIA PLAS-SCNC: <10 UMOL/L
ANION GAP SERPL CALC-SCNC: 5 MMOL/L (ref 5–15)
APPEARANCE UR: CLEAR
AST SERPL-CCNC: 136 U/L (ref 15–37)
BACTERIA URNS QL MICRO: NEGATIVE /HPF
BASOPHILS # BLD: 0 K/UL (ref 0–0.1)
BASOPHILS NFR BLD: 1 % (ref 0–1)
BILIRUB SERPL-MCNC: 1 MG/DL (ref 0.2–1)
BILIRUB UR QL: NEGATIVE
BUN SERPL-MCNC: 31 MG/DL (ref 6–20)
BUN/CREAT SERPL: 17 (ref 12–20)
CALCIUM SERPL-MCNC: 8.4 MG/DL (ref 8.5–10.1)
CHLORIDE SERPL-SCNC: 112 MMOL/L (ref 97–108)
CO2 SERPL-SCNC: 24 MMOL/L (ref 21–32)
COLOR UR: ABNORMAL
COVID-19 RAPID TEST, COVR: NOT DETECTED
CREAT SERPL-MCNC: 1.83 MG/DL (ref 0.55–1.02)
DIFFERENTIAL METHOD BLD: ABNORMAL
EOSINOPHIL # BLD: 0.1 K/UL (ref 0–0.4)
EOSINOPHIL NFR BLD: 1 % (ref 0–7)
EPITH CASTS URNS QL MICRO: ABNORMAL /LPF
ERYTHROCYTE [DISTWIDTH] IN BLOOD BY AUTOMATED COUNT: 15.9 % (ref 11.5–14.5)
GLOBULIN SER CALC-MCNC: 4.8 G/DL (ref 2–4)
GLUCOSE SERPL-MCNC: 97 MG/DL (ref 65–100)
GLUCOSE UR STRIP.AUTO-MCNC: 100 MG/DL
HCT VFR BLD AUTO: 36.7 % (ref 35–47)
HGB BLD-MCNC: 11.3 G/DL (ref 11.5–16)
HGB UR QL STRIP: ABNORMAL
HYALINE CASTS URNS QL MICRO: ABNORMAL /LPF (ref 0–2)
IMM GRANULOCYTES # BLD AUTO: 0 K/UL (ref 0–0.04)
IMM GRANULOCYTES NFR BLD AUTO: 0 % (ref 0–0.5)
KETONES UR QL STRIP.AUTO: NEGATIVE MG/DL
LEUKOCYTE ESTERASE UR QL STRIP.AUTO: NEGATIVE
LYMPHOCYTES # BLD: 1.2 K/UL (ref 0.8–3.5)
LYMPHOCYTES NFR BLD: 17 % (ref 12–49)
MCH RBC QN AUTO: 26.8 PG (ref 26–34)
MCHC RBC AUTO-ENTMCNC: 30.8 G/DL (ref 30–36.5)
MCV RBC AUTO: 87.2 FL (ref 80–99)
MONOCYTES # BLD: 0.6 K/UL (ref 0–1)
MONOCYTES NFR BLD: 8 % (ref 5–13)
NEUTS SEG # BLD: 5 K/UL (ref 1.8–8)
NEUTS SEG NFR BLD: 73 % (ref 32–75)
NITRITE UR QL STRIP.AUTO: NEGATIVE
NRBC # BLD: 0 K/UL (ref 0–0.01)
NRBC BLD-RTO: 0 PER 100 WBC
PH UR STRIP: 6.5 (ref 5–8)
PLATELET # BLD AUTO: 357 K/UL (ref 150–400)
PMV BLD AUTO: 9.6 FL (ref 8.9–12.9)
POTASSIUM SERPL-SCNC: 4.4 MMOL/L (ref 3.5–5.1)
PROT SERPL-MCNC: 6.6 G/DL (ref 6.4–8.2)
PROT UR STRIP-MCNC: >300 MG/DL
RBC # BLD AUTO: 4.21 M/UL (ref 3.8–5.2)
RBC #/AREA URNS HPF: ABNORMAL /HPF (ref 0–5)
SODIUM SERPL-SCNC: 141 MMOL/L (ref 136–145)
SOURCE, COVRS: NORMAL
SP GR UR REFRACTOMETRY: 1.02
UA: UC IF INDICATED,UAUC: ABNORMAL
UROBILINOGEN UR QL STRIP.AUTO: 1 EU/DL (ref 0.2–1)
WBC # BLD AUTO: 7 K/UL (ref 3.6–11)
WBC URNS QL MICRO: ABNORMAL /HPF (ref 0–4)

## 2023-01-24 PROCEDURE — 85025 COMPLETE CBC W/AUTO DIFF WBC: CPT

## 2023-01-24 PROCEDURE — 74176 CT ABD & PELVIS W/O CONTRAST: CPT

## 2023-01-24 PROCEDURE — 87635 SARS-COV-2 COVID-19 AMP PRB: CPT

## 2023-01-24 PROCEDURE — 82140 ASSAY OF AMMONIA: CPT

## 2023-01-24 PROCEDURE — 74011250637 HC RX REV CODE- 250/637: Performed by: EMERGENCY MEDICINE

## 2023-01-24 PROCEDURE — 87086 URINE CULTURE/COLONY COUNT: CPT

## 2023-01-24 PROCEDURE — 80053 COMPREHEN METABOLIC PANEL: CPT

## 2023-01-24 PROCEDURE — 99284 EMERGENCY DEPT VISIT MOD MDM: CPT

## 2023-01-24 PROCEDURE — 81001 URINALYSIS AUTO W/SCOPE: CPT

## 2023-01-24 PROCEDURE — 70450 CT HEAD/BRAIN W/O DYE: CPT

## 2023-01-24 PROCEDURE — 36415 COLL VENOUS BLD VENIPUNCTURE: CPT

## 2023-01-24 RX ORDER — AMLODIPINE BESYLATE 5 MG/1
10 TABLET ORAL
Status: COMPLETED | OUTPATIENT
Start: 2023-01-24 | End: 2023-01-24

## 2023-01-24 RX ORDER — ISOSORBIDE MONONITRATE 30 MG/1
60 TABLET, EXTENDED RELEASE ORAL
Status: COMPLETED | OUTPATIENT
Start: 2023-01-24 | End: 2023-01-24

## 2023-01-24 RX ADMIN — ISOSORBIDE MONONITRATE 60 MG: 30 TABLET, EXTENDED RELEASE ORAL at 22:00

## 2023-01-24 RX ADMIN — AMLODIPINE BESYLATE 10 MG: 5 TABLET ORAL at 22:00

## 2023-01-24 NOTE — ED PROVIDER NOTES
[facility] EMERGENCY DEPT  EMERGENCY DEPARTMENT ENCOUNTER       Pt Name: Shyann Jameson  MRN: [id number]  [place] 1945  Date of evaluation: 2023  Provider: Kristin Peterson MD   PCP: Sherren Roux, MD  Note Started: 5:01 PM 23     CHIEF COMPLAINT       Chief Complaint   Patient presents with    Altered mental status    Lethargy     Pt arrives via EMS with report of AMS, lethargy and possible UTI per home health. Home health nurse did not know patient's baseline mental status. Patient a/o x 1 (to self and ). HISTORY OF PRESENT ILLNESS: 1 or more elements      History From: patient, son History limited by: dementia     Shyann Jameson is a 70 y.o. female who presents dementia, diabetes, GERD, hypertension, hyperlipidemia, who presents with concerns for UTI and confusion. Apparently, home health nurse called because the patient was more confused than her normal, they were concerned for UTI. To me patient states that she has had some stomach pain today but then tells me that she is just hungry. She also reports that she had some nausea and vomiting yesterday but is not having this anymore. Nursing Notes were all reviewed and agreed with or any disagreements were addressed in the HPI. REVIEW OF SYSTEMS        Positives and Pertinent negatives as per HPI.     PAST HISTORY     Past Medical History:  Past Medical History:   Diagnosis Date    Anemia, unspecified 10/6/2020    Arrhythmia     Arthritis     CAD (coronary artery disease)     Cancer (HCC)     Chronic bronchitis ([address]     per pt:  as of 1/9/15 pt denies any ARENAS or SOB    Diabetes ([address] Dx approx     Dr Ezequiel Wilson (in connect care)    GERD (gastroesophageal reflux disease)     Hypercholesteremia     Hypertension        Past Surgical History:  Past Surgical History:   Procedure Laterality Date    HX CHOLECYSTECTOMY      HX COLONOSCOPY      HX CORONARY STENT PLACEMENT  2015    HX DILATION AND CURETTAGE      HX OTHER SURGICAL 2021    Laparoscopic hand-assisted partial gastrectomy with core needle liver biopsy. [facility] PROCEDURE UNLIST  2015    UPPER GI ENDOSCOPY,BIOPSY  10/6/2020            Family History:  Family History   Problem Relation Age of Onset    Diabetes Mother     Heart Disease Mother     Hypertension Mother     Stroke Father     Heart Disease Brother     Heart Disease Brother     Stroke Brother     HIV/AIDS Brother        Social History:  Social History     Tobacco Use    Smoking status: Former     Packs/day: 0.50     Years: 10.00     Pack years: 5.00     Types: Cigarettes     Quit date: 1972     Years since quittin.2    Smokeless tobacco: Never    Tobacco comments:     quit 50 years ago   Vaping Use    Vaping Use: Never used   Substance Use Topics    Alcohol use: No    Drug use: No       Allergies: Allergies   Allergen Reactions    Metformin Other (comments)     GI side effects. Not a true allergy       CURRENT MEDICATIONS      Previous Medications    AMLODIPINE (NORVASC) 10 MG TABLET    Take 1 Tab by mouth daily. ATORVASTATIN (LIPITOR) 40 MG TABLET    Take 1 Tab by mouth nightly. CARVEDILOL (COREG) 25 MG TABLET    Take 25 mg by mouth two (2) times daily (with meals). IMATINIB MESYLATE (IMATINIB PO)    Take 400 mg by mouth daily. ISOSORBIDE MONONITRATE ER (IMDUR) 30 MG TABLET        LANCETS MISC    PUSH BUTTON LANCETS. Check blood sugar 4 times per day due to hypoglycemic events. Dx:E11.42, E16.2    LOSARTAN (COZAAR) 100 MG TABLET    Take 0.5 Tablets by mouth daily. MULTIVITAMIN, TX-IRON-CA-MIN (THERA-M W/ IRON) 9 MG IRON-400 MCG TAB TABLET    Take 1 Tab by mouth two (2) times a day. Centrum silver    TORSEMIDE (DEMADEX) 20 MG TABLET    Take 20 mg by mouth as needed.        SCREENINGS               No data recorded         PHYSICAL EXAM      ED Triage Vitals   ED Encounter Vitals Group      BP 23 1459 (!) 191/99      Pulse (Heart Rate) 23 1459 (!) 56      Resp Rate 01/24/23 1459 16      Temp 01/24/23 1611 98.1 °F (36.7 °C)      Temp src --       O2 Sat (%) 01/24/23 1459 100 %      Weight 01/24/23 1552 140 lb      Height 01/24/23 1552 5' 2\"        Physical Exam  Vitals and nursing note reviewed. Constitutional:       General: She is not in acute distress. Appearance: She is well-developed. HENT:      Head: Normocephalic and atraumatic. Eyes:      Conjunctiva/sclera: Conjunctivae normal.      Pupils: Pupils are equal, round, and reactive to light. Cardiovascular:      Rate and Rhythm: Normal rate and regular rhythm. Pulmonary:      Effort: Pulmonary effort is normal. No respiratory distress. Breath sounds: Normal breath sounds. No stridor. Abdominal:      General: There is no distension. Palpations: Abdomen is soft. Tenderness: There is no abdominal tenderness. Musculoskeletal:         General: Normal range of motion. Cervical back: Normal range of motion. Skin:     General: Skin is warm and dry. Neurological:      Mental Status: She is alert and oriented to person, place, and time. Psychiatric:         Mood and Affect: Mood normal.        DIAGNOSTIC RESULTS   LABS:     Recent Results (from the past 12 hour(s))   CBC WITH AUTOMATED DIFF    Collection Time: 01/24/23  4:04 PM   Result Value Ref Range    WBC 7.0 3.6 - 11.0 K/uL    RBC 4.21 3.80 - 5.20 M/uL    HGB 11.3 (L) 11.5 - 16.0 g/dL    HCT 36.7 35.0 - 47.0 %    MCV 87.2 80.0 - 99.0 FL    MCH 26.8 26.0 - 34.0 PG    MCHC 30.8 30.0 - 36.5 g/dL    RDW 15.9 (H) 11.5 - 14.5 %    PLATELET 304 268 - 767 K/uL    MPV 9.6 8.9 - 12.9 FL    NRBC 0.0 0  WBC    ABSOLUTE NRBC 0.00 0.00 - 0.01 K/uL    NEUTROPHILS 73 32 - 75 %    LYMPHOCYTES 17 12 - 49 %    MONOCYTES 8 5 - 13 %    EOSINOPHILS 1 0 - 7 %    BASOPHILS 1 0 - 1 %    IMMATURE GRANULOCYTES 0 0.0 - 0.5 %    ABS. NEUTROPHILS 5.0 1.8 - 8.0 K/UL    ABS. LYMPHOCYTES 1.2 0.8 - 3.5 K/UL    ABS. MONOCYTES 0.6 0.0 - 1.0 K/UL    ABS. EOSINOPHILS 0.1 0.0 - 0.4 K/UL    ABS. BASOPHILS 0.0 0.0 - 0.1 K/UL    ABS. IMM. GRANS. 0.0 0.00 - 0.04 K/UL    DF AUTOMATED     METABOLIC PANEL, COMPREHENSIVE    Collection Time: 01/24/23  4:04 PM   Result Value Ref Range    Sodium 141 136 - 145 mmol/L    Potassium 4.4 3.5 - 5.1 mmol/L    Chloride 112 (H) 97 - 108 mmol/L    CO2 24 21 - 32 mmol/L    Anion gap 5 5 - 15 mmol/L    Glucose 97 65 - 100 mg/dL    BUN 31 (H) 6 - 20 MG/DL    Creatinine 1.83 (H) 0.55 - 1.02 MG/DL    BUN/Creatinine ratio 17 12 - 20      eGFR 28 (L) >60 ml/min/1.73m2    Calcium 8.4 (L) 8.5 - 10.1 MG/DL    Bilirubin, total 1.0 0.2 - 1.0 MG/DL    ALT (SGPT) 77 12 - 78 U/L    AST (SGOT) 136 (H) 15 - 37 U/L    Alk. phosphatase 424 (H) 45 - 117 U/L    Protein, total 6.6 6.4 - 8.2 g/dL    Albumin 1.8 (L) 3.5 - 5.0 g/dL    Globulin 4.8 (H) 2.0 - 4.0 g/dL    A-G Ratio 0.4 (L) 1.1 - 2.2     URINALYSIS W/ REFLEX CULTURE    Collection Time: 01/24/23  4:04 PM    Specimen: Urine   Result Value Ref Range    Color YELLOW/STRAW      Appearance CLEAR CLEAR      Specific gravity 1.018      pH (UA) 6.5 5.0 - 8.0      Protein >300 (A) NEG mg/dL    Glucose 100 (A) NEG mg/dL    Ketone Negative NEG mg/dL    Bilirubin Negative NEG      Blood SMALL (A) NEG      Urobilinogen 1.0 0.2 - 1.0 EU/dL    Nitrites Negative NEG      Leukocyte Esterase Negative NEG      UA:UC IF INDICATED CULTURE NOT INDICATED BY UA RESULT CNI      WBC 0-4 0 - 4 /hpf    RBC 0-5 0 - 5 /hpf    Epithelial cells MODERATE (A) FEW /lpf    Bacteria Negative NEG /hpf    Hyaline cast 2-5 0 - 2 /lpf   AMMONIA    Collection Time: 01/24/23  6:12 PM   Result Value Ref Range    Ammonia, plasma <10 <32 UMOL/L   COVID-19 RAPID TEST    Collection Time: 01/24/23  9:48 PM   Result Value Ref Range    Specimen source Nasopharyngeal      COVID-19 rapid test Not detected NOTD          EKG:  If performed, independent interpretation documented below in the MDM section     RADIOLOGY:  Non-plain film images such as CT, Ultrasound and MRI are read by the radiologist. Plain radiographic images are visualized and preliminarily interpreted by the ED Provider with the findings documented in the MDM section. Interpretation per the Radiologist below, if available at the time of this note:     CT HEAD WO CONT    Result Date: 1/24/2023  Indication:  ams Comparison: CT July 2022 Findings: 5 mm axial images were obtained from the skull base through the vertex. CT dose reduction was achieved through the use of a standardized protocol tailored for this examination and automatic exposure control for dose modulation. The ventricles and cortical sulci are prominent, compatible with age related volume loss. There is no evidence of intracranial hemorrhage, mass, mass effect, or acute infarct. There is chronic encephalomalacia in the right occipital lobe. There is a chronic right thalamic infarction. There is a chronic left cerebellar infarct. There is periventricular white matter disease. No extra-axial fluid collections are seen. The visualized paranasal sinuses and mastoid air cells are clear. The orbital structures are unremarkable. No osseous abnormalities are seen. 1. No evidence of acute infarct or intracranial hemorrhage. 2. Periventricular white matter disease is likely secondary to chronic small vessel ischemic changes. 3. Chronic right occipital encephalomalacia, as well as additional chronic infarctions. CT ABD PELV WO CONT    Result Date: 1/24/2023  EXAM: CT ABD PELV WO CONT INDICATION: abd pain ; history of GI stromal tumor COMPARISON: CT March 2022 IV CONTRAST: None. ORAL CONTRAST: Not given TECHNIQUE: Thin axial images were obtained through the abdomen and pelvis. Coronal and sagittal reformats were generated. CT dose reduction was achieved through use of a standardized protocol tailored for this examination and automatic exposure control for dose modulation.  The absence of intravenous contrast material reduces the sensitivity for evaluation of the vasculature and solid organs. FINDINGS: LOWER THORAX: Trace right pleural effusion with mild right basilar atelectasis. LIVER: Multiple masses throughout the liver, all of which are increased in size and now solid. There is also new perihepatic ascites. BILIARY TREE: Status post cholecystectomy. CBD is not dilated. SPLEEN: within normal limits. PANCREAS: No focal abnormality. ADRENALS: Unremarkable. KIDNEYS/URETERS: Single 5 mm left renal stone. No hydronephrosis or hydroureter. STOMACH: Unremarkable. SMALL BOWEL: No dilatation or wall thickening. COLON: Diverticulosis of the colon. APPENDIX: Grossly normal PERITONEUM: Moderate perihepatic and pelvic ascites. RETROPERITONEUM: Diffuse calcific aortic atherosclerosis REPRODUCTIVE ORGANS: Prostate is noted, with calcified uterine fibroids. URINARY BLADDER: No mass or calculus. BONES: No destructive bone lesion. ABDOMINAL WALL: No mass or hernia. ADDITIONAL COMMENTS: N/A     1. Diffuse hepatic metastatic disease, which has significantly worsened since the prior study. 2. New moderate perihepatic and pelvic ascites. 3. Trace right pleural effusion. PROCEDURES   Unless otherwise noted below, none  Procedures     CRITICAL CARE TIME   none    EMERGENCY DEPARTMENT COURSE and DIFFERENTIAL DIAGNOSIS/MDM   Vitals:    Vitals:    01/24/23 1900 01/24/23 2000 01/24/23 2100 01/24/23 2200   BP: (!) 204/68 (!) 205/73 (!) 188/53 (!) 182/83   Pulse: (!) 54 (!) 54 (!) 52 68   Resp: 15 17 15    Temp:       SpO2: 100% 100%  98%   Weight:       Height:            Patient was given the following medications:  Medications   amLODIPine (NORVASC) tablet 10 mg (10 mg Oral Given 1/24/23 2200)   isosorbide mononitrate ER (IMDUR) tablet 60 mg (60 mg Oral Given 1/24/23 2200)       Medical Decision Making  42-year-old female who presents with concerns for confusion and possible UTI. To me patient reports abdominal pain and nausea.   She is alert and oriented x3 on my evaluation. She has some very mild suprapubic tenderness to palpation on exam but otherwise has a nonfocal exam.  Will check basic lab work to rule out severe electrolyte derangements or anemia contributing to her increased confusion. Will check urinalysis to rule out UTI. Also check head CT to rule out acute intracranial process though I think this is less likely. No reports of falls or trauma. We will check imaging of the abdomen as well given patient's reports of abdominal pain. Amount and/or Complexity of Data Reviewed  Independent Historian: caregiver  Labs: ordered. Decision-making details documented in ED Course. Radiology: ordered. Decision-making details documented in ED Course. Risk  Prescription drug management. ED Course as of 01/24/23 2326 Tue Jan 24, 2023   1553 RN spoke with family, apparently home health nurse called 911 bc the patient was acting \"altered\" at this house and was incontinent and the urine had an odor [YAJAIRA]   3529 I had a long conversation with the patient's son, Renny Alatorre, regarding presentation today as well as lab results. He states in the past when she has seemed somewhat more fatigued she has had a urinary tract infection. I discussed that she does not currently have 1 based on lab work and urinalysis I performed today. Also discussed the finding of worsening metastatic disease on CT. Stated that I will send a urine culture. He reports she was at [facility] last month with a similar presentation and had negative MRI imaging of her head. He is concerned that she is likely developing some dementia but she has not been formally tested. Discussed that she has been awake and alert with me here and has been ambulatory without issue. We will give a dose of her blood pressure medication with plans for discharge home. Son is comfortable with this plan. He request that we call when the patient is being discharged.  [YAJAIRA]      ED Course User Index  [YAJAIRA] Marita Chuck Carlson MD         FINAL IMPRESSION     1. Primary hypertension    2. Malaise and fatigue          DISPOSITION/PLAN   Leon Beverage  results have been reviewed with her. She has been counseled regarding her diagnosis, treatment, and plan. She verbally conveys understanding and agreement of the signs, symptoms, diagnosis, treatment and prognosis and additionally agrees to follow up as discussed. She also agrees with the care-plan and conveys that all of her questions have been answered. I have also provided discharge instructions for her that include: educational information regarding their diagnosis and treatment, and list of reasons why they would want to return to the ED prior to their follow-up appointment, should her condition change. CLINICAL IMPRESSION    Discharge Note: The patient is stable for discharge home. The signs, symptoms, diagnosis, and discharge instructions have been discussed, understanding conveyed, and agreed upon. The patient is to follow up as recommended or return to ER should their symptoms worsen. PATIENT REFERRED TO:  Follow-up Information       Follow up With Specialties Details Why Contact Info    Isac Katz MD Internal Medicine Physician Schedule an appointment as soon as possible for a visit   [address] [place] [address]  [phone]                DISCHARGE MEDICATIONS:  Current Discharge Medication List            DISCONTINUED MEDICATIONS:  Current Discharge Medication List          I am the Primary Clinician of Record. Mary Bettencourt MD (electronically signed)    (Please note that parts of this dictation were completed with voice recognition software. Quite often unanticipated grammatical, syntax, homophones, and other interpretive errors are inadvertently transcribed by the computer software. Please disregards these errors.  Please excuse any errors that have escaped final proofreading.)

## 2023-01-24 NOTE — ED NOTES
Called patient son at this time. Per patients son, the home health nurse, the patients urine \"smelt strong\" , and patient was acting \"altered at home. \" per patients son, when this happens, its usually due to a UTI. Pt does have a hx of periodic confusion, per the sons statement. Pt is currently A&Ox3, on RA, and states she is here to be r/o for UTI.

## 2023-01-25 LAB
BACTERIA SPEC CULT: ABNORMAL
CC UR VC: ABNORMAL
SERVICE CMNT-IMP: ABNORMAL

## 2023-01-25 NOTE — ED NOTES
Updated son at this time, advised son of impending discharge, Son advises that pt is to be discharged [facility] [address] [facility] [address] [address] [id number]. Hospital to home contacted for transport needs.

## 2023-01-25 NOTE — ED NOTES
TRANSFER - OUT REPORT:    Verbal report given to Vikas Zurita RN (name) on Ramila Maya       Report consisted of patients Situation, Background, Assessment and   Recommendations(SBAR). Information from the following report(s) SBAR and ED Summary was reviewed with the receiving nurse. Lines:   Peripheral IV 49/40/11 Left Basilic (Active)   Site Assessment Clean, dry, & intact 01/24/23 1730   Phlebitis Assessment 0 01/24/23 1730   Infiltration Assessment 0 01/24/23 1730   Dressing Status Clean, dry, & intact 01/24/23 1730   Dressing Type Tape;Transparent 01/24/23 1730   Hub Color/Line Status Pink;Flushed;Patent 01/24/23 1730        Opportunity for questions and clarification was provided.

## 2023-01-25 NOTE — ED NOTES
I have reviewed verbal and written discharge instructions with the patient. The patient verbalized understanding. IV removed. Pt alert and ambulatory to wheelchair at time of discharge.

## 2023-02-10 ENCOUNTER — TELEPHONE (OUTPATIENT)
Dept: ONCOLOGY | Age: 78
End: 2023-02-10

## 2023-02-10 NOTE — TELEPHONE ENCOUNTER
[facility] We will hold off on prescribing until pt is discharged from [address] [place] [address].

## 2023-02-10 NOTE — TELEPHONE ENCOUNTER
[address] [place] [address] called & left a  requesting a new RX . Call [phone] Ref #[id number].

## 2023-02-16 ENCOUNTER — TELEPHONE (OUTPATIENT)
Dept: ONCOLOGY | Age: 78
End: 2023-02-16

## 2023-02-16 NOTE — TELEPHONE ENCOUNTER
Vm left stating that pt has an apt today but wouldn't be discharged in time for apt. Requesting to r/s  for Friday or Monday. Please call [phone].

## 2024-05-06 NOTE — PROGRESS NOTES
SPEECH PATHOLOGY BEDSIDE SWALLOW EVALUATION/DISCHARGE  Patient: Jasper Suarez [de-identified]78 y.o. female)  Date: 7/14/2022  Primary Diagnosis: Elevated troponin [R77.8]  Stroke [facility]) [I63.9]       Precautions:       ASSESSMENT :  Based on the objective data described below, the patient presents with no oral or pharyngeal dysphagia. Patient tolerating regular diet consistency, thin liquids via successive sips and meds whole with water without any s/s aspiration. Patient with clear, fluent speech that she reports is back to baseline. Plan for MRI; however, patient reporting claustrophobia. CT negative. Skilled acute therapy provided by a speech-language pathologist is not indicated at this time. PLAN :  Recommendations:  -- regular/thins  Discharge Recommendations: None     SUBJECTIVE:   Patient alert, pleasant    OBJECTIVE:     Past Medical History:   Diagnosis Date    Anemia, unspecified 10/6/2020    Arrhythmia     Arthritis     CAD (coronary artery disease)     Cancer ([address]     Chronic bronchitis [facility]     per pt:  as of 1/9/15 pt denies any ARENAS or SOB    Diabetes ([address] Dx approx 2009    Dr Ángel Boucher (in connect care)    GERD (gastroesophageal reflux disease)     Hypercholesteremia     Hypertension      Past Surgical History:   Procedure Laterality Date    HX CHOLECYSTECTOMY  6/12    HX COLONOSCOPY      HX CORONARY STENT PLACEMENT  8/25/2015    HX DILATION AND CURETTAGE      HX OTHER SURGICAL  06/22/2021    Laparoscopic hand-assisted partial gastrectomy with core needle liver biopsy.     [place] CARDIAC SURG PROCEDURE UNLIST  2015    UPPER GI ENDOSCOPY,BIOPSY  10/6/2020          Prior Level of Function/Home Situation:   Home Situation  Home Environment: Private residence  # Steps to Enter: 3  One/Two Story Residence: One story  Living Alone: Yes  Support Systems: Child(elle),Friend/Neighbor  Patient Expects to be Discharged to[de-identified] Home  Current DME Used/Available at Home: None  Diet prior to admission: reg/thin   Current Diet:reg/thins   Cognitive and Communication Status:  Neurologic State: Alert  Orientation Level: Appropriate for age  Cognition: Follows commands  Perception: Appears intact  Perseveration: No perseveration noted  Safety/Judgement: Awareness of environment  Oral Assessment:  Oral Assessment  Labial: No impairment  Dentition: Intact  Oral Hygiene: clean, moist  Lingual: No impairment  Velum: Unable to visualize  Mandible: No impairment  P.O. Trials:  Patient Position: up on edge of bed  Vocal quality prior to P.O.: No impairment  Consistency Presented: Thin liquid  How Presented: Successive swallows;Straw;Self-fed/presented     Bolus Acceptance: No impairment  Bolus Formation/Control: No impairment     Propulsion: No impairment  Oral Residue: None  Initiation of Swallow: No impairment  Laryngeal Elevation: Functional  Aspiration Signs/Symptoms: None  Pharyngeal Phase Characteristics: No impairment, issues, or problems   Effective Modifications: None  Cues for Modifications: None       Oral Phase Severity: No impairment  Pharyngeal Phase Severity : No impairment  NOMS:   The NOMS functional outcome measure was used to quantify this patient's level of swallowing impairment. Based on the NOMS, the patient was determined to be at level 7 for swallow function     NOMS Swallowing Levels:  Level 1 (CN): NPO  Level 2 (CM): NPO but takes consistency in therapy  Level 3 (CL): Takes less than 50% of nutrition p.o. and continues with nonoral feedings; and/or safe with mod cues; and/or max diet restriction  Level 4 (CK): Safe swallow but needs mod cues; and/or mod diet restriction; and/or still requires some nonoral feeding/supplements  Level 5 (CJ): Safe swallow with min diet restriction; and/or needs min cues  Level 6 (CI): Independent with p.o.; rare cues; usually self cues; may need to avoid some foods or needs extra time  Level 7 ([address] [place] [address]): Independent for all p.o.  RASHAUN. (2003).  National Outcomes Measurement System (NOMS): Adult Speech-Language Pathology User's Guide. Pain:  Pain Scale 1: Numeric (0 - 10)  Pain Intensity 1: 0     After treatment:   Patient left in no apparent distress in bed, Call bell within reach and Nursing notified    COMMUNICATION/EDUCATION:       The patient's plan of care including recommendations, planned interventions, and recommended diet changes were discussed with: Registered nurse.      Thank you for this referral.  PAUL Hernandez  Time Calculation: 8 mins present

## (undated) DEVICE — BAG SPEC BIOHZRD 10 X 10 IN --

## (undated) DEVICE — SUT SLK 3-0 30IN SH BLK --

## (undated) DEVICE — MEDI-VAC YANK SUCT HNDL W/TPRD BULBOUS TIP & NON-CONDUCTIVE: Brand: CARDINAL HEALTH

## (undated) DEVICE — CATH IV AUTOGRD BC PNK 20GA 25 -- INSYTE

## (undated) DEVICE — DERMABOND SKIN ADH 0.7ML -- DERMABOND ADVANCED 12/BX

## (undated) DEVICE — ACCESS PLATFORM FOR MINIMALLY INVASIVE SURGERY.: Brand: GELPORT® LAPAROSCOPIC  SYSTEM

## (undated) DEVICE — SURGICAL PROCEDURE PACK BASIN MAJ SET CUST NO CAUT

## (undated) DEVICE — MARYLAND JAW LAPAROSCOPIC SEALER/DIVIDER COATED: Brand: LIGASURE

## (undated) DEVICE — Z DISCONTINUED PER MEDLINE LINE GAS SAMPLING O2/CO2 LNG AD 13 FT NSL W/ TBNG FILTERLINE

## (undated) DEVICE — Device

## (undated) DEVICE — SOL IRR SOD CL 0.9% 3000ML --

## (undated) DEVICE — SET ADMIN 16ML TBNG L100IN 2 Y INJ SITE IV PIGGY BK DISP

## (undated) DEVICE — Device: Brand: SINGLE USE ASPIRATION NEEDLE

## (undated) DEVICE — BLOCK BITE ENDOSCP AD 21 MM W/ DIL BLU LF DISP

## (undated) DEVICE — TROCAR: Brand: KII® SLEEVE

## (undated) DEVICE — SOLUTION IRRIG 1000ML H2O STRL BLT

## (undated) DEVICE — LAPAROSCOPIC TROCAR SLEEVE/SINGLE USE: Brand: KII® OPTICAL ACCESS SYSTEM

## (undated) DEVICE — STAPLER INT L34CM 60MM LNG ENDOSCP ARTC PWR + ECHELON FLX

## (undated) DEVICE — SYR 3ML LL TIP 1/10ML GRAD --

## (undated) DEVICE — 1200 GUARD II KIT W/5MM TUBE W/O VAC TUBE: Brand: GUARDIAN

## (undated) DEVICE — MAX-CORE® DISPOSABLE CORE BIOPSY INSTRUMENT, 18G X 25CM: Brand: MAX-CORE

## (undated) DEVICE — CONTAINER SPEC 20 ML LID NEUT BUFF FORMALIN 10 % POLYPR STS

## (undated) DEVICE — Device: Brand: BALLOON3

## (undated) DEVICE — VISUALIZATION SYSTEM: Brand: CLEARIFY

## (undated) DEVICE — TOTAL TRAY, 16FR 10ML SIL FOLEY, URN: Brand: MEDLINE

## (undated) DEVICE — SUTURE VCRL SZ 3-0 L18IN ABSRB UD L26MM SH 1/2 CIR J864D

## (undated) DEVICE — SUTURE VCRL SZ 4-0 L27IN ABSRB UD L19MM PS-2 3/8 CIR PRIM J426H

## (undated) DEVICE — PREP SKN CHLRAPRP APL 26ML STR --

## (undated) DEVICE — ELECTRODE,RADIOTRANSLUCENT,FOAM,5PK: Brand: MEDLINE

## (undated) DEVICE — SYR 10ML LUER LOK 1/5ML GRAD --

## (undated) DEVICE — SUTURE VCRL SZ 0 L18IN ABSRB VLT L36MM CT-1 1/2 CIR J740D

## (undated) DEVICE — GOWN,PREVENTION PLUS,XL,ST,24/CS: Brand: MEDLINE

## (undated) DEVICE — STERILE POLYISOPRENE POWDER-FREE SURGICAL GLOVES: Brand: PROTEXIS

## (undated) DEVICE — TOWEL SURG W17XL27IN STD BLU COT NONFENESTRATED PREWASHED

## (undated) DEVICE — NEEDLE HYPO 18GA L1.5IN PNK S STL HUB POLYPR SHLD REG BVL

## (undated) DEVICE — SUTURE PDS II SZ 1 L96IN ABSRB VLT TP-1 L65MM 1/2 CIR Z880G

## (undated) DEVICE — GARMENT,MEDLINE,DVT,INT,CALF,MED, GEN2: Brand: MEDLINE

## (undated) DEVICE — APPLICATOR ENDOSCP 30 CM W/ SNAP LCK DUPLOSPRAY MIS

## (undated) DEVICE — 3M™ IOBAN™ 2 ANTIMICROBIAL INCISE DRAPE 6640EZ: Brand: IOBAN™ 2

## (undated) DEVICE — SOL IRR SOD CL 0.9% 1000ML BTL --

## (undated) DEVICE — TOWEL 4 PLY TISS 19X30 SUE WHT

## (undated) DEVICE — RELOAD STPL H4.1X2MM DIA60MM THCK TISS GRN 6 ROW PWR GST B

## (undated) DEVICE — FORCEPS BX L160CM DIA8MM GRSP DISECT CUP TIP NONLOCKING ROT

## (undated) DEVICE — TROCAR: Brand: KII® OPTICAL ACCESS SYSTEM

## (undated) DEVICE — SUTURE MCRYL SZ 4-0 L18IN ABSRB UD L19MM PS-2 3/8 CIR PRIM Y496G

## (undated) DEVICE — YANKAUER,TAPERED BULBOUS TIP,W/O VENT: Brand: MEDLINE

## (undated) DEVICE — CANISTER, RIGID, 3000CC: Brand: MEDLINE INDUSTRIES, INC.

## (undated) DEVICE — GOWN,SIRUS,NONRNF,SETINSLV,2XL,18/CS: Brand: MEDLINE

## (undated) DEVICE — SUTURE PDS II SZ 1 L96IN ABSRB VLT XLH L70MM 1/2 CIR Z881G

## (undated) DEVICE — SOLIDIFIER FLD 2OZ 1500CC N DISINF IN BTL DISP SAFESORB

## (undated) DEVICE — PACK,BASIC,SIRUS,V: Brand: MEDLINE

## (undated) DEVICE — REM POLYHESIVE ADULT PATIENT RETURN ELECTRODE: Brand: VALLEYLAB

## (undated) DEVICE — ROCKER SWITCH PENCIL BLADE ELECTRODE, HOLSTER: Brand: EDGE

## (undated) DEVICE — ELECTRODE ES 36CM LAP FLAT L HK COAT DISP CLEANCOAT

## (undated) DEVICE — NEONATAL-ADULT SPO2 SENSOR: Brand: NELLCOR

## (undated) DEVICE — NEEDLE HYPO 22GA L1.5IN BLK S STL HUB POLYPR SHLD REG BVL

## (undated) DEVICE — BASIN EMSIS 16OZ GRAPHITE PLAS KID SHP MOLD GRAD FOR ORAL